# Patient Record
Sex: FEMALE | Race: WHITE | NOT HISPANIC OR LATINO
[De-identification: names, ages, dates, MRNs, and addresses within clinical notes are randomized per-mention and may not be internally consistent; named-entity substitution may affect disease eponyms.]

---

## 2017-01-10 PROBLEM — Z00.00 ENCOUNTER FOR PREVENTIVE HEALTH EXAMINATION: Status: ACTIVE | Noted: 2017-01-10

## 2017-04-17 ENCOUNTER — APPOINTMENT (OUTPATIENT)
Dept: OPHTHALMOLOGY | Facility: CLINIC | Age: 77
End: 2017-04-17

## 2017-06-15 ENCOUNTER — OUTPATIENT (OUTPATIENT)
Dept: OUTPATIENT SERVICES | Facility: HOSPITAL | Age: 77
LOS: 1 days | Discharge: HOME | End: 2017-06-15

## 2017-06-15 DIAGNOSIS — E87.2 ACIDOSIS: ICD-10-CM

## 2017-06-15 DIAGNOSIS — M06.9 RHEUMATOID ARTHRITIS, UNSPECIFIED: ICD-10-CM

## 2017-06-15 DIAGNOSIS — I82.409 ACUTE EMBOLISM AND THROMBOSIS OF UNSPECIFIED DEEP VEINS OF UNSPECIFIED LOWER EXTREMITY: ICD-10-CM

## 2017-06-15 DIAGNOSIS — E11.9 TYPE 2 DIABETES MELLITUS WITHOUT COMPLICATIONS: ICD-10-CM

## 2017-06-28 DIAGNOSIS — D50.8 OTHER IRON DEFICIENCY ANEMIAS: ICD-10-CM

## 2017-06-28 DIAGNOSIS — E78.2 MIXED HYPERLIPIDEMIA: ICD-10-CM

## 2017-06-28 DIAGNOSIS — M06.4 INFLAMMATORY POLYARTHROPATHY: ICD-10-CM

## 2017-09-13 ENCOUNTER — OUTPATIENT (OUTPATIENT)
Dept: OUTPATIENT SERVICES | Facility: HOSPITAL | Age: 77
LOS: 1 days | Discharge: HOME | End: 2017-09-13

## 2017-09-13 DIAGNOSIS — E87.2 ACIDOSIS: ICD-10-CM

## 2017-09-13 DIAGNOSIS — I82.409 ACUTE EMBOLISM AND THROMBOSIS OF UNSPECIFIED DEEP VEINS OF UNSPECIFIED LOWER EXTREMITY: ICD-10-CM

## 2017-09-13 DIAGNOSIS — J02.9 ACUTE PHARYNGITIS, UNSPECIFIED: ICD-10-CM

## 2017-09-13 DIAGNOSIS — M06.9 RHEUMATOID ARTHRITIS, UNSPECIFIED: ICD-10-CM

## 2017-09-13 DIAGNOSIS — E11.9 TYPE 2 DIABETES MELLITUS WITHOUT COMPLICATIONS: ICD-10-CM

## 2017-09-18 ENCOUNTER — OUTPATIENT (OUTPATIENT)
Dept: OUTPATIENT SERVICES | Facility: HOSPITAL | Age: 77
LOS: 1 days | Discharge: HOME | End: 2017-09-18

## 2017-09-18 DIAGNOSIS — I82.409 ACUTE EMBOLISM AND THROMBOSIS OF UNSPECIFIED DEEP VEINS OF UNSPECIFIED LOWER EXTREMITY: ICD-10-CM

## 2017-09-18 DIAGNOSIS — D50.8 OTHER IRON DEFICIENCY ANEMIAS: ICD-10-CM

## 2017-09-18 DIAGNOSIS — E11.9 TYPE 2 DIABETES MELLITUS WITHOUT COMPLICATIONS: ICD-10-CM

## 2017-09-18 DIAGNOSIS — E87.2 ACIDOSIS: ICD-10-CM

## 2017-09-18 DIAGNOSIS — E78.2 MIXED HYPERLIPIDEMIA: ICD-10-CM

## 2017-09-18 DIAGNOSIS — M06.9 RHEUMATOID ARTHRITIS, UNSPECIFIED: ICD-10-CM

## 2017-09-18 DIAGNOSIS — M06.4 INFLAMMATORY POLYARTHROPATHY: ICD-10-CM

## 2017-10-16 ENCOUNTER — APPOINTMENT (OUTPATIENT)
Dept: OPHTHALMOLOGY | Facility: CLINIC | Age: 77
End: 2017-10-16
Payer: MEDICARE

## 2017-10-16 PROCEDURE — 92014 COMPRE OPH EXAM EST PT 1/>: CPT

## 2017-12-18 ENCOUNTER — OUTPATIENT (OUTPATIENT)
Dept: OUTPATIENT SERVICES | Facility: HOSPITAL | Age: 77
LOS: 1 days | Discharge: HOME | End: 2017-12-18

## 2017-12-18 DIAGNOSIS — E87.2 ACIDOSIS: ICD-10-CM

## 2017-12-18 DIAGNOSIS — M06.4 INFLAMMATORY POLYARTHROPATHY: ICD-10-CM

## 2017-12-18 DIAGNOSIS — D50.8 OTHER IRON DEFICIENCY ANEMIAS: ICD-10-CM

## 2017-12-18 DIAGNOSIS — E78.2 MIXED HYPERLIPIDEMIA: ICD-10-CM

## 2017-12-18 DIAGNOSIS — M06.9 RHEUMATOID ARTHRITIS, UNSPECIFIED: ICD-10-CM

## 2017-12-18 DIAGNOSIS — I82.409 ACUTE EMBOLISM AND THROMBOSIS OF UNSPECIFIED DEEP VEINS OF UNSPECIFIED LOWER EXTREMITY: ICD-10-CM

## 2017-12-18 DIAGNOSIS — E11.9 TYPE 2 DIABETES MELLITUS WITHOUT COMPLICATIONS: ICD-10-CM

## 2018-02-28 ENCOUNTER — OUTPATIENT (OUTPATIENT)
Dept: OUTPATIENT SERVICES | Facility: HOSPITAL | Age: 78
LOS: 1 days | Discharge: HOME | End: 2018-02-28

## 2018-02-28 DIAGNOSIS — S52.531A COLLES' FRACTURE OF RIGHT RADIUS, INITIAL ENCOUNTER FOR CLOSED FRACTURE: ICD-10-CM

## 2018-03-14 ENCOUNTER — OUTPATIENT (OUTPATIENT)
Dept: OUTPATIENT SERVICES | Facility: HOSPITAL | Age: 78
LOS: 1 days | Discharge: HOME | End: 2018-03-14

## 2018-03-14 DIAGNOSIS — M54.5 LOW BACK PAIN: ICD-10-CM

## 2018-03-19 ENCOUNTER — OUTPATIENT (OUTPATIENT)
Dept: OUTPATIENT SERVICES | Facility: HOSPITAL | Age: 78
LOS: 1 days | Discharge: HOME | End: 2018-03-19

## 2018-03-19 DIAGNOSIS — E78.2 MIXED HYPERLIPIDEMIA: ICD-10-CM

## 2018-03-19 DIAGNOSIS — D50.8 OTHER IRON DEFICIENCY ANEMIAS: ICD-10-CM

## 2018-03-19 DIAGNOSIS — E11.9 TYPE 2 DIABETES MELLITUS WITHOUT COMPLICATIONS: ICD-10-CM

## 2018-03-19 DIAGNOSIS — M06.4 INFLAMMATORY POLYARTHROPATHY: ICD-10-CM

## 2018-04-03 ENCOUNTER — OUTPATIENT (OUTPATIENT)
Dept: OUTPATIENT SERVICES | Facility: HOSPITAL | Age: 78
LOS: 1 days | Discharge: HOME | End: 2018-04-03

## 2018-04-04 DIAGNOSIS — M81.0 AGE-RELATED OSTEOPOROSIS WITHOUT CURRENT PATHOLOGICAL FRACTURE: ICD-10-CM

## 2018-04-04 DIAGNOSIS — Z78.0 ASYMPTOMATIC MENOPAUSAL STATE: ICD-10-CM

## 2018-04-04 DIAGNOSIS — Z13.820 ENCOUNTER FOR SCREENING FOR OSTEOPOROSIS: ICD-10-CM

## 2018-04-04 DIAGNOSIS — M06.9 RHEUMATOID ARTHRITIS, UNSPECIFIED: ICD-10-CM

## 2018-04-04 DIAGNOSIS — Z87.310 PERSONAL HISTORY OF (HEALED) OSTEOPOROSIS FRACTURE: ICD-10-CM

## 2018-04-16 ENCOUNTER — APPOINTMENT (OUTPATIENT)
Dept: OPHTHALMOLOGY | Facility: CLINIC | Age: 78
End: 2018-04-16
Payer: MEDICARE

## 2018-04-16 PROCEDURE — 92014 COMPRE OPH EXAM EST PT 1/>: CPT

## 2018-05-04 ENCOUNTER — OUTPATIENT (OUTPATIENT)
Dept: OUTPATIENT SERVICES | Facility: HOSPITAL | Age: 78
LOS: 1 days | Discharge: HOME | End: 2018-05-04

## 2018-05-04 DIAGNOSIS — M81.0 AGE-RELATED OSTEOPOROSIS WITHOUT CURRENT PATHOLOGICAL FRACTURE: ICD-10-CM

## 2018-05-04 DIAGNOSIS — D50.8 OTHER IRON DEFICIENCY ANEMIAS: ICD-10-CM

## 2018-05-04 DIAGNOSIS — M06.4 INFLAMMATORY POLYARTHROPATHY: ICD-10-CM

## 2018-05-04 DIAGNOSIS — E78.2 MIXED HYPERLIPIDEMIA: ICD-10-CM

## 2018-06-08 ENCOUNTER — INPATIENT (INPATIENT)
Facility: HOSPITAL | Age: 78
LOS: 6 days | Discharge: NURSING, REHAB OR LONG-TERM CARE | End: 2018-06-15
Attending: INTERNAL MEDICINE | Admitting: INTERNAL MEDICINE
Payer: MEDICARE

## 2018-06-08 VITALS
SYSTOLIC BLOOD PRESSURE: 134 MMHG | OXYGEN SATURATION: 99 % | RESPIRATION RATE: 18 BRPM | TEMPERATURE: 97 F | DIASTOLIC BLOOD PRESSURE: 60 MMHG | HEART RATE: 90 BPM

## 2018-06-08 DIAGNOSIS — M17.11 UNILATERAL PRIMARY OSTEOARTHRITIS, RIGHT KNEE: Chronic | ICD-10-CM

## 2018-06-08 LAB
ABO RH CONFIRMATION: SIGNIFICANT CHANGE UP
ALBUMIN SERPL ELPH-MCNC: 3.3 G/DL — LOW (ref 3.5–5.2)
ALBUMIN SERPL ELPH-MCNC: 3.5 G/DL — SIGNIFICANT CHANGE UP (ref 3.5–5.2)
ALP SERPL-CCNC: 44 U/L — SIGNIFICANT CHANGE UP (ref 30–115)
ALP SERPL-CCNC: 46 U/L — SIGNIFICANT CHANGE UP (ref 30–115)
ALT FLD-CCNC: 14 U/L — SIGNIFICANT CHANGE UP (ref 0–41)
ALT FLD-CCNC: 14 U/L — SIGNIFICANT CHANGE UP (ref 0–41)
ANION GAP SERPL CALC-SCNC: 16 MMOL/L — HIGH (ref 7–14)
APTT BLD: 20.2 SEC — CRITICAL LOW (ref 27–39.2)
AST SERPL-CCNC: 13 U/L — SIGNIFICANT CHANGE UP (ref 0–41)
AST SERPL-CCNC: 13 U/L — SIGNIFICANT CHANGE UP (ref 0–41)
BILIRUB DIRECT SERPL-MCNC: <0.2 MG/DL — SIGNIFICANT CHANGE UP (ref 0–0.2)
BILIRUB INDIRECT FLD-MCNC: >0.1 MG/DL — LOW (ref 0.2–1.2)
BILIRUB SERPL-MCNC: 0.3 MG/DL — SIGNIFICANT CHANGE UP (ref 0.2–1.2)
BILIRUB SERPL-MCNC: 0.4 MG/DL — SIGNIFICANT CHANGE UP (ref 0.2–1.2)
BUN SERPL-MCNC: 46 MG/DL — HIGH (ref 10–20)
CALCIUM SERPL-MCNC: 8.1 MG/DL — LOW (ref 8.5–10.1)
CHLORIDE SERPL-SCNC: 100 MMOL/L — SIGNIFICANT CHANGE UP (ref 98–110)
CK SERPL-CCNC: 51 U/L — SIGNIFICANT CHANGE UP (ref 0–225)
CK SERPL-CCNC: 53 U/L — SIGNIFICANT CHANGE UP (ref 0–225)
CO2 SERPL-SCNC: 19 MMOL/L — SIGNIFICANT CHANGE UP (ref 17–32)
CREAT SERPL-MCNC: 1.5 MG/DL — SIGNIFICANT CHANGE UP (ref 0.7–1.5)
GAS PNL BLDV: SIGNIFICANT CHANGE UP
GLUCOSE SERPL-MCNC: 181 MG/DL — HIGH (ref 70–99)
HCT VFR BLD CALC: 16 % — LOW (ref 37–47)
HGB BLD-MCNC: 5 G/DL — CRITICAL LOW (ref 12–16)
INR BLD: 1.05 RATIO — SIGNIFICANT CHANGE UP (ref 0.65–1.3)
IRON SATN MFR SERPL: 10 % — LOW (ref 15–50)
IRON SATN MFR SERPL: 26 UG/DL — LOW (ref 35–150)
LDH SERPL L TO P-CCNC: 246 — HIGH (ref 50–242)
LIDOCAIN IGE QN: 44 U/L — SIGNIFICANT CHANGE UP (ref 7–60)
MAGNESIUM SERPL-MCNC: 2.2 MG/DL — SIGNIFICANT CHANGE UP (ref 1.8–2.4)
MCHC RBC-ENTMCNC: 31.1 PG — HIGH (ref 27–31)
MCHC RBC-ENTMCNC: 31.3 G/DL — LOW (ref 32–37)
MCV RBC AUTO: 99.4 FL — HIGH (ref 81–99)
NRBC # BLD: 0 /100 WBCS — SIGNIFICANT CHANGE UP (ref 0–0)
PLATELET # BLD AUTO: 55 K/UL — LOW (ref 130–400)
POTASSIUM SERPL-MCNC: 4.6 MMOL/L — SIGNIFICANT CHANGE UP (ref 3.5–5)
POTASSIUM SERPL-SCNC: 4.6 MMOL/L — SIGNIFICANT CHANGE UP (ref 3.5–5)
PROT SERPL-MCNC: 5.5 G/DL — LOW (ref 6–8)
PROT SERPL-MCNC: 5.8 G/DL — LOW (ref 6–8)
PROTHROM AB SERPL-ACNC: 11.3 SEC — SIGNIFICANT CHANGE UP (ref 9.95–12.87)
RBC # BLD: 1.61 M/UL — LOW (ref 4.2–5.4)
RBC # FLD: 18.5 % — HIGH (ref 11.5–14.5)
SODIUM SERPL-SCNC: 135 MMOL/L — SIGNIFICANT CHANGE UP (ref 135–146)
TIBC SERPL-MCNC: 250 UG/DL — SIGNIFICANT CHANGE UP (ref 220–430)
TROPONIN T SERPL-MCNC: 0.02 NG/ML — HIGH
TROPONIN T SERPL-MCNC: 0.03 NG/ML — CRITICAL HIGH
TYPE + AB SCN PNL BLD: SIGNIFICANT CHANGE UP
UIBC SERPL-MCNC: 224 UG/DL — SIGNIFICANT CHANGE UP (ref 110–370)
URATE SERPL-MCNC: 8.4 MG/DL — HIGH (ref 2.5–7)
WBC # BLD: 0.96 K/UL — CRITICAL LOW (ref 4.8–10.8)
WBC # FLD AUTO: 0.96 K/UL — CRITICAL LOW (ref 4.8–10.8)

## 2018-06-08 RX ORDER — CEFEPIME 1 G/1
2000 INJECTION, POWDER, FOR SOLUTION INTRAMUSCULAR; INTRAVENOUS ONCE
Qty: 0 | Refills: 0 | Status: COMPLETED | OUTPATIENT
Start: 2018-06-08 | End: 2018-06-08

## 2018-06-08 RX ORDER — VANCOMYCIN HCL 1 G
VIAL (EA) INTRAVENOUS
Qty: 0 | Refills: 0 | Status: DISCONTINUED | OUTPATIENT
Start: 2018-06-08 | End: 2018-06-08

## 2018-06-08 RX ORDER — CEFEPIME 1 G/1
INJECTION, POWDER, FOR SOLUTION INTRAMUSCULAR; INTRAVENOUS
Qty: 0 | Refills: 0 | Status: DISCONTINUED | OUTPATIENT
Start: 2018-06-08 | End: 2018-06-13

## 2018-06-08 RX ORDER — VANCOMYCIN HCL 1 G
VIAL (EA) INTRAVENOUS
Qty: 0 | Refills: 0 | Status: DISCONTINUED | OUTPATIENT
Start: 2018-06-08 | End: 2018-06-13

## 2018-06-08 RX ORDER — PANTOPRAZOLE SODIUM 20 MG/1
40 TABLET, DELAYED RELEASE ORAL ONCE
Qty: 0 | Refills: 0 | Status: COMPLETED | OUTPATIENT
Start: 2018-06-08 | End: 2018-06-08

## 2018-06-08 RX ORDER — SODIUM CHLORIDE 9 MG/ML
1000 INJECTION INTRAMUSCULAR; INTRAVENOUS; SUBCUTANEOUS
Qty: 0 | Refills: 0 | Status: DISCONTINUED | OUTPATIENT
Start: 2018-06-08 | End: 2018-06-08

## 2018-06-08 RX ORDER — CEFEPIME 1 G/1
2000 INJECTION, POWDER, FOR SOLUTION INTRAMUSCULAR; INTRAVENOUS EVERY 24 HOURS
Qty: 0 | Refills: 0 | Status: DISCONTINUED | OUTPATIENT
Start: 2018-06-09 | End: 2018-06-13

## 2018-06-08 RX ORDER — VANCOMYCIN HCL 1 G
1200 VIAL (EA) INTRAVENOUS EVERY 24 HOURS
Qty: 0 | Refills: 0 | Status: DISCONTINUED | OUTPATIENT
Start: 2018-06-09 | End: 2018-06-13

## 2018-06-08 RX ORDER — ALLOPURINOL 300 MG
300 TABLET ORAL DAILY
Qty: 0 | Refills: 0 | Status: DISCONTINUED | OUTPATIENT
Start: 2018-06-08 | End: 2018-06-15

## 2018-06-08 RX ORDER — VANCOMYCIN HCL 1 G
1200 VIAL (EA) INTRAVENOUS ONCE
Qty: 0 | Refills: 0 | Status: DISCONTINUED | OUTPATIENT
Start: 2018-06-08 | End: 2018-06-08

## 2018-06-08 RX ORDER — CEFEPIME 1 G/1
INJECTION, POWDER, FOR SOLUTION INTRAMUSCULAR; INTRAVENOUS
Qty: 0 | Refills: 0 | Status: COMPLETED | OUTPATIENT
Start: 2018-06-08 | End: 2018-06-09

## 2018-06-08 RX ORDER — VANCOMYCIN HCL 1 G
1200 VIAL (EA) INTRAVENOUS ONCE
Qty: 0 | Refills: 0 | Status: COMPLETED | OUTPATIENT
Start: 2018-06-08 | End: 2018-06-08

## 2018-06-08 RX ORDER — SODIUM CHLORIDE 9 MG/ML
2000 INJECTION, SOLUTION INTRAVENOUS ONCE
Qty: 0 | Refills: 0 | Status: COMPLETED | OUTPATIENT
Start: 2018-06-08 | End: 2018-06-08

## 2018-06-08 RX ADMIN — Medication 300 MILLIGRAM(S): at 22:08

## 2018-06-08 RX ADMIN — Medication 250 MILLIGRAM(S): at 20:33

## 2018-06-08 RX ADMIN — PANTOPRAZOLE SODIUM 40 MILLIGRAM(S): 20 TABLET, DELAYED RELEASE ORAL at 15:41

## 2018-06-08 RX ADMIN — CEFEPIME 100 MILLIGRAM(S): 1 INJECTION, POWDER, FOR SOLUTION INTRAMUSCULAR; INTRAVENOUS at 20:33

## 2018-06-08 RX ADMIN — SODIUM CHLORIDE 2000 MILLILITER(S): 9 INJECTION, SOLUTION INTRAVENOUS at 12:50

## 2018-06-08 NOTE — ED ADULT NURSE REASSESSMENT NOTE - NS ED NURSE REASSESS COMMENT FT1
RN called blood bank, blood is not ready yet, as per blood bank can be about 45minutes till it is ready

## 2018-06-08 NOTE — ED PROVIDER NOTE - NS ED ROS FT
REVIEW OF SYSTEMS:    CONSTITUTIONAL: No weakness, fevers or chills  EYES/ENT: No visual changes;  No vertigo or throat pain   NECK: No pain or stiffness. No cervical midline tenderness.  RESPIRATORY: No difficulty breathing, cough, wheezing.  CARDIOVASCULAR: No chest pain, SOB, or palpitations  GENITOURINARY: No dysuria, frequency or hematuria.  NEUROLOGICAL: No headache. No numbness or weakness.  SKIN: No itching, rashes.

## 2018-06-08 NOTE — ED PROVIDER NOTE - OBJECTIVE STATEMENT
79yo F hx HTN DM RA on 60mg daily prednisone and TMX, RLE nonhealing wound just finished course of Clindamycin pw diarrhea x3d- c/o mld diffuse abdominal pain associated with loose stools but otherwise no complaints- no f/c/n/v, chest pain, shortness of breath, dysuria/hematuria, back pain- Denies all bleeding. Denies hemoptysis, hematemesis, hematuria, BRBPR, melena, vaginal bleeding

## 2018-06-08 NOTE — ED PROVIDER NOTE - PHYSICAL EXAMINATION
Well appearing NAD non toxic. NCAT EOMI conjunctiva pale No nasal discharge. dry MM. Neck supple, non tender, full ROM. RRR no MRG +S1S2. CTA b/l. Abd s NT ND +BS. Ext WWP x4, moving all extremities, no edema. 2+ equal pulses throughout. Cooperative, appropriate.

## 2018-06-08 NOTE — H&P ADULT - NSHPLABSRESULTS_GEN_ALL_CORE
5.0    0.96  )-----------( 55       ( 08 Jun 2018 12:49 )             16.0       06-08    135  |  100  |  46<H>  ----------------------------<  181<H>  4.6   |  19  |  1.5    Ca    8.1<L>      08 Jun 2018 12:49    TPro  5.8<L>  /  Alb  3.5  /  TBili  0.4  /  DBili  x   /  AST  13  /  ALT  14  /  AlkPhos  46  06-08                      Lactate Trend      CARDIAC MARKERS ( 08 Jun 2018 14:25 )  x     / 0.03 ng/mL / 51 U/L / x     / x            CAPILLARY BLOOD GLUCOSE 5.0    0.96  )-----------( 55       ( 08 Jun 2018 12:49 )             16.0       06-08    135  |  100  |  46<H>  ----------------------------<  181<H>  4.6   |  19  |  1.5    Ca    8.1<L>      08 Jun 2018 12:49    TPro  5.8<L>  /  Alb  3.5  /  TBili  0.4  /  DBili  x   /  AST  13  /  ALT  14  /  AlkPhos  46  06-08          < from: 12 Lead ECG (06.08.18 @ 12:13) >     Line Normal sinus rhythm  Poor R wave progression  Borderline EKG    < end of copied text >                Lactate Trend      CARDIAC MARKERS ( 08 Jun 2018 14:25 )  x     / 0.03 ng/mL / 51 U/L / x     / x            CAPILLARY BLOOD GLUCOSE

## 2018-06-08 NOTE — ED PROVIDER NOTE - CARE PLAN
Principal Discharge DX:	Pancytopenia  Secondary Diagnosis:	NSTEMI (non-ST elevated myocardial infarction)

## 2018-06-08 NOTE — ED PROVIDER NOTE - MEDICAL DECISION MAKING DETAILS
Sign out received from Dr. Jane. I personally evaluated the patient. I reviewed the Resident’s or Physician Assistant’s note (as assigned above), and agree with the findings and plan except as documented in my note. Patient doing well, patient admitted to tele

## 2018-06-08 NOTE — ED PROVIDER NOTE - PROGRESS NOTE DETAILS
Pt s/o to Dr. Whitlock to follow up CT A/P, reassess and admit. rectal exam showing guaic + brown stool

## 2018-06-08 NOTE — ED ADULT NURSE NOTE - OBJECTIVE STATEMENT
patient states has diarrhea x2days denies abdomen pan denies n/v. Pt states she is a diabetic. Pt states eating and drinking ok.

## 2018-06-08 NOTE — H&P ADULT - ATTENDING COMMENTS
patient seen and examined independently, agree with pgy 3 assesment and plan except as indicated above,

## 2018-06-08 NOTE — H&P ADULT - HISTORY OF PRESENT ILLNESS
78 Y O f with pmh of HTN , and RA on prednisone and methotrexate presented to ER with c/o diarrhea and generalized weakness for 3 days . History goes back to 2 months ; when pt developed a blister on her  right toe that was infected , went to her PCP was told to take  clindamycin for 5 days. Pt took the medication and reports the that wound was healing . Pt  then went to her Vascular Dr 2 wks ago , had doppler done and was told to take clindamycin for another 2 wks. Pt started taking clindamycin , pt completed 12 days and then developed watery diarrhea .  Pt reports having > 5 episodes of diarrhea , no nausea or vomiting .Pt reports that for past 2 days she had around 7 BM /day , watery with cramps , she did not notice any blood or black stools.  Pt reports no fevers but reports chills at night. Pt also developed generalized weakness , was unable to ambulate so presented to ER.    In Er blood work showed pt had pancytopenia with hb of 5 ,  wbc 0.96 , platelet count of 50 .Labs done in March showed hb of 10.2 with repeat in May was 7.6 but normal platelets and white count.  Pt also had guaiaic + stools. 78 Y O f with pmh of HTN , chronic back pain and RA on prednisone and methotrexate presented to ER with c/o diarrhea and generalized weakness for 3 days . History goes back to 2 months ; when pt developed a blister on her  right toe that was infected , went to her PCP was told to take  clindamycin for 5 days. Pt took the medication and reports the that wound was healing . Pt  then went to her Vascular Dr 2 wks ago , had doppler done and was told to take clindamycin for another 2 wks. Pt started taking clindamycin , pt completed 12 days and then developed watery diarrhea .  Pt reports having > 5 episodes of diarrhea , no nausea or vomiting .Pt reports that for past 2 days she had around 7 BM /day , watery with cramps , she did not notice any blood or black stools.    Pt reports no fevers but reports chills at night. Pt also developed generalized weakness , was unable to ambulate so presented to ER. Pt reports sob , for past 2 months.     In Er blood work showed pt had pancytopenia with hb of 5 ,  wbc 0.96 , platelet count of 50 .Labs done in March showed hb of 10.2 with repeat in May was 7.6 but normal platelets and white count.  Pt also had guaiaic + stools.

## 2018-06-08 NOTE — H&P ADULT - NSHPPHYSICALEXAM_GEN_ALL_CORE
T(F): 98.4  HR: 66  BP: 108/58  RR: 18  SpO2: 97%      PHYSICAL EXAM:  GENERAL: NAD, well-developed lying on bed PALE   HEAD:  Atraumatic, Normocephalic  NECK: Supple, No JVD  CHEST/LUNG: Clear to auscultation bilaterally; No wheeze  HEART: Regular rate and rhythm; No murmurs, rubs, or gallops  ABDOMEN: Soft, Nontender, Nondistended; Bowel sounds present  EXTREMITIES:  2+ Peripheral Pulses, right 3rd toe small ulcer dry with no surrounding erythema no discharge  PSYCH: AAOx3  NEUROLOGY: non-focal  SKIN: LEFT WRIST ECCHYMOTIC T(F): 98.4  HR: 66  BP: 108/58  RR: 18  SpO2: 97%      PHYSICAL EXAM:  GENERAL: NAD, well-developed lying on bed PALE   HEAD:  Atraumatic, Normocephalic  NECK: Supple, No JVD  PULM: Clear to auscultation bilaterally; No wheeze  CV: Regular rate and rhythm; No murmurs, rubs, or gallops  ABDOMEN: Soft, Nontender, Nondistended; Bowel sounds present  EXTREMITIES:  2+ Peripheral Pulses, right 3rd toe small ulcer dry with no surrounding erythema no discharge  PSYCH: AAOx3  NEUROLOGY: non-focal  SKIN: LEFT WRIST ECCHYMOTIC

## 2018-06-08 NOTE — ED PROVIDER NOTE - ATTENDING CONTRIBUTION TO CARE
77 yo female with PMH HTN, DM, rheumatoid arthritis on prednisone and MTX, presented c/o diarrhea x 3 days associated with diffuse abdominal pain. Pt with recent use of clindamycin. Denied any nausea or vomiting, urinary complaints, fevers or chills. VS noted, agree with exam as above.  A/P: Diarrhea; Abdominal pain -  labs, IVF, EKG, CT A/P, reassess

## 2018-06-08 NOTE — CONSULT NOTE ADULT - SUBJECTIVE AND OBJECTIVE BOX
Patient is a 78y old  Female who presents with a chief complaint of Diarrhea and generalized weakness for 3 days (08 Jun 2018 16:56)      HPI:  78 Y O f with pmh of HTN , and RA on prednisone and methotrexate presented to ER with c/o diarrhea and generalized weakness for 3 days . History goes back to 2 months ; when pt developed a blister on her  right toe that was infected , went to her PCP was told to take  clindamycin for 5 days. Pt took the medication and reports the that wound was healing . Pt  then went to her Vascular Dr 2 wks ago , had doppler done and was told to take clindamycin for another 2 wks. Pt started taking clindamycin , pt completed 12 days and then developed watery diarrhea .  Pt reports having > 5 episodes of diarrhea , no nausea or vomiting .Pt reports that for past 2 days she had around 7 BM /day , watery with cramps , she did not notice any blood or black stools.  Pt reports no fevers but reports chills at night. Pt also developed generalized weakness , was unable to ambulate so presented to ER.    In Er blood work showed pt had pancytopenia with hb of 5 ,  wbc 0.96 , platelet count of 50 .Labs done in March showed hb of 10.2 with repeat in May was 7.6 but normal platelets and white count.  Pt also had guaiaic + stools. (08 Jun 2018 16:56)       PAST MEDICAL & SURGICAL HISTORY:  Rheumatoid arteritis  Other specified diabetes mellitus with other specified complication, unspecified whether long term insulin use  Primary osteoarthritis of right knee: s/p knee repalcement      SOCIAL HISTORY:  Negative x 3     FAMILY HISTORY:  No pertinent family history in first degree relatives      MEDICATIONS  (STANDING):  cefepime   IVPB      enalapril 10 milliGRAM(s) Oral daily  predniSONE   Tablet 60 milliGRAM(s) Oral daily  sodium chloride 0.9%. 1000 milliLiter(s) (75 mL/Hr) IV Continuous <Continuous>  vancomycin  IVPB        MEDICATIONS  (PRN):      Weight (kg): 80 (06-08-18 @ 18:03)  Allergies    No Known Drug Allergies  strawberry (Unknown)    Intolerances        Vital Signs Last 24 Hrs  T(C): 36.9 (08 Jun 2018 16:10), Max: 36.9 (08 Jun 2018 16:10)  T(F): 98.4 (08 Jun 2018 16:10), Max: 98.4 (08 Jun 2018 16:10)  HR: 66 (08 Jun 2018 16:10) (66 - 90)  BP: 108/58 (08 Jun 2018 16:10) (108/58 - 134/60)  BP(mean): --  RR: 18 (08 Jun 2018 16:10) (18 - 18)  SpO2: 97% (08 Jun 2018 16:10) (97% - 99%)    PHYSICAL EXAM  General: adult in NAD  HEENT: clear oropharynx, anicteric sclera, pink conjunctiva  Neck: supple  CV: normal S1/S2 with no murmur rubs or gallops  Lungs: positive air movement b/l ant lungs,clear to auscultation, no wheezes, no rales  Abdomen: soft non-tender non-distended, no hepatosplenomegaly  Ext: no clubbing cyanosis or edema  Skin: no rashes and no petechiae  Neuro: alert and oriented X 4, no focal deficits      LABS:                          5.0    0.96  )-----------( 55       ( 08 Jun 2018 12:49 )             16.0         Mean Cell Volume : 99.4 fL  Mean Cell Hemoglobin : 31.1 pg  Mean Cell Hemoglobin Concentration : 31.3 g/dL  Auto Neutrophil # : x  Auto Lymphocyte # : x  Auto Monocyte # : x  Auto Eosinophil # : x  Auto Basophil # : x  Auto Neutrophil % : 80.0 %  Auto Lymphocyte % : 10.0 %  Auto Monocyte % : x  Auto Eosinophil % : 10.0 %  Auto Basophil % : x      Serial CBC's  06-08 @ 12:49  Hct-16.0 / Hgb-5.0 / Plat-55 / RBC-1.61 / WBC-0.96      06-08    135  |  100  |  46<H>  ----------------------------<  181<H>  4.6   |  19  |  1.5    Ca    8.1<L>      08 Jun 2018 12:49    TPro  5.8<L>  /  Alb  3.5  /  TBili  0.4  /  DBili  x   /  AST  13  /  ALT  14  /  AlkPhos  46  06-08      Radiology:      < from: CT Abdomen and Pelvis w/ IV Cont (06.08.18 @ 15:00) >    EXAM:  CT ABDOMEN AND PELVIS IC            PROCEDURE DATE:  06/08/2018            INTERPRETATION:  CLINICAL HISTORY / REASON FOR EXAM: Abdominal pain,   diarrhea.    TECHNIQUE: Contiguous axial CT images were obtained from the lower chest   to the pubic symphysis following administration of 100 mL Optiray 320   intravenous contrast.  Oral contrast was not administered.  Reformatted   images in the coronal and sagittal planes were acquired.    COMPARISON CT: None        OTHER STUDIES USED FOR CORRELATION: Lumbar radiograph from February 28, 2018       FINDINGS:    LOWER CHEST: Apparent asymmetric right breast soft tissue. Bibasilar   atelectasis with left lower lobe groundglass opacities.    HEPATOBILIARY: Indeterminate 1.1 cm liver segment 6 hypodense lesion   (series 3, image 22)    SPLEEN: Unremarkable.    PANCREAS: Two pancreatic tail hypodense possible cystic lesions, larger   1.9 cm (series 5, image 107).    ADRENAL GLANDS: Unremarkable.    KIDNEYS: Symmetric enhancement bilaterally. No evidence of hydronephrosis.    ABDOMINOPELVIC NODES: Unremarkable.    PELVIC ORGANS: Distended urinary bladder.    PERITONEUM/MESENTERY/BOWEL: No bowel obstruction, ascites or   intraperitoneal free air. Normal appendix.    BONES/SOFT TISSUES:Chronic compression fractures of T12, L1 and L2.   Chronic fracture of the left pubic rami. Chronic right eighth rib   fracture. Degenerative changes to the thoracolumbar spine.      IMPRESSION:  1. No evidence of acute abdominal pathology.    2. Apparent asymmetric right breast soft tissue; diagnostic mammogram or   correlation with outside mammography recommended.    3. Indeterminate 1.1 cm liver segment 6 hypodense lesion. Further   evaluation recommended with MRI abdomen with and without IV contrast.    4. Two pancreatic tail hypodense possible cystic lesions, larger 1.9 cm;   this can be further evaluated with recommended MRI with additional MRCP   sequences.    < end of copied text > Patient is a 78y old  Female who presents with a chief complaint of Diarrhea and generalized weakness for 3 days (08 Jun 2018 16:56)      HPI:  78 Y O f with pmh of HTN , and RA on prednisone and methotrexate presented to ER with c/o diarrhea and generalized weakness for 3 days . History goes back to 2 months ; when pt developed a blister on her  right toe that was infected , went to her PCP was told to take  clindamycin for 5 days. Pt took the medication and reports the that wound was healing . Pt  then went to her Vascular Dr 2 wks ago , had doppler done and was told to take clindamycin for another 2 wks. Pt started taking clindamycin , pt completed 12 days and then developed watery diarrhea .  Pt reports having > 5 episodes of diarrhea , no nausea or vomiting .Pt reports that for past 2 days she had around 7 BM /day , watery with cramps , she did not notice any blood or black stools.  Pt reports no fevers but reports chills at night. Pt also developed generalized weakness , was unable to ambulate so presented to ER. The patient also endorses lightheadedness and dizziness, some nausea but no vomiting, she denies any abdominal pain. The patient also states that she has decreased appetite for the last few months and generalized fatigue over the last few days. Patient denies headaches, dysphagia, mouth sores/blisters, chest pain, shortness of breath, abdominal pain, hematuria, hematochezia, melena, epistaxis or gum bleeding.     In Er blood work showed pt had pancytopenia with hb of 5 ,  wbc 0.96 , platelet count of 50 .Labs done in March showed hb of 10.2 with repeat in May was 7.6 but normal platelets and white count.  Pt also had guaiaic + stools. (08 Jun 2018 16:56)       PAST MEDICAL & SURGICAL HISTORY:  Rheumatoid arteritis  Other specified diabetes mellitus with other specified complication, unspecified whether long term insulin use  Primary osteoarthritis of right knee: s/p knee repalcement      SOCIAL HISTORY:  Negative x 3     FAMILY HISTORY:  No pertinent family history in first degree relatives      MEDICATIONS  (STANDING):  cefepime   IVPB      enalapril 10 milliGRAM(s) Oral daily  predniSONE   Tablet 60 milliGRAM(s) Oral daily  sodium chloride 0.9%. 1000 milliLiter(s) (75 mL/Hr) IV Continuous <Continuous>  vancomycin  IVPB        MEDICATIONS  (PRN):      Weight (kg): 80 (06-08-18 @ 18:03)  Allergies    No Known Drug Allergies  strawberry (Unknown)    Intolerances        Vital Signs Last 24 Hrs  T(C): 36.9 (08 Jun 2018 16:10), Max: 36.9 (08 Jun 2018 16:10)  T(F): 98.4 (08 Jun 2018 16:10), Max: 98.4 (08 Jun 2018 16:10)  HR: 66 (08 Jun 2018 16:10) (66 - 90)  BP: 108/58 (08 Jun 2018 16:10) (108/58 - 134/60)  BP(mean): --  RR: 18 (08 Jun 2018 16:10) (18 - 18)  SpO2: 97% (08 Jun 2018 16:10) (97% - 99%)    PHYSICAL EXAM  General: adult in NAD, obese   HEENT: moist mucous membranes, no ulcers, blisters   Neck: no adenopathy  Heart: normal S1/S2   Lungs: decreased breath sounds bilateral bases   abdomen: soft non-tender non-distended, no hepatosplenomegaly  Ext: no edema, healing blister on the right 3rd and 4th toe  Neuro: alert and oriented X 4, no focal deficits      LABS:                          5.0    0.96  )-----------( 55       ( 08 Jun 2018 12:49 )             16.0         Mean Cell Volume : 99.4 fL  Mean Cell Hemoglobin : 31.1 pg  Mean Cell Hemoglobin Concentration : 31.3 g/dL  Auto Neutrophil # : x  Auto Lymphocyte # : x  Auto Monocyte # : x  Auto Eosinophil # : x  Auto Basophil # : x  Auto Neutrophil % : 80.0 %  Auto Lymphocyte % : 10.0 %  Auto Monocyte % : x  Auto Eosinophil % : 10.0 %  Auto Basophil % : x      Serial CBC's  06-08 @ 12:49  Hct-16.0 / Hgb-5.0 / Plat-55 / RBC-1.61 / WBC-0.96      06-08    135  |  100  |  46<H>  ----------------------------<  181<H>  4.6   |  19  |  1.5    Ca    8.1<L>      08 Jun 2018 12:49    TPro  5.8<L>  /  Alb  3.5  /  TBili  0.4  /  DBili  x   /  AST  13  /  ALT  14  /  AlkPhos  46  06-08      Radiology:      < from: CT Abdomen and Pelvis w/ IV Cont (06.08.18 @ 15:00) >    EXAM:  CT ABDOMEN AND PELVIS IC            PROCEDURE DATE:  06/08/2018            INTERPRETATION:  CLINICAL HISTORY / REASON FOR EXAM: Abdominal pain,   diarrhea.    TECHNIQUE: Contiguous axial CT images were obtained from the lower chest   to the pubic symphysis following administration of 100 mL Optiray 320   intravenous contrast.  Oral contrast was not administered.  Reformatted   images in the coronal and sagittal planes were acquired.    COMPARISON CT: None        OTHER STUDIES USED FOR CORRELATION: Lumbar radiograph from February 28, 2018       FINDINGS:    LOWER CHEST: Apparent asymmetric right breast soft tissue. Bibasilar   atelectasis with left lower lobe groundglass opacities.    HEPATOBILIARY: Indeterminate 1.1 cm liver segment 6 hypodense lesion   (series 3, image 22)    SPLEEN: Unremarkable.    PANCREAS: Two pancreatic tail hypodense possible cystic lesions, larger   1.9 cm (series 5, image 107).    ADRENAL GLANDS: Unremarkable.    KIDNEYS: Symmetric enhancement bilaterally. No evidence of hydronephrosis.    ABDOMINOPELVIC NODES: Unremarkable.    PELVIC ORGANS: Distended urinary bladder.    PERITONEUM/MESENTERY/BOWEL: No bowel obstruction, ascites or   intraperitoneal free air. Normal appendix.    BONES/SOFT TISSUES:Chronic compression fractures of T12, L1 and L2.   Chronic fracture of the left pubic rami. Chronic right eighth rib   fracture. Degenerative changes to the thoracolumbar spine.      IMPRESSION:  1. No evidence of acute abdominal pathology.    2. Apparent asymmetric right breast soft tissue; diagnostic mammogram or   correlation with outside mammography recommended.    3. Indeterminate 1.1 cm liver segment 6 hypodense lesion. Further   evaluation recommended with MRI abdomen with and without IV contrast.    4. Two pancreatic tail hypodense possible cystic lesions, larger 1.9 cm;   this can be further evaluated with recommended MRI with additional MRCP   sequences.    < end of copied text > Patient is a 78y old  Female who presents with a chief complaint of Diarrhea and generalized weakness for 3 days (08 Jun 2018 16:56)      HPI:  78 Y O f with pmh of HTN , and RA on prednisone and methotrexate presented to ER with c/o diarrhea and generalized weakness for 3 days . History goes back to 2 months ; when pt developed a blister on her  right toe that was infected , went to her PCP was told to take  clindamycin for 5 days. Pt took the medication and reports the that wound was healing . Pt  then went to her Vascular Dr 2 wks ago , had doppler done and was told to take clindamycin for another 2 wks. Pt started taking clindamycin , pt completed 12 days and then developed watery diarrhea .  Pt reports having > 5 episodes of diarrhea , no nausea or vomiting .Pt reports that for past 2 days she had around 7 BM /day , watery with cramps , she did not notice any blood or black stools.  Pt reports no fevers but reports chills at night. Pt also developed generalized weakness , was unable to ambulate so presented to ER. The patient also endorses lightheadedness and dizziness, some nausea but no vomiting, she denies any abdominal pain. The patient also states that she has decreased appetite for the last few months and generalized fatigue over the last few days. Patient denies headaches, dysphagia, mouth sores/blisters, chest pain, shortness of breath, abdominal pain, hematuria, hematochezia, melena, epistaxis or gum bleeding.     In Er blood work showed pt had pancytopenia with hb of 5 ,  wbc 0.96 , platelet count of 50 .Labs done in March showed hb of 10.2 with repeat in May was 7.6 but normal platelets and white count.  Pt also had guaiaic + stools. (08 Jun 2018 16:56)       PAST MEDICAL & SURGICAL HISTORY:  Rheumatoid arteritis  Other specified diabetes mellitus with other specified complication, unspecified whether long term insulin use  Primary osteoarthritis of right knee: s/p knee repalcement      SOCIAL HISTORY:  Negative x 3     FAMILY HISTORY:  No pertinent family history in first degree relatives      MEDICATIONS  (STANDING):  cefepime   IVPB      enalapril 10 milliGRAM(s) Oral daily  predniSONE   Tablet 60 milliGRAM(s) Oral daily  sodium chloride 0.9%. 1000 milliLiter(s) (75 mL/Hr) IV Continuous <Continuous>  vancomycin  IVPB        MEDICATIONS  (PRN):      Weight (kg): 80 (06-08-18 @ 18:03)  Allergies    No Known Drug Allergies  strawberry (Unknown)    Intolerances        Vital Signs Last 24 Hrs  T(C): 36.9 (08 Jun 2018 16:10), Max: 36.9 (08 Jun 2018 16:10)  T(F): 98.4 (08 Jun 2018 16:10), Max: 98.4 (08 Jun 2018 16:10)  HR: 66 (08 Jun 2018 16:10) (66 - 90)  BP: 108/58 (08 Jun 2018 16:10) (108/58 - 134/60)  BP(mean): --  RR: 18 (08 Jun 2018 16:10) (18 - 18)  SpO2: 97% (08 Jun 2018 16:10) (97% - 99%)    PHYSICAL EXAM  General: adult in NAD, obese   HEENT: moist mucous membranes, no ulcers, blisters   Neck: no adenopathy  Heart: normal S1/S2   Lungs: decreased breath sounds bilateral bases   abdomen: soft non-tender non-distended, no hepatosplenomegaly  Ext: no edema, healing blister on the right 3rd and 4th toe  Neuro: alert and oriented X 4, no focal deficits      LABS:                        7.8    0.66  )-----------( 38       ( 09 Jun 2018 09:46 )             23.1                           5.0    0.96  )-----------( 55       ( 08 Jun 2018 12:49 )             16.0         Mean Cell Volume : 99.4 fL  Mean Cell Hemoglobin : 31.1 pg  Mean Cell Hemoglobin Concentration : 31.3 g/dL  Auto Neutrophil # : x  Auto Lymphocyte # : x  Auto Monocyte # : x  Auto Eosinophil # : x  Auto Basophil # : x  Auto Neutrophil % : 80.0 %  Auto Lymphocyte % : 10.0 %  Auto Monocyte % : x  Auto Eosinophil % : 10.0 %  Auto Basophil % : x      Serial CBC's  06-08 @ 12:49  Hct-16.0 / Hgb-5.0 / Plat-55 / RBC-1.61 / WBC-0.96      06-08    135  |  100  |  46<H>  ----------------------------<  181<H>  4.6   |  19  |  1.5    Ca    8.1<L>      08 Jun 2018 12:49    TPro  5.8<L>  /  Alb  3.5  /  TBili  0.4  /  DBili  x   /  AST  13  /  ALT  14  /  AlkPhos  46  06-08      Radiology:      < from: CT Abdomen and Pelvis w/ IV Cont (06.08.18 @ 15:00) >    EXAM:  CT ABDOMEN AND PELVIS IC            PROCEDURE DATE:  06/08/2018            INTERPRETATION:  CLINICAL HISTORY / REASON FOR EXAM: Abdominal pain,   diarrhea.    TECHNIQUE: Contiguous axial CT images were obtained from the lower chest   to the pubic symphysis following administration of 100 mL Optiray 320   intravenous contrast.  Oral contrast was not administered.  Reformatted   images in the coronal and sagittal planes were acquired.    COMPARISON CT: None        OTHER STUDIES USED FOR CORRELATION: Lumbar radiograph from February 28, 2018       FINDINGS:    LOWER CHEST: Apparent asymmetric right breast soft tissue. Bibasilar   atelectasis with left lower lobe groundglass opacities.    HEPATOBILIARY: Indeterminate 1.1 cm liver segment 6 hypodense lesion   (series 3, image 22)    SPLEEN: Unremarkable.    PANCREAS: Two pancreatic tail hypodense possible cystic lesions, larger   1.9 cm (series 5, image 107).    ADRENAL GLANDS: Unremarkable.    KIDNEYS: Symmetric enhancement bilaterally. No evidence of hydronephrosis.    ABDOMINOPELVIC NODES: Unremarkable.    PELVIC ORGANS: Distended urinary bladder.    PERITONEUM/MESENTERY/BOWEL: No bowel obstruction, ascites or   intraperitoneal free air. Normal appendix.    BONES/SOFT TISSUES:Chronic compression fractures of T12, L1 and L2.   Chronic fracture of the left pubic rami. Chronic right eighth rib   fracture. Degenerative changes to the thoracolumbar spine.      IMPRESSION:  1. No evidence of acute abdominal pathology.    2. Apparent asymmetric right breast soft tissue; diagnostic mammogram or   correlation with outside mammography recommended.    3. Indeterminate 1.1 cm liver segment 6 hypodense lesion. Further   evaluation recommended with MRI abdomen with and without IV contrast.    4. Two pancreatic tail hypodense possible cystic lesions, larger 1.9 cm;   this can be further evaluated with recommended MRI with additional MRCP   sequences.    < end of copied text >

## 2018-06-08 NOTE — CONSULT NOTE ADULT - ASSESSMENT
78 Y O f with pmh of HTN , and RA on prednisone and methotrexate presented to ER with c/o diarrhea and generalized weakness for 3 days.       # ANEMIA / PANCYTOPENIA / BLAST ON PERIPHERAL SMEAR    -Possible ACUTE LEUKEMIA     LESS LIKELY CLINDAMYCIN ASSOCIATED BONE MARROW TOXICITY VS METHOTREXATE MDS VS  VIRAL ILLNESS ( LESS LIKELY)    - Transfuse 2 units of PRBC, Keep Hgb >7, Platelets >10   - IVFs, Broad Spectrum Antibiotics   - Stat PT/PTT, iron studies including ferritin- please draw prior to transfusion  - Flow cytometry, B12, folate, SPEP, K/L ratio, SIFE, HIV, Hepatitis Panel, LDH, Magnesium, Phosphorus, uric acid, methotrexate level  -2D echo   - UA/UCx/BCx  - Send off C diff- for diarrhea   - Consider CT chest     # SHONNA?  - IVFs, trend serum creatinine       # DIARRHEA :    - pt reports improvement in diarrhea today , will send stool sample to r/o C.DIFF.  - iv fluids.    # Laceration on the toe;    -Dry , no discharge or erythema .  - vascular evaluation .    # RA:    - Stable will c/w prednisone , will hold off on methotrexate for now.    # GI:    -ppx with protonix.    # DVT:    -ppx with scd. 78 Y O f with pmh of HTN , and RA on prednisone and methotrexate presented to ER with c/o diarrhea and generalized weakness for 3 days.       # ANEMIA / PANCYTOPENIA / BLAST ON PERIPHERAL SMEAR    -Possible ACUTE LEUKEMIA     LESS LIKELY CLINDAMYCIN ASSOCIATED BONE MARROW TOXICITY VS METHOTREXATE MDS VS  VIRAL ILLNESS ( LESS LIKELY)    - Transfuse 2 units of PRBC, Keep Hgb >7, Platelets >10   - IVFs, Broad Spectrum Antibiotics   - Stat PT/PTT, iron studies including ferritin- please draw prior to transfusion  - Flow cytometry, B12, folate, SPEP, K/L ratio, SIFE, HIV, Hepatitis Panel, LDH, Magnesium, Phosphorus, uric acid, methotrexate level  -2D echo   - UA/UCx/BCx  - Send off C diff- for diarrhea   - Consider CT chest   - Blood smear reviewed: Thrombocytopenia, neutropenia but present neutrophils demonstrate toxic granulations, basophilia present, small atypical lymphocytes with high N/C ratio- 5-10% of cells     # SHONNA?  - IVFs, trend serum creatinine       # DIARRHEA :    - pt reports improvement in diarrhea today , will send stool sample to r/o C.DIFF.  - iv fluids.    # Laceration on the toe;    -Dry , no discharge or erythema .  - vascular evaluation .    # RA:    - Stable will c/w prednisone , will hold off on methotrexate for now.    # GI:    -ppx with protonix.    # DVT:    -ppx with scd. 78 Y O f with pmh of HTN , and RA on prednisone and methotrexate presented to ER with c/o diarrhea and generalized weakness for 3 days.       # ANEMIA / PANCYTOPENIA / BLAST ON PERIPHERAL SMEAR    -Possible ACUTE LEUKEMIA     LESS LIKELY CLINDAMYCIN ASSOCIATED BONE MARROW TOXICITY VS METHOTREXATE MDS VS  VIRAL ILLNESS ( LESS LIKELY)    - Transfuse 2 units of PRBC, Keep Hgb >7, Platelets >10   - IVFs, Broad Spectrum Antibiotics   - Stat PT/PTT, iron studies including ferritin- please draw prior to transfusion  - Flow cytometry, B12, folate, SPEP, K/L ratio, SIFE, HIV, Hepatitis Panel, LDH, Magnesium, Phosphorus, uric acid, methotrexate level, retic, fibrinogen, D dimer   -2D echo   - UA/UCx/BCx  - Send off C diff- for diarrhea   - Consider CT chest   - Blood smear reviewed: Thrombocytopenia, neutropenia but present neutrophils demonstrate toxic granulations, basophilia present, small atypical lymphocytes with high N/C ratio- 5-10% of cells     # SHONNA?  - IVFs, trend serum creatinine       # DIARRHEA :    - pt reports improvement in diarrhea today , will send stool sample to r/o C.DIFF.  - iv fluids.    # Laceration on the toe;    -Dry , no discharge or erythema .  - vascular evaluation .    # RA:    - Stable will c/w prednisone , will hold off on methotrexate for now.    # GI:    -ppx with protonix.    # DVT:    -ppx with scd. 78 Y O f with pmh of HTN , and RA on prednisone and methotrexate presented to ER with c/o diarrhea and generalized weakness for 3 days.       # ANEMIA / PANCYTOPENIA / BLAST ON PERIPHERAL SMEAR    -Possible ACUTE LEUKEMIA     LESS LIKELY CLINDAMYCIN ASSOCIATED BONE MARROW TOXICITY VS METHOTREXATE TOXICITY MDS VS  VIRAL ILLNESS ( LESS LIKELY)    - Transfuse 2 units of PRBC, Keep Hgb >7, Platelets >10   - IVFs, Broad Spectrum Antibiotics   - Stat PT/PTT, iron studies including ferritin- please draw prior to transfusion  - Flow cytometry, B12, folate, SPEP, K/L ratio, SIFE, HIV, Hepatitis Panel, LDH, Magnesium, Phosphorus, uric acid, methotrexate level, retic, fibrinogen, D dimer, haptoglobin  -2D echo   - UA/UCx/BCx  - Send off C diff- for diarrhea   - Consider CT chest   - Blood smear reviewed: Thrombocytopenia, neutropenia but present neutrophils demonstrate toxic granulations, small atypical lymphocytes with high N/C ratio<5% of cells     # SHONNA?  - IVFs, trend serum creatinine       # DIARRHEA :    - pt reports improvement in diarrhea today , will send stool sample to r/o C.DIFF.  - iv fluids.    # Laceration on the toe;    -Dry , no discharge or erythema .  - vascular evaluation .    # RA:    - Stable will c/w prednisone , will hold off on methotrexate for now.    # GI:    -ppx with protonix.    # DVT:    -ppx with scd. 78 Y O f with pmh of HTN , and RA on prednisone and methotrexate presented to ER with c/o diarrhea and generalized weakness for 3 days.       # ANEMIA / PANCYTOPENIA    -DDX include drug-induced such as MTX, flaring inflammatory disorder, B12/folate deficiency, or primary bone marrow disorder such as leukemia and aplastic anemia.   - Transfuse 2 units of PRBC, Keep Hgb >7, Platelets >10   - IVFs, Broad Spectrum Antibiotics   - Stat PT/PTT, iron studies including ferritin- please draw prior to transfusion  - Flow cytometry, B12, folate, SPEP, K/L ratio, SIFE, HIV, Hepatitis Panel, LDH, Magnesium, Phosphorus, uric acid, methotrexate level, retic, fibrinogen, D dimer, haptoglobin  -2D echo   - UA/UCx/BCx  - Send off C diff- for diarrhea   - Consider CT chest   - Blood smear reviewed: Thrombocytopenia, neutropenia but present neutrophils demonstrate toxic granulations, small atypical lymphocytes with high N/C ratio<5% of cells     # SHONNA?  - IVFs, trend serum creatinine       # DIARRHEA :    - pt reports improvement in diarrhea today , will send stool sample to r/o C.DIFF.  - iv fluids.    # Laceration on the toe;    -Dry , no discharge or erythema .  - vascular evaluation .    # RA:    - Stable will c/w prednisone , will hold off on methotrexate for now.    # GI:    -ppx with protonix.    # DVT:    -ppx with scd.

## 2018-06-08 NOTE — H&P ADULT - ASSESSMENT
78 Y O f with pmh of HTN , and RA on prednisone and methotrexate presented to ER with c/o diarrhea and generalized weakness for 3 days .       # ANEMIA / PANCYTOPENIA / BLAST ON PERIPHERAL SMEAR    -likely ACUTE LEUKEMIA     LESS LIKELY CLINDAMYCIN ASSOCIATED BONE MARROW TOXICITY VRS  MDS VRS  VIRAL ILLNESS ( LESS LIKELY)    -admit to medicine .  - transfuse 2 units of PRBC.  - Neutropenic but not septic.  - pt will need BM biopsy .   - hematology evaluation; will call fellow about the findings.    # DIARRHEA :    - pt reports improvement in diarrhea today , will send stool sample to r/o C.DIFF.  - iv fluids.    # TOE WOUND ;    -Dry , no discharge or erythema .  - vascular evaluation .    # RA:    - Stable will c/w prednisone , will hold off on methotrexate for now.    # GI:    -ppx with protonix.    # DVT:    -ppx with scd. 78 Y O f with pmh of HTN , and RA on prednisone and methotrexate presented to ER with c/o diarrhea and generalized weakness for 3 days .       # ANEMIA / PANCYTOPENIA / BLAST ON PERIPHERAL SMEAR    -likely ACUTE LEUKEMIA     LESS LIKELY CLINDAMYCIN ASSOCIATED BONE MARROW TOXICITY VRS  MDS VRS METHOTREXATE TOXICITY VRS   VIRAL ILLNESS ( LESS LIKELY)    -admit to medicine .  - transfuse 2 units of PRBC.  - blood culture ; broad spectrum IV abx.  - will get uric acid , phosphorous, mag , pt , ptt , iron studies , b12 , folate , spep, upep , HIV , hepatitis panel stat.  - Neutropenic but not septic.  - pt will need BM biopsy .   - hematology evaluation; spoke with Dr Wilcox ; recommended to c/w same management.    #CKD:    -monitor BMP.    # DIARRHEA :    - pt reports improvement in diarrhea today , will send stool sample to r/o C.DIFF.  - iv fluids.    # TOE WOUND ;    -Dry , no discharge or erythema .  - vascular evaluation .    # RA:    - Stable will c/w prednisone , will hold off on methotrexate for now.    # GI:    -ppx with protonix.    # DVT:    -ppx with scd.    #dispo:    -full code from home. 78 Y O f with pmh of HTN , and RA on prednisone and methotrexate presented to ER with c/o diarrhea and generalized weakness for 3 days .       # ANEMIA / PANCYTOPENIA / BLAST ON PERIPHERAL SMEAR    -likely ACUTE LEUKEMIA     LESS LIKELY CLINDAMYCIN ASSOCIATED BONE MARROW TOXICITY VRS  MDS VRS METHOTREXATE TOXICITY VRS   VIRAL ILLNESS ( LESS LIKELY)    -admit to medicine .  - transfuse 2 units of PRBC.  - blood culture ; broad spectrum IV abx.  - will get uric acid , phosphorous, mag , pt , ptt , iron studies , b12 , folate , spep, upep , HIV , hepatitis panel stat.  - Neutropenic but not septic.  - pt will need BM biopsy .   - hematology evaluation; spoke with Dr Wilcox ; recommended to c/w same management.    #CKD: CKD 3     -monitor BMP.    # DIARRHEA : secondary to likely clostridium dificile      - pt reports improvement in diarrhea today , will send stool sample to r/o C.DIFF.  - iv fluids.    # TOE WOUND ;    -Dry , no discharge or erythema .  - vascular evaluation .    # RA:    - Stable will c/w prednisone , will hold off on methotrexate for now.    # GI:    -ppx with protonix.    # DVT:    -ppx with scd.    #dispo:    -full code from home.

## 2018-06-08 NOTE — H&P ADULT - PMH
Other specified diabetes mellitus with other specified complication, unspecified whether long term insulin use    Rheumatoid arteritis

## 2018-06-08 NOTE — ED ADULT NURSE NOTE - PMH
Other specified diabetes mellitus with other specified complication, unspecified whether long term insulin use

## 2018-06-09 LAB
ALBUMIN SERPL ELPH-MCNC: 3.3 G/DL — LOW (ref 3.5–5.2)
ALBUMIN SERPL ELPH-MCNC: 3.4 G/DL — LOW (ref 3.5–5.2)
ALP SERPL-CCNC: 44 U/L — SIGNIFICANT CHANGE UP (ref 30–115)
ALP SERPL-CCNC: 45 U/L — SIGNIFICANT CHANGE UP (ref 30–115)
ALT FLD-CCNC: 14 U/L — SIGNIFICANT CHANGE UP (ref 0–41)
ALT FLD-CCNC: 15 U/L — SIGNIFICANT CHANGE UP (ref 0–41)
ANION GAP SERPL CALC-SCNC: 16 MMOL/L — HIGH (ref 7–14)
ANION GAP SERPL CALC-SCNC: 17 MMOL/L — HIGH (ref 7–14)
AST SERPL-CCNC: 15 U/L — SIGNIFICANT CHANGE UP (ref 0–41)
AST SERPL-CCNC: 18 U/L — SIGNIFICANT CHANGE UP (ref 0–41)
BASOPHILS # BLD AUTO: 0 K/UL — SIGNIFICANT CHANGE UP (ref 0–0.2)
BASOPHILS NFR BLD AUTO: 0 % — SIGNIFICANT CHANGE UP (ref 0–1)
BILIRUB SERPL-MCNC: 0.4 MG/DL — SIGNIFICANT CHANGE UP (ref 0.2–1.2)
BILIRUB SERPL-MCNC: 0.5 MG/DL — SIGNIFICANT CHANGE UP (ref 0.2–1.2)
BUN SERPL-MCNC: 33 MG/DL — HIGH (ref 10–20)
BUN SERPL-MCNC: 38 MG/DL — HIGH (ref 10–20)
CALCIUM SERPL-MCNC: 8.2 MG/DL — LOW (ref 8.5–10.1)
CALCIUM SERPL-MCNC: 8.3 MG/DL — LOW (ref 8.5–10.1)
CHLORIDE SERPL-SCNC: 100 MMOL/L — SIGNIFICANT CHANGE UP (ref 98–110)
CHLORIDE SERPL-SCNC: 101 MMOL/L — SIGNIFICANT CHANGE UP (ref 98–110)
CHOLEST SERPL-MCNC: 157 MG/DL — SIGNIFICANT CHANGE UP (ref 100–200)
CO2 SERPL-SCNC: 20 MMOL/L — SIGNIFICANT CHANGE UP (ref 17–32)
CO2 SERPL-SCNC: 21 MMOL/L — SIGNIFICANT CHANGE UP (ref 17–32)
CREAT SERPL-MCNC: 1.3 MG/DL — SIGNIFICANT CHANGE UP (ref 0.7–1.5)
CREAT SERPL-MCNC: 1.5 MG/DL — SIGNIFICANT CHANGE UP (ref 0.7–1.5)
EOSINOPHIL # BLD AUTO: 0.05 K/UL — SIGNIFICANT CHANGE UP (ref 0–0.7)
EOSINOPHIL NFR BLD AUTO: 7.6 % — SIGNIFICANT CHANGE UP (ref 0–8)
FIBRINOGEN PPP-MCNC: 520 MG/DL — SIGNIFICANT CHANGE UP (ref 204.4–570.6)
GLUCOSE SERPL-MCNC: 223 MG/DL — HIGH (ref 70–99)
GLUCOSE SERPL-MCNC: 94 MG/DL — SIGNIFICANT CHANGE UP (ref 70–99)
HCT VFR BLD CALC: 23.1 % — LOW (ref 37–47)
HDLC SERPL-MCNC: 46 MG/DL — SIGNIFICANT CHANGE UP (ref 40–125)
HGB BLD-MCNC: 7.8 G/DL — LOW (ref 12–16)
HIV 1 & 2 AB SERPL IA.RAPID: SIGNIFICANT CHANGE UP
IMM GRANULOCYTES NFR BLD AUTO: 7.6 % — HIGH (ref 0.1–0.3)
LIPID PNL WITH DIRECT LDL SERPL: 77 MG/DL — SIGNIFICANT CHANGE UP (ref 4–129)
LYMPHOCYTES # BLD AUTO: 0.27 K/UL — LOW (ref 1.2–3.4)
LYMPHOCYTES # BLD AUTO: 40.9 % — SIGNIFICANT CHANGE UP (ref 20.5–51.1)
MAGNESIUM SERPL-MCNC: 2.1 MG/DL — SIGNIFICANT CHANGE UP (ref 1.8–2.4)
MCHC RBC-ENTMCNC: 30.7 PG — SIGNIFICANT CHANGE UP (ref 27–31)
MCHC RBC-ENTMCNC: 33.8 G/DL — SIGNIFICANT CHANGE UP (ref 32–37)
MCV RBC AUTO: 90.9 FL — SIGNIFICANT CHANGE UP (ref 81–99)
MONOCYTES # BLD AUTO: 0.05 K/UL — LOW (ref 0.1–0.6)
MONOCYTES NFR BLD AUTO: 7.6 % — SIGNIFICANT CHANGE UP (ref 1.7–9.3)
NEUTROPHILS # BLD AUTO: 0.24 K/UL — LOW (ref 1.4–6.5)
NEUTROPHILS NFR BLD AUTO: 36.3 % — LOW (ref 42.2–75.2)
PHOSPHATE SERPL-MCNC: 2.3 MG/DL — SIGNIFICANT CHANGE UP (ref 2.1–4.9)
PLATELET # BLD AUTO: 38 K/UL — LOW (ref 130–400)
POTASSIUM SERPL-MCNC: 4.6 MMOL/L — SIGNIFICANT CHANGE UP (ref 3.5–5)
POTASSIUM SERPL-MCNC: 4.8 MMOL/L — SIGNIFICANT CHANGE UP (ref 3.5–5)
POTASSIUM SERPL-SCNC: 4.6 MMOL/L — SIGNIFICANT CHANGE UP (ref 3.5–5)
POTASSIUM SERPL-SCNC: 4.8 MMOL/L — SIGNIFICANT CHANGE UP (ref 3.5–5)
PROT SERPL-MCNC: 5.7 G/DL — LOW (ref 6–8)
PROT SERPL-MCNC: 5.7 G/DL — LOW (ref 6–8)
RBC # BLD: 2.54 M/UL — LOW (ref 4.2–5.4)
RBC # FLD: 18.9 % — HIGH (ref 11.5–14.5)
SODIUM SERPL-SCNC: 136 MMOL/L — SIGNIFICANT CHANGE UP (ref 135–146)
SODIUM SERPL-SCNC: 139 MMOL/L — SIGNIFICANT CHANGE UP (ref 135–146)
TOTAL CHOLESTEROL/HDL RATIO MEASUREMENT: 3.4 RATIO — LOW (ref 4–5.5)
TRIGL SERPL-MCNC: 258 MG/DL — HIGH (ref 10–149)
WBC # BLD: 0.66 K/UL — CRITICAL LOW (ref 4.8–10.8)
WBC # FLD AUTO: 0.66 K/UL — CRITICAL LOW (ref 4.8–10.8)

## 2018-06-09 RX ORDER — DEXTROSE 50 % IN WATER 50 %
25 SYRINGE (ML) INTRAVENOUS ONCE
Qty: 0 | Refills: 0 | Status: DISCONTINUED | OUTPATIENT
Start: 2018-06-09 | End: 2018-06-15

## 2018-06-09 RX ORDER — INSULIN GLARGINE 100 [IU]/ML
15 INJECTION, SOLUTION SUBCUTANEOUS AT BEDTIME
Qty: 0 | Refills: 0 | Status: DISCONTINUED | OUTPATIENT
Start: 2018-06-09 | End: 2018-06-15

## 2018-06-09 RX ORDER — INSULIN LISPRO 100/ML
VIAL (ML) SUBCUTANEOUS
Qty: 0 | Refills: 0 | Status: DISCONTINUED | OUTPATIENT
Start: 2018-06-09 | End: 2018-06-15

## 2018-06-09 RX ORDER — DEXTROSE 50 % IN WATER 50 %
12.5 SYRINGE (ML) INTRAVENOUS ONCE
Qty: 0 | Refills: 0 | Status: DISCONTINUED | OUTPATIENT
Start: 2018-06-09 | End: 2018-06-15

## 2018-06-09 RX ORDER — INSULIN LISPRO 100/ML
5 VIAL (ML) SUBCUTANEOUS
Qty: 0 | Refills: 0 | Status: DISCONTINUED | OUTPATIENT
Start: 2018-06-09 | End: 2018-06-15

## 2018-06-09 RX ORDER — ACETAMINOPHEN 500 MG
650 TABLET ORAL EVERY 6 HOURS
Qty: 0 | Refills: 0 | Status: DISCONTINUED | OUTPATIENT
Start: 2018-06-09 | End: 2018-06-15

## 2018-06-09 RX ORDER — DEXTROSE 50 % IN WATER 50 %
15 SYRINGE (ML) INTRAVENOUS ONCE
Qty: 0 | Refills: 0 | Status: DISCONTINUED | OUTPATIENT
Start: 2018-06-09 | End: 2018-06-15

## 2018-06-09 RX ORDER — SODIUM CHLORIDE 9 MG/ML
1000 INJECTION, SOLUTION INTRAVENOUS
Qty: 0 | Refills: 0 | Status: DISCONTINUED | OUTPATIENT
Start: 2018-06-09 | End: 2018-06-11

## 2018-06-09 RX ORDER — GLUCAGON INJECTION, SOLUTION 0.5 MG/.1ML
1 INJECTION, SOLUTION SUBCUTANEOUS ONCE
Qty: 0 | Refills: 0 | Status: DISCONTINUED | OUTPATIENT
Start: 2018-06-09 | End: 2018-06-15

## 2018-06-09 RX ADMIN — Medication 650 MILLIGRAM(S): at 03:50

## 2018-06-09 RX ADMIN — Medication 60 MILLIGRAM(S): at 05:39

## 2018-06-09 RX ADMIN — Medication 5 UNIT(S): at 16:28

## 2018-06-09 RX ADMIN — Medication 650 MILLIGRAM(S): at 11:59

## 2018-06-09 RX ADMIN — Medication 650 MILLIGRAM(S): at 22:59

## 2018-06-09 RX ADMIN — CEFEPIME 100 MILLIGRAM(S): 1 INJECTION, POWDER, FOR SOLUTION INTRAMUSCULAR; INTRAVENOUS at 18:53

## 2018-06-09 RX ADMIN — Medication 5: at 16:28

## 2018-06-09 RX ADMIN — Medication 650 MILLIGRAM(S): at 04:20

## 2018-06-09 RX ADMIN — Medication 250 MILLIGRAM(S): at 18:59

## 2018-06-09 RX ADMIN — Medication 10 MILLIGRAM(S): at 05:39

## 2018-06-09 NOTE — PROVIDER CONTACT NOTE (OTHER) - SITUATION
Pt FS= 87 mg/dl, juice provided. Repeat FS= 77mg/dl, sandwich and juice given. Repeat FS= 76 mg/dl. Pt in no obvious distress. Awaiting orders.

## 2018-06-10 LAB
ALBUMIN SERPL ELPH-MCNC: 3.5 G/DL — SIGNIFICANT CHANGE UP (ref 3.5–5.2)
ALP SERPL-CCNC: 41 U/L — SIGNIFICANT CHANGE UP (ref 30–115)
ALT FLD-CCNC: 15 U/L — SIGNIFICANT CHANGE UP (ref 0–41)
ANION GAP SERPL CALC-SCNC: 17 MMOL/L — HIGH (ref 7–14)
AST SERPL-CCNC: 21 U/L — SIGNIFICANT CHANGE UP (ref 0–41)
BASOPHILS # BLD AUTO: 0.01 K/UL — SIGNIFICANT CHANGE UP (ref 0–0.2)
BASOPHILS NFR BLD AUTO: 1.1 % — HIGH (ref 0–1)
BILIRUB SERPL-MCNC: 0.4 MG/DL — SIGNIFICANT CHANGE UP (ref 0.2–1.2)
BUN SERPL-MCNC: 38 MG/DL — HIGH (ref 10–20)
CALCIUM SERPL-MCNC: 8.8 MG/DL — SIGNIFICANT CHANGE UP (ref 8.5–10.1)
CHLORIDE SERPL-SCNC: 101 MMOL/L — SIGNIFICANT CHANGE UP (ref 98–110)
CO2 SERPL-SCNC: 20 MMOL/L — SIGNIFICANT CHANGE UP (ref 17–32)
CREAT SERPL-MCNC: 1.5 MG/DL — SIGNIFICANT CHANGE UP (ref 0.7–1.5)
EOSINOPHIL # BLD AUTO: 0.12 K/UL — SIGNIFICANT CHANGE UP (ref 0–0.7)
EOSINOPHIL NFR BLD AUTO: 12.6 % — HIGH (ref 0–8)
ESTIMATED AVERAGE GLUCOSE: 154 MG/DL — HIGH (ref 68–114)
GLUCOSE SERPL-MCNC: 124 MG/DL — HIGH (ref 70–99)
HAPTOGLOB SERPL-MCNC: 371 MG/DL — HIGH (ref 34–200)
HBA1C BLD-MCNC: 7 % — HIGH (ref 4–5.6)
HCT VFR BLD CALC: 22.7 % — LOW (ref 37–47)
HGB BLD-MCNC: 7.5 G/DL — LOW (ref 12–16)
IMM GRANULOCYTES NFR BLD AUTO: 1.1 % — HIGH (ref 0.1–0.3)
LYMPHOCYTES # BLD AUTO: 0.44 K/UL — LOW (ref 1.2–3.4)
LYMPHOCYTES # BLD AUTO: 46.3 % — SIGNIFICANT CHANGE UP (ref 20.5–51.1)
MCHC RBC-ENTMCNC: 30.6 PG — SIGNIFICANT CHANGE UP (ref 27–31)
MCHC RBC-ENTMCNC: 33 G/DL — SIGNIFICANT CHANGE UP (ref 32–37)
MCV RBC AUTO: 92.7 FL — SIGNIFICANT CHANGE UP (ref 81–99)
MONOCYTES # BLD AUTO: 0.1 K/UL — SIGNIFICANT CHANGE UP (ref 0.1–0.6)
MONOCYTES NFR BLD AUTO: 10.5 % — HIGH (ref 1.7–9.3)
NEUTROPHILS # BLD AUTO: 0.27 K/UL — LOW (ref 1.4–6.5)
NEUTROPHILS NFR BLD AUTO: 28.4 % — LOW (ref 42.2–75.2)
NRBC # BLD: 5 /100 WBCS — HIGH (ref 0–0)
PLATELET # BLD AUTO: 42 K/UL — LOW (ref 130–400)
POTASSIUM SERPL-MCNC: 4.4 MMOL/L — SIGNIFICANT CHANGE UP (ref 3.5–5)
POTASSIUM SERPL-SCNC: 4.4 MMOL/L — SIGNIFICANT CHANGE UP (ref 3.5–5)
PROT SERPL-MCNC: 5.7 G/DL — LOW (ref 6–8)
PROT SERPL-MCNC: 5.8 G/DL — LOW (ref 6–8.3)
PROT SERPL-MCNC: 5.8 G/DL — LOW (ref 6–8.3)
RBC # BLD: 2.45 M/UL — LOW (ref 4.2–5.4)
RBC # FLD: 18.7 % — HIGH (ref 11.5–14.5)
SODIUM SERPL-SCNC: 138 MMOL/L — SIGNIFICANT CHANGE UP (ref 135–146)
WBC # BLD: 0.95 K/UL — CRITICAL LOW (ref 4.8–10.8)
WBC # FLD AUTO: 0.95 K/UL — CRITICAL LOW (ref 4.8–10.8)

## 2018-06-10 RX ORDER — FOLIC ACID 0.8 MG
1 TABLET ORAL DAILY
Qty: 0 | Refills: 0 | Status: DISCONTINUED | OUTPATIENT
Start: 2018-06-10 | End: 2018-06-15

## 2018-06-10 RX ADMIN — Medication 650 MILLIGRAM(S): at 11:42

## 2018-06-10 RX ADMIN — Medication 5 UNIT(S): at 16:38

## 2018-06-10 RX ADMIN — Medication 2: at 16:38

## 2018-06-10 RX ADMIN — Medication 650 MILLIGRAM(S): at 22:14

## 2018-06-10 RX ADMIN — Medication 10 MILLIGRAM(S): at 06:03

## 2018-06-10 RX ADMIN — Medication 650 MILLIGRAM(S): at 11:15

## 2018-06-10 RX ADMIN — INSULIN GLARGINE 15 UNIT(S): 100 INJECTION, SOLUTION SUBCUTANEOUS at 22:12

## 2018-06-10 RX ADMIN — Medication 250 MILLIGRAM(S): at 17:33

## 2018-06-10 RX ADMIN — Medication 60 MILLIGRAM(S): at 06:03

## 2018-06-10 RX ADMIN — Medication 1 MILLIGRAM(S): at 13:15

## 2018-06-10 RX ADMIN — Medication 5 UNIT(S): at 11:41

## 2018-06-10 RX ADMIN — CEFEPIME 100 MILLIGRAM(S): 1 INJECTION, POWDER, FOR SOLUTION INTRAMUSCULAR; INTRAVENOUS at 17:33

## 2018-06-10 RX ADMIN — Medication 5: at 11:40

## 2018-06-10 NOTE — PROGRESS NOTE ADULT - ASSESSMENT
78 Y O f with pmh of HTN , and RA on prednisone and methotrexate presented to ER with c/o diarrhea and generalized weakness for 3 days .       # ANEMIA / PANCYTOPENIA / BLAST ON PERIPHERAL SMEAR    -likely ACUTE LEUKEMIA     LESS LIKELY CLINDAMYCIN ASSOCIATED BONE MARROW TOXICITY VRS  MDS VRS METHOTREXATE TOXICITY VRS   VIRAL ILLNESS ( LESS LIKELY)    appreciate hemo onc eval, monitor cbc, will arrange with ir for possible bm biopsy , optherwise will monitor     #CKD 3     -monitor BMP.    # DIARRHEA : secondary to likely clostridium dificile    resolved  # TOE WOUND ;    -Dry , no discharge or erythema .  - vascular evaluation .    # RA:    - Stable will c/w prednisone , will hold off on methotrexate for now.    # GI:    -ppx with protonix.    # DVT:    -ppx with scd.    #dispo:    -full code from home.

## 2018-06-10 NOTE — PROGRESS NOTE ADULT - ASSESSMENT
78 Y O f with pmh of HTN , and RA on prednisone and methotrexate presented to ER with c/o diarrhea and generalized weakness for 3 days.       # ANEMIA / PANCYTOPENIA    -DDX include drug-induced such as MTX, flaring inflammatory disorder, B12/folate deficiency, or primary bone marrow disorder such as leukemia and aplastic anemia.   - Transfused 2 units of PRBC, Keep Hgb >7, Platelets >10   - IVFs, Broad Spectrum Antibiotics   - PT/PTT- normal, fibrinogen 522, haptogloin 377, , Uric acid 8.4, phos 2.3  T bili normal, HIV negative  -Iron 26, TIBC 250, % saturation 10  - Flow cytometry, B12, folate, SPEP, K/L ratio, SIFE, Hepatitis Panel, methotrexate level, retic, D dimer- Pending  -2D echo   - UA negative, Blood culture negative  - Send off C diff- for diarrhea   - Consider CT chest   - Blood smear reviewed: Thrombocytopenia, neutropenia but present neutrophils demonstrate toxic granulations, No obvious blasts on the smear     # SHONNA?  - IVFs, trend serum creatinine       # DIARRHEA :    - pt reports improvement in diarrhea today , will send stool sample to r/o C.DIFF.  - iv fluids.    # Laceration on the toe;    -Dry , no discharge or erythema .  - vascular evaluation .    # RA:    - Stable will c/w prednisone , will hold off on methotrexate for now.    # GI:    -ppx with protonix.    # DVT:    -ppx with scd.

## 2018-06-10 NOTE — PROGRESS NOTE ADULT - SUBJECTIVE AND OBJECTIVE BOX
MEDICATIONS  (STANDING):  allopurinol 300 milliGRAM(s) Oral daily  cefepime   IVPB      cefepime   IVPB 2000 milliGRAM(s) IV Intermittent every 24 hours  dextrose 5%. 1000 milliLiter(s) (50 mL/Hr) IV Continuous <Continuous>  dextrose 50% Injectable 12.5 Gram(s) IV Push once  dextrose 50% Injectable 25 Gram(s) IV Push once  dextrose 50% Injectable 25 Gram(s) IV Push once  enalapril 10 milliGRAM(s) Oral daily  insulin glargine Injectable (LANTUS) 15 Unit(s) SubCutaneous at bedtime  insulin lispro (HumaLOG) corrective regimen sliding scale   SubCutaneous three times a day before meals  insulin lispro Injectable (HumaLOG) 5 Unit(s) SubCutaneous three times a day before meals  predniSONE   Tablet 60 milliGRAM(s) Oral daily  vancomycin  IVPB 1200 milliGRAM(s) IV Intermittent every 24 hours  vancomycin  IVPB        MEDICATIONS  (PRN):  acetaminophen   Tablet. 650 milliGRAM(s) Oral every 6 hours PRN Mild Pain (1 - 3)  dextrose 40% Gel 15 Gram(s) Oral once PRN Blood Glucose LESS THAN 70 milliGRAM(s)/deciLiter  glucagon  Injectable 1 milliGRAM(s) IntraMuscular once PRN Glucose <70 milliGRAM(s)/deciLiter      Allergies    No Known Drug Allergies  strawberry (Unknown)    Intolerances        Vital Signs Last 24 Hrs  T(C): 36.7 (09 Jun 2018 23:00), Max: 36.7 (09 Jun 2018 23:00)  T(F): 98.1 (09 Jun 2018 23:00), Max: 98.1 (09 Jun 2018 23:00)  HR: 82 (09 Jun 2018 23:00) (73 - 82)  BP: 141/62 (09 Jun 2018 23:00) (136/66 - 141/62)  BP(mean): --  RR: 18 (09 Jun 2018 23:00) (18 - 18)  SpO2: --    PHYSICAL EXAM  General: adult in NAD  HEENT: clear oropharynx, anicteric sclera, pink conjunctiva  Neck: supple  CV: normal S1/S2 with no murmur rubs or gallops  Lungs: positive air movement b/l ant lungs,clear to auscultation, no wheezes, no rales  Abdomen: soft non-tender non-distended, no hepatosplenomegaly  Ext: no clubbing cyanosis or edema  Skin: no rashes and no petechiae  Neuro: alert and oriented X 4, no focal deficits  LABS:                          7.8    0.66  )-----------( 38       ( 09 Jun 2018 09:46 )             23.1         Mean Cell Volume : 90.9 fL  Mean Cell Hemoglobin : 30.7 pg  Mean Cell Hemoglobin Concentration : 33.8 g/dL  Auto Neutrophil # : 0.24 K/uL  Auto Lymphocyte # : 0.27 K/uL  Auto Monocyte # : 0.05 K/uL  Auto Eosinophil # : 0.05 K/uL  Auto Basophil # : 0.00 K/uL  Auto Neutrophil % : 36.3 %  Auto Lymphocyte % : 40.9 %  Auto Monocyte % : 7.6 %  Auto Eosinophil % : 7.6 %  Auto Basophil % : 0.0 %    Serial CBC's  06-09 @ 09:46  Hct-23.1 / Hgb-7.8 / Plat-38 / RBC-2.54 / WBC-0.66          Serial CBC's  06-08 @ 12:49  Hct-16.0 / Hgb-5.0 / Plat-55 / RBC-1.61 / WBC-0.96            06-09    136  |  100  |  38<H>  ----------------------------<  223<H>  4.8   |  20  |  1.5    Ca    8.2<L>      09 Jun 2018 09:46  Phos  2.3     06-09  Mg     2.1     06-09    TPro  5.7<L>  /  Alb  3.4<L>  /  TBili  0.5  /  DBili  x   /  AST  18  /  ALT  15  /  AlkPhos  44  06-09      PT/INR - ( 08 Jun 2018 20:21 )   PT: 11.30 sec;   INR: 1.05 ratio         PTT - ( 08 Jun 2018 20:21 )  PTT:20.2 sec    Hemoglobin: 7.8 g/dL (06-09-18 @ 09:46)  WBC Count: 0.66 K/uL (06-09-18 @ 09:46)  Platelet Count - Automated: 38 K/uL (06-09-18 @ 09:46)  Hematocrit: 23.1 % (06-09-18 @ 09:46)  Iron - Total Binding Capacity.: 250 ug/dL (06-08-18 @ 20:31)  Hematocrit: 16.0 % (06-08-18 @ 12:49)  Platelet Count - Automated: 55 K/uL (06-08-18 @ 12:49)  Hemoglobin: 5.0 g/dL (06-08-18 @ 12:49)  WBC Count: 0.96 K/uL (06-08-18 @ 12:49)    Culture - Blood (06.08.18 @ 20:56)    Specimen Source: .Blood None    Culture Results:   No growth to date.        RADIOLOGY & ADDITIONAL STUDIES:    < from: CT Abdomen and Pelvis w/ IV Cont (06.08.18 @ 15:00) >    EXAM:  CT ABDOMEN AND PELVIS IC            PROCEDURE DATE:  06/08/2018            INTERPRETATION:  CLINICAL HISTORY / REASON FOR EXAM: Abdominal pain,   diarrhea.    TECHNIQUE: Contiguous axial CT images were obtained from the lower chest   to the pubic symphysis following administration of 100 mL Optiray 320   intravenous contrast.  Oral contrast was not administered.  Reformatted   images in the coronal and sagittal planes were acquired.    COMPARISON CT: None        OTHER STUDIES USED FOR CORRELATION: Lumbar radiograph from February 28, 2018       FINDINGS:    LOWER CHEST: Apparent asymmetric right breast soft tissue. Bibasilar   atelectasis with left lower lobe groundglass opacities.    HEPATOBILIARY: Indeterminate 1.1 cm liver segment 6 hypodense lesion   (series 3, image 22)    SPLEEN: Unremarkable.    PANCREAS: Two pancreatic tail hypodense possible cystic lesions, larger   1.9 cm (series 5, image 107).    ADRENAL GLANDS: Unremarkable.    KIDNEYS: Symmetric enhancement bilaterally. No evidence of hydronephrosis.    ABDOMINOPELVIC NODES: Unremarkable.    PELVIC ORGANS: Distended urinary bladder.    PERITONEUM/MESENTERY/BOWEL: No bowel obstruction, ascites or   intraperitoneal free air. Normal appendix.    BONES/SOFT TISSUES:Chronic compression fractures of T12, L1 and L2.   Chronic fracture of the left pubic rami. Chronic right eighth rib   fracture. Degenerative changes to the thoracolumbar spine.      IMPRESSION:  1. No evidence of acute abdominal pathology.    2. Apparent asymmetric right breast soft tissue; diagnostic mammogram or   correlation with outside mammography recommended.    3. Indeterminate 1.1 cm liver segment 6 hypodense lesion. Further   evaluation recommended with MRI abdomen with and without IV contrast.    4. Two pancreatic tail hypodense possible cystic lesions, larger 1.9 cm;   this can be further evaluated with recommended MRI with additional MRCP   sequences.    < end of copied text > Patient and examined, patient feels well today and has no complaints at this time, she says that she was unable to sleep overnight.     MEDICATIONS  (STANDING):  allopurinol 300 milliGRAM(s) Oral daily  cefepime   IVPB      cefepime   IVPB 2000 milliGRAM(s) IV Intermittent every 24 hours  dextrose 5%. 1000 milliLiter(s) (50 mL/Hr) IV Continuous <Continuous>  dextrose 50% Injectable 12.5 Gram(s) IV Push once  dextrose 50% Injectable 25 Gram(s) IV Push once  dextrose 50% Injectable 25 Gram(s) IV Push once  enalapril 10 milliGRAM(s) Oral daily  insulin glargine Injectable (LANTUS) 15 Unit(s) SubCutaneous at bedtime  insulin lispro (HumaLOG) corrective regimen sliding scale   SubCutaneous three times a day before meals  insulin lispro Injectable (HumaLOG) 5 Unit(s) SubCutaneous three times a day before meals  predniSONE   Tablet 60 milliGRAM(s) Oral daily  vancomycin  IVPB 1200 milliGRAM(s) IV Intermittent every 24 hours  vancomycin  IVPB        MEDICATIONS  (PRN):  acetaminophen   Tablet. 650 milliGRAM(s) Oral every 6 hours PRN Mild Pain (1 - 3)  dextrose 40% Gel 15 Gram(s) Oral once PRN Blood Glucose LESS THAN 70 milliGRAM(s)/deciLiter  glucagon  Injectable 1 milliGRAM(s) IntraMuscular once PRN Glucose <70 milliGRAM(s)/deciLiter      Allergies    No Known Drug Allergies  strawberry (Unknown)    Intolerances        Vital Signs Last 24 Hrs  T(C): 36.7 (09 Jun 2018 23:00), Max: 36.7 (09 Jun 2018 23:00)  T(F): 98.1 (09 Jun 2018 23:00), Max: 98.1 (09 Jun 2018 23:00)  HR: 82 (09 Jun 2018 23:00) (73 - 82)  BP: 141/62 (09 Jun 2018 23:00) (136/66 - 141/62)  BP(mean): --  RR: 18 (09 Jun 2018 23:00) (18 - 18)  SpO2: --    PHYSICAL EXAM  General: adult in NAD  CV: normal S1/S2   Lungs: positive air movement b/l ant lungs  Abdomen: soft non-tender   Ext: noedema  Skin: Patient has laceration on her toes left   Neuro: alert and oriented     LABS:                          7.8    0.66  )-----------( 38       ( 09 Jun 2018 09:46 )             23.1         Mean Cell Volume : 90.9 fL  Mean Cell Hemoglobin : 30.7 pg  Mean Cell Hemoglobin Concentration : 33.8 g/dL  Auto Neutrophil # : 0.24 K/uL  Auto Lymphocyte # : 0.27 K/uL  Auto Monocyte # : 0.05 K/uL  Auto Eosinophil # : 0.05 K/uL  Auto Basophil # : 0.00 K/uL  Auto Neutrophil % : 36.3 %  Auto Lymphocyte % : 40.9 %  Auto Monocyte % : 7.6 %  Auto Eosinophil % : 7.6 %  Auto Basophil % : 0.0 %    Serial CBC's  06-09 @ 09:46  Hct-23.1 / Hgb-7.8 / Plat-38 / RBC-2.54 / WBC-0.66          Serial CBC's  06-08 @ 12:49  Hct-16.0 / Hgb-5.0 / Plat-55 / RBC-1.61 / WBC-0.96            06-09    136  |  100  |  38<H>  ----------------------------<  223<H>  4.8   |  20  |  1.5    Ca    8.2<L>      09 Jun 2018 09:46  Phos  2.3     06-09  Mg     2.1     06-09    TPro  5.7<L>  /  Alb  3.4<L>  /  TBili  0.5  /  DBili  x   /  AST  18  /  ALT  15  /  AlkPhos  44  06-09      PT/INR - ( 08 Jun 2018 20:21 )   PT: 11.30 sec;   INR: 1.05 ratio         PTT - ( 08 Jun 2018 20:21 )  PTT:20.2 sec    Hemoglobin: 7.8 g/dL (06-09-18 @ 09:46)  WBC Count: 0.66 K/uL (06-09-18 @ 09:46)  Platelet Count - Automated: 38 K/uL (06-09-18 @ 09:46)  Hematocrit: 23.1 % (06-09-18 @ 09:46)  Iron - Total Binding Capacity.: 250 ug/dL (06-08-18 @ 20:31)  Hematocrit: 16.0 % (06-08-18 @ 12:49)  Platelet Count - Automated: 55 K/uL (06-08-18 @ 12:49)  Hemoglobin: 5.0 g/dL (06-08-18 @ 12:49)  WBC Count: 0.96 K/uL (06-08-18 @ 12:49)    Culture - Blood (06.08.18 @ 20:56)    Specimen Source: .Blood None    Culture Results:   No growth to date.        RADIOLOGY & ADDITIONAL STUDIES:    < from: CT Abdomen and Pelvis w/ IV Cont (06.08.18 @ 15:00) >    EXAM:  CT ABDOMEN AND PELVIS IC            PROCEDURE DATE:  06/08/2018            INTERPRETATION:  CLINICAL HISTORY / REASON FOR EXAM: Abdominal pain,   diarrhea.    TECHNIQUE: Contiguous axial CT images were obtained from the lower chest   to the pubic symphysis following administration of 100 mL Optiray 320   intravenous contrast.  Oral contrast was not administered.  Reformatted   images in the coronal and sagittal planes were acquired.    COMPARISON CT: None        OTHER STUDIES USED FOR CORRELATION: Lumbar radiograph from February 28, 2018       FINDINGS:    LOWER CHEST: Apparent asymmetric right breast soft tissue. Bibasilar   atelectasis with left lower lobe groundglass opacities.    HEPATOBILIARY: Indeterminate 1.1 cm liver segment 6 hypodense lesion   (series 3, image 22)    SPLEEN: Unremarkable.    PANCREAS: Two pancreatic tail hypodense possible cystic lesions, larger   1.9 cm (series 5, image 107).    ADRENAL GLANDS: Unremarkable.    KIDNEYS: Symmetric enhancement bilaterally. No evidence of hydronephrosis.    ABDOMINOPELVIC NODES: Unremarkable.    PELVIC ORGANS: Distended urinary bladder.    PERITONEUM/MESENTERY/BOWEL: No bowel obstruction, ascites or   intraperitoneal free air. Normal appendix.    BONES/SOFT TISSUES:Chronic compression fractures of T12, L1 and L2.   Chronic fracture of the left pubic rami. Chronic right eighth rib   fracture. Degenerative changes to the thoracolumbar spine.      IMPRESSION:  1. No evidence of acute abdominal pathology.    2. Apparent asymmetric right breast soft tissue; diagnostic mammogram or   correlation with outside mammography recommended.    3. Indeterminate 1.1 cm liver segment 6 hypodense lesion. Further   evaluation recommended with MRI abdomen with and without IV contrast.    4. Two pancreatic tail hypodense possible cystic lesions, larger 1.9 cm;   this can be further evaluated with recommended MRI with additional MRCP   sequences.    < end of copied text >

## 2018-06-11 DIAGNOSIS — I00 RHEUMATIC FEVER WITHOUT HEART INVOLVEMENT: ICD-10-CM

## 2018-06-11 DIAGNOSIS — D61.818 OTHER PANCYTOPENIA: ICD-10-CM

## 2018-06-11 LAB
% ALBUMIN: 52 % — SIGNIFICANT CHANGE UP
% ALPHA 1: 8.1 % — SIGNIFICANT CHANGE UP
% ALPHA 2: 18.7 % — SIGNIFICANT CHANGE UP
% BETA: 13.8 % — SIGNIFICANT CHANGE UP
% GAMMA: 7.4 % — SIGNIFICANT CHANGE UP
ALBUMIN SERPL ELPH-MCNC: 3 G/DL — LOW (ref 3.6–5.5)
ALBUMIN SERPL ELPH-MCNC: 3.3 G/DL — LOW (ref 3.5–5.2)
ALBUMIN/GLOB SERPL ELPH: 1.1 RATIO — SIGNIFICANT CHANGE UP
ALP SERPL-CCNC: 40 U/L — SIGNIFICANT CHANGE UP (ref 30–115)
ALPHA1 GLOB SERPL ELPH-MCNC: 0.5 G/DL — HIGH (ref 0.1–0.4)
ALPHA2 GLOB SERPL ELPH-MCNC: 1.1 G/DL — HIGH (ref 0.5–1)
ALT FLD-CCNC: 14 U/L — SIGNIFICANT CHANGE UP (ref 0–41)
ANION GAP SERPL CALC-SCNC: 13 MMOL/L — SIGNIFICANT CHANGE UP (ref 7–14)
AST SERPL-CCNC: 16 U/L — SIGNIFICANT CHANGE UP (ref 0–41)
B-GLOBULIN SERPL ELPH-MCNC: 0.8 G/DL — SIGNIFICANT CHANGE UP (ref 0.5–1)
BASOPHILS # BLD AUTO: 0 K/UL — SIGNIFICANT CHANGE UP (ref 0–0.2)
BASOPHILS NFR BLD AUTO: 0 % — SIGNIFICANT CHANGE UP (ref 0–1)
BILIRUB SERPL-MCNC: 0.3 MG/DL — SIGNIFICANT CHANGE UP (ref 0.2–1.2)
BUN SERPL-MCNC: 44 MG/DL — HIGH (ref 10–20)
CALCIUM SERPL-MCNC: 8.7 MG/DL — SIGNIFICANT CHANGE UP (ref 8.5–10.1)
CHLORIDE SERPL-SCNC: 101 MMOL/L — SIGNIFICANT CHANGE UP (ref 98–110)
CO2 SERPL-SCNC: 20 MMOL/L — SIGNIFICANT CHANGE UP (ref 17–32)
CREAT SERPL-MCNC: 1.7 MG/DL — HIGH (ref 0.7–1.5)
EOSINOPHIL # BLD AUTO: 0.08 K/UL — SIGNIFICANT CHANGE UP (ref 0–0.7)
EOSINOPHIL NFR BLD AUTO: 4.7 % — SIGNIFICANT CHANGE UP (ref 0–8)
FOLATE SERPL-MCNC: 7.1 NG/ML — SIGNIFICANT CHANGE UP
GAMMA GLOBULIN: 0.4 G/DL — LOW (ref 0.6–1.6)
GLUCOSE SERPL-MCNC: 218 MG/DL — HIGH (ref 70–99)
HCT VFR BLD CALC: 23.4 % — LOW (ref 37–47)
HGB BLD-MCNC: 7.5 G/DL — LOW (ref 12–16)
IMM GRANULOCYTES NFR BLD AUTO: 1.8 % — HIGH (ref 0.1–0.3)
INTERPRETATION SERPL IFE-IMP: SIGNIFICANT CHANGE UP
LYMPHOCYTES # BLD AUTO: 0.9 K/UL — LOW (ref 1.2–3.4)
LYMPHOCYTES # BLD AUTO: 52.9 % — HIGH (ref 20.5–51.1)
MCHC RBC-ENTMCNC: 30.1 PG — SIGNIFICANT CHANGE UP (ref 27–31)
MCHC RBC-ENTMCNC: 32.1 G/DL — SIGNIFICANT CHANGE UP (ref 32–37)
MCV RBC AUTO: 94 FL — SIGNIFICANT CHANGE UP (ref 81–99)
MONOCYTES # BLD AUTO: 0.21 K/UL — SIGNIFICANT CHANGE UP (ref 0.1–0.6)
MONOCYTES NFR BLD AUTO: 12.4 % — HIGH (ref 1.7–9.3)
NEUTROPHILS # BLD AUTO: 0.48 K/UL — LOW (ref 1.4–6.5)
NEUTROPHILS NFR BLD AUTO: 28.2 % — LOW (ref 42.2–75.2)
NRBC # BLD: 3 /100 WBCS — HIGH (ref 0–0)
PLATELET # BLD AUTO: 80 K/UL — LOW (ref 130–400)
POTASSIUM SERPL-MCNC: 4.9 MMOL/L — SIGNIFICANT CHANGE UP (ref 3.5–5)
POTASSIUM SERPL-SCNC: 4.9 MMOL/L — SIGNIFICANT CHANGE UP (ref 3.5–5)
PROT PATTERN SERPL ELPH-IMP: SIGNIFICANT CHANGE UP
PROT SERPL-MCNC: 5.7 G/DL — LOW (ref 6–8)
RBC # BLD: 2.49 M/UL — LOW (ref 4.2–5.4)
RBC # FLD: 18.6 % — HIGH (ref 11.5–14.5)
SODIUM SERPL-SCNC: 134 MMOL/L — LOW (ref 135–146)
VIT B12 SERPL-MCNC: 896 PG/ML — SIGNIFICANT CHANGE UP (ref 232–1245)
WBC # BLD: 1.7 K/UL — LOW (ref 4.8–10.8)
WBC # FLD AUTO: 1.7 K/UL — LOW (ref 4.8–10.8)

## 2018-06-11 RX ADMIN — Medication 1 MILLIGRAM(S): at 12:02

## 2018-06-11 RX ADMIN — Medication 10 MILLIGRAM(S): at 06:08

## 2018-06-11 RX ADMIN — Medication 5 UNIT(S): at 08:13

## 2018-06-11 RX ADMIN — Medication 250 MILLIGRAM(S): at 22:10

## 2018-06-11 RX ADMIN — CEFEPIME 100 MILLIGRAM(S): 1 INJECTION, POWDER, FOR SOLUTION INTRAMUSCULAR; INTRAVENOUS at 18:44

## 2018-06-11 RX ADMIN — Medication 3: at 11:58

## 2018-06-11 RX ADMIN — Medication 5: at 17:13

## 2018-06-11 RX ADMIN — Medication 300 MILLIGRAM(S): at 12:02

## 2018-06-11 RX ADMIN — Medication 650 MILLIGRAM(S): at 15:05

## 2018-06-11 RX ADMIN — Medication 650 MILLIGRAM(S): at 08:54

## 2018-06-11 RX ADMIN — Medication 5 UNIT(S): at 11:59

## 2018-06-11 RX ADMIN — INSULIN GLARGINE 15 UNIT(S): 100 INJECTION, SOLUTION SUBCUTANEOUS at 22:04

## 2018-06-11 RX ADMIN — Medication 5 UNIT(S): at 17:13

## 2018-06-11 RX ADMIN — Medication 60 MILLIGRAM(S): at 06:08

## 2018-06-11 NOTE — CONSULT NOTE ADULT - SUBJECTIVE AND OBJECTIVE BOX
77 yo female known to me with RA stable on Prednisone ~15mg/day and methotrexate 20mg/week  patient has declined to try biologics. 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   Patient recently developed lesion on toe and was treated with clindamycin initially for 5 days then was started on another two week course and developed diarrhea. In ER found to have pancytopenia. Patient has had  chronic anemia but never had reduced WBC or platelets despite taking methotrexate for >10yrs and having CKD. She notes her diarrhea has resolved.    Physical exam  ICU Vital Signs Last 24 Hrs  T(C): 36.5 (11 Jun 2018 16:07), Max: 37.3 (10 Jin 2018 23:50)  T(F): 97.7 (11 Jun 2018 16:07), Max: 99.1 (10 Jin 2018 23:50)  HR: 77 (11 Jun 2018 16:07) (69 - 78)  BP: 132/71 (11 Jun 2018 16:07) (125/- - 132/71)  BP(mean): --  ABP: --  ABP(mean): --  RR: 20 (11 Jun 2018 16:07) (18 - 20)  SpO2: 97% (10 Jin 2018 23:50) (97% - 97%)  WD female in NAD  w/o rash  Chest clear  Cor Nml S1S2   Abd soft BS+  Ext - w/o synovitis, mild pain on ROM right shoulder  Neuro nml

## 2018-06-11 NOTE — CONSULT NOTE ADULT - ASSESSMENT
77 yo female with RA , DM , Osteoporosis who was stable until developing  foot lesion which was treated with Clindamycin. Patient developed diarrhea   and pancytopenia. I don't believe hematologic problem secondary to   methotrexate-she has been stable on this dosage for years and there has been  no recent decline in her renal function. There has to be an alternate cause to the  thrombocytopenia -either clindamycin , C.diff or MDS. I agree with holding methotrexate  until her WBC and platelets recover but would restart methotrexate once this has occurred.   Suggest w/u to r/o other possibilities. Continue current dosage of prednisone for now.

## 2018-06-11 NOTE — PROGRESS NOTE ADULT - SUBJECTIVE AND OBJECTIVE BOX
Patient and examined, patient feels well today and has no complaints at this time    MEDICATIONS  (STANDING):  allopurinol 300 milliGRAM(s) Oral daily  cefepime   IVPB      cefepime   IVPB 2000 milliGRAM(s) IV Intermittent every 24 hours  dextrose 5%. 1000 milliLiter(s) (50 mL/Hr) IV Continuous <Continuous>  dextrose 50% Injectable 12.5 Gram(s) IV Push once  dextrose 50% Injectable 25 Gram(s) IV Push once  dextrose 50% Injectable 25 Gram(s) IV Push once  enalapril 10 milliGRAM(s) Oral daily  insulin glargine Injectable (LANTUS) 15 Unit(s) SubCutaneous at bedtime  insulin lispro (HumaLOG) corrective regimen sliding scale   SubCutaneous three times a day before meals  insulin lispro Injectable (HumaLOG) 5 Unit(s) SubCutaneous three times a day before meals  predniSONE   Tablet 60 milliGRAM(s) Oral daily  vancomycin  IVPB 1200 milliGRAM(s) IV Intermittent every 24 hours  vancomycin  IVPB        MEDICATIONS  (PRN):  acetaminophen   Tablet. 650 milliGRAM(s) Oral every 6 hours PRN Mild Pain (1 - 3)  dextrose 40% Gel 15 Gram(s) Oral once PRN Blood Glucose LESS THAN 70 milliGRAM(s)/deciLiter  glucagon  Injectable 1 milliGRAM(s) IntraMuscular once PRN Glucose <70 milliGRAM(s)/deciLiter      Allergies    No Known Drug Allergies  strawberry (Unknown)    Intolerances        Vital Signs Last 24 Hrs  T(C): 36.9 (11 Jun 2018 07:58), Max: 37.3 (10 Jin 2018 23:50)  T(F): 98.4 (11 Jun 2018 07:58), Max: 99.1 (10 Jin 2018 23:50)  HR: 78 (11 Jun 2018 07:58) (69 - 78)  BP: 125/- (11 Jun 2018 07:58) (119/63 - 129/61)  BP(mean): --  RR: 18 (11 Jun 2018 07:58) (18 - 20)  SpO2: 97% (10 Jin 2018 23:50) (97% - 99%)      PHYSICAL EXAM  General: adult in NAD  CV: normal S1/S2   Lungs: positive air movement b/l ant lungs  Abdomen: soft non-tender   Ext: noedema  Skin: Patient has laceration on her toes left   Neuro: alert and oriented     LABS:                          7.5    1.70  )-----------( 80       ( 11 Jun 2018 10:11 )             23.4          06-10    138  |  101  |  38<H>  ----------------------------<  124<H>  4.4   |  20  |  1.5    Ca    8.8      10 Jin 2018 08:09    TPro  5.7<L>  /  Alb  3.5  /  TBili  0.4  /  DBili  x   /  AST  21  /  ALT  15  /  AlkPhos  41  06-10                     7.8    0.66  )-----------( 38       ( 09 Jun 2018 09:46 )             23.1         Mean Cell Volume : 90.9 fL  Mean Cell Hemoglobin : 30.7 pg  Mean Cell Hemoglobin Concentration : 33.8 g/dL  Auto Neutrophil # : 0.24 K/uL  Auto Lymphocyte # : 0.27 K/uL  Auto Monocyte # : 0.05 K/uL  Auto Eosinophil # : 0.05 K/uL  Auto Basophil # : 0.00 K/uL  Auto Neutrophil % : 36.3 %  Auto Lymphocyte % : 40.9 %  Auto Monocyte % : 7.6 %  Auto Eosinophil % : 7.6 %  Auto Basophil % : 0.0 %    Serial CBC's  06-09 @ 09:46  Hct-23.1 / Hgb-7.8 / Plat-38 / RBC-2.54 / WBC-0.66          Serial CBC's  06-08 @ 12:49  Hct-16.0 / Hgb-5.0 / Plat-55 / RBC-1.61 / WBC-0.96            06-09    136  |  100  |  38<H>  ----------------------------<  223<H>  4.8   |  20  |  1.5    Ca    8.2<L>      09 Jun 2018 09:46  Phos  2.3     06-09  Mg     2.1     06-09    TPro  5.7<L>  /  Alb  3.4<L>  /  TBili  0.5  /  DBili  x   /  AST  18  /  ALT  15  /  AlkPhos  44  06-09      PT/INR - ( 08 Jun 2018 20:21 )   PT: 11.30 sec;   INR: 1.05 ratio         PTT - ( 08 Jun 2018 20:21 )  PTT:20.2 sec    Hemoglobin: 7.8 g/dL (06-09-18 @ 09:46)  WBC Count: 0.66 K/uL (06-09-18 @ 09:46)  Platelet Count - Automated: 38 K/uL (06-09-18 @ 09:46)  Hematocrit: 23.1 % (06-09-18 @ 09:46)  Iron - Total Binding Capacity.: 250 ug/dL (06-08-18 @ 20:31)  Hematocrit: 16.0 % (06-08-18 @ 12:49)  Platelet Count - Automated: 55 K/uL (06-08-18 @ 12:49)  Hemoglobin: 5.0 g/dL (06-08-18 @ 12:49)  WBC Count: 0.96 K/uL (06-08-18 @ 12:49)    Culture - Blood (06.08.18 @ 20:56)    Specimen Source: .Blood None    Culture Results:   No growth to date.        RADIOLOGY & ADDITIONAL STUDIES:    < from: CT Abdomen and Pelvis w/ IV Cont (06.08.18 @ 15:00) >    EXAM:  CT ABDOMEN AND PELVIS IC            PROCEDURE DATE:  06/08/2018            INTERPRETATION:  CLINICAL HISTORY / REASON FOR EXAM: Abdominal pain,   diarrhea.    TECHNIQUE: Contiguous axial CT images were obtained from the lower chest   to the pubic symphysis following administration of 100 mL Optiray 320   intravenous contrast.  Oral contrast was not administered.  Reformatted   images in the coronal and sagittal planes were acquired.    COMPARISON CT: None        OTHER STUDIES USED FOR CORRELATION: Lumbar radiograph from February 28, 2018       FINDINGS:    LOWER CHEST: Apparent asymmetric right breast soft tissue. Bibasilar   atelectasis with left lower lobe groundglass opacities.    HEPATOBILIARY: Indeterminate 1.1 cm liver segment 6 hypodense lesion   (series 3, image 22)    SPLEEN: Unremarkable.    PANCREAS: Two pancreatic tail hypodense possible cystic lesions, larger   1.9 cm (series 5, image 107).    ADRENAL GLANDS: Unremarkable.    KIDNEYS: Symmetric enhancement bilaterally. No evidence of hydronephrosis.    ABDOMINOPELVIC NODES: Unremarkable.    PELVIC ORGANS: Distended urinary bladder.    PERITONEUM/MESENTERY/BOWEL: No bowel obstruction, ascites or   intraperitoneal free air. Normal appendix.    BONES/SOFT TISSUES:Chronic compression fractures of T12, L1 and L2.   Chronic fracture of the left pubic rami. Chronic right eighth rib   fracture. Degenerative changes to the thoracolumbar spine.      IMPRESSION:  1. No evidence of acute abdominal pathology.    2. Apparent asymmetric right breast soft tissue; diagnostic mammogram or   correlation with outside mammography recommended.    3. Indeterminate 1.1 cm liver segment 6 hypodense lesion. Further   evaluation recommended with MRI abdomen with and without IV contrast.    4. Two pancreatic tail hypodense possible cystic lesions, larger 1.9 cm;   this can be further evaluated with recommended MRI with additional MRCP   sequences.    < end of copied text > Patient and examined, patient feels well today and has no complaints at this time. Diarrhea resolved.    MEDICATIONS  (STANDING):  allopurinol 300 milliGRAM(s) Oral daily  cefepime   IVPB      cefepime   IVPB 2000 milliGRAM(s) IV Intermittent every 24 hours  dextrose 5%. 1000 milliLiter(s) (50 mL/Hr) IV Continuous <Continuous>  dextrose 50% Injectable 12.5 Gram(s) IV Push once  dextrose 50% Injectable 25 Gram(s) IV Push once  dextrose 50% Injectable 25 Gram(s) IV Push once  enalapril 10 milliGRAM(s) Oral daily  insulin glargine Injectable (LANTUS) 15 Unit(s) SubCutaneous at bedtime  insulin lispro (HumaLOG) corrective regimen sliding scale   SubCutaneous three times a day before meals  insulin lispro Injectable (HumaLOG) 5 Unit(s) SubCutaneous three times a day before meals  predniSONE   Tablet 60 milliGRAM(s) Oral daily  vancomycin  IVPB 1200 milliGRAM(s) IV Intermittent every 24 hours  vancomycin  IVPB        MEDICATIONS  (PRN):  acetaminophen   Tablet. 650 milliGRAM(s) Oral every 6 hours PRN Mild Pain (1 - 3)  dextrose 40% Gel 15 Gram(s) Oral once PRN Blood Glucose LESS THAN 70 milliGRAM(s)/deciLiter  glucagon  Injectable 1 milliGRAM(s) IntraMuscular once PRN Glucose <70 milliGRAM(s)/deciLiter      Allergies    No Known Drug Allergies  strawberry (Unknown)    Intolerances        Vital Signs Last 24 Hrs  T(C): 36.9 (11 Jun 2018 07:58), Max: 37.3 (10 Jin 2018 23:50)  T(F): 98.4 (11 Jun 2018 07:58), Max: 99.1 (10 Jin 2018 23:50)  HR: 78 (11 Jun 2018 07:58) (69 - 78)  BP: 125/- (11 Jun 2018 07:58) (119/63 - 129/61)  BP(mean): --  RR: 18 (11 Jun 2018 07:58) (18 - 20)  SpO2: 97% (10 Jin 2018 23:50) (97% - 99%)      PHYSICAL EXAM  General: adult in NAD  CV: normal S1/S2   Lungs: positive air movement b/l ant lungs  Abdomen: soft non-tender   Ext: noedema  Skin: Patient has laceration on her toes left   Neuro: alert and oriented     LABS:                          7.5    1.70  )-----------( 80       ( 11 Jun 2018 10:11 )             23.4   CBC Full  -  ( 11 Jun 2018 10:11 )  WBC Count : 1.70 K/uL  Hemoglobin : 7.5 g/dL  Hematocrit : 23.4 %  Platelet Count - Automated : 80 K/uL  Mean Cell Volume : 94.0 fL  Mean Cell Hemoglobin : 30.1 pg  Mean Cell Hemoglobin Concentration : 32.1 g/dL  Auto Neutrophil # : 0.48 K/uL  Auto Lymphocyte # : 0.90 K/uL  Auto Monocyte # : 0.21 K/uL  Auto Eosinophil # : 0.08 K/uL  Auto Basophil # : 0.00 K/uL  Auto Neutrophil % : 28.2 %  Auto Lymphocyte % : 52.9 %  Auto Monocyte % : 12.4 %  Auto Eosinophil % : 4.7 %  Auto Basophil % : 0.0 %         06-10    138  |  101  |  38<H>  ----------------------------<  124<H>  4.4   |  20  |  1.5    Ca    8.8      10 Jin 2018 08:09    TPro  5.7<L>  /  Alb  3.5  /  TBili  0.4  /  DBili  x   /  AST  21  /  ALT  15  /  AlkPhos  41  06-10                     7.8    0.66  )-----------( 38       ( 09 Jun 2018 09:46 )             23.1         Mean Cell Volume : 90.9 fL  Mean Cell Hemoglobin : 30.7 pg  Mean Cell Hemoglobin Concentration : 33.8 g/dL  Auto Neutrophil # : 0.24 K/uL  Auto Lymphocyte # : 0.27 K/uL  Auto Monocyte # : 0.05 K/uL  Auto Eosinophil # : 0.05 K/uL  Auto Basophil # : 0.00 K/uL  Auto Neutrophil % : 36.3 %  Auto Lymphocyte % : 40.9 %  Auto Monocyte % : 7.6 %  Auto Eosinophil % : 7.6 %  Auto Basophil % : 0.0 %    Serial CBC's  06-09 @ 09:46  Hct-23.1 / Hgb-7.8 / Plat-38 / RBC-2.54 / WBC-0.66          Serial CBC's  06-08 @ 12:49  Hct-16.0 / Hgb-5.0 / Plat-55 / RBC-1.61 / WBC-0.96            06-09    136  |  100  |  38<H>  ----------------------------<  223<H>  4.8   |  20  |  1.5    Ca    8.2<L>      09 Jun 2018 09:46  Phos  2.3     06-09  Mg     2.1     06-09    TPro  5.7<L>  /  Alb  3.4<L>  /  TBili  0.5  /  DBili  x   /  AST  18  /  ALT  15  /  AlkPhos  44  06-09      PT/INR - ( 08 Jun 2018 20:21 )   PT: 11.30 sec;   INR: 1.05 ratio         PTT - ( 08 Jun 2018 20:21 )  PTT:20.2 sec    Hemoglobin: 7.8 g/dL (06-09-18 @ 09:46)  WBC Count: 0.66 K/uL (06-09-18 @ 09:46)  Platelet Count - Automated: 38 K/uL (06-09-18 @ 09:46)  Hematocrit: 23.1 % (06-09-18 @ 09:46)  Iron - Total Binding Capacity.: 250 ug/dL (06-08-18 @ 20:31)  Hematocrit: 16.0 % (06-08-18 @ 12:49)  Platelet Count - Automated: 55 K/uL (06-08-18 @ 12:49)  Hemoglobin: 5.0 g/dL (06-08-18 @ 12:49)  WBC Count: 0.96 K/uL (06-08-18 @ 12:49)    Culture - Blood (06.08.18 @ 20:56)    Specimen Source: .Blood None    Culture Results:   No growth to date.        RADIOLOGY & ADDITIONAL STUDIES:    < from: CT Abdomen and Pelvis w/ IV Cont (06.08.18 @ 15:00) >    EXAM:  CT ABDOMEN AND PELVIS IC            PROCEDURE DATE:  06/08/2018            INTERPRETATION:  CLINICAL HISTORY / REASON FOR EXAM: Abdominal pain,   diarrhea.    TECHNIQUE: Contiguous axial CT images were obtained from the lower chest   to the pubic symphysis following administration of 100 mL Optiray 320   intravenous contrast.  Oral contrast was not administered.  Reformatted   images in the coronal and sagittal planes were acquired.    COMPARISON CT: None        OTHER STUDIES USED FOR CORRELATION: Lumbar radiograph from February 28, 2018       FINDINGS:    LOWER CHEST: Apparent asymmetric right breast soft tissue. Bibasilar   atelectasis with left lower lobe groundglass opacities.    HEPATOBILIARY: Indeterminate 1.1 cm liver segment 6 hypodense lesion   (series 3, image 22)    SPLEEN: Unremarkable.    PANCREAS: Two pancreatic tail hypodense possible cystic lesions, larger   1.9 cm (series 5, image 107).    ADRENAL GLANDS: Unremarkable.    KIDNEYS: Symmetric enhancement bilaterally. No evidence of hydronephrosis.    ABDOMINOPELVIC NODES: Unremarkable.    PELVIC ORGANS: Distended urinary bladder.    PERITONEUM/MESENTERY/BOWEL: No bowel obstruction, ascites or   intraperitoneal free air. Normal appendix.    BONES/SOFT TISSUES:Chronic compression fractures of T12, L1 and L2.   Chronic fracture of the left pubic rami. Chronic right eighth rib   fracture. Degenerative changes to the thoracolumbar spine.      IMPRESSION:  1. No evidence of acute abdominal pathology.    2. Apparent asymmetric right breast soft tissue; diagnostic mammogram or   correlation with outside mammography recommended.    3. Indeterminate 1.1 cm liver segment 6 hypodense lesion. Further   evaluation recommended with MRI abdomen with and without IV contrast.    4. Two pancreatic tail hypodense possible cystic lesions, larger 1.9 cm;   this can be further evaluated with recommended MRI with additional MRCP   sequences.    < end of copied text >

## 2018-06-11 NOTE — CONSULT NOTE ADULT - SUBJECTIVE AND OBJECTIVE BOX
INTERVENTIONAL RADIOLOGY CONSULT:     Procedure Requested: bone marrow biopsy    HPI:  78 Y O f with pmh of HTN , chronic back pain and RA on prednisone and methotrexate presented to ER with c/o diarrhea and generalized weakness for 3 days . History goes back to 2 months ; when pt developed a blister on her  right toe that was infected , went to her PCP was told to take  clindamycin for 5 days. Pt took the medication and reports the that wound was healing . Pt  then went to her Vascular Dr 2 wks ago , had doppler done and was told to take clindamycin for another 2 wks. Pt started taking clindamycin , pt completed 12 days and then developed watery diarrhea .  Pt reports having > 5 episodes of diarrhea , no nausea or vomiting .Pt reports that for past 2 days she had around 7 BM /day , watery with cramps , she did not notice any blood or black stools.    Pt reports no fevers but reports chills at night. Pt also developed generalized weakness , was unable to ambulate so presented to ER. Pt reports sob , for past 2 months.     In Er blood work showed pt had pancytopenia with hb of 5 ,  wbc 0.96 , platelet count of 50 .Labs done in March showed hb of 10.2 with repeat in May was 7.6 but normal platelets and white count.  Pt also had guaiaic + stools. 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, unspecified whether long term insulin use  Primary osteoarthritis of right knee: s/p knee repalcement      MEDICATIONS  (STANDING):  allopurinol 300 milliGRAM(s) Oral daily  cefepime   IVPB      cefepime   IVPB 2000 milliGRAM(s) IV Intermittent every 24 hours  dextrose 50% Injectable 12.5 Gram(s) IV Push once  dextrose 50% Injectable 25 Gram(s) IV Push once  dextrose 50% Injectable 25 Gram(s) IV Push once  enalapril 10 milliGRAM(s) Oral daily  folic acid 1 milliGRAM(s) Oral daily  insulin glargine Injectable (LANTUS) 15 Unit(s) SubCutaneous at bedtime  insulin lispro (HumaLOG) corrective regimen sliding scale   SubCutaneous three times a day before meals  insulin lispro Injectable (HumaLOG) 5 Unit(s) SubCutaneous three times a day before meals  predniSONE   Tablet 60 milliGRAM(s) Oral daily  vancomycin  IVPB 1200 milliGRAM(s) IV Intermittent every 24 hours  vancomycin  IVPB        MEDICATIONS  (PRN):  acetaminophen   Tablet. 650 milliGRAM(s) Oral every 6 hours PRN Mild Pain (1 - 3)  dextrose 40% Gel 15 Gram(s) Oral once PRN Blood Glucose LESS THAN 70 milliGRAM(s)/deciLiter  glucagon  Injectable 1 milliGRAM(s) IntraMuscular once PRN Glucose <70 milliGRAM(s)/deciLiter      Allergies    No Known Drug Allergies  strawberry (Unknown)    Intolerances        Social History:   Smoking: Yes [ ]  No [ ]   ______pk yrs  ETOH  Yes [ ]  No [ ]  Social [ ]  DRUGS:  Yes [ ]  No [ ]  if so what______________    FAMILY HISTORY:  No pertinent family history in first degree relatives      Physical Exam:   Vital Signs Last 24 Hrs  T(C): 36.9 (11 Jun 2018 07:58), Max: 37.3 (10 Jin 2018 23:50)  T(F): 98.4 (11 Jun 2018 07:58), Max: 99.1 (10 Jin 2018 23:50)  HR: 78 (11 Jun 2018 07:58) (69 - 78)  BP: 125/- (11 Jun 2018 07:58) (119/63 - 129/61)  BP(mean): --  RR: 18 (11 Jun 2018 07:58) (18 - 20)  SpO2: 97% (10 Jin 2018 23:50) (97% - 99%)        Labs:                         7.5    1.70  )-----------( 80       ( 11 Jun 2018 10:11 )             23.4     06-11    134<L>  |  101  |  44<H>  ----------------------------<  218<H>  4.9   |  20  |  1.7<H>    Ca    8.7      11 Jun 2018 10:11    TPro  5.7<L>  /  Alb  3.3<L>  /  TBili  0.3  /  DBili  x   /  AST  16  /  ALT  14  /  AlkPhos  40  06-11        Pertinent labs:                      7.5    1.70  )-----------( 80       ( 11 Jun 2018 10:11 )             23.4       06-11    134<L>  |  101  |  44<H>  ----------------------------<  218<H>  4.9   |  20  |  1.7<H>    Ca    8.7      11 Jun 2018 10:11    TPro  5.7<L>  /  Alb  3.3<L>  /  TBili  0.3  /  DBili  x   /  AST  16  /  ALT  14  /  AlkPhos  40  06-11      ASSESSMENT/ PLAN:   - consulted by primary team for bone marrow biopsy to rule out acute leukemia - patient has new onset pancytopenia on admission  - patient scheduled for CT guided bone marrow biopsy with conscious sedation tomorrow 6/12  - please make patient NPO after midnight   - target Hg >8, PLT >50 - due to very minimal risk of bleeding will still proceed if slightly below range      Thank you for the courtesy of this consult, please call [phone] with any further questions. INTERVENTIONAL RADIOLOGY CONSULT:     Procedure Requested: bone marrow biopsy    HPI:  78 Y O f with pmh of HTN , chronic back pain and RA on prednisone and methotrexate presented to ER with c/o diarrhea and generalized weakness for 3 days . History goes back to 2 months ; when pt developed a blister on her  right toe that was infected , went to her PCP was told to take  clindamycin for 5 days. Pt took the medication and reports the that wound was healing . Pt  then went to her Vascular Dr 2 wks ago , had doppler done and was told to take clindamycin for another 2 wks. Pt started taking clindamycin , pt completed 12 days and then developed watery diarrhea .  Pt reports having > 5 episodes of diarrhea , no nausea or vomiting .Pt reports that for past 2 days she had around 7 BM /day , watery with cramps , she did not notice any blood or black stools.    Pt reports no fevers but reports chills at night. Pt also developed generalized weakness , was unable to ambulate so presented to ER. Pt reports sob , for past 2 months.     In Er blood work showed pt had pancytopenia with hb of 5 ,  wbc 0.96 , platelet count of 50 .Labs done in March showed hb of 10.2 with repeat in May was 7.6 but normal platelets and white count.  Pt also had guaiaic + stools. 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, unspecified whether long term insulin use  Primary osteoarthritis of right knee: s/p knee repalcement      MEDICATIONS  (STANDING):  allopurinol 300 milliGRAM(s) Oral daily  cefepime   IVPB      cefepime   IVPB 2000 milliGRAM(s) IV Intermittent every 24 hours  dextrose 50% Injectable 12.5 Gram(s) IV Push once  dextrose 50% Injectable 25 Gram(s) IV Push once  dextrose 50% Injectable 25 Gram(s) IV Push once  enalapril 10 milliGRAM(s) Oral daily  folic acid 1 milliGRAM(s) Oral daily  insulin glargine Injectable (LANTUS) 15 Unit(s) SubCutaneous at bedtime  insulin lispro (HumaLOG) corrective regimen sliding scale   SubCutaneous three times a day before meals  insulin lispro Injectable (HumaLOG) 5 Unit(s) SubCutaneous three times a day before meals  predniSONE   Tablet 60 milliGRAM(s) Oral daily  vancomycin  IVPB 1200 milliGRAM(s) IV Intermittent every 24 hours  vancomycin  IVPB        MEDICATIONS  (PRN):  acetaminophen   Tablet. 650 milliGRAM(s) Oral every 6 hours PRN Mild Pain (1 - 3)  dextrose 40% Gel 15 Gram(s) Oral once PRN Blood Glucose LESS THAN 70 milliGRAM(s)/deciLiter  glucagon  Injectable 1 milliGRAM(s) IntraMuscular once PRN Glucose <70 milliGRAM(s)/deciLiter      Allergies    No Known Drug Allergies  strawberry (Unknown)    Intolerances        Social History:   Smoking: Yes [ ]  No [ ]   ______pk yrs  ETOH  Yes [ ]  No [ ]  Social [ ]  DRUGS:  Yes [ ]  No [ ]  if so what______________    FAMILY HISTORY:  No pertinent family history in first degree relatives      Physical Exam:   Vital Signs Last 24 Hrs  T(C): 36.9 (11 Jun 2018 07:58), Max: 37.3 (10 Jin 2018 23:50)  T(F): 98.4 (11 Jun 2018 07:58), Max: 99.1 (10 Jin 2018 23:50)  HR: 78 (11 Jun 2018 07:58) (69 - 78)  BP: 125/- (11 Jun 2018 07:58) (119/63 - 129/61)  BP(mean): --  RR: 18 (11 Jun 2018 07:58) (18 - 20)  SpO2: 97% (10 Jin 2018 23:50) (97% - 99%)        Labs:                         7.5    1.70  )-----------( 80       ( 11 Jun 2018 10:11 )             23.4     06-11    134<L>  |  101  |  44<H>  ----------------------------<  218<H>  4.9   |  20  |  1.7<H>    Ca    8.7      11 Jun 2018 10:11    TPro  5.7<L>  /  Alb  3.3<L>  /  TBili  0.3  /  DBili  x   /  AST  16  /  ALT  14  /  AlkPhos  40  06-11        Pertinent labs:                      7.5    1.70  )-----------( 80       ( 11 Jun 2018 10:11 )             23.4       06-11    134<L>  |  101  |  44<H>  ----------------------------<  218<H>  4.9   |  20  |  1.7<H>    Ca    8.7      11 Jun 2018 10:11    TPro  5.7<L>  /  Alb  3.3<L>  /  TBili  0.3  /  DBili  x   /  AST  16  /  ALT  14  /  AlkPhos  40  06-11      ASSESSMENT/ PLAN:   - consulted by primary team for bone marrow biopsy to rule out acute leukemia - patient has new onset pancytopenia on admission  - patient scheduled for CT guided bone marrow biopsy with conscious sedation tomorrow 6/12  - please make patient NPO after midnight   - target Hg >8, PLT >50 - due to very minimal risk of bleeding will still proceed if slightly below range      Addendum:  - spoke with Dr. Marx 6/11 2:30pm - heme/onc no longer wants bone marrow bx - labs are trending upwards - most likely drug related response   - please reconsult as needed     Thank you for the courtesy of this consult, please call [phone] with any further questions. INTERVENTIONAL RADIOLOGY CONSULT:     Procedure Requested: bone marrow biopsy    HPI:  78 Y O f with pmh of HTN , chronic back pain and RA on prednisone and methotrexate presented to ER with c/o diarrhea and generalized weakness for 3 days . History goes back to 2 months ; when pt developed a blister on her  right toe that was infected , went to her PCP was told to take  clindamycin for 5 days. Pt took the medication and reports the that wound was healing . Pt  then went to her Vascular Dr 2 wks ago , had doppler done and was told to take clindamycin for another 2 wks. Pt started taking clindamycin , pt completed 12 days and then developed watery diarrhea .  Pt reports having > 5 episodes of diarrhea , no nausea or vomiting .Pt reports that for past 2 days she had around 7 BM /day , watery with cramps , she did not notice any blood or black stools.    Pt reports no fevers but reports chills at night. Pt also developed generalized weakness , was unable to ambulate so presented to ER. Pt reports sob , for past 2 months.     In Er blood work showed pt had pancytopenia with hb of 5 ,  wbc 0.96 , platelet count of 50 .Labs done in March showed hb of 10.2 with repeat in May was 7.6 but normal platelets and white count.  Pt also had guaiaic + stools. 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, unspecified whether long term insulin use  Primary osteoarthritis of right knee: s/p knee repalcement      MEDICATIONS  (STANDING):  allopurinol 300 milliGRAM(s) Oral daily  cefepime   IVPB      cefepime   IVPB 2000 milliGRAM(s) IV Intermittent every 24 hours  dextrose 50% Injectable 12.5 Gram(s) IV Push once  dextrose 50% Injectable 25 Gram(s) IV Push once  dextrose 50% Injectable 25 Gram(s) IV Push once  enalapril 10 milliGRAM(s) Oral daily  folic acid 1 milliGRAM(s) Oral daily  insulin glargine Injectable (LANTUS) 15 Unit(s) SubCutaneous at bedtime  insulin lispro (HumaLOG) corrective regimen sliding scale   SubCutaneous three times a day before meals  insulin lispro Injectable (HumaLOG) 5 Unit(s) SubCutaneous three times a day before meals  predniSONE   Tablet 60 milliGRAM(s) Oral daily  vancomycin  IVPB 1200 milliGRAM(s) IV Intermittent every 24 hours  vancomycin  IVPB        MEDICATIONS  (PRN):  acetaminophen   Tablet. 650 milliGRAM(s) Oral every 6 hours PRN Mild Pain (1 - 3)  dextrose 40% Gel 15 Gram(s) Oral once PRN Blood Glucose LESS THAN 70 milliGRAM(s)/deciLiter  glucagon  Injectable 1 milliGRAM(s) IntraMuscular once PRN Glucose <70 milliGRAM(s)/deciLiter      Allergies    No Known Drug Allergies  strawberry (Unknown)    Intolerances        Social History:   Smoking: Yes [ ]  No [ ]   ______pk yrs  ETOH  Yes [ ]  No [ ]  Social [ ]  DRUGS:  Yes [ ]  No [ ]  if so what______________    FAMILY HISTORY:  No pertinent family history in first degree relatives      Physical Exam:   Vital Signs Last 24 Hrs  T(C): 36.9 (11 Jun 2018 07:58), Max: 37.3 (10 Jin 2018 23:50)  T(F): 98.4 (11 Jun 2018 07:58), Max: 99.1 (10 Jin 2018 23:50)  HR: 78 (11 Jun 2018 07:58) (69 - 78)  BP: 125/- (11 Jun 2018 07:58) (119/63 - 129/61)  BP(mean): --  RR: 18 (11 Jun 2018 07:58) (18 - 20)  SpO2: 97% (10 Jin 2018 23:50) (97% - 99%)        Labs:                         7.5    1.70  )-----------( 80       ( 11 Jun 2018 10:11 )             23.4     06-11    134<L>  |  101  |  44<H>  ----------------------------<  218<H>  4.9   |  20  |  1.7<H>    Ca    8.7      11 Jun 2018 10:11    TPro  5.7<L>  /  Alb  3.3<L>  /  TBili  0.3  /  DBili  x   /  AST  16  /  ALT  14  /  AlkPhos  40  06-11        Pertinent labs:                      7.5    1.70  )-----------( 80       ( 11 Jun 2018 10:11 )             23.4       06-11    134<L>  |  101  |  44<H>  ----------------------------<  218<H>  4.9   |  20  |  1.7<H>    Ca    8.7      11 Jun 2018 10:11    TPro  5.7<L>  /  Alb  3.3<L>  /  TBili  0.3  /  DBili  x   /  AST  16  /  ALT  14  /  AlkPhos  40  06-11      ASSESSMENT/ PLAN:   - consulted by primary team for bone marrow biopsy to rule out acute leukemia - patient has new onset pancytopenia on admission  - patient scheduled for CT guided bone marrow biopsy with conscious sedation tomorrow 6/12  - please make patient NPO after midnight   - target Hg >8, PLT >50 - due to very minimal risk of bleeding will still proceed if slightly below range      Addendum:  - spoke with Dr. Smith 6/11 2:30pm - heme/onc no longer wants bone marrow bx - labs are trending upwards - most likely drug related response   - please reconsult as needed     Thank you for the courtesy of this consult, please call [phone] with any further questions.

## 2018-06-11 NOTE — PROGRESS NOTE ADULT - SUBJECTIVE AND OBJECTIVE BOX
Patient is a 78y old  Female who presents with a chief complaint of Diarrhea, weakness (08 Jun 2018 22:49)      Ovenight events:    PAST MEDICAL & SURGICAL HISTORY:  Rheumatoid arteritis  Other specified diabetes mellitus with other specified complication, unspecified whether long term insulin use  Primary osteoarthritis of right knee: s/p knee repalcement        FAMILY HISTORY:  No pertinent family history in first degree relatives        Allergies    No Known Drug Allergies  strawberry (Unknown)    Intolerances        acetaminophen   Tablet. 650 milliGRAM(s) Oral every 6 hours PRN  allopurinol 300 milliGRAM(s) Oral daily  cefepime   IVPB      cefepime   IVPB 2000 milliGRAM(s) IV Intermittent every 24 hours  dextrose 40% Gel 15 Gram(s) Oral once PRN  dextrose 50% Injectable 12.5 Gram(s) IV Push once  dextrose 50% Injectable 25 Gram(s) IV Push once  dextrose 50% Injectable 25 Gram(s) IV Push once  enalapril 10 milliGRAM(s) Oral daily  folic acid 1 milliGRAM(s) Oral daily  glucagon  Injectable 1 milliGRAM(s) IntraMuscular once PRN  insulin glargine Injectable (LANTUS) 15 Unit(s) SubCutaneous at bedtime  insulin lispro (HumaLOG) corrective regimen sliding scale   SubCutaneous three times a day before meals  insulin lispro Injectable (HumaLOG) 5 Unit(s) SubCutaneous three times a day before meals  predniSONE   Tablet 60 milliGRAM(s) Oral daily  vancomycin  IVPB 1200 milliGRAM(s) IV Intermittent every 24 hours  vancomycin  IVPB      : Home Meds:      PHYSICAL EXAM    ICU Vital Signs Last 24 Hrs  T(C): 36.9 (11 Jun 2018 07:58), Max: 37.3 (10 Jin 2018 23:50)  T(F): 98.4 (11 Jun 2018 07:58), Max: 99.1 (10 Jin 2018 23:50)  HR: 78 (11 Jun 2018 07:58) (69 - 78)  BP: 125/- (11 Jun 2018 07:58) (119/63 - 129/61)  BP(mean): --  ABP: --  ABP(mean): --  RR: 18 (11 Jun 2018 07:58) (18 - 20)  SpO2: 97% (10 Jin 2018 23:50) (97% - 99%)      General: NAD  HEENT:   Lymph nodes:  Lungs:  CTA b/l  Cardiovascular: rs1s2 no murmur  Abdomen: soft non tender not distended  Extremities: no LLE   Skin: laceration on left toes  Neurological: AAO3      06-10-18 @ 07:01  -  06-11-18 @ 07:00  --------------------------------------------------------  IN:  Total IN: 0 mL    OUT:    Voided: 2 mL  Total OUT: 2 mL    Total NET: -2 mL      06-11-18 @ 07:01  -  06-11-18 @ 15:05  --------------------------------------------------------  IN:    Oral Fluid: 560 mL  Total IN: 560 mL    OUT:    Voided: 700 mL  Total OUT: 700 mL    Total NET: -140 mL          LABS:                          7.5    1.70  )-----------( 80       ( 11 Jun 2018 10:11 )             23.4                                               06-11    134<L>  |  101  |  44<H>  ----------------------------<  218<H>  4.9   |  20  |  1.7<H>    Ca    8.7      11 Jun 2018 10:11    TPro  5.7<L>  /  Alb  3.3<L>  /  TBili  0.3  /  DBili  x   /  AST  16  /  ALT  14  /  AlkPhos  40  06-11                                                                                           LIVER FUNCTIONS - ( 11 Jun 2018 10:11 )  Alb: 3.3 g/dL / Pro: 5.7 g/dL / ALK PHOS: 40 U/L / ALT: 14 U/L / AST: 16 U/L / GGT: x                                                  Culture - Blood (collected 08 Jun 2018 20:56)  Source: .Blood None  Preliminary Report (10 Jin 2018 02:12):    No growth to date.                                                                                           X-Rays:                                                                                         ECHO:    MEDICATIONS  (STANDING):  allopurinol 300 milliGRAM(s) Oral daily  cefepime   IVPB      cefepime   IVPB 2000 milliGRAM(s) IV Intermittent every 24 hours  dextrose 50% Injectable 12.5 Gram(s) IV Push once  dextrose 50% Injectable 25 Gram(s) IV Push once  dextrose 50% Injectable 25 Gram(s) IV Push once  enalapril 10 milliGRAM(s) Oral daily  folic acid 1 milliGRAM(s) Oral daily  insulin glargine Injectable (LANTUS) 15 Unit(s) SubCutaneous at bedtime  insulin lispro (HumaLOG) corrective regimen sliding scale   SubCutaneous three times a day before meals  insulin lispro Injectable (HumaLOG) 5 Unit(s) SubCutaneous three times a day before meals  predniSONE   Tablet 60 milliGRAM(s) Oral daily  vancomycin  IVPB 1200 milliGRAM(s) IV Intermittent every 24 hours  vancomycin  IVPB        MEDICATIONS  (PRN):  acetaminophen   Tablet. 650 milliGRAM(s) Oral every 6 hours PRN Mild Pain (1 - 3)  dextrose 40% Gel 15 Gram(s) Oral once PRN Blood Glucose LESS THAN 70 milliGRAM(s)/deciLiter  glucagon  Injectable 1 milliGRAM(s) IntraMuscular once PRN Glucose <70 milliGRAM(s)/deciLiter Patient is a 78y old  Female who presents with a chief complaint of Diarrhea, weakness (08 Jun 2018 22:49)      Ovenight events:    PAST MEDICAL & SURGICAL HISTORY:  Rheumatoid arteritis  Other specified diabetes mellitus with other specified complication, unspecified whether long term insulin use  Primary osteoarthritis of right knee: s/p knee repalcement        FAMILY HISTORY:  No pertinent family history in first degree relatives        Allergies    No Known Drug Allergies  strawberry (Unknown)    Intolerances        acetaminophen   Tablet. 650 milliGRAM(s) Oral every 6 hours PRN  allopurinol 300 milliGRAM(s) Oral daily  cefepime   IVPB      cefepime   IVPB 2000 milliGRAM(s) IV Intermittent every 24 hours  dextrose 40% Gel 15 Gram(s) Oral once PRN  dextrose 50% Injectable 12.5 Gram(s) IV Push once  dextrose 50% Injectable 25 Gram(s) IV Push once  dextrose 50% Injectable 25 Gram(s) IV Push once  enalapril 10 milliGRAM(s) Oral daily  folic acid 1 milliGRAM(s) Oral daily  glucagon  Injectable 1 milliGRAM(s) IntraMuscular once PRN  insulin glargine Injectable (LANTUS) 15 Unit(s) SubCutaneous at bedtime  insulin lispro (HumaLOG) corrective regimen sliding scale   SubCutaneous three times a day before meals  insulin lispro Injectable (HumaLOG) 5 Unit(s) SubCutaneous three times a day before meals  predniSONE   Tablet 60 milliGRAM(s) Oral daily  vancomycin  IVPB 1200 milliGRAM(s) IV Intermittent every 24 hours  vancomycin  IVPB      : Home Meds:      PHYSICAL EXAM    ICU Vital Signs Last 24 Hrs  T(C): 36.9 (11 Jun 2018 07:58), Max: 37.3 (10 Jin 2018 23:50)  T(F): 98.4 (11 Jun 2018 07:58), Max: 99.1 (10 Jin 2018 23:50)  HR: 78 (11 Jun 2018 07:58) (69 - 78)  BP: 125/- (11 Jun 2018 07:58) (119/63 - 129/61)  BP(mean): --  ABP: --  ABP(mean): --  RR: 18 (11 Jun 2018 07:58) (18 - 20)  SpO2: 97% (10 Jin 2018 23:50) (97% - 99%)      General: NAD  HEENT:   Lymph nodes:  Lungs:  CTA b/l  Cardiovascular: rs1s2 no murmur  Abdomen: soft non tender not distended  Extremities: no LLE   Skin: laceration on left toes  Neurological: AAO3      06-10-18 @ 07:01  -  06-11-18 @ 07:00  --------------------------------------------------------  IN:  Total IN: 0 mL    OUT:    Voided: 2 mL  Total OUT: 2 mL    Total NET: -2 mL      06-11-18 @ 07:01  -  06-11-18 @ 15:05  --------------------------------------------------------  IN:    Oral Fluid: 560 mL  Total IN: 560 mL    OUT:    Voided: 700 mL  Total OUT: 700 mL    Total NET: -140 mL          LABS:                          7.5    1.70  )-----------( 80       ( 11 Jun 2018 10:11 )             23.4                                               06-11    134<L>  |  101  |  44<H>  ----------------------------<  218<H>  4.9   |  20  |  1.7<H>    Ca    8.7      11 Jun 2018 10:11    TPro  5.7<L>  /  Alb  3.3<L>  /  TBili  0.3  /  DBili  x   /  AST  16  /  ALT  14  /  AlkPhos  40  06-11                                                                                           LIVER FUNCTIONS - ( 11 Jun 2018 10:11 )  Alb: 3.3 g/dL / Pro: 5.7 g/dL / ALK PHOS: 40 U/L / ALT: 14 U/L / AST: 16 U/L / GGT: x                                                  Culture - Blood (collected 08 Jun 2018 20:56)  Source: .Blood None  Preliminary Report (10 Jin 2018 02:12):    No growth to date.         MEDICATIONS  (STANDING):  allopurinol 300 milliGRAM(s) Oral daily  cefepime   IVPB      cefepime   IVPB 2000 milliGRAM(s) IV Intermittent every 24 hours  dextrose 50% Injectable 12.5 Gram(s) IV Push once  dextrose 50% Injectable 25 Gram(s) IV Push once  dextrose 50% Injectable 25 Gram(s) IV Push once  enalapril 10 milliGRAM(s) Oral daily  folic acid 1 milliGRAM(s) Oral daily  insulin glargine Injectable (LANTUS) 15 Unit(s) SubCutaneous at bedtime  insulin lispro (HumaLOG) corrective regimen sliding scale   SubCutaneous three times a day before meals  insulin lispro Injectable (HumaLOG) 5 Unit(s) SubCutaneous three times a day before meals  predniSONE   Tablet 60 milliGRAM(s) Oral daily  vancomycin  IVPB 1200 milliGRAM(s) IV Intermittent every 24 hours  vancomycin  IVPB        MEDICATIONS  (PRN):  acetaminophen   Tablet. 650 milliGRAM(s) Oral every 6 hours PRN Mild Pain (1 - 3)  dextrose 40% Gel 15 Gram(s) Oral once PRN Blood Glucose LESS THAN 70 milliGRAM(s)/deciLiter  glucagon  Injectable 1 milliGRAM(s) IntraMuscular once PRN Glucose <70 milliGRAM(s)/deciLiter

## 2018-06-11 NOTE — PROGRESS NOTE ADULT - ASSESSMENT
78 Y O f with pmh of HTN , and RA on prednisone and methotrexate presented to ER with c/o diarrhea and generalized weakness for 3 days.       # ANEMIA / PANCYTOPENIA    -DDX include drug-induced such as MTX, flaring inflammatory disorder, B12/folate deficiency, or primary bone marrow disorder such as leukemia and aplastic anemia.  --Her pancytopenia is improving.    - Transfused 2 units of PRBC, Keep Hgb >7, Platelets >10   - IVFs, Broad Spectrum Antibiotics   - PT/PTT- normal, fibrinogen 522, haptogloin 377, , Uric acid 8.4, phos 2.3  T bili normal, HIV negative  -Iron 26, TIBC 250, % saturation 10  - Flow cytometry, B12, folate, SPEP, K/L ratio, SIFE, Hepatitis Panel, methotrexate level, retic, D dimer- Pending  -2D echo   - UA negative, Blood culture negative  - Send off C diff- for diarrhea   -Blood smear reviewed: Thrombocytopenia, neutropenia but present neutrophils demonstrate toxic granulations, No obvious blasts on the smear     # SHONNA?  - IVFs, trend serum creatinine       # DIARRHEA :    - pt reports improvement in diarrhea today ,C. diff was not sent.   # Laceration on the toe;      # RA:    - Stable will c/w prednisone , will hold off on methotrexate for now.    # GI:    -ppx with protonix.    # DVT:    -ppx with scd. 78 Y O f with pmh of HTN , and RA on prednisone and methotrexate presented to ER with c/o diarrhea and generalized weakness for 3 days.       # ANEMIA / PANCYTOPENIA    -DDX include drug-induced such as MTX, flaring inflammatory disorder, B12/folate deficiency, or primary bone marrow disorder such as leukemia and aplastic anemia.  --Her pancytopenia is improving. I doubt if its bone marrow disorder if counts are improving   - Transfused 2 units of PRBC, Keep Hgb >7, Platelets >10   - IVFs, Broad Spectrum Antibiotics   - PT/PTT- normal, fibrinogen 522, haptogloin 377, , Uric acid 8.4, phos 2.3  T bili normal, HIV negative  -Iron 26, TIBC 250, % saturation 10  - B12, folate are normal. immunoelectrophoresis no monoclonal bands.  --Hepatitis Panel, methotrexate level, retic, D dimer- Pending  -2D echo   - UA negative, Blood culture negative  - Send off C diff- for diarrhea   -Blood smear reviewed: Thrombocytopenia, neutropenia but present neutrophils demonstrate toxic granulations, No obvious blasts on the smear     # SHONNA?  - IVFs, trend serum creatinine       # DIARRHEA :    - pt reports improvement in diarrhea today ,C. diff was not sent.   # Laceration on the toe;      # RA:    - Stable will c/w prednisone , will hold off on methotrexate for now.    # GI:    -ppx with protonix.    # DVT:    -ppx with scd.

## 2018-06-12 LAB
ANION GAP SERPL CALC-SCNC: 16 MMOL/L — HIGH (ref 7–14)
BASOPHILS # BLD AUTO: 0.01 K/UL — SIGNIFICANT CHANGE UP (ref 0–0.2)
BASOPHILS NFR BLD AUTO: 0.4 % — SIGNIFICANT CHANGE UP (ref 0–1)
BUN SERPL-MCNC: 45 MG/DL — HIGH (ref 10–20)
CALCIUM SERPL-MCNC: 8.9 MG/DL — SIGNIFICANT CHANGE UP (ref 8.5–10.1)
CHLORIDE SERPL-SCNC: 100 MMOL/L — SIGNIFICANT CHANGE UP (ref 98–110)
CO2 SERPL-SCNC: 20 MMOL/L — SIGNIFICANT CHANGE UP (ref 17–32)
CREAT SERPL-MCNC: 1.5 MG/DL — SIGNIFICANT CHANGE UP (ref 0.7–1.5)
EOSINOPHIL # BLD AUTO: 0.11 K/UL — SIGNIFICANT CHANGE UP (ref 0–0.7)
EOSINOPHIL NFR BLD AUTO: 4.4 % — SIGNIFICANT CHANGE UP (ref 0–8)
GLUCOSE SERPL-MCNC: 227 MG/DL — HIGH (ref 70–99)
HCT VFR BLD CALC: 24.7 % — LOW (ref 37–47)
HGB BLD-MCNC: 7.8 G/DL — LOW (ref 12–16)
IMM GRANULOCYTES NFR BLD AUTO: 7.7 % — HIGH (ref 0.1–0.3)
LYMPHOCYTES # BLD AUTO: 1.24 K/UL — SIGNIFICANT CHANGE UP (ref 1.2–3.4)
LYMPHOCYTES # BLD AUTO: 50 % — SIGNIFICANT CHANGE UP (ref 20.5–51.1)
MAGNESIUM SERPL-MCNC: 2.1 MG/DL — SIGNIFICANT CHANGE UP (ref 1.8–2.4)
MCHC RBC-ENTMCNC: 29.9 PG — SIGNIFICANT CHANGE UP (ref 27–31)
MCHC RBC-ENTMCNC: 31.6 G/DL — LOW (ref 32–37)
MCV RBC AUTO: 94.6 FL — SIGNIFICANT CHANGE UP (ref 81–99)
MONOCYTES # BLD AUTO: 0.43 K/UL — SIGNIFICANT CHANGE UP (ref 0.1–0.6)
MONOCYTES NFR BLD AUTO: 17.3 % — HIGH (ref 1.7–9.3)
NEUTROPHILS # BLD AUTO: 0.5 K/UL — LOW (ref 1.4–6.5)
NEUTROPHILS NFR BLD AUTO: 20.2 % — LOW (ref 42.2–75.2)
PLATELET # BLD AUTO: 150 K/UL — SIGNIFICANT CHANGE UP (ref 130–400)
POTASSIUM SERPL-MCNC: 4.7 MMOL/L — SIGNIFICANT CHANGE UP (ref 3.5–5)
POTASSIUM SERPL-SCNC: 4.7 MMOL/L — SIGNIFICANT CHANGE UP (ref 3.5–5)
RBC # BLD: 2.61 M/UL — LOW (ref 4.2–5.4)
RBC # FLD: 19 % — HIGH (ref 11.5–14.5)
SODIUM SERPL-SCNC: 136 MMOL/L — SIGNIFICANT CHANGE UP (ref 135–146)
WBC # BLD: 2.48 K/UL — LOW (ref 4.8–10.8)
WBC # FLD AUTO: 2.48 K/UL — LOW (ref 4.8–10.8)

## 2018-06-12 RX ADMIN — Medication 650 MILLIGRAM(S): at 11:02

## 2018-06-12 RX ADMIN — Medication 3: at 11:50

## 2018-06-12 RX ADMIN — Medication 250 MILLIGRAM(S): at 18:09

## 2018-06-12 RX ADMIN — Medication 650 MILLIGRAM(S): at 11:01

## 2018-06-12 RX ADMIN — CEFEPIME 100 MILLIGRAM(S): 1 INJECTION, POWDER, FOR SOLUTION INTRAMUSCULAR; INTRAVENOUS at 18:09

## 2018-06-12 RX ADMIN — Medication 10 MILLIGRAM(S): at 06:26

## 2018-06-12 RX ADMIN — Medication 5 UNIT(S): at 11:51

## 2018-06-12 RX ADMIN — Medication 650 MILLIGRAM(S): at 16:49

## 2018-06-12 RX ADMIN — Medication 4: at 16:41

## 2018-06-12 RX ADMIN — Medication 5 UNIT(S): at 16:42

## 2018-06-12 RX ADMIN — INSULIN GLARGINE 15 UNIT(S): 100 INJECTION, SOLUTION SUBCUTANEOUS at 21:13

## 2018-06-12 RX ADMIN — Medication 1 MILLIGRAM(S): at 11:02

## 2018-06-12 RX ADMIN — Medication 300 MILLIGRAM(S): at 11:01

## 2018-06-12 RX ADMIN — Medication 60 MILLIGRAM(S): at 06:26

## 2018-06-12 NOTE — PROGRESS NOTE ADULT - ASSESSMENT
# Pancytopenia in setting of antibiotic usage:    -DDX include drug-induced such as MTX, flaring inflammatory disorder, B12/folate deficiency, or primary bone marrow disorder such as leukemia and aplastic anemia.   - Transfused 2 units of PRBC, Keep Hgb >7, Platelets >10   - IVFs, Broad Spectrum Antibiotics   - PT/PTT- normal, fibrinogen 522, haptogloin 377, , Uric acid 8.4, phos 2.3  T bili normal, HIV negative  -Iron 26, TIBC 250, % saturation 10  - Flow cytometry, B12, folate, SPEP, K/L ratio, SIFE, Hepatitis Panel, methotrexate level, retic, D dimer- Pending  -2D echo   - UA negative, Blood culture negative  - Blood smear reviewed: Thrombocytopenia, neutropenia but present neutrophils demonstrate toxic granulations, No obvious blasts on the smear   - Deferred CT-guided biopsy because patient has improving cell counts, so possibility of drug-induced is higher than acute malignancy.  - Today CBC improved so no need for biopsy, will continue with medical management for now.    #CKD: CKD 3     -monitor BMP.    # DIARRHEA :   - resolved  - if diarrhea again then send c diff sample    - pt reports improvement in diarrhea today , will send stool sample to r/o C.DIFF.  - iv fluids.    # TOE WOUND ;    -Dry , no discharge or erythema .  - vascular/ podiatry evaluation .    # RA:    - Stable will c/w prednisone , will hold off on methotrexate for now as per rheumaology    # GI:    -ppx with protonix.    # DVT:    -ppx with scd.    #dispo:    -full code from home. # Pancytopenia in setting of antibiotic usage: resolving  -DDX include drug-induced such as MTX, flaring inflammatory disorder, B12/folate deficiency, or primary bone marrow disorder such as leukemia and aplastic anemia.   - Transfused 2 units of PRBC, Keep Hgb >7, Platelets >10   -  Broad Spectrum Antibiotics   - PT/PTT- normal, fibrinogen 522, haptogloin 377, , Uric acid 8.4, phos 2.3  T bili normal, HIV negative  -Iron 26, TIBC 250, % saturation 10  - Flow cytometry, B12, folate, SPEP, K/L ratio, SIFE, Hepatitis Panel, methotrexate level, retic, D dimer- Pending  -2D echo   - UA negative, Blood culture negative  - Blood smear reviewed: Thrombocytopenia, neutropenia but present neutrophils demonstrate toxic granulations, No obvious blasts on the smear   - Deferred bone marrow  biopsy because patient has improving cell counts, so possibility of drug-induced is higher than acute malignancy.  - Today CBC improved so no need for biopsy, will continue with medical management for now.    #CKD: CKD 3   -monitor BMP.    # DIARRHEA :   - resolved    # TOE WOUND ;  -Dry , no discharge or erythema .  -  podiatry evaluation .    #DM type 2  - continue lantus, lispro    # RA:  - Stable will c/w prednisone , will hold off on methotrexate for now as per rheumaology    # GI:  -ppx with protonix.    # DVT:  -ppx with scd.    #dispo:    -full code from home.

## 2018-06-12 NOTE — PROGRESS NOTE ADULT - SUBJECTIVE AND OBJECTIVE BOX
Patient is a 78y old  Female who presents with a chief complaint of Diarrhea, weakness (08 Jun 2018 22:49)      Ovenight events:    PAST MEDICAL & SURGICAL HISTORY:  Rheumatoid arteritis  Other specified diabetes mellitus with other specified complication, unspecified whether long term insulin use  Primary osteoarthritis of right knee: s/p knee repalcement        FAMILY HISTORY:  No pertinent family history in first degree relatives        Allergies    No Known Drug Allergies  strawberry (Unknown)    Intolerances        acetaminophen   Tablet. 650 milliGRAM(s) Oral every 6 hours PRN  allopurinol 300 milliGRAM(s) Oral daily  cefepime   IVPB      cefepime   IVPB 2000 milliGRAM(s) IV Intermittent every 24 hours  dextrose 40% Gel 15 Gram(s) Oral once PRN  dextrose 50% Injectable 12.5 Gram(s) IV Push once  dextrose 50% Injectable 25 Gram(s) IV Push once  dextrose 50% Injectable 25 Gram(s) IV Push once  enalapril 10 milliGRAM(s) Oral daily  folic acid 1 milliGRAM(s) Oral daily  glucagon  Injectable 1 milliGRAM(s) IntraMuscular once PRN  insulin glargine Injectable (LANTUS) 15 Unit(s) SubCutaneous at bedtime  insulin lispro (HumaLOG) corrective regimen sliding scale   SubCutaneous three times a day before meals  insulin lispro Injectable (HumaLOG) 5 Unit(s) SubCutaneous three times a day before meals  predniSONE   Tablet 60 milliGRAM(s) Oral daily  vancomycin  IVPB 1200 milliGRAM(s) IV Intermittent every 24 hours  vancomycin  IVPB      : Home Meds:      PHYSICAL EXAM    ICU Vital Signs Last 24 Hrs  T(C): 36.9 (12 Jun 2018 07:30), Max: 37.1 (11 Jun 2018 23:31)  T(F): 98.4 (12 Jun 2018 07:30), Max: 98.7 (11 Jun 2018 23:31)  HR: 85 (12 Jun 2018 07:30) (72 - 85)  BP: 140/70 (12 Jun 2018 07:30) (132/59 - 140/70)  BP(mean): --  ABP: --  ABP(mean): --  RR: 18 (12 Jun 2018 07:30) (18 - 20)  SpO2: 95% (12 Jun 2018 06:26) (95% - 95%)      General: NAD  HEENT: wnl  Lymph nodes:  Lungs:  CTA b/l  Cardiovascular: rs1s2 no murmur  Abdomen: soft non tender not distended  Extremities: no LLE   Skin: laceration on left toes  Neurological: AAO3      06-11-18 @ 07:01  -  06-12-18 @ 07:00  --------------------------------------------------------  IN:    Oral Fluid: 560 mL  Total IN: 560 mL    OUT:    Voided: 700 mL  Total OUT: 700 mL    Total NET: -140 mL          LABS:    Folate, Serum (06.10.18 @ 16:01)    Folate, Serum: 7.1: Note: New reference ranges effective 04/16/2018. ng/mL    Vitamin B12, Serum: 896: Note: Reference Range Change on 12/18/2017. pg/mL (06.10.18 @ 16:01)    Iron with Total Binding Capacity (06.08.18 @ 20:31)    Iron - Total Binding Capacity.: 250 ug/dL    % Saturation, Iron: 10 %    Iron Total, Serum: 26 ug/dL    Unsaturated Iron Binding Capacity: 224 ug/dL                            7.8    2.48  )-----------( 150      ( 12 Jun 2018 09:49 )             24.7                                               06-12    136  |  100  |  45<H>  ----------------------------<  227<H>  4.7   |  20  |  1.5    Ca    8.9      12 Jun 2018 09:49  Mg     2.1     06-12    TPro  5.7<L>  /  Alb  3.3<L>  /  TBili  0.3  /  DBili  x   /  AST  16  /  ALT  14  /  AlkPhos  40  06-11                                                                                           LIVER FUNCTIONS - ( 11 Jun 2018 10:11 )  Alb: 3.3 g/dL / Pro: 5.7 g/dL / ALK PHOS: 40 U/L / ALT: 14 U/L / AST: 16 U/L / GGT: x                                                                                                                                       X-Rays:                                                                                         ECHO:    MEDICATIONS  (STANDING):  allopurinol 300 milliGRAM(s) Oral daily  cefepime   IVPB      cefepime   IVPB 2000 milliGRAM(s) IV Intermittent every 24 hours  dextrose 50% Injectable 12.5 Gram(s) IV Push once  dextrose 50% Injectable 25 Gram(s) IV Push once  dextrose 50% Injectable 25 Gram(s) IV Push once  enalapril 10 milliGRAM(s) Oral daily  folic acid 1 milliGRAM(s) Oral daily  insulin glargine Injectable (LANTUS) 15 Unit(s) SubCutaneous at bedtime  insulin lispro (HumaLOG) corrective regimen sliding scale   SubCutaneous three times a day before meals  insulin lispro Injectable (HumaLOG) 5 Unit(s) SubCutaneous three times a day before meals  predniSONE   Tablet 60 milliGRAM(s) Oral daily  vancomycin  IVPB 1200 milliGRAM(s) IV Intermittent every 24 hours  vancomycin  IVPB        MEDICATIONS  (PRN):  acetaminophen   Tablet. 650 milliGRAM(s) Oral every 6 hours PRN Mild Pain (1 - 3)  dextrose 40% Gel 15 Gram(s) Oral once PRN Blood Glucose LESS THAN 70 milliGRAM(s)/deciLiter  glucagon  Injectable 1 milliGRAM(s) IntraMuscular once PRN Glucose <70 milliGRAM(s)/deciLiter Patient is a 78y old  Female who presents with a chief complaint of Diarrhea, weakness (08 Jun 2018 22:49)      Ovenight events: none    PAST MEDICAL & SURGICAL HISTORY:  Rheumatoid arteritis  Other specified diabetes mellitus with other specified complication, unspecified whether long term insulin use  Primary osteoarthritis of right knee: s/p knee repalcement        FAMILY HISTORY:  No pertinent family history in first degree relatives        Allergies    No Known Drug Allergies  strawberry (Unknown)    Intolerances        acetaminophen   Tablet. 650 milliGRAM(s) Oral every 6 hours PRN  allopurinol 300 milliGRAM(s) Oral daily  cefepime   IVPB      cefepime   IVPB 2000 milliGRAM(s) IV Intermittent every 24 hours  dextrose 40% Gel 15 Gram(s) Oral once PRN  dextrose 50% Injectable 12.5 Gram(s) IV Push once  dextrose 50% Injectable 25 Gram(s) IV Push once  dextrose 50% Injectable 25 Gram(s) IV Push once  enalapril 10 milliGRAM(s) Oral daily  folic acid 1 milliGRAM(s) Oral daily  glucagon  Injectable 1 milliGRAM(s) IntraMuscular once PRN  insulin glargine Injectable (LANTUS) 15 Unit(s) SubCutaneous at bedtime  insulin lispro (HumaLOG) corrective regimen sliding scale   SubCutaneous three times a day before meals  insulin lispro Injectable (HumaLOG) 5 Unit(s) SubCutaneous three times a day before meals  predniSONE   Tablet 60 milliGRAM(s) Oral daily  vancomycin  IVPB 1200 milliGRAM(s) IV Intermittent every 24 hours  vancomycin  IVPB      : Home Meds:      PHYSICAL EXAM    ICU Vital Signs Last 24 Hrs  T(C): 36.9 (12 Jun 2018 07:30), Max: 37.1 (11 Jun 2018 23:31)  T(F): 98.4 (12 Jun 2018 07:30), Max: 98.7 (11 Jun 2018 23:31)  HR: 85 (12 Jun 2018 07:30) (72 - 85)  BP: 140/70 (12 Jun 2018 07:30) (132/59 - 140/70)  BP(mean): --  ABP: --  ABP(mean): --  RR: 18 (12 Jun 2018 07:30) (18 - 20)  SpO2: 95% (12 Jun 2018 06:26) (95% - 95%)      General: NAD  HEENT: atraumatic, EOMI  Lungs:  CTA b/l  Cardiovascular: rs1s2 no murmur  Abdomen: soft non tender not distended  Extremities: no LLE   Skin: laceration on left toes  Neurological: AAO3      06-11-18 @ 07:01  -  06-12-18 @ 07:00  --------------------------------------------------------  IN:    Oral Fluid: 560 mL  Total IN: 560 mL    OUT:    Voided: 700 mL  Total OUT: 700 mL    Total NET: -140 mL          LABS:    Folate, Serum (06.10.18 @ 16:01)    Folate, Serum: 7.1: Note: New reference ranges effective 04/16/2018. ng/mL    Vitamin B12, Serum: 896: Note: Reference Range Change on 12/18/2017. pg/mL (06.10.18 @ 16:01)    Iron with Total Binding Capacity (06.08.18 @ 20:31)    Iron - Total Binding Capacity.: 250 ug/dL    % Saturation, Iron: 10 %    Iron Total, Serum: 26 ug/dL    Unsaturated Iron Binding Capacity: 224 ug/dL                            7.8    2.48  )-----------( 150      ( 12 Jun 2018 09:49 )             24.7                                               06-12    136  |  100  |  45<H>  ----------------------------<  227<H>  4.7   |  20  |  1.5    Ca    8.9      12 Jun 2018 09:49  Mg     2.1     06-12    TPro  5.7<L>  /  Alb  3.3<L>  /  TBili  0.3  /  DBili  x   /  AST  16  /  ALT  14  /  AlkPhos  40  06-11                                                                                           LIVER FUNCTIONS - ( 11 Jun 2018 10:11 )  Alb: 3.3 g/dL / Pro: 5.7 g/dL / ALK PHOS: 40 U/L / ALT: 14 U/L / AST: 16 U/L / GGT: x                                                                                                                                       X-Rays:                                                                                         ECHO:    MEDICATIONS  (STANDING):  allopurinol 300 milliGRAM(s) Oral daily  cefepime   IVPB      cefepime   IVPB 2000 milliGRAM(s) IV Intermittent every 24 hours  dextrose 50% Injectable 12.5 Gram(s) IV Push once  dextrose 50% Injectable 25 Gram(s) IV Push once  dextrose 50% Injectable 25 Gram(s) IV Push once  enalapril 10 milliGRAM(s) Oral daily  folic acid 1 milliGRAM(s) Oral daily  insulin glargine Injectable (LANTUS) 15 Unit(s) SubCutaneous at bedtime  insulin lispro (HumaLOG) corrective regimen sliding scale   SubCutaneous three times a day before meals  insulin lispro Injectable (HumaLOG) 5 Unit(s) SubCutaneous three times a day before meals  predniSONE   Tablet 60 milliGRAM(s) Oral daily  vancomycin  IVPB 1200 milliGRAM(s) IV Intermittent every 24 hours  vancomycin  IVPB        MEDICATIONS  (PRN):  acetaminophen   Tablet. 650 milliGRAM(s) Oral every 6 hours PRN Mild Pain (1 - 3)  dextrose 40% Gel 15 Gram(s) Oral once PRN Blood Glucose LESS THAN 70 milliGRAM(s)/deciLiter  glucagon  Injectable 1 milliGRAM(s) IntraMuscular once PRN Glucose <70 milliGRAM(s)/deciLiter

## 2018-06-13 ENCOUNTER — TRANSCRIPTION ENCOUNTER (OUTPATIENT)
Age: 78
End: 2018-06-13

## 2018-06-13 LAB
ANION GAP SERPL CALC-SCNC: 16 MMOL/L — HIGH (ref 7–14)
BASOPHILS # BLD AUTO: 0.03 K/UL — SIGNIFICANT CHANGE UP (ref 0–0.2)
BASOPHILS NFR BLD AUTO: 0.8 % — SIGNIFICANT CHANGE UP (ref 0–1)
BUN SERPL-MCNC: 49 MG/DL — HIGH (ref 10–20)
CALCIUM SERPL-MCNC: 8.8 MG/DL — SIGNIFICANT CHANGE UP (ref 8.5–10.1)
CHLORIDE SERPL-SCNC: 99 MMOL/L — SIGNIFICANT CHANGE UP (ref 98–110)
CO2 SERPL-SCNC: 22 MMOL/L — SIGNIFICANT CHANGE UP (ref 17–32)
CREAT SERPL-MCNC: 1.6 MG/DL — HIGH (ref 0.7–1.5)
EOSINOPHIL # BLD AUTO: 0.11 K/UL — SIGNIFICANT CHANGE UP (ref 0–0.7)
EOSINOPHIL NFR BLD AUTO: 2.9 % — SIGNIFICANT CHANGE UP (ref 0–8)
GLUCOSE SERPL-MCNC: 302 MG/DL — HIGH (ref 70–99)
HCT VFR BLD CALC: 25.5 % — LOW (ref 37–47)
HGB BLD-MCNC: 8 G/DL — LOW (ref 12–16)
IMM GRANULOCYTES NFR BLD AUTO: 14.9 % — HIGH (ref 0.1–0.3)
LYMPHOCYTES # BLD AUTO: 1.87 K/UL — SIGNIFICANT CHANGE UP (ref 1.2–3.4)
LYMPHOCYTES # BLD AUTO: 49.6 % — SIGNIFICANT CHANGE UP (ref 20.5–51.1)
MCHC RBC-ENTMCNC: 30 PG — SIGNIFICANT CHANGE UP (ref 27–31)
MCHC RBC-ENTMCNC: 31.4 G/DL — LOW (ref 32–37)
MCV RBC AUTO: 95.5 FL — SIGNIFICANT CHANGE UP (ref 81–99)
MONOCYTES # BLD AUTO: 0.62 K/UL — HIGH (ref 0.1–0.6)
MONOCYTES NFR BLD AUTO: 16.4 % — HIGH (ref 1.7–9.3)
NEUTROPHILS # BLD AUTO: 0.58 K/UL — LOW (ref 1.4–6.5)
NEUTROPHILS NFR BLD AUTO: 15.4 % — LOW (ref 42.2–75.2)
PLATELET # BLD AUTO: 208 K/UL — SIGNIFICANT CHANGE UP (ref 130–400)
POTASSIUM SERPL-MCNC: 4.9 MMOL/L — SIGNIFICANT CHANGE UP (ref 3.5–5)
POTASSIUM SERPL-SCNC: 4.9 MMOL/L — SIGNIFICANT CHANGE UP (ref 3.5–5)
RBC # BLD: 2.67 M/UL — LOW (ref 4.2–5.4)
RBC # FLD: 18.5 % — HIGH (ref 11.5–14.5)
SODIUM SERPL-SCNC: 137 MMOL/L — SIGNIFICANT CHANGE UP (ref 135–146)
WBC # BLD: 3.77 K/UL — LOW (ref 4.8–10.8)
WBC # FLD AUTO: 3.77 K/UL — LOW (ref 4.8–10.8)

## 2018-06-13 PROCEDURE — 93925 LOWER EXTREMITY STUDY: CPT | Mod: 26

## 2018-06-13 RX ADMIN — Medication 650 MILLIGRAM(S): at 07:19

## 2018-06-13 RX ADMIN — Medication 5 UNIT(S): at 11:34

## 2018-06-13 RX ADMIN — INSULIN GLARGINE 15 UNIT(S): 100 INJECTION, SOLUTION SUBCUTANEOUS at 21:08

## 2018-06-13 RX ADMIN — Medication 650 MILLIGRAM(S): at 06:49

## 2018-06-13 RX ADMIN — Medication 5 UNIT(S): at 17:40

## 2018-06-13 RX ADMIN — Medication 3: at 17:40

## 2018-06-13 RX ADMIN — Medication 1 MILLIGRAM(S): at 11:35

## 2018-06-13 RX ADMIN — Medication 300 MILLIGRAM(S): at 11:35

## 2018-06-13 RX ADMIN — Medication 650 MILLIGRAM(S): at 21:54

## 2018-06-13 RX ADMIN — Medication 4: at 11:34

## 2018-06-13 RX ADMIN — Medication 650 MILLIGRAM(S): at 14:15

## 2018-06-13 RX ADMIN — Medication 10 MILLIGRAM(S): at 05:44

## 2018-06-13 RX ADMIN — Medication 60 MILLIGRAM(S): at 05:44

## 2018-06-13 RX ADMIN — Medication 650 MILLIGRAM(S): at 22:23

## 2018-06-13 NOTE — DISCHARGE NOTE ADULT - CARE PROVIDERS DIRECT ADDRESSES
[email or web address]

## 2018-06-13 NOTE — DISCHARGE NOTE ADULT - MEDICATION SUMMARY - MEDICATIONS TO TAKE
I will START or STAY ON the medications listed below when I get home from the hospital:    predniSONE 20 mg oral tablet  -- 3 tab(s) by mouth once a day  -- Indication: For arthritis    acetaminophen 325 mg oral tablet  -- 2 tab(s) by mouth every 6 hours, As needed, Mild Pain (1 - 3)  -- Indication: For spinal fracture    enalapril 10 mg oral tablet  -- 1 tab(s) by mouth once a day  -- Indication: For HTN    Actos 30 mg oral tablet  -- 1 tab(s) by mouth once a day  -- Indication: For DM    glimepiride 2 mg oral tablet  -- 1  by mouth 3 times a day  -- Indication: For DM    allopurinol 300 mg oral tablet  -- 1 tab(s) by mouth once a day  -- Indication: For arthritis    docusate sodium 100 mg oral capsule  -- 1 cap(s) by mouth 3 times a day, As Needed for constipation  -- Indication: For constipation    lactulose 10 g/15 mL oral syrup  -- 30 milliliter(s) by mouth 2 times a day, As Needed for constipation  -- Indication: For constipation    senna oral tablet  -- 2 tab(s) by mouth once a day (at bedtime), As Needed for constipation  -- Indication: For constipation    folic acid 1 mg oral tablet  -- 1 tab(s) by mouth once a day  -- Indication: For arthritis

## 2018-06-13 NOTE — CONSULT NOTE ADULT - ATTENDING COMMENTS
78 year old type 2 DM female presents with superficial ulceration on 3rd digit right foot,no ascending cellulitis noted no drainage. Positive history of pancytopenia. pedal pulses diminished bilateral. agree with above findings xrays reviewed no evidence of fracture or OM noted,doppler studies ordered. no surgical intervention at this time. recommend depth shoes for daily use to accommodate hammer toes. patient to follow up with her podiatrist on D/C.
The patient was seen and examined. Agree with above.  79 yo female with h/o RA on MTX and prednisone presented with new onset profound pancytopenia. Etiology is unclear. DDx include drug-induced such as MTX, flaring inflammatory disorder, B12/folate deficiency, or primary bone marrow disorder such as leukemia and aplastic anemia. Peripheral blood smear was reviewed. No blasts were seen but there were few cells to be reviewed due to leukopenia.    Recommendation:  1. Will followup the results of blood work as listed above.  2. Bone marrow biopsy on Monday.  3. Monitor CBC daily.  4. Discontinue MTX. Give Folic Acid 1 mg daily.

## 2018-06-13 NOTE — PROGRESS NOTE ADULT - SUBJECTIVE AND OBJECTIVE BOX
Patient is a 78y old  Female who presents with a chief complaint of Diarrhea, weakness (08 Jun 2018 22:49)      Ovenight events:    PAST MEDICAL & SURGICAL HISTORY:  Rheumatoid arteritis  Other specified diabetes mellitus with other specified complication, unspecified whether long term insulin use  Primary osteoarthritis of right knee: s/p knee repalcement        FAMILY HISTORY:  No pertinent family history in first degree relatives        Allergies    No Known Drug Allergies  strawberry (Unknown)    Intolerances        acetaminophen   Tablet. 650 milliGRAM(s) Oral every 6 hours PRN  allopurinol 300 milliGRAM(s) Oral daily  cefepime   IVPB      cefepime   IVPB 2000 milliGRAM(s) IV Intermittent every 24 hours  dextrose 40% Gel 15 Gram(s) Oral once PRN  dextrose 50% Injectable 12.5 Gram(s) IV Push once  dextrose 50% Injectable 25 Gram(s) IV Push once  dextrose 50% Injectable 25 Gram(s) IV Push once  enalapril 10 milliGRAM(s) Oral daily  folic acid 1 milliGRAM(s) Oral daily  glucagon  Injectable 1 milliGRAM(s) IntraMuscular once PRN  insulin glargine Injectable (LANTUS) 15 Unit(s) SubCutaneous at bedtime  insulin lispro (HumaLOG) corrective regimen sliding scale   SubCutaneous three times a day before meals  insulin lispro Injectable (HumaLOG) 5 Unit(s) SubCutaneous three times a day before meals  predniSONE   Tablet 60 milliGRAM(s) Oral daily  vancomycin  IVPB 1200 milliGRAM(s) IV Intermittent every 24 hours  vancomycin  IVPB      : Home Meds:      PHYSICAL EXAM    ICU Vital Signs Last 24 Hrs  T(C): 36.8 (13 Jun 2018 08:17), Max: 36.8 (13 Jun 2018 08:17)  T(F): 98.2 (13 Jun 2018 08:17), Max: 98.2 (13 Jun 2018 08:17)  HR: 72 (13 Jun 2018 08:17) (70 - 77)  BP: 115/61 (13 Jun 2018 08:17) (115/61 - 151/67)  BP(mean): 72 (13 Jun 2018 08:17) (72 - 97)  ABP: --  ABP(mean): --  RR: 18 (13 Jun 2018 08:17) (18 - 18)  SpO2: --    General: NAD  HEENT: atraumatic, EOMI  Lungs:  CTA b/l  Cardiovascular: rs1s2 no murmur  Abdomen: soft non tender not distended  Extremities: no LLE   Skin: laceration on right third toe  Neurological: AAO3  	    06-13-18 @ 07:01  -  06-13-18 @ 15:48  --------------------------------------------------------  IN:  Total IN: 0 mL    OUT:    Voided: 2 mL  Total OUT: 2 mL    Total NET: -2 mL          LABS:                          8.0    3.77  )-----------( 208      ( 13 Jun 2018 09:38 )             25.5                                               06-13    137  |  99  |  49<H>  ----------------------------<  302<H>  4.9   |  22  |  1.6<H>    Ca    8.8      13 Jun 2018 09:38  Mg     2.1     06-12                                                                                                                                                                                                                           X-Rays:                                                                                         ECHO:    MEDICATIONS  (STANDING):  allopurinol 300 milliGRAM(s) Oral daily  cefepime   IVPB      cefepime   IVPB 2000 milliGRAM(s) IV Intermittent every 24 hours  dextrose 50% Injectable 12.5 Gram(s) IV Push once  dextrose 50% Injectable 25 Gram(s) IV Push once  dextrose 50% Injectable 25 Gram(s) IV Push once  enalapril 10 milliGRAM(s) Oral daily  folic acid 1 milliGRAM(s) Oral daily  insulin glargine Injectable (LANTUS) 15 Unit(s) SubCutaneous at bedtime  insulin lispro (HumaLOG) corrective regimen sliding scale   SubCutaneous three times a day before meals  insulin lispro Injectable (HumaLOG) 5 Unit(s) SubCutaneous three times a day before meals  predniSONE   Tablet 60 milliGRAM(s) Oral daily  vancomycin  IVPB 1200 milliGRAM(s) IV Intermittent every 24 hours  vancomycin  IVPB        MEDICATIONS  (PRN):  acetaminophen   Tablet. 650 milliGRAM(s) Oral every 6 hours PRN Mild Pain (1 - 3)  dextrose 40% Gel 15 Gram(s) Oral once PRN Blood Glucose LESS THAN 70 milliGRAM(s)/deciLiter  glucagon  Injectable 1 milliGRAM(s) IntraMuscular once PRN Glucose <70 milliGRAM(s)/deciLiter Patient is a 78y old  Female who presents with a chief complaint of Diarrhea, weakness (08 Jun 2018 22:49)      Ovenight events: none    PAST MEDICAL & SURGICAL HISTORY:  Rheumatoid arteritis  Other specified diabetes mellitus with other specified complication, unspecified whether long term insulin use  Primary osteoarthritis of right knee: s/p knee repalcement        FAMILY HISTORY:  No pertinent family history in first degree relatives        Allergies    No Known Drug Allergies  strawberry (Unknown)    Intolerances        acetaminophen   Tablet. 650 milliGRAM(s) Oral every 6 hours PRN  allopurinol 300 milliGRAM(s) Oral daily  cefepime   IVPB      cefepime   IVPB 2000 milliGRAM(s) IV Intermittent every 24 hours  dextrose 40% Gel 15 Gram(s) Oral once PRN  dextrose 50% Injectable 12.5 Gram(s) IV Push once  dextrose 50% Injectable 25 Gram(s) IV Push once  dextrose 50% Injectable 25 Gram(s) IV Push once  enalapril 10 milliGRAM(s) Oral daily  folic acid 1 milliGRAM(s) Oral daily  glucagon  Injectable 1 milliGRAM(s) IntraMuscular once PRN  insulin glargine Injectable (LANTUS) 15 Unit(s) SubCutaneous at bedtime  insulin lispro (HumaLOG) corrective regimen sliding scale   SubCutaneous three times a day before meals  insulin lispro Injectable (HumaLOG) 5 Unit(s) SubCutaneous three times a day before meals  predniSONE   Tablet 60 milliGRAM(s) Oral daily  vancomycin  IVPB 1200 milliGRAM(s) IV Intermittent every 24 hours  vancomycin  IVPB      : Home Meds:      PHYSICAL EXAM    ICU Vital Signs Last 24 Hrs  T(C): 36.8 (13 Jun 2018 08:17), Max: 36.8 (13 Jun 2018 08:17)  T(F): 98.2 (13 Jun 2018 08:17), Max: 98.2 (13 Jun 2018 08:17)  HR: 72 (13 Jun 2018 08:17) (70 - 77)  BP: 115/61 (13 Jun 2018 08:17) (115/61 - 151/67)  BP(mean): 72 (13 Jun 2018 08:17) (72 - 97)  ABP: --  ABP(mean): --  RR: 18 (13 Jun 2018 08:17) (18 - 18)  SpO2: --    General: NAD  HEENT: atraumatic, EOMI  CHEST:  CTA b/l  Cardiovascular: rs1s2 no murmur  Abdomen: soft non tender not distended  Extremities: no LLE   Skin: laceration on right third toe  Neurological: AAO3  	    06-13-18 @ 07:01  -  06-13-18 @ 15:48  --------------------------------------------------------  IN:  Total IN: 0 mL    OUT:    Voided: 2 mL  Total OUT: 2 mL    Total NET: -2 mL          LABS:                          8.0    3.77  )-----------( 208      ( 13 Jun 2018 09:38 )             25.5                                               06-13    137  |  99  |  49<H>  ----------------------------<  302<H>  4.9   |  22  |  1.6<H>    Ca    8.8      13 Jun 2018 09:38  Mg     2.1     06-12                                                                                                                                                                                                                           X-Rays:                                                                                         ECHO:    MEDICATIONS  (STANDING):  allopurinol 300 milliGRAM(s) Oral daily  cefepime   IVPB      cefepime   IVPB 2000 milliGRAM(s) IV Intermittent every 24 hours  dextrose 50% Injectable 12.5 Gram(s) IV Push once  dextrose 50% Injectable 25 Gram(s) IV Push once  dextrose 50% Injectable 25 Gram(s) IV Push once  enalapril 10 milliGRAM(s) Oral daily  folic acid 1 milliGRAM(s) Oral daily  insulin glargine Injectable (LANTUS) 15 Unit(s) SubCutaneous at bedtime  insulin lispro (HumaLOG) corrective regimen sliding scale   SubCutaneous three times a day before meals  insulin lispro Injectable (HumaLOG) 5 Unit(s) SubCutaneous three times a day before meals  predniSONE   Tablet 60 milliGRAM(s) Oral daily  vancomycin  IVPB 1200 milliGRAM(s) IV Intermittent every 24 hours  vancomycin  IVPB        MEDICATIONS  (PRN):  acetaminophen   Tablet. 650 milliGRAM(s) Oral every 6 hours PRN Mild Pain (1 - 3)  dextrose 40% Gel 15 Gram(s) Oral once PRN Blood Glucose LESS THAN 70 milliGRAM(s)/deciLiter  glucagon  Injectable 1 milliGRAM(s) IntraMuscular once PRN Glucose <70 milliGRAM(s)/deciLiter

## 2018-06-13 NOTE — CONSULT NOTE ADULT - SUBJECTIVE AND OBJECTIVE BOX
77 y/o female with PMH below admitted for pancytopenia. Podiatry was consulted for toe ulcer. Pt seen at bedside NAD. Pt said that she sees Dr. Oden as o/p and she accidently hit her toes with the cane and toes are painful especially the 3rd toe right foot.   HPI:  78 Y O f with pmh of HTN , chronic back pain and RA on prednisone and methotrexate presented to ER with c/o diarrhea and generalized weakness for 3 days . History goes back to 2 months ; when pt developed a blister on her  right toe that was infected , went to her PCP was told to take  clindamycin for 5 days. Pt took the medication and reports the that wound was healing . Pt  then went to her Vascular Dr 2 wks ago , had doppler done and was told to take clindamycin for another 2 wks. Pt started taking clindamycin , pt completed 12 days and then developed watery diarrhea .  Pt reports having > 5 episodes of diarrhea , no nausea or vomiting .Pt reports that for past 2 days she had around 7 BM /day , watery with cramps , she did not notice any blood or black stools.    Pt reports no fevers but reports chills at night. Pt also developed generalized weakness , was unable to ambulate so presented to ER. Pt reports sob , for past 2 months.     In Er blood work showed pt had pancytopenia with hb of 5 ,  wbc 0.96 , platelet count of 50 .Labs done in March showed hb of 10.2 with repeat in May was 7.6 but normal platelets and white count.  Pt also had guaiaic + stools. (08 Jun 2018 16:56)    PMH: PANCYTOPENIA  Rheumatoid arteritis  Other specified diabetes mellitus with other specified complication, unspecified whether long term insulin use    PSH: Primary osteoarthritis of right knee    Medication cefepime   IVPB      cefepime   IVPB 2000 milliGRAM(s) IV Intermittent every 24 hours  vancomycin  IVPB 1200 milliGRAM(s) IV Intermittent every 24 hours  vancomycin  IVPB        Allergy: No Known Drug Allergies  strawberry (Unknown)        Labs:                        8.0    3.77  )-----------( 208      ( 13 Jun 2018 09:38 )             25.5       06-13    137  |  99  |  49<H>  ----------------------------<  302<H>  4.9   |  22  |  1.6<H>    Ca    8.8      13 Jun 2018 09:38  Mg     2.1     06-12              O:   Derm: right 3rd toe ulcer covered with necrotic patch. 3rd toe appears ecchymotic vs ischemic?, no active sign of infection, - edema, - purlence, - fluctuance, - tracking/tunneling, - probe to bone. - streaking erythema. - xerosis, + pain upon palpation, - hemosiderin deposits. small superficial ulcer 2nd toe noted covered with dry serosanginous fluid noted.   Vascular: Dorsalis Pedis and Posterior Tibial pulses 0/4.   MSK: No amputation B/L LE. hammertoe deformities B/L digits.       Assessment and Plan:   Pt with Right 3rd toe ulcer  Chart reviewed and Patient evaluated  Discussed diagnosis and treatment with patient  betadine Pain daily  xray right foot to r/o fracture  aduplex- r/o PAD  Darco shoe for ambulation  Podiatry will f/u 79 y/o female with PMH below admitted for pancytopenia. Podiatry was consulted for toe ulcer. Pt seen at bedside NAD. Pt said that she sees Dr. Oden as o/p and she accidently hit her toes with the cane and toes are painful especially the 3rd toe right foot.   HPI:  78 Y O f with pmh of HTN , chronic back pain and RA on prednisone and methotrexate presented to ER with c/o diarrhea and generalized weakness for 3 days . History goes back to 2 months ; when pt developed a blister on her  right toe that was infected , went to her PCP was told to take  clindamycin for 5 days. Pt took the medication and reports the that wound was healing . Pt  then went to her Vascular Dr 2 wks ago , had doppler done and was told to take clindamycin for another 2 wks. Pt started taking clindamycin , pt completed 12 days and then developed watery diarrhea .  Pt reports having > 5 episodes of diarrhea , no nausea or vomiting .Pt reports that for past 2 days she had around 7 BM /day , watery with cramps , she did not notice any blood or black stools.    Pt reports no fevers but reports chills at night. Pt also developed generalized weakness , was unable to ambulate so presented to ER. Pt reports sob , for past 2 months.     In Er blood work showed pt had pancytopenia with hb of 5 ,  wbc 0.96 , platelet count of 50 .Labs done in March showed hb of 10.2 with repeat in May was 7.6 but normal platelets and white count.  Pt also had guaiaic + stools. (08 Jun 2018 16:56)    PMH: PANCYTOPENIA  Rheumatoid arteritis  Other specified diabetes mellitus with other specified complication, unspecified whether long term insulin use    PSH: Primary osteoarthritis of right knee    Medication cefepime   IVPB      cefepime   IVPB 2000 milliGRAM(s) IV Intermittent every 24 hours  vancomycin  IVPB 1200 milliGRAM(s) IV Intermittent every 24 hours  vancomycin  IVPB        Allergy: No Known Drug Allergies  strawberry (Unknown)        Labs:                        8.0    3.77  )-----------( 208      ( 13 Jun 2018 09:38 )             25.5       06-13    137  |  99  |  49<H>  ----------------------------<  302<H>  4.9   |  22  |  1.6<H>    Ca    8.8      13 Jun 2018 09:38  Mg     2.1     06-12              O:   Derm: right 3rd toe ulcer covered with necrotic patch. 3rd toe appears ecchymotic vs ischemic?, no active sign of infection, - edema, - purlence, - fluctuance, - tracking/tunneling, - probe to bone. - streaking erythema. - xerosis, + pain upon palpation, - hemosiderin deposits. small superficial ulcer 2nd toe noted covered with dry serosanginous fluid noted.   Vascular: Dorsalis Pedis and Posterior Tibial pulses 0/4. except DP 1/4 on right foot   MSK: No amputation B/L LE. hammertoe deformities B/L digits.       Assessment and Plan:   Pt with Right 3rd toe ulcer  Chart reviewed and Patient evaluated  Discussed diagnosis and treatment with patient  betadine Pain daily  xray right foot to r/o fracture  aduplex- r/o PAD  Darco shoe for ambulation  Podiatry will f/u

## 2018-06-13 NOTE — DISCHARGE NOTE ADULT - PATIENT PORTAL LINK FT
You can access the [facility] Patient Portal, offered by [facility], by registering with the following website: [email or web address]

## 2018-06-13 NOTE — DISCHARGE NOTE ADULT - HOSPITAL COURSE
79yo F with PMHx HTN DM2 RA on 60mg daily prednisone and TMX, right 3rd toe nonhealing wound, hx rt TKR just finished course of Clindamycin pw diarrhea x3d and diffuse abdominal pain associated with loose stools. Diarrhea resolved in hospital before samples were sent for analysis. She had pancytopenia on admission and required 2 units of PRBC transfusion. Workup was initiated and 77yo F with PMHx HTN DM2 RA on 60mg daily prednisone and TMX, right 3rd toe nonhealing wound, hx rt TKR just finished course of Clindamycin pw diarrhea x3d and diffuse abdominal pain associated with loose stools. Diarrhea resolved in hospital before samples were sent for analysis. She had pancytopenia on admission and required 2 units of PRBC transfusion. Workup showed EUGENIO, no hemolysis, recovered cell lines spontaneously so high probability of being drug-induced. Patient also felt weak and deconditioned so PT/rehab consult requested and MARK sent for SNF short term rehab. CT ab/pelv showed pancreatic hypodense lesions possibly cystic, and 1.1 cm liver segment 6 hypodense lesion, to be followed with MRI w/ w/o IV contrast with PMD. She also has asymmetric breasts which she endorses being from childhood but has not done mammogram in 6 years so recommended to get mammogram as outpatient. 79yo F with PMHx HTN DM2 RA on 60mg daily prednisone and TMX, right 3rd toe nonhealing wound, hx rt TKR just finished course of Clindamycin pw diarrhea x3d and diffuse abdominal pain associated with loose stools. Diarrhea resolved in hospital before samples were sent for analysis. She had pancytopenia on admission and required 2 units of PRBC transfusion. Workup showed EUGENIO, no hemolysis, recovered cell lines spontaneously so high probability of being drug-induced. Patient also felt weak and deconditioned so PT/rehab consult requested and MARK sent for SNF short term rehab. CT ab/pelv showed pancreatic hypodense lesions possibly cystic, and 1.1 cm liver segment 6 hypodense lesion, to be followed with MRI w/ w/o IV contrast with PMD. She also has asymmetric breasts which she endorses being from childhood but has not done mammogram in 6 years so recommended to get mammogram as outpatient. Also needs to follow up CBC with hematology and f/u with rheum to restart MTX. 79yo F with PMHx HTN DM2 RA on 60mg daily prednisone and TMX, right 3rd toe nonhealing wound, hx rt TKR just finished course of Clindamycin pw diarrhea x3d and diffuse abdominal pain associated with loose stools. Diarrhea resolved in hospital before samples could be sent for analysis. She had pancytopenia on admission and required 2 units of PRBC transfusion. Workup -no hemolysis, recovered cell lines spontaneously so high probability of being drug-induced. Patient also felt weak and deconditioned so PT/rehab consult requested and MARK sent for SNF short term rehab. CT ab/pelv showed pancreatic hypodense lesions possibly cystic, and 1.1 cm liver segment 6 hypodense lesion, to be followed with MRI w/ w/o IV contrast with PMD. She also has asymmetric breasts which she endorses being from childhood but has not done mammogram in 6 years so recommended to get mammogram as outpatient. Also needs to follow up CBC with hematology and f/u with rheum to restart MTX.    # Pancytopenia likely drug induced resolved  - drug-induced due to MTX/ clindamycin, unlikely to be MTX toxicity as she had her level checked week before presentation as it was normal.  -Negative hep panel. B12, folate normal. Negative myeloma panel.  - PT/PTT- normal, fibrinogen 522, haptogloin 377, , Uric acid 8.4, phos 2.3  T bili normal, HIV negative  -Iron 26, TIBC 250, % saturation 10  - F/u flow cytometry  - Transfused 2 units of PRBC   -  plan is to follow up cbc with hem/onc as outpt to assess ANC to resume MTX accordingly    #CKD: CKD 3   -monitor BMP.    # DIARRHEA :   - resolved    # TOE WOUND ;  -Dry , no discharge or erythema .  -  podiatry evaluation - Darco shoe for ambulation.  - vascular eval-normal arterial flow on duplex. 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 She needs to get mammogram and MRI pancreatic protocol. THis can be done as outpatient.

## 2018-06-13 NOTE — PROGRESS NOTE ADULT - ASSESSMENT
# Pancytopenia in setting of antibiotic usage: resolving  -DDX include drug-induced such as MTX, flaring inflammatory disorder, B12/folate deficiency, or primary bone marrow disorder such as leukemia and aplastic anemia.   - Transfused 2 units of PRBC, Keep Hgb >7, Platelets >10   -  Broad Spectrum Antibiotics   - PT/PTT- normal, fibrinogen 522, haptogloin 377, , Uric acid 8.4, phos 2.3  T bili normal, HIV negative  -Iron 26, TIBC 250, % saturation 10  - Flow cytometry, B12, folate, SPEP, K/L ratio, SIFE, Hepatitis Panel, methotrexate level, retic, D dimer- Pending  -2D echo   - resolving/ plan is to follow up cbc with hem/onc as outpt to assess ANC to resume MTX accordingly      #CKD: CKD 3   -monitor BMP.    # DIARRHEA :   - resolved    # TOE WOUND ;  -Dry , no discharge or erythema .  -  podiatry evaluation .    #DM type 2  - continue lantus, lispro    # RA:  - Stable will c/w prednisone , will hold off on methotrexate for now as per rheumaology    # GI:  -ppx with protonix.    # DVT:  -ppx with scd.    #dispo:    -full code from home. # Pancytopenia likely drug induced resolving  - drug-induced due to MTX/ clindamycin, unlikely to be MTX toxicity as she had her level checked week before presentation as it was normal.  -Negative hep panel. B12, folate normal. Negative myeloma panel.  - PT/PTT- normal, fibrinogen 522, haptogloin 377, , Uric acid 8.4, phos 2.3  T bili normal, HIV negative  -Iron 26, TIBC 250, % saturation 10  - F/u flow cytometry  - Transfused 2 units of PRBC, Keep Hgb >7, Platelets >10   -  plan is to follow up cbc with hem/onc as outpt to assess ANC to resume MTX accordingly    #CKD: CKD 3   -monitor BMP.    # DIARRHEA :   - resolved    # TOE WOUND ;  -Dry , no discharge or erythema .  -  podiatry evaluation -xray right foot to r/o fracture, aduplex- r/o PAD, Darco shoe for ambulation.  - vascular eval.    #DM type 2  - continue lantus, lispro    # RA:  - Stable will c/w prednisone , will hold off on methotrexate for now as per rheumaology    # GI:  -ppx with protonix.    # DVT:  -ppx with scd.    #dispo:    -full code from home.

## 2018-06-13 NOTE — DISCHARGE NOTE ADULT - SECONDARY DIAGNOSIS.
Rheumatoid arteritis Other specified diabetes mellitus with other specified complication, unspecified whether long term insulin use NSTEMI (non-ST elevated myocardial infarction)

## 2018-06-13 NOTE — PROGRESS NOTE ADULT - SUBJECTIVE AND OBJECTIVE BOX
Patient and examined, patient feels well today and has no complaints at this time. She was noted to have toe fracture. being evaluated by podiatry.     MEDICATIONS  (STANDING):  allopurinol 300 milliGRAM(s) Oral daily  cefepime   IVPB      cefepime   IVPB 2000 milliGRAM(s) IV Intermittent every 24 hours  dextrose 50% Injectable 12.5 Gram(s) IV Push once  dextrose 50% Injectable 25 Gram(s) IV Push once  dextrose 50% Injectable 25 Gram(s) IV Push once  enalapril 10 milliGRAM(s) Oral daily  folic acid 1 milliGRAM(s) Oral daily  insulin glargine Injectable (LANTUS) 15 Unit(s) SubCutaneous at bedtime  insulin lispro (HumaLOG) corrective regimen sliding scale   SubCutaneous three times a day before meals  insulin lispro Injectable (HumaLOG) 5 Unit(s) SubCutaneous three times a day before meals  predniSONE   Tablet 60 milliGRAM(s) Oral daily  vancomycin  IVPB 1200 milliGRAM(s) IV Intermittent every 24 hours  vancomycin  IVPB        MEDICATIONS  (PRN):  acetaminophen   Tablet. 650 milliGRAM(s) Oral every 6 hours PRN Mild Pain (1 - 3)  dextrose 40% Gel 15 Gram(s) Oral once PRN Blood Glucose LESS THAN 70 milliGRAM(s)/deciLiter  glucagon  Injectable 1 milliGRAM(s) IntraMuscular once PRN Glucose <70 milliGRAM(s)/deciLiter      Allergies    No Known Drug Allergies  strawberry (Unknown)    Intolerances        Vital Signs Last 24 Hrs  T(C): 36.8 (13 Jun 2018 08:17), Max: 36.8 (13 Jun 2018 08:17)  T(F): 98.2 (13 Jun 2018 08:17), Max: 98.2 (13 Jun 2018 08:17)  HR: 72 (13 Jun 2018 08:17) (70 - 77)  BP: 115/61 (13 Jun 2018 08:17) (115/61 - 151/67)  BP(mean): 72 (13 Jun 2018 08:17) (72 - 97)  RR: 18 (13 Jun 2018 08:17) (18 - 18)  SpO2: --    PHYSICAL EXAM  General: adult in NAD  CV: normal S1/S2   Lungs: positive air movement b/l ant lungs  Abdomen: soft non-tender   Ext: no edema  Skin: Patient has laceration on her toes left   Neuro: alert and oriented     LABS:                               8.0    3.77  )-----------( 208      ( 13 Jun 2018 09:38 )             25.5   06-13    137  |  99  |  49<H>  ----------------------------<  302<H>  4.9   |  22  |  1.6<H>    Ca    8.8      13 Jun 2018 09:38  Mg     2.1     06-12                             7.5    1.70  )-----------( 80       ( 11 Jun 2018 10:11 )             23.4   CBC Full  -  ( 11 Jun 2018 10:11 )  WBC Count : 1.70 K/uL  Hemoglobin : 7.5 g/dL  Hematocrit : 23.4 %  Platelet Count - Automated : 80 K/uL  Mean Cell Volume : 94.0 fL  Mean Cell Hemoglobin : 30.1 pg  Mean Cell Hemoglobin Concentration : 32.1 g/dL  Auto Neutrophil # : 0.48 K/uL  Auto Lymphocyte # : 0.90 K/uL  Auto Monocyte # : 0.21 K/uL  Auto Eosinophil # : 0.08 K/uL  Auto Basophil # : 0.00 K/uL  Auto Neutrophil % : 28.2 %  Auto Lymphocyte % : 52.9 %  Auto Monocyte % : 12.4 %  Auto Eosinophil % : 4.7 %  Auto Basophil % : 0.0 %         06-10    138  |  101  |  38<H>  ----------------------------<  124<H>  4.4   |  20  |  1.5    Ca    8.8      10 Jin 2018 08:09    TPro  5.7<L>  /  Alb  3.5  /  TBili  0.4  /  DBili  x   /  AST  21  /  ALT  15  /  AlkPhos  41  06-10                     7.8    0.66  )-----------( 38       ( 09 Jun 2018 09:46 )             23.1         Mean Cell Volume : 90.9 fL  Mean Cell Hemoglobin : 30.7 pg  Mean Cell Hemoglobin Concentration : 33.8 g/dL  Auto Neutrophil # : 0.24 K/uL  Auto Lymphocyte # : 0.27 K/uL  Auto Monocyte # : 0.05 K/uL  Auto Eosinophil # : 0.05 K/uL  Auto Basophil # : 0.00 K/uL  Auto Neutrophil % : 36.3 %  Auto Lymphocyte % : 40.9 %  Auto Monocyte % : 7.6 %  Auto Eosinophil % : 7.6 %  Auto Basophil % : 0.0 %    Serial CBC's  06-09 @ 09:46  Hct-23.1 / Hgb-7.8 / Plat-38 / RBC-2.54 / WBC-0.66          Serial CBC's  06-08 @ 12:49  Hct-16.0 / Hgb-5.0 / Plat-55 / RBC-1.61 / WBC-0.96            06-09    136  |  100  |  38<H>  ----------------------------<  223<H>  4.8   |  20  |  1.5    Ca    8.2<L>      09 Jun 2018 09:46  Phos  2.3     06-09  Mg     2.1     06-09    TPro  5.7<L>  /  Alb  3.4<L>  /  TBili  0.5  /  DBili  x   /  AST  18  /  ALT  15  /  AlkPhos  44  06-09      PT/INR - ( 08 Jun 2018 20:21 )   PT: 11.30 sec;   INR: 1.05 ratio         PTT - ( 08 Jun 2018 20:21 )  PTT:20.2 sec    Hemoglobin: 7.8 g/dL (06-09-18 @ 09:46)  WBC Count: 0.66 K/uL (06-09-18 @ 09:46)  Platelet Count - Automated: 38 K/uL (06-09-18 @ 09:46)  Hematocrit: 23.1 % (06-09-18 @ 09:46)  Iron - Total Binding Capacity.: 250 ug/dL (06-08-18 @ 20:31)  Hematocrit: 16.0 % (06-08-18 @ 12:49)  Platelet Count - Automated: 55 K/uL (06-08-18 @ 12:49)  Hemoglobin: 5.0 g/dL (06-08-18 @ 12:49)  WBC Count: 0.96 K/uL (06-08-18 @ 12:49)    Culture - Blood (06.08.18 @ 20:56)    Specimen Source: .Blood None    Culture Results:   No growth to date.        RADIOLOGY & ADDITIONAL STUDIES:    < from: CT Abdomen and Pelvis w/ IV Cont (06.08.18 @ 15:00) >    EXAM:  CT ABDOMEN AND PELVIS IC            PROCEDURE DATE:  06/08/2018            INTERPRETATION:  CLINICAL HISTORY / REASON FOR EXAM: Abdominal pain,   diarrhea.    TECHNIQUE: Contiguous axial CT images were obtained from the lower chest   to the pubic symphysis following administration of 100 mL Optiray 320   intravenous contrast.  Oral contrast was not administered.  Reformatted   images in the coronal and sagittal planes were acquired.    COMPARISON CT: None        OTHER STUDIES USED FOR CORRELATION: Lumbar radiograph from February 28, 2018       FINDINGS:    LOWER CHEST: Apparent asymmetric right breast soft tissue. Bibasilar   atelectasis with left lower lobe groundglass opacities.    HEPATOBILIARY: Indeterminate 1.1 cm liver segment 6 hypodense lesion   (series 3, image 22)    SPLEEN: Unremarkable.    PANCREAS: Two pancreatic tail hypodense possible cystic lesions, larger   1.9 cm (series 5, image 107).    ADRENAL GLANDS: Unremarkable.    KIDNEYS: Symmetric enhancement bilaterally. No evidence of hydronephrosis.    ABDOMINOPELVIC NODES: Unremarkable.    PELVIC ORGANS: Distended urinary bladder.    PERITONEUM/MESENTERY/BOWEL: No bowel obstruction, ascites or   intraperitoneal free air. Normal appendix.    BONES/SOFT TISSUES:Chronic compression fractures of T12, L1 and L2.   Chronic fracture of the left pubic rami. Chronic right eighth rib   fracture. Degenerative changes to the thoracolumbar spine.      IMPRESSION:  1. No evidence of acute abdominal pathology.    2. Apparent asymmetric right breast soft tissue; diagnostic mammogram or   correlation with outside mammography recommended.    3. Indeterminate 1.1 cm liver segment 6 hypodense lesion. Further   evaluation recommended with MRI abdomen with and without IV contrast.    4. Two pancreatic tail hypodense possible cystic lesions, larger 1.9 cm;   this can be further evaluated with recommended MRI with additional MRCP   sequences.    < end of copied text >

## 2018-06-13 NOTE — PROGRESS NOTE ADULT - ASSESSMENT
78 Y O f with pmh of HTN , and RA on prednisone and methotrexate presented to ER with c/o diarrhea and generalized weakness for 3 days.       # ANEMIA / PANCYTOPENIA  Improving. Platelets are normal now. ANC is still at 500 but is improving which is reassuring.   At this point, likely from Drug induced. MTX vs clindamycin both can cause agranulocytosis.   Rheum Follow up appreciated unlikely to be MTX toxicity as she had her level checked week before presentation as it was normal. Though Rheum wants MTX to be initiated. We recommend to wait until ANC improves >1000.   -Blood work reviewed. Negat hep panel. B12, folate normal. Negative myeloma panel. Flow is not back yet though.  --Her pancytopenia is improving. I doubt if its bone marrow disorder if counts are improving. We will continue to observe her. If ANC not trending up then will consider bone marrow biopsy. If she is planned to be discahrged. she will need to follow up with Dr. Briseno at [facility] in 1 week.     r     # SHONNA?  - IVFs, trend serum creatinine       # DIARRHEA :    - pt reports improvement in diarrhea today ,C. diff was not sent.     # Laceration on the toe; Podiatry follow up      # RA:    - Stable will c/w prednisone , will hold off on methotrexate for now.    # she was noted to inderteminate cyst in pancreas and asymmetric right breast tissue. She needs to get mammogram and MRI pancreatic protocol. THis can be done as outpatient.

## 2018-06-13 NOTE — CONSULT NOTE ADULT - ASSESSMENT
Recommend outpatient follow up with patients own vascular surgeon at [facility]. Dr Salcedo to see. Right 3rd toe ulcer, normal arterial flow on duplex. No vascular intervention required at this time    Recommend outpatient follow up with patients own vascular surgeon at [facility]. Dr Salcedo to see.

## 2018-06-13 NOTE — DISCHARGE NOTE ADULT - CARE PLAN
Principal Discharge DX:	Pancytopenia  Secondary Diagnosis:	Rheumatoid arteritis  Secondary Diagnosis:	Other specified diabetes mellitus with other specified complication, unspecified whether long term insulin use  Secondary Diagnosis:	NSTEMI (non-ST elevated myocardial infarction) Principal Discharge DX:	Pancytopenia  Goal:	medical therapy  Assessment and plan of treatment:	resolved, repeat CBC in 1 week and f//u with hemo onc and rheum to resume MTX as per their recommendation  Secondary Diagnosis:	Rheumatoid arteritis  Goal:	medical therapy  Assessment and plan of treatment:	c/w allopurinol  Secondary Diagnosis:	Other specified diabetes mellitus with other specified complication, unspecified whether long term insulin use  Goal:	medical therapy  Assessment and plan of treatment:	c/w oral meds and f/u with PMD

## 2018-06-13 NOTE — CONSULT NOTE ADULT - SUBJECTIVE AND OBJECTIVE BOX
HPI:  78 Y O f with pmh of HTN , chronic back pain and RA on prednisone and methotrexate presented to ER with c/o diarrhea and generalized weakness for 3 days . History goes back to 2 months ; when pt developed a blister on her  right toe that was infected , went to her PCP was told to take  clindamycin for 5 days. Pt took the medication and reports the that wound was healing . Pt  then went to her Vascular Dr 2 wks ago , had doppler done and was told to take clindamycin for another 2 wks. Pt started taking clindamycin , pt completed 12 days and then developed watery diarrhea .  Pt reports having > 5 episodes of diarrhea , no nausea or vomiting .Pt reports that for past 2 days she had around 7 BM /day , watery with cramps , she did not notice any blood or black stools.  Pt reports no fevers but reports chills at night. Pt also developed generalized weakness , was unable to ambulate so presented to ER. Pt reports sob , for past 2 months.   In Er blood work showed pt had pancytopenia with hb of 5 ,  wbc 0.96 , platelet count of 50 .Labs done in March showed hb of 10.2 with repeat in May was 7.6 but normal platelets and white count.  Pt also had guaiaic + stools. (08 Jun 2018 16:56)  Vascular surgery consult called for 3rd toe ulcer right side which patient has had a longer time. Pt follows up with [facility] vascular surgery and has had vascular procedures done in the past.     Objective:   PAST MEDICAL & SURGICAL HISTORY:  Rheumatoid arteritis  Other specified diabetes mellitus with other specified complication, unspecified whether long term insulin use  Primary osteoarthritis of right knee: s/p knee repalcement    Vital Signs Last 24 Hrs  T(C): 36.8 (13 Jun 2018 08:17), Max: 36.8 (13 Jun 2018 08:17)  T(F): 98.2 (13 Jun 2018 08:17), Max: 98.2 (13 Jun 2018 08:17)  HR: 72 (13 Jun 2018 08:17) (70 - 77)  BP: 115/61 (13 Jun 2018 08:17) (115/61 - 151/67)  BP(mean): 72 (13 Jun 2018 08:17) (72 - 97)  RR: 18 (13 Jun 2018 08:17) (18 - 18)  SpO2: --        I&O's Detail    13 Jun 2018 07:01  -  13 Jun 2018 16:02  --------------------------------------------------------  IN:  Total IN: 0 mL    OUT:    Voided: 2 mL  Total OUT: 2 mL    Total NET: -2 mL                 8.0  3.77  )-----------( 208      ( 06-13 @ 09:38 )             25.5                7.8    2.48  )-----------( 150      ( 06-12 @ 09:49 )             24.7                    137   |  99    |  49                 Ca: 8.8    BMP:   ----------------------------< 302    Mg: x     (06-13-18 @ 09:38)             4.9    |  22    | 1.6                Ph: x        LFT:     TPro: 5.7 / Alb: 3.3 / TBili: 0.3 / DBili: x / AST: 16 / ALT: 14 / AlkPhos: 40   (06-11-18 @ 10:11)      MEDICATIONS  (STANDING):  allopurinol 300 milliGRAM(s) Oral daily  cefepime   IVPB      cefepime   IVPB 2000 milliGRAM(s) IV Intermittent every 24 hours  dextrose 50% Injectable 12.5 Gram(s) IV Push once  dextrose 50% Injectable 25 Gram(s) IV Push once  dextrose 50% Injectable 25 Gram(s) IV Push once  enalapril 10 milliGRAM(s) Oral daily  folic acid 1 milliGRAM(s) Oral daily  insulin glargine Injectable (LANTUS) 15 Unit(s) SubCutaneous at bedtime  insulin lispro (HumaLOG) corrective regimen sliding scale   SubCutaneous three times a day before meals  insulin lispro Injectable (HumaLOG) 5 Unit(s) SubCutaneous three times a day before meals  predniSONE   Tablet 60 milliGRAM(s) Oral daily  vancomycin  IVPB 1200 milliGRAM(s) IV Intermittent every 24 hours  vancomycin  IVPB        MEDICATIONS  (PRN):  acetaminophen   Tablet. 650 milliGRAM(s) Oral every 6 hours PRN Mild Pain (1 - 3)  dextrose 40% Gel 15 Gram(s) Oral once PRN Blood Glucose LESS THAN 70 milliGRAM(s)/deciLiter  glucagon  Injectable 1 milliGRAM(s) IntraMuscular once PRN Glucose <70 milliGRAM(s)/deciLiter    Diet, DASH/TLC:   Sodium & Cholesterol Restricted (06-11-18 @ 10:08)    PHYSICAL EXAM:  General Appearance: Appears well, NAD  Neck: Supple  Chest: Equal expansion bilaterally, equal breath sounds  CV: S1, S2, RRR  Abdomen: Soft, NT, ND  Extremities: Right third toe ulcer, DP palpable, PT nonpalpable, warm foot  Neuro: A&Ox3

## 2018-06-13 NOTE — DISCHARGE NOTE ADULT - PLAN OF CARE
medical therapy resolved, repeat CBC in 1 week and f//u with hemo onc and rheum to resume MTX as per their recommendation c/w allopurinol c/w oral meds and f/u with PMD

## 2018-06-14 LAB
CULTURE RESULTS: SIGNIFICANT CHANGE UP
HCV RNA SERPL NAA DL=5-ACNC: SIGNIFICANT CHANGE UP IU/ML
HCV RNA SPEC NAA+PROBE-LOG IU: SIGNIFICANT CHANGE UP LOGIU/ML
SPECIMEN SOURCE: SIGNIFICANT CHANGE UP

## 2018-06-14 RX ORDER — SENNA PLUS 8.6 MG/1
2 TABLET ORAL AT BEDTIME
Qty: 0 | Refills: 0 | Status: DISCONTINUED | OUTPATIENT
Start: 2018-06-14 | End: 2018-06-15

## 2018-06-14 RX ORDER — FOLIC ACID 0.8 MG
1 TABLET ORAL
Qty: 0 | Refills: 0 | COMMUNITY
Start: 2018-06-14

## 2018-06-14 RX ORDER — DOCUSATE SODIUM 100 MG
100 CAPSULE ORAL THREE TIMES A DAY
Qty: 0 | Refills: 0 | Status: DISCONTINUED | OUTPATIENT
Start: 2018-06-14 | End: 2018-06-15

## 2018-06-14 RX ADMIN — INSULIN GLARGINE 15 UNIT(S): 100 INJECTION, SOLUTION SUBCUTANEOUS at 22:03

## 2018-06-14 RX ADMIN — Medication 3: at 11:35

## 2018-06-14 RX ADMIN — Medication 5 UNIT(S): at 17:48

## 2018-06-14 RX ADMIN — Medication 60 MILLIGRAM(S): at 06:09

## 2018-06-14 RX ADMIN — Medication 5 UNIT(S): at 11:36

## 2018-06-14 RX ADMIN — Medication 10 MILLIGRAM(S): at 15:48

## 2018-06-14 RX ADMIN — Medication 650 MILLIGRAM(S): at 10:25

## 2018-06-14 RX ADMIN — Medication 1 MILLIGRAM(S): at 11:36

## 2018-06-14 RX ADMIN — Medication 100 MILLIGRAM(S): at 22:04

## 2018-06-14 RX ADMIN — SENNA PLUS 2 TABLET(S): 8.6 TABLET ORAL at 15:47

## 2018-06-14 RX ADMIN — Medication 5 UNIT(S): at 08:00

## 2018-06-14 RX ADMIN — SENNA PLUS 2 TABLET(S): 8.6 TABLET ORAL at 22:05

## 2018-06-14 RX ADMIN — Medication 650 MILLIGRAM(S): at 22:07

## 2018-06-14 RX ADMIN — Medication 300 MILLIGRAM(S): at 11:36

## 2018-06-14 RX ADMIN — Medication 3: at 17:47

## 2018-06-14 RX ADMIN — Medication 10 MILLIGRAM(S): at 06:09

## 2018-06-14 NOTE — PROGRESS NOTE ADULT - ATTENDING COMMENTS
The patient was seen and examined. Agree with above.   The patient is feeling better. Blood counts are improving. 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   Neutrophil count is still low. Should followup at [facility] upon discharge to insure full recovery.  Followup with Dr. Henry for management of RA. Would hold MTX until ANC > 1000.  The patient may come to [facility] on Friday to check CBC.
Patient seen and examined independently.
The patient was seen and examined. Agree with above.   Afebrile and no sign of bleeding.  Repeat CBC today still reveals pancytopenia but stable.  Will continue current care, followup the results of flow and B12.  Will call IR for BM biopsy.  Rheumatology followup.
Patient seen and examined independently.
Patient seen and examined independently.
The patient was seen and examined. Agree with above.  Repeat CBC shows improvement, WBC 1.7/ANC 0.48, Hb 7.5 and PLT 80, suggestive of possible drug-induced cytopenia.  Will hold BM bx, continue supportive care, monitor CBC daily and call rheumatology followup.
Patient is seen and examined independently, I agree with resident note above and plan of care -edited and corrected where applicable.

## 2018-06-14 NOTE — CONSULT NOTE ADULT - SUBJECTIVE AND OBJECTIVE BOX
HPI:  78 Y O f with pmh of HTN , chronic back pain and RA on prednisone and methotrexate presented to ER with c/o diarrhea and generalized weakness for 3 days . History goes back to 2 months ; when pt developed a blister on her  right toe that was infected , went to her PCP was told to take  clindamycin for 5 days. Pt took the medication and reports the that wound was healing . Pt  then went to her Vascular Dr 2 wks ago , had doppler done and was told to take clindamycin for another 2 wks. Pt started taking clindamycin , pt completed 12 days and then developed watery diarrhea .  Pt reports having > 5 episodes of diarrhea , no nausea or vomiting .Pt reports that for past 2 days she had around 7 BM /day , watery with cramps , she did not notice any blood or black stools.    Pt reports no fevers but reports chills at night. Pt also developed generalized weakness , was unable to ambulate so presented to ER. Pt reports sob , for past 2 months.     In Er blood work showed pt had pancytopenia with hb of 5 ,  wbc 0.96 , platelet count of 50 .Labs done in March showed hb of 10.2 with repeat in May was 7.6 but normal platelets and white count.  Pt also had guaiaic + stools. 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, unspecified whether long term insulin use  Primary osteoarthritis of right knee: s/p knee repalcement      Hospital Course:    TODAY'S SUBJECTIVE & REVIEW OF SYMPTOMS:     Constitutional WNL   Cardio WNL   Resp WNL   GI WNL  Heme WNL  Endo WNL  Skin WNL  MSK Weakness  Neuro WNL  Cognitive WNL  Psych WNL      MEDICATIONS  (STANDING):  allopurinol 300 milliGRAM(s) Oral daily  dextrose 50% Injectable 12.5 Gram(s) IV Push once  dextrose 50% Injectable 25 Gram(s) IV Push once  dextrose 50% Injectable 25 Gram(s) IV Push once  docusate sodium 100 milliGRAM(s) Oral three times a day  enalapril 10 milliGRAM(s) Oral daily  folic acid 1 milliGRAM(s) Oral daily  insulin glargine Injectable (LANTUS) 15 Unit(s) SubCutaneous at bedtime  insulin lispro (HumaLOG) corrective regimen sliding scale   SubCutaneous three times a day before meals  insulin lispro Injectable (HumaLOG) 5 Unit(s) SubCutaneous three times a day before meals  predniSONE   Tablet 60 milliGRAM(s) Oral daily  senna 2 Tablet(s) Oral at bedtime    MEDICATIONS  (PRN):  acetaminophen   Tablet. 650 milliGRAM(s) Oral every 6 hours PRN Mild Pain (1 - 3)  dextrose 40% Gel 15 Gram(s) Oral once PRN Blood Glucose LESS THAN 70 milliGRAM(s)/deciLiter  glucagon  Injectable 1 milliGRAM(s) IntraMuscular once PRN Glucose <70 milliGRAM(s)/deciLiter      FAMILY HISTORY:  No pertinent family history in first degree relatives      Allergies    No Known Drug Allergies  strawberry (Unknown)    Intolerances        SOCIAL HISTORY:    [  ] Etoh  [  ] Smoking  [  ] Substance abuse     Home Environment:  [x  ] Home Alone  [  ] Lives with Family  [  ] Home Health Aid    Dwelling:  [  ] Apartment  [ x ] Private House  [  ] Adult Home  [  ] Skilled Nursing Facility      [  ] Short Term  [  ] Long Term  [x  ] Stairs       Elevator [  ]    FUNCTIONAL STATUS PTA: (Check all that apply)  Ambulation: [x   ]Independent    [  ] Dependent     [  ] Non-Ambulatory  Assistive Device: [  ] SA Cane  [  ]  Q Cane  [x  ] Walker  [  ]  Wheelchair  ADL : [ x ] Independent  [  ]  Dependent       Vital Signs Last 24 Hrs  T(C): 36.8 (14 Jun 2018 15:25), Max: 37.2 (13 Jun 2018 23:50)  T(F): 98.2 (14 Jun 2018 15:25), Max: 98.9 (13 Jun 2018 23:50)  HR: 88 (14 Jun 2018 15:25) (72 - 88)  BP: 140/82 (14 Jun 2018 15:25) (119/63 - 141/67)  BP(mean): --  RR: 20 (14 Jun 2018 15:25) (20 - 20)  SpO2: 96% (13 Jun 2018 16:01) (96% - 96%)      PHYSICAL EXAM: Alert & Oriented X3  GENERAL: NAD, well-groomed, well-developed  HEAD:  Atraumatic, Normocephalic  CHEST/LUNG: Clear   HEART: S1S2+  ABDOMEN: Soft, Nontender  EXTREMITIES:  no calf tenderness    NERVOUS SYSTEM:  Cranial Nerves 2-12 intact [  ] Abnormal  [  ]  ROM: WFL all extremities [  ]  Abnormal [x  ]limited right knee  Motor Strength: WFL all extremities  [  ]  Abnormal [x  ]limited rle  Sensation: intact to light touch [x  ] Abnormal [  ]  Reflexes: Symmetric [  ]  Abnormal [  ]    FUNCTIONAL STATUS:  Bed Mobility: Independent [  ]  Supervision [  ]  Needs Assistance [x  ]  N/A [  ]  Transfers: Independent [  ]  Supervision [  ]  Needs Assistance [x  ]  N/A [  ]   Ambulation: Independent [  ]  Supervision [  ]  Needs Assistance [  ]  N/A [  ]  ADL: Independent [  ] Requires Assistance [  ] N/A [  ]      LABS:                        8.0    3.77  )-----------( 208      ( 13 Jun 2018 09:38 )             25.5     06-13    137  |  99  |  49<H>  ----------------------------<  302<H>  4.9   |  22  |  1.6<H>    Ca    8.8      13 Jun 2018 09:38            RADIOLOGY & ADDITIONAL STUDIES:    Assesment:

## 2018-06-14 NOTE — PROGRESS NOTE ADULT - SUBJECTIVE AND OBJECTIVE BOX
Patient is a 78y old  Female who presents with a chief complaint of Diarrhea, weakness (13 Jun 2018 16:08)      Ovenight events: none overnight    PAST MEDICAL & SURGICAL HISTORY:  Rheumatoid arteritis  Other specified diabetes mellitus with other specified complication, unspecified whether long term insulin use  Primary osteoarthritis of right knee: s/p knee repalcement        FAMILY HISTORY:  No pertinent family history in first degree relatives        Allergies    No Known Drug Allergies  strawberry (Unknown)    Intolerances        acetaminophen   Tablet. 650 milliGRAM(s) Oral every 6 hours PRN  allopurinol 300 milliGRAM(s) Oral daily  bisacodyl Suppository 10 milliGRAM(s) Rectal once  dextrose 40% Gel 15 Gram(s) Oral once PRN  dextrose 50% Injectable 12.5 Gram(s) IV Push once  dextrose 50% Injectable 25 Gram(s) IV Push once  dextrose 50% Injectable 25 Gram(s) IV Push once  docusate sodium 100 milliGRAM(s) Oral three times a day  enalapril 10 milliGRAM(s) Oral daily  folic acid 1 milliGRAM(s) Oral daily  glucagon  Injectable 1 milliGRAM(s) IntraMuscular once PRN  insulin glargine Injectable (LANTUS) 15 Unit(s) SubCutaneous at bedtime  insulin lispro (HumaLOG) corrective regimen sliding scale   SubCutaneous three times a day before meals  insulin lispro Injectable (HumaLOG) 5 Unit(s) SubCutaneous three times a day before meals  predniSONE   Tablet 60 milliGRAM(s) Oral daily  senna 2 Tablet(s) Oral at bedtime  : Home Meds:      PHYSICAL EXAM    ICU Vital Signs Last 24 Hrs  T(C): 36.9 (14 Jun 2018 07:49), Max: 37.2 (13 Jun 2018 23:50)  T(F): 98.4 (14 Jun 2018 07:49), Max: 98.9 (13 Jun 2018 23:50)  HR: 72 (14 Jun 2018 07:49) (72 - 76)  BP: 119/63 (14 Jun 2018 07:49) (119/63 - 141/67)  BP(mean): --  ABP: --  ABP(mean): --  RR: 20 (14 Jun 2018 07:49) (20 - 20)  SpO2: 96% (13 Jun 2018 16:01) (96% - 96%)      General: NAD  HEENT: atraumatic, EOMI  CHEST:  CTA b/l  Cardiovascular: rs1s2 no murmur  Abdomen: soft non tender not distended  Extremities: no LLE   Skin: laceration on right third toe  Neurological: AAO3  	      06-13-18 @ 07:01  -  06-14-18 @ 07:00  --------------------------------------------------------  IN:  Total IN: 0 mL    OUT:    Voided: 2 mL  Total OUT: 2 mL    Total NET: -2 mL          LABS:                          8.0    3.77  )-----------( 208      ( 13 Jun 2018 09:38 )             25.5                                               06-13    137  |  99  |  49<H>  ----------------------------<  302<H>  4.9   |  22  |  1.6<H>    Ca    8.8      13 Jun 2018 09:38                                                                                                                                                                                                                           X-Rays:                                                                                         ECHO:    MEDICATIONS  (STANDING):  allopurinol 300 milliGRAM(s) Oral daily  bisacodyl Suppository 10 milliGRAM(s) Rectal once  dextrose 50% Injectable 12.5 Gram(s) IV Push once  dextrose 50% Injectable 25 Gram(s) IV Push once  dextrose 50% Injectable 25 Gram(s) IV Push once  docusate sodium 100 milliGRAM(s) Oral three times a day  enalapril 10 milliGRAM(s) Oral daily  folic acid 1 milliGRAM(s) Oral daily  insulin glargine Injectable (LANTUS) 15 Unit(s) SubCutaneous at bedtime  insulin lispro (HumaLOG) corrective regimen sliding scale   SubCutaneous three times a day before meals  insulin lispro Injectable (HumaLOG) 5 Unit(s) SubCutaneous three times a day before meals  predniSONE   Tablet 60 milliGRAM(s) Oral daily  senna 2 Tablet(s) Oral at bedtime    MEDICATIONS  (PRN):  acetaminophen   Tablet. 650 milliGRAM(s) Oral every 6 hours PRN Mild Pain (1 - 3)  dextrose 40% Gel 15 Gram(s) Oral once PRN Blood Glucose LESS THAN 70 milliGRAM(s)/deciLiter  glucagon  Injectable 1 milliGRAM(s) IntraMuscular once PRN Glucose <70 milliGRAM(s)/deciLiter

## 2018-06-14 NOTE — PROGRESS NOTE ADULT - ASSESSMENT
# Pancytopenia likely drug induced resolving  - resolved  - drug-induced due to MTX/ clindamycin, unlikely to be MTX toxicity as she had her level checked week before presentation as it was normal.  -Negative hep panel. B12, folate normal. Negative myeloma panel.  - PT/PTT- normal, fibrinogen 522, haptogloin 377, , Uric acid 8.4, phos 2.3  T bili normal, HIV negative  -Iron 26, TIBC 250, % saturation 10  - F/u flow cytometry  - Transfused 2 units of PRBC, Keep Hgb >7, Platelets >10   -  plan is to follow up cbc with hem/onc as outpt to assess ANC to resume MTX accordingly    #CKD: CKD 3   -monitor BMP.    # DIARRHEA :   - resolved    # TOE WOUND ;  -Dry , no discharge or erythema .  -  podiatry evaluation -xray right foot to r/o fracture, aduplex- r/o PAD, Darco shoe for ambulation.  - vascular eval.    #DM type 2  - continue lantus, lispro    # RA:  - Stable will c/w prednisone , will hold off on methotrexate for now as per rheumaology    # GI:  -ppx with protonix.    # DVT:  -ppx with scd.    #dispo:  - prepare for d/c tomorrow to SNF # Pancytopenia likely drug induced resolved  - drug-induced due to MTX/ clindamycin, unlikely to be MTX toxicity as she had her level checked week before presentation as it was normal.  -Negative hep panel. B12, folate normal. Negative myeloma panel.  - PT/PTT- normal, fibrinogen 522, haptogloin 377, , Uric acid 8.4, phos 2.3  T bili normal, HIV negative  -Iron 26, TIBC 250, % saturation 10  - F/u flow cytometry  - Transfused 2 units of PRBC, Keep Hgb >7, Platelets >10   -  plan is to follow up cbc with hem/onc as outpt to assess ANC to resume MTX accordingly    #CKD: CKD 3   -monitor BMP.    # DIARRHEA :   - resolved    # TOE WOUND ;  -Dry , no discharge or erythema .  -  podiatry evaluation - Darco shoe for ambulation.  - vascular eval-normal arterial flow on duplex. No vascular intervention required at this time    #DM type 2  - continue lantus, lispro    # RA:  - Stable will c/w prednisone , will hold off on methotrexate for now as per rheumaology    # GI:  -ppx with protonix.    # DVT:  -ppx with scd.    #dispo:  - prepare for d/c tomorrow to SNF

## 2018-06-14 NOTE — CONSULT NOTE ADULT - ASSESSMENT

## 2018-06-14 NOTE — CONSULT NOTE ADULT - CONSULT REASON
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
Pancytopenia
Rt third toe ulcer
bone marrow biopsy
gait dysfunction
right foot ulcer

## 2018-06-15 VITALS
OXYGEN SATURATION: 97 % | RESPIRATION RATE: 20 BRPM | HEART RATE: 89 BPM | DIASTOLIC BLOOD PRESSURE: 69 MMHG | TEMPERATURE: 98 F | SYSTOLIC BLOOD PRESSURE: 145 MMHG

## 2018-06-15 LAB
ANION GAP SERPL CALC-SCNC: 19 MMOL/L — HIGH (ref 7–14)
BUN SERPL-MCNC: 48 MG/DL — HIGH (ref 10–20)
CALCIUM SERPL-MCNC: 8.9 MG/DL — SIGNIFICANT CHANGE UP (ref 8.5–10.1)
CHLORIDE SERPL-SCNC: 101 MMOL/L — SIGNIFICANT CHANGE UP (ref 98–110)
CO2 SERPL-SCNC: 18 MMOL/L — SIGNIFICANT CHANGE UP (ref 17–32)
CREAT SERPL-MCNC: 1.6 MG/DL — HIGH (ref 0.7–1.5)
GLUCOSE SERPL-MCNC: 174 MG/DL — HIGH (ref 70–99)
HCT VFR BLD CALC: 28.8 % — LOW (ref 37–47)
HGB BLD-MCNC: 9.1 G/DL — LOW (ref 12–16)
MCHC RBC-ENTMCNC: 30.5 PG — SIGNIFICANT CHANGE UP (ref 27–31)
MCHC RBC-ENTMCNC: 31.6 G/DL — LOW (ref 32–37)
MCV RBC AUTO: 96.6 FL — SIGNIFICANT CHANGE UP (ref 81–99)
NRBC # BLD: 2 /100 WBCS — HIGH (ref 0–0)
PLATELET # BLD AUTO: 416 K/UL — HIGH (ref 130–400)
POTASSIUM SERPL-MCNC: 4.5 MMOL/L — SIGNIFICANT CHANGE UP (ref 3.5–5)
POTASSIUM SERPL-SCNC: 4.5 MMOL/L — SIGNIFICANT CHANGE UP (ref 3.5–5)
RBC # BLD: 2.98 M/UL — LOW (ref 4.2–5.4)
RBC # FLD: 19 % — HIGH (ref 11.5–14.5)
SODIUM SERPL-SCNC: 138 MMOL/L — SIGNIFICANT CHANGE UP (ref 135–146)
WBC # BLD: 11.52 K/UL — HIGH (ref 4.8–10.8)
WBC # FLD AUTO: 11.52 K/UL — HIGH (ref 4.8–10.8)

## 2018-06-15 RX ORDER — ALLOPURINOL 300 MG
1 TABLET ORAL
Qty: 0 | Refills: 0 | COMMUNITY
Start: 2018-06-15

## 2018-06-15 RX ORDER — LACTULOSE 10 G/15ML
20 SOLUTION ORAL EVERY 4 HOURS
Qty: 0 | Refills: 0 | Status: DISCONTINUED | OUTPATIENT
Start: 2018-06-15 | End: 2018-06-15

## 2018-06-15 RX ORDER — LACTULOSE 10 G/15ML
30 SOLUTION ORAL
Qty: 0 | Refills: 0 | COMMUNITY
Start: 2018-06-15

## 2018-06-15 RX ORDER — SENNA PLUS 8.6 MG/1
2 TABLET ORAL
Qty: 0 | Refills: 0 | COMMUNITY
Start: 2018-06-15

## 2018-06-15 RX ORDER — DOCUSATE SODIUM 100 MG
1 CAPSULE ORAL
Qty: 0 | Refills: 0 | COMMUNITY
Start: 2018-06-15

## 2018-06-15 RX ORDER — ACETAMINOPHEN 500 MG
2 TABLET ORAL
Qty: 0 | Refills: 0 | COMMUNITY
Start: 2018-06-15

## 2018-06-15 RX ADMIN — Medication 1: at 13:47

## 2018-06-15 RX ADMIN — Medication 5 UNIT(S): at 16:19

## 2018-06-15 RX ADMIN — Medication 5 UNIT(S): at 08:40

## 2018-06-15 RX ADMIN — Medication 10 MILLIGRAM(S): at 07:37

## 2018-06-15 RX ADMIN — Medication 4: at 16:19

## 2018-06-15 RX ADMIN — LACTULOSE 20 GRAM(S): 10 SOLUTION ORAL at 13:48

## 2018-06-15 RX ADMIN — Medication 300 MILLIGRAM(S): at 13:50

## 2018-06-15 RX ADMIN — Medication 5 UNIT(S): at 13:48

## 2018-06-15 RX ADMIN — Medication 1 MILLIGRAM(S): at 13:49

## 2018-06-15 RX ADMIN — Medication 100 MILLIGRAM(S): at 07:37

## 2018-06-15 RX ADMIN — Medication 100 MILLIGRAM(S): at 13:48

## 2018-06-15 RX ADMIN — Medication 60 MILLIGRAM(S): at 07:37

## 2018-06-15 RX ADMIN — Medication 650 MILLIGRAM(S): at 16:13

## 2018-06-15 NOTE — DIETITIAN INITIAL EVALUATION ADULT. - OTHER INFO
Pt admitted w/ diarrhea + weakness- now resolved. Pancytopenia likely drug induced- resolved. CKD III stable. D/C pending. RD assessment for LOS. Pt reports intake is now very good, consumed 100% of meal. Denies any recent wt changes. Reports follows heart healthy/carb consistent diet at home. Allergy to strawberries

## 2018-06-15 NOTE — PHYSICAL THERAPY INITIAL EVALUATION ADULT - GAIT DEVIATIONS NOTED, PT EVAL
decreased velocity of limb motion/decreased francisco javier/decreased step length/decreased stride length

## 2018-06-15 NOTE — PHYSICAL THERAPY INITIAL EVALUATION ADULT - GENERAL OBSERVATIONS, REHAB EVAL
Time in: 1000Time out: 1025 am Chart reviewed. Pt. seen seated at edge of bed, +R darco shoe, LSO brace, agreed to PT

## 2018-06-15 NOTE — DIETITIAN INITIAL EVALUATION ADULT. - ENERGY NEEDS
Calories: 4659-9290 kcals/day (22-25 kcals/kg ABW) for CKD pt  Protein: 64-80 g/day (0.8-1 g/kg ABW) for ckd stage III  Fluids: 1ml/kcal or per LIP

## 2018-06-15 NOTE — PROGRESS NOTE ADULT - SUBJECTIVE AND OBJECTIVE BOX
Pt with right 3rd toe ulcer.  No surgical intervention from podiatry standpoint.  WBAT with Darco shoe right foot  F/u o/p with Dr. Oden  Please recall as o/p

## 2018-06-18 ENCOUNTER — OUTPATIENT (OUTPATIENT)
Dept: OUTPATIENT SERVICES | Facility: HOSPITAL | Age: 78
LOS: 1 days | Discharge: HOME | End: 2018-06-18

## 2018-06-18 DIAGNOSIS — M17.11 UNILATERAL PRIMARY OSTEOARTHRITIS, RIGHT KNEE: Chronic | ICD-10-CM

## 2018-06-18 DIAGNOSIS — E10.9 TYPE 1 DIABETES MELLITUS WITHOUT COMPLICATIONS: ICD-10-CM

## 2018-06-18 PROBLEM — E13.69 OTHER SPECIFIED DIABETES MELLITUS WITH OTHER SPECIFIED COMPLICATION: Chronic | Status: ACTIVE | Noted: 2018-06-08

## 2018-06-21 DIAGNOSIS — A04.72 ENTEROCOLITIS DUE TO CLOSTRIDIUM DIFFICILE, NOT SPECIFIED AS RECURRENT: ICD-10-CM

## 2018-06-21 DIAGNOSIS — M06.9 RHEUMATOID ARTHRITIS, UNSPECIFIED: ICD-10-CM

## 2018-06-21 DIAGNOSIS — Z96.651 PRESENCE OF RIGHT ARTIFICIAL KNEE JOINT: ICD-10-CM

## 2018-06-21 DIAGNOSIS — T38.0X5A ADVERSE EFFECT OF GLUCOCORTICOIDS AND SYNTHETIC ANALOGUES, INITIAL ENCOUNTER: ICD-10-CM

## 2018-06-21 DIAGNOSIS — L97.511 NON-PRESSURE CHRONIC ULCER OF OTHER PART OF RIGHT FOOT LIMITED TO BREAKDOWN OF SKIN: ICD-10-CM

## 2018-06-21 DIAGNOSIS — E11.621 TYPE 2 DIABETES MELLITUS WITH FOOT ULCER: ICD-10-CM

## 2018-06-21 DIAGNOSIS — Z91.018 ALLERGY TO OTHER FOODS: ICD-10-CM

## 2018-06-21 DIAGNOSIS — T36.95XA ADVERSE EFFECT OF UNSPECIFIED SYSTEMIC ANTIBIOTIC, INITIAL ENCOUNTER: ICD-10-CM

## 2018-06-21 DIAGNOSIS — R19.7 DIARRHEA, UNSPECIFIED: ICD-10-CM

## 2018-06-21 DIAGNOSIS — E11.22 TYPE 2 DIABETES MELLITUS WITH DIABETIC CHRONIC KIDNEY DISEASE: ICD-10-CM

## 2018-06-21 DIAGNOSIS — M54.9 DORSALGIA, UNSPECIFIED: ICD-10-CM

## 2018-06-21 DIAGNOSIS — G89.29 OTHER CHRONIC PAIN: ICD-10-CM

## 2018-06-21 DIAGNOSIS — D61.811 OTHER DRUG-INDUCED PANCYTOPENIA: ICD-10-CM

## 2018-06-21 DIAGNOSIS — M81.8 OTHER OSTEOPOROSIS WITHOUT CURRENT PATHOLOGICAL FRACTURE: ICD-10-CM

## 2018-06-21 DIAGNOSIS — I12.9 HYPERTENSIVE CHRONIC KIDNEY DISEASE WITH STAGE 1 THROUGH STAGE 4 CHRONIC KIDNEY DISEASE, OR UNSPECIFIED CHRONIC KIDNEY DISEASE: ICD-10-CM

## 2018-06-21 DIAGNOSIS — N18.3 CHRONIC KIDNEY DISEASE, STAGE 3 (MODERATE): ICD-10-CM

## 2018-06-28 ENCOUNTER — OUTPATIENT (OUTPATIENT)
Dept: OUTPATIENT SERVICES | Facility: HOSPITAL | Age: 78
LOS: 1 days | Discharge: HOME | End: 2018-06-28

## 2018-06-28 DIAGNOSIS — M17.11 UNILATERAL PRIMARY OSTEOARTHRITIS, RIGHT KNEE: Chronic | ICD-10-CM

## 2018-06-28 DIAGNOSIS — R79.9 ABNORMAL FINDING OF BLOOD CHEMISTRY, UNSPECIFIED: ICD-10-CM

## 2018-06-29 ENCOUNTER — APPOINTMENT (OUTPATIENT)
Dept: HEMATOLOGY ONCOLOGY | Facility: CLINIC | Age: 78
End: 2018-06-29

## 2018-06-29 ENCOUNTER — OUTPATIENT (OUTPATIENT)
Dept: OUTPATIENT SERVICES | Facility: HOSPITAL | Age: 78
LOS: 1 days | Discharge: HOME | End: 2018-06-29

## 2018-06-29 VITALS
TEMPERATURE: 97.7 F | RESPIRATION RATE: 14 BRPM | HEART RATE: 95 BPM | SYSTOLIC BLOOD PRESSURE: 120 MMHG | BODY MASS INDEX: 30.31 KG/M2 | HEIGHT: 68 IN | WEIGHT: 200 LBS | DIASTOLIC BLOOD PRESSURE: 70 MMHG

## 2018-06-29 DIAGNOSIS — Z87.448 PERSONAL HISTORY OF OTHER DISEASES OF URINARY SYSTEM: ICD-10-CM

## 2018-06-29 DIAGNOSIS — M17.11 UNILATERAL PRIMARY OSTEOARTHRITIS, RIGHT KNEE: Chronic | ICD-10-CM

## 2018-06-30 PROBLEM — Z87.448 HISTORY OF CHRONIC KIDNEY DISEASE: Status: RESOLVED | Noted: 2018-06-30 | Resolved: 2018-06-30

## 2018-07-02 DIAGNOSIS — D61.818 OTHER PANCYTOPENIA: ICD-10-CM

## 2018-07-03 ENCOUNTER — OUTPATIENT (OUTPATIENT)
Dept: OUTPATIENT SERVICES | Facility: HOSPITAL | Age: 78
LOS: 1 days | Discharge: HOME | End: 2018-07-03

## 2018-07-03 DIAGNOSIS — M17.11 UNILATERAL PRIMARY OSTEOARTHRITIS, RIGHT KNEE: Chronic | ICD-10-CM

## 2018-07-03 DIAGNOSIS — R79.9 ABNORMAL FINDING OF BLOOD CHEMISTRY, UNSPECIFIED: ICD-10-CM

## 2018-07-30 ENCOUNTER — APPOINTMENT (OUTPATIENT)
Dept: HEMATOLOGY ONCOLOGY | Facility: CLINIC | Age: 78
End: 2018-07-30

## 2018-08-16 ENCOUNTER — OUTPATIENT (OUTPATIENT)
Dept: OUTPATIENT SERVICES | Facility: HOSPITAL | Age: 78
LOS: 1 days | Discharge: HOME | End: 2018-08-16

## 2018-08-16 DIAGNOSIS — M17.11 UNILATERAL PRIMARY OSTEOARTHRITIS, RIGHT KNEE: Chronic | ICD-10-CM

## 2018-08-16 DIAGNOSIS — L97.512 NON-PRESSURE CHRONIC ULCER OF OTHER PART OF RIGHT FOOT WITH FAT LAYER EXPOSED: ICD-10-CM

## 2018-08-17 PROBLEM — I00 RHEUMATIC FEVER WITHOUT HEART INVOLVEMENT: Chronic | Status: ACTIVE | Noted: 2018-06-08

## 2018-08-22 ENCOUNTER — OUTPATIENT (OUTPATIENT)
Dept: OUTPATIENT SERVICES | Facility: HOSPITAL | Age: 78
LOS: 1 days | Discharge: HOME | End: 2018-08-22

## 2018-08-22 DIAGNOSIS — M17.11 UNILATERAL PRIMARY OSTEOARTHRITIS, RIGHT KNEE: Chronic | ICD-10-CM

## 2018-08-23 DIAGNOSIS — I83.022 VARICOSE VEINS OF LEFT LOWER EXTREMITY WITH ULCER OF CALF: ICD-10-CM

## 2018-08-24 ENCOUNTER — INPATIENT (INPATIENT)
Facility: HOSPITAL | Age: 78
LOS: 6 days | Discharge: NURSING, REHAB OR LONG-TERM CARE | End: 2018-08-31
Attending: INTERNAL MEDICINE | Admitting: INTERNAL MEDICINE
Payer: MEDICARE

## 2018-08-24 VITALS
RESPIRATION RATE: 22 BRPM | SYSTOLIC BLOOD PRESSURE: 125 MMHG | DIASTOLIC BLOOD PRESSURE: 65 MMHG | OXYGEN SATURATION: 100 % | TEMPERATURE: 99 F | HEART RATE: 97 BPM

## 2018-08-24 DIAGNOSIS — M17.11 UNILATERAL PRIMARY OSTEOARTHRITIS, RIGHT KNEE: Chronic | ICD-10-CM

## 2018-08-24 LAB
ALBUMIN SERPL ELPH-MCNC: 4 G/DL — SIGNIFICANT CHANGE UP (ref 3.5–5.2)
ALP SERPL-CCNC: 62 U/L — SIGNIFICANT CHANGE UP (ref 30–115)
ALT FLD-CCNC: 12 U/L — SIGNIFICANT CHANGE UP (ref 0–41)
ANION GAP SERPL CALC-SCNC: 16 MMOL/L — HIGH (ref 7–14)
APTT BLD: 25 SEC — LOW (ref 27–39.2)
AST SERPL-CCNC: 34 U/L — SIGNIFICANT CHANGE UP (ref 0–41)
BASOPHILS # BLD AUTO: 0 K/UL — SIGNIFICANT CHANGE UP (ref 0–0.2)
BASOPHILS NFR BLD AUTO: 0 % — SIGNIFICANT CHANGE UP (ref 0–1)
BILIRUB SERPL-MCNC: 0.3 MG/DL — SIGNIFICANT CHANGE UP (ref 0.2–1.2)
BUN SERPL-MCNC: 29 MG/DL — HIGH (ref 10–20)
CALCIUM SERPL-MCNC: 8.7 MG/DL — SIGNIFICANT CHANGE UP (ref 8.5–10.1)
CHLORIDE SERPL-SCNC: 102 MMOL/L — SIGNIFICANT CHANGE UP (ref 98–110)
CO2 SERPL-SCNC: 17 MMOL/L — SIGNIFICANT CHANGE UP (ref 17–32)
CREAT SERPL-MCNC: 1.3 MG/DL — SIGNIFICANT CHANGE UP (ref 0.7–1.5)
EOSINOPHIL # BLD AUTO: 0 K/UL — SIGNIFICANT CHANGE UP (ref 0–0.7)
EOSINOPHIL NFR BLD AUTO: 0 % — SIGNIFICANT CHANGE UP (ref 0–8)
GLUCOSE BLDC GLUCOMTR-MCNC: 173 MG/DL — HIGH (ref 70–99)
GLUCOSE SERPL-MCNC: 233 MG/DL — HIGH (ref 70–99)
HCT VFR BLD CALC: 29.1 % — LOW (ref 37–47)
HGB BLD-MCNC: 9.2 G/DL — LOW (ref 12–16)
IMM GRANULOCYTES NFR BLD AUTO: 0.7 % — HIGH (ref 0.1–0.3)
INR BLD: 0.96 RATIO — SIGNIFICANT CHANGE UP (ref 0.65–1.3)
LACTATE SERPL-SCNC: 2 MMOL/L — SIGNIFICANT CHANGE UP (ref 0.5–2.2)
LYMPHOCYTES # BLD AUTO: 0.65 K/UL — LOW (ref 1.2–3.4)
LYMPHOCYTES # BLD AUTO: 11.1 % — LOW (ref 20.5–51.1)
MCHC RBC-ENTMCNC: 31.6 G/DL — LOW (ref 32–37)
MCHC RBC-ENTMCNC: 32.7 PG — HIGH (ref 27–31)
MCV RBC AUTO: 103.6 FL — HIGH (ref 81–99)
MONOCYTES # BLD AUTO: 0.12 K/UL — SIGNIFICANT CHANGE UP (ref 0.1–0.6)
MONOCYTES NFR BLD AUTO: 2.1 % — SIGNIFICANT CHANGE UP (ref 1.7–9.3)
NEUTROPHILS # BLD AUTO: 5.02 K/UL — SIGNIFICANT CHANGE UP (ref 1.4–6.5)
NEUTROPHILS NFR BLD AUTO: 86.1 % — HIGH (ref 42.2–75.2)
PLATELET # BLD AUTO: 236 K/UL — SIGNIFICANT CHANGE UP (ref 130–400)
POTASSIUM SERPL-MCNC: 6.1 MMOL/L — CRITICAL HIGH (ref 3.5–5)
POTASSIUM SERPL-SCNC: 6.1 MMOL/L — CRITICAL HIGH (ref 3.5–5)
PROT SERPL-MCNC: 6.5 G/DL — SIGNIFICANT CHANGE UP (ref 6–8)
PROTHROM AB SERPL-ACNC: 10.4 SEC — SIGNIFICANT CHANGE UP (ref 9.95–12.87)
RBC # BLD: 2.81 M/UL — LOW (ref 4.2–5.4)
RBC # FLD: 17.7 % — HIGH (ref 11.5–14.5)
SODIUM SERPL-SCNC: 135 MMOL/L — SIGNIFICANT CHANGE UP (ref 135–146)
TYPE + AB SCN PNL BLD: SIGNIFICANT CHANGE UP
WBC # BLD: 5.83 K/UL — SIGNIFICANT CHANGE UP (ref 4.8–10.8)
WBC # FLD AUTO: 5.83 K/UL — SIGNIFICANT CHANGE UP (ref 4.8–10.8)

## 2018-08-24 PROCEDURE — 93971 EXTREMITY STUDY: CPT | Mod: 26

## 2018-08-24 RX ORDER — VANCOMYCIN HCL 1 G
1000 VIAL (EA) INTRAVENOUS EVERY 12 HOURS
Qty: 0 | Refills: 0 | Status: DISCONTINUED | OUTPATIENT
Start: 2018-08-24 | End: 2018-08-28

## 2018-08-24 RX ORDER — SODIUM CHLORIDE 9 MG/ML
1000 INJECTION, SOLUTION INTRAVENOUS ONCE
Qty: 0 | Refills: 0 | Status: COMPLETED | OUTPATIENT
Start: 2018-08-24 | End: 2018-08-24

## 2018-08-24 RX ORDER — ENOXAPARIN SODIUM 100 MG/ML
90 INJECTION SUBCUTANEOUS
Qty: 0 | Refills: 0 | Status: DISCONTINUED | OUTPATIENT
Start: 2018-08-24 | End: 2018-08-29

## 2018-08-24 RX ORDER — SODIUM CHLORIDE 9 MG/ML
500 INJECTION, SOLUTION INTRAVENOUS
Qty: 0 | Refills: 0 | Status: DISCONTINUED | OUTPATIENT
Start: 2018-08-24 | End: 2018-08-24

## 2018-08-24 RX ORDER — ALLOPURINOL 300 MG
300 TABLET ORAL DAILY
Qty: 0 | Refills: 0 | Status: DISCONTINUED | OUTPATIENT
Start: 2018-08-24 | End: 2018-08-29

## 2018-08-24 RX ORDER — ENOXAPARIN SODIUM 100 MG/ML
88 INJECTION SUBCUTANEOUS ONCE
Qty: 0 | Refills: 0 | Status: COMPLETED | OUTPATIENT
Start: 2018-08-24 | End: 2018-08-24

## 2018-08-24 RX ORDER — OXYCODONE AND ACETAMINOPHEN 5; 325 MG/1; MG/1
1 TABLET ORAL EVERY 4 HOURS
Qty: 0 | Refills: 0 | Status: DISCONTINUED | OUTPATIENT
Start: 2018-08-24 | End: 2018-08-29

## 2018-08-24 RX ORDER — VANCOMYCIN HCL 1 G
1000 VIAL (EA) INTRAVENOUS ONCE
Qty: 0 | Refills: 0 | Status: COMPLETED | OUTPATIENT
Start: 2018-08-24 | End: 2018-08-24

## 2018-08-24 RX ORDER — METRONIDAZOLE 500 MG
500 TABLET ORAL EVERY 8 HOURS
Qty: 0 | Refills: 0 | Status: DISCONTINUED | OUTPATIENT
Start: 2018-08-24 | End: 2018-08-28

## 2018-08-24 RX ADMIN — Medication 250 MILLIGRAM(S): at 14:39

## 2018-08-24 RX ADMIN — Medication 1000 MILLIGRAM(S): at 15:33

## 2018-08-24 RX ADMIN — ENOXAPARIN SODIUM 88 MILLIGRAM(S): 100 INJECTION SUBCUTANEOUS at 17:57

## 2018-08-24 RX ADMIN — SODIUM CHLORIDE 1000 MILLILITER(S): 9 INJECTION, SOLUTION INTRAVENOUS at 14:39

## 2018-08-24 RX ADMIN — SODIUM CHLORIDE 500 MILLILITER(S): 9 INJECTION, SOLUTION INTRAVENOUS at 20:04

## 2018-08-24 RX ADMIN — SODIUM CHLORIDE 1000 MILLILITER(S): 9 INJECTION, SOLUTION INTRAVENOUS at 15:33

## 2018-08-24 NOTE — ED PROVIDER NOTE - PROGRESS NOTE DETAILS
[facility] pod[facility]. Aware of patient. Will see [facility]ry[facility]. Aware of patient. Will see patient Podiatry made aware again, Dr. Johnston. Will see patient Evaluated patient after incident in CT. Patient has no complaint. Tenderness in low lumbar region. No tenderness in lower t spine

## 2018-08-24 NOTE — H&P ADULT - HISTORY OF PRESENT ILLNESS
77 yo M with PMH of HTN, DM, RA on Prednisone, DVT off AC presented to ED with foot ulcer after being referred by PCP. Patient reports he injured is R 3rd toe ~2 months ago. Has had poor wound healing since. Reports chills of 3 days duration PTP as well as extreme pain. Denies fevers. Also has poorly healing wounds of L leg. States they appeared ~1 month ago out of no where. Follows with Podiatry as outpatient. Patient had DVT in past (2-3 years ago) and was on Coumadin but was taken off it because he said he was told clot had resolved. In ED had LLE swelling, duplex was positive for DVT. 79 yo F with PMH of HTN, DM, RA on Prednisone, DVT off AC presented to ED with foot ulcer after being referred by PCP. Patient reports she injured is R 3rd toe ~2 months ago. Has had poor wound healing since. Reports chills of 3 days duration PTP as well as extreme pain. Denies fevers. Also has poorly healing wounds of L leg. States they appeared ~1 month ago out of no where. Follows with Podiatry as outpatient. Patient had DVT in past (2-3 years ago) and was on Coumadin but was taken off it because he said she was told clot had resolved. In ED had LLE swelling, duplex was positive for DVT.

## 2018-08-24 NOTE — ED ADULT NURSE NOTE - NSIMPLEMENTINTERV_GEN_ALL_ED
Implemented All Universal Safety Interventions:  [place] to call system. Call bell, personal items and telephone within reach. Instruct patient to call for assistance. Room bathroom lighting operational. Non-slip footwear when patient is off stretcher. Physically safe environment: no spills, clutter or unnecessary equipment. Stretcher in lowest position, wheels locked, appropriate side rails in place.

## 2018-08-24 NOTE — ED PROVIDER NOTE - PHYSICAL EXAMINATION
AOx4, Non toxic appearing, NAD, speaking in full sentences. Skin  warm and dry, no acute rash. Head normocephalic, atraumatic. PERRLA/EOMI, conjunctiva and sclera clear. MM moist, no nasal discharge.  Pharynx and TM's unremarkable.  No mastoid or temporal ttp. Neck supple nt, no meningeal signs. Heart RRR s1s2 nl, no rub/murmur. Lungs- No retractions, BS equal, CTAB. Abdomen soft ntnd no r/g. Extremities- 3rd right toe erythematous, warm, with small necrotic center, ttp. Left lower leg wounds with purlent base, surrounding erythema ttp anteriorly.  sensation wnl, normal ROM. LLE slightly larger than right.

## 2018-08-24 NOTE — H&P ADULT - NSHPPHYSICALEXAM_GEN_ALL_CORE
GEN: NAD, comfortable  CARDIO: RRR, no m/r/g  RESP: CTAB, no w/r/r  ABD: soft, NT/ND, +BS  EXT: LLE wrapped, R foot ulcer noted 3rd digit  NEURO: AAOx3, non-focal

## 2018-08-24 NOTE — ED PROVIDER NOTE - MEDICAL DECISION MAKING DETAILS
Patient is a 79 y/o F with pmh of HTN, DM, PVD. diabetic foot ulcers presents with left leg swelling. Found to have DVT. Lovenox administered. PE ruled out.

## 2018-08-24 NOTE — H&P ADULT - ASSESSMENT
77 yo M with PMH of HTN, DM, RA on Prednisone, DVT off AC presented to ED with foot ulcer after being referred by PCP.    #) Acute DVT LLE  - Duplex done - official read pending. ED made aware of positive results. Started on Lovenox BID.  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 Patient denies ever having been tested.    #) Diabetic foot ulcer  - Podiatry following  - will check ESR, CRP, foot XR's  - c/w Unasyn + Vanc  - ID eval    #) HTN - c/w Enalapril    #) DM - holding oral agents, monitor fs, insulin PRN    #) RA  - c/w Prednisone. Is on it chronically. Rheum Dr Henry aware  - patient previously on Methotrexate but was discontinued over concerns for pancytopenia  - In discussion with Dr Henry about starting biologic however he has a history of TB    DVT ppx: Lovenox  Diet: DASH, carb consistent  Activity: OOBTC  Code status: FULL  Dispo: pending 79 yo M with PMH of HTN, DM, RA on Prednisone, DVT off AC presented to ED with foot ulcer after being referred by PCP.    #) Acute DVT LLE  - Duplex done - official read pending. ED made aware of positive results. Started on Lovenox BID.  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 Consider hypercoagulable workup    #) Diabetic foot ulcer  - Podiatry following  - will check ESR, CRP, foot XR's  - c/w Unasyn + Vanc  - ID eval    #) HTN - c/w Enalapril    #) DM - holding oral agents, monitor fs, insulin PRN    #) RA  - c/w Prednisone. Is on it chronically. Rheum Dr Henry aware  - patient previously on Methotrexate but was discontinued over concerns for pancytopenia  - In discussion with Dr Henry about starting biologic however he has a history of TB    DVT ppx: Lovenox  Diet: DASH, carb consistent  Activity: OOBTC  Code status: FULL  Dispo: pending 77 yo M with PMH of HTN, DM, RA on Prednisone, DVT off AC presented to ED with foot ulcer after being referred by PCP.    #) Acute DVT LLE  - Duplex done - official read pending. ED made aware of positive results. Started on Lovenox BID.  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 Consider hypercoagulable workup    #) Diabetic foot ulcer  - Podiatry following  - will check ESR, CRP, foot XR's  - reports number of antibiotic allergies, though not all severe. Will cover with Flagyl + Levaquin + Vanc for now.  - ID eval    #) HTN - c/w Enalapril    #) DM - holding oral agents, monitor fs, insulin PRN    #) RA  - c/w Prednisone. Is on it chronically. Rheum Dr Henry aware  - patient previously on Methotrexate but was discontinued over concerns for pancytopenia  - In discussion with Dr Henry about starting biologic however he has a history of TB    DVT ppx: Lovenox  Diet: DASH, carb consistent  Activity: OOBTC  Code status: FULL  Dispo: pending 77 yo M with PMH of HTN, DM, RA on Prednisone, DVT off AC presented to ED with foot ulcer after being referred by PCP.    #) Acute DVT LLE  - Duplex done - official read pending. ED made aware of positive results. Started on Lovenox BID.  - CTA negative for PE  - c/w Lovenox BID for now  - this is 2nd DVT now. Consider hypercoagulable workup    #) Diabetic foot ulcer  - Podiatry following  - will check ESR, CRP, foot XR's  - reports number of antibiotic allergies, though not all severe. Will cover with Flagyl + Levaquin + Vanc for now.  - ID eval    #) HTN - c/w Enalapril    #) DM - holding oral agents, monitor fs, insulin PRN    #) RA  - c/w Prednisone. Is on it chronically. Rheum Dr Henry aware  - patient previously on Methotrexate but was discontinued over concerns for pancytopenia  - In discussion with Dr Henry about starting biologic however he has a history of TB    DVT ppx: Lovenox  Diet: DASH, carb consistent  Activity: OOBTC  Code status: FULL  Dispo: pending 77 yo F with PMH of HTN, DM, RA on Prednisone, DVT off AC presented to ED with foot ulcer after being referred by PCP.    #) Acute DVT LLE  - Duplex done - official read pending. ED made aware of positive results. Started on Lovenox BID.  - CTA negative for PE  - c/w Lovenox BID for now  - this is 2nd DVT now. Consider hypercoagulable workup    #) Diabetic foot ulcer  - Podiatry following  - will check ESR, CRP, foot XR's  - reports number of antibiotic allergies, though not all severe. Will cover with Flagyl + Levaquin + Vanc for now.  - ID eval    #) HTN - c/w Enalapril    #) DM - holding oral agents, monitor fs, insulin PRN    #) RA  - c/w Prednisone. Is on it chronically. Rheum Dr Henry aware  - patient previously on Methotrexate but was discontinued over concerns for pancytopenia  - In discussion with Dr Henry about starting biologic however he has a history of TB    DVT ppx: Lovenox  Diet: DASH, carb consistent  Activity: OOBTC  Code status: FULL  Dispo: pending

## 2018-08-24 NOTE — ED PROVIDER NOTE - NS ED ROS FT
Constitutional: See HPI.  Eyes: No visual changes, eye pain or discharge.  ENMT: No neck pain or stiffness.  Cardiac: No chest pain, SOB or edema. No chest pain with exertion.  Respiratory: No cough or respiratory distress.   GI: No nausea, vomiting, diarrhea or abdominal pain.  : No dysuria, frequency or burning.  MS: No myalgia, muscle weakness, joint pain or back pain.  Neuro: No headache or weakness. No LOC.  Skin: +DFu  Heme: no bleeding

## 2018-08-24 NOTE — H&P ADULT - ATTENDING COMMENTS
79 yo F with PMH of HTN, DM, RA on Prednisone, and prior DVT off AC admitted for non-healing RLE ulcer with hospital course complicated by acute LLE DVT.    Acute LLE DVT: lower extremity duplex was positive for acute thrombus.  Awaiting official results to assess for extensiveness of clot burden.  Consider Vascular Sx evaluation if thrombus involves multiple vessels.  Continue therapeutic Lovenox as inpatient, and consider transitioning to Eliquis upon discharge.  Please refer to hematology for hypercoaguable workup as outpatient.    Right Lower Extremity DFU (3rd digit): radiographs were negative for OM.  Podiatry recmmendations were reviewed; continue local wound care.  Infectious Disease evaluation requested for optimization of antibiotic therapy.  Her pain is currently well controlled on Percocet    Rheumatoid Arthritis: continue Prednisone 20mg PO q24.  Follow-up with Rheumatology-Dr. Henry as outpatient for treatment with biologics (off medications due to distant history of tuberculosis).    Hypertension: continue Enalapril    DMII: continue Lantus/Lispro and monitor FS with meals.    GI/DVT prophylaxis

## 2018-08-24 NOTE — ED ADULT NURSE NOTE - OBJECTIVE STATEMENT
pt presents to the ED with c/o right 3rd toe pain and left LE increased pain with feeling warm yesterday. PT's pm sent her in to r/o dvt to the left LE. pt denies sob,cp, n/v/d.

## 2018-08-24 NOTE — ED PROVIDER NOTE - PMH
HTN (hypertension)    Other specified diabetes mellitus with other specified complication, unspecified whether long term insulin use    Rheumatoid arteritis

## 2018-08-24 NOTE — ED PROVIDER NOTE - ATTENDING CONTRIBUTION TO CARE
I personally evaluated the patient. I reviewed the Resident’s or Physician Assistant’s note (as assigned above), and agree with the findings and plan except as documented in my note.    79 yo F with a hx of DM, HTN, DVT presents with diabetic foot ulcer at the 3rd toe. Reports left leg swelling. No CP, SOB. No fevers.    Plan: DVT r/o, labs, podiatry consult, admit

## 2018-08-24 NOTE — ED PROVIDER NOTE - OBJECTIVE STATEMENT
79 yo F with a hx of DM, HTN, DVT not on AC, DFUs, presents with DFU on right 3rd toe and chronic wounds on left lower extremity. Going for "months", sent in by PMD Dr. KELLER. Both sites very painful, draining purulent fluid. Associated with chills and left leg swelling. Denies fevers, cp, SOB, cough, hemoptysis.

## 2018-08-24 NOTE — ED ADULT NURSE NOTE - PMH
Other specified diabetes mellitus with other specified complication, unspecified whether long term insulin use    Rheumatoid arteritis HTN (hypertension)    Other specified diabetes mellitus with other specified complication, unspecified whether long term insulin use    Rheumatoid arteritis

## 2018-08-25 LAB
ANION GAP SERPL CALC-SCNC: 14 MMOL/L — SIGNIFICANT CHANGE UP (ref 7–14)
ANION GAP SERPL CALC-SCNC: 18 MMOL/L — HIGH (ref 7–14)
BASOPHILS # BLD AUTO: 0.02 K/UL — SIGNIFICANT CHANGE UP (ref 0–0.2)
BASOPHILS NFR BLD AUTO: 0.3 % — SIGNIFICANT CHANGE UP (ref 0–1)
BUN SERPL-MCNC: 27 MG/DL — HIGH (ref 10–20)
BUN SERPL-MCNC: 28 MG/DL — HIGH (ref 10–20)
CALCIUM SERPL-MCNC: 8.6 MG/DL — SIGNIFICANT CHANGE UP (ref 8.5–10.1)
CALCIUM SERPL-MCNC: 8.6 MG/DL — SIGNIFICANT CHANGE UP (ref 8.5–10.1)
CHLORIDE SERPL-SCNC: 106 MMOL/L — SIGNIFICANT CHANGE UP (ref 98–110)
CHLORIDE SERPL-SCNC: 107 MMOL/L — SIGNIFICANT CHANGE UP (ref 98–110)
CO2 SERPL-SCNC: 18 MMOL/L — SIGNIFICANT CHANGE UP (ref 17–32)
CO2 SERPL-SCNC: 19 MMOL/L — SIGNIFICANT CHANGE UP (ref 17–32)
CREAT SERPL-MCNC: 1.3 MG/DL — SIGNIFICANT CHANGE UP (ref 0.7–1.5)
CREAT SERPL-MCNC: 1.3 MG/DL — SIGNIFICANT CHANGE UP (ref 0.7–1.5)
CRP SERPL-MCNC: 0.78 MG/DL — HIGH (ref 0–0.4)
EOSINOPHIL # BLD AUTO: 0.03 K/UL — SIGNIFICANT CHANGE UP (ref 0–0.7)
EOSINOPHIL NFR BLD AUTO: 0.5 % — SIGNIFICANT CHANGE UP (ref 0–8)
ERYTHROCYTE [SEDIMENTATION RATE] IN BLOOD: 43 MM/HR — HIGH (ref 0–20)
GLUCOSE BLDC GLUCOMTR-MCNC: 116 MG/DL — HIGH (ref 70–99)
GLUCOSE BLDC GLUCOMTR-MCNC: 153 MG/DL — HIGH (ref 70–99)
GLUCOSE BLDC GLUCOMTR-MCNC: 218 MG/DL — HIGH (ref 70–99)
GLUCOSE BLDC GLUCOMTR-MCNC: 58 MG/DL — LOW (ref 70–99)
GLUCOSE BLDC GLUCOMTR-MCNC: 96 MG/DL — SIGNIFICANT CHANGE UP (ref 70–99)
GLUCOSE SERPL-MCNC: 46 MG/DL — LOW (ref 70–99)
GLUCOSE SERPL-MCNC: 70 MG/DL — SIGNIFICANT CHANGE UP (ref 70–99)
HCT VFR BLD CALC: 28.6 % — LOW (ref 37–47)
HGB BLD-MCNC: 8.6 G/DL — LOW (ref 12–16)
IMM GRANULOCYTES NFR BLD AUTO: 0.3 % — SIGNIFICANT CHANGE UP (ref 0.1–0.3)
LYMPHOCYTES # BLD AUTO: 1.97 K/UL — SIGNIFICANT CHANGE UP (ref 1.2–3.4)
LYMPHOCYTES # BLD AUTO: 30.7 % — SIGNIFICANT CHANGE UP (ref 20.5–51.1)
MAGNESIUM SERPL-MCNC: 2.3 MG/DL — SIGNIFICANT CHANGE UP (ref 1.8–2.4)
MAGNESIUM SERPL-MCNC: 2.3 MG/DL — SIGNIFICANT CHANGE UP (ref 1.8–2.4)
MCHC RBC-ENTMCNC: 30.1 G/DL — LOW (ref 32–37)
MCHC RBC-ENTMCNC: 32.1 PG — HIGH (ref 27–31)
MCV RBC AUTO: 106.7 FL — HIGH (ref 81–99)
MONOCYTES # BLD AUTO: 0.62 K/UL — HIGH (ref 0.1–0.6)
MONOCYTES NFR BLD AUTO: 9.7 % — HIGH (ref 1.7–9.3)
NEUTROPHILS # BLD AUTO: 3.75 K/UL — SIGNIFICANT CHANGE UP (ref 1.4–6.5)
NEUTROPHILS NFR BLD AUTO: 58.5 % — SIGNIFICANT CHANGE UP (ref 42.2–75.2)
NRBC # BLD: 0 /100 WBCS — SIGNIFICANT CHANGE UP (ref 0–0)
PLATELET # BLD AUTO: 217 K/UL — SIGNIFICANT CHANGE UP (ref 130–400)
POTASSIUM SERPL-MCNC: 4.5 MMOL/L — SIGNIFICANT CHANGE UP (ref 3.5–5)
POTASSIUM SERPL-MCNC: 5 MMOL/L — SIGNIFICANT CHANGE UP (ref 3.5–5)
POTASSIUM SERPL-SCNC: 4.5 MMOL/L — SIGNIFICANT CHANGE UP (ref 3.5–5)
POTASSIUM SERPL-SCNC: 5 MMOL/L — SIGNIFICANT CHANGE UP (ref 3.5–5)
RBC # BLD: 2.68 M/UL — LOW (ref 4.2–5.4)
RBC # FLD: 17.9 % — HIGH (ref 11.5–14.5)
SODIUM SERPL-SCNC: 139 MMOL/L — SIGNIFICANT CHANGE UP (ref 135–146)
SODIUM SERPL-SCNC: 143 MMOL/L — SIGNIFICANT CHANGE UP (ref 135–146)
WBC # BLD: 6.41 K/UL — SIGNIFICANT CHANGE UP (ref 4.8–10.8)
WBC # FLD AUTO: 6.41 K/UL — SIGNIFICANT CHANGE UP (ref 4.8–10.8)

## 2018-08-25 RX ORDER — TRAMADOL HYDROCHLORIDE 50 MG/1
25 TABLET ORAL ONCE
Qty: 0 | Refills: 0 | Status: DISCONTINUED | OUTPATIENT
Start: 2018-08-25 | End: 2018-08-26

## 2018-08-25 RX ADMIN — OXYCODONE AND ACETAMINOPHEN 1 TABLET(S): 5; 325 TABLET ORAL at 00:04

## 2018-08-25 RX ADMIN — Medication 300 MILLIGRAM(S): at 11:08

## 2018-08-25 RX ADMIN — Medication 100 MILLIGRAM(S): at 15:36

## 2018-08-25 RX ADMIN — Medication 20 MILLIGRAM(S): at 06:02

## 2018-08-25 RX ADMIN — OXYCODONE AND ACETAMINOPHEN 1 TABLET(S): 5; 325 TABLET ORAL at 11:03

## 2018-08-25 RX ADMIN — Medication 250 MILLIGRAM(S): at 17:44

## 2018-08-25 RX ADMIN — OXYCODONE AND ACETAMINOPHEN 1 TABLET(S): 5; 325 TABLET ORAL at 09:56

## 2018-08-25 RX ADMIN — OXYCODONE AND ACETAMINOPHEN 1 TABLET(S): 5; 325 TABLET ORAL at 21:53

## 2018-08-25 RX ADMIN — Medication 100 MILLIGRAM(S): at 21:08

## 2018-08-25 RX ADMIN — Medication 10 MILLIGRAM(S): at 06:02

## 2018-08-25 RX ADMIN — Medication 250 MILLIGRAM(S): at 06:02

## 2018-08-25 RX ADMIN — ENOXAPARIN SODIUM 90 MILLIGRAM(S): 100 INJECTION SUBCUTANEOUS at 17:45

## 2018-08-25 RX ADMIN — Medication 100 MILLIGRAM(S): at 06:02

## 2018-08-25 RX ADMIN — ENOXAPARIN SODIUM 90 MILLIGRAM(S): 100 INJECTION SUBCUTANEOUS at 06:02

## 2018-08-25 NOTE — CONSULT NOTE ADULT - SUBJECTIVE AND OBJECTIVE BOX
PODIATRY CONSULT   LINCOLN HEREDIA is a pleasant well-nourished, well developed 78y Female in no acute distress, alert awake, and oriented to person, place and time.   Patient is a 78y old  Female who presents with a chief complaint of diabetic foot ulcer + DVT (24 Aug 2018 23:10)    HPI:  77 yo M with PMH of HTN, DM, RA on Prednisone, DVT off AC presented to ED with foot ulcer after being referred by PCP. Patient reports the injured is R 3rd toe ~2 months ago. She has been seeing Dr. Oden for local wound care R 3rd digit and Left LE.  Doctor has been applying some type of cream which does not recall name of it.  states that she has finished po abx 2 weeks ago and gotten better but now it is getting worse again.  She describes her pain as sharp/needle pain which rates 8/10.  Has had poor wound healing since. Reports chills of 3 days duration PTP as well as extreme pain. she also has poorly healing wounds of L leg. States they appeared ~1 month ago out of no where. Patient had DVT in past (2-3 years ago) and was on Coumadin but was taken off it because he said he was told clot had resolved. In ED had LLE swelling, duplex was positive for DVT.       PAST MEDICAL & SURGICAL HISTORY:  HTN (hypertension)  Rheumatoid arteritis  Other specified diabetes mellitus with other specified complication, unspecified whether long term insulin use  Primary osteoarthritis of right knee: s/p knee repalcement    MEDICATIONS  (STANDING):  allopurinol 300 milliGRAM(s) Oral daily  enalapril 10 milliGRAM(s) Oral daily  enoxaparin Injectable 90 milliGRAM(s) SubCutaneous two times a day  levoFLOXacin IVPB 750 milliGRAM(s) IV Intermittent every 24 hours  metroNIDAZOLE  IVPB 500 milliGRAM(s) IV Intermittent every 8 hours  predniSONE   Tablet 20 milliGRAM(s) Oral daily  vancomycin  IVPB 1000 milliGRAM(s) IV Intermittent every 12 hours    MEDICATIONS  (PRN):  oxyCODONE    5 mG/acetaminophen 325 mG 1 Tablet(s) Oral every 4 hours PRN Severe Pain (7 - 10)    FAMILY HISTORY:  No pertinent family history in first degree relatives    Vital Signs Last 24 Hrs  T(C): 35.8 (25 Aug 2018 06:01), Max: 37.3 (24 Aug 2018 13:07)  T(F): 96.5 (25 Aug 2018 06:01), Max: 99.2 (24 Aug 2018 13:07)  HR: 85 (25 Aug 2018 06:01) (85 - 107)  BP: 143/65 (25 Aug 2018 06:01) (125/65 - 154/69)  RR: 16 (25 Aug 2018 06:01) (16 - 22)  SpO2: 100% (24 Aug 2018 16:40) (100% - 100%)                          9.2    5.83  )-----------( 236      ( 24 Aug 2018 14:19 )             29.1     08-25    139  |  107  |  28<H>  ----------------------------<  70  5.0   |  18  |  1.3    Ca    8.6      25 Aug 2018 00:55  Mg     2.3     08-25    TPro  6.5  /  Alb  4.0  /  TBili  0.3  /  DBili  x   /  AST  34  /  ALT  12  /  AlkPhos  62  08-24    PT/INR - ( 24 Aug 2018 14:19 )   PT: 10.40 sec;   INR: 0.96 ratio         PTT - ( 24 Aug 2018 14:19 )  PTT:25.0 sec    CAPILLARY BLOOD GLUCOSE      POCT Blood Glucose.: 173 mg/dL (24 Aug 2018 21:23)        PHYSICAL EXAM  LE Focused examination conducted:    DERM:  necrotic/fibriotic superficial wound ulcer dorsal right 3rd digit with erythema and edema noted.  multiple superficial ulcers noted left medial aspect of the LE.   VASC:   Dorsalis Pedis 0/4   Posterior Tibial 0/4    Capillary refill time <3s x10   Temperature gradient: cold to touch b/l  Edema: mild edema right  NEURO: Protective sensation intact to the level of the digits bilateral.  ORTHO: Muscle strength 5/5 all major muscle groups bilateral. contracted toes 2/3/4 b/l    A:  DFU right 3rd digit  multiple superficial ulcerations LLE.  P:  pt evaluated and treated  applied betadine/dsd/cling left 3rd digit  applied xeroform/dsd/kerlix right LE  x ray pending report right  A duplex ordered b/l  V duplex pending report   continue local wound care  consult ID  consult pain management  podiatry will f/u with attending for further evaluation and management  08-25-18 @ 09:12 PODIATRY CONSULT   LINCOLN HEREDIA is a pleasant well-nourished, well developed 78y Female in no acute distress, alert awake, and oriented to person, place and time.   Patient is a 78y old  Female who presents with a chief complaint of diabetic foot ulcer + DVT (24 Aug 2018 23:10)    HPI:  79 yo M with PMH of HTN, DM, RA on Prednisone, DVT off AC presented to ED with foot ulcer after being referred by PCP. Patient reports the injured is R 3rd toe ~2 months ago. She has been seeing Dr. Oden for local wound care R 3rd digit and Left LE.  Doctor has been applying some type of cream which does not recall name of it.  states that she has finished po abx 2 weeks ago and gotten better but now it is getting worse again.  She describes her pain as sharp/needle pain which rates 8/10.  Has had poor wound healing since. Reports chills of 3 days duration PTP as well as extreme pain. she also has poorly healing wounds of L leg. States they appeared ~1 month ago out of no where. Patient had DVT in past (2-3 years ago) and was on Coumadin but was taken off it because he said he was told clot had resolved. In ED had LLE swelling, duplex was positive for DVT.       PAST MEDICAL & SURGICAL HISTORY:  HTN (hypertension)  Rheumatoid arteritis  Other specified diabetes mellitus with other specified complication, unspecified whether long term insulin use  Primary osteoarthritis of right knee: s/p knee repalcement    MEDICATIONS  (STANDING):  allopurinol 300 milliGRAM(s) Oral daily  enalapril 10 milliGRAM(s) Oral daily  enoxaparin Injectable 90 milliGRAM(s) SubCutaneous two times a day  levoFLOXacin IVPB 750 milliGRAM(s) IV Intermittent every 24 hours  metroNIDAZOLE  IVPB 500 milliGRAM(s) IV Intermittent every 8 hours  predniSONE   Tablet 20 milliGRAM(s) Oral daily  vancomycin  IVPB 1000 milliGRAM(s) IV Intermittent every 12 hours    MEDICATIONS  (PRN):  oxyCODONE    5 mG/acetaminophen 325 mG 1 Tablet(s) Oral every 4 hours PRN Severe Pain (7 - 10)    FAMILY HISTORY:  No pertinent family history in first degree relatives    Vital Signs Last 24 Hrs  T(C): 35.8 (25 Aug 2018 06:01), Max: 37.3 (24 Aug 2018 13:07)  T(F): 96.5 (25 Aug 2018 06:01), Max: 99.2 (24 Aug 2018 13:07)  HR: 85 (25 Aug 2018 06:01) (85 - 107)  BP: 143/65 (25 Aug 2018 06:01) (125/65 - 154/69)  RR: 16 (25 Aug 2018 06:01) (16 - 22)  SpO2: 100% (24 Aug 2018 16:40) (100% - 100%)                          9.2    5.83  )-----------( 236      ( 24 Aug 2018 14:19 )             29.1     08-25    139  |  107  |  28<H>  ----------------------------<  70  5.0   |  18  |  1.3    Ca    8.6      25 Aug 2018 00:55  Mg     2.3     08-25    TPro  6.5  /  Alb  4.0  /  TBili  0.3  /  DBili  x   /  AST  34  /  ALT  12  /  AlkPhos  62  08-24    PT/INR - ( 24 Aug 2018 14:19 )   PT: 10.40 sec;   INR: 0.96 ratio         PTT - ( 24 Aug 2018 14:19 )  PTT:25.0 sec    CAPILLARY BLOOD GLUCOSE      POCT Blood Glucose.: 173 mg/dL (24 Aug 2018 21:23)        PHYSICAL EXAM  LE Focused examination conducted:    DERM:  necrotic/fibriotic superficial wound ulcer dorsal right 3rd digit with erythema and edema noted.  multiple superficial ulcers noted left medial aspect of the LE.   VASC:   Dorsalis Pedis 0/4   Posterior Tibial 0/4    Capillary refill time <3s x10   Temperature gradient: cold to touch b/l  Edema: mild edema right  NEURO: Protective sensation intact to the level of the digits bilateral.  ORTHO: Muscle strength 5/5 all major muscle groups bilateral. contracted toes 2/3/4 b/l    A:  DFU right 3rd digit  multiple superficial ulcerations LLE.  P:  pt evaluated and treated  applied betadine/dsd/cling left 3rd digit  applied xeroform/dsd/kerlix right LE  x ray pending report right  A duplex ordered b/l  V duplex pending report   continue local wound care  consult ID  consult Vascular  consult pain management  podiatry will f/u with attending for further evaluation and management  08-25-18 @ 09:12

## 2018-08-25 NOTE — PROGRESS NOTE ADULT - ASSESSMENT
77 yo M with PMH of HTN, DM, RA on Prednisone, DVT off AC presented to ED with foot ulcer after being referred by PCP.    #) Acute DVT LLE  - Duplex done - official read pending. ED made aware of positive results. Started on Lovenox BID.  - CTA negative for PE  - c/w Lovenox BID for now  - this is 2nd DVT now. Consider hypercoagulable workup    #) Diabetic foot ulcer  - Podiatry following  - will check ESR, CRP, foot XR's  - reports number of antibiotic allergies, though not all severe. Will cover with Flagyl + Levaquin + Vanc for now.  - ID eval    #) HTN - c/w Enalapril    #) DM - holding oral agents, monitor fs, insulin PRN    #) RA  - c/w Prednisone. Is on it chronically. Rheum Dr Henry aware  - patient previously on Methotrexate but was discontinued over concerns for pancytopenia  - In discussion with Dr Henry about starting biologic however he has a history of TB    DVT ppx: Lovenox  Diet: DASH, carb consistent  Activity: OOBTC  Code status: FULL  Dispo: pending 77 yo M with PMH of HTN, DM, RA on Prednisone, DVT off AC presented to ED with foot ulcer after being referred by PCP.    #) Acute DVT LLE  - Duplex done - official read pending. Started on Lovenox BID.  - CTA negative for PE  - c/w Lovenox BID for now  - this is 2nd DVT now. Consider hypercoagulable workup    #) Diabetic foot ulcer  - Podiatry: pt evaluated and treated, applied betadine/dsd/cling left 3rd digit, applied xeroform/dsd/kerlix right LE, x ray pending report right  - A duplex ordered b/l, V duplex pending report , continue local wound care, consult ID, consult pain management   - Check ESR, CRP, foot XR's  - reports number of antibiotic allergies, though not all severe. c/w  Flagyl + Levaquin + Vanc for now.  - ID eval    #) HTN - c/w Enalapril    #) DM - holding oral agents, monitor fs, insulin PRN    #) RA  - c/w Prednisone. Rheum Dr Henry aware  - patient previously on Methotrexate but was discontinued over concerns for pancytopenia  - Biologics: contraindicated, history of TB    DVT ppx: Lovenox  Diet: DASH, carb consistent  Activity: OOBTC  Code status: FULL  Dispo: pending

## 2018-08-25 NOTE — CONSULT NOTE ADULT - ATTENDING COMMENTS
Patient evaluated bedside with resident. Right third toe with ulcer dry eschar unstageable  no cellulitis noted. Multiple hammertoes noted right foot. Left lower extremity with excoriations and small ulcer medial calf. Vascular work up and continue local wound care.

## 2018-08-25 NOTE — PROGRESS NOTE ADULT - SUBJECTIVE AND OBJECTIVE BOX
CHIEF COMPLAINT:    Patient is a 78y old Female who presents with a chief complaint of diabetic foot ulcer + DVT (24 Aug 2018 23:10)    Currently admitted to medicine with the primary diagnosis of DVT (deep venous thrombosis)     Today is hospital day 1d. This morning she is resting comfortably in bed and reports no new issues or overnight events.     PAST MEDICAL & SURGICAL HISTORY  HTN (hypertension)  Rheumatoid arteritis  Other specified diabetes mellitus with other specified complication, unspecified whether long term insulin use  Primary osteoarthritis of right knee: s/p knee repalcement    SOCIAL HISTORY:  Negative for smoking/alcohol/drug use.     ALLERGIES:  clindamycin (Unknown)  erythromycin (Unknown)  penicillin (Unknown)  strawberry (Unknown)    MEDICATIONS:  STANDING MEDICATIONS  allopurinol 300 milliGRAM(s) Oral daily  enalapril 10 milliGRAM(s) Oral daily  enoxaparin Injectable 90 milliGRAM(s) SubCutaneous two times a day  levoFLOXacin IVPB 750 milliGRAM(s) IV Intermittent every 24 hours  metroNIDAZOLE  IVPB 500 milliGRAM(s) IV Intermittent every 8 hours  predniSONE   Tablet 20 milliGRAM(s) Oral daily  vancomycin  IVPB 1000 milliGRAM(s) IV Intermittent every 12 hours    PRN MEDICATIONS  oxyCODONE    5 mG/acetaminophen 325 mG 1 Tablet(s) Oral every 4 hours PRN    VITALS:   T(F): 96.5  HR: 85  BP: 143/65  RR: 16  SpO2: 100%    LABS:                        8.6    6.41  )-----------( 217      ( 25 Aug 2018 07:56 )             28.6     08-25    143  |  106  |  27<H>  ----------------------------<  46<L>  4.5   |  19  |  1.3    Ca    8.6      25 Aug 2018 07:56  Mg     2.3     08-25    TPro  6.5  /  Alb  4.0  /  TBili  0.3  /  DBili  x   /  AST  34  /  ALT  12  /  AlkPhos  62  08-24    PT/INR - ( 24 Aug 2018 14:19 )   PT: 10.40 sec;   INR: 0.96 ratio         PTT - ( 24 Aug 2018 14:19 )  PTT:25.0 sec      Sedimentation Rate, Erythrocyte: 43 mm/hr <H> (08-25-18 @ 07:56)  Lactate, Blood: 2.0 mmol/L (08-24-18 @ 14:19)            RADIOLOGY:    PHYSICAL EXAM:  GEN: No acute distress  PULM: Clear to auscultation bilaterally   CARD: S1/S2 present. RRR.   GI: Soft, non-tender, non-distended. Bowel sounds present  MSK: NC/NC/NE/2+PP/BOYLE  NEURO: AAOX3 CHIEF COMPLAINT:    Patient is a 78y old Female who presents with a chief complaint of diabetic foot ulcer + DVT (24 Aug 2018 23:10)    Currently admitted to medicine with the primary diagnosis of DVT (deep venous thrombosis)     Today is hospital day 1d. This morning she is resting comfortably in bed and reports no new issues or overnight events. Pt denies any CP, SOB, N/V/D, palpitataions.    PAST MEDICAL & SURGICAL HISTORY  HTN (hypertension)  Rheumatoid arteritis  Other specified diabetes mellitus with other specified complication, unspecified whether long term insulin use  Primary osteoarthritis of right knee: s/p knee repalcement    SOCIAL HISTORY:  Negative for smoking/alcohol/drug use.     ALLERGIES:  clindamycin (Unknown)  erythromycin (Unknown)  penicillin (Unknown)  strawberry (Unknown)    MEDICATIONS:  STANDING MEDICATIONS  allopurinol 300 milliGRAM(s) Oral daily  enalapril 10 milliGRAM(s) Oral daily  enoxaparin Injectable 90 milliGRAM(s) SubCutaneous two times a day  levoFLOXacin IVPB 750 milliGRAM(s) IV Intermittent every 24 hours  metroNIDAZOLE  IVPB 500 milliGRAM(s) IV Intermittent every 8 hours  predniSONE   Tablet 20 milliGRAM(s) Oral daily  vancomycin  IVPB 1000 milliGRAM(s) IV Intermittent every 12 hours    PRN MEDICATIONS  oxyCODONE    5 mG/acetaminophen 325 mG 1 Tablet(s) Oral every 4 hours PRN    VITALS:   T(F): 96.5  HR: 85  BP: 143/65  RR: 16  SpO2: 100%    LABS:                        8.6    6.41  )-----------( 217      ( 25 Aug 2018 07:56 )             28.6     08-25    143  |  106  |  27<H>  ----------------------------<  46<L>  4.5   |  19  |  1.3    Ca    8.6      25 Aug 2018 07:56  Mg     2.3     08-25    TPro  6.5  /  Alb  4.0  /  TBili  0.3  /  DBili  x   /  AST  34  /  ALT  12  /  AlkPhos  62  08-24    PT/INR - ( 24 Aug 2018 14:19 )   PT: 10.40 sec;   INR: 0.96 ratio         PTT - ( 24 Aug 2018 14:19 )  PTT:25.0 sec      Sedimentation Rate, Erythrocyte: 43 mm/hr <H> (08-25-18 @ 07:56)  Lactate, Blood: 2.0 mmol/L (08-24-18 @ 14:19)            RADIOLOGY:    PHYSICAL EXAM:  GEN: No acute distress  PULM: Clear to auscultation bilaterally   CARD: S1/S2 present. RRR.   GI: Soft, non-tender, non-distended. Bowel sounds present  MSK: No edema  NEURO: AAOX3

## 2018-08-26 LAB
ANION GAP SERPL CALC-SCNC: 14 MMOL/L — SIGNIFICANT CHANGE UP (ref 7–14)
BASOPHILS # BLD AUTO: 0.01 K/UL — SIGNIFICANT CHANGE UP (ref 0–0.2)
BASOPHILS NFR BLD AUTO: 0.2 % — SIGNIFICANT CHANGE UP (ref 0–1)
BUN SERPL-MCNC: 28 MG/DL — HIGH (ref 10–20)
CALCIUM SERPL-MCNC: 8.9 MG/DL — SIGNIFICANT CHANGE UP (ref 8.5–10.1)
CHLORIDE SERPL-SCNC: 103 MMOL/L — SIGNIFICANT CHANGE UP (ref 98–110)
CO2 SERPL-SCNC: 21 MMOL/L — SIGNIFICANT CHANGE UP (ref 17–32)
CREAT SERPL-MCNC: 1.4 MG/DL — SIGNIFICANT CHANGE UP (ref 0.7–1.5)
EOSINOPHIL # BLD AUTO: 0.07 K/UL — SIGNIFICANT CHANGE UP (ref 0–0.7)
EOSINOPHIL NFR BLD AUTO: 1.4 % — SIGNIFICANT CHANGE UP (ref 0–8)
GLUCOSE BLDC GLUCOMTR-MCNC: 181 MG/DL — HIGH (ref 70–99)
GLUCOSE BLDC GLUCOMTR-MCNC: 193 MG/DL — HIGH (ref 70–99)
GLUCOSE BLDC GLUCOMTR-MCNC: 268 MG/DL — HIGH (ref 70–99)
GLUCOSE BLDC GLUCOMTR-MCNC: 49 MG/DL — LOW (ref 70–99)
GLUCOSE BLDC GLUCOMTR-MCNC: 69 MG/DL — LOW (ref 70–99)
GLUCOSE BLDC GLUCOMTR-MCNC: 77 MG/DL — SIGNIFICANT CHANGE UP (ref 70–99)
GLUCOSE BLDC GLUCOMTR-MCNC: 90 MG/DL — SIGNIFICANT CHANGE UP (ref 70–99)
GLUCOSE SERPL-MCNC: 58 MG/DL — LOW (ref 70–99)
HCT VFR BLD CALC: 26.4 % — LOW (ref 37–47)
HGB BLD-MCNC: 8.3 G/DL — LOW (ref 12–16)
IMM GRANULOCYTES NFR BLD AUTO: 0.4 % — HIGH (ref 0.1–0.3)
LYMPHOCYTES # BLD AUTO: 1.27 K/UL — SIGNIFICANT CHANGE UP (ref 1.2–3.4)
LYMPHOCYTES # BLD AUTO: 25.2 % — SIGNIFICANT CHANGE UP (ref 20.5–51.1)
MCHC RBC-ENTMCNC: 31.4 G/DL — LOW (ref 32–37)
MCHC RBC-ENTMCNC: 32.9 PG — HIGH (ref 27–31)
MCV RBC AUTO: 104.8 FL — HIGH (ref 81–99)
MONOCYTES # BLD AUTO: 0.59 K/UL — SIGNIFICANT CHANGE UP (ref 0.1–0.6)
MONOCYTES NFR BLD AUTO: 11.7 % — HIGH (ref 1.7–9.3)
NEUTROPHILS # BLD AUTO: 3.07 K/UL — SIGNIFICANT CHANGE UP (ref 1.4–6.5)
NEUTROPHILS NFR BLD AUTO: 61.1 % — SIGNIFICANT CHANGE UP (ref 42.2–75.2)
NRBC # BLD: 0 /100 WBCS — SIGNIFICANT CHANGE UP (ref 0–0)
PLATELET # BLD AUTO: 215 K/UL — SIGNIFICANT CHANGE UP (ref 130–400)
POTASSIUM SERPL-MCNC: 5.3 MMOL/L — HIGH (ref 3.5–5)
POTASSIUM SERPL-SCNC: 5.3 MMOL/L — HIGH (ref 3.5–5)
RBC # BLD: 2.52 M/UL — LOW (ref 4.2–5.4)
RBC # FLD: 17.5 % — HIGH (ref 11.5–14.5)
SODIUM SERPL-SCNC: 138 MMOL/L — SIGNIFICANT CHANGE UP (ref 135–146)
WBC # BLD: 5.03 K/UL — SIGNIFICANT CHANGE UP (ref 4.8–10.8)
WBC # FLD AUTO: 5.03 K/UL — SIGNIFICANT CHANGE UP (ref 4.8–10.8)

## 2018-08-26 PROCEDURE — 99222 1ST HOSP IP/OBS MODERATE 55: CPT

## 2018-08-26 RX ADMIN — OXYCODONE AND ACETAMINOPHEN 1 TABLET(S): 5; 325 TABLET ORAL at 00:10

## 2018-08-26 RX ADMIN — OXYCODONE AND ACETAMINOPHEN 1 TABLET(S): 5; 325 TABLET ORAL at 08:08

## 2018-08-26 RX ADMIN — OXYCODONE AND ACETAMINOPHEN 1 TABLET(S): 5; 325 TABLET ORAL at 15:13

## 2018-08-26 RX ADMIN — Medication 20 MILLIGRAM(S): at 06:05

## 2018-08-26 RX ADMIN — ENOXAPARIN SODIUM 90 MILLIGRAM(S): 100 INJECTION SUBCUTANEOUS at 06:04

## 2018-08-26 RX ADMIN — Medication 100 MILLIGRAM(S): at 21:44

## 2018-08-26 RX ADMIN — Medication 10 MILLIGRAM(S): at 06:05

## 2018-08-26 RX ADMIN — ENOXAPARIN SODIUM 90 MILLIGRAM(S): 100 INJECTION SUBCUTANEOUS at 17:49

## 2018-08-26 RX ADMIN — Medication 250 MILLIGRAM(S): at 17:51

## 2018-08-26 RX ADMIN — Medication 250 MILLIGRAM(S): at 06:05

## 2018-08-26 RX ADMIN — OXYCODONE AND ACETAMINOPHEN 1 TABLET(S): 5; 325 TABLET ORAL at 06:05

## 2018-08-26 RX ADMIN — OXYCODONE AND ACETAMINOPHEN 1 TABLET(S): 5; 325 TABLET ORAL at 15:52

## 2018-08-26 RX ADMIN — Medication 100 MILLIGRAM(S): at 06:05

## 2018-08-26 RX ADMIN — Medication 300 MILLIGRAM(S): at 11:24

## 2018-08-26 RX ADMIN — Medication 100 MILLIGRAM(S): at 13:24

## 2018-08-26 RX ADMIN — OXYCODONE AND ACETAMINOPHEN 1 TABLET(S): 5; 325 TABLET ORAL at 00:11

## 2018-08-26 NOTE — CONSULT NOTE ADULT - SUBJECTIVE AND OBJECTIVE BOX
Patient is a 78y old  Female who presents with a chief complaint of diabetic foot ulcer + DVT (24 Aug 2018 23:10)      INTERVAL HPI/OVERNIGHT EVENTS:  T(C): 36.7 (08-26-18 @ 14:56), Max: 36.7 (08-26-18 @ 14:56)  HR: 100 (08-26-18 @ 14:56) (90 - 101)  BP: 125/77 (08-26-18 @ 14:56) (124/59 - 139/63)  RR: 18 (08-26-18 @ 14:56) (18 - 18)  SpO2: --  Wt(kg): --  I&O's Summary    25 Aug 2018 07:01  -  26 Aug 2018 07:00  --------------------------------------------------------  IN: 1250 mL / OUT: 1650 mL / NET: -400 mL    26 Aug 2018 07:01  -  26 Aug 2018 18:48  --------------------------------------------------------  IN: 860 mL / OUT: 500 mL / NET: 360 mL        PAST MEDICAL & SURGICAL HISTORY:  HTN (hypertension)  Rheumatoid arteritis  Other specified diabetes mellitus with other specified complication, unspecified whether long term insulin use  Primary osteoarthritis of right knee: s/p knee repalcement      SOCIAL HISTORY  Alcohol:  Tobacco:  Illicit substance use:      FAMILY HISTORY:      LABS:                        8.3    5.03  )-----------( 215      ( 26 Aug 2018 07:45 )             26.4     08-26    138  |  103  |  28<H>  ----------------------------<  58<L>  5.3<H>   |  21  |  1.4    Ca    8.9      26 Aug 2018 07:45  Mg     2.3     08-25          CAPILLARY BLOOD GLUCOSE  181 (26 Aug 2018 11:40)  77 (26 Aug 2018 08:01)  49 (26 Aug 2018 01:56)      POCT Blood Glucose.: 268 mg/dL (26 Aug 2018 16:46)  POCT Blood Glucose.: 181 mg/dL (26 Aug 2018 11:27)  POCT Blood Glucose.: 77 mg/dL (26 Aug 2018 07:28)  POCT Blood Glucose.: 90 mg/dL (26 Aug 2018 06:00)  POCT Blood Glucose.: 69 mg/dL (26 Aug 2018 02:37)  POCT Blood Glucose.: 49 mg/dL (26 Aug 2018 01:52)  POCT Blood Glucose.: 116 mg/dL (25 Aug 2018 20:58)            MEDICATIONS  (STANDING):  allopurinol 300 milliGRAM(s) Oral daily  enalapril 10 milliGRAM(s) Oral daily  enoxaparin Injectable 90 milliGRAM(s) SubCutaneous two times a day  levoFLOXacin IVPB 750 milliGRAM(s) IV Intermittent every 24 hours  metroNIDAZOLE  IVPB 500 milliGRAM(s) IV Intermittent every 8 hours  predniSONE   Tablet 20 milliGRAM(s) Oral daily  vancomycin  IVPB 1000 milliGRAM(s) IV Intermittent every 12 hours    MEDICATIONS  (PRN):  oxyCODONE    5 mG/acetaminophen 325 mG 1 Tablet(s) Oral every 4 hours PRN Severe Pain (7 - 10)      REVIEW OF SYSTEMS:  CONSTITUTIONAL: No fever, weight loss, or fatigue  EYES: No eye pain, visual disturbances, or discharge  ENMT:  No difficulty hearing, tinnitus, vertigo; No sinus or throat pain  NECK: No pain or stiffness  RESPIRATORY: No cough, wheezing, chills or hemoptysis; No shortness of breath  CARDIOVASCULAR: No chest pain, palpitations, dizziness, or leg swelling  GASTROINTESTINAL: No abdominal or epigastric pain. No nausea, vomiting, or hematemesis; No diarrhea or constipation. No melena or hematochezia.  GENITOURINARY: No dysuria, frequency, hematuria, or incontinence  NEUROLOGICAL: No headaches, memory loss, loss of strength, numbness, or tremors  SKIN: No itching, burning, rashes, or lesions   LYMPH NODES: No enlarged glands  ENDOCRINE: No heat or cold intolerance; No hair loss  MUSCULOSKELETAL: No joint pain or swelling; No muscle, back, or extremity pain  PSYCHIATRIC: No depression, anxiety, mood swings, or difficulty sleeping  HEME/LYMPH: No easy bruising, or bleeding gums  ALLERY AND IMMUNOLOGIC: No hives or eczema    RADIOLOGY & ADDITIONAL TESTS:    Imaging Personally Reviewed:  [ ] YES  [ ] NO    Consultant(s) Notes Reviewed:  [ ] YES  [ ] NO    PHYSICAL EXAM:  GENERAL: NAD, well-groomed, well-developed  HEAD:  Atraumatic, Normocephalic  EYES: EOMI, PERRLA, conjunctiva and sclera clear  ENMT: No tonsillar erythema, exudates, or enlargement; Moist mucous membranes, Good dentition, No lesions  NECK: Supple, No JVD, Normal thyroid  NERVOUS SYSTEM:  Alert & Oriented X3, Good concentration; Motor Strength 5/5 B/L upper and lower extremities; DTRs 2+ intact and symmetric  CHEST/LUNG: Clear to percussion bilaterally; No rales, rhonchi, wheezing, or rubs  HEART: Regular rate and rhythm; No murmurs, rubs, or gallops  ABDOMEN: Soft, Nontender, Nondistended; Bowel sounds present  EXTREMITIES:  2+ Peripheral Pulses, No clubbing, cyanosis, or edema  LYMPH: No lymphadenopathy noted  SKIN: No rashes or lesions    Care Discussed with Consultants/Other Providers [ ] YES  [ ] NO Patient is a 78y old  Female with multiple co-morbidities including  DM2, DVT off AC, who presented to the ER for evaluation of Right 3rd toe ulcer and severe pain.         REVIEW OF SYSTEMS: Total of twelve systems have been reviewed with patient and found to be negative unless mentioned in HPI          PAST MEDICAL & SURGICAL HISTORY:  DM,   RA on Prednisone  HTN (hypertension)  Rheumatoid arteritis  Other specified diabetes mellitus with other specified complication, unspecified whether long term insulin use  Primary osteoarthritis of right knee: s/p knee replacement   DVT (24 Aug 2018 23:10)        SOCIAL HISTORY  Alcohol: Does not drink  Tobacco: Does not smoke  Illicit substance use: None        FAMILY HISTORY: Non contributory to the present illness      ALLERGIES: NKFDA      T(C): 36.7 (08-26-18 @ 14:56), Max: 36.7 (08-26-18 @ 14:56)  HR: 100 (08-26-18 @ 14:56) (90 - 101)  BP: 125/77 (08-26-18 @ 14:56) (124/59 - 139/63)  RR: 18 (08-26-18 @ 14:56) (18 - 18)  SpO2: --  Wt(kg): --  I&O's Summary      PHYSICAL EXAM:  GENERAL: Not in distress  CVS; s1 and s2 present  RESP: Air entry B/L  GI: Abdomen nontender and nondistended  EXT: Right third toe necrotic and very tender  CNS: AAOX3        LABS:                        8.3    5.03  )-----------( 215      ( 26 Aug 2018 07:45 )             26.4         08-26    138  |  103  |  28<H>  ----------------------------<  58<L>  5.3<H>   |  21  |  1.4    Ca    8.9      26 Aug 2018 07:45  Mg     2.3     08-25          CAPILLARY BLOOD GLUCOSE  181 (26 Aug 2018 11:40)  77 (26 Aug 2018 08:01)  49 (26 Aug 2018 01:56)      POCT Blood Glucose.: 268 mg/dL (26 Aug 2018 16:46)  POCT Blood Glucose.: 181 mg/dL (26 Aug 2018 11:27)  POCT Blood Glucose.: 77 mg/dL (26 Aug 2018 07:28)  POCT Blood Glucose.: 90 mg/dL (26 Aug 2018 06:00)  POCT Blood Glucose.: 69 mg/dL (26 Aug 2018 02:37)  POCT Blood Glucose.: 49 mg/dL (26 Aug 2018 01:52)  POCT Blood Glucose.: 116 mg/dL (25 Aug 2018 20:58)          MEDICATIONS  (STANDING):  allopurinol 300 milliGRAM(s) Oral daily  enalapril 10 milliGRAM(s) Oral daily  enoxaparin Injectable 90 milliGRAM(s) SubCutaneous two times a day  levoFLOXacin IVPB 750 milliGRAM(s) IV Intermittent every 24 hours  metroNIDAZOLE  IVPB 500 milliGRAM(s) IV Intermittent every 8 hours  predniSONE   Tablet 20 milliGRAM(s) Oral daily  vancomycin  IVPB 1000 milliGRAM(s) IV Intermittent every 12 hours    MEDICATIONS  (PRN):  oxyCODONE    5 mG/acetaminophen 325 mG 1 Tablet(s) Oral every 4 hours PRN Severe Pain (7 - 10)        RADIOLOGY & ADDITIONAL TESTS:    < from: Xray Foot AP + Lateral, Right (08.25.18 @ 08:08) >  No evidence of acute fracture or dislocation. Neuropathic toe claw   deformities and midfoot degenerative changes are present. Tarsometatarsal  preserved. No radiographic evidence to suggest osteomyelitis. No   subcutaneous emphysema      < end of copied text >

## 2018-08-26 NOTE — PROGRESS NOTE ADULT - SUBJECTIVE AND OBJECTIVE BOX
YAN LINCOLN MRN-[id number]    Hospitalist Note  77 yo F with PMH of HTN, DM, RA on Prednisone, and prior DVT off AC admitted for non-healing RLE ulcer with hospital course complicated by acute LLE DVT.    Overnight events/Updates: The patient was hypoglycemic last night--no insulin was administered. No new complaints this AM    Vital Signs Last 24 Hrs  T(C): 36.2 (26 Aug 2018 04:55), Max: 36.4 (25 Aug 2018 20:36)  T(F): 97.2 (26 Aug 2018 04:55), Max: 97.5 (25 Aug 2018 20:36)  HR: 90 (26 Aug 2018 04:55) (90 - 116)  BP: 139/63 (26 Aug 2018 04:55) (122/61 - 139/63)  BP(mean): --  RR: 18 (26 Aug 2018 04:55) (18 - 18)  SpO2: --    Physical Examination:  General: AAO x 3  HEENT: PERRLA, EOMI  CV= S1 & S2 appreciated, tachycardia  Lungs=CTA BL  Abdominal Examination= + BS, Soft, NT/ND  Extremity Examination= Ulcer to the dorsal aspect of the right third toe    ROS: No chest pain, no shortness of breath.  All other systems reviewed and are within normal limits except for the complaints in the HPI.    MEDICATIONS  (STANDING):  allopurinol 300 milliGRAM(s) Oral daily  enalapril 10 milliGRAM(s) Oral daily  enoxaparin Injectable 90 milliGRAM(s) SubCutaneous two times a day  levoFLOXacin IVPB 750 milliGRAM(s) IV Intermittent every 24 hours  metroNIDAZOLE  IVPB 500 milliGRAM(s) IV Intermittent every 8 hours  predniSONE   Tablet 20 milliGRAM(s) Oral daily  vancomycin  IVPB 1000 milliGRAM(s) IV Intermittent every 12 hours    MEDICATIONS  (PRN):  oxyCODONE    5 mG/acetaminophen 325 mG 1 Tablet(s) Oral every 4 hours PRN Severe Pain (7 - 10)                            8.3    5.03  )-----------( 215      ( 26 Aug 2018 07:45 )             26.4     08-26    138  |  103  |  28<H>  ----------------------------<  58<L>  5.3<H>   |  21  |  1.4    Ca    8.9      26 Aug 2018 07:45  Mg     2.3     08-25    TPro  6.5  /  Alb  4.0  /  TBili  0.3  /  DBili  x   /  AST  34  /  ALT  12  /  AlkPhos  62  08-24      Case discussed with housestaff & family  SEBAS Segal [id number]

## 2018-08-26 NOTE — PROGRESS NOTE ADULT - ASSESSMENT
79 yo F with PMH of HTN, DM, RA on Prednisone, and prior DVT off AC admitted for non-healing RLE ulcer with hospital course complicated by acute LLE DVT.    Acute LLE DVT: lower extremity duplex was positive for acute thrombus.  Pending results from official duplex.  Vascular Surgery consult requested for acute DVT and non-healing DFU.  Continue Lovenox 90mg SQ q12.  Please refer to hematology/oncology upon discharge.  Right Lower Extremity DFU (3rd digit): radiographs were negative for OM.  Continue local wound care as per podiatry.  Continue treatment with IV Flagyl, Levaquin, and Vancomycin.  ID evaluation was requested.  Her pain is currently well controlled on Percocet.  Repeat CBC and BMP in AM  Rheumatoid Arthritis: continue Prednisone 20mg PO q24.  Follow-up with Rheumatology-Dr. Henry as outpatient for treatment with biologics (off medications due to distant history of tuberculosis).  Hypertension: continue Enalapril  DMII: continue Lantus/Lispro and monitor FS with meals.  GI prophylaxis

## 2018-08-26 NOTE — PROGRESS NOTE ADULT - SUBJECTIVE AND OBJECTIVE BOX
PROGRESS NOTE   Pt seen at bedside during AM rounds for Ischemic ulcer R 3rd digit and  multiple superficial ulcerations LLE. Pt reports pain and oozing LLE ulcers. PT reports pain 3rd R digit. Denies n/f/v/c/d/sob.       Vital Signs Last 24 Hrs  T(C): 36.2 (26 Aug 2018 04:55), Max: 36.4 (25 Aug 2018 20:36)  T(F): 97.2 (26 Aug 2018 04:55), Max: 97.5 (25 Aug 2018 20:36)  HR: 90 (26 Aug 2018 04:55) (90 - 116)  BP: 139/63 (26 Aug 2018 04:55) (122/61 - 139/63)  BP(mean): --  RR: 18 (26 Aug 2018 04:55) (18 - 18)  SpO2: --                          8.3    5.03  )-----------( 215      ( 26 Aug 2018 07:45 )             26.4               08-26    138  |  103  |  28<H>  ----------------------------<  58<L>  5.3<H>   |  21  |  1.4    Ca    8.9      26 Aug 2018 07:45  Mg     2.3     08-25    TPro  6.5  /  Alb  4.0  /  TBili  0.3  /  DBili  x   /  AST  34  /  ALT  12  /  AlkPhos  62  08-24      PHYSICAL EXAM  LE Focused examination conducted:    DERM:  necrotic/fibriotic superficial wound ulcer dorsal right 3rd digit with erythema and edema noted.  multiple superficial ulcers noted left medial aspect of the LE.   VASC:   Dorsalis Pedis 0/4   Posterior Tibial 0/4    Capillary refill time <3s x10   Temperature gradient: cold to touch b/l  Edema: mild edema right  NEURO: Protective sensation intact to the level of the digits bilateral.  ORTHO: Muscle strength 5/5 all major muscle groups bilateral. hammertoes toes 2/3/4 b/l    A:  Right ischemic 3rd digit ulcer  multiple superficial ulcerations LLE.  P:  pt evaluated and treated  applied betadine no dressing R 3rd toe  applied xeroform/dsd/kerlix right LE  x ray negative   continue local wound care  consult ID recommend  consult Vascular recommended  podiatry will f/u with attending for further evaluation and management

## 2018-08-27 ENCOUNTER — TRANSCRIPTION ENCOUNTER (OUTPATIENT)
Age: 78
End: 2018-08-27

## 2018-08-27 DIAGNOSIS — Z02.9 ENCOUNTER FOR ADMINISTRATIVE EXAMINATIONS, UNSPECIFIED: ICD-10-CM

## 2018-08-27 LAB
ANION GAP SERPL CALC-SCNC: 13 MMOL/L — SIGNIFICANT CHANGE UP (ref 7–14)
ANION GAP SERPL CALC-SCNC: 18 MMOL/L — HIGH (ref 7–14)
BASOPHILS # BLD AUTO: 0.01 K/UL — SIGNIFICANT CHANGE UP (ref 0–0.2)
BASOPHILS NFR BLD AUTO: 0.2 % — SIGNIFICANT CHANGE UP (ref 0–1)
BUN SERPL-MCNC: 31 MG/DL — HIGH (ref 10–20)
BUN SERPL-MCNC: 34 MG/DL — HIGH (ref 10–20)
CALCIUM SERPL-MCNC: 9.1 MG/DL — SIGNIFICANT CHANGE UP (ref 8.5–10.1)
CALCIUM SERPL-MCNC: 9.2 MG/DL — SIGNIFICANT CHANGE UP (ref 8.5–10.1)
CHLORIDE SERPL-SCNC: 101 MMOL/L — SIGNIFICANT CHANGE UP (ref 98–110)
CHLORIDE SERPL-SCNC: 105 MMOL/L — SIGNIFICANT CHANGE UP (ref 98–110)
CO2 SERPL-SCNC: 19 MMOL/L — SIGNIFICANT CHANGE UP (ref 17–32)
CO2 SERPL-SCNC: 20 MMOL/L — SIGNIFICANT CHANGE UP (ref 17–32)
CREAT SERPL-MCNC: 1.5 MG/DL — SIGNIFICANT CHANGE UP (ref 0.7–1.5)
CREAT SERPL-MCNC: 1.6 MG/DL — HIGH (ref 0.7–1.5)
EOSINOPHIL # BLD AUTO: 0.1 K/UL — SIGNIFICANT CHANGE UP (ref 0–0.7)
EOSINOPHIL NFR BLD AUTO: 2.1 % — SIGNIFICANT CHANGE UP (ref 0–8)
GLUCOSE BLDC GLUCOMTR-MCNC: 130 MG/DL — HIGH (ref 70–99)
GLUCOSE BLDC GLUCOMTR-MCNC: 131 MG/DL — HIGH (ref 70–99)
GLUCOSE BLDC GLUCOMTR-MCNC: 150 MG/DL — HIGH (ref 70–99)
GLUCOSE BLDC GLUCOMTR-MCNC: 206 MG/DL — HIGH (ref 70–99)
GLUCOSE BLDC GLUCOMTR-MCNC: 233 MG/DL — HIGH (ref 70–99)
GLUCOSE BLDC GLUCOMTR-MCNC: 87 MG/DL — SIGNIFICANT CHANGE UP (ref 70–99)
GLUCOSE BLDC GLUCOMTR-MCNC: 96 MG/DL — SIGNIFICANT CHANGE UP (ref 70–99)
GLUCOSE SERPL-MCNC: 192 MG/DL — HIGH (ref 70–99)
GLUCOSE SERPL-MCNC: 83 MG/DL — SIGNIFICANT CHANGE UP (ref 70–99)
HCT VFR BLD CALC: 29.6 % — LOW (ref 37–47)
HGB BLD-MCNC: 9.1 G/DL — LOW (ref 12–16)
IMM GRANULOCYTES NFR BLD AUTO: 0.4 % — HIGH (ref 0.1–0.3)
LYMPHOCYTES # BLD AUTO: 1.31 K/UL — SIGNIFICANT CHANGE UP (ref 1.2–3.4)
LYMPHOCYTES # BLD AUTO: 27.1 % — SIGNIFICANT CHANGE UP (ref 20.5–51.1)
MCHC RBC-ENTMCNC: 30.7 G/DL — LOW (ref 32–37)
MCHC RBC-ENTMCNC: 32.7 PG — HIGH (ref 27–31)
MCV RBC AUTO: 106.5 FL — HIGH (ref 81–99)
MONOCYTES # BLD AUTO: 0.61 K/UL — HIGH (ref 0.1–0.6)
MONOCYTES NFR BLD AUTO: 12.6 % — HIGH (ref 1.7–9.3)
NEUTROPHILS # BLD AUTO: 2.79 K/UL — SIGNIFICANT CHANGE UP (ref 1.4–6.5)
NEUTROPHILS NFR BLD AUTO: 57.6 % — SIGNIFICANT CHANGE UP (ref 42.2–75.2)
NRBC # BLD: 0 /100 WBCS — SIGNIFICANT CHANGE UP (ref 0–0)
PLATELET # BLD AUTO: 203 K/UL — SIGNIFICANT CHANGE UP (ref 130–400)
POTASSIUM SERPL-MCNC: 5.4 MMOL/L — HIGH (ref 3.5–5)
POTASSIUM SERPL-MCNC: 5.6 MMOL/L — HIGH (ref 3.5–5)
POTASSIUM SERPL-SCNC: 5.4 MMOL/L — HIGH (ref 3.5–5)
POTASSIUM SERPL-SCNC: 5.6 MMOL/L — HIGH (ref 3.5–5)
RBC # BLD: 2.78 M/UL — LOW (ref 4.2–5.4)
RBC # FLD: 17.6 % — HIGH (ref 11.5–14.5)
SODIUM SERPL-SCNC: 138 MMOL/L — SIGNIFICANT CHANGE UP (ref 135–146)
SODIUM SERPL-SCNC: 138 MMOL/L — SIGNIFICANT CHANGE UP (ref 135–146)
WBC # BLD: 4.84 K/UL — SIGNIFICANT CHANGE UP (ref 4.8–10.8)
WBC # FLD AUTO: 4.84 K/UL — SIGNIFICANT CHANGE UP (ref 4.8–10.8)

## 2018-08-27 RX ORDER — INSULIN HUMAN 100 [IU]/ML
10 INJECTION, SOLUTION SUBCUTANEOUS ONCE
Qty: 0 | Refills: 0 | Status: DISCONTINUED | OUTPATIENT
Start: 2018-08-27 | End: 2018-08-27

## 2018-08-27 RX ORDER — FLUOCINONIDE/EMOLLIENT BASE 0.05 %
1 CREAM (GRAM) TOPICAL EVERY 12 HOURS
Qty: 0 | Refills: 0 | Status: DISCONTINUED | OUTPATIENT
Start: 2018-08-27 | End: 2018-08-29

## 2018-08-27 RX ORDER — PIOGLITAZONE HYDROCHLORIDE 15 MG/1
30 TABLET ORAL DAILY
Qty: 0 | Refills: 0 | Status: DISCONTINUED | OUTPATIENT
Start: 2018-08-27 | End: 2018-08-29

## 2018-08-27 RX ORDER — DEXTROSE 50 % IN WATER 50 %
50 SYRINGE (ML) INTRAVENOUS ONCE
Qty: 0 | Refills: 0 | Status: DISCONTINUED | OUTPATIENT
Start: 2018-08-27 | End: 2018-08-29

## 2018-08-27 RX ORDER — INSULIN HUMAN 100 [IU]/ML
10 INJECTION, SOLUTION SUBCUTANEOUS ONCE
Qty: 0 | Refills: 0 | Status: COMPLETED | OUTPATIENT
Start: 2018-08-27 | End: 2018-08-27

## 2018-08-27 RX ORDER — DEXTROSE 50 % IN WATER 50 %
50 SYRINGE (ML) INTRAVENOUS ONCE
Qty: 0 | Refills: 0 | Status: COMPLETED | OUTPATIENT
Start: 2018-08-27 | End: 2018-08-27

## 2018-08-27 RX ORDER — INSULIN LISPRO 100/ML
10 VIAL (ML) SUBCUTANEOUS ONCE
Qty: 0 | Refills: 0 | Status: DISCONTINUED | OUTPATIENT
Start: 2018-08-27 | End: 2018-08-27

## 2018-08-27 RX ADMIN — Medication 250 MILLIGRAM(S): at 06:41

## 2018-08-27 RX ADMIN — Medication 10 MILLIGRAM(S): at 06:39

## 2018-08-27 RX ADMIN — Medication 100 MILLIGRAM(S): at 21:40

## 2018-08-27 RX ADMIN — OXYCODONE AND ACETAMINOPHEN 1 TABLET(S): 5; 325 TABLET ORAL at 00:42

## 2018-08-27 RX ADMIN — Medication 50 MILLILITER(S): at 12:01

## 2018-08-27 RX ADMIN — Medication 1 APPLICATION(S): at 18:22

## 2018-08-27 RX ADMIN — OXYCODONE AND ACETAMINOPHEN 1 TABLET(S): 5; 325 TABLET ORAL at 00:07

## 2018-08-27 RX ADMIN — Medication 250 MILLIGRAM(S): at 18:21

## 2018-08-27 RX ADMIN — Medication 300 MILLIGRAM(S): at 11:52

## 2018-08-27 RX ADMIN — PIOGLITAZONE HYDROCHLORIDE 30 MILLIGRAM(S): 15 TABLET ORAL at 11:52

## 2018-08-27 RX ADMIN — INSULIN HUMAN 10 UNIT(S): 100 INJECTION, SOLUTION SUBCUTANEOUS at 12:21

## 2018-08-27 RX ADMIN — Medication 20 MILLIGRAM(S): at 06:39

## 2018-08-27 RX ADMIN — OXYCODONE AND ACETAMINOPHEN 1 TABLET(S): 5; 325 TABLET ORAL at 23:39

## 2018-08-27 RX ADMIN — Medication 100 MILLIGRAM(S): at 06:39

## 2018-08-27 RX ADMIN — ENOXAPARIN SODIUM 90 MILLIGRAM(S): 100 INJECTION SUBCUTANEOUS at 06:38

## 2018-08-27 RX ADMIN — ENOXAPARIN SODIUM 90 MILLIGRAM(S): 100 INJECTION SUBCUTANEOUS at 18:21

## 2018-08-27 RX ADMIN — OXYCODONE AND ACETAMINOPHEN 1 TABLET(S): 5; 325 TABLET ORAL at 23:02

## 2018-08-27 RX ADMIN — Medication 100 MILLIGRAM(S): at 14:48

## 2018-08-27 NOTE — DISCHARGE NOTE ADULT - MEDICATION SUMMARY - MEDICATIONS TO TAKE
I will START or STAY ON the medications listed below when I get home from the hospital:    predniSONE 20 mg oral tablet  -- 1 tab(s) by mouth once a day  -- Indication: For Rheumatoid arthiritis    oxyCODONE-acetaminophen 5 mg-325 mg oral tablet  -- 1 tab(s) by mouth every 4 hours, As needed, Severe Pain (7 - 10)  -- Indication: For Pain    Eliquis 5 mg oral tablet  -- 2 tab(s) by mouth 2 times a day for 5 days and then 1 tab by mouth 2 times a day.   -- Check with your doctor before becoming pregnant.  It is very important that you take or use this exactly as directed.  Do not skip doses or discontinue unless directed by your doctor.  Obtain medical advice before taking any non-prescription drugs as some may affect the action of this medication.    -- Indication: For Anticoagulation    Actos 30 mg oral tablet  -- 1 tab(s) by mouth once a day  -- Indication: For Diabetes    glimepiride 2 mg oral tablet  -- 1  by mouth 3 times a day  -- Indication: For Diabetes    leucovorin 5 mg oral tablet  -- 1 tab(s) by mouth once a day  -- Indication: For Rheumatoid arthirits    allopurinol 300 mg oral tablet  -- 1 tab(s) by mouth once a day  -- Indication: For Anti gout    fluocinonide 0.05% topical cream  -- 1 application on skin every 12 hours  -- Indication: For Topical cream    docusate sodium 100 mg oral capsule  -- 1 cap(s) by mouth 2 times a day  -- Indication: For Laxative    folic acid 1 mg oral tablet  -- 1 tab(s) by mouth once a day  -- Indication: For Supplements

## 2018-08-27 NOTE — DISCHARGE NOTE ADULT - MEDICATION SUMMARY - MEDICATIONS TO STOP TAKING
I will STOP taking the medications listed below when I get home from the hospital:    enalapril 10 mg oral tablet  -- 1 tab(s) by mouth once a day    acetaminophen 325 mg oral tablet  -- 2 tab(s) by mouth every 6 hours, As needed, Mild Pain (1 - 3)

## 2018-08-27 NOTE — CONSULT NOTE ADULT - ASSESSMENT
IMPRESSION: Rehab of gait dysfunction      PRECAUTIONS: [  ] Cardiac  [  ] Respiratory  [  ] Seizures [  ] Contact Isolation  [  ] Droplet Isolation  [  ] Other    Weight Bearing Status:     RECOMMENDATION:    Out of Bed to Chair     DVT/Decubiti Prophylaxis    REHAB PLAN:     [ x  ] Bedside P/T 3-5 times a week   [   ]   Bedside O/T  2-3 times a week             [   ] No Rehab Therapy Indicated                   [   ]  Speech Therapy   Conditioning/ROM                                    ADL  Bed Mobility                                               Conditioning/ROM  Transfers                                                     Bed Mobility  Sitting /Standing Balance                         Transfers                                        Gait Training                                               Sitting/Standing Balance  Stair Training [   ]Applicable                    Home equipment Eval                                                                        Splinting  [   ] Only      GOALS:   ADL   [   ]   Independent                    Transfers  [ x  ] Independent                          Ambulation  [ x  ] Independent     [ x   ] With device                            [   ]  CG                                                         [   ]  CG                                                                  [   ] CG                            [    ] Min A                                                   [   ] Min A                                                              [   ] Min  A          DISCHARGE PLAN:   [   ]  Good candidate for Intensive Rehabilitation/Hospital based-4A [facility]                                             Will tolerate 3hrs Intensive Rehab Daily                                       [ x   ]  Short Term Rehab in Skilled Nursing Facility                              vs         [  x  ]  Home with Outpatient or VN services                                         [    ]  Possible Candidate for Intensive Hospital based Rehab
ASSESSMENT:  78y Female presenting with ulcer on right 3rd toe, complains of worsening pain and skin changes over the past 6 weeks.    PLAN:   -f/u arterial duplex    --------------------------------------------------------------------------------------    08-27-18 @ 13:24
# Right third toe DFU with necrosis    Would recommend:    1. Vascular evaluation for possible revascularization  2. Doubt antibiotic is beneficial at this time  3. Monitor for fever, and Leukocytosis     will follow the patient with you and make further recommendation based on the clinical course and Lab results  Thank you for the opportunity to participate in Mr. HEREDIA's care

## 2018-08-27 NOTE — CONSULT NOTE ADULT - SUBJECTIVE AND OBJECTIVE BOX
HPI:  79 yo F with PMH of HTN, DM, RA on Prednisone, DVT off AC presented to ED with foot ulcer after being referred by PCP. Patient reports she injured is R 3rd toe ~2 months ago. Has had poor wound healing since. Reports chills of 3 days duration PTP as well as extreme pain. Denies fevers. Also has poorly healing wounds of L leg. States they appeared ~1 month ago out of no where. Follows with Podiatry as outpatient. Patient had DVT in past (2-3 years ago) and was on Coumadin but was taken off it because he said she was told clot had resolved. In ED had LLE swelling, duplex was positive for DVT. (24 Aug 2018 23:10)      PAST MEDICAL & SURGICAL HISTORY:  HTN (hypertension)  Rheumatoid arteritis  Other specified diabetes mellitus with other specified complication, unspecified whether long term insulin use  Primary osteoarthritis of right knee: s/p knee repalcement      Hospital Course:    TODAY'S SUBJECTIVE & REVIEW OF SYMPTOMS:     Constitutional WNL   Cardio WNL   Resp WNL   GI WNL  Heme WNL  Endo WNL  Skin WNL  MSK foot pain  Neuro WNL  Cognitive WNL  Psych WNL      MEDICATIONS  (STANDING):  allopurinol 300 milliGRAM(s) Oral daily  enalapril 10 milliGRAM(s) Oral daily  enoxaparin Injectable 90 milliGRAM(s) SubCutaneous two times a day  levoFLOXacin IVPB 750 milliGRAM(s) IV Intermittent every 48 hours  metroNIDAZOLE  IVPB 500 milliGRAM(s) IV Intermittent every 8 hours  pioglitazone 30 milliGRAM(s) Oral daily  predniSONE   Tablet 20 milliGRAM(s) Oral daily  vancomycin  IVPB 1000 milliGRAM(s) IV Intermittent every 12 hours    MEDICATIONS  (PRN):  oxyCODONE    5 mG/acetaminophen 325 mG 1 Tablet(s) Oral every 4 hours PRN Severe Pain (7 - 10)      FAMILY HISTORY:  No pertinent family history in first degree relatives      Allergies    clindamycin (Unknown)  erythromycin (Unknown)  penicillin (Unknown)  strawberry (Unknown)    Intolerances        SOCIAL HISTORY:    [  ] Etoh  [  ] Smoking  [  ] Substance abuse     Home Environment:  [x  ] Home Alone  [  ] Lives with Family  [  ] Home Health Aid    Dwelling:  [x  ] Apartment  [  ] Private House  [  ] Adult Home  [  ] Skilled Nursing Facility      [  ] Short Term  [  ] Long Term  [  ] Stairs       Elevator [  ]    FUNCTIONAL STATUS PTA: (Check all that apply)  Ambulation: [x   ]Independent    [  ] Dependent     [  ] Non-Ambulatory  Assistive Device: [  ] SA Cane  [  ]  Q Cane  [x  ] Walker  [  ]  Wheelchair  ADL : [ x ] Independent  [  ]  Dependent       Vital Signs Last 24 Hrs  T(C): 36.1 (27 Aug 2018 06:33), Max: 36.7 (26 Aug 2018 14:56)  T(F): 96.9 (27 Aug 2018 06:33), Max: 98 (26 Aug 2018 14:56)  HR: 94 (27 Aug 2018 06:33) (94 - 100)  BP: 123/65 (27 Aug 2018 06:33) (121/68 - 125/77)  BP(mean): --  RR: 18 (26 Aug 2018 14:56) (18 - 18)  SpO2: --      PHYSICAL EXAM: Alert & Oriented X3  GENERAL: NAD, well-groomed, well-developed  HEAD:  Atraumatic, Normocephalic  CHEST/LUNG: Clear   HEART: S1S2+  ABDOMEN: Soft, Nontender  EXTREMITIES:  no calf tenderness    NERVOUS SYSTEM:  Cranial Nerves 2-12 intact [  ] Abnormal  [  ]  ROM: WFL all extremities [  ]  Abnormal [x  ]limited right shoulder  Motor Strength: WFL all extremities  [  ]  Abnormal [ x ]limited right shoulder  Sensation: intact to light touch [  ] Abnormal [  ]  Reflexes: Symmetric [  ]  Abnormal [  ]    FUNCTIONAL STATUS:  Bed Mobility: Independent [  ]  Supervision [  ]  Needs Assistance [ x ]  N/A [  ]  Transfers: Independent [  ]  Supervision [  ]  Needs Assistance [x  ]  N/A [  ]   Ambulation: Independent [  ]  Supervision [  ]  Needs Assistance [x  ]  N/A [  ]  ADL: Independent [  ] Requires Assistance [  ] N/A [  ]      LABS:                        9.1    4.84  )-----------( 203      ( 27 Aug 2018 07:39 )             29.6     08-27    138  |  105  |  31<H>  ----------------------------<  83  5.4<H>   |  20  |  1.5    Ca    9.2      27 Aug 2018 07:39            RADIOLOGY & ADDITIONAL STUDIES:    Assesment:

## 2018-08-27 NOTE — DISCHARGE NOTE ADULT - PATIENT PORTAL LINK FT
You can access the [facility] Patient Portal, offered by [facility], by registering with the following website: [email or web address]

## 2018-08-27 NOTE — DISCHARGE NOTE ADULT - HOSPITAL COURSE
79 yo F with PMH of HTN, DM, RA on Prednisone, DVT off AC presented to ED with foot ulcer after being referred by PCP. Patient reports she injured is R 3rd toe ~2 months ago. Has had poor wound healing since. Patient had DVT in past (2-3 years ago) and was on Coumadin but was taken off it because she said she was told clot had resolved. In ED had LLE swelling, duplex was positive for DVT in left lower extremity. Arterial Duplex was negative.Vascular Surgery consult was requested for acute DVT and non-healing DFU. Angiogram was done which showed atherosclerotic lesions. No acute procedure recommended at this time. Patient can follow up outpatient.   - Continue Lovenox 90mg SQ q12.  -Continue Eliquis 10 mg twice a day for 7 days then 5 mg twice a day Outpatient.   - Right Lower Extremity DFU (3rd digit): radiographs were negative for OM.  Continue local wound care as per podiatry. Patient started on doxycycline 100 BID.  Her pain is currently well controlled on Percocet.    2.Rheumatoid Arthritis  continue Prednisone 20mg PO q24.  Follow-up with Rheumatology-Dr. Henry as outpatient for treatment with biologics (off medications due to distant history of tuberculosis).    3.Hypertension  Holding Enalipril. Follow up with BMP for hyperkalemia    4.DMII  Resumed Pioglitazone. monitor BS    5. Hyperkalemia  No t wave changes on EKG. Patient given dextrose 50 bolus and Insulin once. Current K is 5.1. Follow up with BMP. patient on ace need to monitor if potassium gets worse then need to hold ace     6.CKD 3 stable     7.SOB  Follow up with chest X-ray.  Possibly due to post op atelectasis. 79 yo F with PMH of HTN, DM, RA on Prednisone, DVT off AC presented to ED with foot ulcer after being referred by PCP. Patient reports she injured is R 3rd toe ~2 months ago. Has had poor wound healing since. Patient had DVT in past (2-3 years ago) and was on Coumadin but was taken off it because she said she was told clot had resolved. In ED had LLE swelling, duplex was positive for DVT in left lower extremity. Arterial Duplex was negative.Vascular Surgery consult was requested for acute DVT and non-healing DFU. Angiogram was done which showed atherosclerotic lesions. No acute procedure recommended at this time. Patient can follow up outpatient. Patient has to continue Eliquis 10 mg twice a day for 5 days then 5 mg twice a day Outpatient.Her pain is currently well controlled on Percocet.  For Rheumatoid Arthritis continue Prednisone 20mg PO q24.  Follow-up with Rheumatology-Dr. Henry as outpatient for treatment with biologics (off medications due to distant history of tuberculosis).  For Hypertension Holding Enalipril because of hyperkalemia. Discuss with PCP to resume it. 77 yo F with PMH of HTN, DM, RA on Prednisone, DVT off AC presented to ED with foot ulcer after being referred by PCP. Patient reports she injured is R 3rd toe ~2 months ago. Has had poor wound healing since. Patient had DVT in past (2-3 years ago) and was on Coumadin but was taken off it because she said she was told clot had resolved. In ED had LLE swelling, duplex was positive for DVT in left lower extremity. Arterial Duplex was negative.Vascular Surgery consult was requested for acute DVT and non-healing DFU. Angiogram was done which showed atherosclerotic lesions. No acute procedure recommended at this time. Patient can follow up outpatient. Patient has to continue Eliquis 10 mg twice a day for 5 days then 5 mg twice a day Outpatient.Her pain is currently well controlled on Percocet.  For Rheumatoid Arthritis continue Prednisone 20mg PO q24.  Follow-up with Rheumatology-Dr. Henry as outpatient for treatment with biologics (off medications due to distant history of tuberculosis).  For Hypertension Holding Enalipril because of hyperkalemia. Discuss with PCP to resume it.  Podiatry has recommended wound care with betadine, xeroform and kerlex dressing.

## 2018-08-27 NOTE — DISCHARGE NOTE ADULT - CARE PLAN
Principal Discharge DX:	Diabetic foot ulcer  Goal:	To Optimize the patient  Assessment and plan of treatment:	The patient was evaluated for osteomyletis, DVT and infection. Sepsis, osteomyelitis was ruled out. The patient was found to have left lower extremity DVT. Started on anticoagulation. Vascular was consulted, angiogram was done, no surgery indicated at this time. Patient received wound care.  Secondary Diagnosis:	Deep vein thrombosis (DVT) of other vein of left lower extremity Principal Discharge DX:	Diabetic foot ulcer  Goal:	To Optimize the patient  Assessment and plan of treatment:	The patient was evaluated for osteomyletis, DVT and infection. Sepsis, osteomyelitis was ruled out. The patient was found to have left lower extremity DVT. Started on anticoagulation. Vascular was consulted, angiogram was done, no surgery indicated at this time. Patient received wound care. Discuss with PCP to resume enalipril which was discontinued because of hyperkalemia.  Secondary Diagnosis:	Deep vein thrombosis (DVT) of other vein of left lower extremity Principal Discharge DX:	Diabetic foot ulcer  Goal:	To Optimize the patient  Assessment and plan of treatment:	The patient was evaluated for osteomyletis, DVT and infection. Sepsis, osteomyelitis was ruled out. The patient was found to have left lower extremity DVT. Started on anticoagulation. Vascular was consulted, angiogram was done, no surgery indicated at this time. Patient received wound care. Discuss with PCP to resume enalipril which was discontinued because of hyperkalemia. Podiatry recommended to continue with wound care, betadine dressing, xeroform and kerlix dressing.  Secondary Diagnosis:	Deep vein thrombosis (DVT) of other vein of left lower extremity

## 2018-08-27 NOTE — DISCHARGE NOTE ADULT - CARE PROVIDER_API CALL
Dr. Stephen,   Phone: (   )    -  Fax: (   )    -    Dr. Henry,   Phone: (   )    -  Fax: (   )    -

## 2018-08-27 NOTE — CONSULT NOTE ADULT - SUBJECTIVE AND OBJECTIVE BOX
Vascular Surgery Consultation Note  =====================================================  HPI: 78y Female  HPI:  77 yo F with PMH of HTN, DM, RA on Prednisone, DVT off AC presented to ED with foot ulcer after being referred by PCP. Patient reports she injured is R 3rd toe ~2 months ago. Has had poor wound healing since. Reports chills of 3 days duration PTP as well as extreme pain. Denies fevers. Also has poorly healing wounds of L leg. States they appeared ~1 month ago out of no where. Follows with Podiatry as outpatient who had been treating the ulcer with collagenase/dressing changes. Patient had DVT in past (2-3 years ago) and was on Coumadin but was taken off it because he said she was told clot had resolved. In ED had LLE swelling, duplex was positive for DVT.  On presentation today, patient is complaining of worsening pain in her right 3rd toe.  Denies drainage or bleeding from lesion.  Palpable DP bilaterally, could not palpate posterior tibial arteries bilaterally.         PAST MEDICAL & SURGICAL HISTORY:  HTN (hypertension)  Rheumatoid arteritis  Other specified diabetes mellitus with other specified complication, unspecified whether long term insulin use  Primary osteoarthritis of right knee: s/p knee repalcement    Home Meds: Home Medications:  acetaminophen 325 mg oral tablet: 2 tab(s) orally every 6 hours, As needed, Mild Pain (1 - 3) (15 Jin 2018 13:43)  Actos 30 mg oral tablet: 1 tab(s) orally once a day (08 Jun 2018 17:15)  allopurinol 300 mg oral tablet: 1 tab(s) orally once a day (15 Jin 2018 13:43)  enalapril 10 mg oral tablet: 1 tab(s) orally once a day (08 Jun 2018 17:15)  folic acid 1 mg oral tablet: 1 tab(s) orally once a day (14 Jun 2018 09:59)  glimepiride 2 mg oral tablet: 1  orally 3 times a day (08 Jun 2018 17:15)  leucovorin 5 mg oral tablet: 1 tab(s) orally once a day (24 Aug 2018 23:35)  predniSONE 20 mg oral tablet: 1 tab(s) orally once a day (24 Aug 2018 23:17)    Allergies: Allergies    clindamycin (Unknown)  erythromycin (Unknown)  penicillin (Unknown)  strawberry (Unknown)    Intolerances      Soc:   Advanced Directives: Presumed Full Code     ROS:    REVIEW OF SYSTEMS    [x ] A ten-point review of systems was otherwise negative except as noted.  [ ] Due to altered mental status/intubation, subjective information were not able to be obtained from the patient. History was obtained, to the extent possible, from review of the chart and collateral sources of information.      CURRENT MEDICATIONS:   --------------------------------------------------------------------------------------  Neurologic Medications  oxyCODONE    5 mG/acetaminophen 325 mG 1 Tablet(s) Oral every 4 hours PRN Severe Pain (7 - 10)    Respiratory Medications    Cardiovascular Medications    Gastrointestinal Medications    Genitourinary Medications    Hematologic/Oncologic Medications  enoxaparin Injectable 90 milliGRAM(s) SubCutaneous two times a day    Antimicrobial/Immunologic Medications  levoFLOXacin IVPB 750 milliGRAM(s) IV Intermittent every 48 hours  metroNIDAZOLE  IVPB 500 milliGRAM(s) IV Intermittent every 8 hours  vancomycin  IVPB 1000 milliGRAM(s) IV Intermittent every 12 hours    Endocrine/Metabolic Medications  allopurinol 300 milliGRAM(s) Oral daily  pioglitazone 30 milliGRAM(s) Oral daily  predniSONE   Tablet 20 milliGRAM(s) Oral daily    Topical/Other Medications    --------------------------------------------------------------------------------------    VITAL SIGNS, INS/OUTS (last 24 hours):  --------------------------------------------------------------------------------------  ICU Vital Signs Last 24 Hrs  T(C): 36.1 (27 Aug 2018 06:33), Max: 36.7 (26 Aug 2018 14:56)  T(F): 96.9 (27 Aug 2018 06:33), Max: 98 (26 Aug 2018 14:56)  HR: 94 (27 Aug 2018 06:33) (94 - 100)  BP: 123/65 (27 Aug 2018 06:33) (121/68 - 125/77)  BP(mean): --  ABP: --  ABP(mean): --  RR: 18 (26 Aug 2018 14:56) (18 - 18)  SpO2: --    I&O's Summary    26 Aug 2018 07:01  -  27 Aug 2018 07:00  --------------------------------------------------------  IN: 860 mL / OUT: 500 mL / NET: 360 mL    27 Aug 2018 07:01  -  27 Aug 2018 13:24  --------------------------------------------------------  IN: 420 mL / OUT: 0 mL / NET: 420 mL      --------------------------------------------------------------------------------------  PHYSICAL EXAM    General: NAD, AAOx3, calm and cooperative  HEENT: NCAT, ANTONIO, EOMI  Cardiac: RRR S1, S2, no Murmurs, rubs or gallops  Respiratory: CTAB  Abdomen: Soft, non-distended, non-tender, +bowel sounds  Vascular: palpable DP bilateral lower extremities, bilateral pitting edema  Skin: ulcer overlying anterior surface of right 3rd toe, erythema if bilateral feet      LABS  --------------------------------------------------------------------------------------  Labs:  CAPILLARY BLOOD GLUCOSE      POCT Blood Glucose.: 233 mg/dL (27 Aug 2018 11:45)  POCT Blood Glucose.: 96 mg/dL (27 Aug 2018 07:22)  POCT Blood Glucose.: 131 mg/dL (27 Aug 2018 06:43)  POCT Blood Glucose.: 87 mg/dL (27 Aug 2018 03:19)  POCT Blood Glucose.: 130 mg/dL (27 Aug 2018 00:02)  POCT Blood Glucose.: 193 mg/dL (26 Aug 2018 21:12)  POCT Blood Glucose.: 268 mg/dL (26 Aug 2018 16:46)                          9.1    4.84  )-----------( 203      ( 27 Aug 2018 07:39 )             29.6       Auto Neutrophil %: 57.6 % (08-27-18 @ 07:39)  Auto Immature Granulocyte %: 0.4 % (08-27-18 @ 07:39)    08-27    138  |  105  |  31<H>  ----------------------------<  83  5.4<H>   |  20  |  1.5      Calcium, Total Serum: 9.2 mg/dL (08-27-18 @ 07:39)      LFTs:     Lactate, Blood: 2.0 mmol/L (08-24-18 @ 14:19)      Coags:            Culture - Blood (collected 24 Aug 2018 14:19)  Source: .Blood Blood-Peripheral  Preliminary Report (26 Aug 2018 01:02):    No growth to date.    Culture - Blood (collected 24 Aug 2018 14:19)  Source: .Blood Blood-Peripheral  Preliminary Report (26 Aug 2018 01:02):    No growth to date.        --------------------------------------------------------------------------------------    OTHER LABS    IMAGING RESULTS    ---------------------------------------------------------------------------------------

## 2018-08-27 NOTE — PROGRESS NOTE ADULT - SUBJECTIVE AND OBJECTIVE BOX
LINCOLN HEREDIA 78y Female  MRN#: [id number]       SUBJECTIVE    Patient is a 78y old Female who presents with a chief complaint of diabetic foot ulcer + DVT (27 Aug 2018 10:31)  Currently admitted to medicine with the primary diagnosis of DVT (deep venous thrombosis)  Patient is lying comfortably in bed. She denies any fever, chest pain, SOB, abdominal pain. No complains in urine and bowel movement. She denies any calf pain. The patient does not recall how she developed wound on her right leg third toe. She denies any history of trauma, discharge and bleeding. When she presented in ER, US duplex lower extremities showed blood clot in femoral, Popliteal and posterior tibial vein on left side. Arterial duplex was normal bilaterally.        OBJECTIVE  PAST MEDICAL & SURGICAL HISTORY  HTN (hypertension)  Rheumatoid arteritis  Other specified diabetes mellitus with other specified complication, unspecified whether long term insulin use  Primary osteoarthritis of right knee: s/p knee repalcement    ALLERGIES:  clindamycin (Unknown)  erythromycin (Unknown)  penicillin (Unknown)  strawberry (Unknown)    MEDICATIONS:  STANDING MEDICATIONS  allopurinol 300 milliGRAM(s) Oral daily  enalapril 10 milliGRAM(s) Oral daily  enoxaparin Injectable 90 milliGRAM(s) SubCutaneous two times a day  levoFLOXacin IVPB 750 milliGRAM(s) IV Intermittent every 48 hours  metroNIDAZOLE  IVPB 500 milliGRAM(s) IV Intermittent every 8 hours  pioglitazone 30 milliGRAM(s) Oral daily  predniSONE   Tablet 20 milliGRAM(s) Oral daily  vancomycin  IVPB 1000 milliGRAM(s) IV Intermittent every 12 hours    PRN MEDICATIONS  oxyCODONE    5 mG/acetaminophen 325 mG 1 Tablet(s) Oral every 4 hours PRN      VITAL SIGNS: Last 24 Hours  T(C): 36.1 (27 Aug 2018 06:33), Max: 36.7 (26 Aug 2018 14:56)  T(F): 96.9 (27 Aug 2018 06:33), Max: 98 (26 Aug 2018 14:56)  HR: 94 (27 Aug 2018 06:33) (94 - 100)  BP: 123/65 (27 Aug 2018 06:33) (121/68 - 125/77)  BP(mean): --  RR: 18 (26 Aug 2018 14:56) (18 - 18)  SpO2: --    LABS:                        9.1    4.84  )-----------( 203      ( 27 Aug 2018 07:39 )             29.6     08-27    138  |  105  |  31<H>  ----------------------------<  83  5.4<H>   |  20  |  1.5    Ca    9.2      27 Aug 2018 07:39                Culture - Blood (collected 24 Aug 2018 14:19)  Source: .Blood Blood-Peripheral  Preliminary Report (26 Aug 2018 01:02):    No growth to date.    Culture - Blood (collected 24 Aug 2018 14:19)  Source: .Blood Blood-Peripheral  Preliminary Report (26 Aug 2018 01:02):    No growth to date.          RADIOLOGY:      PHYSICAL EXAM:    GENERAL: NAD, well-developed, AAOx3  HEENT:  Atraumatic, Normocephalic  PULMONARY: Clear to auscultation bilaterally; No wheeze  CARDIOVASCULAR: Regular rate and rhythm; No murmurs, rubs, or gallops  GASTROINTESTINAL: Soft, Nontender Bowel sounds present  MUSCULOSKELETAL:   Right third Ulcer, well defined margin. Tender, No discharge, no oozing or bleeding.                                    Pulses +1 bilaterally, skin pale bilaterally, Cool to touch, Bilateral lower extremities.    ADMISSION SUMMARY  Patient is a 78y old Female who presents with a chief complaint of diabetic foot ulcer + DVT (27 Aug 2018 10:31)  Currently admitted to medicine with the primary diagnosis of DVT (deep venous thrombosis)      ASSESSMENT & PLAN    1. Acute LLE DVT  - lower extremity duplex was positive for acute thrombus.  - Vascular Surgery consult requested for acute DVT and non-healing DFU.    - Continue Lovenox 90mg SQ q12. Please refer to hematology/oncology upon discharge.  - Right Lower Extremity DFU (3rd digit): radiographs were negative for OM.  Continue local wound care as per podiatry. Continue treatment with IV Flagyl, Levaquin, and Vancomycin. As per ID recommendation, antibiotics will not help be beneficial.  Her pain is currently well controlled on Percocet.    2.Rheumatoid Arthritis  continue Prednisone 20mg PO q24.  Follow-up with Rheumatology-Dr. Henry as outpatient for treatment with biologics (off medications due to distant history of tuberculosis).    3.Hypertension  continue Enalapril    4.DMII  continue Lantus/Lispro and monitor FS with meals.    GI prophylaxis  Full code  Diet Dash Diet  Dispo- Short term rehab vs Home with [facility] LINCOLN HEREDIA 78y Female  MRN#: [id number]       SUBJECTIVE    Patient is a 78y old Female who presents with a chief complaint of diabetic foot ulcer + DVT (27 Aug 2018 10:31)  Currently admitted to medicine with the primary diagnosis of DVT (deep venous thrombosis)  Patient is lying comfortably in bed. She denies any fever, chest pain, SOB, abdominal pain. No complains in urine and bowel movement. She denies any calf pain. The patient does not recall how she developed wound on her right leg third toe. She denies any history of trauma, discharge and bleeding. When she presented in ER, US duplex lower extremities showed blood clot in femoral, Popliteal and posterior tibial vein on left side. Arterial duplex was normal bilaterally.        OBJECTIVE  PAST MEDICAL & SURGICAL HISTORY  HTN (hypertension)  Rheumatoid arteritis  Other specified diabetes mellitus with other specified complication, unspecified whether long term insulin use  Primary osteoarthritis of right knee: s/p knee repalcement    ALLERGIES:  clindamycin (Unknown)  erythromycin (Unknown)  penicillin (Unknown)  strawberry (Unknown)    MEDICATIONS:  STANDING MEDICATIONS  allopurinol 300 milliGRAM(s) Oral daily  enalapril 10 milliGRAM(s) Oral daily  enoxaparin Injectable 90 milliGRAM(s) SubCutaneous two times a day  levoFLOXacin IVPB 750 milliGRAM(s) IV Intermittent every 48 hours  metroNIDAZOLE  IVPB 500 milliGRAM(s) IV Intermittent every 8 hours  pioglitazone 30 milliGRAM(s) Oral daily  predniSONE   Tablet 20 milliGRAM(s) Oral daily  vancomycin  IVPB 1000 milliGRAM(s) IV Intermittent every 12 hours    PRN MEDICATIONS  oxyCODONE    5 mG/acetaminophen 325 mG 1 Tablet(s) Oral every 4 hours PRN      VITAL SIGNS: Last 24 Hours  T(C): 36.1 (27 Aug 2018 06:33), Max: 36.7 (26 Aug 2018 14:56)  T(F): 96.9 (27 Aug 2018 06:33), Max: 98 (26 Aug 2018 14:56)  HR: 94 (27 Aug 2018 06:33) (94 - 100)  BP: 123/65 (27 Aug 2018 06:33) (121/68 - 125/77)  BP(mean): --  RR: 18 (26 Aug 2018 14:56) (18 - 18)  SpO2: --    LABS:                        9.1    4.84  )-----------( 203      ( 27 Aug 2018 07:39 )             29.6     08-27    138  |  105  |  31<H>  ----------------------------<  83  5.4<H>   |  20  |  1.5    Ca    9.2      27 Aug 2018 07:39                Culture - Blood (collected 24 Aug 2018 14:19)  Source: .Blood Blood-Peripheral  Preliminary Report (26 Aug 2018 01:02):    No growth to date.    Culture - Blood (collected 24 Aug 2018 14:19)  Source: .Blood Blood-Peripheral  Preliminary Report (26 Aug 2018 01:02):    No growth to date.          RADIOLOGY:      PHYSICAL EXAM:    GENERAL: NAD, well-developed, AAOx3  HEENT:  Atraumatic, Normocephalic  PULMONARY: Clear to auscultation bilaterally; No wheeze  CARDIOVASCULAR: Regular rate and rhythm; No murmurs, rubs, or gallops  GASTROINTESTINAL: Soft, Nontender Bowel sounds present  MUSCULOSKELETAL:   Right third Ulcer, well defined margin. Tender, No discharge, no oozing or bleeding.                                    Pulses +1 bilaterally, skin pale bilaterally, Cool to touch, Bilateral lower extremities.    ADMISSION SUMMARY  Patient is a 78y old Female who presents with a chief complaint of diabetic foot ulcer + DVT (27 Aug 2018 10:31)  Currently admitted to medicine with the primary diagnosis of DVT (deep venous thrombosis)      ASSESSMENT & PLAN    1. Acute LLE DVT  - lower extremity duplex was positive for acute thrombus.  - Vascular Surgery consult requested for acute DVT and non-healing DFU.    - Continue Lovenox 90mg SQ q12. Please refer to hematology/oncology upon discharge.  - Right Lower Extremity DFU (3rd digit): radiographs were negative for OM.  Continue local wound care as per podiatry. Continue treatment with IV Flagyl, Levaquin, and Vancomycin. As per ID recommendation, antibiotics will not help be beneficial.  Her pain is currently well controlled on Percocet.    2.Rheumatoid Arthritis  continue Prednisone 20mg PO q24.  Follow-up with Rheumatology-Dr. Henry as outpatient for treatment with biologics (off medications due to distant history of tuberculosis).    3.Hypertension  continue Enalapril    4.DMII  Resumed Pioglitazone. monitor BS    GI prophylaxis  Full code  Diet Dash Diet  Dispo- Short term rehab vs Home with [facility] LINCOLN HEREDIA 78y Female  MRN#: [id number]       SUBJECTIVE    Patient is a 78y old Female who presents with a chief complaint of diabetic foot ulcer + DVT (27 Aug 2018 10:31)  Currently admitted to medicine with the primary diagnosis of DVT (deep venous thrombosis)  Patient is lying comfortably in bed. She denies any fever, chest pain, SOB, abdominal pain. No complains in urine and bowel movement. She denies any calf pain. The patient does not recall how she developed wound on her right leg third toe. She denies any history of trauma, discharge and bleeding. When she presented in ER, US duplex lower extremities showed blood clot in femoral, Popliteal and posterior tibial vein on left side. Arterial duplex was normal bilaterally.        OBJECTIVE  PAST MEDICAL & SURGICAL HISTORY  HTN (hypertension)  Rheumatoid arteritis  Other specified diabetes mellitus with other specified complication, unspecified whether long term insulin use  Primary osteoarthritis of right knee: s/p knee repalcement    ALLERGIES:  clindamycin (Unknown)  erythromycin (Unknown)  penicillin (Unknown)  strawberry (Unknown)    MEDICATIONS:  STANDING MEDICATIONS  allopurinol 300 milliGRAM(s) Oral daily  enalapril 10 milliGRAM(s) Oral daily  enoxaparin Injectable 90 milliGRAM(s) SubCutaneous two times a day  levoFLOXacin IVPB 750 milliGRAM(s) IV Intermittent every 48 hours  metroNIDAZOLE  IVPB 500 milliGRAM(s) IV Intermittent every 8 hours  pioglitazone 30 milliGRAM(s) Oral daily  predniSONE   Tablet 20 milliGRAM(s) Oral daily  vancomycin  IVPB 1000 milliGRAM(s) IV Intermittent every 12 hours    PRN MEDICATIONS  oxyCODONE    5 mG/acetaminophen 325 mG 1 Tablet(s) Oral every 4 hours PRN      VITAL SIGNS: Last 24 Hours  T(C): 36.1 (27 Aug 2018 06:33), Max: 36.7 (26 Aug 2018 14:56)  T(F): 96.9 (27 Aug 2018 06:33), Max: 98 (26 Aug 2018 14:56)  HR: 94 (27 Aug 2018 06:33) (94 - 100)  BP: 123/65 (27 Aug 2018 06:33) (121/68 - 125/77)  BP(mean): --  RR: 18 (26 Aug 2018 14:56) (18 - 18)  SpO2: --    LABS:                        9.1    4.84  )-----------( 203      ( 27 Aug 2018 07:39 )             29.6     08-27    138  |  105  |  31<H>  ----------------------------<  83  5.4<H>   |  20  |  1.5    Ca    9.2      27 Aug 2018 07:39                Culture - Blood (collected 24 Aug 2018 14:19)  Source: .Blood Blood-Peripheral  Preliminary Report (26 Aug 2018 01:02):    No growth to date.    Culture - Blood (collected 24 Aug 2018 14:19)  Source: .Blood Blood-Peripheral  Preliminary Report (26 Aug 2018 01:02):    No growth to date.          RADIOLOGY:      PHYSICAL EXAM:    GENERAL: NAD, well-developed, AAOx3  HEENT:  Atraumatic, Normocephalic  PULMONARY: Clear to auscultation bilaterally; No wheeze  CARDIOVASCULAR: Regular rate and rhythm; No murmurs, rubs, or gallops  GASTROINTESTINAL: Soft, Nontender Bowel sounds present  MUSCULOSKELETAL:   Right third Ulcer, well defined margin. Tender, No discharge, no oozing or bleeding.                                    Pulses +1 bilaterally, skin pale bilaterally, Cool to touch, Bilateral lower extremities.    ADMISSION SUMMARY  Patient is a 78y old Female who presents with a chief complaint of diabetic foot ulcer + DVT (27 Aug 2018 10:31)  Currently admitted to medicine with the primary diagnosis of DVT (deep venous thrombosis)      ASSESSMENT & PLAN    1. Acute LLE DVT  - lower extremity duplex was positive for acute thrombus.  - Vascular Surgery consult requested for acute DVT and non-healing DFU.    - Continue Lovenox 90mg SQ q12. Please refer to hematology/oncology upon discharge.  - Right Lower Extremity DFU (3rd digit): radiographs were negative for OM.  Continue local wound care as per podiatry. Continue treatment with IV Flagyl, Levaquin, and Vancomycin. As per ID recommendation, antibiotics will not help be beneficial.  Her pain is currently well controlled on Percocet.    2.Rheumatoid Arthritis  continue Prednisone 20mg PO q24.  Follow-up with Rheumatology-Dr. Henry as outpatient for treatment with biologics (off medications due to distant history of tuberculosis).    3.Hypertension  continue Enalapril    4.DMII  Resumed Pioglitazone. monitor BS    5. Hyperkalemia  No t wave changes on EKG. Patient given dextrose 50 bolus and Insulin once. Repeat BMP 4 Pm    GI prophylaxis  Full code  Diet Dash Diet  Dispo- Short term rehab vs Home with [facility] LINCOLN HEREDIA 78y Female  MRN#: [id number]       SUBJECTIVE    Patient is a 78y old Female who presents with a chief complaint of diabetic foot ulcer + DVT (27 Aug 2018 10:31)  Currently admitted to medicine with the primary diagnosis of DVT (deep venous thrombosis)  Patient is lying comfortably in bed. She denies any fever, chest pain, SOB, abdominal pain. No complains in urine and bowel movement. She denies any calf pain. The patient does not recall how she developed wound on her right leg third toe. She denies any history of trauma, discharge and bleeding. When she presented in ER, US duplex lower extremities showed blood clot in femoral, Popliteal and posterior tibial vein on left side. Arterial duplex was normal bilaterally.        OBJECTIVE  PAST MEDICAL & SURGICAL HISTORY  HTN (hypertension)  Rheumatoid arteritis  Other specified diabetes mellitus with other specified complication, unspecified whether long term insulin use  Primary osteoarthritis of right knee: s/p knee repalcement    ALLERGIES:  clindamycin (Unknown)  erythromycin (Unknown)  penicillin (Unknown)  strawberry (Unknown)    MEDICATIONS:  STANDING MEDICATIONS  allopurinol 300 milliGRAM(s) Oral daily  enalapril 10 milliGRAM(s) Oral daily  enoxaparin Injectable 90 milliGRAM(s) SubCutaneous two times a day  levoFLOXacin IVPB 750 milliGRAM(s) IV Intermittent every 48 hours  metroNIDAZOLE  IVPB 500 milliGRAM(s) IV Intermittent every 8 hours  pioglitazone 30 milliGRAM(s) Oral daily  predniSONE   Tablet 20 milliGRAM(s) Oral daily  vancomycin  IVPB 1000 milliGRAM(s) IV Intermittent every 12 hours    PRN MEDICATIONS  oxyCODONE    5 mG/acetaminophen 325 mG 1 Tablet(s) Oral every 4 hours PRN      VITAL SIGNS: Last 24 Hours  T(C): 36.1 (27 Aug 2018 06:33), Max: 36.7 (26 Aug 2018 14:56)  T(F): 96.9 (27 Aug 2018 06:33), Max: 98 (26 Aug 2018 14:56)  HR: 94 (27 Aug 2018 06:33) (94 - 100)  BP: 123/65 (27 Aug 2018 06:33) (121/68 - 125/77)  BP(mean): --  RR: 18 (26 Aug 2018 14:56) (18 - 18)  SpO2: --    LABS:                        9.1    4.84  )-----------( 203      ( 27 Aug 2018 07:39 )             29.6     08-27    138  |  105  |  31<H>  ----------------------------<  83  5.4<H>   |  20  |  1.5    Ca    9.2      27 Aug 2018 07:39                Culture - Blood (collected 24 Aug 2018 14:19)  Source: .Blood Blood-Peripheral  Preliminary Report (26 Aug 2018 01:02):    No growth to date.    Culture - Blood (collected 24 Aug 2018 14:19)  Source: .Blood Blood-Peripheral  Preliminary Report (26 Aug 2018 01:02):    No growth to date.          RADIOLOGY:      PHYSICAL EXAM:    GENERAL: NAD, well-developed, AAOx3  HEENT:  Atraumatic, Normocephalic  PULMONARY: Clear to auscultation bilaterally; No wheeze  CARDIOVASCULAR: Regular rate and rhythm; No murmurs, rubs, or gallops  GASTROINTESTINAL: Soft, Nontender Bowel sounds present  MUSCULOSKELETAL:   Right third Ulcer, well defined margin. Tender, No discharge, no oozing or bleeding.                                    Pulses +1 bilaterally, skin pale bilaterally, Cool to touch, Bilateral lower extremities.    ADMISSION SUMMARY  Patient is a 78y old Female who presents with a chief complaint of diabetic foot ulcer + DVT (27 Aug 2018 10:31)  Currently admitted to medicine with the primary diagnosis of DVT (deep venous thrombosis)      ASSESSMENT & PLAN    1. Acute LLE DVT  - lower extremity duplex was positive for acute thrombus. Arterial Duplex was negative.  - Vascular Surgery consult requested for acute DVT and non-healing DFU.    - Continue Lovenox 90mg SQ q12. Please refer to hematology/oncology upon discharge.  - Right Lower Extremity DFU (3rd digit): radiographs were negative for OM.  Continue local wound care as per podiatry. Continue treatment with IV Flagyl, Levaquin, and Vancomycin. As per ID recommendation, antibiotics will not help be beneficial.  Her pain is currently well controlled on Percocet.    2.Rheumatoid Arthritis  continue Prednisone 20mg PO q24.  Follow-up with Rheumatology-Dr. Henry as outpatient for treatment with biologics (off medications due to distant history of tuberculosis).    3.Hypertension  continue Enalapril    4.DMII  Resumed Pioglitazone. monitor BS    5. Hyperkalemia  No t wave changes on EKG. Patient given dextrose 50 bolus and Insulin once. Repeat BMP 4 Pm    GI prophylaxis  Full code  Diet Dash Diet  Dispo- Short term rehab vs Home with [facility]

## 2018-08-27 NOTE — PROGRESS NOTE ADULT - SUBJECTIVE AND OBJECTIVE BOX
no chest pain and no sob     Vital Signs Last 24 Hrs  T(C): 36.1 (27 Aug 2018 06:33), Max: 36.7 (26 Aug 2018 14:56)  T(F): 96.9 (27 Aug 2018 06:33), Max: 98 (26 Aug 2018 14:56)  HR: 94 (27 Aug 2018 06:33) (94 - 100)  BP: 123/65 (27 Aug 2018 06:33) (121/68 - 125/77)  BP(mean): --  RR: 18 (26 Aug 2018 14:56) (18 - 18)  SpO2: --    PHYSICAL EXAM:  GENERAL: NAD, well-developed  HEAD:  Atraumatic, Normocephalic  EYES: EOMI, PERRLA, conjunctiva and sclera clear  NECK: Supple, No JVD  Pulm: Clear to auscultation bilaterally; No wheeze  CV: Regular rate and rhythm; No murmurs, rubs, or gallops  GI: Soft, Nontender, Nondistended; Bowel sounds present  EXTREMITIES:  Ulcer to the dorsal aspect of the right third toe  PSYCH: AAOx3  NEUROLOGY: non-focal  SKIN: No rashes or lesions                          9.1    4.84  )-----------( 203      ( 27 Aug 2018 07:39 )             29.6     08-27    138  |  105  |  31<H>  ----------------------------<  83  5.4<H>   |  20  |  1.5    Ca    9.2      27 Aug 2018 07:39                Culture - Blood (collected 24 Aug 2018 14:19)  Source: .Blood Blood-Peripheral  Preliminary Report (26 Aug 2018 01:02):    No growth to date.    Culture - Blood (collected 24 Aug 2018 14:19)  Source: .Blood Blood-Peripheral  Preliminary Report (26 Aug 2018 01:02):    No growth to date.

## 2018-08-27 NOTE — CONSULT NOTE ADULT - ATTENDING COMMENTS
Pt seen and examined.  DM female with non healing gangrenous right third toe wound.  Also has left leg venous stasis ulcer.  On examination she does not have palpable pedal pulses.  Will schedule angiogram of right leg for 8/29/18

## 2018-08-27 NOTE — DISCHARGE NOTE ADULT - PROVIDER TOKENS
FREE:[LAST:[Dr. Stephen],PHONE:[(   )    -],FAX:[(   )    -]],FREE:[LAST:[Dr. Henry],PHONE:[(   )    -],FAX:[(   )    -]]

## 2018-08-27 NOTE — PROGRESS NOTE ADULT - ASSESSMENT
77 yo F with PMH of HTN, DM, RA on Prednisone, and prior DVT off AC admitted for non-healing RLE ulcer with hospital course complicated by acute LLE DVT.    Acute LLE DVT:  Vascular Surgery consult requested for acute DVT and non-healing DFU.  Continue Lovenox 90mg SQ q12 for now .  Please refer to hematology/oncology upon discharge.    Right Lower Extremity DFU (3rd digit): radiographs were negative for OM.  Continue local wound care as per podiatry.  Continue treatment with IV Flagyl, Levaquin, and Vancomycin.needs ID follow up to comment on abx . Her pain is currently well controlled on Percocet.    Rheumatoid Arthritis: continue Prednisone 20mg PO q24.  Follow-up with Rheumatology-Dr. Henry as outpatient for treatment with biologics (off medications due to distant history of tuberculosis).    Hypertension: hold vasotec for now potassium 5.4 recheck tomorrow and resume if potassium better. otherwise will give CCB     DMII: resumed actos per patient request. had episodes of hypoglycemia on insulin     GI prophylaxis

## 2018-08-27 NOTE — PROGRESS NOTE ADULT - SUBJECTIVE AND OBJECTIVE BOX
LINCOLN HEREDIA  78y, Female      OVERNIGHT EVENTS:  no acute events overnight    ROS negative except as per above    VITALS:  T(F): 96.3, Max: 97.4 (08-26-18 @ 20:39)  HR: 111  BP: 14/68  RR: 20Vital Signs Last 24 Hrs  T(C): 35.7 (27 Aug 2018 13:58), Max: 36.3 (26 Aug 2018 20:39)  T(F): 96.3 (27 Aug 2018 13:58), Max: 97.4 (26 Aug 2018 20:39)  HR: 111 (27 Aug 2018 13:58) (94 - 111)  BP: 14/68 (27 Aug 2018 13:58) (14/68 - 123/65)  BP(mean): --  RR: 20 (27 Aug 2018 13:58) (20 - 20)  SpO2: --    PHYSICAL EXAM  Gen: Awake and alert, non-toxic appearing, NAD  HEENT: NCAT. EOMI. MMM.   Neck: Supple, no cervical LAD  CV: RRR, no murmurs  Lungs: CTAB, no w/r/r  Abd: Soft. NTND  Extr: wwp, RLE venous stasis wound, no surrounding erythema. L foot digit ischemic ulcer  Skin: no rash  Neuro: No focal deficits  Lines: clean      TESTS & MEASUREMENTS:                        9.1    4.84  )-----------( 203      ( 27 Aug 2018 07:39 )             29.6     08-27    138  |  105  |  31<H>  ----------------------------<  83  5.4<H>   |  20  |  1.5    Ca    9.2      27 Aug 2018 07:39          Culture - Blood (collected 08-24-18 @ 14:19)  Source: .Blood Blood-Peripheral  Preliminary Report (08-26-18 @ 01:02):    No growth to date.    Culture - Blood (collected 08-24-18 @ 14:19)  Source: .Blood Blood-Peripheral  Preliminary Report (08-26-18 @ 01:02):    No growth to date.          RADIOLOGY & ADDITIONAL TESTS:    ANTIBIOTICS:  levoFLOXacin IVPB   100 mL/Hr IV Intermittent (08-27-18 @ 06:42)   100 mL/Hr IV Intermittent (08-26-18 @ 06:05)   100 mL/Hr IV Intermittent (08-25-18 @ 06:01)    metroNIDAZOLE  IVPB   100 mL/Hr IV Intermittent (08-27-18 @ 14:48)   100 mL/Hr IV Intermittent (08-27-18 @ 06:39)   100 mL/Hr IV Intermittent (08-26-18 @ 21:44)   100 mL/Hr IV Intermittent (08-26-18 @ 13:24)   100 mL/Hr IV Intermittent (08-26-18 @ 06:05)   100 mL/Hr IV Intermittent (08-25-18 @ 21:08)   100 mL/Hr IV Intermittent (08-25-18 @ 15:36)   100 mL/Hr IV Intermittent (08-25-18 @ 06:02)    vancomycin  IVPB   250 mL/Hr IV Intermittent (08-27-18 @ 06:41)   250 mL/Hr IV Intermittent (08-26-18 @ 17:51)   250 mL/Hr IV Intermittent (08-26-18 @ 06:05)   250 mL/Hr IV Intermittent (08-25-18 @ 17:44)   250 mL/Hr IV Intermittent (08-25-18 @ 06:02)    vancomycin  IVPB   250 mL/Hr IV Intermittent (08-24-18 @ 14:39)        levoFLOXacin IVPB 750 milliGRAM(s) IV Intermittent every 48 hours  metroNIDAZOLE  IVPB 500 milliGRAM(s) IV Intermittent every 8 hours  vancomycin  IVPB 1000 milliGRAM(s) IV Intermittent every 12 hours

## 2018-08-27 NOTE — DISCHARGE NOTE ADULT - PLAN OF CARE
To Optimize the patient The patient was evaluated for osteomyletis, DVT and infection. Sepsis, osteomyelitis was ruled out. The patient was found to have left lower extremity DVT. Started on anticoagulation. Vascular was consulted, angiogram was done, no surgery indicated at this time. Patient received wound care. The patient was evaluated for osteomyletis, DVT and infection. Sepsis, osteomyelitis was ruled out. The patient was found to have left lower extremity DVT. Started on anticoagulation. Vascular was consulted, angiogram was done, no surgery indicated at this time. Patient received wound care. Discuss with PCP to resume enalipril which was discontinued because of hyperkalemia. The patient was evaluated for osteomyletis, DVT and infection. Sepsis, osteomyelitis was ruled out. The patient was found to have left lower extremity DVT. Started on anticoagulation. Vascular was consulted, angiogram was done, no surgery indicated at this time. Patient received wound care. Discuss with PCP to resume enalipril which was discontinued because of hyperkalemia. Podiatry recommended to continue with wound care, betadine dressing, xeroform and kerlix dressing.

## 2018-08-27 NOTE — PROGRESS NOTE ADULT - ASSESSMENT
78F    RA on prednisone  DM    # Right third toe DFU with necrosis, xray without OM  # LLE ulcer & DVT  # Macrocytosis    - Appreciate Vascular consult  - No fever, no leukocytosis, recommend monitor off abx-- as seen once abx started, if primary team prefers can complete short course doxy 100mg BID PO 5-7 days for LLE ulcer    Spectra [id number]

## 2018-08-28 PROBLEM — I10 ESSENTIAL (PRIMARY) HYPERTENSION: Chronic | Status: ACTIVE | Noted: 2018-08-24

## 2018-08-28 LAB
ALBUMIN SERPL ELPH-MCNC: 3.6 G/DL — SIGNIFICANT CHANGE UP (ref 3.5–5.2)
ALP SERPL-CCNC: 55 U/L — SIGNIFICANT CHANGE UP (ref 30–115)
ALT FLD-CCNC: 16 U/L — SIGNIFICANT CHANGE UP (ref 0–41)
ANION GAP SERPL CALC-SCNC: 12 MMOL/L — SIGNIFICANT CHANGE UP (ref 7–14)
ANION GAP SERPL CALC-SCNC: 17 MMOL/L — HIGH (ref 7–14)
APTT BLD: 36.9 SEC — SIGNIFICANT CHANGE UP (ref 27–39.2)
AST SERPL-CCNC: 26 U/L — SIGNIFICANT CHANGE UP (ref 0–41)
BASOPHILS # BLD AUTO: 0.01 K/UL — SIGNIFICANT CHANGE UP (ref 0–0.2)
BASOPHILS NFR BLD AUTO: 0.2 % — SIGNIFICANT CHANGE UP (ref 0–1)
BILIRUB SERPL-MCNC: 0.2 MG/DL — SIGNIFICANT CHANGE UP (ref 0.2–1.2)
BUN SERPL-MCNC: 34 MG/DL — HIGH (ref 10–20)
BUN SERPL-MCNC: 35 MG/DL — HIGH (ref 10–20)
CALCIUM SERPL-MCNC: 8.9 MG/DL — SIGNIFICANT CHANGE UP (ref 8.5–10.1)
CALCIUM SERPL-MCNC: 9 MG/DL — SIGNIFICANT CHANGE UP (ref 8.5–10.1)
CHLORIDE SERPL-SCNC: 106 MMOL/L — SIGNIFICANT CHANGE UP (ref 98–110)
CHLORIDE SERPL-SCNC: 106 MMOL/L — SIGNIFICANT CHANGE UP (ref 98–110)
CO2 SERPL-SCNC: 18 MMOL/L — SIGNIFICANT CHANGE UP (ref 17–32)
CO2 SERPL-SCNC: 19 MMOL/L — SIGNIFICANT CHANGE UP (ref 17–32)
CREAT SERPL-MCNC: 1.5 MG/DL — SIGNIFICANT CHANGE UP (ref 0.7–1.5)
CREAT SERPL-MCNC: 1.5 MG/DL — SIGNIFICANT CHANGE UP (ref 0.7–1.5)
EOSINOPHIL # BLD AUTO: 0.08 K/UL — SIGNIFICANT CHANGE UP (ref 0–0.7)
EOSINOPHIL NFR BLD AUTO: 1.6 % — SIGNIFICANT CHANGE UP (ref 0–8)
GLUCOSE BLDC GLUCOMTR-MCNC: 135 MG/DL — HIGH (ref 70–99)
GLUCOSE BLDC GLUCOMTR-MCNC: 139 MG/DL — HIGH (ref 70–99)
GLUCOSE BLDC GLUCOMTR-MCNC: 153 MG/DL — HIGH (ref 70–99)
GLUCOSE BLDC GLUCOMTR-MCNC: 84 MG/DL — SIGNIFICANT CHANGE UP (ref 70–99)
GLUCOSE BLDC GLUCOMTR-MCNC: 96 MG/DL — SIGNIFICANT CHANGE UP (ref 70–99)
GLUCOSE BLDC GLUCOMTR-MCNC: 99 MG/DL — SIGNIFICANT CHANGE UP (ref 70–99)
GLUCOSE SERPL-MCNC: 125 MG/DL — HIGH (ref 70–99)
GLUCOSE SERPL-MCNC: 82 MG/DL — SIGNIFICANT CHANGE UP (ref 70–99)
HCT VFR BLD CALC: 29.4 % — LOW (ref 37–47)
HGB BLD-MCNC: 8.9 G/DL — LOW (ref 12–16)
IMM GRANULOCYTES NFR BLD AUTO: 0.4 % — HIGH (ref 0.1–0.3)
INR BLD: 1.19 RATIO — SIGNIFICANT CHANGE UP (ref 0.65–1.3)
LYMPHOCYTES # BLD AUTO: 1.27 K/UL — SIGNIFICANT CHANGE UP (ref 1.2–3.4)
LYMPHOCYTES # BLD AUTO: 25 % — SIGNIFICANT CHANGE UP (ref 20.5–51.1)
MAGNESIUM SERPL-MCNC: 2.1 MG/DL — SIGNIFICANT CHANGE UP (ref 1.8–2.4)
MCHC RBC-ENTMCNC: 30.3 G/DL — LOW (ref 32–37)
MCHC RBC-ENTMCNC: 32.2 PG — HIGH (ref 27–31)
MCV RBC AUTO: 106.5 FL — HIGH (ref 81–99)
MONOCYTES # BLD AUTO: 0.57 K/UL — SIGNIFICANT CHANGE UP (ref 0.1–0.6)
MONOCYTES NFR BLD AUTO: 11.2 % — HIGH (ref 1.7–9.3)
NEUTROPHILS # BLD AUTO: 3.13 K/UL — SIGNIFICANT CHANGE UP (ref 1.4–6.5)
NEUTROPHILS NFR BLD AUTO: 61.6 % — SIGNIFICANT CHANGE UP (ref 42.2–75.2)
NRBC # BLD: 0 /100 WBCS — SIGNIFICANT CHANGE UP (ref 0–0)
PHOSPHATE SERPL-MCNC: 3.7 MG/DL — SIGNIFICANT CHANGE UP (ref 2.1–4.9)
PLATELET # BLD AUTO: 201 K/UL — SIGNIFICANT CHANGE UP (ref 130–400)
POTASSIUM SERPL-MCNC: 5.1 MMOL/L — HIGH (ref 3.5–5)
POTASSIUM SERPL-MCNC: 5.2 MMOL/L — HIGH (ref 3.5–5)
POTASSIUM SERPL-SCNC: 5.1 MMOL/L — HIGH (ref 3.5–5)
POTASSIUM SERPL-SCNC: 5.2 MMOL/L — HIGH (ref 3.5–5)
PROT SERPL-MCNC: 5.8 G/DL — LOW (ref 6–8)
PROTHROM AB SERPL-ACNC: 12.8 SEC — SIGNIFICANT CHANGE UP (ref 9.95–12.87)
RBC # BLD: 2.76 M/UL — LOW (ref 4.2–5.4)
RBC # FLD: 17.7 % — HIGH (ref 11.5–14.5)
SODIUM SERPL-SCNC: 137 MMOL/L — SIGNIFICANT CHANGE UP (ref 135–146)
SODIUM SERPL-SCNC: 141 MMOL/L — SIGNIFICANT CHANGE UP (ref 135–146)
WBC # BLD: 5.08 K/UL — SIGNIFICANT CHANGE UP (ref 4.8–10.8)
WBC # FLD AUTO: 5.08 K/UL — SIGNIFICANT CHANGE UP (ref 4.8–10.8)

## 2018-08-28 PROCEDURE — 93925 LOWER EXTREMITY STUDY: CPT | Mod: 26

## 2018-08-28 RX ORDER — SODIUM CHLORIDE 9 MG/ML
1000 INJECTION INTRAMUSCULAR; INTRAVENOUS; SUBCUTANEOUS
Qty: 0 | Refills: 0 | Status: DISCONTINUED | OUTPATIENT
Start: 2018-08-28 | End: 2018-08-29

## 2018-08-28 RX ADMIN — Medication 250 MILLIGRAM(S): at 06:36

## 2018-08-28 RX ADMIN — ENOXAPARIN SODIUM 90 MILLIGRAM(S): 100 INJECTION SUBCUTANEOUS at 06:33

## 2018-08-28 RX ADMIN — OXYCODONE AND ACETAMINOPHEN 1 TABLET(S): 5; 325 TABLET ORAL at 21:12

## 2018-08-28 RX ADMIN — OXYCODONE AND ACETAMINOPHEN 1 TABLET(S): 5; 325 TABLET ORAL at 11:43

## 2018-08-28 RX ADMIN — OXYCODONE AND ACETAMINOPHEN 1 TABLET(S): 5; 325 TABLET ORAL at 13:22

## 2018-08-28 RX ADMIN — ENOXAPARIN SODIUM 90 MILLIGRAM(S): 100 INJECTION SUBCUTANEOUS at 17:57

## 2018-08-28 RX ADMIN — OXYCODONE AND ACETAMINOPHEN 1 TABLET(S): 5; 325 TABLET ORAL at 21:53

## 2018-08-28 RX ADMIN — Medication 100 MILLIGRAM(S): at 17:57

## 2018-08-28 RX ADMIN — SODIUM CHLORIDE 75 MILLILITER(S): 9 INJECTION INTRAMUSCULAR; INTRAVENOUS; SUBCUTANEOUS at 18:44

## 2018-08-28 RX ADMIN — PIOGLITAZONE HYDROCHLORIDE 30 MILLIGRAM(S): 15 TABLET ORAL at 11:45

## 2018-08-28 RX ADMIN — Medication 100 MILLIGRAM(S): at 06:34

## 2018-08-28 RX ADMIN — Medication 20 MILLIGRAM(S): at 06:34

## 2018-08-28 RX ADMIN — Medication 1 APPLICATION(S): at 17:57

## 2018-08-28 RX ADMIN — Medication 300 MILLIGRAM(S): at 11:45

## 2018-08-28 NOTE — PRE-ANESTHESIA EVALUATION ADULT - NSANTHPMHFT_GEN_ALL_CORE
ADMISSION SUMMARY  Patient is a 78y old Female who presents with a chief complaint of diabetic foot ulcer + DVT (27 Aug 2018 10:31)  Currently admitted to medicine with the primary diagnosis of DVT (deep venous thrombosis)      ASSESSMENT & PLAN    1. Acute LLE DVT  - lower extremity duplex was positive for acute thrombus. Arterial Duplex was negative.  - Vascular Surgery consult requested for acute DVT and non-healing DFU. Arterial Duplex repeat order. Vascular surgery following up.   - Continue Lovenox 90mg SQ q12. Please refer to hematology/oncology upon discharge.  - Right Lower Extremity DFU (3rd digit): radiographs were negative for OM.  Continue local wound care as per podiatry. Patient started on doxycycline 100 BID.  Her pain is currently well controlled on Percocet.    2.Rheumatoid Arthritis  continue Prednisone 20mg PO q24.  Follow-up with Rheumatology-Dr. Henry as outpatient for treatment with biologics (off medications due to distant history of tuberculosis).    3.Hypertension  continue Enalapril    4.DMII  Resumed Pioglitazone. monitor BS    5. Hyperkalemia  No t wave changes on EKG. Patient given dextrose 50 bolus and Insulin once. Current K is 5.1. Follow up with BMP    GI prophylaxis  Full code  Diet Dash Diet  Dispo- Short term rehab vs Home with [facility]

## 2018-08-28 NOTE — PROGRESS NOTE ADULT - SUBJECTIVE AND OBJECTIVE BOX
LINCOLN HEREDIA 78y Female  MRN#: [id number]       SUBJECTIVE    Patient is a 78y old Female who presents with a chief complaint of diabetic foot ulcer + DVT (27 Aug 2018 10:31).Currently admitted to medicine with the primary diagnosis of DVT (deep venous thrombosis).Patient is lying comfortably in bed. She denies any fever, chest pain, SOB, abdominal pain. No complains in urine and bowel movement. She denies any calf pain. The patient does not recall how she developed wound on her right leg third toe. She denies any history of trauma, discharge and bleeding. When she presented in ER, US duplex lower extremities showed blood clot in femoral, Popliteal and posterior tibial vein on left side. Arterial duplex was normal bilaterally.  No overnight complains. The patient denies any worsening of symptoms.      OBJECTIVE  PAST MEDICAL & SURGICAL HISTORY  HTN (hypertension)  Rheumatoid arteritis  Other specified diabetes mellitus with other specified complication, unspecified whether long term insulin use  Primary osteoarthritis of right knee: s/p knee repalcement    ALLERGIES:  clindamycin (Unknown)  erythromycin (Unknown)  penicillin (Unknown)  strawberry (Unknown)    MEDICATIONS:  STANDING MEDICATIONS  allopurinol 300 milliGRAM(s) Oral daily  dextrose 50% Injectable 50 milliLiter(s) IV Push once  doxycycline hyclate Capsule 100 milliGRAM(s) Oral every 12 hours  enoxaparin Injectable 90 milliGRAM(s) SubCutaneous two times a day  fluocinonide 0.05% Cream 1 Application(s) Topical every 12 hours  insulin regular  human recombinant. 10 Unit(s) SubCutaneous once  pioglitazone 30 milliGRAM(s) Oral daily  predniSONE   Tablet 20 milliGRAM(s) Oral daily    PRN MEDICATIONS  oxyCODONE    5 mG/acetaminophen 325 mG 1 Tablet(s) Oral every 4 hours PRN      VITAL SIGNS: Last 24 Hours  T(C): 36.4 (28 Aug 2018 06:30), Max: 36.5 (27 Aug 2018 20:30)  T(F): 97.6 (28 Aug 2018 06:30), Max: 97.7 (27 Aug 2018 20:30)  HR: 95 (28 Aug 2018 06:30) (95 - 116)  BP: 108/55 (28 Aug 2018 06:30) (14/68 - 152/70)  BP(mean): --  RR: 19 (27 Aug 2018 20:30) (19 - 20)  SpO2: --    LABS:                        8.9    5.08  )-----------( 201      ( 28 Aug 2018 07:43 )             29.4     08-28    141  |  106  |  34<H>  ----------------------------<  82  5.1<H>   |  18  |  1.5    Ca    8.9      28 Aug 2018 07:43  Phos  3.7     08-28  Mg     2.1     08-28    TPro  5.8<L>  /  Alb  3.6  /  TBili  0.2  /  DBili  x   /  AST  26  /  ALT  16  /  AlkPhos  55  08-28    PT/INR - ( 28 Aug 2018 07:43 )   PT: 12.80 sec;   INR: 1.19 ratio         PTT - ( 28 Aug 2018 07:43 )  PTT:36.9 sec              RADIOLOGY:      PHYSICAL EXAM:    GENERAL: NAD, well-developed, AAOx3  HEENT:  Atraumatic, Normocephalic  PULMONARY: Clear to auscultation bilaterally; No wheeze  CARDIOVASCULAR: Regular rate and rhythm; No murmurs, rubs, or gallops  GASTROINTESTINAL: Soft, Nontender Bowel sounds present  MUSCULOSKELETAL:   Right third Ulcer, well defined margin. Tender, No discharge, no oozing or bleeding.                                    Pulses +1 bilaterally, skin pale bilaterally, Cool to touch, Bilateral lower extremities.    ADMISSION SUMMARY  Patient is a 78y old Female who presents with a chief complaint of diabetic foot ulcer + DVT (27 Aug 2018 10:31)  Currently admitted to medicine with the primary diagnosis of DVT (deep venous thrombosis)      ASSESSMENT & PLAN    1. Acute LLE DVT  - lower extremity duplex was positive for acute thrombus. Arterial Duplex was negative.  - Vascular Surgery consult requested for acute DVT and non-healing DFU. Arterial Duplex repeat order. Vascular surgery following up.   - Continue Lovenox 90mg SQ q12. Please refer to hematology/oncology upon discharge.  - Right Lower Extremity DFU (3rd digit): radiographs were negative for OM.  Continue local wound care as per podiatry. Patient started on doxycycline 100 BID.  Her pain is currently well controlled on Percocet.    2.Rheumatoid Arthritis  continue Prednisone 20mg PO q24.  Follow-up with Rheumatology-Dr. Henry as outpatient for treatment with biologics (off medications due to distant history of tuberculosis).    3.Hypertension  continue Enalapril    4.DMII  Resumed Pioglitazone. monitor BS    5. Hyperkalemia  No t wave changes on EKG. Patient given dextrose 50 bolus and Insulin once. Current K is 5.1. Follow up with BMP    GI prophylaxis  Full code  Diet Dash Diet  Dispo- Short term rehab vs Home with [facility] LINCOLN HEREDIA 78y Female  MRN#: [id number]       SUBJECTIVE    Patient is a 78y old Female who presents with a chief complaint of diabetic foot ulcer + DVT (27 Aug 2018 10:31).Currently admitted to medicine with the primary diagnosis of DVT (deep venous thrombosis).Patient is lying comfortably in bed. She denies any fever, chest pain, SOB, abdominal pain. No complains in urine and bowel movement. She denies any calf pain. The patient does not recall how she developed wound on her right leg third toe. She denies any history of trauma, discharge and bleeding. When she presented in ER, US duplex lower extremities showed blood clot in femoral, Popliteal and posterior tibial vein on left side. Arterial duplex was normal bilaterally.  No overnight complains. The patient denies any worsening of symptoms.      OBJECTIVE  PAST MEDICAL & SURGICAL HISTORY  HTN (hypertension)  Rheumatoid arteritis  Other specified diabetes mellitus with other specified complication, unspecified whether long term insulin use  Primary osteoarthritis of right knee: s/p knee repalcement    ALLERGIES:  clindamycin (Unknown)  erythromycin (Unknown)  penicillin (Unknown)  strawberry (Unknown)    MEDICATIONS:  STANDING MEDICATIONS  allopurinol 300 milliGRAM(s) Oral daily  dextrose 50% Injectable 50 milliLiter(s) IV Push once  doxycycline hyclate Capsule 100 milliGRAM(s) Oral every 12 hours  enoxaparin Injectable 90 milliGRAM(s) SubCutaneous two times a day  fluocinonide 0.05% Cream 1 Application(s) Topical every 12 hours  insulin regular  human recombinant. 10 Unit(s) SubCutaneous once  pioglitazone 30 milliGRAM(s) Oral daily  predniSONE   Tablet 20 milliGRAM(s) Oral daily    PRN MEDICATIONS  oxyCODONE    5 mG/acetaminophen 325 mG 1 Tablet(s) Oral every 4 hours PRN      VITAL SIGNS: Last 24 Hours  T(C): 36.4 (28 Aug 2018 06:30), Max: 36.5 (27 Aug 2018 20:30)  T(F): 97.6 (28 Aug 2018 06:30), Max: 97.7 (27 Aug 2018 20:30)  HR: 95 (28 Aug 2018 06:30) (95 - 116)  BP: 108/55 (28 Aug 2018 06:30) (14/68 - 152/70)  BP(mean): --  RR: 19 (27 Aug 2018 20:30) (19 - 20)  SpO2: --    LABS:                        8.9    5.08  )-----------( 201      ( 28 Aug 2018 07:43 )             29.4     08-28    141  |  106  |  34<H>  ----------------------------<  82  5.1<H>   |  18  |  1.5    Ca    8.9      28 Aug 2018 07:43  Phos  3.7     08-28  Mg     2.1     08-28    TPro  5.8<L>  /  Alb  3.6  /  TBili  0.2  /  DBili  x   /  AST  26  /  ALT  16  /  AlkPhos  55  08-28    PT/INR - ( 28 Aug 2018 07:43 )   PT: 12.80 sec;   INR: 1.19 ratio         PTT - ( 28 Aug 2018 07:43 )  PTT:36.9 sec              RADIOLOGY:      PHYSICAL EXAM:    GENERAL: NAD, well-developed, AAOx3  HEENT:  Atraumatic, Normocephalic  PULMONARY: Clear to auscultation bilaterally; No wheeze  CARDIOVASCULAR: Regular rate and rhythm; No murmurs, rubs, or gallops  GASTROINTESTINAL: Soft, Nontender Bowel sounds present  MUSCULOSKELETAL:   Right third Ulcer, well defined margin. Tender, No discharge, no oozing or bleeding.                                    Pulses +1 bilaterally, skin pale bilaterally, Cool to touch, Bilateral lower extremities.    ADMISSION SUMMARY  Patient is a 78y old Female who presents with a chief complaint of diabetic foot ulcer + DVT (27 Aug 2018 10:31)  Currently admitted to medicine with the primary diagnosis of DVT (deep venous thrombosis)      ASSESSMENT & PLAN    1. Acute LLE DVT  - lower extremity duplex was positive for acute thrombus. Arterial Duplex was negative.  - Vascular Surgery consult requested for acute DVT and non-healing DFU. Arterial Duplex repeat order. Vascular surgery following up.   - Continue Lovenox 90mg SQ q12. Please refer to hematology/oncology upon discharge.  - Right Lower Extremity DFU (3rd digit): radiographs were negative for OM.  Continue local wound care as per podiatry. Patient started on doxycycline 100 BID.  Her pain is currently well controlled on Percocet.  -will send home on eliquis patient prefers this over coumadin     2.Rheumatoid Arthritis  continue Prednisone 20mg PO q24.  Follow-up with Rheumatology-Dr. Henry as outpatient for treatment with biologics (off medications due to distant history of tuberculosis).    3.Hypertension  continue Enalapril    4.DMII  Resumed Pioglitazone. monitor BS    5. Hyperkalemia  No t wave changes on EKG. Patient given dextrose 50 bolus and Insulin once. Current K is 5.1. Follow up with BMP    6.CKD 3 stable         GI prophylaxis  Full code  Diet Dash Diet  Dispo- Short term rehab vs Home with [facility]

## 2018-08-28 NOTE — CHART NOTE - NSCHARTNOTEFT_GEN_A_CORE
Patient: LINCOLN HEREDIA  MRN: [id number]  78y Female  Location: [facility] T5-3C 009 B  18 @ 16:35    Vitals:  T(F): 96.8 (18 @ 14:06), Max: 97.7 (18 @ 20:30)  HR: 107 (18 @ 14:06) (95 - 116)  BP: 133/71 (18 @ 14:06) (108/55 - 152/70)  RR: 20 (18 @ 14:06) (19 - 20)  SpO2: --    Procedure:   Consent in Chart: [ x ] Yes [  ] No  Diet: NPO after midnight  Fluids:   EK Lead ECG:   Ventricular Rate 100 BPM    Atrial Rate 100 BPM    P-R Interval 144 ms    QRS Duration 78 ms    Q-T Interval 316 ms    QTC Calculation(Bezet) 407 ms    P Axis 26 degrees    R Axis -34 degrees    T Axis 21 degrees    Diagnosis Line Normal sinus rhythm  Left axis deviation  Left anterior fascicular block  Low voltage QRS  Cannot rule out Anterior infarct , age undetermined  Abnormal ECG    Confirmed by LAURA MCDANIEL MD ([id number]) on 2018 9:33:38 AM (18 @ 07:53)    CXR:  Xray Chest 1 View-PORTABLE IMMEDIATE:   EXAM:  XR CHEST PORTABLE IMMED 1V            PROCEDURE DATE:  2018            INTERPRETATION:  Clinical History / Reason for exam: Sepsis    Comparison : Chest radiograph December 3, 2015.    Technique/Positionin frontal.    Findings:    Support devices: None.    Cardiac/mediastinum/hilum: Unremarkable.    Lung parenchyma/Pleura: Left basilar focal atelectasis.    Skeleton/soft tissues: Stable.    Impression:      Left basilar focal atelectasis                  CARA GANN M.D., ATTENDING RADIOLOGIST  This document has been electronically signed. Aug 24 2018  2:40PM             (18 @ 14:37)    Pre-OP Labs:  CAPILLARY BLOOD GLUCOSE      POCT Blood Glucose.: 153 mg/dL (28 Aug 2018 16:16)  POCT Blood Glucose.: 99 mg/dL (28 Aug 2018 11:15)  POCT Blood Glucose.: 96 mg/dL (28 Aug 2018 07:36)  POCT Blood Glucose.: 84 mg/dL (28 Aug 2018 05:57)  POCT Blood Glucose.: 139 mg/dL (27 Aug 2018 23:59)  POCT Blood Glucose.: 150 mg/dL (27 Aug 2018 21:23)  POCT Blood Glucose.: 206 mg/dL (27 Aug 2018 16:56)                          8.9    5.08  )-----------( 201      ( 28 Aug 2018 07:43 )             29.4         141  |  106  |  34<H>  ----------------------------<  82  5.1<H>   |  18  |  1.5    Ca    8.9      28 Aug 2018 07:43  Phos  3.7       Mg     2.1         TPro  5.8<L>  /  Alb  3.6  /  TBili  0.2  /  DBili  x   /  AST  26  /  ALT  16  /  AlkPhos  55  -    PT/INR - ( 28 Aug 2018 07:43 )   PT: 12.80 sec;   INR: 1.19 ratio         PTT - ( 28 Aug 2018 07:43 )  PTT:36.9 sec    Type & Screen:    Anticoagulation/Antiplatelet:    Plan:  -OR tomorrow for right lower extremity angiogram  -NPO after midnight  -IVF when NPO  -active type and screen Patient: LINCOLN HEREDIA  MRN: [id number]  78y Female  Location: [facility] T5-3C 009 B  18 @ 16:35    Vitals:  T(F): 96.8 (18 @ 14:06), Max: 97.7 (18 @ 20:30)  HR: 107 (18 @ 14:06) (95 - 116)  BP: 133/71 (18 @ 14:06) (108/55 - 152/70)  RR: 20 (18 @ 14:06) (19 - 20)  SpO2: --    Procedure:   Consent in Chart: [ x ] Yes [  ] No  Diet: NPO after midnight  Fluids:   EK Lead ECG:   Ventricular Rate 100 BPM    Atrial Rate 100 BPM    P-R Interval 144 ms    QRS Duration 78 ms    Q-T Interval 316 ms    QTC Calculation(Bezet) 407 ms    P Axis 26 degrees    R Axis -34 degrees    T Axis 21 degrees    Diagnosis Line Normal sinus rhythm  Left axis deviation  Left anterior fascicular block  Low voltage QRS  Cannot rule out Anterior infarct , age undetermined  Abnormal ECG    Confirmed by LAURA MCDANIEL MD ([id number]) on 2018 9:33:38 AM (18 @ 07:53)    CXR:  Xray Chest 1 View-PORTABLE IMMEDIATE:   EXAM:  XR CHEST PORTABLE IMMED 1V            PROCEDURE DATE:  2018            INTERPRETATION:  Clinical History / Reason for exam: Sepsis    Comparison : Chest radiograph December 3, 2015.    Technique/Positionin frontal.    Findings:    Support devices: None.    Cardiac/mediastinum/hilum: Unremarkable.    Lung parenchyma/Pleura: Left basilar focal atelectasis.    Skeleton/soft tissues: Stable.    Impression:      Left basilar focal atelectasis                  CARA GANN M.D., ATTENDING RADIOLOGIST  This document has been electronically signed. Aug 24 2018  2:40PM             (18 @ 14:37)    Pre-OP Labs:  CAPILLARY BLOOD GLUCOSE      POCT Blood Glucose.: 153 mg/dL (28 Aug 2018 16:16)  POCT Blood Glucose.: 99 mg/dL (28 Aug 2018 11:15)  POCT Blood Glucose.: 96 mg/dL (28 Aug 2018 07:36)  POCT Blood Glucose.: 84 mg/dL (28 Aug 2018 05:57)  POCT Blood Glucose.: 139 mg/dL (27 Aug 2018 23:59)  POCT Blood Glucose.: 150 mg/dL (27 Aug 2018 21:23)  POCT Blood Glucose.: 206 mg/dL (27 Aug 2018 16:56)                          8.9    5.08  )-----------( 201      ( 28 Aug 2018 07:43 )             29.4         141  |  106  |  34<H>  ----------------------------<  82  5.1<H>   |  18  |  1.5    Ca    8.9      28 Aug 2018 07:43  Phos  3.7       Mg     2.1         TPro  5.8<L>  /  Alb  3.6  /  TBili  0.2  /  DBili  x   /  AST  26  /  ALT  16  /  AlkPhos  55  -    PT/INR - ( 28 Aug 2018 07:43 )   PT: 12.80 sec;   INR: 1.19 ratio         PTT - ( 28 Aug 2018 07:43 )  PTT:36.9 sec    Type & Screen:    Anticoagulation/Antiplatelet:    Plan:  -OR tomorrow for right lower extremity angiogram  -Hold morning dose of lovenox  -NPO after midnight  -IVF when NPO  -active type and screen

## 2018-08-28 NOTE — PRE-ANESTHESIA EVALUATION ADULT - NSRADCARDRESULTSFT_GEN_ALL_CORE
Summary:   1. Normal left ventricular size and wall thicknesses, with normal   systolic function.   2. The mean global longitudinal strain by speckle tracking is -18.4%   which is normal.   3. Mild tricuspid regurgitation.   4. Pulmonic valve regurgitation.   5. LA volume Index is 40.3 ml/m² ml/m2.

## 2018-08-28 NOTE — PROGRESS NOTE ADULT - SUBJECTIVE AND OBJECTIVE BOX
PROGRESS NOTE   Pt seen at bedside during AM rounds for Ischemic ulcer R 3rd digit and  multiple superficial ulcerations LLE. No changes or acute events. Denies n/f/v/c/d/sob.         Vital Signs Last 24 Hrs  T(C): 36.4 (28 Aug 2018 06:30), Max: 36.5 (27 Aug 2018 20:30)  T(F): 97.6 (28 Aug 2018 06:30), Max: 97.7 (27 Aug 2018 20:30)  HR: 95 (28 Aug 2018 06:30) (95 - 116)  BP: 108/55 (28 Aug 2018 06:30) (14/68 - 152/70)  BP(mean): --  RR: 19 (27 Aug 2018 20:30) (19 - 20)  SpO2: --                          8.9    5.08  )-----------( 201      ( 28 Aug 2018 07:43 )             29.4               08-28    141  |  106  |  34<H>  ----------------------------<  82  5.1<H>   |  18  |  1.5    Ca    8.9      28 Aug 2018 07:43  Phos  3.7     08-28  Mg     2.1     08-28    TPro  5.8<L>  /  Alb  3.6  /  TBili  0.2  /  DBili  x   /  AST  26  /  ALT  16  /  AlkPhos  55  08-28      PHYSICAL EXAM  E Focused examination conducted:  DERM:  necrotic/fibriotic superficial wound ulcer dorsal right 3rd digit with erythema and edema noted.  multiple superficial ulcers noted left medial aspect of the LE.   VASC:   Dorsalis Pedis 0/4   Posterior Tibial 0/4    Capillary refill time <3s x10   Temperature gradient: cold to touch b/l  Edema: mild edema right  NEURO: Protective sensation intact to the level of the digits bilateral.  ORTHO: Muscle strength 5/5 all major muscle groups bilateral. hammertoes toes 2/3/4 b/l    A:  Right ischemic 3rd digit ulcer  multiple superficial ulcerations LLE.  P:  pt evaluated and treated  applied betadine no dressing R 3rd toe  applied xeroform/dsd/kerlix right LE  Will continue local wound care  No surgical planning at this point per Podiatry  podiatry will f/u with attending for further evaluation and management

## 2018-08-29 ENCOUNTER — APPOINTMENT (OUTPATIENT)
Dept: VASCULAR SURGERY | Facility: HOSPITAL | Age: 78
End: 2018-08-29
Payer: MEDICARE

## 2018-08-29 LAB
ALLERGY+IMMUNOLOGY DIAG STUDY NOTE: SIGNIFICANT CHANGE UP
ANION GAP SERPL CALC-SCNC: 16 MMOL/L — HIGH (ref 7–14)
BASOPHILS # BLD AUTO: 0.01 K/UL — SIGNIFICANT CHANGE UP (ref 0–0.2)
BASOPHILS NFR BLD AUTO: 0.2 % — SIGNIFICANT CHANGE UP (ref 0–1)
BLD GP AB SCN SERPL QL: ABNORMAL
BUN SERPL-MCNC: 34 MG/DL — HIGH (ref 10–20)
CALCIUM SERPL-MCNC: 8.6 MG/DL — SIGNIFICANT CHANGE UP (ref 8.5–10.1)
CHLORIDE SERPL-SCNC: 101 MMOL/L — SIGNIFICANT CHANGE UP (ref 98–110)
CO2 SERPL-SCNC: 19 MMOL/L — SIGNIFICANT CHANGE UP (ref 17–32)
CREAT SERPL-MCNC: 1.3 MG/DL — SIGNIFICANT CHANGE UP (ref 0.7–1.5)
DIR ANTIGLOB POLYSPECIFIC INTERPRETATION: SIGNIFICANT CHANGE UP
EOSINOPHIL # BLD AUTO: 0.11 K/UL — SIGNIFICANT CHANGE UP (ref 0–0.7)
EOSINOPHIL NFR BLD AUTO: 2.1 % — SIGNIFICANT CHANGE UP (ref 0–8)
GLUCOSE BLDC GLUCOMTR-MCNC: 145 MG/DL — HIGH (ref 70–99)
GLUCOSE BLDC GLUCOMTR-MCNC: 158 MG/DL — HIGH (ref 70–99)
GLUCOSE BLDC GLUCOMTR-MCNC: 189 MG/DL — HIGH (ref 70–99)
GLUCOSE BLDC GLUCOMTR-MCNC: 236 MG/DL — HIGH (ref 70–99)
GLUCOSE BLDC GLUCOMTR-MCNC: 247 MG/DL — HIGH (ref 70–99)
GLUCOSE BLDC GLUCOMTR-MCNC: 95 MG/DL — SIGNIFICANT CHANGE UP (ref 70–99)
GLUCOSE SERPL-MCNC: 89 MG/DL — SIGNIFICANT CHANGE UP (ref 70–99)
HCT VFR BLD CALC: 27.2 % — LOW (ref 37–47)
HGB BLD-MCNC: 8.4 G/DL — LOW (ref 12–16)
IMM GRANULOCYTES NFR BLD AUTO: 0.8 % — HIGH (ref 0.1–0.3)
LYMPHOCYTES # BLD AUTO: 1 K/UL — LOW (ref 1.2–3.4)
LYMPHOCYTES # BLD AUTO: 18.9 % — LOW (ref 20.5–51.1)
MCHC RBC-ENTMCNC: 30.9 G/DL — LOW (ref 32–37)
MCHC RBC-ENTMCNC: 32.8 PG — HIGH (ref 27–31)
MCV RBC AUTO: 106.3 FL — HIGH (ref 81–99)
MONOCYTES # BLD AUTO: 0.56 K/UL — SIGNIFICANT CHANGE UP (ref 0.1–0.6)
MONOCYTES NFR BLD AUTO: 10.6 % — HIGH (ref 1.7–9.3)
NEUTROPHILS # BLD AUTO: 3.57 K/UL — SIGNIFICANT CHANGE UP (ref 1.4–6.5)
NEUTROPHILS NFR BLD AUTO: 67.4 % — SIGNIFICANT CHANGE UP (ref 42.2–75.2)
NRBC # BLD: 0 /100 WBCS — SIGNIFICANT CHANGE UP (ref 0–0)
PLATELET # BLD AUTO: 194 K/UL — SIGNIFICANT CHANGE UP (ref 130–400)
POTASSIUM SERPL-MCNC: 5.2 MMOL/L — HIGH (ref 3.5–5)
POTASSIUM SERPL-SCNC: 5.2 MMOL/L — HIGH (ref 3.5–5)
RBC # BLD: 2.56 M/UL — LOW (ref 4.2–5.4)
RBC # FLD: 17.6 % — HIGH (ref 11.5–14.5)
SODIUM SERPL-SCNC: 136 MMOL/L — SIGNIFICANT CHANGE UP (ref 135–146)
TYPE + AB SCN PNL BLD: SIGNIFICANT CHANGE UP
WBC # BLD: 5.29 K/UL — SIGNIFICANT CHANGE UP (ref 4.8–10.8)
WBC # FLD AUTO: 5.29 K/UL — SIGNIFICANT CHANGE UP (ref 4.8–10.8)

## 2018-08-29 PROCEDURE — 36246 INS CATH ABD/L-EXT ART 2ND: CPT | Mod: RT

## 2018-08-29 PROCEDURE — 76937 US GUIDE VASCULAR ACCESS: CPT | Mod: 26

## 2018-08-29 PROCEDURE — 36200 PLACE CATHETER IN AORTA: CPT | Mod: 59,RT

## 2018-08-29 PROCEDURE — 75630 X-RAY AORTA LEG ARTERIES: CPT | Mod: 26,59

## 2018-08-29 PROCEDURE — 75710 ARTERY X-RAYS ARM/LEG: CPT | Mod: 26

## 2018-08-29 RX ORDER — ALLOPURINOL 300 MG
300 TABLET ORAL DAILY
Qty: 0 | Refills: 0 | Status: DISCONTINUED | OUTPATIENT
Start: 2018-08-29 | End: 2018-08-31

## 2018-08-29 RX ORDER — ASPIRIN/CALCIUM CARB/MAGNESIUM 324 MG
81 TABLET ORAL DAILY
Qty: 0 | Refills: 0 | Status: DISCONTINUED | OUTPATIENT
Start: 2018-08-29 | End: 2018-08-30

## 2018-08-29 RX ORDER — METOCLOPRAMIDE HCL 10 MG
10 TABLET ORAL ONCE
Qty: 0 | Refills: 0 | Status: DISCONTINUED | OUTPATIENT
Start: 2018-08-29 | End: 2018-08-29

## 2018-08-29 RX ORDER — ENOXAPARIN SODIUM 100 MG/ML
90 INJECTION SUBCUTANEOUS
Qty: 0 | Refills: 0 | Status: DISCONTINUED | OUTPATIENT
Start: 2018-08-29 | End: 2018-08-30

## 2018-08-29 RX ORDER — PIOGLITAZONE HYDROCHLORIDE 15 MG/1
30 TABLET ORAL DAILY
Qty: 0 | Refills: 0 | Status: DISCONTINUED | OUTPATIENT
Start: 2018-08-29 | End: 2018-08-31

## 2018-08-29 RX ORDER — OXYCODONE AND ACETAMINOPHEN 5; 325 MG/1; MG/1
1 TABLET ORAL ONCE
Qty: 0 | Refills: 0 | Status: DISCONTINUED | OUTPATIENT
Start: 2018-08-29 | End: 2018-08-29

## 2018-08-29 RX ORDER — MORPHINE SULFATE 50 MG/1
2 CAPSULE, EXTENDED RELEASE ORAL
Qty: 0 | Refills: 0 | Status: DISCONTINUED | OUTPATIENT
Start: 2018-08-29 | End: 2018-08-29

## 2018-08-29 RX ORDER — SODIUM CHLORIDE 9 MG/ML
1000 INJECTION INTRAMUSCULAR; INTRAVENOUS; SUBCUTANEOUS
Qty: 0 | Refills: 0 | Status: DISCONTINUED | OUTPATIENT
Start: 2018-08-29 | End: 2018-08-30

## 2018-08-29 RX ORDER — ONDANSETRON 8 MG/1
4 TABLET, FILM COATED ORAL ONCE
Qty: 0 | Refills: 0 | Status: DISCONTINUED | OUTPATIENT
Start: 2018-08-29 | End: 2018-08-29

## 2018-08-29 RX ORDER — FLUOCINONIDE/EMOLLIENT BASE 0.05 %
1 CREAM (GRAM) TOPICAL EVERY 12 HOURS
Qty: 0 | Refills: 0 | Status: DISCONTINUED | OUTPATIENT
Start: 2018-08-29 | End: 2018-08-31

## 2018-08-29 RX ORDER — OXYCODONE AND ACETAMINOPHEN 5; 325 MG/1; MG/1
1 TABLET ORAL EVERY 4 HOURS
Qty: 0 | Refills: 0 | Status: DISCONTINUED | OUTPATIENT
Start: 2018-08-29 | End: 2018-08-31

## 2018-08-29 RX ORDER — APIXABAN 2.5 MG/1
2 TABLET, FILM COATED ORAL
Qty: 44 | Refills: 0 | OUTPATIENT
Start: 2018-08-29 | End: 2018-09-27

## 2018-08-29 RX ORDER — SODIUM CHLORIDE 9 MG/ML
1000 INJECTION INTRAMUSCULAR; INTRAVENOUS; SUBCUTANEOUS
Qty: 0 | Refills: 0 | Status: DISCONTINUED | OUTPATIENT
Start: 2018-08-29 | End: 2018-08-29

## 2018-08-29 RX ORDER — HYDROMORPHONE HYDROCHLORIDE 2 MG/ML
0.5 INJECTION INTRAMUSCULAR; INTRAVENOUS; SUBCUTANEOUS
Qty: 0 | Refills: 0 | Status: DISCONTINUED | OUTPATIENT
Start: 2018-08-29 | End: 2018-08-29

## 2018-08-29 RX ADMIN — OXYCODONE AND ACETAMINOPHEN 1 TABLET(S): 5; 325 TABLET ORAL at 12:38

## 2018-08-29 RX ADMIN — MORPHINE SULFATE 2 MILLIGRAM(S): 50 CAPSULE, EXTENDED RELEASE ORAL at 12:03

## 2018-08-29 RX ADMIN — OXYCODONE AND ACETAMINOPHEN 1 TABLET(S): 5; 325 TABLET ORAL at 07:47

## 2018-08-29 RX ADMIN — Medication 300 MILLIGRAM(S): at 11:32

## 2018-08-29 RX ADMIN — ENOXAPARIN SODIUM 90 MILLIGRAM(S): 100 INJECTION SUBCUTANEOUS at 17:14

## 2018-08-29 RX ADMIN — Medication 1 APPLICATION(S): at 05:49

## 2018-08-29 RX ADMIN — Medication 1 APPLICATION(S): at 17:15

## 2018-08-29 RX ADMIN — Medication 81 MILLIGRAM(S): at 11:32

## 2018-08-29 RX ADMIN — OXYCODONE AND ACETAMINOPHEN 1 TABLET(S): 5; 325 TABLET ORAL at 12:16

## 2018-08-29 RX ADMIN — Medication 100 MILLIGRAM(S): at 05:49

## 2018-08-29 RX ADMIN — PIOGLITAZONE HYDROCHLORIDE 30 MILLIGRAM(S): 15 TABLET ORAL at 14:28

## 2018-08-29 RX ADMIN — Medication 20 MILLIGRAM(S): at 05:49

## 2018-08-29 RX ADMIN — OXYCODONE AND ACETAMINOPHEN 1 TABLET(S): 5; 325 TABLET ORAL at 23:03

## 2018-08-29 RX ADMIN — SODIUM CHLORIDE 100 MILLILITER(S): 9 INJECTION INTRAMUSCULAR; INTRAVENOUS; SUBCUTANEOUS at 15:43

## 2018-08-29 RX ADMIN — MORPHINE SULFATE 2 MILLIGRAM(S): 50 CAPSULE, EXTENDED RELEASE ORAL at 11:45

## 2018-08-29 RX ADMIN — Medication 100 MILLIGRAM(S): at 17:14

## 2018-08-29 RX ADMIN — OXYCODONE AND ACETAMINOPHEN 1 TABLET(S): 5; 325 TABLET ORAL at 08:30

## 2018-08-29 RX ADMIN — OXYCODONE AND ACETAMINOPHEN 1 TABLET(S): 5; 325 TABLET ORAL at 05:48

## 2018-08-29 RX ADMIN — OXYCODONE AND ACETAMINOPHEN 1 TABLET(S): 5; 325 TABLET ORAL at 01:08

## 2018-08-29 RX ADMIN — SODIUM CHLORIDE 50 MILLILITER(S): 9 INJECTION INTRAMUSCULAR; INTRAVENOUS; SUBCUTANEOUS at 10:52

## 2018-08-29 RX ADMIN — SODIUM CHLORIDE 75 MILLILITER(S): 9 INJECTION INTRAMUSCULAR; INTRAVENOUS; SUBCUTANEOUS at 07:57

## 2018-08-29 RX ADMIN — OXYCODONE AND ACETAMINOPHEN 1 TABLET(S): 5; 325 TABLET ORAL at 23:33

## 2018-08-29 NOTE — BRIEF OPERATIVE NOTE - OPERATION/FINDINGS
patent aortoiliac segment, femoropopliteal segment and 2 vessel intact runoff via PHIL and peroneal vessel

## 2018-08-29 NOTE — CHART NOTE - NSCHARTNOTEFT_GEN_A_CORE
GENERAL SURGERY PROGRESS NOTE     LINCOLN HEREDIA  78y  Female  Hospital day :5d  POD: 0  Procedure: Angiogram, extremity, right    Patient is doing well post operatively. Denies pain at catheter site. No swelling present to indicate hematoma. Denies f/c, C/P, SOB.     T(F): 97.8 (08-29-18 @ 12:50), Max: 98.2 (08-29-18 @ 10:51)  HR: 89 (08-29-18 @ 12:50) (83 - 96)  BP: 113/65 (08-29-18 @ 12:50) (93/68 - 142/67)  ABP: --  ABP(mean): --  RR: 12 (08-29-18 @ 12:50) (12 - 23)  SpO2: 100% (08-29-18 @ 12:50) (96% - 100%)    DIET/FLUIDS: sodium chloride 0.9%. 1000 milliLiter(s) IV Continuous <Continuous>    GI proph:    AC/ proph: aspirin enteric coated 81 milliGRAM(s) Oral daily    ABx: doxycycline hyclate Capsule 100 milliGRAM(s) Oral every 12 hours      PHYSICAL EXAM:  GENERAL: NAD, well-appearing  CHEST/LUNG: No obvious increased WOB  ABDOMEN: Soft, Nontender, Nondistended; No swelling at catheterization site   VASC: B/l DP/PT able to be dopplered       LABS  Labs:  CAPILLARY BLOOD GLUCOSE  145 (29 Aug 2018 03:02)      POCT Blood Glucose.: 189 mg/dL (29 Aug 2018 14:06)  POCT Blood Glucose.: 95 mg/dL (29 Aug 2018 07:33)  POCT Blood Glucose.: 145 mg/dL (29 Aug 2018 03:14)  POCT Blood Glucose.: 135 mg/dL (28 Aug 2018 21:15)  POCT Blood Glucose.: 158 mg/dL (28 Aug 2018 17:08)  POCT Blood Glucose.: 153 mg/dL (28 Aug 2018 16:16)                          8.4    5.29  )-----------( 194      ( 29 Aug 2018 08:22 )             27.2       Auto Neutrophil %: 67.4 % (08-29-18 @ 08:22)  Auto Immature Granulocyte %: 0.8 % (08-29-18 @ 08:22)    08-29    136  |  101  |  34<H>  ----------------------------<  89  5.2<H>   |  19  |  1.3      Calcium, Total Serum: 8.6 mg/dL (08-29-18 @ 08:22)      LFTs:             5.8  | 0.2  | 26       ------------------[55      ( 28 Aug 2018 07:43 )  3.6  | x    | 16          Lipase:x      Amylase:x             Coags:     12.80  ----< 1.19    ( 28 Aug 2018 07:43 )     36.9

## 2018-08-29 NOTE — PROGRESS NOTE ADULT - SUBJECTIVE AND OBJECTIVE BOX
LINCOLN HEREDIA 78y Female  MRN#: [id number]       SUBJECTIVE  Patient is a 78y old Female who presents with a chief complaint of diabetic foot ulcer + DVT (27 Aug 2018 10:31).Currently admitted to medicine with the primary diagnosis of DVT (deep venous thrombosis).  No overnight complains. The patient denies any worsening of symptoms. Patient scheduled for angiogram today.         OBJECTIVE  PAST MEDICAL & SURGICAL HISTORY  HTN (hypertension)  Rheumatoid arteritis  Other specified diabetes mellitus with other specified complication, unspecified whether long term insulin use  Primary osteoarthritis of right knee: s/p knee repalcement    ALLERGIES:  clindamycin (Unknown)  erythromycin (Unknown)  penicillin (Unknown)  strawberry (Unknown)    MEDICATIONS:  STANDING MEDICATIONS    PRN MEDICATIONS      VITAL SIGNS: Last 24 Hours  T(C): 35.9 (29 Aug 2018 08:00), Max: 36 (28 Aug 2018 14:06)  T(F): 96.6 (29 Aug 2018 08:00), Max: 96.8 (28 Aug 2018 14:06)  HR: 88 (29 Aug 2018 08:00) (88 - 107)  BP: 142/67 (29 Aug 2018 08:00) (112/57 - 142/67)  BP(mean): --  RR: 18 (29 Aug 2018 08:00) (18 - 20)  SpO2: 96% (29 Aug 2018 08:00) (96% - 96%)    LABS:                        8.9    5.08  )-----------( 201      ( 28 Aug 2018 07:43 )             29.4     08-28    141  |  106  |  34<H>  ----------------------------<  82  5.1<H>   |  18  |  1.5    Ca    8.9      28 Aug 2018 07:43  Phos  3.7     08-28  Mg     2.1     08-28    TPro  5.8<L>  /  Alb  3.6  /  TBili  0.2  /  DBili  x   /  AST  26  /  ALT  16  /  AlkPhos  55  08-28    PT/INR - ( 28 Aug 2018 07:43 )   PT: 12.80 sec;   INR: 1.19 ratio         PTT - ( 28 Aug 2018 07:43 )  PTT:36.9 sec            PHYSICAL EXAM:    GENERAL: NAD, well-developed, AAOx3  HEENT:  Atraumatic, Normocephalic  PULMONARY: Clear to auscultation bilaterally  CARDIOVASCULAR: Regular rate and rhythm; No murmurs, rubs, or gallops  MUSCULOSKELETAL:   Right third Ulcer, well defined margin. Tender, No discharge, no oozing or bleeding.                                    Pulses +1 bilaterally, skin pale bilaterally, Cool to touch, Bilateral lower extremities.    ADMISSION SUMMARY  Patient is a 78y old Female who presents with a chief complaint of diabetic foot ulcer + DVT (27 Aug 2018 10:31)  Currently admitted to medicine with the primary diagnosis of DVT (deep venous thrombosis)    PHYSICAL EXAM:      1. Acute LLE DVT  - lower extremity duplex was positive for acute thrombus. Arterial Duplex was negative.  - Vascular Surgery consult requested for acute DVT and non-healing DFU. Arterial Duplex repeat order. Vascular surgery following up. Patient going for angiogram today.   - Continue Lovenox 90mg SQ q12. Please refer to hematology/oncology upon discharge.  -Continue Eliquis 10 mg twice a day for 7 days then 5 mg twice  day   - Right Lower Extremity DFU (3rd digit): radiographs were negative for OM.  Continue local wound care as per podiatry. Patient started on doxycycline 100 BID.  Her pain is currently well controlled on Percocet.  -will send home on eliquis patient prefers this over coumadin     2.Rheumatoid Arthritis  continue Prednisone 20mg PO q24.  Follow-up with Rheumatology-Dr. Henry as outpatient for treatment with biologics (off medications due to distant history of tuberculosis).    3.Hypertension  continue Enalapril    4.DMII  Resumed Pioglitazone. monitor BS    5. Hyperkalemia  No t wave changes on EKG. Patient given dextrose 50 bolus and Insulin once. Current K is 5.1. Follow up with BMP    6.CKD 3 stable         GI prophylaxis  Full code  Diet Dash Diet  Dispo- Short term rehab vs Home with [facility] LINCOLN HEREDIA 78y Female  MRN#: [id number]       SUBJECTIVE  Patient is a 78y old Female who presents with a chief complaint of diabetic foot ulcer + DVT (27 Aug 2018 10:31).Currently admitted to medicine with the primary diagnosis of DVT (deep venous thrombosis).  No overnight complains. The patient denies any worsening of symptoms. Patient scheduled for angiogram today.         OBJECTIVE  PAST MEDICAL & SURGICAL HISTORY  HTN (hypertension)  Rheumatoid arteritis  Other specified diabetes mellitus with other specified complication, unspecified whether long term insulin use  Primary osteoarthritis of right knee: s/p knee repalcement    ALLERGIES:  clindamycin (Unknown)  erythromycin (Unknown)  penicillin (Unknown)  strawberry (Unknown)    MEDICATIONS:  STANDING MEDICATIONS    PRN MEDICATIONS      VITAL SIGNS: Last 24 Hours  T(C): 35.9 (29 Aug 2018 08:00), Max: 36 (28 Aug 2018 14:06)  T(F): 96.6 (29 Aug 2018 08:00), Max: 96.8 (28 Aug 2018 14:06)  HR: 88 (29 Aug 2018 08:00) (88 - 107)  BP: 142/67 (29 Aug 2018 08:00) (112/57 - 142/67)  BP(mean): --  RR: 18 (29 Aug 2018 08:00) (18 - 20)  SpO2: 96% (29 Aug 2018 08:00) (96% - 96%)    LABS:                        8.9    5.08  )-----------( 201      ( 28 Aug 2018 07:43 )             29.4     08-28    141  |  106  |  34<H>  ----------------------------<  82  5.1<H>   |  18  |  1.5    Ca    8.9      28 Aug 2018 07:43  Phos  3.7     08-28  Mg     2.1     08-28    TPro  5.8<L>  /  Alb  3.6  /  TBili  0.2  /  DBili  x   /  AST  26  /  ALT  16  /  AlkPhos  55  08-28    PT/INR - ( 28 Aug 2018 07:43 )   PT: 12.80 sec;   INR: 1.19 ratio         PTT - ( 28 Aug 2018 07:43 )  PTT:36.9 sec            PHYSICAL EXAM:    GENERAL: NAD, well-developed, AAOx3  HEENT:  Atraumatic, Normocephalic  PULMONARY: Clear to auscultation bilaterally  CARDIOVASCULAR: Regular rate and rhythm; No murmurs, rubs, or gallops  MUSCULOSKELETAL:   Right third Ulcer, well defined margin. Tender, No discharge, no oozing or bleeding.                                    Pulses +1 bilaterally, skin pale bilaterally, Cool to touch, Bilateral lower extremities.    ADMISSION SUMMARY  Patient is a 78y old Female who presents with a chief complaint of diabetic foot ulcer + DVT (27 Aug 2018 10:31)  Currently admitted to medicine with the primary diagnosis of DVT (deep venous thrombosis)    PHYSICAL EXAM:      1. Acute LLE DVT  - lower extremity duplex was positive for acute thrombus. Arterial Duplex was negative.  - Vascular Surgery consult requested for acute DVT and non-healing DFU. Arterial Duplex repeat order. Vascular surgery following up. Patient going for angiogram today.   - Continue Lovenox 90mg SQ q12. Please refer to hematology/oncology upon discharge.  -Continue Eliquis 10 mg twice a day for 7 days then 5 mg twice a day Outpatient.   - Right Lower Extremity DFU (3rd digit): radiographs were negative for OM.  Continue local wound care as per podiatry. Patient started on doxycycline 100 BID.  Her pain is currently well controlled on Percocet.    2.Rheumatoid Arthritis  continue Prednisone 20mg PO q24.  Follow-up with Rheumatology-Dr. Henry as outpatient for treatment with biologics (off medications due to distant history of tuberculosis).    3.Hypertension  continue Enalapril    4.DMII  Resumed Pioglitazone. monitor BS    5. Hyperkalemia  No t wave changes on EKG. Patient given dextrose 50 bolus and Insulin once. Current K is 5.1. Follow up with BMP    6.CKD 3 stable         GI prophylaxis  Full code  Diet Dash Diet  Dispo- Short term rehab vs Home with [facility] LINCOLN HEREDIA 78y Female  MRN#: [id number]       SUBJECTIVE  Patient is a 78y old Female who presents with a chief complaint of diabetic foot ulcer + DVT (27 Aug 2018 10:31).Currently admitted to medicine with the primary diagnosis of DVT (deep venous thrombosis).  No overnight complains. The patient denies any worsening of symptoms. Patient scheduled for angiogram today.         OBJECTIVE  PAST MEDICAL & SURGICAL HISTORY  HTN (hypertension)  Rheumatoid arteritis  Other specified diabetes mellitus with other specified complication, unspecified whether long term insulin use  Primary osteoarthritis of right knee: s/p knee repalcement    ALLERGIES:  clindamycin (Unknown)  erythromycin (Unknown)  penicillin (Unknown)  strawberry (Unknown)    MEDICATIONS:  STANDING MEDICATIONS    PRN MEDICATIONS      VITAL SIGNS: Last 24 Hours  T(C): 35.9 (29 Aug 2018 08:00), Max: 36 (28 Aug 2018 14:06)  T(F): 96.6 (29 Aug 2018 08:00), Max: 96.8 (28 Aug 2018 14:06)  HR: 88 (29 Aug 2018 08:00) (88 - 107)  BP: 142/67 (29 Aug 2018 08:00) (112/57 - 142/67)  BP(mean): --  RR: 18 (29 Aug 2018 08:00) (18 - 20)  SpO2: 96% (29 Aug 2018 08:00) (96% - 96%)    LABS:                        8.9    5.08  )-----------( 201      ( 28 Aug 2018 07:43 )             29.4     08-28    141  |  106  |  34<H>  ----------------------------<  82  5.1<H>   |  18  |  1.5    Ca    8.9      28 Aug 2018 07:43  Phos  3.7     08-28  Mg     2.1     08-28    TPro  5.8<L>  /  Alb  3.6  /  TBili  0.2  /  DBili  x   /  AST  26  /  ALT  16  /  AlkPhos  55  08-28    PT/INR - ( 28 Aug 2018 07:43 )   PT: 12.80 sec;   INR: 1.19 ratio         PTT - ( 28 Aug 2018 07:43 )  PTT:36.9 sec            PHYSICAL EXAM:    GENERAL: NAD, well-developed, AAOx3  HEENT:  Atraumatic, Normocephalic  PULMONARY: Clear to auscultation bilaterally  CARDIOVASCULAR: Regular rate and rhythm; No murmurs, rubs, or gallops  MUSCULOSKELETAL:   Right third Ulcer, well defined margin. Tender, No discharge, no oozing or bleeding.                                    Pulses +1 bilaterally, skin pale bilaterally, Cool to touch, Bilateral lower extremities.    ADMISSION SUMMARY  Patient is a 78y old Female who presents with a chief complaint of diabetic foot ulcer + DVT (27 Aug 2018 10:31)  Currently admitted to medicine with the primary diagnosis of DVT (deep venous thrombosis)    PHYSICAL EXAM:      1. Acute LLE DVT  - lower extremity duplex was positive for acute thrombus. Arterial Duplex was negative.  - Vascular Surgery consult requested for acute DVT and non-healing DFU. Arterial Duplex repeat order. Vascular surgery following up. Patient going for angiogram today.   - Continue Lovenox 90mg SQ q12. Please refer to hematology/oncology upon discharge.  -Continue Eliquis 10 mg twice a day for 7 days then 5 mg twice a day Outpatient.   - Right Lower Extremity DFU (3rd digit): radiographs were negative for OM.  Continue local wound care as per podiatry. Patient started on doxycycline 100 BID.  Her pain is currently well controlled on Percocet.    2.Rheumatoid Arthritis  continue Prednisone 20mg PO q24.  Follow-up with Rheumatology-Dr. Henry as outpatient for treatment with biologics (off medications due to distant history of tuberculosis).    3.Hypertension  continue Enalapril    4.DMII  Resumed Pioglitazone. monitor BS    5. Hyperkalemia  No t wave changes on EKG. Patient given dextrose 50 bolus and Insulin once. Current K is 5.1. Follow up with BMP. patient on ace need to monitor if potassium gets worse then need to hold ace     6.CKD 3 stable         GI prophylaxis  Full code  Diet Dash Diet  Dispo- Short term rehab vs Home with [facility]

## 2018-08-29 NOTE — CHART NOTE - NSCHARTNOTEFT_GEN_A_CORE
S/p aortogram, angiogram under sedation.  Patient is awake, patent airway with intact reflexes, pain controlled, adequate hydration, no n/v.  Discharge to floor when criteria are met. BP 93/68 P 87 R 16 T 98.4 SpO2 98%.

## 2018-08-29 NOTE — PROGRESS NOTE ADULT - ATTENDING COMMENTS
patient seen and examined independently    vascular planning on angiogram tomorrow. will need to give IVF tonight and 12 hours post angiogram since for CKD3
patient seen and examined independently

## 2018-08-29 NOTE — BRIEF OPERATIVE NOTE - PROCEDURE
<<-----Click on this checkbox to enter Procedure Angiogram, extremity, right  08/29/2018    Active  SEA

## 2018-08-29 NOTE — PHYSICAL THERAPY INITIAL EVALUATION ADULT - SPECIFY REASON(S)
Pt not in room. As per covering RN Mandy, pt went for Angiogram. PT to f/u when pt is available if appropriate and schedule permitting.

## 2018-08-30 LAB
ALBUMIN SERPL ELPH-MCNC: 3.8 G/DL — SIGNIFICANT CHANGE UP (ref 3.5–5.2)
ALP SERPL-CCNC: 58 U/L — SIGNIFICANT CHANGE UP (ref 30–115)
ALT FLD-CCNC: 17 U/L — SIGNIFICANT CHANGE UP (ref 0–41)
ANION GAP SERPL CALC-SCNC: 16 MMOL/L — HIGH (ref 7–14)
APTT BLD: 35.2 SEC — SIGNIFICANT CHANGE UP (ref 27–39.2)
AST SERPL-CCNC: 19 U/L — SIGNIFICANT CHANGE UP (ref 0–41)
BILIRUB SERPL-MCNC: 0.2 MG/DL — SIGNIFICANT CHANGE UP (ref 0.2–1.2)
BUN SERPL-MCNC: 41 MG/DL — HIGH (ref 10–20)
CALCIUM SERPL-MCNC: 8.4 MG/DL — LOW (ref 8.5–10.1)
CHLORIDE SERPL-SCNC: 102 MMOL/L — SIGNIFICANT CHANGE UP (ref 98–110)
CO2 SERPL-SCNC: 20 MMOL/L — SIGNIFICANT CHANGE UP (ref 17–32)
CREAT SERPL-MCNC: 1.5 MG/DL — SIGNIFICANT CHANGE UP (ref 0.7–1.5)
CULTURE RESULTS: SIGNIFICANT CHANGE UP
CULTURE RESULTS: SIGNIFICANT CHANGE UP
GLUCOSE BLDC GLUCOMTR-MCNC: 154 MG/DL — HIGH (ref 70–99)
GLUCOSE BLDC GLUCOMTR-MCNC: 162 MG/DL — HIGH (ref 70–99)
GLUCOSE BLDC GLUCOMTR-MCNC: 168 MG/DL — HIGH (ref 70–99)
GLUCOSE BLDC GLUCOMTR-MCNC: 245 MG/DL — HIGH (ref 70–99)
GLUCOSE BLDC GLUCOMTR-MCNC: 287 MG/DL — HIGH (ref 70–99)
GLUCOSE SERPL-MCNC: 133 MG/DL — HIGH (ref 70–99)
HCT VFR BLD CALC: 27.3 % — LOW (ref 37–47)
HGB BLD-MCNC: 8.5 G/DL — LOW (ref 12–16)
INR BLD: 1.05 RATIO — SIGNIFICANT CHANGE UP (ref 0.65–1.3)
MCHC RBC-ENTMCNC: 31.1 G/DL — LOW (ref 32–37)
MCHC RBC-ENTMCNC: 32.9 PG — HIGH (ref 27–31)
MCV RBC AUTO: 105.8 FL — HIGH (ref 81–99)
NRBC # BLD: 0 /100 WBCS — SIGNIFICANT CHANGE UP (ref 0–0)
PLATELET # BLD AUTO: 202 K/UL — SIGNIFICANT CHANGE UP (ref 130–400)
POTASSIUM SERPL-MCNC: 5.1 MMOL/L — HIGH (ref 3.5–5)
POTASSIUM SERPL-SCNC: 5.1 MMOL/L — HIGH (ref 3.5–5)
PROT SERPL-MCNC: 5.9 G/DL — LOW (ref 6–8)
PROTHROM AB SERPL-ACNC: 11.3 SEC — SIGNIFICANT CHANGE UP (ref 9.95–12.87)
RBC # BLD: 2.58 M/UL — LOW (ref 4.2–5.4)
RBC # FLD: 17.3 % — HIGH (ref 11.5–14.5)
SODIUM SERPL-SCNC: 138 MMOL/L — SIGNIFICANT CHANGE UP (ref 135–146)
SPECIMEN SOURCE: SIGNIFICANT CHANGE UP
SPECIMEN SOURCE: SIGNIFICANT CHANGE UP
WBC # BLD: 5.46 K/UL — SIGNIFICANT CHANGE UP (ref 4.8–10.8)
WBC # FLD AUTO: 5.46 K/UL — SIGNIFICANT CHANGE UP (ref 4.8–10.8)

## 2018-08-30 RX ORDER — APIXABAN 2.5 MG/1
10 TABLET, FILM COATED ORAL EVERY 12 HOURS
Qty: 0 | Refills: 0 | Status: DISCONTINUED | OUTPATIENT
Start: 2018-08-30 | End: 2018-08-31

## 2018-08-30 RX ADMIN — Medication 100 MILLIGRAM(S): at 05:55

## 2018-08-30 RX ADMIN — Medication 1 APPLICATION(S): at 17:17

## 2018-08-30 RX ADMIN — OXYCODONE AND ACETAMINOPHEN 1 TABLET(S): 5; 325 TABLET ORAL at 21:34

## 2018-08-30 RX ADMIN — Medication 20 MILLIGRAM(S): at 05:55

## 2018-08-30 RX ADMIN — Medication 1 APPLICATION(S): at 05:56

## 2018-08-30 RX ADMIN — ENOXAPARIN SODIUM 90 MILLIGRAM(S): 100 INJECTION SUBCUTANEOUS at 05:55

## 2018-08-30 RX ADMIN — PIOGLITAZONE HYDROCHLORIDE 30 MILLIGRAM(S): 15 TABLET ORAL at 12:22

## 2018-08-30 RX ADMIN — Medication 300 MILLIGRAM(S): at 12:22

## 2018-08-30 NOTE — PROGRESS NOTE ADULT - SUBJECTIVE AND OBJECTIVE BOX
Progress Note: General Surgery  Patient: LINCOLN HEREDIA , 78y (1940)Female   MRN: [id number]  Location: [address]   Visit: 08-24-18 Inpatient  Date: 08-30-18 @ 09:22  Hospital Day:   Post-op Day:     Procedure/Diagnosis: right 3rd toe ulcer, s/p RLE angio  Events over 24h: Patient complaining of pain in right 3rd toe and left lower extremity above ankle, controlled with pain medication.  Otherwise no acute overnight events.  RLE angiogram performed yesterday, patient tolerated procedure well.    Vitals: T(F): 97.4 (08-30-18 @ 05:00), Max: 98.2 (08-29-18 @ 10:51)  HR: 89 (08-30-18 @ 05:00)  BP: 123/56 (08-30-18 @ 05:00) (93/68 - 136/68)  RR: 18 (08-30-18 @ 05:00)  SpO2: 96% (08-30-18 @ 08:19)    In:   08-29-18 @ 07:01  -  08-30-18 @ 07:00  --------------------------------------------------------  IN: 1260 mL      Out:   08-29-18 @ 07:01  -  08-30-18 @ 07:00  --------------------------------------------------------  OUT:    Voided: 300 mL  Total OUT: 300 mL        Net:   08-29-18 @ 07:01  -  08-30-18 @ 07:00  --------------------------------------------------------  NET: 960 mL      Diet: Diet, DASH/TLC:   Sodium & Cholesterol Restricted  Consistent Carbohydrate No Snacks (08-29-18 @ 11:10)    IV Fluids: yes no , Type: sodium chloride 0.9%. 1000 milliLiter(s) (100 mL/Hr) IV Continuous <Continuous>    Physical Examination:  General Appearance: NAD, alert and cooperative  HEENT: NCAT, WNL  Heart: S1 and S2. No murmurs. Rhythm is regular   Lungs: Clear to auscultation BL   Abdomen:  Positive bowel sounds. Soft, nondistended, nontender. Obese, left groin dressing clean and dry, soft, nontender  MSK/Extremities: ROM intact BL upper/lower extremities, palpable TPs and DPs bilaterally  Skin: Warm/dry, Normal color, No jaundice.       Medications: [Standing]  allopurinol 300 milliGRAM(s) Oral daily  aspirin enteric coated 81 milliGRAM(s) Oral daily  doxycycline hyclate Capsule 100 milliGRAM(s) Oral every 12 hours  enoxaparin Injectable 90 milliGRAM(s) SubCutaneous two times a day  fluocinonide 0.05% Cream 1 Application(s) Topical every 12 hours  pioglitazone 30 milliGRAM(s) Oral daily  predniSONE   Tablet 20 milliGRAM(s) Oral daily  sodium chloride 0.9%. 1000 milliLiter(s) (100 mL/Hr) IV Continuous <Continuous>    DVT Prophylaxis: enoxaparin Injectable 90 milliGRAM(s) SubCutaneous two times a day    GI Prophylaxis:   Antibiotics: doxycycline hyclate Capsule 100 milliGRAM(s) Oral every 12 hours    Anticoagulation:   Medications:[PRN]  oxyCODONE    5 mG/acetaminophen 325 mG 1 Tablet(s) Oral every 4 hours PRN    Labs:                        8.5    5.46  )-----------( 202      ( 30 Aug 2018 07:47 )             27.3     08-30    138  |  102  |  41<H>  ----------------------------<  133<H>  5.1<H>   |  20  |  1.5    Ca    8.4<L>      30 Aug 2018 07:47    TPro  5.9<L>  /  Alb  3.8  /  TBili  0.2  /  DBili  x   /  AST  19  /  ALT  17  /  AlkPhos  58  08-30    LIVER FUNCTIONS - ( 30 Aug 2018 07:47 )  Alb: 3.8 g/dL / Pro: 5.9 g/dL / ALK PHOS: 58 U/L / ALT: 17 U/L / AST: 19 U/L / GGT: x           PT/INR - ( 30 Aug 2018 07:47 )   PT: 11.30 sec;   INR: 1.05 ratio         PTT - ( 30 Aug 2018 07:47 )  PTT:35.2 sec        Urine/Micro:      Imaging:  None/24h          Date/Time: 08-30-18 @ 09:22

## 2018-08-30 NOTE — PROGRESS NOTE ADULT - SUBJECTIVE AND OBJECTIVE BOX
no chest pain and no sob     Vital Signs Last 24 Hrs  T(C): 36.3 (30 Aug 2018 05:00), Max: 36.6 (29 Aug 2018 11:51)  T(F): 97.4 (30 Aug 2018 05:00), Max: 97.8 (29 Aug 2018 11:51)  HR: 89 (30 Aug 2018 05:00) (83 - 101)  BP: 123/56 (30 Aug 2018 05:00) (113/65 - 136/68)  BP(mean): --  RR: 18 (30 Aug 2018 05:00) (12 - 22)  SpO2: 100% (30 Aug 2018 10:49) (87% - 100%)    PHYSICAL EXAM:  GENERAL: NAD, well-developed  HEAD:  Atraumatic, Normocephalic  EYES: EOMI, PERRLA, conjunctiva and sclera clear  NECK: Supple, No JVD  Pulm Clear to auscultation bilaterally; No wheeze  CV: Regular rate and rhythm; No murmurs, rubs, or gallops  GI Soft, Nontender, Nondistended; Bowel sounds present  EXTREMITIES: right 3rd  ulcer toe  PSYCH: AAOx3  NEUROLOGY: non-focal                          8.5    5.46  )-----------( 202      ( 30 Aug 2018 07:47 )             27.3     08-30    138  |  102  |  41<H>  ----------------------------<  133<H>  5.1<H>   |  20  |  1.5    Ca    8.4<L>      30 Aug 2018 07:47    TPro  5.9<L>  /  Alb  3.8  /  TBili  0.2  /  DBili  x   /  AST  19  /  ALT  17  /  AlkPhos  58  08-30    LIVER FUNCTIONS - ( 30 Aug 2018 07:47 )  Alb: 3.8 g/dL / Pro: 5.9 g/dL / ALK PHOS: 58 U/L / ALT: 17 U/L / AST: 19 U/L / GGT: x           PT/INR - ( 30 Aug 2018 07:47 )   PT: 11.30 sec;   INR: 1.05 ratio         PTT - ( 30 Aug 2018 07:47 )  PTT:35.2 sec

## 2018-08-30 NOTE — PROGRESS NOTE ADULT - SUBJECTIVE AND OBJECTIVE BOX
PROGRESS NOTE   Pt seen at bedside during AM rounds for Ischemic ulcer R 3rd digit and  multiple superficial ulcerations LLE. s/p day 1 Angio RLE. No changes or acute events. Denies n/f/v/c/d/sob.       Vital Signs Last 24 Hrs  T(C): 36.3 (30 Aug 2018 05:00), Max: 36.8 (29 Aug 2018 10:51)  T(F): 97.4 (30 Aug 2018 05:00), Max: 98.2 (29 Aug 2018 10:51)  HR: 89 (30 Aug 2018 05:00) (83 - 101)  BP: 123/56 (30 Aug 2018 05:00) (93/68 - 136/68)  BP(mean): --  RR: 18 (30 Aug 2018 05:00) (12 - 23)  SpO2: 96% (30 Aug 2018 08:19) (87% - 100%)                          8.5    5.46  )-----------( 202      ( 30 Aug 2018 07:47 )             27.3               08-30    138  |  102  |  41<H>  ----------------------------<  133<H>  5.1<H>   |  20  |  1.5    Ca    8.4<L>      30 Aug 2018 07:47    TPro  5.9<L>  /  Alb  3.8  /  TBili  0.2  /  DBili  x   /  AST  19  /  ALT  17  /  AlkPhos  58  08-30      PHYSICAL EXAM  E Focused examination conducted:  DERM:  necrotic/fibriotic superficial wound ulcer dorsal right 3rd digit with erythema and edema noted.  multiple superficial ulcers noted left medial aspect of the LE.       A:  Right ischemic 3rd digit ulcer  multiple superficial ulcerations LLE.  P:  pt evaluated and treated  applied betadine no dressing R 3rd toe  applied xeroform/dsd/kerlix right LE  Will continue local wound care  No surgical planning at this point per Podiatry  Pt is stable for D/C from podiatry stand point  Pt f/u with Dr. Oden DPM as o/p for further evaluation and management of her foot ulcers

## 2018-08-30 NOTE — PROGRESS NOTE ADULT - SUBJECTIVE AND OBJECTIVE BOX
LINCOLN HEREDIA 78y Female  MRN#: [id number]       SUBJECTIVE    Patient is a 78y old Female who presents with a chief complaint of diabetic foot ulcer + DVT (27 Aug 2018 10:31).Currently admitted to medicine with the primary diagnosis of DVT (deep venous thrombosis). The patient denies any worsening of symptoms. Patient had angiogram yesterday. Awaiting recommendations from vascular surgery. The patient started feeling slight shortness of breath since morning. She denies any chest pain, Racing of heart. She was started on NC 3 L and saturating well. She feels tired.       OBJECTIVE  PAST MEDICAL & SURGICAL HISTORY  HTN (hypertension)  Rheumatoid arteritis  Other specified diabetes mellitus with other specified complication, unspecified whether long term insulin use  Primary osteoarthritis of right knee: s/p knee repalcement    ALLERGIES:  clindamycin (Unknown)  erythromycin (Unknown)  penicillin (Unknown)  strawberry (Unknown)    MEDICATIONS:  STANDING MEDICATIONS  allopurinol 300 milliGRAM(s) Oral daily  apixaban 10 milliGRAM(s) Oral every 12 hours  fluocinonide 0.05% Cream 1 Application(s) Topical every 12 hours  pioglitazone 30 milliGRAM(s) Oral daily  predniSONE   Tablet 20 milliGRAM(s) Oral daily    PRN MEDICATIONS  oxyCODONE    5 mG/acetaminophen 325 mG 1 Tablet(s) Oral every 4 hours PRN      VITAL SIGNS: Last 24 Hours  T(C): 36.1 (30 Aug 2018 12:46), Max: 36.3 (30 Aug 2018 05:00)  T(F): 97 (30 Aug 2018 12:46), Max: 97.4 (30 Aug 2018 05:00)  HR: 101 (30 Aug 2018 12:46) (89 - 101)  BP: 123/59 (30 Aug 2018 12:46) (123/56 - 129/58)  BP(mean): --  RR: 20 (30 Aug 2018 12:46) (18 - 20)  SpO2: 100% (30 Aug 2018 10:49) (87% - 100%)    LABS:                        8.5    5.46  )-----------( 202      ( 30 Aug 2018 07:47 )             27.3     08-30    138  |  102  |  41<H>  ----------------------------<  133<H>  5.1<H>   |  20  |  1.5    Ca    8.4<L>      30 Aug 2018 07:47    TPro  5.9<L>  /  Alb  3.8  /  TBili  0.2  /  DBili  x   /  AST  19  /  ALT  17  /  AlkPhos  58  08-30    PT/INR - ( 30 Aug 2018 07:47 )   PT: 11.30 sec;   INR: 1.05 ratio         PTT - ( 30 Aug 2018 07:47 )  PTT:35.2 sec              RADIOLOGY:      PHYSICAL EXAM:    GENERAL: NAD, well-developed, AAOx3  HEENT:  Atraumatic, Normocephalic  PULMONARY: Clear to auscultation bilaterally, No rhonchi or wheeze  CARDIOVASCULAR: Regular rate and rhythm; No murmurs, rubs, or gallops  MUSCULOSKELETAL:   Right third Ulcer, well defined margin. Tender, No discharge, no oozing or bleeding.                                    Pulses +1 bilaterally, skin pale bilaterally, Cool to touch, Bilateral lower extremities.    ADMISSION SUMMARY  Patient is a 78y old Female who presents with a chief complaint of diabetic foot ulcer + DVT (27 Aug 2018 10:31)  Currently admitted to medicine with the primary diagnosis of DVT (deep venous thrombosis)    ASSESSMENT    1. Acute LLE DVT  - lower extremity duplex was positive for acute thrombus. Arterial Duplex was negative.  - Vascular Surgery consult requested for acute DVT and non-healing DFU. Arterial Duplex repeat order. Vascular surgery following up. Angiogram done-showed    atherosclerotic lesions. Awaiting vascular surgery clearance.   - Continue Lovenox 90mg SQ q12. Please refer to hematology/oncology upon discharge.  -Continue Eliquis 10 mg twice a day for 7 days then 5 mg twice a day Outpatient.   - Right Lower Extremity DFU (3rd digit): radiographs were negative for OM.  Continue local wound care as per podiatry. Patient started on doxycycline 100 BID.  Her pain is currently well controlled on Percocet.    2.Rheumatoid Arthritis  continue Prednisone 20mg PO q24.  Follow-up with Rheumatology-Dr. Henry as outpatient for treatment with biologics (off medications due to distant history of tuberculosis).    3.Hypertension  Holding Enalipril. Follow up with BMP for hyperkalemia    4.DMII  Resumed Pioglitazone. monitor BS    5. Hyperkalemia  No t wave changes on EKG. Patient given dextrose 50 bolus and Insulin once. Current K is 5.1. Follow up with BMP. patient on ace need to monitor if potassium gets worse then need to hold ace     6.CKD 3 stable       Anticipate discharge Soon  GI prophylaxis  Full code  Diet Dash Diet  Dispo- Short term rehab vs Home with VNS LINCOLN HEREDIA 78y Female  MRN#: [id number]       SUBJECTIVE    Patient is a 78y old Female who presents with a chief complaint of diabetic foot ulcer + DVT (27 Aug 2018 10:31).Currently admitted to medicine with the primary diagnosis of DVT (deep venous thrombosis). The patient denies any worsening of symptoms. Patient had angiogram yesterday. Awaiting recommendations from vascular surgery. The patient started feeling slight shortness of breath since morning. She denies any chest pain, Racing of heart. She was started on NC 3 L and saturating well. She feels tired.       OBJECTIVE  PAST MEDICAL & SURGICAL HISTORY  HTN (hypertension)  Rheumatoid arteritis  Other specified diabetes mellitus with other specified complication, unspecified whether long term insulin use  Primary osteoarthritis of right knee: s/p knee repalcement    ALLERGIES:  clindamycin (Unknown)  erythromycin (Unknown)  penicillin (Unknown)  strawberry (Unknown)    MEDICATIONS:  STANDING MEDICATIONS  allopurinol 300 milliGRAM(s) Oral daily  apixaban 10 milliGRAM(s) Oral every 12 hours  fluocinonide 0.05% Cream 1 Application(s) Topical every 12 hours  pioglitazone 30 milliGRAM(s) Oral daily  predniSONE   Tablet 20 milliGRAM(s) Oral daily    PRN MEDICATIONS  oxyCODONE    5 mG/acetaminophen 325 mG 1 Tablet(s) Oral every 4 hours PRN      VITAL SIGNS: Last 24 Hours  T(C): 36.1 (30 Aug 2018 12:46), Max: 36.3 (30 Aug 2018 05:00)  T(F): 97 (30 Aug 2018 12:46), Max: 97.4 (30 Aug 2018 05:00)  HR: 101 (30 Aug 2018 12:46) (89 - 101)  BP: 123/59 (30 Aug 2018 12:46) (123/56 - 129/58)  BP(mean): --  RR: 20 (30 Aug 2018 12:46) (18 - 20)  SpO2: 100% (30 Aug 2018 10:49) (87% - 100%)    LABS:                        8.5    5.46  )-----------( 202      ( 30 Aug 2018 07:47 )             27.3     08-30    138  |  102  |  41<H>  ----------------------------<  133<H>  5.1<H>   |  20  |  1.5    Ca    8.4<L>      30 Aug 2018 07:47    TPro  5.9<L>  /  Alb  3.8  /  TBili  0.2  /  DBili  x   /  AST  19  /  ALT  17  /  AlkPhos  58  08-30    PT/INR - ( 30 Aug 2018 07:47 )   PT: 11.30 sec;   INR: 1.05 ratio         PTT - ( 30 Aug 2018 07:47 )  PTT:35.2 sec              RADIOLOGY:      PHYSICAL EXAM:    GENERAL: NAD, well-developed, AAOx3  HEENT:  Atraumatic, Normocephalic  PULMONARY: Clear to auscultation bilaterally, No rhonchi or wheeze  CARDIOVASCULAR: Regular rate and rhythm; No murmurs, rubs, or gallops  MUSCULOSKELETAL:   Right third Ulcer, well defined margin. Tender, No discharge, no oozing or bleeding.                                    Pulses +1 bilaterally, skin pale bilaterally, Cool to touch, Bilateral lower extremities.    ADMISSION SUMMARY  Patient is a 78y old Female who presents with a chief complaint of diabetic foot ulcer + DVT (27 Aug 2018 10:31)  Currently admitted to medicine with the primary diagnosis of DVT (deep venous thrombosis)    ASSESSMENT    1. Acute LLE DVT  - lower extremity duplex was positive for acute thrombus. Arterial Duplex was negative.  - Vascular Surgery consult requested for acute DVT and non-healing DFU. Arterial Duplex repeat order. Vascular surgery following up. Angiogram done-showed    atherosclerotic lesions. Awaiting vascular surgery clearance.   - Continue Lovenox 90mg SQ q12. Please refer to hematology/oncology upon discharge.  -Continue Eliquis 10 mg twice a day for 7 days then 5 mg twice a day Outpatient.   - Right Lower Extremity DFU (3rd digit): radiographs were negative for OM.  Continue local wound care as per podiatry. Patient started on doxycycline 100 BID.  Her pain is currently well controlled on Percocet.    2.Rheumatoid Arthritis  continue Prednisone 20mg PO q24.  Follow-up with Rheumatology-Dr. Henry as outpatient for treatment with biologics (off medications due to distant history of tuberculosis).    3.Hypertension  Holding Enalipril. Follow up with BMP for hyperkalemia    4.DMII  Resumed Pioglitazone. monitor BS    5. Hyperkalemia  No t wave changes on EKG. Patient given dextrose 50 bolus and Insulin once. Current K is 5.1. Follow up with BMP. patient on ace need to monitor if potassium gets worse then need to hold ace     6.CKD 3 stable     7.SOB  Follow up with chest X-ray.  Possibly due to post op atelectasis.      Anticipate discharge Soon  GI prophylaxis  Full code  Diet Dash Diet  Dispo- Short term rehab vs Home with VNS

## 2018-08-30 NOTE — PROGRESS NOTE ADULT - ASSESSMENT
Assessment:  78y Female patient admitted with right 3rd toe ulcer, s/p RLE angio, with the above physical exam, labs, and imaging findings.    Plan:  -pain control  -continue regular diet  -angio showed open vessels with no significant stenosis  -no acute surgical intervention required at this time

## 2018-08-30 NOTE — PHYSICAL THERAPY INITIAL EVALUATION ADULT - GENERAL OBSERVATIONS, REHAB EVAL
10:10-10:58 48 min   pt rec OOB in chair in NAD, pt agreeable to PT, +O2 intact, spoke with RN Kathy who stated can try amb on room air, see vitals

## 2018-08-30 NOTE — PROGRESS NOTE ADULT - ASSESSMENT
1. Acute LLE DVT  patient prefers to be on eliauis not coumadin   OPT follow up with heme/onc this is second episode of DVT  OPT follow up with vascular     2.Rheumatoid Arthritis  prednisone as per rheum     3.Hypertension  controlled off meds      4.DMII  Resumed Pioglitazone. monitor BS    5. Hyperkalemia 5.1 today off ace     6.CKD 3 stable     7.right 3rd toe ulcer OPT follow up with podiatry s/p angiogram by vascular and no stenosis     8. discharge to snf vs home will discuss with

## 2018-08-31 VITALS
HEART RATE: 94 BPM | DIASTOLIC BLOOD PRESSURE: 68 MMHG | RESPIRATION RATE: 18 BRPM | TEMPERATURE: 96 F | SYSTOLIC BLOOD PRESSURE: 137 MMHG

## 2018-08-31 LAB
ALBUMIN SERPL ELPH-MCNC: 3.8 G/DL — SIGNIFICANT CHANGE UP (ref 3.5–5.2)
ALP SERPL-CCNC: 54 U/L — SIGNIFICANT CHANGE UP (ref 30–115)
ALT FLD-CCNC: 14 U/L — SIGNIFICANT CHANGE UP (ref 0–41)
ANION GAP SERPL CALC-SCNC: 15 MMOL/L — HIGH (ref 7–14)
AST SERPL-CCNC: 18 U/L — SIGNIFICANT CHANGE UP (ref 0–41)
BILIRUB SERPL-MCNC: 0.2 MG/DL — SIGNIFICANT CHANGE UP (ref 0.2–1.2)
BUN SERPL-MCNC: 42 MG/DL — HIGH (ref 10–20)
CALCIUM SERPL-MCNC: 8.6 MG/DL — SIGNIFICANT CHANGE UP (ref 8.5–10.1)
CHLORIDE SERPL-SCNC: 105 MMOL/L — SIGNIFICANT CHANGE UP (ref 98–110)
CO2 SERPL-SCNC: 19 MMOL/L — SIGNIFICANT CHANGE UP (ref 17–32)
CREAT SERPL-MCNC: 1.4 MG/DL — SIGNIFICANT CHANGE UP (ref 0.7–1.5)
GLUCOSE BLDC GLUCOMTR-MCNC: 131 MG/DL — HIGH (ref 70–99)
GLUCOSE BLDC GLUCOMTR-MCNC: 274 MG/DL — HIGH (ref 70–99)
GLUCOSE SERPL-MCNC: 94 MG/DL — SIGNIFICANT CHANGE UP (ref 70–99)
HCT VFR BLD CALC: 27.2 % — LOW (ref 37–47)
HGB BLD-MCNC: 8.5 G/DL — LOW (ref 12–16)
MCHC RBC-ENTMCNC: 31.3 G/DL — LOW (ref 32–37)
MCHC RBC-ENTMCNC: 32.6 PG — HIGH (ref 27–31)
MCV RBC AUTO: 104.2 FL — HIGH (ref 81–99)
NRBC # BLD: 0 /100 WBCS — SIGNIFICANT CHANGE UP (ref 0–0)
PLATELET # BLD AUTO: 207 K/UL — SIGNIFICANT CHANGE UP (ref 130–400)
POTASSIUM SERPL-MCNC: 5.2 MMOL/L — HIGH (ref 3.5–5)
POTASSIUM SERPL-SCNC: 5.2 MMOL/L — HIGH (ref 3.5–5)
PROT SERPL-MCNC: 5.7 G/DL — LOW (ref 6–8)
RBC # BLD: 2.61 M/UL — LOW (ref 4.2–5.4)
RBC # FLD: 17.2 % — HIGH (ref 11.5–14.5)
SODIUM SERPL-SCNC: 139 MMOL/L — SIGNIFICANT CHANGE UP (ref 135–146)
WBC # BLD: 5.75 K/UL — SIGNIFICANT CHANGE UP (ref 4.8–10.8)
WBC # FLD AUTO: 5.75 K/UL — SIGNIFICANT CHANGE UP (ref 4.8–10.8)

## 2018-08-31 RX ORDER — FLUOCINONIDE/EMOLLIENT BASE 0.05 %
1 CREAM (GRAM) TOPICAL
Qty: 0 | Refills: 0 | COMMUNITY
Start: 2018-08-31

## 2018-08-31 RX ORDER — APIXABAN 2.5 MG/1
2 TABLET, FILM COATED ORAL
Qty: 44 | Refills: 0 | COMMUNITY
Start: 2018-08-31 | End: 2018-09-29

## 2018-08-31 RX ORDER — SENNA PLUS 8.6 MG/1
2 TABLET ORAL AT BEDTIME
Qty: 0 | Refills: 0 | Status: DISCONTINUED | OUTPATIENT
Start: 2018-08-31 | End: 2018-08-31

## 2018-08-31 RX ORDER — APIXABAN 2.5 MG/1
2 TABLET, FILM COATED ORAL
Qty: 44 | Refills: 0 | OUTPATIENT
Start: 2018-08-31 | End: 2018-09-29

## 2018-08-31 RX ORDER — DOCUSATE SODIUM 100 MG
100 CAPSULE ORAL
Qty: 0 | Refills: 0 | Status: DISCONTINUED | OUTPATIENT
Start: 2018-08-31 | End: 2018-08-31

## 2018-08-31 RX ORDER — DOCUSATE SODIUM 100 MG
1 CAPSULE ORAL
Qty: 0 | Refills: 0 | COMMUNITY
Start: 2018-08-31

## 2018-08-31 RX ADMIN — PIOGLITAZONE HYDROCHLORIDE 30 MILLIGRAM(S): 15 TABLET ORAL at 11:16

## 2018-08-31 RX ADMIN — Medication 20 MILLIGRAM(S): at 06:35

## 2018-08-31 RX ADMIN — Medication 300 MILLIGRAM(S): at 11:16

## 2018-08-31 RX ADMIN — Medication 1 APPLICATION(S): at 06:35

## 2018-08-31 RX ADMIN — APIXABAN 10 MILLIGRAM(S): 2.5 TABLET, FILM COATED ORAL at 06:35

## 2018-08-31 NOTE — PROGRESS NOTE ADULT - SUBJECTIVE AND OBJECTIVE BOX
PROGRESS NOTE   Pt seen at bedside during AM rounds for Ischemic ulcer R 3rd digit and  multiple superficial ulcerations LLE. s/p day 2 Angio RLE. No changes or acute events. Denies n/f/v/c/d/sob.       Vital Signs Last 24 Hrs  T(C): 35.6 (31 Aug 2018 05:57), Max: 36.2 (30 Aug 2018 20:30)  T(F): 96.1 (31 Aug 2018 05:57), Max: 97.1 (30 Aug 2018 20:30)  HR: 94 (31 Aug 2018 05:57) (81 - 101)  BP: 137/68 (31 Aug 2018 05:57) (123/59 - 144/60)  BP(mean): --  RR: 18 (31 Aug 2018 05:57) (18 - 20)  SpO2: --                          8.5    5.75  )-----------( 207      ( 31 Aug 2018 05:09 )             27.2               08-31    139  |  105  |  42<H>  ----------------------------<  94  5.2<H>   |  19  |  1.4    Ca    8.6      31 Aug 2018 05:09    TPro  5.7<L>  /  Alb  3.8  /  TBili  0.2  /  DBili  x   /  AST  18  /  ALT  14  /  AlkPhos  54  08-31      PHYSICAL EXAM   LE Focused examination conducted:  DERM:  necrotic/fibriotic superficial wound ulcer dorsal right 3rd digit with erythema and edema noted.  multiple superficial ulcers noted left medial aspect of the LE.       A:  Right ischemic 3rd digit ulcer  multiple superficial ulcerations LLE.  P:  pt evaluated and treated  Wound Care orders:  applied betadine/DSD R 3rd toe  applied xeroform/dsd/kerlix right LE  Will continue local wound care  No surgical planning at this point per Podiatry  Pt is stable for D/C from podiatry stand point  Pt f/u with Dr. Oden DPM as o/p for further evaluation and management of her foot ulcers

## 2018-08-31 NOTE — PROGRESS NOTE ADULT - SUBJECTIVE AND OBJECTIVE BOX
LINCOLN HEREDIA 78y Female  MRN#: [id number]       SUBJECTIVE    Patient is a 78y old Female who presents with a chief complaint of diabetic foot ulcer + DVT (27 Aug 2018 10:31).Currently admitted to medicine with the primary diagnosis of DVT (deep venous thrombosis). The patient denies any worsening of symptoms. Patient had angiogram done, vascular surgery not going for any procedure currently. The patient shortness is has resolved. She denies any chest pain, Racing of heart.      OBJECTIVE  PAST MEDICAL & SURGICAL HISTORY  HTN (hypertension)  Rheumatoid arteritis  Other specified diabetes mellitus with other specified complication, unspecified whether long term insulin use  Primary osteoarthritis of right knee: s/p knee repalcement    ALLERGIES:  clindamycin (Unknown)  erythromycin (Unknown)  penicillin (Unknown)  strawberry (Unknown)    MEDICATIONS:  STANDING MEDICATIONS  allopurinol 300 milliGRAM(s) Oral daily  apixaban 10 milliGRAM(s) Oral every 12 hours  fluocinonide 0.05% Cream 1 Application(s) Topical every 12 hours  pioglitazone 30 milliGRAM(s) Oral daily  predniSONE   Tablet 20 milliGRAM(s) Oral daily    PRN MEDICATIONS  oxyCODONE    5 mG/acetaminophen 325 mG 1 Tablet(s) Oral every 4 hours PRN      VITAL SIGNS: Last 24 Hours  T(C): 35.6 (31 Aug 2018 05:57), Max: 36.2 (30 Aug 2018 20:30)  T(F): 96.1 (31 Aug 2018 05:57), Max: 97.1 (30 Aug 2018 20:30)  HR: 94 (31 Aug 2018 05:57) (81 - 101)  BP: 137/68 (31 Aug 2018 05:57) (123/59 - 144/60)  BP(mean): --  RR: 18 (31 Aug 2018 05:57) (18 - 20)  SpO2: 100% (30 Aug 2018 10:49) (90% - 100%)    LABS:                        8.5    5.75  )-----------( 207      ( 31 Aug 2018 05:09 )             27.2     08-30    138  |  102  |  41<H>  ----------------------------<  133<H>  5.1<H>   |  20  |  1.5    Ca    8.4<L>      30 Aug 2018 07:47    TPro  5.9<L>  /  Alb  3.8  /  TBili  0.2  /  DBili  x   /  AST  19  /  ALT  17  /  AlkPhos  58  08-30    PT/INR - ( 30 Aug 2018 07:47 )   PT: 11.30 sec;   INR: 1.05 ratio         PTT - ( 30 Aug 2018 07:47 )  PTT:35.2 sec              RADIOLOGY:      PHYSICAL EXAM:    GENERAL: NAD, well-developed, AAOx3  HEENT:  Atraumatic, Normocephalic  PULMONARY: Clear to auscultation bilaterally, No rhonchi or wheeze  CARDIOVASCULAR: Regular rate and rhythm; No murmurs, rubs, or gallops  MUSCULOSKELETAL: Right third Ulcer, well defined margin. Tender, No discharge, no oozing or bleeding. Pulses +1 bilaterally, skin pale bilaterally, Cool to touch, Bilateral lower extremities. Mild tenderness noted on the left lower extremity. Michael sign negative.    ADMISSION SUMMARY  Patient is a 78y old Female who presents with a chief complaint of diabetic foot ulcer + DVT (27 Aug 2018 10:31)  Currently admitted to medicine with the primary diagnosis of DVT (deep venous thrombosis)    ASSESSMENT    1. Acute LLE DVT  - lower extremity duplex was positive for acute thrombus. Arterial Duplex was negative.  - Vascular Surgery consult requested for acute DVT and non-healing DFU. Arterial Duplex repeat order. Vascular surgery following up. Angiogram done-showed    atherosclerotic lesions. No acute procedure recommended at this time. Patient can follow up outpatient.   - Continue Lovenox 90mg SQ q12.  -Continue Eliquis 10 mg twice a day for 7 days then 5 mg twice a day Outpatient.   - Right Lower Extremity DFU (3rd digit): radiographs were negative for OM.  Continue local wound care as per podiatry. Patient started on doxycycline 100 BID.  Her pain is currently well controlled on Percocet.    2.Rheumatoid Arthritis  continue Prednisone 20mg PO q24.  Follow-up with Rheumatology-Dr. Henry as outpatient for treatment with biologics (off medications due to distant history of tuberculosis).    3.Hypertension  Holding Enalipril. Follow up with BMP for hyperkalemia    4.DMII  Resumed Pioglitazone. monitor BS    5. Hyperkalemia  No t wave changes on EKG. Patient given dextrose 50 bolus and Insulin once. Current K is 5.1. Follow up with BMP. patient on ace need to monitor if potassium gets worse then need to hold ace     6.CKD 3 stable     7.SOB  Follow up with chest X-ray.  Possibly due to post op atelectasis.      Anticipate for discharge

## 2018-08-31 NOTE — PROGRESS NOTE ADULT - ASSESSMENT
1. Acute LLE DVT  patient prefers to be on eliquis not coumadin   OPT follow up with heme/onc this is second episode of DVT  OPT follow up with vascular     2.Rheumatoid Arthritis  prednisone as per rheum     3.Hypertension  controlled off meds      4.DMII  Resumed Pioglitazone. monitor BS    5. Hyperkalemia 5.2 today off ace     6.CKD 3 stable     7.right 3rd toe ulcer OPT follow up with podiatry s/p angiogram by vascular and no stenosis     8. discharge to snf once bed available     spent 35 min on discharge

## 2018-08-31 NOTE — PROGRESS NOTE ADULT - SUBJECTIVE AND OBJECTIVE BOX
no chest pain and no sob     Vital Signs Last 24 Hrs  T(C): 35.6 (31 Aug 2018 05:57), Max: 36.2 (30 Aug 2018 20:30)  T(F): 96.1 (31 Aug 2018 05:57), Max: 97.1 (30 Aug 2018 20:30)  HR: 94 (31 Aug 2018 05:57) (81 - 101)  BP: 137/68 (31 Aug 2018 05:57) (123/59 - 144/60)  BP(mean): --  RR: 18 (31 Aug 2018 05:57) (18 - 20)  SpO2: --    PHYSICAL EXAM:  GENERAL: NAD, well-developed  HEAD:  Atraumatic, Normocephalic  EYES: EOMI, PERRLA, conjunctiva and sclera clear  NECK: Supple, No JVD  Pulm: Clear to auscultation bilaterally; No wheeze  CV: Regular rate and rhythm; No murmurs, rubs, or gallops  GI: Soft, Nontender, Nondistended; Bowel sounds present  EXTREMITIES:right 3rd toe ulcer   PSYCH: AAOx3  NEUROLOGY: non-focal                            8.5    5.75  )-----------( 207      ( 31 Aug 2018 05:09 )             27.2     08-31    139  |  105  |  42<H>  ----------------------------<  94  5.2<H>   |  19  |  1.4    Ca    8.6      31 Aug 2018 05:09    TPro  5.7<L>  /  Alb  3.8  /  TBili  0.2  /  DBili  x   /  AST  18  /  ALT  14  /  AlkPhos  54  08-31    LIVER FUNCTIONS - ( 31 Aug 2018 05:09 )  Alb: 3.8 g/dL / Pro: 5.7 g/dL / ALK PHOS: 54 U/L / ALT: 14 U/L / AST: 18 U/L / GGT: x           PT/INR - ( 30 Aug 2018 07:47 )   PT: 11.30 sec;   INR: 1.05 ratio         PTT - ( 30 Aug 2018 07:47 )  PTT:35.2 sec

## 2018-08-31 NOTE — PROGRESS NOTE ADULT - PROVIDER SPECIALTY LIST ADULT
Hospitalist
Infectious Disease
Internal Medicine
Podiatry
Vascular Surgery
Internal Medicine
Hospitalist

## 2018-09-01 ENCOUNTER — OUTPATIENT (OUTPATIENT)
Dept: OUTPATIENT SERVICES | Facility: HOSPITAL | Age: 78
LOS: 1 days | Discharge: HOME | End: 2018-09-01

## 2018-09-01 DIAGNOSIS — M17.11 UNILATERAL PRIMARY OSTEOARTHRITIS, RIGHT KNEE: Chronic | ICD-10-CM

## 2018-09-01 DIAGNOSIS — R79.9 ABNORMAL FINDING OF BLOOD CHEMISTRY, UNSPECIFIED: ICD-10-CM

## 2018-09-08 ENCOUNTER — OUTPATIENT (OUTPATIENT)
Dept: OUTPATIENT SERVICES | Facility: HOSPITAL | Age: 78
LOS: 1 days | Discharge: HOME | End: 2018-09-08

## 2018-09-08 DIAGNOSIS — M17.11 UNILATERAL PRIMARY OSTEOARTHRITIS, RIGHT KNEE: Chronic | ICD-10-CM

## 2018-09-08 DIAGNOSIS — D64.9 ANEMIA, UNSPECIFIED: ICD-10-CM

## 2018-09-10 DIAGNOSIS — E11.621 TYPE 2 DIABETES MELLITUS WITH FOOT ULCER: ICD-10-CM

## 2018-09-10 DIAGNOSIS — E11.22 TYPE 2 DIABETES MELLITUS WITH DIABETIC CHRONIC KIDNEY DISEASE: ICD-10-CM

## 2018-09-10 DIAGNOSIS — I82.402 ACUTE EMBOLISM AND THROMBOSIS OF UNSPECIFIED DEEP VEINS OF LEFT LOWER EXTREMITY: ICD-10-CM

## 2018-09-10 DIAGNOSIS — M06.9 RHEUMATOID ARTHRITIS, UNSPECIFIED: ICD-10-CM

## 2018-09-10 DIAGNOSIS — Z88.0 ALLERGY STATUS TO PENICILLIN: ICD-10-CM

## 2018-09-10 DIAGNOSIS — N18.3 CHRONIC KIDNEY DISEASE, STAGE 3 (MODERATE): ICD-10-CM

## 2018-09-10 DIAGNOSIS — Z88.1 ALLERGY STATUS TO OTHER ANTIBIOTIC AGENTS STATUS: ICD-10-CM

## 2018-09-10 DIAGNOSIS — I70.261 ATHEROSCLEROSIS OF NATIVE ARTERIES OF EXTREMITIES WITH GANGRENE, RIGHT LEG: ICD-10-CM

## 2018-09-10 DIAGNOSIS — Z87.891 PERSONAL HISTORY OF NICOTINE DEPENDENCE: ICD-10-CM

## 2018-09-10 DIAGNOSIS — D75.89 OTHER SPECIFIED DISEASES OF BLOOD AND BLOOD-FORMING ORGANS: ICD-10-CM

## 2018-09-10 DIAGNOSIS — E87.5 HYPERKALEMIA: ICD-10-CM

## 2018-09-10 DIAGNOSIS — L97.519 NON-PRESSURE CHRONIC ULCER OF OTHER PART OF RIGHT FOOT WITH UNSPECIFIED SEVERITY: ICD-10-CM

## 2018-09-10 DIAGNOSIS — Z91.018 ALLERGY TO OTHER FOODS: ICD-10-CM

## 2018-09-10 DIAGNOSIS — I12.9 HYPERTENSIVE CHRONIC KIDNEY DISEASE WITH STAGE 1 THROUGH STAGE 4 CHRONIC KIDNEY DISEASE, OR UNSPECIFIED CHRONIC KIDNEY DISEASE: ICD-10-CM

## 2018-09-10 DIAGNOSIS — E11.52 TYPE 2 DIABETES MELLITUS WITH DIABETIC PERIPHERAL ANGIOPATHY WITH GANGRENE: ICD-10-CM

## 2018-09-27 ENCOUNTER — OUTPATIENT (OUTPATIENT)
Dept: OUTPATIENT SERVICES | Facility: HOSPITAL | Age: 78
LOS: 1 days | Discharge: HOME | End: 2018-09-27

## 2018-09-27 DIAGNOSIS — M17.11 UNILATERAL PRIMARY OSTEOARTHRITIS, RIGHT KNEE: Chronic | ICD-10-CM

## 2018-09-28 DIAGNOSIS — M81.0 AGE-RELATED OSTEOPOROSIS WITHOUT CURRENT PATHOLOGICAL FRACTURE: ICD-10-CM

## 2018-09-28 DIAGNOSIS — E78.2 MIXED HYPERLIPIDEMIA: ICD-10-CM

## 2018-09-28 DIAGNOSIS — D50.8 OTHER IRON DEFICIENCY ANEMIAS: ICD-10-CM

## 2018-09-28 DIAGNOSIS — R53.81 OTHER MALAISE: ICD-10-CM

## 2018-09-28 DIAGNOSIS — N19 UNSPECIFIED KIDNEY FAILURE: ICD-10-CM

## 2018-09-28 DIAGNOSIS — K76.9 LIVER DISEASE, UNSPECIFIED: ICD-10-CM

## 2018-10-01 ENCOUNTER — APPOINTMENT (OUTPATIENT)
Dept: OPHTHALMOLOGY | Facility: CLINIC | Age: 78
End: 2018-10-01
Payer: MEDICARE

## 2018-10-01 DIAGNOSIS — E11.9 TYPE 2 DIABETES MELLITUS W/OUT COMPLICATIONS: ICD-10-CM

## 2018-10-01 DIAGNOSIS — Z96.1 PRESENCE OF INTRAOCULAR LENS: ICD-10-CM

## 2018-10-01 DIAGNOSIS — M06.9 RHEUMATOID ARTHRITIS, UNSPECIFIED: ICD-10-CM

## 2018-10-01 DIAGNOSIS — H35.3290 EXUDATIVE AGE-RELATED MACULAR DEGENERATION, UNSPECIFIED EYE, STAGE UNSPECIFIED: ICD-10-CM

## 2018-10-01 DIAGNOSIS — H20.043 SECONDARY NONINFECTIOUS IRIDOCYCLITIS, BILATERAL: ICD-10-CM

## 2018-10-01 PROCEDURE — 92014 COMPRE OPH EXAM EST PT 1/>: CPT

## 2018-10-02 PROBLEM — H35.3290 MACULAR DEGENERATION, WET: Status: ACTIVE | Noted: 2017-04-18

## 2018-10-02 PROBLEM — H20.043 IRITIS, SECONDARY, BILATERAL: Status: ACTIVE | Noted: 2017-04-18

## 2018-10-02 PROBLEM — M06.9 RHEUMATOID ARTHRITIS: Status: ACTIVE | Noted: 2017-04-18

## 2018-10-02 PROBLEM — E11.9 DIABETES MELLITUS TYPE II, CONTROLLED: Status: ACTIVE | Noted: 2017-04-18

## 2018-10-02 PROBLEM — Z96.1 PSEUDOPHAKIA, BOTH EYES: Status: ACTIVE | Noted: 2017-04-18

## 2018-10-23 ENCOUNTER — INPATIENT (INPATIENT)
Facility: HOSPITAL | Age: 78
LOS: 5 days | Discharge: NURSING, REHAB OR LONG-TERM CARE | End: 2018-10-29
Attending: INTERNAL MEDICINE | Admitting: INTERNAL MEDICINE
Payer: MEDICARE

## 2018-10-23 VITALS
SYSTOLIC BLOOD PRESSURE: 133 MMHG | TEMPERATURE: 100 F | OXYGEN SATURATION: 97 % | DIASTOLIC BLOOD PRESSURE: 58 MMHG | RESPIRATION RATE: 18 BRPM | HEART RATE: 95 BPM

## 2018-10-23 DIAGNOSIS — M17.11 UNILATERAL PRIMARY OSTEOARTHRITIS, RIGHT KNEE: Chronic | ICD-10-CM

## 2018-10-23 LAB
ALBUMIN SERPL ELPH-MCNC: 3.8 G/DL — SIGNIFICANT CHANGE UP (ref 3.5–5.2)
ALP SERPL-CCNC: 55 U/L — SIGNIFICANT CHANGE UP (ref 30–115)
ALT FLD-CCNC: 8 U/L — SIGNIFICANT CHANGE UP (ref 0–41)
ANION GAP SERPL CALC-SCNC: 19 MMOL/L — HIGH (ref 7–14)
APPEARANCE UR: ABNORMAL
APTT BLD: 28 SEC — SIGNIFICANT CHANGE UP (ref 27–39.2)
AST SERPL-CCNC: 24 U/L — SIGNIFICANT CHANGE UP (ref 0–41)
BACTERIA # UR AUTO: ABNORMAL /HPF
BASE EXCESS BLDV CALC-SCNC: -3.3 MMOL/L — LOW (ref -2–2)
BASOPHILS # BLD AUTO: 0.04 K/UL — SIGNIFICANT CHANGE UP (ref 0–0.2)
BASOPHILS NFR BLD AUTO: 0.4 % — SIGNIFICANT CHANGE UP (ref 0–1)
BILIRUB SERPL-MCNC: 0.3 MG/DL — SIGNIFICANT CHANGE UP (ref 0.2–1.2)
BILIRUB UR-MCNC: NEGATIVE — SIGNIFICANT CHANGE UP
BUN SERPL-MCNC: 24 MG/DL — HIGH (ref 10–20)
CA-I SERPL-SCNC: 1.13 MMOL/L — SIGNIFICANT CHANGE UP (ref 1.12–1.3)
CALCIUM SERPL-MCNC: 8.3 MG/DL — LOW (ref 8.5–10.1)
CHLORIDE SERPL-SCNC: 97 MMOL/L — LOW (ref 98–110)
CO2 SERPL-SCNC: 21 MMOL/L — SIGNIFICANT CHANGE UP (ref 17–32)
COLOR SPEC: YELLOW — SIGNIFICANT CHANGE UP
CREAT SERPL-MCNC: 1.1 MG/DL — SIGNIFICANT CHANGE UP (ref 0.7–1.5)
DIFF PNL FLD: ABNORMAL
EOSINOPHIL # BLD AUTO: 0.1 K/UL — SIGNIFICANT CHANGE UP (ref 0–0.7)
EOSINOPHIL NFR BLD AUTO: 1 % — SIGNIFICANT CHANGE UP (ref 0–8)
EPI CELLS # UR: ABNORMAL /HPF
GAS PNL BLDV: 135 MMOL/L — LOW (ref 136–145)
GAS PNL BLDV: SIGNIFICANT CHANGE UP
GLUCOSE SERPL-MCNC: 145 MG/DL — HIGH (ref 70–99)
GLUCOSE UR QL: NEGATIVE MG/DL — SIGNIFICANT CHANGE UP
HCO3 BLDV-SCNC: 22 MMOL/L — SIGNIFICANT CHANGE UP (ref 22–29)
HCT VFR BLD CALC: 29.7 % — LOW (ref 37–47)
HCT VFR BLDA CALC: 30.8 % — LOW (ref 34–44)
HGB BLD CALC-MCNC: 10.1 G/DL — LOW (ref 14–18)
HGB BLD-MCNC: 9.4 G/DL — LOW (ref 12–16)
IMM GRANULOCYTES NFR BLD AUTO: 0.4 % — HIGH (ref 0.1–0.3)
INR BLD: 1.2 RATIO — SIGNIFICANT CHANGE UP (ref 0.65–1.3)
KETONES UR-MCNC: NEGATIVE — SIGNIFICANT CHANGE UP
LACTATE BLDV-MCNC: 1.5 MMOL/L — SIGNIFICANT CHANGE UP (ref 0.5–1.6)
LEUKOCYTE ESTERASE UR-ACNC: ABNORMAL
LYMPHOCYTES # BLD AUTO: 1.41 K/UL — SIGNIFICANT CHANGE UP (ref 1.2–3.4)
LYMPHOCYTES # BLD AUTO: 13.5 % — LOW (ref 20.5–51.1)
MCHC RBC-ENTMCNC: 31 PG — SIGNIFICANT CHANGE UP (ref 27–31)
MCHC RBC-ENTMCNC: 31.6 G/DL — LOW (ref 32–37)
MCV RBC AUTO: 98 FL — SIGNIFICANT CHANGE UP (ref 81–99)
MONOCYTES # BLD AUTO: 1.41 K/UL — HIGH (ref 0.1–0.6)
MONOCYTES NFR BLD AUTO: 13.5 % — HIGH (ref 1.7–9.3)
NEUTROPHILS # BLD AUTO: 7.43 K/UL — HIGH (ref 1.4–6.5)
NEUTROPHILS NFR BLD AUTO: 71.2 % — SIGNIFICANT CHANGE UP (ref 42.2–75.2)
NITRITE UR-MCNC: POSITIVE
NRBC # BLD: 0 /100 WBCS — SIGNIFICANT CHANGE UP (ref 0–0)
PCO2 BLDV: 40 MMHG — LOW (ref 41–51)
PH BLDV: 7.35 — SIGNIFICANT CHANGE UP (ref 7.26–7.43)
PH UR: 5.5 — SIGNIFICANT CHANGE UP (ref 5–8)
PLATELET # BLD AUTO: 254 K/UL — SIGNIFICANT CHANGE UP (ref 130–400)
PO2 BLDV: 29 MMHG — SIGNIFICANT CHANGE UP (ref 20–40)
POTASSIUM BLDV-SCNC: 4.3 MMOL/L — SIGNIFICANT CHANGE UP (ref 3.3–5.6)
POTASSIUM SERPL-MCNC: 4.4 MMOL/L — SIGNIFICANT CHANGE UP (ref 3.5–5)
POTASSIUM SERPL-SCNC: 4.4 MMOL/L — SIGNIFICANT CHANGE UP (ref 3.5–5)
PROT SERPL-MCNC: 6 G/DL — SIGNIFICANT CHANGE UP (ref 6–8)
PROT UR-MCNC: 30 MG/DL
PROTHROM AB SERPL-ACNC: 13.8 SEC — HIGH (ref 9.95–12.87)
RBC # BLD: 3.03 M/UL — LOW (ref 4.2–5.4)
RBC # FLD: 15.2 % — HIGH (ref 11.5–14.5)
RBC CASTS # UR COMP ASSIST: ABNORMAL /HPF
SAO2 % BLDV: 53 % — SIGNIFICANT CHANGE UP
SODIUM SERPL-SCNC: 137 MMOL/L — SIGNIFICANT CHANGE UP (ref 135–146)
SP GR SPEC: 1.02 — SIGNIFICANT CHANGE UP (ref 1.01–1.03)
TROPONIN T SERPL-MCNC: 0.06 NG/ML — CRITICAL HIGH
UROBILINOGEN FLD QL: 0.2 MG/DL — SIGNIFICANT CHANGE UP (ref 0.2–0.2)
WBC # BLD: 10.43 K/UL — SIGNIFICANT CHANGE UP (ref 4.8–10.8)
WBC # FLD AUTO: 10.43 K/UL — SIGNIFICANT CHANGE UP (ref 4.8–10.8)
WBC UR QL: >50 /HPF

## 2018-10-23 RX ORDER — MORPHINE SULFATE 50 MG/1
4 CAPSULE, EXTENDED RELEASE ORAL ONCE
Qty: 0 | Refills: 0 | Status: DISCONTINUED | OUTPATIENT
Start: 2018-10-23 | End: 2018-10-23

## 2018-10-23 RX ORDER — CEFTRIAXONE 500 MG/1
1 INJECTION, POWDER, FOR SOLUTION INTRAMUSCULAR; INTRAVENOUS ONCE
Qty: 0 | Refills: 0 | Status: COMPLETED | OUTPATIENT
Start: 2018-10-23 | End: 2018-10-23

## 2018-10-23 RX ADMIN — CEFTRIAXONE 100 GRAM(S): 500 INJECTION, POWDER, FOR SOLUTION INTRAMUSCULAR; INTRAVENOUS at 21:26

## 2018-10-23 RX ADMIN — MORPHINE SULFATE 4 MILLIGRAM(S): 50 CAPSULE, EXTENDED RELEASE ORAL at 21:26

## 2018-10-23 NOTE — ED PROVIDER NOTE - PROGRESS NOTE DETAILS
Discussed case with podiatry. Will evaluate pt in the morning. Discussed case with RICKY Pal and Dr. Wall. Aware of pts admission

## 2018-10-23 NOTE — ED PROVIDER NOTE - CARE PLAN
Principal Discharge DX:	UTI (urinary tract infection)  Secondary Diagnosis:	Elevated troponin  Secondary Diagnosis:	Foot ulcer

## 2018-10-23 NOTE — ED PROVIDER NOTE - OBJECTIVE STATEMENT
78 year old female with pmhx of HTN, RA, DM presenting with multiple medical complaints. Pt has hx of diabetic ulcer on her left third toe x many months. Was seen in ED 2 months ago for ulcer and was found to have DVT, now on Eliquis. Pt followed up with her podiatrist Dr. Oden in the office yesterday. Pt is in Ed today due to fever of 103 F and generalized weakness x 1 day. Sts she has had difficulty walking with her walker due to fatigue/weakness.  + worsening b/l LE pain and swelling. No URI symptoms, sob, chest pain, abdominal pain, n/v/d, dysuria, trauma/falls.

## 2018-10-23 NOTE — ED PROVIDER NOTE - MEDICAL DECISION MAKING DETAILS
78 year old female with pmhx of HTN, RA, DM presenting with multiple medical complaints. Pt noted with UTI on labs. CT scan performed as she developed abdominal pain while in ED. CT shows no acute abdominal pathology, but does show chronic and acute compression fractures. Pt treated for UTI. Pt also noted with mildly elevated troponin. Pt admitted to tele for further evaluation

## 2018-10-23 NOTE — ED PROVIDER NOTE - NS ED ROS FT
Constitutional: + fever, generalized weakness  Cardiac: No chest pain, SOB or edema.  Respiratory: No cough or respiratory distress  GI: No nausea, vomiting, diarrhea or abdominal pain.  : No dysuria, frequency, urgency or hematuria  MS: no pain to back or extremities, no loss of ROM, no weakness  Extrem: + b/l lower extremity pain/swelling  Neuro: No headache or weakness. No LOC.  Skin:  + ulcer on right third toe  Except as documented in the HPI, all other systems are negative.

## 2018-10-23 NOTE — ED PROVIDER NOTE - PHYSICAL EXAMINATION
CONSTITUTIONAL: Well-appearing; well-nourished; in no apparent distress.   NECK: Supple; non-tender; no cervical lymphadenopathy. No JVD.   CARDIOVASCULAR: Normal S1, S2; no murmurs, rubs, or gallops.   RESPIRATORY: Normal chest excursion with respiration; breath sounds clear and equal bilaterally; no wheezes, rhonchi, or rales.  GI/: Normal bowel sounds; non-distended; non-tender; no palpable organomegaly.   MS: + b/l peripheral pitting edema.   SKIN: + ulcer noted over distal dorsal surface of right third toe with some surrounding erythema. + granulation tissue noted. No discharge. + multiple small superficial ulcerations over b/l lower extremities  NEURO/PSYCH: A & O x 4; grossly unremarkable. mood and manner are appropriate.

## 2018-10-23 NOTE — ED ADULT NURSE NOTE - OBJECTIVE STATEMENT
patient saw podiatrist yesterday for right 3rd toe debri and now is in a lot of pain, denies numbness tingle, Pt has blle edema +3. left leg more warm then right. Pt states feels weak all over

## 2018-10-23 NOTE — ED PROVIDER NOTE - ATTENDING CONTRIBUTION TO CARE
79 yo F with h/o DVT, currently on eliquis, chronic R third finger ulcer, RA, on prednisone, HTN, p/w fever. Pt states that today she developed a fever, [id number]. Pt took tylenol two hours pta in ED. Pt currently c/o malaise, b/l LE swelling/redness and right third toe pain. Pt states that she did get her toe debrided yesterday by her podiatrist, , and initially felt better but now having pain. Pt denies any ha, neck pain, chest pain, sob, cough, abdominal pain, n/v/d or dysuria. No recent travel or sickc ontacts. Pt did not get the flu shot this year. a/p: borderline febrile, pt appears in nad, nontoxic appearing, ncat, perrla, norm cardiac exam, +murmur, no rubs/gallops, lungs cta b/l, no w/r/r, abd is soft and nt, +b/l pedal edema extending to b/l knees, +chronic nonhealing ulcer to dorsal surface of distal right third toe with crusting and necrotic tissue, +mild erythema extending from toe to dorsum of foot, faint b/l pedal pulses, +healing ulcers x2 to left inner calf, will check labs, ekg, cxr, ua, and reassess

## 2018-10-24 LAB
ANION GAP SERPL CALC-SCNC: 16 MMOL/L — HIGH (ref 7–14)
BASOPHILS # BLD AUTO: 0.03 K/UL — SIGNIFICANT CHANGE UP (ref 0–0.2)
BASOPHILS NFR BLD AUTO: 0.3 % — SIGNIFICANT CHANGE UP (ref 0–1)
BUN SERPL-MCNC: 25 MG/DL — HIGH (ref 10–20)
CALCIUM SERPL-MCNC: 8 MG/DL — LOW (ref 8.5–10.1)
CHLORIDE SERPL-SCNC: 98 MMOL/L — SIGNIFICANT CHANGE UP (ref 98–110)
CK MB CFR SERPL CALC: 2.3 NG/ML — SIGNIFICANT CHANGE UP (ref 0.6–6.3)
CK SERPL-CCNC: 759 U/L — HIGH (ref 0–225)
CK SERPL-CCNC: 770 U/L — HIGH (ref 0–225)
CO2 SERPL-SCNC: 22 MMOL/L — SIGNIFICANT CHANGE UP (ref 17–32)
CREAT SERPL-MCNC: 1.2 MG/DL — SIGNIFICANT CHANGE UP (ref 0.7–1.5)
EOSINOPHIL # BLD AUTO: 0.26 K/UL — SIGNIFICANT CHANGE UP (ref 0–0.7)
EOSINOPHIL NFR BLD AUTO: 3 % — SIGNIFICANT CHANGE UP (ref 0–8)
GLUCOSE BLDC GLUCOMTR-MCNC: 136 MG/DL — HIGH (ref 70–99)
GLUCOSE BLDC GLUCOMTR-MCNC: 147 MG/DL — HIGH (ref 70–99)
GLUCOSE SERPL-MCNC: 133 MG/DL — HIGH (ref 70–99)
HCT VFR BLD CALC: 27.6 % — LOW (ref 37–47)
HGB BLD-MCNC: 8.6 G/DL — LOW (ref 12–16)
IMM GRANULOCYTES NFR BLD AUTO: 0.5 % — HIGH (ref 0.1–0.3)
LYMPHOCYTES # BLD AUTO: 1.79 K/UL — SIGNIFICANT CHANGE UP (ref 1.2–3.4)
LYMPHOCYTES # BLD AUTO: 20.5 % — SIGNIFICANT CHANGE UP (ref 20.5–51.1)
MAGNESIUM SERPL-MCNC: 2.2 MG/DL — SIGNIFICANT CHANGE UP (ref 1.8–2.4)
MCHC RBC-ENTMCNC: 31.2 G/DL — LOW (ref 32–37)
MCHC RBC-ENTMCNC: 31.2 PG — HIGH (ref 27–31)
MCV RBC AUTO: 100 FL — HIGH (ref 81–99)
MONOCYTES # BLD AUTO: 1.04 K/UL — HIGH (ref 0.1–0.6)
MONOCYTES NFR BLD AUTO: 11.9 % — HIGH (ref 1.7–9.3)
NEUTROPHILS # BLD AUTO: 5.59 K/UL — SIGNIFICANT CHANGE UP (ref 1.4–6.5)
NEUTROPHILS NFR BLD AUTO: 63.8 % — SIGNIFICANT CHANGE UP (ref 42.2–75.2)
PLATELET # BLD AUTO: 239 K/UL — SIGNIFICANT CHANGE UP (ref 130–400)
POTASSIUM SERPL-MCNC: 4.5 MMOL/L — SIGNIFICANT CHANGE UP (ref 3.5–5)
POTASSIUM SERPL-SCNC: 4.5 MMOL/L — SIGNIFICANT CHANGE UP (ref 3.5–5)
RBC # BLD: 2.76 M/UL — LOW (ref 4.2–5.4)
RBC # FLD: 15.3 % — HIGH (ref 11.5–14.5)
SODIUM SERPL-SCNC: 136 MMOL/L — SIGNIFICANT CHANGE UP (ref 135–146)
TROPONIN T SERPL-MCNC: 0.06 NG/ML — CRITICAL HIGH
WBC # BLD: 8.75 K/UL — SIGNIFICANT CHANGE UP (ref 4.8–10.8)
WBC # FLD AUTO: 8.75 K/UL — SIGNIFICANT CHANGE UP (ref 4.8–10.8)

## 2018-10-24 PROCEDURE — 99282 EMERGENCY DEPT VISIT SF MDM: CPT

## 2018-10-24 RX ORDER — ALLOPURINOL 300 MG
300 TABLET ORAL DAILY
Qty: 0 | Refills: 0 | Status: DISCONTINUED | OUTPATIENT
Start: 2018-10-24 | End: 2018-10-29

## 2018-10-24 RX ORDER — CEFTRIAXONE 500 MG/1
1 INJECTION, POWDER, FOR SOLUTION INTRAMUSCULAR; INTRAVENOUS EVERY 24 HOURS
Qty: 0 | Refills: 0 | Status: DISCONTINUED | OUTPATIENT
Start: 2018-10-24 | End: 2018-10-29

## 2018-10-24 RX ORDER — MORPHINE SULFATE 50 MG/1
4 CAPSULE, EXTENDED RELEASE ORAL ONCE
Qty: 0 | Refills: 0 | Status: DISCONTINUED | OUTPATIENT
Start: 2018-10-24 | End: 2018-10-24

## 2018-10-24 RX ORDER — LEUCOVORIN CALCIUM 5 MG
5 TABLET ORAL DAILY
Qty: 0 | Refills: 0 | Status: DISCONTINUED | OUTPATIENT
Start: 2018-10-24 | End: 2018-10-29

## 2018-10-24 RX ORDER — DOCUSATE SODIUM 100 MG
100 CAPSULE ORAL
Qty: 0 | Refills: 0 | Status: DISCONTINUED | OUTPATIENT
Start: 2018-10-24 | End: 2018-10-29

## 2018-10-24 RX ORDER — APIXABAN 2.5 MG/1
5 TABLET, FILM COATED ORAL EVERY 12 HOURS
Qty: 0 | Refills: 0 | Status: DISCONTINUED | OUTPATIENT
Start: 2018-10-24 | End: 2018-10-29

## 2018-10-24 RX ORDER — MORPHINE SULFATE 50 MG/1
4 CAPSULE, EXTENDED RELEASE ORAL EVERY 4 HOURS
Qty: 0 | Refills: 0 | Status: DISCONTINUED | OUTPATIENT
Start: 2018-10-24 | End: 2018-10-25

## 2018-10-24 RX ORDER — FOLIC ACID 0.8 MG
1 TABLET ORAL DAILY
Qty: 0 | Refills: 0 | Status: DISCONTINUED | OUTPATIENT
Start: 2018-10-24 | End: 2018-10-29

## 2018-10-24 RX ADMIN — CEFTRIAXONE 100 GRAM(S): 500 INJECTION, POWDER, FOR SOLUTION INTRAMUSCULAR; INTRAVENOUS at 06:46

## 2018-10-24 RX ADMIN — MORPHINE SULFATE 4 MILLIGRAM(S): 50 CAPSULE, EXTENDED RELEASE ORAL at 09:15

## 2018-10-24 RX ADMIN — MORPHINE SULFATE 4 MILLIGRAM(S): 50 CAPSULE, EXTENDED RELEASE ORAL at 08:45

## 2018-10-24 RX ADMIN — Medication 100 MILLIGRAM(S): at 06:45

## 2018-10-24 RX ADMIN — Medication 5 MILLIGRAM(S): at 12:05

## 2018-10-24 RX ADMIN — MORPHINE SULFATE 4 MILLIGRAM(S): 50 CAPSULE, EXTENDED RELEASE ORAL at 03:09

## 2018-10-24 RX ADMIN — APIXABAN 5 MILLIGRAM(S): 2.5 TABLET, FILM COATED ORAL at 17:33

## 2018-10-24 RX ADMIN — Medication 1 MILLIGRAM(S): at 12:05

## 2018-10-24 RX ADMIN — MORPHINE SULFATE 4 MILLIGRAM(S): 50 CAPSULE, EXTENDED RELEASE ORAL at 17:33

## 2018-10-24 RX ADMIN — Medication 15 MILLIGRAM(S): at 06:45

## 2018-10-24 RX ADMIN — Medication 100 MILLIGRAM(S): at 17:33

## 2018-10-24 RX ADMIN — MORPHINE SULFATE 4 MILLIGRAM(S): 50 CAPSULE, EXTENDED RELEASE ORAL at 01:58

## 2018-10-24 RX ADMIN — Medication 300 MILLIGRAM(S): at 12:05

## 2018-10-24 RX ADMIN — APIXABAN 5 MILLIGRAM(S): 2.5 TABLET, FILM COATED ORAL at 06:46

## 2018-10-24 RX ADMIN — MORPHINE SULFATE 4 MILLIGRAM(S): 50 CAPSULE, EXTENDED RELEASE ORAL at 01:49

## 2018-10-24 NOTE — H&P ADULT - ASSESSMENT
77 yo F with PMH of DM2, RA on Prednisone, DVT on Eliquis, foot ulcer, presented to ED for fever after being advised by visiting nurse to come in.    #) Acute cystitis  - UA grossly +ve  - check urine Cx  - f/u blood Cx  - c/w Rocephin 1g q24 for now    #) Pain 2/2 diabetic foot ulcer  - Podiatry eval  - will control pain with IV morphine for now    #) DVT - c/w Eliquis    #) DM - holding oral agents, monitor fs, insulin PRN    #) RA  - c/w Prednisone. Is on it chronically. Rheum Dr Henry aware  - patient previously on Methotrexate but was discontinued over concerns for pancytopenia  - In discussion with Dr Henry about starting biologic however he has a history of TB    DVT ppx: Lovenox  Diet: DASH, carb consistent  Activity: OOBTC  Code status: FULL  Dispo: pending 79 yo F with PMH of DM2, RA, DVT on Eliquis, gout, latent TB on Rifampin and foot ulcer presented to ED for fever after being advised by visiting nurse to come in.    #) Acute cystitis  - UA grossly +ve  - check urine Cx  - f/u blood Cx  - c/w Rocephin 1g q24 for now    #) Pain 2/2 diabetic foot ulcer  - Podiatry eval  - will control pain with IV morphine for now    #) DVT - c/w Eliquis    #) DM - holding oral agents, monitor fs, insulin PRN    #) RA  - c/w Prednisone. Home med - patient is being tapered down  - recently started on Leucovorin and Folic acid    #) Latent TB - c/w Rifampin    DVT ppx: Lovenox  Diet: DASH, carb consistent  Activity: OOBTC  Code status: FULL  Dispo: pending 77 yo F with PMH of DM2, RA, DVT on Eliquis, gout, latent TB on Rifampin and foot ulcer presented to ED for fever after being advised by visiting nurse to come in.    #) Acute cystitis  - UA grossly +ve  - check urine Cx  - f/u blood Cx  - c/w Rocephin 1g q24 for now    #) Pain 2/2 diabetic foot ulcer  - Podiatry eval  - will control pain with IV morphine for now    #) RA  - c/w Prednisone. Home med - patient is being tapered down  - recently started on Leucovorin and Folic acid    #) Latent TB - c/w Rifampin    #) DVT - c/w Eliquis    #) DM - holding oral agents, monitor fs, insulin PRN    #) Gout - c/w Allopurinol  DVT ppx: Lovenox  Diet: DASH, carb consistent  Activity: OOBTC  Code status: FULL  Dispo: pending 77 yo F with PMH of DM2, RA, DVT on Eliquis, gout, latent TB on Rifampin and foot ulcer presented to ED for fever after being advised by visiting nurse to come in.    #) Acute cystitis  - UA grossly +ve  - check urine Cx  - f/u blood Cx  - c/w Rocephin 1g q24 for now    #) Pain 2/2 diabetic foot ulcer  - Podiatry eval  - will control pain with IV morphine for now    #) RA  - c/w Prednisone. Home med - patient is being tapered down  - recently started on Leucovorin and Folic acid    #) Latent TB - c/w Rifampin    #) DVT - c/w Eliquis    #) DM - holding oral agents, monitor fs, insulin PRN    #) Gout - c/w Allopurinol    DVT ppx: Lovenox  Diet: DASH, carb consistent  Activity: OOBTC  Code status: FULL  Dispo: pending 77 yo F with PMH of DM2, RA, DVT on Eliquis, gout, latent TB on Rifampin and foot ulcer presented to ED for fever after being advised by visiting nurse to come in.    #) Acute cystitis  - UA grossly +ve  - check urine Cx  - f/u blood Cx  - c/w Rocephin 1g q24 for now    #) Pain 2/2 diabetic foot ulcer  - Podiatry eval  - will control pain with IV morphine for now    #) RA  - c/w Prednisone. Home med - patient is being tapered down  - recently started on Leucovorin and Folic acid    #) Latent TB - c/w Rifampin    #) DVT - c/w Eliquis    #) DM - holding oral agents, monitor fs, insulin PRN    #) Gout - c/w Allopurinol    DVT ppx: on Eliquis  Diet: DASH, carb consistent  Activity: OOBTC  Code status: FULL  Dispo: pending 79 yo F with PMH of DM2, RA, DVT on Eliquis, gout, latent TB on Rifampin and foot ulcer presented to ED for fever after being advised by visiting nurse to come in.    #) Acute cystitis  - UA grossly +ve  - check urine Cx  - f/u blood Cx  - c/w Rocephin 1g q24 for now    #) Pain 2/2 diabetic foot ulcer  - Podiatry eval  - will control pain with IV morphine for now    #) Troponemia  - no chest pain, no EKG changes  - likely demand ischemia. Will trend CE for now    #) RA  - c/w Prednisone. Home med - patient is being tapered down  - recently started on Leucovorin and Folic acid    #) Latent TB - c/w Rifampin    #) DVT - c/w Eliquis    #) DM - holding oral agents, monitor fs, insulin PRN    #) Gout - c/w Allopurinol    DVT ppx: on Eliquis  Diet: DASH, carb consistent  Activity: OOBTC  Code status: FULL  Dispo: pending

## 2018-10-24 NOTE — CONSULT NOTE ADULT - ASSESSMENT
Assessment:   - DFU dorsal aspect right 3rd digit. + edema, + erythema, + malodor, - purulence  - wound medial aspect LLE    Plan:   - pt seen/evaluated in ED  - Consult  (Vascular Sx)  - Consult Rheumatology   - Consult ID  - c/w IV abx  - dressed w/ betadine/DSD  - plan will be discussed w/ attending   - podiatry will f/u Assessment:   - DFU dorsal aspect right 3rd digit. + edema, + erythema, + malodor, - purulence  - wound medial aspect LLE    Plan:   - pt seen/evaluated in ED  - Consult  (Vascular Sx)  - Consult Rheumatology   - Consult ID  - c/w IV abx  - dressed w/ betadine/DSD   - podiatry will f/u

## 2018-10-24 NOTE — H&P ADULT - HISTORY OF PRESENT ILLNESS
79 yo F with PMH of HTN, DM, RA on Prednisone, DVT on Eliquis, foot ulcer, presented to ED for fever after being advised by visiting nurse to come in. Patient reports she had a fever of 103 when visiting nurse came to see her day PTP. Was told she should come into ED. Also reports she had her foot ulcer debrided by Podiatry on Monday. Afterwards Monday evening, patient began experiencing severe pain and was unable to ambulate. Visiting nurse came next day and noted fever. Patient reports some lower abdominal discomfort. Denies any burning while urinating, foul odor from urine, constipation, diarrhea, chest pain, SOB, cough or other complaint or symptoms.     In ED UA was grossly positive. Received Rocephin 1g. 77 yo F with PMH of DM2, RA, DVT on Eliquis, gout, latent TB on Rifampin and foot ulcer presented to ED for fever after being advised by visiting nurse to come in. Patient reports she had a fever of 103 when visiting nurse came to see her day PTP. Was told she should come into ED. Also reports she had her foot ulcer debrided by Podiatry on Monday. Afterwards Monday evening, patient began experiencing severe, unbearable pain. Visiting nurse came next day and noted fever. Patient reports some lower abdominal discomfort. Denies any burning while urinating, foul odor from urine, constipation, diarrhea, chest pain, SOB, cough or other complaint or symptoms.     In ED UA was grossly positive. Received Rocephin 1g.

## 2018-10-24 NOTE — CONSULT NOTE ADULT - ASSESSMENT
Assessment:   - DFU dorsal aspect right 3rd digit. + edema, + erythema, + malodor, - purulence  - wound medial aspect LLE    Plan:   - pt seen/evaluated in ED  - Consult  (Vascular Sx)  - Consult Rheumatology   - Consult ID  - c/w IV abx  - dressed w/ betadine/DSD  - plan discussed w/ attending   - podiatry will f/u

## 2018-10-24 NOTE — ED ADULT NURSE REASSESSMENT NOTE - NS ED NURSE REASSESS COMMENT FT1
pt resting comfortably, denies discomfort at this time. fluids provided, sandwich at bed side. Iv intact. no distress noted at this time, safety maintained.

## 2018-10-24 NOTE — CONSULT NOTE ADULT - ASSESSMENT
78y old Female c/o dysphagia after eating chicken. No evidence of foreign body on flexible laryngoscopy.    PLAN:  1.	Consider GI consult  2.	No acute ENT intervention at this time 78y old Female c/o dysphagia after eating chicken. No evidence of foreign body on flexible laryngoscopy.    PLAN:  1.	Consider GI evaluation & speech and swallow eval  2.	No acute ENT intervention at this time

## 2018-10-24 NOTE — H&P ADULT - PMH
DVT (deep venous thrombosis)    Gout    HTN (hypertension)    Other specified diabetes mellitus with other specified complication, unspecified whether long term insulin use    Rheumatoid arteritis

## 2018-10-24 NOTE — H&P ADULT - NSHPPHYSICALEXAM_GEN_ALL_CORE
GEN: NAD, comfortable  CARDIO: RRR, no m/r/g  RESP: CTAB, no w/r/r  ABD: soft, NT/ND, +BS  EXT: +1 pitting edema b/l, R 3rd toe ulcer noted, granulation tissue, no pus  NEURO: AAOx3, grossly normal

## 2018-10-24 NOTE — H&P ADULT - ATTENDING COMMENTS
patient seen and examined independently on morning rounds in ED, chart reviewed and discussed with medicine resident and agree with the above overnight H/P and assessment and plan with the following addendum:     in brief, 77 yo woman with h/o DM2, RA and OA (s/p TKR), DVT (on eliquis), gout and latent tb currently on rifampin treatment regimen who p/w fever at home (as recorded by home vns that was 103).  She denies any chest pain, cough, sob, headache, dysuria or other urinary symptoms.  She was seen by podiatry <1 week ago for Left foot debridement.  in the ED she was found to have +UA and was started on iv abx (ceftriaxone) and admitted to medicine service for UTI.    pcp-Dr. Zafar WINTER- as per above otherwise review of systems unremarkable  soc hx- lives alone- has home vns- no h/o tobacco, etoh or drug use    PE:  GEN-NAD, AAOx3  HEENT- NCAT, PERRLA, EOMI  NECK- supple no jvd  PULM- Clear to auscultation bilaterally, fair air entry  CVS- +s1/s2 RRR   GI- soft obese NT ND +bs  EXT- 1+edema, Right 3rd metatarsal distal tip ulcer with central necrosis/granualtion tissue- left anterior distal shin medial skin break- chronic venous stasis changes  NEURO- nonfocal    labs/radiology reviewed    a/p:   #Fever 2/2 UTI  -cont with iv abx (ceftriaxone)  -f/u urine and blood cx  -monitor wbc and fever curve    #DM2 with DFU  -podiatry f/u--->unclear if full debridement done within last week  -local wound care (xeroform)  -monitor fs---check hba1c  -cont insulin     #latent Tb---cont rifampin---need to confirm duration of treatment remaining    #h/o DVT--cont eliquis    #DVT/GI ppx    full code  PT eval for dispo planning

## 2018-10-24 NOTE — CONSULT NOTE ADULT - ATTENDING COMMENTS
Patient was sent to the ED by patients podiatrist Dr. Oden. Podiatry team has spoken to Dr. Oden. I agree with the above plan

## 2018-10-25 LAB
ALBUMIN SERPL ELPH-MCNC: 2.9 G/DL — LOW (ref 3.5–5.2)
ALP SERPL-CCNC: 46 U/L — SIGNIFICANT CHANGE UP (ref 30–115)
ALT FLD-CCNC: 10 U/L — SIGNIFICANT CHANGE UP (ref 0–41)
ANION GAP SERPL CALC-SCNC: 15 MMOL/L — HIGH (ref 7–14)
AST SERPL-CCNC: 24 U/L — SIGNIFICANT CHANGE UP (ref 0–41)
BASOPHILS # BLD AUTO: 0.02 K/UL — SIGNIFICANT CHANGE UP (ref 0–0.2)
BASOPHILS NFR BLD AUTO: 0.3 % — SIGNIFICANT CHANGE UP (ref 0–1)
BILIRUB SERPL-MCNC: 0.4 MG/DL — SIGNIFICANT CHANGE UP (ref 0.2–1.2)
BUN SERPL-MCNC: 25 MG/DL — HIGH (ref 10–20)
CALCIUM SERPL-MCNC: 7.7 MG/DL — LOW (ref 8.5–10.1)
CHLORIDE SERPL-SCNC: 100 MMOL/L — SIGNIFICANT CHANGE UP (ref 98–110)
CO2 SERPL-SCNC: 21 MMOL/L — SIGNIFICANT CHANGE UP (ref 17–32)
CREAT SERPL-MCNC: 1.2 MG/DL — SIGNIFICANT CHANGE UP (ref 0.7–1.5)
CULTURE RESULTS: SIGNIFICANT CHANGE UP
EOSINOPHIL # BLD AUTO: 0.27 K/UL — SIGNIFICANT CHANGE UP (ref 0–0.7)
EOSINOPHIL NFR BLD AUTO: 3.5 % — SIGNIFICANT CHANGE UP (ref 0–8)
ERYTHROCYTE [SEDIMENTATION RATE] IN BLOOD: 73 MM/HR — HIGH (ref 0–20)
ESTIMATED AVERAGE GLUCOSE: 146 MG/DL — HIGH (ref 68–114)
GLUCOSE BLDC GLUCOMTR-MCNC: 127 MG/DL — HIGH (ref 70–99)
GLUCOSE BLDC GLUCOMTR-MCNC: 129 MG/DL — HIGH (ref 70–99)
GLUCOSE BLDC GLUCOMTR-MCNC: 133 MG/DL — HIGH (ref 70–99)
GLUCOSE BLDC GLUCOMTR-MCNC: 155 MG/DL — HIGH (ref 70–99)
GLUCOSE BLDC GLUCOMTR-MCNC: 165 MG/DL — HIGH (ref 70–99)
GLUCOSE BLDC GLUCOMTR-MCNC: 171 MG/DL — HIGH (ref 70–99)
GLUCOSE BLDC GLUCOMTR-MCNC: 173 MG/DL — HIGH (ref 70–99)
GLUCOSE BLDC GLUCOMTR-MCNC: 239 MG/DL — HIGH (ref 70–99)
GLUCOSE SERPL-MCNC: 148 MG/DL — HIGH (ref 70–99)
GRAM STN FLD: SIGNIFICANT CHANGE UP
HBA1C BLD-MCNC: 6.7 % — HIGH (ref 4–5.6)
HCT VFR BLD CALC: 25.8 % — LOW (ref 37–47)
HGB BLD-MCNC: 8.2 G/DL — LOW (ref 12–16)
IMM GRANULOCYTES NFR BLD AUTO: 0.3 % — SIGNIFICANT CHANGE UP (ref 0.1–0.3)
LYMPHOCYTES # BLD AUTO: 0.82 K/UL — LOW (ref 1.2–3.4)
LYMPHOCYTES # BLD AUTO: 10.7 % — LOW (ref 20.5–51.1)
MCHC RBC-ENTMCNC: 30.9 PG — SIGNIFICANT CHANGE UP (ref 27–31)
MCHC RBC-ENTMCNC: 31.8 G/DL — LOW (ref 32–37)
MCV RBC AUTO: 97.4 FL — SIGNIFICANT CHANGE UP (ref 81–99)
MONOCYTES # BLD AUTO: 1.08 K/UL — HIGH (ref 0.1–0.6)
MONOCYTES NFR BLD AUTO: 14.1 % — HIGH (ref 1.7–9.3)
NEUTROPHILS # BLD AUTO: 5.45 K/UL — SIGNIFICANT CHANGE UP (ref 1.4–6.5)
NEUTROPHILS NFR BLD AUTO: 71.1 % — SIGNIFICANT CHANGE UP (ref 42.2–75.2)
NRBC # BLD: 0 /100 WBCS — SIGNIFICANT CHANGE UP (ref 0–0)
PLATELET # BLD AUTO: 238 K/UL — SIGNIFICANT CHANGE UP (ref 130–400)
POTASSIUM SERPL-MCNC: 4.2 MMOL/L — SIGNIFICANT CHANGE UP (ref 3.5–5)
POTASSIUM SERPL-SCNC: 4.2 MMOL/L — SIGNIFICANT CHANGE UP (ref 3.5–5)
PROT SERPL-MCNC: 5.2 G/DL — LOW (ref 6–8)
RBC # BLD: 2.65 M/UL — LOW (ref 4.2–5.4)
RBC # FLD: 14.9 % — HIGH (ref 11.5–14.5)
SODIUM SERPL-SCNC: 136 MMOL/L — SIGNIFICANT CHANGE UP (ref 135–146)
SPECIMEN SOURCE: SIGNIFICANT CHANGE UP
WBC # BLD: 7.66 K/UL — SIGNIFICANT CHANGE UP (ref 4.8–10.8)
WBC # FLD AUTO: 7.66 K/UL — SIGNIFICANT CHANGE UP (ref 4.8–10.8)

## 2018-10-25 PROCEDURE — 99222 1ST HOSP IP/OBS MODERATE 55: CPT | Mod: 25

## 2018-10-25 PROCEDURE — 31575 DIAGNOSTIC LARYNGOSCOPY: CPT

## 2018-10-25 RX ORDER — DEXTROSE 50 % IN WATER 50 %
25 SYRINGE (ML) INTRAVENOUS ONCE
Qty: 0 | Refills: 0 | Status: DISCONTINUED | OUTPATIENT
Start: 2018-10-25 | End: 2018-10-29

## 2018-10-25 RX ORDER — OXYCODONE AND ACETAMINOPHEN 5; 325 MG/1; MG/1
1 TABLET ORAL ONCE
Qty: 0 | Refills: 0 | Status: DISCONTINUED | OUTPATIENT
Start: 2018-10-25 | End: 2018-10-25

## 2018-10-25 RX ORDER — INSULIN LISPRO 100/ML
VIAL (ML) SUBCUTANEOUS
Qty: 0 | Refills: 0 | Status: DISCONTINUED | OUTPATIENT
Start: 2018-10-25 | End: 2018-10-29

## 2018-10-25 RX ORDER — DEXTROSE 50 % IN WATER 50 %
12.5 SYRINGE (ML) INTRAVENOUS ONCE
Qty: 0 | Refills: 0 | Status: DISCONTINUED | OUTPATIENT
Start: 2018-10-25 | End: 2018-10-29

## 2018-10-25 RX ORDER — CHLORHEXIDINE GLUCONATE 213 G/1000ML
1 SOLUTION TOPICAL
Qty: 0 | Refills: 0 | Status: DISCONTINUED | OUTPATIENT
Start: 2018-10-25 | End: 2018-10-29

## 2018-10-25 RX ORDER — OXYCODONE AND ACETAMINOPHEN 5; 325 MG/1; MG/1
2 TABLET ORAL EVERY 6 HOURS
Qty: 0 | Refills: 0 | Status: DISCONTINUED | OUTPATIENT
Start: 2018-10-25 | End: 2018-10-26

## 2018-10-25 RX ORDER — SODIUM CHLORIDE 9 MG/ML
1000 INJECTION, SOLUTION INTRAVENOUS
Qty: 0 | Refills: 0 | Status: DISCONTINUED | OUTPATIENT
Start: 2018-10-25 | End: 2018-10-29

## 2018-10-25 RX ORDER — INSULIN GLARGINE 100 [IU]/ML
12 INJECTION, SOLUTION SUBCUTANEOUS AT BEDTIME
Qty: 0 | Refills: 0 | Status: DISCONTINUED | OUTPATIENT
Start: 2018-10-25 | End: 2018-10-29

## 2018-10-25 RX ORDER — INSULIN LISPRO 100/ML
4 VIAL (ML) SUBCUTANEOUS
Qty: 0 | Refills: 0 | Status: DISCONTINUED | OUTPATIENT
Start: 2018-10-25 | End: 2018-10-29

## 2018-10-25 RX ORDER — GLUCAGON INJECTION, SOLUTION 0.5 MG/.1ML
1 INJECTION, SOLUTION SUBCUTANEOUS ONCE
Qty: 0 | Refills: 0 | Status: DISCONTINUED | OUTPATIENT
Start: 2018-10-25 | End: 2018-10-29

## 2018-10-25 RX ORDER — DEXTROSE 50 % IN WATER 50 %
15 SYRINGE (ML) INTRAVENOUS ONCE
Qty: 0 | Refills: 0 | Status: DISCONTINUED | OUTPATIENT
Start: 2018-10-25 | End: 2018-10-29

## 2018-10-25 RX ADMIN — Medication 100 MILLIGRAM(S): at 17:18

## 2018-10-25 RX ADMIN — MORPHINE SULFATE 4 MILLIGRAM(S): 50 CAPSULE, EXTENDED RELEASE ORAL at 00:45

## 2018-10-25 RX ADMIN — MORPHINE SULFATE 4 MILLIGRAM(S): 50 CAPSULE, EXTENDED RELEASE ORAL at 13:06

## 2018-10-25 RX ADMIN — MORPHINE SULFATE 4 MILLIGRAM(S): 50 CAPSULE, EXTENDED RELEASE ORAL at 09:06

## 2018-10-25 RX ADMIN — APIXABAN 5 MILLIGRAM(S): 2.5 TABLET, FILM COATED ORAL at 05:16

## 2018-10-25 RX ADMIN — Medication 15 MILLIGRAM(S): at 05:16

## 2018-10-25 RX ADMIN — Medication 1 MILLIGRAM(S): at 12:38

## 2018-10-25 RX ADMIN — MORPHINE SULFATE 4 MILLIGRAM(S): 50 CAPSULE, EXTENDED RELEASE ORAL at 15:00

## 2018-10-25 RX ADMIN — CHLORHEXIDINE GLUCONATE 1 APPLICATION(S): 213 SOLUTION TOPICAL at 12:37

## 2018-10-25 RX ADMIN — CEFTRIAXONE 100 GRAM(S): 500 INJECTION, POWDER, FOR SOLUTION INTRAMUSCULAR; INTRAVENOUS at 05:16

## 2018-10-25 RX ADMIN — Medication 100 MILLIGRAM(S): at 05:16

## 2018-10-25 RX ADMIN — INSULIN GLARGINE 12 UNIT(S): 100 INJECTION, SOLUTION SUBCUTANEOUS at 21:04

## 2018-10-25 RX ADMIN — OXYCODONE AND ACETAMINOPHEN 1 TABLET(S): 5; 325 TABLET ORAL at 22:18

## 2018-10-25 RX ADMIN — MORPHINE SULFATE 4 MILLIGRAM(S): 50 CAPSULE, EXTENDED RELEASE ORAL at 08:32

## 2018-10-25 RX ADMIN — APIXABAN 5 MILLIGRAM(S): 2.5 TABLET, FILM COATED ORAL at 17:18

## 2018-10-25 RX ADMIN — Medication 300 MILLIGRAM(S): at 12:38

## 2018-10-25 RX ADMIN — Medication 4 UNIT(S): at 17:17

## 2018-10-25 RX ADMIN — Medication 5 MILLIGRAM(S): at 12:38

## 2018-10-25 RX ADMIN — MORPHINE SULFATE 4 MILLIGRAM(S): 50 CAPSULE, EXTENDED RELEASE ORAL at 00:11

## 2018-10-25 NOTE — PROGRESS NOTE ADULT - SUBJECTIVE AND OBJECTIVE BOX
SUBJECTIVE:    Patient is a 78y old Female who presents with a chief complaint of UTI (24 Oct 2018 20:15)    Currently admitted to medicine with the primary diagnosis of UTI (urinary tract infection)     Today is hospital day 1d. Choked on a piece of chicken last night. ENT was called - did a flex laryngoscopy - didnt see anything; no acute intervention. Patient able to tolerate PO now. c/o of some left jaw pain.     PAST MEDICAL & SURGICAL HISTORY  Gout  DVT (deep venous thrombosis)  HTN (hypertension)  Rheumatoid arteritis  Other specified diabetes mellitus with other specified complication, unspecified whether long term insulin use  Primary osteoarthritis of right knee: s/p knee repalcement    SOCIAL HISTORY:  Negative for smoking/alcohol/drug use.     ALLERGIES:  clindamycin (Unknown)  erythromycin (Unknown)  penicillin (Unknown)  strawberry (Unknown)    MEDICATIONS:  STANDING MEDICATIONS  allopurinol 300 milliGRAM(s) Oral daily  apixaban 5 milliGRAM(s) Oral every 12 hours  cefTRIAXone   IVPB 1 Gram(s) IV Intermittent every 24 hours  chlorhexidine 4% Liquid 1 Application(s) Topical <User Schedule>  docusate sodium 100 milliGRAM(s) Oral two times a day  folic acid 1 milliGRAM(s) Oral daily  leucovorin 5 milliGRAM(s) Oral daily  predniSONE   Tablet 15 milliGRAM(s) Oral daily  rifampin 300 milliGRAM(s) Oral two times a day    PRN MEDICATIONS  morphine  - Injectable 4 milliGRAM(s) IV Push every 4 hours PRN    VITALS:   T(F): 99.3  HR: 102  BP: 119/76  RR: 20  SpO2: 98%    LABS:                        8.2    7.66  )-----------( 238      ( 25 Oct 2018 07:17 )             25.8     10-25    136  |  100  |  25<H>  ----------------------------<  148<H>  4.2   |  21  |  1.2    Ca    7.7<L>      25 Oct 2018 07:17  Mg     2.2     10-24    TPro  5.2<L>  /  Alb  2.9<L>  /  TBili  0.4  /  DBili  x   /  AST  24  /  ALT  10  /  AlkPhos  46  10-25    PT/INR - ( 23 Oct 2018 17:27 )   PT: 13.80 sec;   INR: 1.20 ratio         PTT - ( 23 Oct 2018 17:27 )  PTT:28.0 sec  Urinalysis Basic - ( 23 Oct 2018 20:50 )    Color: Yellow / Appearance: Cloudy / S.020 / pH: x  Gluc: x / Ketone: Negative  / Bili: Negative / Urobili: 0.2 mg/dL   Blood: x / Protein: 30 mg/dL / Nitrite: Positive   Leuk Esterase: Moderate / RBC: 3-5 /HPF / WBC >50 /HPF   Sq Epi: x / Non Sq Epi: Moderate /HPF / Bacteria: TNTC /HPF            Culture - Blood (collected 23 Oct 2018 18:41)  Source: .Blood Blood  Preliminary Report (25 Oct 2018 03:01):    No growth to date.    Culture - Blood (collected 23 Oct 2018 18:41)  Source: .Blood Blood  Preliminary Report (25 Oct 2018 03:01):    No growth to date.      CARDIAC MARKERS ( 24 Oct 2018 07:26 )  x     / 0.06 ng/mL / 759 U/L / x     / 2.3 ng/mL  CARDIAC MARKERS ( 23 Oct 2018 17:27 )  x     / 0.06 ng/mL / x     / x     / x          RADIOLOGY:    EXAM:  CT ABDOMEN AND PELVIS IC            PROCEDURE DATE:  10/23/2018            INTERPRETATION:    CLINICAL HISTORY / REASON FOR EXAM: Weakness and fever    TECHNIQUE: Contiguous axial CT images were obtained from the lower chest   to the pubic symphysis following administration of 100 mL Optiray 320  intravenous contrast.  Oral contrast was not administered.  Reformatted   images in the coronal and sagittal planes were acquired.    COMPARISON CT: 2018    FINDINGS:    LOWER CHEST: Apparent asymmetric right breast soft tissue. Bibasilar  atelectasis with left lower lobe groundglass opacities. Nodular density   in the lingula measuring up to 1.1 cm.    HEPATOBILIARY: Indeterminate 1.1 cm liver segment 6 hypodense lesion   (series 3, image 24)    SPLEEN: Unremarkable.    PANCREAS: Two pancreatic tail hypodense possible cystic lesions, larger   1.9  cm (series 3, image 28).    ADRENAL GLANDS: Unremarkable.    KIDNEYS: Symmetric enhancement bilaterally. No evidence of hydronephrosis.    ABDOMINOPELVIC NODES: Unremarkable.    PELVIC ORGANS: Partially distended urinary bladder.    PERITONEUM/MESENTERY/BOWEL: No bowel obstruction, ascites or   intraperitoneal free air. Normal appendix.    BONES/SOFT TISSUES: Chronic compression fractures of T12, L1 and L2. T9   and T10 compression fractures are unchanged since 2018. T8   compression fracture is new since 2018. Chronic fracture of the   left pubic rami. Chronic right eighth rib fracture. Degenerative changes   to the thoracolumbar spine.      IMPRESSION:      No evidence of acute abdominal or pelvic pathology.    T8 compression fracture is new since 2018.    Apparent asymmetric right breast soft tissue; diagnostic    mammogram/sonogram or correlation with outside mammography recommended.    Indeterminate 1.1 cm liver segment 6 hypodense lesion, unchanged. Further   evaluation with nonemergent MRI abdomen with and without IV contrast is   recommended.    Two pancreatic tail hypodense possible cystic lesions, larger 1.9 cm,   unchanged; this can be further evaluated with recommended MRI with   additional MRCP sequences.    Lingular 1.1 cm nodularity. This may reflect focal atelectasis however,   correlation with PET CT may be of benefit.    MORALES GONZALEZ M.D., ATTENDING RADIOLOGIST  This document has been electronically signed. Oct 23 2018 11:58PM            EXAM:  DUPLEX SCAN EXT VEINS LOWER BI        PROCEDURE DATE:  10/23/2018    INTERPRETATION:  Clinical History / Reason for exam: The patient is a   78-year-old female with leg swelling. A venous duplex examination was   performed to evaluate the patient for deep venous thrombosis of the lower   extremities.    The common femoral, great saphenous and small saphenous veins were   visualized bilaterally with no evidence of deep venous thrombosis    All veins were fully compressible.  There was presence of spontaneous   flow, augmentation with distal compression and phasicity.    A chronic bilateral femoral and popliteal DVT was visualized    The anterior tibial veins were not visualized    The posterior tibial veins were  not visualized    The peroneal veins were not visualized.    Impression:    A chronic bilateral femoral and popliteal DVT was visualized    ICD-10: M79.89    BE LACKEY M.D., ATTENDING VASCULAR  This document has been electronically signed. Oct 24 2018 11:05AM                PHYSICAL EXAM:  GEN: No acute distress  LUNGS: Clear to auscultation bilaterally   HEART: S1/S2 present. RRR.   ABD: Soft, non-tender, non-distended. Bowel sounds present  EXT:  1+edema, Right 3rd metatarsal distal tip ulcer with central necrosis/granulation tissue- left anterior distal shin medial skin break- chronic venous stasis changes  NEURO: AAOX3

## 2018-10-25 NOTE — PROGRESS NOTE ADULT - ASSESSMENT
79 yo F with PMH of DM2, RA, DVT on Eliquis, gout, latent TB on Rifampin and foot ulcer presented to ED for fever after being advised by visiting nurse to come in.    #)cystitis  - UA grossly +ve  - f/u urine Cx  - blood Cx negx2  - c/w Rocephin 1g q24 for now    #)DFU  - Podiatry f/u  - ID f/u   - vascular f/u  - c/w IV morphine for now  - f/u ESR  - f/u right foot xray  - CXR f/u - AP/lateral  - will find history of wound cx   - A1C f/u      #) Troponemia  - no chest pain, no EKG changes  - likely demand ischemia. Will trend CE      #) RA  - c/w Prednisone. Home med - patient is being tapered down  - recently started on Leucovorin and Folic acid    #) Latent TB - c/w Rifampin (total 4 month treatment)    #) LLE DVT - c/w Eliquis 5mg BID    #) DM - holding oral agents, monitor fs, insulin PRN    #) Gout - c/w Allopurinol    DVT ppx: on Eliquis  Diet: DASH, carb consistent  Activity: OOBTC  Code status: FULL  Dispo: pending

## 2018-10-25 NOTE — CONSULT NOTE ADULT - ASSESSMENT
IMPRESSION    Pt with RA, DM,     Left lung atelectasis on 10/23 Cxray    U/A Nitrite: Positive /Leuk Esterase: Moderate / RBC: 3-5 /HPF / WBC >50 /HPF/ Bacteria: TNTC     On cefTRIAXone   IVPB 1 Gram(s) IV Intermittent every 24 hours  rifampin 300 milliGRAM(s) Oral two times a day    Patient has history of:  clindamycin (Unknown)  erythromycin (Unknown)  penicillin (Unknown)    Right 3rd foot ulcer      SUGGESTIONs  Need urine culture  Continue Rocephin   Vascular to see pt IMPRESSION    Pt with RA, DM,     Left lung atelectasis on 10/23 Cxray    U/A Nitrite: Positive /Leuk Esterase: Moderate / RBC: 3-5 /HPF / WBC >50 /HPF/ Bacteria: TNTC     On cefTRIAXone   IVPB 1 Gram(s) IV Intermittent every 24 hours  rifampin 300 milliGRAM(s) Oral two times a day    Patient has history of:  clindamycin (Unknown)  erythromycin (Unknown)  penicillin (Unknown)    Right 3rd foot ulcer X almost 1 year    On Rifampin for four more months for latent TB      SUGGESTIONs  Need urine culture  Continue Rocephin   Vascular to see pt  Await today's foot Xray  Need result of prior foot CT (As per pt: No MRI was done because of type of knee replacement)

## 2018-10-25 NOTE — PROGRESS NOTE ADULT - ASSESSMENT
9 yo F with PMH of DM2, RA, LLE DVT on Eliquis, CKD 3, gout, latent TB on Rifampin and foot ulcer admitted for fever, likely 2/2 UTI. I week PTP patient underwent an uneventful debridement by podiatry, 1 day post-op was complaining of RLE pain, was advised by podiatry office to take pain relievers until her follow up appt. She was then referred to ED by visiting nurse after noting a fever    1. UTI  - UA+, f/u urine cx  - blood Cx negx2  - c/w Rocephin 1g q24 for now  -monitor VS and WBC    2. Choking episode  no further occurrences  speech and swallow evaluation    3. Diabetic Foot Ulcer s/p debridement 1 week ago  no acute intervention by vascular inpatient  ESR elevated, f/u foot xray  c/w betadine/DSD  follow up podiatry    4. RA - follows Dr Henry  patient is on Prednisone 15 daily, leucovorin/Folic Acid  as per patient, she was supposed to start Rituxan infusions this week   Rheumatology evaluation     5. LE pitting edema  2d echo from 6/2018 shows normal systolic fxn, mild [place], mild TR  today's cxray shows no effusions/no pulm vascular congestion  possibly due to hypoalbuminemia/sedentary lifestyle    6. Latent TB - c/w Rifampin (total 4 month treatment)    7. LLE DVT - c/w Eliquis 5mg BID    8. DM - holding oral agents, monitor fs, insulin PRN    9. Gout - c/w Allopurinol    DVT ppx: on Eliquis  Diet: DASH, carb consistent  Activity: OOB  Code status: FULL  Dispo: pending    Discussed with patient and housestaff

## 2018-10-25 NOTE — PROGRESS NOTE ADULT - SUBJECTIVE AND OBJECTIVE BOX
LINCOLN HEREDIA  78y Female    CHIEF COMPLAINT:    Patient is a 78y old  Female who presents with a chief complaint of UTI (25 Oct 2018 12:03)      INTERVAL HPI/OVERNIGHT EVENTS:    Patient seen and examined. Complains of right foot pain and sore throat. Patient choked on chicken yesterday while in the ED. She was seen by ENT, and a laryngoscopy at that time was unrevealing. Patient denies any prior history of choking, coughing,  dysphagia, odynophagia to solids or liquids.     ROS: All other systems are negative.    Vital Signs:    T(F): 97.9 (10-25-18 @ 12:46), Max: 99.8 (10-24-18 @ 23:30)  HR: 97 (10-25-18 @ 12:46) (97 - 102)  BP: 154/72 (10-25-18 @ 12:46) (119/76 - 154/72)  RR: 18 (10-25-18 @ 12:46) (18 - 20)  SpO2: 97% (10-25-18 @ 08:18) (97% - 98%)    POCT Blood Glucose.: 173 mg/dL (25 Oct 2018 16:52)  POCT Blood Glucose.: 239 mg/dL (25 Oct 2018 11:42)  POCT Blood Glucose.: 171 mg/dL (25 Oct 2018 08:02)  POCT Blood Glucose.: 129 mg/dL (25 Oct 2018 05:24)  POCT Blood Glucose.: 136 mg/dL (24 Oct 2018 23:36)  POCT Blood Glucose.: 155 mg/dL (24 Oct 2018 18:02)      PHYSICAL EXAM:    GENERAL:  NAD, cushingoid habitus  SKIN: No rashes or lesions  HEENT: Atraumatic. Normocephalic.   NECK: Supple, No JVD. No lymphadenopathy.  PULMONARY: CTA B/L. No wheezing. No rales  CVS: Normal S1, S2. Rate and Rhythm are regular. No murmurs. B/l LE pitting edema  ABDOMEN/GI: Soft, mild lower abdominal tenderness, Nondistended; BS present  MSK:  Right 3rd digit edematous, with areas of dry blood visible, no signs of gas/necrosis  NEUROLOGIC:  No motor or sensory deficit.  PSYCH: Alert & oriented x 3    Consultant(s) Notes Reviewed:  [x ] YES  [ ] NO  Care Discussed with Consultants/Other Providers [ x] YES  [ ] NO    LABS:                        8.2    7.66  )-----------( 238      ( 25 Oct 2018 07:17 )             25.8     10-25    136  |  100  |  25<H>  ----------------------------<  148<H>  4.2   |  21  |  1.2    Ca    7.7<L>      25 Oct 2018 07:17  Mg     2.2     10-24    TPro  5.2<L>  /  Alb  2.9<L>  /  TBili  0.4  /  DBili  x   /  AST  24  /  ALT  10  /  AlkPhos  46  10-25    PT/INR - ( 23 Oct 2018 17:27 )   PT: 13.80 sec;   INR: 1.20 ratio         PTT - ( 23 Oct 2018 17:27 )  PTT:28.0 sec    Trop 0.06, CKMB 2.3, , 10-24-18 @ 07:26  Trop 0.06, CKMB --, CK --, 10-23-18 @ 17:27      Culture - Blood (collected 23 Oct 2018 18:41)  Source: .Blood Blood  Preliminary Report (25 Oct 2018 03:01):    No growth to date.    Culture - Blood (collected 23 Oct 2018 18:41)  Source: .Blood Blood  Preliminary Report (25 Oct 2018 03:01):    No growth to date.        RADIOLOGY & ADDITIONAL TESTS:  < from: Transthoracic Echocardiogram (06.09.18 @ 15:29) >  Summary:   1. Normal left ventricular size and wall thicknesses, with normal   systolic function.   2. The mean global longitudinal strain by speckle tracking is -18.4%   which is normal.   3. Mild tricuspid regurgitation.   4. Pulmonic valve regurgitation.   5. LA volume Index is 40.3 ml/m² ml/m2.    < end of copied text >      Imaging or report Personally Reviewed:  [x] YES  [ ] NO    Medications:  Standing  allopurinol 300 milliGRAM(s) Oral daily  apixaban 5 milliGRAM(s) Oral every 12 hours  cefTRIAXone   IVPB 1 Gram(s) IV Intermittent every 24 hours  chlorhexidine 4% Liquid 1 Application(s) Topical <User Schedule>  dextrose 5%. 1000 milliLiter(s) IV Continuous <Continuous>  dextrose 50% Injectable 12.5 Gram(s) IV Push once  dextrose 50% Injectable 25 Gram(s) IV Push once  dextrose 50% Injectable 25 Gram(s) IV Push once  docusate sodium 100 milliGRAM(s) Oral two times a day  folic acid 1 milliGRAM(s) Oral daily  insulin glargine Injectable (LANTUS) 12 Unit(s) SubCutaneous at bedtime  insulin lispro (HumaLOG) corrective regimen sliding scale   SubCutaneous three times a day before meals  insulin lispro Injectable (HumaLOG) 4 Unit(s) SubCutaneous three times a day before meals  leucovorin 5 milliGRAM(s) Oral daily  predniSONE   Tablet 15 milliGRAM(s) Oral daily  rifampin 300 milliGRAM(s) Oral two times a day    PRN Meds  dextrose 40% Gel 15 Gram(s) Oral once PRN  glucagon  Injectable 1 milliGRAM(s) IntraMuscular once PRN  morphine  - Injectable 4 milliGRAM(s) IV Push every 4 hours PRN

## 2018-10-26 LAB
-  COAGULASE NEGATIVE STAPHYLOCOCCUS: SIGNIFICANT CHANGE UP
ANION GAP SERPL CALC-SCNC: 12 MMOL/L — SIGNIFICANT CHANGE UP (ref 7–14)
BUN SERPL-MCNC: 27 MG/DL — HIGH (ref 10–20)
CALCIUM SERPL-MCNC: 7.9 MG/DL — LOW (ref 8.5–10.1)
CHLORIDE SERPL-SCNC: 97 MMOL/L — LOW (ref 98–110)
CK MB CFR SERPL CALC: 2.1 NG/ML — SIGNIFICANT CHANGE UP (ref 0.6–6.3)
CK SERPL-CCNC: 444 U/L — HIGH (ref 0–225)
CO2 SERPL-SCNC: 23 MMOL/L — SIGNIFICANT CHANGE UP (ref 17–32)
CREAT SERPL-MCNC: 1.3 MG/DL — SIGNIFICANT CHANGE UP (ref 0.7–1.5)
CRP SERPL-MCNC: 9.24 MG/DL — HIGH (ref 0–0.4)
CULTURE RESULTS: SIGNIFICANT CHANGE UP
ERYTHROCYTE [SEDIMENTATION RATE] IN BLOOD: 74 MM/HR — HIGH (ref 0–20)
ESTIMATED AVERAGE GLUCOSE: 137 MG/DL — HIGH (ref 68–114)
GLUCOSE BLDC GLUCOMTR-MCNC: 108 MG/DL — HIGH (ref 70–99)
GLUCOSE BLDC GLUCOMTR-MCNC: 114 MG/DL — HIGH (ref 70–99)
GLUCOSE BLDC GLUCOMTR-MCNC: 117 MG/DL — HIGH (ref 70–99)
GLUCOSE BLDC GLUCOMTR-MCNC: 118 MG/DL — HIGH (ref 70–99)
GLUCOSE BLDC GLUCOMTR-MCNC: 136 MG/DL — HIGH (ref 70–99)
GLUCOSE BLDC GLUCOMTR-MCNC: 211 MG/DL — HIGH (ref 70–99)
GLUCOSE SERPL-MCNC: 122 MG/DL — HIGH (ref 70–99)
GRAM STN FLD: SIGNIFICANT CHANGE UP
HBA1C BLD-MCNC: 6.4 % — HIGH (ref 4–5.6)
HCT VFR BLD CALC: 26.5 % — LOW (ref 37–47)
HGB BLD-MCNC: 8.3 G/DL — LOW (ref 12–16)
MCHC RBC-ENTMCNC: 30.7 PG — SIGNIFICANT CHANGE UP (ref 27–31)
MCHC RBC-ENTMCNC: 31.3 G/DL — LOW (ref 32–37)
MCV RBC AUTO: 98.1 FL — SIGNIFICANT CHANGE UP (ref 81–99)
METHOD TYPE: SIGNIFICANT CHANGE UP
NRBC # BLD: 0 /100 WBCS — SIGNIFICANT CHANGE UP (ref 0–0)
ORGANISM # SPEC MICROSCOPIC CNT: SIGNIFICANT CHANGE UP
ORGANISM # SPEC MICROSCOPIC CNT: SIGNIFICANT CHANGE UP
PLATELET # BLD AUTO: 252 K/UL — SIGNIFICANT CHANGE UP (ref 130–400)
POTASSIUM SERPL-MCNC: 4.4 MMOL/L — SIGNIFICANT CHANGE UP (ref 3.5–5)
POTASSIUM SERPL-SCNC: 4.4 MMOL/L — SIGNIFICANT CHANGE UP (ref 3.5–5)
RBC # BLD: 2.7 M/UL — LOW (ref 4.2–5.4)
RBC # FLD: 15.1 % — HIGH (ref 11.5–14.5)
SODIUM SERPL-SCNC: 132 MMOL/L — LOW (ref 135–146)
SPECIMEN SOURCE: SIGNIFICANT CHANGE UP
TROPONIN T SERPL-MCNC: 0.06 NG/ML — CRITICAL HIGH
WBC # BLD: 5.17 K/UL — SIGNIFICANT CHANGE UP (ref 4.8–10.8)
WBC # FLD AUTO: 5.17 K/UL — SIGNIFICANT CHANGE UP (ref 4.8–10.8)

## 2018-10-26 PROCEDURE — 99231 SBSQ HOSP IP/OBS SF/LOW 25: CPT

## 2018-10-26 RX ORDER — OXYCODONE AND ACETAMINOPHEN 5; 325 MG/1; MG/1
2 TABLET ORAL EVERY 4 HOURS
Qty: 0 | Refills: 0 | Status: DISCONTINUED | OUTPATIENT
Start: 2018-10-26 | End: 2018-10-29

## 2018-10-26 RX ORDER — OXYCODONE AND ACETAMINOPHEN 5; 325 MG/1; MG/1
1 TABLET ORAL EVERY 4 HOURS
Qty: 0 | Refills: 0 | Status: DISCONTINUED | OUTPATIENT
Start: 2018-10-26 | End: 2018-10-26

## 2018-10-26 RX ADMIN — Medication 15 MILLIGRAM(S): at 05:44

## 2018-10-26 RX ADMIN — Medication 100 MILLIGRAM(S): at 17:23

## 2018-10-26 RX ADMIN — OXYCODONE AND ACETAMINOPHEN 2 TABLET(S): 5; 325 TABLET ORAL at 15:15

## 2018-10-26 RX ADMIN — APIXABAN 5 MILLIGRAM(S): 2.5 TABLET, FILM COATED ORAL at 17:23

## 2018-10-26 RX ADMIN — CHLORHEXIDINE GLUCONATE 1 APPLICATION(S): 213 SOLUTION TOPICAL at 05:44

## 2018-10-26 RX ADMIN — Medication 4: at 12:06

## 2018-10-26 RX ADMIN — OXYCODONE AND ACETAMINOPHEN 2 TABLET(S): 5; 325 TABLET ORAL at 18:19

## 2018-10-26 RX ADMIN — OXYCODONE AND ACETAMINOPHEN 2 TABLET(S): 5; 325 TABLET ORAL at 23:45

## 2018-10-26 RX ADMIN — Medication 4 UNIT(S): at 16:49

## 2018-10-26 RX ADMIN — OXYCODONE AND ACETAMINOPHEN 2 TABLET(S): 5; 325 TABLET ORAL at 08:33

## 2018-10-26 RX ADMIN — CEFTRIAXONE 100 GRAM(S): 500 INJECTION, POWDER, FOR SOLUTION INTRAMUSCULAR; INTRAVENOUS at 05:45

## 2018-10-26 RX ADMIN — Medication 4 UNIT(S): at 12:06

## 2018-10-26 RX ADMIN — Medication 1 MILLIGRAM(S): at 12:04

## 2018-10-26 RX ADMIN — OXYCODONE AND ACETAMINOPHEN 1 TABLET(S): 5; 325 TABLET ORAL at 02:11

## 2018-10-26 RX ADMIN — APIXABAN 5 MILLIGRAM(S): 2.5 TABLET, FILM COATED ORAL at 05:44

## 2018-10-26 RX ADMIN — OXYCODONE AND ACETAMINOPHEN 2 TABLET(S): 5; 325 TABLET ORAL at 02:55

## 2018-10-26 RX ADMIN — Medication 300 MILLIGRAM(S): at 11:23

## 2018-10-26 RX ADMIN — Medication 4 UNIT(S): at 08:35

## 2018-10-26 RX ADMIN — Medication 5 MILLIGRAM(S): at 11:23

## 2018-10-26 RX ADMIN — OXYCODONE AND ACETAMINOPHEN 2 TABLET(S): 5; 325 TABLET ORAL at 03:40

## 2018-10-26 RX ADMIN — OXYCODONE AND ACETAMINOPHEN 2 TABLET(S): 5; 325 TABLET ORAL at 14:15

## 2018-10-26 RX ADMIN — OXYCODONE AND ACETAMINOPHEN 2 TABLET(S): 5; 325 TABLET ORAL at 09:30

## 2018-10-26 RX ADMIN — Medication 100 MILLIGRAM(S): at 05:44

## 2018-10-26 NOTE — PROGRESS NOTE ADULT - ASSESSMENT
Pt with RA, DM,   ESR 74    Left lung atelectasis on 10/23 Cxray    U/A Nitrite: Positive /Leuk Esterase: Moderate / RBC: 3-5 /HPF / WBC >50 /HPF/ Bacteria: TNTC     On cefTRIAXone   IVPB 1 Gram(s) IV Intermittent every 24 hours  rifampin 300 milliGRAM(s) Oral two times a day    Patient has history of:  clindamycin (Unknown)  erythromycin (Unknown)  penicillin (Unknown)    Right 3rd foot ulcer X almost 1 year    On Rifampin for four more months for latent TB    1/2 B/C CNS; other B/C with GPC in clusters  Concerned re: contaminated    Foot xray Neuropathic hind mid and forefoot changes with hammertoe deformities   of second and third.    SUGGESTIONs  B/C x 2  Continue Rocephin   Need result of prior foot CT (As per pt: No MRI was done because of type of knee replacement)

## 2018-10-26 NOTE — PROGRESS NOTE ADULT - SUBJECTIVE AND OBJECTIVE BOX
PROGRESS NOTE   Pt seen at bedside during PM wounds with attending present. No acute events or changes to the foot. Pt complaining about pain on the R 3rd toe. Denies n/v/c/d/f/sob      Vital Signs Last 24 Hrs  T(C): 36.9 (26 Oct 2018 06:10), Max: 36.9 (26 Oct 2018 06:10)  T(F): 98.4 (26 Oct 2018 06:10), Max: 98.4 (26 Oct 2018 06:10)  HR: 84 (26 Oct 2018 06:10) (84 - 87)  BP: 127/58 (26 Oct 2018 06:10) (121/71 - 127/58)  BP(mean): --  RR: 16 (26 Oct 2018 06:10) (16 - 18)  SpO2: 97% (26 Oct 2018 08:00) (97% - 97%)                          8.3    5.17  )-----------( 252      ( 26 Oct 2018 06:30 )             26.5               10-26    132<L>  |  97<L>  |  27<H>  ----------------------------<  122<H>  4.4   |  23  |  1.3    Ca    7.9<L>      26 Oct 2018 06:30    TPro  5.2<L>  /  Alb  2.9<L>  /  TBili  0.4  /  DBili  x   /  AST  24  /  ALT  10  /  AlkPhos  46  10-25      PHYSICAL EXAM  Vasc:    - DP/PT pulses non palpable but dopplerable B/L  - Skin temp warm to warm, proximal to distal B/L  - CFT < 3 sec B/L    Neuro:   - Gross sensation in tact B/L     Derm:   - No interdigital macerations B/L   - ulcer dorsal aspect right 3rd digit. + mild edema, + mild erythema, - purulence, Dry superficial skin. No bone exposed.     MSK:   - Muscle strength 3/5 in all quadrants  - Hammertoes left digits 3/4 and right digits 2,3,4    < from: Xray Foot AP + Lateral + Oblique, Right (10.25.18 @ 10:06) >    EXAM:  XR FOOT COMP MIN 3 VIEWS RT            PROCEDURE DATE:  10/25/2018            INTERPRETATION:  Clinical history: third toe ulcer, rule out   osteomyelitis or deep infection.    Technique: 3 views of the right foot.    Comparison: X-ray foot 8/25/2018.    Findings:    No evidence of acute fracture, with neuropathic changes of the hind mid   and forefoot including exaggerated hammertoes worst at second and third   PIP joints.. No evidence of soft tissue ulceration. No subcutaneous   emphysema    Calcaneal heel spur.    Diffuse atherosclerotic vascular calcifications.      Impression:     1.  No radiographic evidence of soft tissue ulcer with third PIP   hammertoe. Consider MR evaluation of the ulcerated region to exclude   underlying osteomyelitis  2.   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      < end of copied text >      A:  - DFU dorsal aspect right 3rd digit. + edema, + erythema, + malodor, - purulence  P:   - pt seen/evaluated at bedside  - Xray unremarkable for Bony or soft tissue infection   - Wound care orders: Paint w/ betadine.   - Pt stable for D/C per Podiatry.  - No surgical intervention at this point.   - Please re-consult Podiatry as needed

## 2018-10-26 NOTE — SWALLOW BEDSIDE ASSESSMENT ADULT - SWALLOW EVAL: DIAGNOSIS
no overt symptoms of penetration/aspiration or oropharyngeal dysphagia. pt with susoected esophageal dysfunction 2/2 c/o globus sensation with laurita cracker

## 2018-10-26 NOTE — PROGRESS NOTE ADULT - SUBJECTIVE AND OBJECTIVE BOX
infectious diseases progress note:  LINCOLN HEREDIA is a 78yFemale patient    UTI (URINARY TRACT INFECTION);ELEVATED TROPONIN        ROS:  CONSTITUTIONAL:  Negative fever or chills, feels well, good appetite  EYES:  Negative  blurry vision or double vision  CARDIOVASCULAR:  Negative for chest pain or palpitations  RESPIRATORY:  Negative for cough, wheezing, or SOB   GASTROINTESTINAL:  Negative for nausea, vomiting, diarrhea, constipation, or abdominal pain  GENITOURINARY:  Negative frequency, urgency or dysuria  NEUROLOGIC:  No headache, confusion, dizziness, lightheadedness    Allergies    clindamycin (Unknown)  erythromycin (Unknown)  penicillin (Unknown)  strawberry (Unknown)    Intolerances        ANTIBIOTICS/RELEVANT:  antimicrobials  cefTRIAXone   IVPB 1 Gram(s) IV Intermittent every 24 hours  rifampin 300 milliGRAM(s) Oral two times a day    immunologic:    OTHER:  allopurinol 300 milliGRAM(s) Oral daily  apixaban 5 milliGRAM(s) Oral every 12 hours  chlorhexidine 4% Liquid 1 Application(s) Topical <User Schedule>  dextrose 40% Gel 15 Gram(s) Oral once PRN  dextrose 5%. 1000 milliLiter(s) IV Continuous <Continuous>  dextrose 50% Injectable 12.5 Gram(s) IV Push once  dextrose 50% Injectable 25 Gram(s) IV Push once  dextrose 50% Injectable 25 Gram(s) IV Push once  docusate sodium 100 milliGRAM(s) Oral two times a day  folic acid 1 milliGRAM(s) Oral daily  glucagon  Injectable 1 milliGRAM(s) IntraMuscular once PRN  insulin glargine Injectable (LANTUS) 12 Unit(s) SubCutaneous at bedtime  insulin lispro (HumaLOG) corrective regimen sliding scale   SubCutaneous three times a day before meals  insulin lispro Injectable (HumaLOG) 4 Unit(s) SubCutaneous three times a day before meals  leucovorin 5 milliGRAM(s) Oral daily  oxyCODONE    5 mG/acetaminophen 325 mG 2 Tablet(s) Oral every 6 hours PRN  predniSONE   Tablet 15 milliGRAM(s) Oral daily      Objective:  T(F): 98.4 (10-26-18 @ 06:10), Max: 98.4 (10-26-18 @ 06:10)  HR: 84 (10-26-18 @ 06:10) (84 - 87)  BP: 127/58 (10-26-18 @ 06:10) (121/71 - 127/58)  RR: 16 (10-26-18 @ 06:10) (16 - 18)  SpO2: 97% (10-26-18 @ 08:00) (97% - 97%)    PHYSICAL EXAM  Constitutional:Well-developed, well nourished  Eyes:ANTONIO, EOMI  Ear/Nose/Throat: no oral lesion, no sinus tenderness on percussion	  Neck:no JVD, no lymphadenopathy, supple  Respiratory: CTA lico  Cardiovascular: S1S2 RRR, no murmurs  Gastrointestinal:soft, (+) BS, no HSM  Extremities:no e/e/c    10-26    132<L>  |  97<L>  |  27<H>  ----------------------------<  122<H>  4.4   |  23  |  1.3      TPro  5.2<L>  /  Alb  2.9<L>  /  TBili  0.4  /  DBili  x   /  AST  24  /  ALT  10  /  AlkPhos  46  10-25                            8.3    5.17  )-----------( 252      ( 26 Oct 2018 06:30 )             26.5           Culture - Urine (collected 24 Oct 2018 02:22)  Source: .Urine Clean Catch (Midstream)  Final Report (25 Oct 2018 20:43):    <10,000 CFU/ml Normal Urogenital reva present    Culture - Blood (collected 23 Oct 2018 18:41)  Source: .Blood Blood  Gram Stain (25 Oct 2018 22:48):    Growth in aerobic bottle: Gram Positive Cocci in Clusters  Preliminary Report (25 Oct 2018 22:48):    Growth in aerobic bottle: Gram Positive Cocci in Clusters    "Due to technical problems, Proteus sp. will Not be reported as part of    the BCID panel until further notice"    ***Blood Panel PCR results on this specimen are available    approximately 3 hours after the Gram stain result.***    Gram stain, PCR, and/or culture results may not always    correspond due to difference in methodologies.    ************************************************************    This PCR assay was performed using [employer].    The following targets are tested for: Enterococcus,    vancomycin resistant enterococci, Listeria monocytogenes,    coagulase negative staphylococci, S. aureus,    methicillin resistant S. aureus, Streptococcus agalactiae    (Group B), S. pneumoniae, S.pyogenes (Group A),    Acinetobacter baumannii, Enterobacter cloacae, E. coli,    Klebsiella oxytoca, K. pneumoniae, Proteus sp.,    Serratia marcescens, Haemophilus influenzae,    Neisseria meningitidis, Pseudomonas aeruginosa, Candida    albicans, C. glabrata, C krusei, C parapsilosis,    C. tropicalis and the KPC resistance gene.  Organism: Blood Culture PCR (26 Oct 2018 02:02)  Organism: Blood Culture PCR (26 Oct 2018 02:02)      -  Coagulase negative Staphylococcus: Detec      Method Type: PCR    Culture - Blood (collected 23 Oct 2018 18:41)  Source: .Blood Blood  Gram Stain (26 Oct 2018 08:50):    Growth in anaerobic bottle: Gram Positive Cocci in Clusters  Preliminary Report (26 Oct 2018 08:51):    Growth in anaerobic bottle: Gram Positive Cocci in Clusters

## 2018-10-26 NOTE — PROGRESS NOTE ADULT - SUBJECTIVE AND OBJECTIVE BOX
SUBJECTIVE:    Patient is a 78y old Female who presents with a chief complaint of UTI (25 Oct 2018 16:55)    Currently admitted to medicine with the primary diagnosis of UTI (urinary tract infection)     Today is hospital day 2d. This morning she is resting comfortably in bed and reports no new issues or overnight events.     PAST MEDICAL & SURGICAL HISTORY  Gout  DVT (deep venous thrombosis)  HTN (hypertension)  Rheumatoid arteritis  Other specified diabetes mellitus with other specified complication, unspecified whether long term insulin use  Primary osteoarthritis of right knee: s/p knee repalcement    SOCIAL HISTORY:  Negative for smoking/alcohol/drug use.     ALLERGIES:  clindamycin (Unknown)  erythromycin (Unknown)  penicillin (Unknown)  strawberry (Unknown)    MEDICATIONS:  STANDING MEDICATIONS  allopurinol 300 milliGRAM(s) Oral daily  apixaban 5 milliGRAM(s) Oral every 12 hours  cefTRIAXone   IVPB 1 Gram(s) IV Intermittent every 24 hours  chlorhexidine 4% Liquid 1 Application(s) Topical <User Schedule>  dextrose 5%. 1000 milliLiter(s) IV Continuous <Continuous>  dextrose 50% Injectable 12.5 Gram(s) IV Push once  dextrose 50% Injectable 25 Gram(s) IV Push once  dextrose 50% Injectable 25 Gram(s) IV Push once  docusate sodium 100 milliGRAM(s) Oral two times a day  folic acid 1 milliGRAM(s) Oral daily  insulin glargine Injectable (LANTUS) 12 Unit(s) SubCutaneous at bedtime  insulin lispro (HumaLOG) corrective regimen sliding scale   SubCutaneous three times a day before meals  insulin lispro Injectable (HumaLOG) 4 Unit(s) SubCutaneous three times a day before meals  leucovorin 5 milliGRAM(s) Oral daily  predniSONE   Tablet 15 milliGRAM(s) Oral daily  rifampin 300 milliGRAM(s) Oral two times a day    PRN MEDICATIONS  dextrose 40% Gel 15 Gram(s) Oral once PRN  glucagon  Injectable 1 milliGRAM(s) IntraMuscular once PRN  oxyCODONE    5 mG/acetaminophen 325 mG 2 Tablet(s) Oral every 6 hours PRN    VITALS:   T(F): 98.4  HR: 84  BP: 127/58  RR: 16  SpO2: 97%    LABS:                        8.3    5.17  )-----------( 252      ( 26 Oct 2018 06:30 )             26.5     10-26    132<L>  |  97<L>  |  27<H>  ----------------------------<  122<H>  4.4   |  23  |  1.3    Ca    7.9<L>      26 Oct 2018 06:30    TPro  5.2<L>  /  Alb  2.9<L>  /  TBili  0.4  /  DBili  x   /  AST  24  /  ALT  10  /  AlkPhos  46  10-25          Sedimentation Rate, Erythrocyte: 74 mm/hr <H> (10-26-18 @ 06:30)  Creatine Kinase, Serum: 444 U/L <H> (10-26-18 @ 06:30)  Troponin T, Serum: 0.06 ng/mL <HH> (10-26-18 @ 06:30)  Sedimentation Rate, Erythrocyte: 73 mm/hr <H> (10-25-18 @ 11:00)      Culture - Urine (collected 24 Oct 2018 02:22)  Source: .Urine Clean Catch (Midstream)  Final Report (25 Oct 2018 20:43):    <10,000 CFU/ml Normal Urogenital reva present    Culture - Blood (collected 23 Oct 2018 18:41)  Source: .Blood Blood  Gram Stain (25 Oct 2018 22:48):    Growth in aerobic bottle: Gram Positive Cocci in Clusters  Preliminary Report (25 Oct 2018 22:48):    Growth in aerobic bottle: Gram Positive Cocci in Clusters    "Due to technical problems, Proteus sp. will Not be reported as part of    the BCID panel until further notice"    ***Blood Panel PCR results on this specimen are available    approximately 3 hours after the Gram stain result.***    Gram stain, PCR, and/or culture results may not always    correspond due to difference in methodologies.    ************************************************************    This PCR assay was performed using [employer].    The following targets are tested for: Enterococcus,    vancomycin resistant enterococci, Listeria monocytogenes,    coagulase negative staphylococci, S. aureus,    methicillin resistant S. aureus, Streptococcus agalactiae    (Group B), S. pneumoniae, S.pyogenes (Group A),    Acinetobacter baumannii, Enterobacter cloacae, E. coli,    Klebsiella oxytoca, K. pneumoniae, Proteus sp.,    Serratia marcescens, Haemophilus influenzae,    Neisseria meningitidis, Pseudomonas aeruginosa, Candida    albicans, C. glabrata, C krusei, C parapsilosis,    C. tropicalis and the KPC resistance gene.  Organism: Blood Culture PCR (26 Oct 2018 02:02)  Organism: Blood Culture PCR (26 Oct 2018 02:02)    Culture - Blood (collected 23 Oct 2018 18:41)  Source: .Blood Blood  Gram Stain (26 Oct 2018 08:50):    Growth in anaerobic bottle: Gram Positive Cocci in Clusters  Preliminary Report (26 Oct 2018 08:51):    Growth in anaerobic bottle: Gram Positive Cocci in Clusters      CARDIAC MARKERS ( 26 Oct 2018 06:30 )  x     / 0.06 ng/mL / 444 U/L / x     / 2.1 ng/mL      RADIOLOGY:      EXAM:  XR CHEST PA LAT 2V            PROCEDURE DATE:  10/25/2018            INTERPRETATION:  Clinical History / Reason for exam: Pneumonia.    Comparison : Chest radiograph 10/23/2018.    Technique/Positioning: Frontal and lateral views of the chest.    Findings:    Support devices: None.    Cardiac/mediastinum/hilum: Stable.    Lung parenchyma/Pleura: No focal consolidation or pleural effusion.   Bibasilar atelectasis. No pneumothorax.    Skeleton/soft tissues: Stable.    Impression:      Bibasilar atelectasis. No focal consolidation.        PHYSICAL EXAM:  GEN: No acute distress  LUNGS: Clear to auscultation bilaterally   HEART: S1/S2 present. RRR.   ABD: Soft, non-tender, non-distended. Bowel sounds present  EXT:  1+edema, Right 3rd metatarsal distal tip ulcer with central necrosis/granulation tissue- left anterior distal shin medial skin break- chronic venous stasis changes  NEURO: AAOX3

## 2018-10-26 NOTE — PROGRESS NOTE ADULT - ASSESSMENT
79 yo F with PMH of DM2, RA, DVT on Eliquis, gout, latent TB on Rifampin and foot ulcer presented to ED for fever after being advised by visiting nurse to come in.    #)cystitis  - UA grossly +ve  - Urine culture negative  - blood Cx positive for coag neg staph; will repeat blood cx  - c/w Rocephin 1g q24 for now     #)DFU  - Podiatry f/u  - ID f/u   - vascular - no acute intervention. f/u Dr. Dorman in 2 weeks.  - c/w IV morphine for now  - ESR elevated at 74  - f/u right foot xray  - CXR - Bibasilar atelectasis. No focal consolidation.  - will find history of wound cx   - A1C at 6.7      #) Troponemia  - no chest pain, no EKG changes  - likely demand ischemia. Will trend CE      #) RA  - c/w Prednisone. Home med - patient is being tapered down  - recently started on Leucovorin and Folic acid  - due for Rutixan therapy - will consult rheum    #) Latent TB - c/w Rifampin (total 4 month treatment)    #) LLE DVT - c/w Eliquis 5mg BID    #) DM - holding oral agents, monitor fs, insulin PRN    #) Gout - c/w Allopurinol    DVT ppx: on Eliquis  Diet: DASH, carb consistent  Activity: OOBTC  Code status: FULL  Dispo: pending 79 yo F with PMH of DM2, RA, DVT on Eliquis, gout, latent TB on Rifampin and foot ulcer presented to ED for fever after being advised by visiting nurse to come in.    #)cystitis  - UA grossly +ve  - Urine culture negative  - c/w Rocephin 1g q24 for now     #bacteremia  -blood culture positive for gram+ cocci in clusters  -need ID follow up regarding escalation of abx  -will repeat blood cx  -will check Echo to r/o endocarditis  -podiatry f/u    #)DFU  - Podiatry f/u  - ID f/u   - vascular - no acute intervention. f/u Dr. Dorman in 2 weeks.  - c/w IV morphine for now  - ESR elevated at 74  - f/u right foot xray read  - CXR - Bibasilar atelectasis. No focal consolidation.  - will find history of wound cx   - A1C at 6.7      #) Troponemia  - no chest pain, no EKG changes  - likely demand ischemia. no need to trend CE anymore    #) RA  - c/w Prednisone. Home med - patient is being tapered down  - recently started on Leucovorin and Folic acid  - due for Rutixan therapy - will consult rheum    #) Latent TB - c/w Rifampin (total 4 month treatment)    #) LLE DVT - c/w Eliquis 5mg BID    #) DM - c/w basal bolus insulin, iss    #) Gout - c/w Allopurinol    DVT ppx: on Eliquis  Diet: DASH, carb consistent  Activity: OOBTC  Code status: FULL  Dispo: pending 79 yo F with PMH of DM2, RA, DVT on Eliquis, gout, latent TB on Rifampin and foot ulcer presented to ED for fever after being advised by visiting nurse to come in.    #)cystitis  - UA grossly +ve  - Urine culture negative  - c/w Rocephin 1g q24 for now     #bacteremia  -blood culture positive for gram+ cocci in clusters and coag neg staph  -ID: repeat blood cx, continue rocephin for now.  -will repeat blood cx  -will check Echo to r/o endocarditis      #)DFU  - Podiatry: local wound care, outpatient follow up  - vascular - no acute intervention. f/u Dr. Dorman in 2 weeks.  - c/w IV morphine for now  - ESR elevated at 74  - Foot xray: unremarkable for Bony or soft tissue infection   - CXR - Bibasilar atelectasis. No focal consolidation.  - will find history of wound cx (previous CT scan)  - A1C at 6.7      #) Troponemia  - no chest pain, no EKG changes  - likely demand ischemia. no need to trend CE anymore    #) RA  - c/w Prednisone. Home med - patient is being tapered down  - recently started on Leucovorin and Folic acid  - due for Rutixan therapy - will consult rheum    #) Latent TB - c/w Rifampin (total 4 month treatment)    #) LLE DVT - c/w Eliquis 5mg BID    #) DM - c/w basal bolus insulin, iss    #) Gout - c/w Allopurinol    DVT ppx: on Eliquis  Diet: DASH, carb consistent  Activity: OOBTC  Code status: FULL  Dispo: pending

## 2018-10-26 NOTE — PROGRESS NOTE ADULT - SUBJECTIVE AND OBJECTIVE BOX
LINCOLN HEREDIA  78y Female    CHIEF COMPLAINT:    Patient is a 78y old  Female who presents with a chief complaint of UTI (25 Oct 2018 16:55)      INTERVAL HPI/OVERNIGHT EVENTS:    Patient seen and examined at bedside. No acute events overnight. Patient states that she feels run down and fatigued.    ROS: All other systems are negative.    Vital Signs:    T(F): 98.4 (10-26-18 @ 06:10), Max: 98.4 (10-26-18 @ 06:10)  HR: 84 (10-26-18 @ 06:10) (84 - 97)  BP: 127/58 (10-26-18 @ 06:10) (121/71 - 154/72)  RR: 16 (10-26-18 @ 06:10) (16 - 18)  SpO2: 97% (10-26-18 @ 08:00) (97% - 97%)  I&O's Summary    POCT Blood Glucose.: 136 mg/dL (26 Oct 2018 08:05)  POCT Blood Glucose.: 108 mg/dL (26 Oct 2018 02:12)  POCT Blood Glucose.: 133 mg/dL (25 Oct 2018 20:44)  POCT Blood Glucose.: 173 mg/dL (25 Oct 2018 16:52)  POCT Blood Glucose.: 239 mg/dL (25 Oct 2018 11:42)      PHYSICAL EXAM:    GENERAL:  NAD, tired  SKIN: dry flaky skin, LE  HENT: Atraumatic. Normocephalic.   NECK: Supple, No JVD. No lymphadenopathy.  PULMONARY: Poor inspiratory effort No wheezing. No rales  CVS: Normal S1, S2. Rate and Rhythm are regular. No murmurs.  ABDOMEN/GI: Soft, Nontender, Nondistended; BS present  MSK:  2+ pitting edema b/l no clubbing or cyanosis. Right 3rd phalanx swollen, dry blood around prior debriedment site  NEUROLOGIC:  No motor or sensory deficit.  PSYCH: Alert & oriented x 3    Consultant(s) Notes Reviewed:  [x ] YES  [ ] NO  Care Discussed with Consultants/Other Providers [ x] YES  [ ] NO    LABS:                        8.3    5.17  )-----------( 252      ( 26 Oct 2018 06:30 )             26.5     10-26    132<L>  |  97<L>  |  27<H>  ----------------------------<  122<H>  4.4   |  23  |  1.3    Ca    7.9<L>      26 Oct 2018 06:30    TPro  5.2<L>  /  Alb  2.9<L>  /  TBili  0.4  /  DBili  x   /  AST  24  /  ALT  10  /  AlkPhos  46  10-25    Trop 0.06, CKMB 2.1, , 10-26-18 @ 06:30  Trop 0.06, CKMB 2.3, , 10-24-18 @ 07:26  Trop 0.06, CKMB --, CK --, 10-23-18 @ 17:27    Culture - Urine (collected 24 Oct 2018 02:22)  Source: .Urine Clean Catch (Midstream)  Final Report (25 Oct 2018 20:43):    <10,000 CFU/ml Normal Urogenital reva present    Culture - Blood (collected 23 Oct 2018 18:41)  Source: .Blood Blood  Gram Stain (25 Oct 2018 22:48):    Growth in aerobic bottle: Gram Positive Cocci in Clusters  Preliminary Report (25 Oct 2018 22:48):    Growth in aerobic bottle: Gram Positive Cocci in Clusters    "Due to technical problems, Proteus sp. will Not be reported as part of    the BCID panel until further notice"    ***Blood Panel PCR results on this specimen are available    approximately 3 hours after the Gram stain result.***    Gram stain, PCR, and/or culture results may not always    correspond due to difference in methodologies.    ************************************************************    This PCR assay was performed using [employer].    The following targets are tested for: Enterococcus,    vancomycin resistant enterococci, Listeria monocytogenes,    coagulase negative staphylococci, S. aureus,    methicillin resistant S. aureus, Streptococcus agalactiae    (Group B), S. pneumoniae, S.pyogenes (Group A),    Acinetobacter baumannii, Enterobacter cloacae, E. coli,    Klebsiella oxytoca, K. pneumoniae, Proteus sp.,    Serratia marcescens, Haemophilus influenzae,    Neisseria meningitidis, Pseudomonas aeruginosa, Candida    albicans, C. glabrata, C krusei, C parapsilosis,    C. tropicalis and the KPC resistance gene.  Organism: Blood Culture PCR (26 Oct 2018 02:02)  Organism: Blood Culture PCR (26 Oct 2018 02:02)    Culture - Blood (collected 23 Oct 2018 18:41)  Source: .Blood Blood  Gram Stain (26 Oct 2018 08:50):    Growth in anaerobic bottle: Gram Positive Cocci in Clusters  Preliminary Report (26 Oct 2018 08:51):    Growth in anaerobic bottle: Gram Positive Cocci in Clusters        RADIOLOGY & ADDITIONAL TESTS:  Imaging or report Personally Reviewed:  [x] YES  [ ] NO    Medications:  Standing  allopurinol 300 milliGRAM(s) Oral daily  apixaban 5 milliGRAM(s) Oral every 12 hours  cefTRIAXone   IVPB 1 Gram(s) IV Intermittent every 24 hours  chlorhexidine 4% Liquid 1 Application(s) Topical <User Schedule>  dextrose 5%. 1000 milliLiter(s) IV Continuous <Continuous>  dextrose 50% Injectable 12.5 Gram(s) IV Push once  dextrose 50% Injectable 25 Gram(s) IV Push once  dextrose 50% Injectable 25 Gram(s) IV Push once  docusate sodium 100 milliGRAM(s) Oral two times a day  folic acid 1 milliGRAM(s) Oral daily  insulin glargine Injectable (LANTUS) 12 Unit(s) SubCutaneous at bedtime  insulin lispro (HumaLOG) corrective regimen sliding scale   SubCutaneous three times a day before meals  insulin lispro Injectable (HumaLOG) 4 Unit(s) SubCutaneous three times a day before meals  leucovorin 5 milliGRAM(s) Oral daily  predniSONE   Tablet 15 milliGRAM(s) Oral daily  rifampin 300 milliGRAM(s) Oral two times a day    PRN Meds  dextrose 40% Gel 15 Gram(s) Oral once PRN  glucagon  Injectable 1 milliGRAM(s) IntraMuscular once PRN  oxyCODONE    5 mG/acetaminophen 325 mG 2 Tablet(s) Oral every 6 hours PRN

## 2018-10-26 NOTE — SWALLOW BEDSIDE ASSESSMENT ADULT - SLP PERTINENT HISTORY OF CURRENT PROBLEM
pt admitted for foot ulcer. while in ER pt choked on chicken. pt reports occasional regurgitation/globus sensation with meat or dry foods for past few months. pt reports she feels food stuck and needs to drink to push it down

## 2018-10-26 NOTE — PROGRESS NOTE ADULT - ASSESSMENT
79 yo F with PMH of DM2, RA, LLE DVT on Eliquis, CKD 3, gout, latent TB on Rifampin and foot ulcer admitted for fever, likely 2/2 UTI. I week PTP patient underwent an uneventful debridement by podiatry, 1 day post-op was complaining of RLE pain, was advised by podiatry office to take pain relievers until her follow up appt. She was then referred to ED by visiting nurse after noting a fever    1. Sepsis 2/2 coagulase negative Staph bacteremia  - UA+, ucx normal urogenital reva  - blood Cx + x2 --->1st is Coag negative staph, 2nd gm + cocci in clusters  -given recent debridement of 3rd digit DFU, as possible source versus endocarditis??  -d/c Rocephin start vancomycin 1250 mg Q24H, first dose stat  -check another blood culture and f/u ID  -check 2d echo  -f/u podiatry  -monitor VS and CBC    2. Choking episode  no further occurrences  awaiting speech and swallow evaluation    3. CKD 3  stable    4. RA - follows Dr Henry  patient is on Prednisone 15 daily, leucovorin/Folic Acid  as per patient, she was supposed to start Rituxan infusions this week   Rheumatology evaluation     5. LE pitting edema  improved since yesterday  2d echo from 6/2018 shows normal systolic fxn, mild AR, mild TR  today's cxray shows no effusions/no pulm vascular congestion  possibly due to hypoalbuminemia/sedentary lifestyle    6. Latent TB - c/w Rifampin (total 4 month treatment)    7. LLE DVT - c/w Eliquis 5mg BID    8. DM - holding oral agents, monitor fs, insulin PRN    9. Gout - c/w Allopurinol    DVT ppx: on Eliquis  Diet: DASH, carb consistent  Activity: OOB  Code status: FULL  Dispo: pending    Case discussed with patient and housestaff 77 yo F with PMH of DM2, RA, LLE DVT on Eliquis, CKD 3, gout, latent TB on Rifampin and foot ulcer admitted for fever, likely 2/2 UTI. I week PTP patient underwent an uneventful debridement by podiatry, 1 day post-op was complaining of RLE pain, was advised by podiatry office to take pain relievers until her follow up appt. She was then referred to ED by visiting nurse after noting a fever    1. Sepsis 2/2 coagulase negative Staph bacteremia  - UA+, ucx normal urogenital reva  - blood Cx + x2 --->1st is Coag negative staph, 2nd gm + cocci in clusters  -given recent debridement of 3rd digit DFU, as possible source versus endocarditis??  -d/c Rocephin start vancomycin 1250 mg Q24H, first dose stat  -check another blood culture and f/u ID  -check 2d echo  -f/u podiatry  -monitor VS and CBC    2. Choking episode  no further occurrences  awaiting speech and swallow evaluation    3. Troponemia  no active chest pain  stable at 0.06, no need to check anymore    4. RA - follows Dr Henry  patient is on Prednisone 15 daily, leucovorin/Folic Acid  as per patient, she was supposed to start Rituxan infusions this week   Rheumatology evaluation     5. LE pitting edema  improved since yesterday  2d echo from 6/2018 shows normal systolic fxn, mild DC, mild TR  today's cxray shows no effusions/no pulm vascular congestion  possibly due to hypoalbuminemia/sedentary lifestyle    6. Latent TB - c/w Rifampin (total 4 month treatment)    7. LLE DVT - c/w Eliquis 5mg BID    8. DM - holding oral agents, monitor fs, insulin PRN    9. CKD 3 - stable    DVT ppx: on Eliquis  Diet: DASH, carb consistent  Activity: OOB  Code status: FULL  Dispo: pending    Case discussed with patient and housestaff 77 yo F with PMH of DM2, RA, LLE DVT on Eliquis, CKD 3, gout, latent TB on Rifampin and foot ulcer admitted for fever, likely 2/2 UTI. I week PTP patient underwent an uneventful debridement by podiatry, 1 day post-op was complaining of RLE pain, was advised by podiatry office to take pain relievers until her follow up appt. She was then referred to ED by visiting nurse after noting a fever    1. Sepsis 2/2 coagulase negative Staph bacteremia  - UA+, ucx normal urogenital reva  - blood Cx + x2 --->1st is Coag negative staph, 2nd gm + cocci in clusters  -given recent debridement of 3rd digit DFU, as possible source versus endocarditis??  -d/c Rocephin start vancomycin 1250 mg Q24H, first dose stat  -check another blood culture and f/u ID  -check 2d echo  -f/u LE xray  -f/u podiatry  -monitor VS and CBC    2. Choking episode  no further occurrences  awaiting speech and swallow evaluation    3. Troponemia  no active chest pain  stable at 0.06, no need to check anymore    4. RA - follows Dr Henry  patient is on Prednisone 15 daily, leucovorin/Folic Acid  as per patient, she was supposed to start Rituxan infusions this week   Rheumatology evaluation     5. LE pitting edema  improved since yesterday  2d echo from 6/2018 shows normal systolic fxn, mild [place], mild TR  today's cxray shows no effusions/no pulm vascular congestion  possibly due to hypoalbuminemia/sedentary lifestyle    6. Latent TB - c/w Rifampin (total 4 month treatment)    7. LLE DVT - c/w Eliquis 5mg BID    8. DM - holding oral agents, monitor fs, insulin PRN    9. CKD 3 - stable    DVT ppx: on Eliquis  Diet: DASH, carb consistent  Activity: OOB  Code status: FULL  Dispo: pending    Case discussed with patient and housestaff 79 yo F with PMH of DM2, RA, LLE DVT on Eliquis, CKD 3, gout, latent TB on Rifampin and foot ulcer admitted for fever, likely 2/2 UTI. I week PTP patient underwent an uneventful debridement by podiatry, 1 day post-op was complaining of RLE pain, was advised by podiatry office to take pain relievers until her follow up appt. She was then referred to ED by visiting nurse after noting a fever    1. Sepsis 2/2 coagulase negative Staph bacteremia  - UA+, ucx normal urogenital reva  - blood Cx + x2 --->1st is Coag negative staph, 2nd gm + cocci in clusters  -given recent debridement of 3rd digit DFU, as possible source versus endocarditis?? versus contamination  -check another blood culture and f/u with  ID  -check 2d echo  -f/u LE xray  -f/u podiatry  -monitor VS and CBC    2. Choking episode  no further occurrences  awaiting speech and swallow evaluation    3. Troponemia  no active chest pain  stable at 0.06, no need to check anymore    4. RA - follows Dr Henry  patient is on Prednisone 15 daily, leucovorin/Folic Acid  as per patient, she was supposed to start Rituxan infusions this week   Rheumatology evaluation     5. LE pitting edema  improved since yesterday  2d echo from 6/2018 shows normal systolic fxn, mild UT, mild TR  today's cxray shows no effusions/no pulm vascular congestion  possibly due to hypoalbuminemia/sedentary lifestyle    6. Latent TB - c/w Rifampin (total 4 month treatment)    7. LLE DVT - c/w Eliquis 5mg BID    8. DM - holding oral agents, monitor fs, insulin PRN    9. CKD 3 - stable    DVT ppx: on Eliquis  Diet: DASH, carb consistent  Activity: OOB  Code status: FULL  Dispo: pending    Case discussed with patient and housestaff

## 2018-10-27 LAB
ANION GAP SERPL CALC-SCNC: 16 MMOL/L — HIGH (ref 7–14)
BUN SERPL-MCNC: 22 MG/DL — HIGH (ref 10–20)
CALCIUM SERPL-MCNC: 7.8 MG/DL — LOW (ref 8.5–10.1)
CHLORIDE SERPL-SCNC: 99 MMOL/L — SIGNIFICANT CHANGE UP (ref 98–110)
CO2 SERPL-SCNC: 22 MMOL/L — SIGNIFICANT CHANGE UP (ref 17–32)
CREAT SERPL-MCNC: 1.2 MG/DL — SIGNIFICANT CHANGE UP (ref 0.7–1.5)
GLUCOSE BLDC GLUCOMTR-MCNC: 100 MG/DL — HIGH (ref 70–99)
GLUCOSE BLDC GLUCOMTR-MCNC: 116 MG/DL — HIGH (ref 70–99)
GLUCOSE BLDC GLUCOMTR-MCNC: 127 MG/DL — HIGH (ref 70–99)
GLUCOSE BLDC GLUCOMTR-MCNC: 166 MG/DL — HIGH (ref 70–99)
GLUCOSE BLDC GLUCOMTR-MCNC: 184 MG/DL — HIGH (ref 70–99)
GLUCOSE SERPL-MCNC: 182 MG/DL — HIGH (ref 70–99)
HCT VFR BLD CALC: 26.1 % — LOW (ref 37–47)
HGB BLD-MCNC: 8.3 G/DL — LOW (ref 12–16)
MCHC RBC-ENTMCNC: 30.9 PG — SIGNIFICANT CHANGE UP (ref 27–31)
MCHC RBC-ENTMCNC: 31.8 G/DL — LOW (ref 32–37)
MCV RBC AUTO: 97 FL — SIGNIFICANT CHANGE UP (ref 81–99)
NRBC # BLD: 0 /100 WBCS — SIGNIFICANT CHANGE UP (ref 0–0)
PLATELET # BLD AUTO: 266 K/UL — SIGNIFICANT CHANGE UP (ref 130–400)
POTASSIUM SERPL-MCNC: 4.6 MMOL/L — SIGNIFICANT CHANGE UP (ref 3.5–5)
POTASSIUM SERPL-SCNC: 4.6 MMOL/L — SIGNIFICANT CHANGE UP (ref 3.5–5)
RBC # BLD: 2.69 M/UL — LOW (ref 4.2–5.4)
RBC # FLD: 14.9 % — HIGH (ref 11.5–14.5)
SODIUM SERPL-SCNC: 137 MMOL/L — SIGNIFICANT CHANGE UP (ref 135–146)
WBC # BLD: 5.06 K/UL — SIGNIFICANT CHANGE UP (ref 4.8–10.8)
WBC # FLD AUTO: 5.06 K/UL — SIGNIFICANT CHANGE UP (ref 4.8–10.8)

## 2018-10-27 RX ADMIN — Medication 5 MILLIGRAM(S): at 12:42

## 2018-10-27 RX ADMIN — Medication 2: at 08:33

## 2018-10-27 RX ADMIN — APIXABAN 5 MILLIGRAM(S): 2.5 TABLET, FILM COATED ORAL at 06:02

## 2018-10-27 RX ADMIN — Medication 100 MILLIGRAM(S): at 06:02

## 2018-10-27 RX ADMIN — Medication 100 MILLIGRAM(S): at 17:44

## 2018-10-27 RX ADMIN — OXYCODONE AND ACETAMINOPHEN 2 TABLET(S): 5; 325 TABLET ORAL at 21:42

## 2018-10-27 RX ADMIN — OXYCODONE AND ACETAMINOPHEN 2 TABLET(S): 5; 325 TABLET ORAL at 22:55

## 2018-10-27 RX ADMIN — Medication 2: at 12:40

## 2018-10-27 RX ADMIN — CHLORHEXIDINE GLUCONATE 1 APPLICATION(S): 213 SOLUTION TOPICAL at 06:01

## 2018-10-27 RX ADMIN — Medication 1 MILLIGRAM(S): at 12:42

## 2018-10-27 RX ADMIN — Medication 15 MILLIGRAM(S): at 06:02

## 2018-10-27 RX ADMIN — CEFTRIAXONE 100 GRAM(S): 500 INJECTION, POWDER, FOR SOLUTION INTRAMUSCULAR; INTRAVENOUS at 06:01

## 2018-10-27 RX ADMIN — OXYCODONE AND ACETAMINOPHEN 2 TABLET(S): 5; 325 TABLET ORAL at 17:42

## 2018-10-27 RX ADMIN — OXYCODONE AND ACETAMINOPHEN 2 TABLET(S): 5; 325 TABLET ORAL at 13:11

## 2018-10-27 RX ADMIN — OXYCODONE AND ACETAMINOPHEN 2 TABLET(S): 5; 325 TABLET ORAL at 13:33

## 2018-10-27 RX ADMIN — Medication 4 UNIT(S): at 12:41

## 2018-10-27 RX ADMIN — Medication 300 MILLIGRAM(S): at 12:41

## 2018-10-27 RX ADMIN — OXYCODONE AND ACETAMINOPHEN 2 TABLET(S): 5; 325 TABLET ORAL at 08:34

## 2018-10-27 RX ADMIN — OXYCODONE AND ACETAMINOPHEN 2 TABLET(S): 5; 325 TABLET ORAL at 09:02

## 2018-10-27 RX ADMIN — OXYCODONE AND ACETAMINOPHEN 2 TABLET(S): 5; 325 TABLET ORAL at 04:16

## 2018-10-27 RX ADMIN — OXYCODONE AND ACETAMINOPHEN 2 TABLET(S): 5; 325 TABLET ORAL at 04:46

## 2018-10-27 RX ADMIN — Medication 4 UNIT(S): at 08:33

## 2018-10-27 RX ADMIN — OXYCODONE AND ACETAMINOPHEN 2 TABLET(S): 5; 325 TABLET ORAL at 00:15

## 2018-10-27 RX ADMIN — APIXABAN 5 MILLIGRAM(S): 2.5 TABLET, FILM COATED ORAL at 17:45

## 2018-10-27 NOTE — PROGRESS NOTE ADULT - SUBJECTIVE AND OBJECTIVE BOX
LINCOLN HEREDIA  78y Female    CHIEF COMPLAINT:    Patient is a 78y old  Female who presents with a chief complaint of UTI (27 Oct 2018 08:33)      INTERVAL HPI/OVERNIGHT EVENTS:    Patient seen and examined. Generally feels well but complains of toe pain. She had a beside surgical debriedment done yesterday by podiatry.     ROS: All other systems are negative.    Vital Signs:    T(F): 97.8 (10-27-18 @ 06:51), Max: 97.8 (10-27-18 @ 06:51)  HR: 89 (10-27-18 @ 06:51) (87 - 89)  BP: 126/61 (10-27-18 @ 06:51) (125/60 - 129/63)  RR: 18 (10-27-18 @ 06:51) (18 - 18)  SpO2: 96% (10-26-18 @ 20:03) (96% - 96%)  I&O's Summary    POCT Blood Glucose.: 166 mg/dL (27 Oct 2018 12:00)  POCT Blood Glucose.: 184 mg/dL (27 Oct 2018 07:57)  POCT Blood Glucose.: 100 mg/dL (27 Oct 2018 02:02)  POCT Blood Glucose.: 114 mg/dL (26 Oct 2018 22:13)  POCT Blood Glucose.: 117 mg/dL (26 Oct 2018 18:29)  POCT Blood Glucose.: 118 mg/dL (26 Oct 2018 16:31)      PHYSICAL EXAM:    GENERAL:  NAD, cushiongoid  SKIN: No rashes or lesions  HEENT: Atraumatic. Normocephalic.   NECK: Supple, No JVD. No lymphadenopathy.  PULMONARY: CTA B/L. No wheezing. No rales  CVS: Normal S1, S2. Rate and Rhythm are regular. No murmurs.  ABDOMEN/GI: Soft, Nontender, Nondistended; BS present  MSK:  No edema B/L LE. Right third digit swollen with areas of dry blood, no discharge or foul smell  NEUROLOGIC:  No motor or sensory deficit.  PSYCH: Alert & oriented x 3    Consultant(s) Notes Reviewed:  [x ] YES  [ ] NO  Care Discussed with Consultants/Other Providers [ x] YES  [ ] NO    LABS:                        8.3    5.06  )-----------( 266      ( 27 Oct 2018 08:08 )             26.1     10-27    137  |  99  |  22<H>  ----------------------------<  182<H>  4.6   |  22  |  1.2    Ca    7.8<L>      27 Oct 2018 08:08    Trop 0.06, CKMB 2.1, , 10-26-18 @ 06:30    RADIOLOGY & ADDITIONAL TESTS:  < from: Xray Foot AP + Lateral + Oblique, Right (10.25.18 @ 10:06) >  mpression:     1.  No radiographic evidence of soft tissue ulcer with third PIP   hammertoe. Consider MR evaluation of the ulcerated region to exclude   underlying osteomyelitis  2.   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    < end of copied text >      Imaging or report Personally Reviewed:  [x] YES  [ ] NO    Medications:  Standing  allopurinol 300 milliGRAM(s) Oral daily  apixaban 5 milliGRAM(s) Oral every 12 hours  cefTRIAXone   IVPB 1 Gram(s) IV Intermittent every 24 hours  chlorhexidine 4% Liquid 1 Application(s) Topical <User Schedule>  dextrose 5%. 1000 milliLiter(s) IV Continuous <Continuous>  dextrose 50% Injectable 12.5 Gram(s) IV Push once  dextrose 50% Injectable 25 Gram(s) IV Push once  dextrose 50% Injectable 25 Gram(s) IV Push once  docusate sodium 100 milliGRAM(s) Oral two times a day  folic acid 1 milliGRAM(s) Oral daily  insulin glargine Injectable (LANTUS) 12 Unit(s) SubCutaneous at bedtime  insulin lispro (HumaLOG) corrective regimen sliding scale   SubCutaneous three times a day before meals  insulin lispro Injectable (HumaLOG) 4 Unit(s) SubCutaneous three times a day before meals  leucovorin 5 milliGRAM(s) Oral daily  predniSONE   Tablet 15 milliGRAM(s) Oral daily  rifampin 300 milliGRAM(s) Oral two times a day    PRN Meds  dextrose 40% Gel 15 Gram(s) Oral once PRN  glucagon  Injectable 1 milliGRAM(s) IntraMuscular once PRN  oxyCODONE    5 mG/acetaminophen 325 mG 2 Tablet(s) Oral every 4 hours PRN

## 2018-10-27 NOTE — PROGRESS NOTE ADULT - SUBJECTIVE AND OBJECTIVE BOX
SUBJECTIVE:    Patient is a 78y old Female who presents with a chief complaint of UTI (26 Oct 2018 13:15)    Currently admitted to medicine with the primary diagnosis of UTI (urinary tract infection)     Today is hospital day 3d. This morning she is resting comfortably in bed and reports no new issues or overnight events.     PAST MEDICAL & SURGICAL HISTORY  Gout  DVT (deep venous thrombosis)  HTN (hypertension)  Rheumatoid arteritis  Other specified diabetes mellitus with other specified complication, unspecified whether long term insulin use  Primary osteoarthritis of right knee: s/p knee repalcement    SOCIAL HISTORY:  Negative for smoking/alcohol/drug use.     ALLERGIES:  clindamycin (Unknown)  erythromycin (Unknown)  penicillin (Unknown)  strawberry (Unknown)    MEDICATIONS:  STANDING MEDICATIONS  allopurinol 300 milliGRAM(s) Oral daily  apixaban 5 milliGRAM(s) Oral every 12 hours  cefTRIAXone   IVPB 1 Gram(s) IV Intermittent every 24 hours  chlorhexidine 4% Liquid 1 Application(s) Topical <User Schedule>  dextrose 5%. 1000 milliLiter(s) IV Continuous <Continuous>  dextrose 50% Injectable 12.5 Gram(s) IV Push once  dextrose 50% Injectable 25 Gram(s) IV Push once  dextrose 50% Injectable 25 Gram(s) IV Push once  docusate sodium 100 milliGRAM(s) Oral two times a day  folic acid 1 milliGRAM(s) Oral daily  insulin glargine Injectable (LANTUS) 12 Unit(s) SubCutaneous at bedtime  insulin lispro (HumaLOG) corrective regimen sliding scale   SubCutaneous three times a day before meals  insulin lispro Injectable (HumaLOG) 4 Unit(s) SubCutaneous three times a day before meals  leucovorin 5 milliGRAM(s) Oral daily  predniSONE   Tablet 15 milliGRAM(s) Oral daily  rifampin 300 milliGRAM(s) Oral two times a day    PRN MEDICATIONS  dextrose 40% Gel 15 Gram(s) Oral once PRN  glucagon  Injectable 1 milliGRAM(s) IntraMuscular once PRN  oxyCODONE    5 mG/acetaminophen 325 mG 2 Tablet(s) Oral every 4 hours PRN    VITALS:   T(F): 97.8  HR: 89  BP: 126/61  RR: 18  SpO2: 96%    LABS:                        8.3    5.17  )-----------( 252      ( 26 Oct 2018 06:30 )             26.5     10-26    132<L>  |  97<L>  |  27<H>  ----------------------------<  122<H>  4.4   |  23  |  1.3    Ca    7.9<L>      26 Oct 2018 06:30                CARDIAC MARKERS ( 26 Oct 2018 06:30 )  x     / 0.06 ng/mL / 444 U/L / x     / 2.1 ng/mL      RADIOLOGY:    EXAM:  XR FOOT COMP MIN 3 VIEWS RT            PROCEDURE DATE:  10/25/2018            INTERPRETATION:  Clinical history: third toe ulcer, rule out   osteomyelitis or deep infection.    Technique: 3 views of the right foot.    Comparison: X-ray foot 8/25/2018.    Findings:    No evidence of acute fracture, with neuropathic changes of the hind mid   and forefoot including exaggerated hammertoes worst at second and third   PIP joints.. No evidence of soft tissue ulceration. No subcutaneous   emphysema    Calcaneal heel spur.    Diffuse atherosclerotic vascular calcifications.      Impression:     1.  No radiographic evidence of soft tissue ulcer with third PIP   hammertoe. Consider MR evaluation of the ulcerated region to exclude   underlying osteomyelitis  2.   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  WILFREDO HAYDEN M.D., RESIDENT RADIOLOGIST  This document has been electronically signed.  KORY PLASCENCIA M.D., ATTENDING RADIOLOGIST  This document has been electronically signed. Oct 26 2018 11:27AM            EXAM:  XR CHEST PA LAT 2V        PROCEDURE DATE:  10/25/2018    INTERPRETATION:  Clinical History / Reason for exam: Pneumonia.    Comparison : Chest radiograph 10/23/2018.    Technique/Positioning: Frontal and lateral views of the chest.    Findings:    Support devices: None.    Cardiac/mediastinum/hilum: Stable.    Lung parenchyma/Pleura: No focal consolidation or pleural effusion.   Bibasilar atelectasis. No pneumothorax.    Skeleton/soft tissues: Stable.    Impression:      Bibasilar atelectasis. No focal consolidation.    WILFREDO COON M.D., RESIDENT RADIOLOGIST  This document has been electronically signed.  LYNNE MARTINEZ M.D., ATTENDING RADIOLOGIST  This document has been electronically signed. Oct 25 2018  4:53PM        PHYSICAL EXAM:  GEN: No acute distress  LUNGS: Clear to auscultation bilaterally   HEART: S1/S2 present. RRR.   ABD: Soft, non-tender, non-distended. Bowel sounds present  EXT:  1+edema, Right 3rd metatarsal distal tip ulcer with central necrosis/granulation tissue- left anterior distal shin medial skin break- chronic venous stasis changes  NEURO: AAOX3 SUBJECTIVE:    Patient is a 78y old Female who presents with a chief complaint of UTI (26 Oct 2018 13:15)    Currently admitted to medicine with the primary diagnosis of UTI (urinary tract infection)     Today is hospital day 3d. Complaining of pain. Difficulty in walking - will consult PT/R    PAST MEDICAL & SURGICAL HISTORY  Gout  DVT (deep venous thrombosis)  HTN (hypertension)  Rheumatoid arteritis  Other specified diabetes mellitus with other specified complication, unspecified whether long term insulin use  Primary osteoarthritis of right knee: s/p knee replacement    SOCIAL HISTORY:  Negative for smoking/alcohol/drug use.     ALLERGIES:  clindamycin (Unknown)  erythromycin (Unknown)  penicillin (Unknown)  strawberry (Unknown)    MEDICATIONS:  STANDING MEDICATIONS  allopurinol 300 milliGRAM(s) Oral daily  apixaban 5 milliGRAM(s) Oral every 12 hours  cefTRIAXone   IVPB 1 Gram(s) IV Intermittent every 24 hours  chlorhexidine 4% Liquid 1 Application(s) Topical <User Schedule>  dextrose 5%. 1000 milliLiter(s) IV Continuous <Continuous>  dextrose 50% Injectable 12.5 Gram(s) IV Push once  dextrose 50% Injectable 25 Gram(s) IV Push once  dextrose 50% Injectable 25 Gram(s) IV Push once  docusate sodium 100 milliGRAM(s) Oral two times a day  folic acid 1 milliGRAM(s) Oral daily  insulin glargine Injectable (LANTUS) 12 Unit(s) SubCutaneous at bedtime  insulin lispro (HumaLOG) corrective regimen sliding scale   SubCutaneous three times a day before meals  insulin lispro Injectable (HumaLOG) 4 Unit(s) SubCutaneous three times a day before meals  leucovorin 5 milliGRAM(s) Oral daily  predniSONE   Tablet 15 milliGRAM(s) Oral daily  rifampin 300 milliGRAM(s) Oral two times a day    PRN MEDICATIONS  dextrose 40% Gel 15 Gram(s) Oral once PRN  glucagon  Injectable 1 milliGRAM(s) IntraMuscular once PRN  oxyCODONE    5 mG/acetaminophen 325 mG 2 Tablet(s) Oral every 4 hours PRN    VITALS:   T(F): 97.8  HR: 89  BP: 126/61  RR: 18  SpO2: 96%    LABS:                        8.3    5.17  )-----------( 252      ( 26 Oct 2018 06:30 )             26.5     10-26    132<L>  |  97<L>  |  27<H>  ----------------------------<  122<H>  4.4   |  23  |  1.3    Ca    7.9<L>      26 Oct 2018 06:30                CARDIAC MARKERS ( 26 Oct 2018 06:30 )  x     / 0.06 ng/mL / 444 U/L / x     / 2.1 ng/mL      RADIOLOGY:    EXAM:  XR FOOT COMP MIN 3 VIEWS RT            PROCEDURE DATE:  10/25/2018            INTERPRETATION:  Clinical history: third toe ulcer, rule out   osteomyelitis or deep infection.    Technique: 3 views of the right foot.    Comparison: X-ray foot 8/25/2018.    Findings:    No evidence of acute fracture, with neuropathic changes of the hind mid   and forefoot including exaggerated hammertoes worst at second and third   PIP joints.. No evidence of soft tissue ulceration. No subcutaneous   emphysema    Calcaneal heel spur.    Diffuse atherosclerotic vascular calcifications.      Impression:     1.  No radiographic evidence of soft tissue ulcer with third PIP   hammertoe. Consider MR evaluation of the ulcerated region to exclude   underlying osteomyelitis  2.   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  WILFREDO HAYDEN M.D., RESIDENT RADIOLOGIST  This document has been electronically signed.  KORY PLASCENCIA M.D., ATTENDING RADIOLOGIST  This document has been electronically signed. Oct 26 2018 11:27AM            EXAM:  XR CHEST PA LAT 2V        PROCEDURE DATE:  10/25/2018    INTERPRETATION:  Clinical History / Reason for exam: Pneumonia.    Comparison : Chest radiograph 10/23/2018.    Technique/Positioning: Frontal and lateral views of the chest.    Findings:    Support devices: None.    Cardiac/mediastinum/hilum: Stable.    Lung parenchyma/Pleura: No focal consolidation or pleural effusion.   Bibasilar atelectasis. No pneumothorax.    Skeleton/soft tissues: Stable.    Impression:      Bibasilar atelectasis. No focal consolidation.    WILFREDO COON M.D., RESIDENT RADIOLOGIST  This document has been electronically signed.  LYNNE MARTINEZ M.D., ATTENDING RADIOLOGIST  This document has been electronically signed. Oct 25 2018  4:53PM        PHYSICAL EXAM:  GEN: No acute distress  LUNGS: Clear to auscultation bilaterally   HEART: S1/S2 present. RRR.   ABD: Soft, non-tender, non-distended. Bowel sounds present  EXT:  1+edema, Right 3rd metatarsal distal tip ulcer with central necrosis/granulation tissue- left anterior distal shin medial skin break- chronic venous stasis changes  NEURO: AAOX3

## 2018-10-27 NOTE — PROGRESS NOTE ADULT - ASSESSMENT
77 yo F with PMH of DM2, RA, DVT on Eliquis, gout, latent TB on Rifampin and foot ulcer presented to ED for fever after being advised by visiting nurse to come in.    #)cystitis  - UA grossly +ve  - Urine culture negative  - c/w Rocephin 1g q24 for now     #bacteremia  -blood culture positive for gram+ cocci in clusters and coag neg staph  -ID: repeat blood cx, continue rocephin for now.  -repeat cultures pending  -will check Echo to r/o endocarditis - pending      #)DFU  - Podiatry: local wound care, outpatient follow up  - vascular - no acute intervention. f/u Dr. Dorman in 2 weeks.  - c/w IV morphine for now  - ESR elevated at 74  - Foot xray: unremarkable for Bony or soft tissue infection   - CXR - Bibasilar atelectasis. No focal consolidation.  - will find history of wound cx (previous CT scan)  - A1C at 6.7      #) Troponemia  - no chest pain, no EKG changes  - likely demand ischemia. no need to trend CE anymore    #) RA  - c/w Prednisone. Home med - patient is being tapered down  - recently started on Leucovorin and Folic acid  - due for Rutixan therapy - will consult rheum    #) Latent TB - c/w Rifampin (total 4 month treatment)    #) LLE DVT - c/w Eliquis 5mg BID    #) DM - c/w basal bolus insulin, iss    #) Gout - c/w Allopurinol    DVT ppx: on Eliquis  Diet: DASH, carb consistent  Activity: OOBTC  Code status: FULL  Dispo: pending

## 2018-10-28 LAB
ANION GAP SERPL CALC-SCNC: 15 MMOL/L — HIGH (ref 7–14)
BUN SERPL-MCNC: 19 MG/DL — SIGNIFICANT CHANGE UP (ref 10–20)
CALCIUM SERPL-MCNC: 7.9 MG/DL — LOW (ref 8.5–10.1)
CHLORIDE SERPL-SCNC: 100 MMOL/L — SIGNIFICANT CHANGE UP (ref 98–110)
CO2 SERPL-SCNC: 23 MMOL/L — SIGNIFICANT CHANGE UP (ref 17–32)
CREAT SERPL-MCNC: 1.2 MG/DL — SIGNIFICANT CHANGE UP (ref 0.7–1.5)
CULTURE RESULTS: SIGNIFICANT CHANGE UP
GLUCOSE BLDC GLUCOMTR-MCNC: 107 MG/DL — HIGH (ref 70–99)
GLUCOSE BLDC GLUCOMTR-MCNC: 152 MG/DL — HIGH (ref 70–99)
GLUCOSE BLDC GLUCOMTR-MCNC: 162 MG/DL — HIGH (ref 70–99)
GLUCOSE BLDC GLUCOMTR-MCNC: 169 MG/DL — HIGH (ref 70–99)
GLUCOSE BLDC GLUCOMTR-MCNC: 191 MG/DL — HIGH (ref 70–99)
GLUCOSE BLDC GLUCOMTR-MCNC: 99 MG/DL — SIGNIFICANT CHANGE UP (ref 70–99)
GLUCOSE SERPL-MCNC: 178 MG/DL — HIGH (ref 70–99)
HCT VFR BLD CALC: 28.4 % — LOW (ref 37–47)
HGB BLD-MCNC: 8.7 G/DL — LOW (ref 12–16)
MCHC RBC-ENTMCNC: 30.3 PG — SIGNIFICANT CHANGE UP (ref 27–31)
MCHC RBC-ENTMCNC: 30.6 G/DL — LOW (ref 32–37)
MCV RBC AUTO: 99 FL — SIGNIFICANT CHANGE UP (ref 81–99)
NRBC # BLD: 0 /100 WBCS — SIGNIFICANT CHANGE UP (ref 0–0)
PLATELET # BLD AUTO: 274 K/UL — SIGNIFICANT CHANGE UP (ref 130–400)
POTASSIUM SERPL-MCNC: 4.8 MMOL/L — SIGNIFICANT CHANGE UP (ref 3.5–5)
POTASSIUM SERPL-SCNC: 4.8 MMOL/L — SIGNIFICANT CHANGE UP (ref 3.5–5)
RBC # BLD: 2.87 M/UL — LOW (ref 4.2–5.4)
RBC # FLD: 15 % — HIGH (ref 11.5–14.5)
SODIUM SERPL-SCNC: 138 MMOL/L — SIGNIFICANT CHANGE UP (ref 135–146)
SPECIMEN SOURCE: SIGNIFICANT CHANGE UP
WBC # BLD: 5.14 K/UL — SIGNIFICANT CHANGE UP (ref 4.8–10.8)
WBC # FLD AUTO: 5.14 K/UL — SIGNIFICANT CHANGE UP (ref 4.8–10.8)

## 2018-10-28 RX ORDER — SENNA PLUS 8.6 MG/1
2 TABLET ORAL AT BEDTIME
Qty: 0 | Refills: 0 | Status: DISCONTINUED | OUTPATIENT
Start: 2018-10-28 | End: 2018-10-29

## 2018-10-28 RX ORDER — POLYETHYLENE GLYCOL 3350 17 G/17G
17 POWDER, FOR SOLUTION ORAL ONCE
Qty: 0 | Refills: 0 | Status: COMPLETED | OUTPATIENT
Start: 2018-10-28 | End: 2018-10-28

## 2018-10-28 RX ADMIN — CHLORHEXIDINE GLUCONATE 1 APPLICATION(S): 213 SOLUTION TOPICAL at 06:27

## 2018-10-28 RX ADMIN — OXYCODONE AND ACETAMINOPHEN 2 TABLET(S): 5; 325 TABLET ORAL at 20:35

## 2018-10-28 RX ADMIN — Medication 4 UNIT(S): at 12:24

## 2018-10-28 RX ADMIN — OXYCODONE AND ACETAMINOPHEN 2 TABLET(S): 5; 325 TABLET ORAL at 09:53

## 2018-10-28 RX ADMIN — APIXABAN 5 MILLIGRAM(S): 2.5 TABLET, FILM COATED ORAL at 05:17

## 2018-10-28 RX ADMIN — Medication 2: at 12:24

## 2018-10-28 RX ADMIN — SENNA PLUS 2 TABLET(S): 8.6 TABLET ORAL at 22:57

## 2018-10-28 RX ADMIN — OXYCODONE AND ACETAMINOPHEN 2 TABLET(S): 5; 325 TABLET ORAL at 05:50

## 2018-10-28 RX ADMIN — CEFTRIAXONE 100 GRAM(S): 500 INJECTION, POWDER, FOR SOLUTION INTRAMUSCULAR; INTRAVENOUS at 05:50

## 2018-10-28 RX ADMIN — OXYCODONE AND ACETAMINOPHEN 2 TABLET(S): 5; 325 TABLET ORAL at 10:22

## 2018-10-28 RX ADMIN — POLYETHYLENE GLYCOL 3350 17 GRAM(S): 17 POWDER, FOR SOLUTION ORAL at 08:28

## 2018-10-28 RX ADMIN — Medication 4 UNIT(S): at 17:18

## 2018-10-28 RX ADMIN — INSULIN GLARGINE 12 UNIT(S): 100 INJECTION, SOLUTION SUBCUTANEOUS at 22:57

## 2018-10-28 RX ADMIN — OXYCODONE AND ACETAMINOPHEN 2 TABLET(S): 5; 325 TABLET ORAL at 15:09

## 2018-10-28 RX ADMIN — Medication 100 MILLIGRAM(S): at 05:17

## 2018-10-28 RX ADMIN — Medication 4 UNIT(S): at 08:27

## 2018-10-28 RX ADMIN — Medication 1 MILLIGRAM(S): at 12:25

## 2018-10-28 RX ADMIN — APIXABAN 5 MILLIGRAM(S): 2.5 TABLET, FILM COATED ORAL at 17:18

## 2018-10-28 RX ADMIN — Medication 2: at 08:27

## 2018-10-28 RX ADMIN — Medication 5 MILLIGRAM(S): at 12:25

## 2018-10-28 RX ADMIN — Medication 300 MILLIGRAM(S): at 12:25

## 2018-10-28 RX ADMIN — OXYCODONE AND ACETAMINOPHEN 2 TABLET(S): 5; 325 TABLET ORAL at 01:59

## 2018-10-28 RX ADMIN — Medication 15 MILLIGRAM(S): at 05:17

## 2018-10-28 RX ADMIN — OXYCODONE AND ACETAMINOPHEN 2 TABLET(S): 5; 325 TABLET ORAL at 06:56

## 2018-10-28 RX ADMIN — OXYCODONE AND ACETAMINOPHEN 2 TABLET(S): 5; 325 TABLET ORAL at 19:43

## 2018-10-28 RX ADMIN — Medication 100 MILLIGRAM(S): at 17:18

## 2018-10-28 NOTE — PROGRESS NOTE ADULT - ATTENDING COMMENTS
Patient seen and examined independently. I personally had a face-to-face encounter with the patient, examined the patient myself and reviewed the plan of care with the housestaff. Agree with Dr Pope's note above    c/w current Abx. Will f/u
Patients pain symptoms began post debridement at her private podiatrists office. Clinically the 3rd digit is erythematous; no bone exposed. IV antibiotics per ID recommendations. No podiatric surgical intervention at this time. Patient to follow as outpatient with her private podiatrist.
Patient seen and examined independently. I personally had a face-to-face encounter with the patient, examined the patient myself and reviewed the plan of care with the housestaff. Agree with Dr Peres's note above.     S : No CP/SOB  Other ROS neg.    Corroborate with above exam.    Labs/Rad : Reviewed.     c/w IV CTX for bacteremia. F/u repeat Cultures.

## 2018-10-28 NOTE — PROGRESS NOTE ADULT - ASSESSMENT
77 yo F with PMH of DM2, RA, LLE DVT on Eliquis, CKD 3, gout, latent TB on Rifampin and foot ulcer admitted for fever, likely 2/2 UTI. I week PTP patient underwent an uneventful debridement by podiatry, 1 day post-op was complaining of RLE pain, was advised by podiatry office to take pain relievers until her follow up appt. She was then referred to ED by visiting nurse after noting a fever    1. UTI - nl WBC, afebrile, asymptomatic  - UA+, ucx normal urogenital reva  - blood Cx + x2 --->1st is Coag negative staph, 2nd gm + cocci in clusters  subsequent blood cultures negative - likely contamination  -c/w Current abx. Today is day 5    2. Coagulase negative staph in blood - based on initial culture  subsequent cultures x3 negative -->>> likely a contamination    3. Choking episode  -no further occurrences  -speech and swallow eval appreciated  -if patient has a choking episode again, will ask for GI eval     4. DFU  s/p bedside debridement by podiatry  c/w wound care  f/u podiatry if any further plans for debriedments    6. RA - follows Dr Henry  patient is on Prednisone 15 daily, leucovorin/Folic Acid  as per patient, she was supposed to start Rituxan infusions this week   Rheumatology evaluation pending    7. LE pitting edema  improved since patent has been sitting up in the chair  2d echo from 6/2018 shows normal systolic fxn, mild FL, mild TR  cxray shows no effusions/no pulm vascular congestion  possibly due to hypoalbuminemia/sedentary lifestyle    8 Latent TB - c/w Rifampin (total 4 month treatment)    9. LLE DVT - c/w Eliquis 5mg BID    10. DM - holding oral agents, monitor fs, insulin PRN    11. CKD 3 - stable    DVT ppx: on Eliquis  Diet: DASH, carb consistent  Activity: OOB  Code status: FULL  Dispo: patient requesting to go to [facility] for rehab. Awaiting PT eval.     Case discussed with patient and housestaff

## 2018-10-29 ENCOUNTER — TRANSCRIPTION ENCOUNTER (OUTPATIENT)
Age: 78
End: 2018-10-29

## 2018-10-29 VITALS
DIASTOLIC BLOOD PRESSURE: 63 MMHG | SYSTOLIC BLOOD PRESSURE: 140 MMHG | HEART RATE: 99 BPM | RESPIRATION RATE: 18 BRPM | TEMPERATURE: 97 F

## 2018-10-29 LAB
ANION GAP SERPL CALC-SCNC: 12 MMOL/L — SIGNIFICANT CHANGE UP (ref 7–14)
BUN SERPL-MCNC: 19 MG/DL — SIGNIFICANT CHANGE UP (ref 10–20)
CALCIUM SERPL-MCNC: 8.4 MG/DL — LOW (ref 8.5–10.1)
CHLORIDE SERPL-SCNC: 97 MMOL/L — LOW (ref 98–110)
CO2 SERPL-SCNC: 22 MMOL/L — SIGNIFICANT CHANGE UP (ref 17–32)
CREAT SERPL-MCNC: 1.1 MG/DL — SIGNIFICANT CHANGE UP (ref 0.7–1.5)
CULTURE RESULTS: SIGNIFICANT CHANGE UP
CULTURE RESULTS: SIGNIFICANT CHANGE UP
GLUCOSE BLDC GLUCOMTR-MCNC: 134 MG/DL — HIGH (ref 70–99)
GLUCOSE BLDC GLUCOMTR-MCNC: 139 MG/DL — HIGH (ref 70–99)
GLUCOSE BLDC GLUCOMTR-MCNC: 146 MG/DL — HIGH (ref 70–99)
GLUCOSE BLDC GLUCOMTR-MCNC: 165 MG/DL — HIGH (ref 70–99)
GLUCOSE SERPL-MCNC: 154 MG/DL — HIGH (ref 70–99)
HCT VFR BLD CALC: 28.7 % — LOW (ref 37–47)
HGB BLD-MCNC: 8.8 G/DL — LOW (ref 12–16)
MCHC RBC-ENTMCNC: 30.1 PG — SIGNIFICANT CHANGE UP (ref 27–31)
MCHC RBC-ENTMCNC: 30.7 G/DL — LOW (ref 32–37)
MCV RBC AUTO: 98.3 FL — SIGNIFICANT CHANGE UP (ref 81–99)
NRBC # BLD: 0 /100 WBCS — SIGNIFICANT CHANGE UP (ref 0–0)
PLATELET # BLD AUTO: 306 K/UL — SIGNIFICANT CHANGE UP (ref 130–400)
POTASSIUM SERPL-MCNC: 4.5 MMOL/L — SIGNIFICANT CHANGE UP (ref 3.5–5)
POTASSIUM SERPL-SCNC: 4.5 MMOL/L — SIGNIFICANT CHANGE UP (ref 3.5–5)
RBC # BLD: 2.92 M/UL — LOW (ref 4.2–5.4)
RBC # FLD: 15.1 % — HIGH (ref 11.5–14.5)
SODIUM SERPL-SCNC: 131 MMOL/L — LOW (ref 135–146)
SPECIMEN SOURCE: SIGNIFICANT CHANGE UP
WBC # BLD: 7.82 K/UL — SIGNIFICANT CHANGE UP (ref 4.8–10.8)
WBC # FLD AUTO: 7.82 K/UL — SIGNIFICANT CHANGE UP (ref 4.8–10.8)

## 2018-10-29 RX ADMIN — Medication 4 UNIT(S): at 12:35

## 2018-10-29 RX ADMIN — OXYCODONE AND ACETAMINOPHEN 2 TABLET(S): 5; 325 TABLET ORAL at 17:02

## 2018-10-29 RX ADMIN — Medication 15 MILLIGRAM(S): at 06:05

## 2018-10-29 RX ADMIN — Medication 1 MILLIGRAM(S): at 11:31

## 2018-10-29 RX ADMIN — OXYCODONE AND ACETAMINOPHEN 2 TABLET(S): 5; 325 TABLET ORAL at 00:47

## 2018-10-29 RX ADMIN — CEFTRIAXONE 100 GRAM(S): 500 INJECTION, POWDER, FOR SOLUTION INTRAMUSCULAR; INTRAVENOUS at 06:04

## 2018-10-29 RX ADMIN — OXYCODONE AND ACETAMINOPHEN 2 TABLET(S): 5; 325 TABLET ORAL at 11:30

## 2018-10-29 RX ADMIN — Medication 100 MILLIGRAM(S): at 06:05

## 2018-10-29 RX ADMIN — APIXABAN 5 MILLIGRAM(S): 2.5 TABLET, FILM COATED ORAL at 06:05

## 2018-10-29 RX ADMIN — Medication 100 MILLIGRAM(S): at 17:12

## 2018-10-29 RX ADMIN — Medication 5 MILLIGRAM(S): at 11:31

## 2018-10-29 RX ADMIN — OXYCODONE AND ACETAMINOPHEN 2 TABLET(S): 5; 325 TABLET ORAL at 17:32

## 2018-10-29 RX ADMIN — CHLORHEXIDINE GLUCONATE 1 APPLICATION(S): 213 SOLUTION TOPICAL at 06:05

## 2018-10-29 RX ADMIN — Medication 4 UNIT(S): at 17:04

## 2018-10-29 RX ADMIN — OXYCODONE AND ACETAMINOPHEN 2 TABLET(S): 5; 325 TABLET ORAL at 00:10

## 2018-10-29 RX ADMIN — APIXABAN 5 MILLIGRAM(S): 2.5 TABLET, FILM COATED ORAL at 17:04

## 2018-10-29 RX ADMIN — Medication 4 UNIT(S): at 09:00

## 2018-10-29 RX ADMIN — Medication 2: at 12:35

## 2018-10-29 RX ADMIN — OXYCODONE AND ACETAMINOPHEN 2 TABLET(S): 5; 325 TABLET ORAL at 12:43

## 2018-10-29 RX ADMIN — Medication 300 MILLIGRAM(S): at 11:31

## 2018-10-29 RX ADMIN — Medication 100 MILLIGRAM(S): at 17:04

## 2018-10-29 RX ADMIN — OXYCODONE AND ACETAMINOPHEN 2 TABLET(S): 5; 325 TABLET ORAL at 06:04

## 2018-10-29 NOTE — DISCHARGE NOTE ADULT - HOSPITAL COURSE
79 yo F with PMH of DM2, RA, DVT on Eliquis, gout, latent TB on Rifampin and foot ulcer presented to ED for fever after being advised by visiting nurse to come in. Patient reports she had a fever of 103 when visiting nurse came to see her day PTP. Was told she should come into ED. Also reports she had her foot ulcer debrided by Podiatry on Monday. Afterwards Monday evening, patient began experiencing severe, unbearable pain. Visiting nurse came next day and noted fever. Patient reports some lower abdominal discomfort. Denies any burning while urinating, foul odor from urine, constipation, diarrhea, chest pain, SOB, cough or other complaint or symptoms.     In ED UA was grossly positive. Received Rocephin 1g. Urine culture was negative however. Podiatry evaluated the DFU and recommended local wound care and outpatient follow up. Vascular did not recommend any inpatient acute intervention. Patient will f/u with rheum and podiatry as outpatient.     Pt will also follow up with rheumatology Dr Henry for further management of her Rheumatoid Arthritis .

## 2018-10-29 NOTE — PHYSICAL THERAPY INITIAL EVALUATION ADULT - GAIT DISTANCE, PT EVAL
60' x2 with 3 rest periods 2* to fatigue, then another 20'x 2 to and from bathroom after seated rest period. 60' x2 with 3 rest periods 2* to fatigue, then another 20'x 2 to and from bathroom after seated rest period. Pt requested pain meds. Janice ROSEN administered at b/s.

## 2018-10-29 NOTE — PHYSICAL THERAPY INITIAL EVALUATION ADULT - CRITERIA FOR SKILLED THERAPEUTIC INTERVENTIONS
rehab potential/impairments found/functional limitations in following categories/predicted duration of therapy intervention/therapy frequency/risk reduction/prevention

## 2018-10-29 NOTE — PROGRESS NOTE ADULT - ASSESSMENT
IMPRESSION:  Dry ganfreous changes right foot 3rd digit with no infection.  RLE distally with superficial ischemic ulcer with no abscess/ cellulitis.  No PNA  Bcx contaminated with CNS with repeat Bcx being negative.    RECOMMENDATIONS:  Doxycycline 100 mg po q12h for 7 days.  D/C other Abx.

## 2018-10-29 NOTE — DISCHARGE NOTE ADULT - PATIENT PORTAL LINK FT
You can access the [facility] Patient Portal, offered by [facility], by registering with the following website: [email or web address]

## 2018-10-29 NOTE — PHYSICAL THERAPY INITIAL EVALUATION ADULT - RANGE OF MOTION EXAMINATION, REHAB EVAL
B UE's grossly WFL. R LE grossly WFL except for R DF limited 2* to pain in toes and lower tibial region with + swelling noted.  LLE grossly WFL

## 2018-10-29 NOTE — PROGRESS NOTE ADULT - ASSESSMENT
79 yo F with PMH of DM2, RA, DVT on Eliquis, gout, latent TB on Rifampin and foot ulcer presented to ED for fever after being advised by visiting nurse to come in.    #)cystitis  - UA grossly +ve  - Urine culture negative  - c/w Rocephin 1g q24 for now     #bacteremia  -blood culture positive for gram+ cocci in clusters and coag neg staph  -subsequent blood cx have been negative; most likely a contaminant   -will check Echo to r/o endocarditis       #)DFU  - Podiatry: local wound care, outpatient follow up  - vascular - no acute intervention. f/u Dr. Dorman in 2 weeks.  - c/w IV morphine for now  - ESR elevated at 74  - Foot xray: unremarkable for Bony or soft tissue infection   - CXR - Bibasilar atelectasis. No focal consolidation.  - will find history of wound cx (previous CT scan)  - A1C at 6.7      #) Troponemia  - no chest pain, no EKG changes  - likely demand ischemia. no need to trend CE anymore    #) RA  - c/w Prednisone 15 qd  - recently started on Leucovorin and Folic acid  - due for Rutixan therapy   - can follow up outpatient Dr. Henry     #) Latent TB - c/w Rifampin (total 4 month treatment)    #) LLE DVT - c/w Eliquis 5mg BID    #) DM - c/w basal bolus insulin, iss    #) Gout - c/w Allopurinol    DVT ppx: on Eliquis  Diet: DASH, carb consistent  Activity: OOBTC  Code status: FULL  Dispo: Pt/R pending; pt wishes to go to [facility] 77 yo F with PMH of DM2, RA, DVT on Eliquis, gout, latent TB on Rifampin and foot ulcer presented to ED for fever after being advised by visiting nurse to come in.    #)cystitis  - UA grossly +ve  - Urine culture negative  - d/c Rocephin    #bacteremia  -blood culture positive for gram+ cocci in clusters and coag neg staph  -subsequent blood cx have been negative; most likely a contaminant   -will check Echo to r/o endocarditis       #)DFU  - Podiatry: local wound care, outpatient follow up  - vascular - no acute intervention. f/u Dr. Dorman in 2 weeks.  - c/w IV morphine for now  - ESR elevated at 74  - Foot xray: unremarkable for Bony or soft tissue infection   - CXR - Bibasilar atelectasis. No focal consolidation.  - will find history of wound cx (previous CT scan)  - A1C at 6.7  - doxycycline 100mg BID PO x 7 days (10/29-11/5)      #) Troponemia  - no chest pain, no EKG changes  - likely demand ischemia. no need to trend CE anymore    #) RA  - c/w Prednisone 15 qd  - recently started on Leucovorin and Folic acid  - due for Rutixan therapy   - can follow up outpatient Dr. Henry     #) Latent TB - c/w Rifampin (total 4 month treatment)    #) LLE DVT - c/w Eliquis 5mg BID    #) DM - c/w basal bolus insulin, iss    #) Gout - c/w Allopurinol    DVT ppx: on Eliquis  Diet: DASH, carb consistent  Activity: OOBTC  Code status: FULL  Dispo: Pt/R pending; pt wishes to go to [facility]

## 2018-10-29 NOTE — DISCHARGE NOTE ADULT - CARE PROVIDER_API CALL
Kenisha Henry), Allergy and Immunology  [address] [place] [address]  Phone: [phone]  Fax: [phone]    Christofer Fragoso (GAYLE), Podiatric Medicine  [address]  [place] [address]  Phone: [phone]  Fax: [phone]    Carlos Stephen), Family Medicine  [address] [place] [address]  [address]  [place] [address]  Phone: [phone]  Fax: [phone]

## 2018-10-29 NOTE — PHYSICAL THERAPY INITIAL EVALUATION ADULT - MANUAL MUSCLE TESTING RESULTS, REHAB EVAL
B UE's at least 3/5. R LE : hip and knee 3/5, ankle DF 3-/5.  L LE: hip flex 3/5, knee flex/ext 3/5, ankle DF 3/5

## 2018-10-29 NOTE — PROGRESS NOTE ADULT - PROVIDER SPECIALTY LIST ADULT
Infectious Disease
Internal Medicine
Podiatry
Internal Medicine
Infectious Disease

## 2018-10-29 NOTE — PROGRESS NOTE ADULT - SUBJECTIVE AND OBJECTIVE BOX
LINCOLN HEREDIA  78y Female    CHIEF COMPLAINT:    Patient is a 78y old  Female who presents with a chief complaint of UTI  3rd toe cellulitis  RA, UTI (29 Oct 2018 12:11)      INTERVAL HPI/OVERNIGHT EVENTS:    Patient seen and examined. No acute events overnight. Complains of Right third digit pain when touched    ROS: All other systems are negative.    Vital Signs:    T(F): 97.6 (10-29-18 @ 05:20), Max: 97.6 (10-29-18 @ 05:20)  HR: 107 (10-29-18 @ 05:20) (85 - 107)  BP: 141/67 (10-29-18 @ 05:20) (122/61 - 141/67)  RR: 18 (10-29-18 @ 05:20) (18 - 18)  SpO2: 94% (10-29-18 @ 07:55) (94% - 94%)  I&O's Summary    POCT Blood Glucose.: 165 mg/dL (29 Oct 2018 11:41)  POCT Blood Glucose.: 134 mg/dL (29 Oct 2018 07:58)  POCT Blood Glucose.: 146 mg/dL (29 Oct 2018 02:35)  POCT Blood Glucose.: 191 mg/dL (28 Oct 2018 22:55)  POCT Blood Glucose.: 152 mg/dL (28 Oct 2018 20:35)  POCT Blood Glucose.: 99 mg/dL (28 Oct 2018 16:58)      PHYSICAL EXAM:    GENERAL:  NAD  SKIN: No rashes or lesions  HEENT: Atraumatic. Normocephalic.   NECK: Supple, No JVD. No lymphadenopathy.  PULMONARY: CTA B/L. No wheezing. No rales  CVS: Normal S1, S2. Rate and Rhythm are regular.   ABDOMEN/GI: Soft, Nontender, Nondistended; BS present  MSK: B/L LE edema, L>R LLE distal ulcer, no purulent drainage. R third digit DFU, dry  NEUROLOGIC:  No motor or sensory deficit.  PSYCH: Alert & oriented x 3, normal affect    Consultant(s) Notes Reviewed:  [x ] YES  [ ] NO  Care Discussed with Consultants/Other Providers [ x] YES  [ ] NO    LABS:                        8.8    7.82  )-----------( 306      ( 29 Oct 2018 08:38 )             28.7     10-29    131<L>  |  97<L>  |  19  ----------------------------<  154<H>  4.5   |  22  |  1.1    Ca    8.4<L>      29 Oct 2018 08:38    Culture - Blood (collected 27 Oct 2018 08:08)  Source: .Blood None  Preliminary Report (28 Oct 2018 14:01):    No growth to date.    Culture - Blood (collected 27 Oct 2018 08:08)  Source: .Blood None  Preliminary Report (28 Oct 2018 14:01):    No growth to date.        RADIOLOGY & ADDITIONAL TESTS:    Imaging or report Personally Reviewed:  [x] YES  [ ] NO  EKG reviewed: [x] YES  [ ] NO    Medications:  Standing  allopurinol 300 milliGRAM(s) Oral daily  apixaban 5 milliGRAM(s) Oral every 12 hours  cefTRIAXone   IVPB 1 Gram(s) IV Intermittent every 24 hours  chlorhexidine 4% Liquid 1 Application(s) Topical <User Schedule>  dextrose 5%. 1000 milliLiter(s) IV Continuous <Continuous>  dextrose 50% Injectable 12.5 Gram(s) IV Push once  dextrose 50% Injectable 25 Gram(s) IV Push once  dextrose 50% Injectable 25 Gram(s) IV Push once  docusate sodium 100 milliGRAM(s) Oral two times a day  folic acid 1 milliGRAM(s) Oral daily  insulin glargine Injectable (LANTUS) 12 Unit(s) SubCutaneous at bedtime  insulin lispro (HumaLOG) corrective regimen sliding scale   SubCutaneous three times a day before meals  insulin lispro Injectable (HumaLOG) 4 Unit(s) SubCutaneous three times a day before meals  leucovorin 5 milliGRAM(s) Oral daily  predniSONE   Tablet 15 milliGRAM(s) Oral daily  rifampin 300 milliGRAM(s) Oral two times a day  senna 2 Tablet(s) Oral at bedtime    PRN Meds  dextrose 40% Gel 15 Gram(s) Oral once PRN  glucagon  Injectable 1 milliGRAM(s) IntraMuscular once PRN  oxyCODONE    5 mG/acetaminophen 325 mG 2 Tablet(s) Oral every 4 hours PRN

## 2018-10-29 NOTE — DISCHARGE NOTE ADULT - MEDICATION SUMMARY - MEDICATIONS TO TAKE
I will START or STAY ON the medications listed below when I get home from the hospital:    predniSONE 5 mg oral tablet  -- 3 tab(s) by mouth once a day  -- Indication: For Rheumatoid arteritis    oxyCODONE-acetaminophen 5 mg-325 mg oral tablet  -- 1 tab(s) by mouth every 4 hours, As needed, Severe Pain (7 - 10)  -- Indication: For Pain    Eliquis 5 mg oral tablet  -- 2 tab(s) by mouth 2 times a day for 5 days and then 1 tab by mouth 2 times a day.   -- Check with your doctor before becoming pregnant.  It is very important that you take or use this exactly as directed.  Do not skip doses or discontinue unless directed by your doctor.  Obtain medical advice before taking any non-prescription drugs as some may affect the action of this medication.    -- Indication: For DVT    gabapentin 100 mg oral tablet  -- 1 tab(s) by mouth 2 times a day, As Needed  -- Indication: For Neuropathy    Actos 30 mg oral tablet  -- 1 tab(s) by mouth once a day  -- Indication: For DIabetes    glimepiride 2 mg oral tablet  -- 1  by mouth 3 times a day  -- Indication: For Diabetes    leucovorin 5 mg oral tablet  -- 1 tab(s) by mouth once a day  -- Indication: For Rheumatoid arteritis    allopurinol 300 mg oral tablet  -- 1 tab(s) by mouth once a day  -- Indication: For Rheumatoid arteritis    doxycycline monohydrate 100 mg oral capsule  -- 1 cap(s) by mouth every 12 hours until 11/5/18  -- Indication: For Infection    rifAMPin 300 mg oral capsule  -- 1 cap(s) by mouth 2 times a day  -- Indication: For Latent TB    docusate sodium 100 mg oral capsule  -- 1 cap(s) by mouth 2 times a day  -- Indication: For Constipation    folic acid 1 mg oral tablet  -- 1 tab(s) by mouth once a day  -- Indication: For Rheumatoid arteritis

## 2018-10-29 NOTE — CONSULT NOTE ADULT - ASSESSMENT
IMPRESSION: Rehab of   RA, gait abnormality, right 3rd toe cellulitis    PRECAUTIONS: [ x ] Cardiac  [  ] Respiratory  [  ] Seizures [  ] Contact Isolation  [  ] Droplet Isolation  [  ] Other    Weight Bearing Status: WBAT RLE    RECOMMENDATION:  ID follow up    Out of Bed to Chair     DVT/Decubiti Prophylaxis    REHAB PLAN:     [ xx  ] Bedside P/T 3-5 times a week   [   ]   Bedside O/T  2-3 times a week             [   ] No Rehab Therapy Indicated                   [   ]  Speech Therapy   Conditioning/ROM                                    ADL  Bed Mobility                                               Conditioning/ROM  Transfers                                                     Bed Mobility  Sitting /Standing Balance                         Transfers                                        Gait Training                                               Sitting/Standing Balance  Stair Training [   ]Applicable                    Home equipment Eval                                                                        Splinting  [   ] Only      GOALS:   ADL   [x   ]   Independent                    Transfers  [ x  ] Independent                          Ambulation  [x   ] Independent     [  x  ] With device                            [   ]  CG                                                         [   ]  CG                                                                  [   ] CG                            [    ] Min A                                                   [   ] Min A                                                              [   ] Min  A          DISCHARGE PLAN:   [   ]  Good candidate for Intensive Rehabilitation/Hospital based-[facility]A [facility]                                             Will tolerate 3hrs Intensive Rehab Daily                                       [  xx  ]  Short Term Rehab in Skilled Nursing Facility                                       [    ]  Home with Outpatient or VN services                                         [    ]  Possible Candidate for Intensive Hospital based Rehab

## 2018-10-29 NOTE — PROGRESS NOTE ADULT - ASSESSMENT
79 yo F with PMH of DM2, RA, LLE DVT on Eliquis, CKD 3, gout, latent TB on Rifampin and foot ulcer admitted for fever, likely 2/2 UTI. I week PTP patient underwent an uneventful debridement by podiatry, 1 day post-op was complaining of RLE pain, was advised by podiatry office to take pain relievers until her follow up appt. She was then referred to ED by visiting nurse after noting a fever    1. UTI - nl WBC, afebrile, asymptomatic  - UA+, ucx normal urogenital reva  - blood Cx + x2 --->1st is Coag negative staph, 2nd gm + cocci in clusters  subsequent blood cultures negative - likely contamination  -s/p Rocephin x6 days    3. Choking episode  -no further occurrences but patient complains of a dry throat, no dysphagia or odynophagia  -speech and swallow eval appreciated      4. DFU/LLE small ulcer  s/p bedside debridement by podiatryof 3rd digit  c/w wound care  f/u podiatry if any further plans for debriedments  Doxycycline 100 Q12H PO x7 day per ID    6. RA - follows Dr Henry  patient is on Prednisone 15 daily, leucovorin/Folic Acid  as per patient, she was supposed to start Rituxan infusions this week   f/u Dr Henry as outpatient    7. LE pitting edema  improved since patent has been sitting up in the chair  2d echo from 6/2018 shows normal systolic fxn, mild HI, mild TR  cxray shows no effusions/no pulm vascular congestion  possibly due to hypoalbuminemia/sedentary lifestyle  trial of compression stockings    8 Latent TB - c/w Rifampin (total 4 month treatment)    9. LLE DVT - c/w Eliquis 5mg BID    10. DM - holding oral agents, monitor fs, insulin PRN    11. CKD 3 - stable    DVT ppx: on Eliquis    Dispo: d/c planning to SNF. Patient requesting eger    Case discussed with patient and housestaff

## 2018-10-29 NOTE — DISCHARGE NOTE ADULT - CARE PLAN
Principal Discharge DX:	Foot ulcer  Goal:	prevent  Secondary Diagnosis:	Rheumatoid arteritis Principal Discharge DX:	Foot ulcer  Goal:	prevent  Assessment and plan of treatment:	You came in with a diabetic foot ulcer. Podiatry evaluated your foot and recommend local wound care. Please follow up with podiatry in 1-2 weeks and your PMD. Take meds as prescribed.  Secondary Diagnosis:	Rheumatoid arteritis  Goal:	maintenance  Assessment and plan of treatment:	Please follow up with Dr. Henry regarding further management of your Rheumatoid Arthritis. Please take meds as prescribed.

## 2018-10-29 NOTE — DISCHARGE NOTE ADULT - PLAN OF CARE
prevent You came in with a diabetic foot ulcer. Podiatry evaluated your foot and recommend local wound care. Please follow up with podiatry in 1-2 weeks and your PMD. Take meds as prescribed. maintenance Please follow up with Dr. Henry regarding further management of your Rheumatoid Arthritis. Please take meds as prescribed.

## 2018-10-29 NOTE — PHYSICAL THERAPY INITIAL EVALUATION ADULT - GENERAL OBSERVATIONS, REHAB EVAL
11:00-11:45. Pt encountered sitting in chair in NAD, +dressing to L LE (shin), + darco shoes R foot, +low back brace (2* to hx of low back due to fall in the past ). Pt reported pain R LE Pt agreeable to PT.  Pt's rollator present at b/s. PT adjusted Rollator as it was too low for pt. 11:00-11:45. Pt encountered sitting in chair in NAD, +dressing to L LE (shin), + darco shoes R foot with R LE elevated on stool with pillow support,+low back brace (2* to hx of low back due to fall in the past ). Pt reported pain R LE Pt agreeable to PT.  Pt's rollator present at b/s. PT adjusted Rollator as it was too low for pt.

## 2018-10-29 NOTE — DISCHARGE NOTE ADULT - CARE PROVIDERS DIRECT ADDRESSES
,DirectAddress_Unknown,[email or web address]

## 2018-10-29 NOTE — PROGRESS NOTE ADULT - SUBJECTIVE AND OBJECTIVE BOX
LINCOLN HEREDIA  78y, Female      OVERNIGHT EVENTS:    pain LLE, SOB with minimal cough    VITALS:  T(F): 97.6, Max: 97.6 (10-29-18 @ 05:20)  HR: 107  BP: 141/67  RR: 18Vital Signs Last 24 Hrs  T(C): 36.4 (29 Oct 2018 05:20), Max: 36.4 (28 Oct 2018 20:40)  T(F): 97.6 (29 Oct 2018 05:20), Max: 97.6 (29 Oct 2018 05:20)  HR: 107 (29 Oct 2018 05:20) (85 - 107)  BP: 141/67 (29 Oct 2018 05:20) (122/61 - 141/67)  BP(mean): --  RR: 18 (29 Oct 2018 05:20) (18 - 18)  SpO2: --    TESTS & MEASUREMENTS:                        8.8    7.82  )-----------( 306      ( 29 Oct 2018 08:38 )             28.7     10-29    131<L>  |  97<L>  |  19  ----------------------------<  154<H>  4.5   |  22  |  1.1    Ca    8.4<L>      29 Oct 2018 08:38          Culture - Blood (collected 10-27-18 @ 08:08)  Source: .Blood None  Preliminary Report (10-28-18 @ 14:01):    No growth to date.    Culture - Blood (collected 10-27-18 @ 08:08)  Source: .Blood None  Preliminary Report (10-28-18 @ 14:01):    No growth to date.    Culture - Blood (collected 10-26-18 @ 11:17)  Source: .Blood None  Preliminary Report (10-27-18 @ 18:01):    No growth to date.    Culture - Urine (collected 10-24-18 @ 02:22)  Source: .Urine Clean Catch (Midstream)  Final Report (10-25-18 @ 20:43):    <10,000 CFU/ml Normal Urogenital reva present    Culture - Blood (collected 10-23-18 @ 18:41)  Source: .Blood Blood  Gram Stain (10-25-18 @ 22:48):    Growth in aerobic bottle: Gram Positive Cocci in Clusters  Final Report (10-26-18 @ 21:28):    Growth in aerobic bottle: Staphylococcus epidermidis    "Due to technical problems, Proteus sp. will Not be reported as part of    the BCID panel until further notice"    ***Blood Panel PCR results on this specimen are available    approximately 3 hours after the Gram stain result.***    Gram stain, PCR, and/or culture results may not always    correspond due to difference in methodologies.    ************************************************************    This PCR assay was performed using [employer].    The following targets are tested for: Enterococcus,    vancomycin resistant enterococci, Listeria monocytogenes,    coagulase negative staphylococci, S. aureus,    methicillin resistant S. aureus, Streptococcus agalactiae    (Group B), S. pneumoniae, S. pyogenes (Group A),    Acinetobacter baumannii, Enterobacter cloacae, E. coli,    Klebsiella oxytoca, K. pneumoniae, Proteus sp.,    Serratia marcescens, Haemophilus influenzae,    Neisseria meningitidis, Pseudomonas aeruginosa, Candida    albicans, C. glabrata, C krusei, C parapsilosis,    C. tropicalis and the KPC resistance gene.  Organism: Blood Culture PCR (10-26-18 @ 21:28)  Organism: Blood Culture PCR (10-26-18 @ 21:28)      -  Coagulase negative Staphylococcus: Detec      Method Type: PCR    Culture - Blood (collected 10-23-18 @ 18:41)  Source: .Blood Blood  Gram Stain (10-26-18 @ 08:50):    Growth in anaerobic bottle: Gram Positive Cocci in Clusters  Final Report (10-29-18 @ 11:18):    Growth in anaerobic bottle: Coag Negative Staphylococcus Susceptibility    to follow.            RADIOLOGY & ADDITIONAL TESTS:    ANTIBIOTICS:  cefTRIAXone   IVPB 1 Gram(s) IV Intermittent every 24 hours  rifampin 300 milliGRAM(s) Oral two times a day

## 2018-10-29 NOTE — PROGRESS NOTE ADULT - SUBJECTIVE AND OBJECTIVE BOX
SUBJECTIVE:    Patient is a 78y old Female who presents with a chief complaint of UTI (28 Oct 2018 17:17)    Currently admitted to medicine with the primary diagnosis of UTI (urinary tract infection)     Today is hospital day 5d. This morning she is resting comfortably in bed and reports no new issues or overnight events.     PAST MEDICAL & SURGICAL HISTORY  Gout  DVT (deep venous thrombosis)  HTN (hypertension)  Rheumatoid arteritis  Other specified diabetes mellitus with other specified complication, unspecified whether long term insulin use  Primary osteoarthritis of right knee: s/p knee repalcement    SOCIAL HISTORY:  Negative for smoking/alcohol/drug use.     ALLERGIES:  clindamycin (Unknown)  erythromycin (Unknown)  penicillin (Unknown)  strawberry (Unknown)    MEDICATIONS:  STANDING MEDICATIONS  allopurinol 300 milliGRAM(s) Oral daily  apixaban 5 milliGRAM(s) Oral every 12 hours  cefTRIAXone   IVPB 1 Gram(s) IV Intermittent every 24 hours  chlorhexidine 4% Liquid 1 Application(s) Topical <User Schedule>  dextrose 5%. 1000 milliLiter(s) IV Continuous <Continuous>  dextrose 50% Injectable 12.5 Gram(s) IV Push once  dextrose 50% Injectable 25 Gram(s) IV Push once  dextrose 50% Injectable 25 Gram(s) IV Push once  docusate sodium 100 milliGRAM(s) Oral two times a day  folic acid 1 milliGRAM(s) Oral daily  insulin glargine Injectable (LANTUS) 12 Unit(s) SubCutaneous at bedtime  insulin lispro (HumaLOG) corrective regimen sliding scale   SubCutaneous three times a day before meals  insulin lispro Injectable (HumaLOG) 4 Unit(s) SubCutaneous three times a day before meals  leucovorin 5 milliGRAM(s) Oral daily  predniSONE   Tablet 15 milliGRAM(s) Oral daily  rifampin 300 milliGRAM(s) Oral two times a day  senna 2 Tablet(s) Oral at bedtime    PRN MEDICATIONS  dextrose 40% Gel 15 Gram(s) Oral once PRN  glucagon  Injectable 1 milliGRAM(s) IntraMuscular once PRN  oxyCODONE    5 mG/acetaminophen 325 mG 2 Tablet(s) Oral every 4 hours PRN    VITALS:   T(F): 97.6  HR: 107  BP: 141/67  RR: 18  SpO2: --    LABS:                        8.8    7.82  )-----------( 306      ( 29 Oct 2018 08:38 )             28.7     10-29    131<L>  |  97<L>  |  19  ----------------------------<  154<H>  4.5   |  22  |  1.1    Ca    8.4<L>      29 Oct 2018 08:38                Culture - Blood (collected 27 Oct 2018 08:08)  Source: .Blood None  Preliminary Report (28 Oct 2018 14:01):    No growth to date.    Culture - Blood (collected 27 Oct 2018 08:08)  Source: .Blood None  Preliminary Report (28 Oct 2018 14:01):    No growth to date.    Culture - Blood (collected 26 Oct 2018 11:17)  Source: .Blood None  Preliminary Report (27 Oct 2018 18:01):    No growth to date.          RADIOLOGY:    PHYSICAL EXAM:  GEN: No acute distress  LUNGS: Clear to auscultation bilaterally   HEART: S1/S2 present. RRR.   ABD: Soft, non-tender, non-distended. Bowel sounds present  EXT: 1+edema, Right 3rd metatarsal distal tip ulcer with central necrosis/granulation tissue- left anterior distal shin medial skin break- chronic venous stasis changes  NEURO: AAOX3

## 2018-10-30 LAB
-  AMOXICILLIN/CLAVULANIC ACID: SIGNIFICANT CHANGE UP
-  AMPICILLIN/SULBACTAM: SIGNIFICANT CHANGE UP
-  AMPICILLIN: SIGNIFICANT CHANGE UP
-  CEFAZOLIN: SIGNIFICANT CHANGE UP
-  CEFTRIAXONE: SIGNIFICANT CHANGE UP
-  CIPROFLOXACIN: SIGNIFICANT CHANGE UP
-  CLINDAMYCIN: SIGNIFICANT CHANGE UP
-  DAPTOMYCIN: SIGNIFICANT CHANGE UP
-  ERYTHROMYCIN: SIGNIFICANT CHANGE UP
-  GENTAMICIN: SIGNIFICANT CHANGE UP
-  LEVOFLOXACIN: SIGNIFICANT CHANGE UP
-  LINEZOLID: SIGNIFICANT CHANGE UP
-  MEROPENEM: SIGNIFICANT CHANGE UP
-  MOXIFLOXACIN(AEROBIC): SIGNIFICANT CHANGE UP
-  OXACILLIN: SIGNIFICANT CHANGE UP
-  PENICILLIN: SIGNIFICANT CHANGE UP
-  RIFAMPIN: SIGNIFICANT CHANGE UP
-  TETRACYCLINE: SIGNIFICANT CHANGE UP
-  TRIMETHOPRIM/SULFAMETHOXAZOLE: SIGNIFICANT CHANGE UP
-  VANCOMYCIN: SIGNIFICANT CHANGE UP
CULTURE RESULTS: SIGNIFICANT CHANGE UP
METHOD TYPE: SIGNIFICANT CHANGE UP
ORGANISM # SPEC MICROSCOPIC CNT: SIGNIFICANT CHANGE UP
ORGANISM # SPEC MICROSCOPIC CNT: SIGNIFICANT CHANGE UP

## 2018-10-31 ENCOUNTER — OUTPATIENT (OUTPATIENT)
Dept: OUTPATIENT SERVICES | Facility: HOSPITAL | Age: 78
LOS: 1 days | Discharge: HOME | End: 2018-10-31

## 2018-10-31 DIAGNOSIS — M17.11 UNILATERAL PRIMARY OSTEOARTHRITIS, RIGHT KNEE: Chronic | ICD-10-CM

## 2018-10-31 DIAGNOSIS — N18.9 CHRONIC KIDNEY DISEASE, UNSPECIFIED: ICD-10-CM

## 2018-10-31 DIAGNOSIS — I10 ESSENTIAL (PRIMARY) HYPERTENSION: ICD-10-CM

## 2018-10-31 DIAGNOSIS — E11.9 TYPE 2 DIABETES MELLITUS WITHOUT COMPLICATIONS: ICD-10-CM

## 2018-10-31 DIAGNOSIS — R78.81 BACTEREMIA: ICD-10-CM

## 2018-10-31 LAB
CULTURE RESULTS: SIGNIFICANT CHANGE UP
SPECIMEN SOURCE: SIGNIFICANT CHANGE UP

## 2018-11-01 PROBLEM — I82.409 ACUTE EMBOLISM AND THROMBOSIS OF UNSPECIFIED DEEP VEINS OF UNSPECIFIED LOWER EXTREMITY: Chronic | Status: ACTIVE | Noted: 2018-10-24

## 2018-11-01 PROBLEM — M10.9 GOUT, UNSPECIFIED: Chronic | Status: ACTIVE | Noted: 2018-10-24

## 2018-11-01 LAB
CULTURE RESULTS: SIGNIFICANT CHANGE UP
CULTURE RESULTS: SIGNIFICANT CHANGE UP
SPECIMEN SOURCE: SIGNIFICANT CHANGE UP
SPECIMEN SOURCE: SIGNIFICANT CHANGE UP

## 2018-11-02 LAB
CULTURE RESULTS: SIGNIFICANT CHANGE UP
SPECIMEN SOURCE: SIGNIFICANT CHANGE UP

## 2018-11-03 ENCOUNTER — OUTPATIENT (OUTPATIENT)
Dept: OUTPATIENT SERVICES | Facility: HOSPITAL | Age: 78
LOS: 1 days | Discharge: HOME | End: 2018-11-03

## 2018-11-03 DIAGNOSIS — M17.11 UNILATERAL PRIMARY OSTEOARTHRITIS, RIGHT KNEE: Chronic | ICD-10-CM

## 2018-11-03 DIAGNOSIS — D64.9 ANEMIA, UNSPECIFIED: ICD-10-CM

## 2018-11-03 DIAGNOSIS — R78.81 BACTEREMIA: ICD-10-CM

## 2018-11-08 ENCOUNTER — OUTPATIENT (OUTPATIENT)
Dept: OUTPATIENT SERVICES | Facility: HOSPITAL | Age: 78
LOS: 1 days | Discharge: HOME | End: 2018-11-08

## 2018-11-08 DIAGNOSIS — R79.9 ABNORMAL FINDING OF BLOOD CHEMISTRY, UNSPECIFIED: ICD-10-CM

## 2018-11-08 DIAGNOSIS — M17.11 UNILATERAL PRIMARY OSTEOARTHRITIS, RIGHT KNEE: Chronic | ICD-10-CM

## 2018-11-09 NOTE — CHART NOTE - NSCHARTNOTEFT_GEN_A_CORE
As per request of documentation RN documenting hereby (Even though I took care of patient only 2 days out of her entire stay) that patient's DVT was chronic for which she was already on Eliquis that we just continued.

## 2018-11-13 DIAGNOSIS — L97.519 NON-PRESSURE CHRONIC ULCER OF OTHER PART OF RIGHT FOOT WITH UNSPECIFIED SEVERITY: ICD-10-CM

## 2018-11-13 DIAGNOSIS — R76.11 NONSPECIFIC REACTION TO TUBERCULIN SKIN TEST WITHOUT ACTIVE TUBERCULOSIS: ICD-10-CM

## 2018-11-13 DIAGNOSIS — N18.3 CHRONIC KIDNEY DISEASE, STAGE 3 (MODERATE): ICD-10-CM

## 2018-11-13 DIAGNOSIS — Z88.0 ALLERGY STATUS TO PENICILLIN: ICD-10-CM
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
## 2018-11-13 DIAGNOSIS — Z91.018 ALLERGY TO OTHER FOODS: ICD-10-CM

## 2018-11-13 DIAGNOSIS — Z96.651 PRESENCE OF RIGHT ARTIFICIAL KNEE JOINT: ICD-10-CM

## 2018-11-13 DIAGNOSIS — R78.81 BACTEREMIA: ICD-10-CM

## 2018-11-13 DIAGNOSIS — E11.621 TYPE 2 DIABETES MELLITUS WITH FOOT ULCER: ICD-10-CM

## 2018-11-13 DIAGNOSIS — Z79.01 LONG TERM (CURRENT) USE OF ANTICOAGULANTS: ICD-10-CM

## 2018-11-13 DIAGNOSIS — I12.9 HYPERTENSIVE CHRONIC KIDNEY DISEASE WITH STAGE 1 THROUGH STAGE 4 CHRONIC KIDNEY DISEASE, OR UNSPECIFIED CHRONIC KIDNEY DISEASE: ICD-10-CM

## 2018-11-13 DIAGNOSIS — E11.22 TYPE 2 DIABETES MELLITUS WITH DIABETIC CHRONIC KIDNEY DISEASE: ICD-10-CM

## 2018-11-13 DIAGNOSIS — Z28.21 IMMUNIZATION NOT CARRIED OUT BECAUSE OF PATIENT REFUSAL: ICD-10-CM

## 2018-11-13 DIAGNOSIS — N30.90 CYSTITIS, UNSPECIFIED WITHOUT HEMATURIA: ICD-10-CM

## 2018-11-13 DIAGNOSIS — M06.9 RHEUMATOID ARTHRITIS, UNSPECIFIED: ICD-10-CM

## 2018-11-13 DIAGNOSIS — I82.533 CHRONIC EMBOLISM AND THROMBOSIS OF POPLITEAL VEIN, BILATERAL: ICD-10-CM

## 2018-11-13 DIAGNOSIS — Z88.1 ALLERGY STATUS TO OTHER ANTIBIOTIC AGENTS STATUS: ICD-10-CM

## 2018-11-13 DIAGNOSIS — M10.9 GOUT, UNSPECIFIED: ICD-10-CM

## 2018-11-13 DIAGNOSIS — I82.513 CHRONIC EMBOLISM AND THROMBOSIS OF FEMORAL VEIN, BILATERAL: ICD-10-CM

## 2018-11-13 DIAGNOSIS — B95.8 UNSPECIFIED STAPHYLOCOCCUS AS THE CAUSE OF DISEASES CLASSIFIED ELSEWHERE: ICD-10-CM

## 2018-11-13 DIAGNOSIS — T17.928A FOOD IN RESPIRATORY TRACT, PART UNSPECIFIED CAUSING OTHER INJURY, INITIAL ENCOUNTER: ICD-10-CM

## 2018-11-16 ENCOUNTER — OUTPATIENT (OUTPATIENT)
Dept: OUTPATIENT SERVICES | Facility: HOSPITAL | Age: 78
LOS: 1 days | Discharge: HOME | End: 2018-11-16

## 2018-11-16 ENCOUNTER — APPOINTMENT (OUTPATIENT)
Dept: OPHTHALMOLOGY | Facility: CLINIC | Age: 78
End: 2018-11-16

## 2018-11-16 DIAGNOSIS — M17.11 UNILATERAL PRIMARY OSTEOARTHRITIS, RIGHT KNEE: Chronic | ICD-10-CM

## 2018-11-16 DIAGNOSIS — R79.9 ABNORMAL FINDING OF BLOOD CHEMISTRY, UNSPECIFIED: ICD-10-CM

## 2018-11-27 ENCOUNTER — OUTPATIENT (OUTPATIENT)
Dept: OUTPATIENT SERVICES | Facility: HOSPITAL | Age: 78
LOS: 1 days | Discharge: HOME | End: 2018-11-27

## 2018-11-27 DIAGNOSIS — R79.9 ABNORMAL FINDING OF BLOOD CHEMISTRY, UNSPECIFIED: ICD-10-CM

## 2018-11-27 DIAGNOSIS — M17.11 UNILATERAL PRIMARY OSTEOARTHRITIS, RIGHT KNEE: Chronic | ICD-10-CM

## 2018-12-19 ENCOUNTER — INPATIENT (INPATIENT)
Facility: HOSPITAL | Age: 78
LOS: 30 days | Discharge: NURSING, REHAB OR LONG-TERM CARE | End: 2019-01-19
Attending: HOSPITALIST | Admitting: HOSPITALIST
Payer: MEDICARE

## 2018-12-19 VITALS
DIASTOLIC BLOOD PRESSURE: 80 MMHG | HEART RATE: 90 BPM | TEMPERATURE: 98 F | SYSTOLIC BLOOD PRESSURE: 140 MMHG | OXYGEN SATURATION: 95 % | RESPIRATION RATE: 18 BRPM

## 2018-12-19 DIAGNOSIS — M17.11 UNILATERAL PRIMARY OSTEOARTHRITIS, RIGHT KNEE: Chronic | ICD-10-CM

## 2018-12-19 DIAGNOSIS — I80.10 PHLEBITIS AND THROMBOPHLEBITIS OF UNSPECIFIED FEMORAL VEIN: ICD-10-CM

## 2018-12-19 LAB
ALBUMIN SERPL ELPH-MCNC: 3.8 G/DL — SIGNIFICANT CHANGE UP (ref 3.5–5.2)
ALP SERPL-CCNC: 45 U/L — SIGNIFICANT CHANGE UP (ref 30–115)
ALT FLD-CCNC: 8 U/L — SIGNIFICANT CHANGE UP (ref 0–41)
ANION GAP SERPL CALC-SCNC: 18 MMOL/L — HIGH (ref 7–14)
APTT BLD: 28.2 SEC — SIGNIFICANT CHANGE UP (ref 27–39.2)
AST SERPL-CCNC: 17 U/L — SIGNIFICANT CHANGE UP (ref 0–41)
BASOPHILS # BLD AUTO: 0.03 K/UL — SIGNIFICANT CHANGE UP (ref 0–0.2)
BASOPHILS NFR BLD AUTO: 0.3 % — SIGNIFICANT CHANGE UP (ref 0–1)
BILIRUB SERPL-MCNC: 0.5 MG/DL — SIGNIFICANT CHANGE UP (ref 0.2–1.2)
BUN SERPL-MCNC: 27 MG/DL — HIGH (ref 10–20)
CALCIUM SERPL-MCNC: 9 MG/DL — SIGNIFICANT CHANGE UP (ref 8.5–10.1)
CHLORIDE SERPL-SCNC: 102 MMOL/L — SIGNIFICANT CHANGE UP (ref 98–110)
CO2 SERPL-SCNC: 20 MMOL/L — SIGNIFICANT CHANGE UP (ref 17–32)
CREAT SERPL-MCNC: 1 MG/DL — SIGNIFICANT CHANGE UP (ref 0.7–1.5)
EOSINOPHIL # BLD AUTO: 0.16 K/UL — SIGNIFICANT CHANGE UP (ref 0–0.7)
EOSINOPHIL NFR BLD AUTO: 1.6 % — SIGNIFICANT CHANGE UP (ref 0–8)
GLUCOSE BLDC GLUCOMTR-MCNC: 75 MG/DL — SIGNIFICANT CHANGE UP (ref 70–99)
GLUCOSE SERPL-MCNC: 54 MG/DL — LOW (ref 70–99)
HCT VFR BLD CALC: 27.8 % — LOW (ref 37–47)
HGB BLD-MCNC: 8.8 G/DL — LOW (ref 12–16)
IMM GRANULOCYTES NFR BLD AUTO: 0.4 % — HIGH (ref 0.1–0.3)
INR BLD: 1.09 RATIO — SIGNIFICANT CHANGE UP (ref 0.65–1.3)
LACTATE SERPL-SCNC: 1.4 MMOL/L — SIGNIFICANT CHANGE UP (ref 0.5–2.2)
LYMPHOCYTES # BLD AUTO: 1.31 K/UL — SIGNIFICANT CHANGE UP (ref 1.2–3.4)
LYMPHOCYTES # BLD AUTO: 13 % — LOW (ref 20.5–51.1)
MCHC RBC-ENTMCNC: 29.2 PG — SIGNIFICANT CHANGE UP (ref 27–31)
MCHC RBC-ENTMCNC: 31.7 G/DL — LOW (ref 32–37)
MCV RBC AUTO: 92.4 FL — SIGNIFICANT CHANGE UP (ref 81–99)
MONOCYTES # BLD AUTO: 1.07 K/UL — HIGH (ref 0.1–0.6)
MONOCYTES NFR BLD AUTO: 10.6 % — HIGH (ref 1.7–9.3)
NEUTROPHILS # BLD AUTO: 7.47 K/UL — HIGH (ref 1.4–6.5)
NEUTROPHILS NFR BLD AUTO: 74.1 % — SIGNIFICANT CHANGE UP (ref 42.2–75.2)
NRBC # BLD: 0 /100 WBCS — SIGNIFICANT CHANGE UP (ref 0–0)
PLATELET # BLD AUTO: 329 K/UL — SIGNIFICANT CHANGE UP (ref 130–400)
POTASSIUM SERPL-MCNC: 4.5 MMOL/L — SIGNIFICANT CHANGE UP (ref 3.5–5)
POTASSIUM SERPL-SCNC: 4.5 MMOL/L — SIGNIFICANT CHANGE UP (ref 3.5–5)
PROT SERPL-MCNC: 6.4 G/DL — SIGNIFICANT CHANGE UP (ref 6–8)
PROTHROM AB SERPL-ACNC: 12.5 SEC — SIGNIFICANT CHANGE UP (ref 9.95–12.87)
RBC # BLD: 3.01 M/UL — LOW (ref 4.2–5.4)
RBC # FLD: 16.1 % — HIGH (ref 11.5–14.5)
SODIUM SERPL-SCNC: 140 MMOL/L — SIGNIFICANT CHANGE UP (ref 135–146)
WBC # BLD: 10.08 K/UL — SIGNIFICANT CHANGE UP (ref 4.8–10.8)
WBC # FLD AUTO: 10.08 K/UL — SIGNIFICANT CHANGE UP (ref 4.8–10.8)

## 2018-12-19 RX ORDER — APIXABAN 2.5 MG/1
5 TABLET, FILM COATED ORAL EVERY 12 HOURS
Qty: 0 | Refills: 0 | Status: DISCONTINUED | OUTPATIENT
Start: 2018-12-19 | End: 2018-12-27

## 2018-12-19 RX ORDER — GABAPENTIN 400 MG/1
0 CAPSULE ORAL
Qty: 0 | Refills: 0 | COMMUNITY

## 2018-12-19 RX ORDER — OXYCODONE HYDROCHLORIDE 5 MG/1
20 TABLET ORAL EVERY 12 HOURS
Qty: 0 | Refills: 0 | Status: DISCONTINUED | OUTPATIENT
Start: 2018-12-19 | End: 2018-12-21

## 2018-12-19 RX ORDER — GABAPENTIN 400 MG/1
1 CAPSULE ORAL
Qty: 0 | Refills: 0 | COMMUNITY

## 2018-12-19 RX ORDER — GABAPENTIN 400 MG/1
300 CAPSULE ORAL EVERY 12 HOURS
Qty: 0 | Refills: 0 | Status: DISCONTINUED | OUTPATIENT
Start: 2018-12-19 | End: 2018-12-28

## 2018-12-19 RX ORDER — CHLORHEXIDINE GLUCONATE 213 G/1000ML
1 SOLUTION TOPICAL
Qty: 0 | Refills: 0 | Status: DISCONTINUED | OUTPATIENT
Start: 2018-12-19 | End: 2018-12-28

## 2018-12-19 RX ORDER — FOLIC ACID 0.8 MG
1 TABLET ORAL DAILY
Qty: 0 | Refills: 0 | Status: DISCONTINUED | OUTPATIENT
Start: 2018-12-19 | End: 2018-12-28

## 2018-12-19 RX ORDER — ALLOPURINOL 300 MG
300 TABLET ORAL DAILY
Qty: 0 | Refills: 0 | Status: DISCONTINUED | OUTPATIENT
Start: 2018-12-19 | End: 2018-12-28

## 2018-12-19 RX ORDER — MORPHINE SULFATE 50 MG/1
2 CAPSULE, EXTENDED RELEASE ORAL ONCE
Qty: 0 | Refills: 0 | Status: DISCONTINUED | OUTPATIENT
Start: 2018-12-19 | End: 2018-12-19

## 2018-12-19 RX ORDER — LEUCOVORIN CALCIUM 5 MG
5 TABLET ORAL DAILY
Qty: 0 | Refills: 0 | Status: DISCONTINUED | OUTPATIENT
Start: 2018-12-19 | End: 2018-12-28

## 2018-12-19 RX ORDER — MORPHINE SULFATE 50 MG/1
4 CAPSULE, EXTENDED RELEASE ORAL ONCE
Qty: 0 | Refills: 0 | Status: DISCONTINUED | OUTPATIENT
Start: 2018-12-19 | End: 2018-12-19

## 2018-12-19 RX ADMIN — Medication 300 MILLIGRAM(S): at 16:52

## 2018-12-19 RX ADMIN — MORPHINE SULFATE 4 MILLIGRAM(S): 50 CAPSULE, EXTENDED RELEASE ORAL at 10:57

## 2018-12-19 RX ADMIN — OXYCODONE HYDROCHLORIDE 20 MILLIGRAM(S): 5 TABLET ORAL at 14:53

## 2018-12-19 RX ADMIN — APIXABAN 5 MILLIGRAM(S): 2.5 TABLET, FILM COATED ORAL at 16:52

## 2018-12-19 RX ADMIN — Medication 1 MILLIGRAM(S): at 16:52

## 2018-12-19 RX ADMIN — Medication 1 TABLET(S): at 16:53

## 2018-12-19 RX ADMIN — MORPHINE SULFATE 2 MILLIGRAM(S): 50 CAPSULE, EXTENDED RELEASE ORAL at 09:00

## 2018-12-19 RX ADMIN — GABAPENTIN 300 MILLIGRAM(S): 400 CAPSULE ORAL at 16:52

## 2018-12-19 RX ADMIN — CHLORHEXIDINE GLUCONATE 1 APPLICATION(S): 213 SOLUTION TOPICAL at 16:52

## 2018-12-19 RX ADMIN — Medication 10 MILLIGRAM(S): at 16:53

## 2018-12-19 NOTE — ED PROVIDER NOTE - ATTENDING CONTRIBUTION TO CARE
79 yo F with h/o DM, RA, HTN, DVT, on eliqius, chronic b/l LE ulcers p/w inability to ambulate and b/l foot pain. Pt states that she was unable to get out of her recliner last week 2/2 to worsening pain in b/l feet. Pt has been since unable to ambulate. Pt admits to b/l ulcers to lower extremities and nonhealing ulcer to distal right third toe. Pt f/u weekly with  whom she saw last week and had her toe debrided. Pt states that she is now having more pain to the right toe and to a new ulcer to RLE. Pt did c/o chills yesterday. Pt recently admitted in 10/18 for same complaints. Pt states that she lives alone and has VNS come to dress wounds every 2 days. Pt denies any fever, ha, dizziness, chest pain, sob, abdominal pain, n/v/d. a/p: borderline febrile, tachy, pt appears in mild distress, nontoxic appearing,  norm cardiac exam, lungs cta b/l, no w/r/r, abd is soft and nt, +chronic appearing nonhealing ulcer to distal R toe with crusting and necrotic tissue, no surrounding erythema/warmth, chronic swelling noted around wound, +multiple superficial ulcers at different stages noted on b/l calves, two on R calf and two on L calf, +minimal erythema to calves with chronic swelling, no streaking, +limited ROM of b/l LE 2/2 to pain, will check labs, XR, pain meds, podiatry c/s and reassess 77 yo F with h/o DM, RA, HTN, DVT, on eliqius, chronic b/l LE ulcers p/w inability to ambulate and b/l foot pain. Pt states that she was unable to get out of her recliner last week 2/2 to worsening pain in b/l feet. Pt has been since unable to ambulate. Pt admits to b/l ulcers to lower extremities and nonhealing ulcer to distal right third toe. Pt f/u weekly with  whom she saw last week and had her toe debrided. Pt states that she is now having more pain to the right toe and to a new ulcer to RLE. Pt did c/o chills yesterday. Pt recently admitted in 10/18 for same complaints. Pt states that she lives alone and has VNS come to dress wounds every 2 days. Pt denies any fever, ha, dizziness, chest pain, sob, abdominal pain, n/v/d. a/p: vss, pt appears in mild distress, nontoxic appearing,  norm cardiac exam, lungs cta b/l, no w/r/r, abd is soft and nt, +chronic appearing nonhealing ulcer to distal R toe with crusting and necrotic tissue, no surrounding erythema/warmth, chronic swelling noted around wound, +multiple superficial ulcers at different stages noted on b/l calves, two on R calf and two on L calf, +minimal erythema to calves with chronic swelling, no streaking, +limited ROM of b/l LE 2/2 to pain, will check labs, XR, pain meds, podiatry c/s and reassess

## 2018-12-19 NOTE — H&P ADULT - NSHPSOCIALHISTORY_GEN_ALL_CORE
Patient lives alone with visiting nurse services every other day (for dressing changes), patient has meals delivered. Patient quit smoking in 1976, previously smoked 1-2.5 ppd for 15 years, denies ETOH and illicit drug abuse.

## 2018-12-19 NOTE — ED PROVIDER NOTE - CONSTITUTIONAL, MLM
elderly appearing, awake, alert, oriented to person, place, time/situation and in no apparent distress. normal...

## 2018-12-19 NOTE — ED PROVIDER NOTE - CARE PLAN
Principal Discharge DX:	Wounds, multiple open, lower extremity  Secondary Diagnosis:	Leg pain  Secondary Diagnosis:	Gait abnormality

## 2018-12-19 NOTE — ED PROVIDER NOTE - MEDICAL DECISION MAKING DETAILS
79 yo F with h/o DM, RA, HTN, DVT, on eliqius, chronic b/l LE ulcers p/w inability to ambulate and b/l foot pain. Pt noted with chronic b/l nonhealing ulcers in various stages. Pt seen by podiatry, who stats that pt's wounds appear consistent, no need for emergent IV abx. Pt unable to ambulate at home or in ED 2/2 to persistent pain and pt lives alone. Will admit for rehab/PT/SW. Pt stable for admission to medical floor.

## 2018-12-19 NOTE — H&P ADULT - NSHPREVIEWOFSYSTEMS_GEN_ALL_CORE
REVIEW OF SYSTEMS:  CONSTITUTIONAL: subjective fever, no weight loss, or fatigue  EYES: No eye pain, visual disturbances, or discharge  ENMT:  No difficulty hearing, tinnitus, vertigo; No sinus or throat pain  NECK: No pain or stiffness  BREASTS: No pain, masses, or nipple discharge  RESPIRATORY: No cough, wheezing, chills or hemoptysis; No shortness of breath  CARDIOVASCULAR: No chest pain, palpitations, dizziness, positive leg swelling  GASTROINTESTINAL: No abdominal or epigastric pain. No nausea, vomiting, or hematemesis; No diarrhea or constipation. No melena or hematochezia.  GENITOURINARY: No dysuria, frequency, hematuria, or incontinence  NEUROLOGICAL: No headaches, memory loss, loss of strength, numbness, or tremors  SKIN: No itching, burning, rashes, or lesions   LYMPH NODES: No enlarged glands  ENDOCRINE: No heat or cold intolerance; No hair loss  MUSCULOSKELETAL: bilateral lower extremity pain, ulcers, right hip pain  PSYCHIATRIC: No depression, anxiety, mood swings, or difficulty sleeping  HEME/LYMPH: No easy bruising, or bleeding gums  ALLERGY AND IMMUNOLOGIC: No hives or eczema

## 2018-12-19 NOTE — ED ADULT NURSE NOTE - NSIMPLEMENTINTERV_GEN_ALL_ED
Implemented All Fall Risk Interventions:  [place] to call system. Call bell, personal items and telephone within reach. Instruct patient to call for assistance. Room bathroom lighting operational. Non-slip footwear when patient is off stretcher. Physically safe environment: no spills, clutter or unnecessary equipment. Stretcher in lowest position, wheels locked, appropriate side rails in place. Provide visual cue, wrist band, yellow gown, etc. Monitor gait and stability. Monitor for mental status changes and reorient to person, place, and time. Review medications for side effects contributing to fall risk. Reinforce activity limits and safety measures with patient and family.

## 2018-12-19 NOTE — H&P ADULT - NSHPLABSRESULTS_GEN_ALL_CORE
8.8    10.08 )-----------( 329      ( 19 Dec 2018 09:24 )             27.8       12-19    140  |  102  |  27<H>  ----------------------------<  54<L>  4.5   |  20  |  1.0    Ca    9.0      19 Dec 2018 09:24    TPro  6.4  /  Alb  3.8  /  TBili  0.5  /  DBili  x   /  AST  17  /  ALT  8   /  AlkPhos  45  12-19          PT/INR - ( 19 Dec 2018 09:24 )   PT: 12.50 sec;   INR: 1.09 ratio         PTT - ( 19 Dec 2018 09:24 )  PTT:28.2 sec    Lactate Trend  12-19 @ 09:24 Lactate:1.4

## 2018-12-19 NOTE — ED PROVIDER NOTE - SKIN, MLM
+chronic appearing nonhealing ulcer to distal R toe with crusting and necrotic tissue, no surrounding erythema/warmth, chronic swelling noted around wound, +multiple superficial ulcers at different stages noted on b/l calves, two on R calf and two on L calf, +minimal erythema to calves with chronic swelling, no streaking

## 2018-12-19 NOTE — H&P ADULT - ASSESSMENT
77 yo F presents with acute onset difficult rising from recliner secondary to worsening bilateral lower extremity pain.    Bilateral Chronic Nonhealing Ulcers and concurrent LLE cellulitis   -Seen by podiatry, continue with wound care  -F/U arterial and venous duplex  -Will   -Consider infectious disease evaluation if worsening progression    Chronic Right Hip Pain  -F/U Right Hip Xray    Rheumatoid Arthritis   -Continue home prednisone tapering     DM II  -Monitor finger sticks, start insulin sliding scale if fingersticks are greater than 180    Code Status: DNR/DNI, MOLST form completed with health care proxy as witness by bedside, form in chart  Disposition: Consider PT/Rehab evaluation 79 yo F presents with acute onset difficult rising from recliner secondary to worsening bilateral lower extremity pain.    Bilateral Chronic Nonhealing Ulcers and concurrent LLE cellulitis   -Seen by podiatry, continue with wound care  -F/U arterial and venous duplex  -Will start PO Bactrim DS  -Consider infectious disease evaluation if worsening progression    Chronic Right Hip Pain  -F/U Right Hip Xray    Rheumatoid Arthritis   -Continue home prednisone tapering     DM II  -Monitor finger sticks, start insulin sliding scale if fingersticks are greater than 180    Code Status: DNR/DNI, MOLST form completed with health care proxy as witness by bedside, form in chart  Disposition: Consider PT/Rehab evaluation

## 2018-12-19 NOTE — CONSULT NOTE ADULT - SUBJECTIVE AND OBJECTIVE BOX
PROGRESS NOTE   Patient is a 78y old  Female who presents with a chief complaint of b/l leg pain    HPI:  Pt is seen by  for wound care on a regular basis. Pt had appt today but due to pain, she came to ED accompanied with her daughter. Pt has suffered from chronic wounds which have not healed due to chronic steroid use. Pt has no other pedal complaints at this time. Denies n/v/f/c/sob    ^FOOT PAIN  No pertinent family history in first degree relatives  MEWS Score  Gout  DVT (deep venous thrombosis)  HTN (hypertension)  Rheumatoid arteritis  Other specified diabetes mellitus with other specified complication, unspecified whether long term insulin use  Primary osteoarthritis of right knee  FOOT PAIN  50      VITALS:  Vital Signs Last 24 Hrs  T(C): 36.7 (19 Dec 2018 08:14), Max: 36.7 (19 Dec 2018 08:14)  T(F): 98 (19 Dec 2018 08:14), Max: 98 (19 Dec 2018 08:14)  HR: 90 (19 Dec 2018 08:14) (90 - 90)  BP: 140/80 (19 Dec 2018 08:14) (140/80 - 140/80)  BP(mean): --  RR: 18 (19 Dec 2018 08:14) (18 - 18)  SpO2: 95% (19 Dec 2018 08:14) (95% - 95%)    LABS:                        8.8    10.08 )-----------( 329      ( 19 Dec 2018 09:24 )             27.8     12-19    140  |  102  |  27<H>  ----------------------------<  54<L>  4.5   |  20  |  1.0    Ca    9.0      19 Dec 2018 09:24    TPro  6.4  /  Alb  3.8  /  TBili  0.5  /  DBili  x   /  AST  17  /  ALT  8   /  AlkPhos  45  12-19    PT/INR - ( 19 Dec 2018 09:24 )   PT: 12.50 sec;   INR: 1.09 ratio         PTT - ( 19 Dec 2018 09:24 )  PTT:28.2 sec  Hemoglobin A1C     PHYSICAL EXAM  GEN: LINCOLN HEREDIA is a pleasant well-nourished, well developed 78y Female in no acute distress, alert awake, and oriented to person, place and time.     Medication(s):       LE Focused Exam:      Vasc:    - DP/PT pulses non-palpable B/L   - Skin temp increased , proximal to distal B/L  - CFT < 3 sec B/L    Neuro:   - Gross sensation in tact B/L     Derm:   - No interdigital macerations B/L   - cellulitic changes b/l LE.   - right 3rd digit chronic ulceration  - multiple ulcerations b/l LE. + edema, + erythema, + serous drainage, - malodor, - purulence, - CSOI @ this time     MSK:   - Muscle strength 3/5 in all quadrants w/ no defects B/L

## 2018-12-19 NOTE — ED PROVIDER NOTE - OBJECTIVE STATEMENT
79 y/o F, PMHx DM, Rheumatoid Arthritis (on Prednisone daily), DVT (LLE) - on Eliquis x three years, CKD III & Chronic LE wounds, presents to the ED with complaints of lower extremity pain since last night. She states that her LE wounds are causing her significant discomfort that is impeding her ability to ambulate 79 y/o F, PMHx DM, Rheumatoid Arthritis (on Prednisone daily), DVT (LLE) - on Eliquis x three years, CKD III & Chronic LE wounds, presents to the ED with complaints of lower extremity pain since last night. She states that her LE wounds are causing her significant discomfort that is impeding her ability to ambulate. Her podiatrist is Dr. Oden and she has a visiting RN come to her home every two days t 77 y/o F, PMHx DM, Rheumatoid Arthritis (on Prednisone daily), DVT (LLE) - on Eliquis x three years, CKD III & Chronic LE wounds, presents to the ED with complaints of lower extremity pain since last night. She states that her LE wounds are causing her significant discomfort that is impeding her ability to ambulate. Her podiatrist is Dr. Oden and she has a visiting RN come to her home every two days for wound care/dressing changes. She denies any chest pain, dyspnea, fever, chills, nausea and vomiting. She was just d/c from [facility] facility 3 weeks ago.

## 2018-12-19 NOTE — CONSULT NOTE ADULT - SUBJECTIVE AND OBJECTIVE BOX
PROGRESS NOTE   Patient is a 78y old  Female who presents with a chief complaint of b/l leg pain    HPI:  Pt is seen by  for routine podiatric care. Pt had appt today but due to pain, she came to ED accompanied with her daughter. Pt has suffered from chronic wounds which have not healed due to chronic steriod use. P    ^FOOT PAIN  No pertinent family history in first degree relatives  MEWS Score  Gout  DVT (deep venous thrombosis)  HTN (hypertension)  Rheumatoid arteritis  Other specified diabetes mellitus with other specified complication, unspecified whether long term insulin use  Primary osteoarthritis of right knee  FOOT PAIN  50      VITALS:  Vital Signs Last 24 Hrs  T(C): 36.7 (19 Dec 2018 08:14), Max: 36.7 (19 Dec 2018 08:14)  T(F): 98 (19 Dec 2018 08:14), Max: 98 (19 Dec 2018 08:14)  HR: 90 (19 Dec 2018 08:14) (90 - 90)  BP: 140/80 (19 Dec 2018 08:14) (140/80 - 140/80)  BP(mean): --  RR: 18 (19 Dec 2018 08:14) (18 - 18)  SpO2: 95% (19 Dec 2018 08:14) (95% - 95%)    LABS:                        8.8    10.08 )-----------( 329      ( 19 Dec 2018 09:24 )             27.8     12-19    140  |  102  |  27<H>  ----------------------------<  54<L>  4.5   |  20  |  1.0    Ca    9.0      19 Dec 2018 09:24    TPro  6.4  /  Alb  3.8  /  TBili  0.5  /  DBili  x   /  AST  17  /  ALT  8   /  AlkPhos  45  12-19    PT/INR - ( 19 Dec 2018 09:24 )   PT: 12.50 sec;   INR: 1.09 ratio         PTT - ( 19 Dec 2018 09:24 )  PTT:28.2 sec  Hemoglobin A1C     PHYSICAL EXAM  GEN: LINCOLN HEREDIA is a pleasant well-nourished, well developed 78y Female in no acute distress, alert awake, and oriented to person, place and time.     Medication(s):       LE Focused Exam:      Vasc:    - DP/PT pulses non-palpable B/L   - Skin temp increased , proximal to distal B/L  - CFT < 3 sec B/L    Neuro:   - Gross sensation in tact B/L     Derm:   - No interdigital macerations B/L   - cellulitic changes b/l LE.   - right 3rd digit chronic ulceration  - multiple ulcerations b/l LE. + edema, + erythema, + serous drainage, - malodor, - purulence, - CSOI @ this time     MSK:   - Muscle strength 3/5 in all quadrants w/ no defects B/L

## 2018-12-19 NOTE — H&P ADULT - NSHPPHYSICALEXAM_GEN_ALL_CORE
PHYSICAL EXAM:  GENERAL: NAD, obese, anxious  HEAD:  Atraumatic, Normocephalic  EYES: EOMI, PERRLA  ENMT: No tonsillar erythema, exudates, or enlargement  NECK: Supple, No JVD, Normal thyroid  NERVOUS SYSTEM:  Alert & Oriented X3, Good concentration, motor strength 4/5 bilaterally  CHEST/LUNG: Clear to percussion bilaterally; No rales, rhonchi, wheezing, or rubs  HEART: Regular rate and rhythm; No murmurs, rubs, or gallops  ABDOMEN: Soft, Nontender, Nondistended; Bowel sounds present  EXTREMITIES:  2+ Peripheral Pulses, gangrenous changes of bilateral 3rd toes, 4x4cm (x2) ulcers over right shin, 3x3cm (x2) ulcers of left shin associated with erythema that extends from inner thigh involving whole calf PHYSICAL EXAM:  GENERAL: NAD, obese, anxious  HEAD:  Atraumatic, Normocephalic  EYES: EOMI, PERRLA  ENMT: No tonsillar erythema, exudates, or enlargement  NECK: Supple, No JVD, Normal thyroid  NERVOUS SYSTEM:  Alert & Oriented X3, Good concentration, motor strength 4/5 bilaterally  CHEST/LUNG: Clear to percussion bilaterally; No rales, rhonchi, wheezing, or rubs  HEART: Regular rate and rhythm; No murmurs, rubs, or gallops  ABDOMEN: Soft, Nontender, Nondistended; Bowel sounds present  EXTREMITIES:  1+ Peripheral Pulses, gangrenous changes of right 3rd toe, 4x4cm (x2) ulcers over right shin, 3x3cm (x2) ulcers of left shin, bilateral serous drainage, LLE erythema and warmth that extends from inner thigh involving whole calf, negative hip skin changes, negative tenderness to palpation, dressings over bilateral shins taken down and redone, now clean, dry and intact

## 2018-12-19 NOTE — H&P ADULT - ATTENDING COMMENTS
I saw and examined the patient independently. I reviewed and agree with resident's HPI, physical exam findings and plan of care and edited where appropriate with following additions:    Assessment:  Bilateral Chronic Nonhealing Ulcers with sorrounding cellulitis   Chronic Right Hip Pain  Rheumatoid Arthritis  DM II  anemia of chronic disease    Plan:   continue local wound care  podiatry consult appreciated  FU arterial and venous duplex LE and vascular surgery consult for possible gangrene right 3rd toe gangrene.  FU x ray right hip  continue bactrim for now, ID consult for further eval  continue neurontin/oxycodone for pain  c/w maintenance dose of prednisone for RA.  Monitor FS glucose   PTeval/physiatry consult    DVT PPX I saw and examined the patient independently. I reviewed and agree with resident's HPI, physical exam findings and plan of care and edited where appropriate with following additions:    Assessment:  Bilateral Chronic Nonhealing Ulcers with surrounding cellulitis   osteopenia  suspect vitamin D deficiency  Chronic Right Hip Pain  Rheumatoid Arthritis  DM II  anemia of chronic disease  ambulatory dysfunction due to painful LE and arthritis    Plan:   continue local wound care  podiatry consult appreciated  FU arterial and venous duplex LE and vascular surgery consult for possible gangrene right 3rd toe gangrene.  FU x ray right hip  continue bactrim for now, ID consult for further eval  continue neurontin/oxycodone for pain  c/w maintenance dose of prednisone for RA.  Monitor FS glucose   PTeval/physiatry consult    DVT PPX

## 2018-12-19 NOTE — H&P ADULT - HISTORY OF PRESENT ILLNESS
77 yo F with PMH of DM2, RA, DVT on Eliquis, gout, latent TB on Rifampin and foot ulcer presented to ED for fever after being advised by visiting nurse to come in. Patient reports she had a fever of 103 when visiting nurse came to see her day PTP. Was told she should come into ED. Also reports she had her foot ulcer debrided by Podiatry on Monday. Afterwards Monday evening, patient began experiencing severe, unbearable pain. Visiting nurse came next day and noted fever. Patient reports some lower abdominal discomfort. Denies any burning while urinating, foul odor from urine, constipation, diarrhea, chest pain, SOB, cough or other complaint or symptoms. 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 Patient describes sharp, "jabbing" persistent pain as well as aching intermittent pain of her right hip for several years duration. Patient endorses subjective fevers, denies chills, nausea, vomiting, chest pain, palpitations, lightheadedness, headaches, sick contacts or recent travel. Patient was recently admitted on 10/24-10/29 for UTI was discharged to [facility] nursing home, from where she was recently discharged 2 weeks ago.

## 2018-12-19 NOTE — ED ADULT NURSE NOTE - OBJECTIVE STATEMENT
Patient p/w BLLE DFU with severe pain worsening this morning and inability to ambulate. Patient denies Fevers chill N/V, CP SOB Urinary symptoms and recent trauma to the effected areas

## 2018-12-20 LAB
ALBUMIN SERPL ELPH-MCNC: 3.6 G/DL — SIGNIFICANT CHANGE UP (ref 3.5–5.2)
ALP SERPL-CCNC: 43 U/L — SIGNIFICANT CHANGE UP (ref 30–115)
ALT FLD-CCNC: 11 U/L — SIGNIFICANT CHANGE UP (ref 0–41)
ANION GAP SERPL CALC-SCNC: 18 MMOL/L — HIGH (ref 7–14)
AST SERPL-CCNC: 32 U/L — SIGNIFICANT CHANGE UP (ref 0–41)
BASOPHILS # BLD AUTO: 0.03 K/UL — SIGNIFICANT CHANGE UP (ref 0–0.2)
BASOPHILS NFR BLD AUTO: 0.3 % — SIGNIFICANT CHANGE UP (ref 0–1)
BILIRUB SERPL-MCNC: 0.6 MG/DL — SIGNIFICANT CHANGE UP (ref 0.2–1.2)
BUN SERPL-MCNC: 19 MG/DL — SIGNIFICANT CHANGE UP (ref 10–20)
CALCIUM SERPL-MCNC: 9 MG/DL — SIGNIFICANT CHANGE UP (ref 8.5–10.1)
CHLORIDE SERPL-SCNC: 100 MMOL/L — SIGNIFICANT CHANGE UP (ref 98–110)
CO2 SERPL-SCNC: 20 MMOL/L — SIGNIFICANT CHANGE UP (ref 17–32)
CREAT SERPL-MCNC: 1.1 MG/DL — SIGNIFICANT CHANGE UP (ref 0.7–1.5)
EOSINOPHIL # BLD AUTO: 0.16 K/UL — SIGNIFICANT CHANGE UP (ref 0–0.7)
EOSINOPHIL NFR BLD AUTO: 1.5 % — SIGNIFICANT CHANGE UP (ref 0–8)
ERYTHROCYTE [SEDIMENTATION RATE] IN BLOOD: 109 MM/HR — HIGH (ref 0–20)
GLUCOSE BLDC GLUCOMTR-MCNC: 103 MG/DL — HIGH (ref 70–99)
GLUCOSE BLDC GLUCOMTR-MCNC: 107 MG/DL — HIGH (ref 70–99)
GLUCOSE BLDC GLUCOMTR-MCNC: 113 MG/DL — HIGH (ref 70–99)
GLUCOSE BLDC GLUCOMTR-MCNC: 70 MG/DL — SIGNIFICANT CHANGE UP (ref 70–99)
GLUCOSE BLDC GLUCOMTR-MCNC: 83 MG/DL — SIGNIFICANT CHANGE UP (ref 70–99)
GLUCOSE BLDC GLUCOMTR-MCNC: 91 MG/DL — SIGNIFICANT CHANGE UP (ref 70–99)
GLUCOSE BLDC GLUCOMTR-MCNC: 95 MG/DL — SIGNIFICANT CHANGE UP (ref 70–99)
GLUCOSE SERPL-MCNC: 92 MG/DL — SIGNIFICANT CHANGE UP (ref 70–99)
HCT VFR BLD CALC: 28.3 % — LOW (ref 37–47)
HGB BLD-MCNC: 8.8 G/DL — LOW (ref 12–16)
IMM GRANULOCYTES NFR BLD AUTO: 0.5 % — HIGH (ref 0.1–0.3)
LYMPHOCYTES # BLD AUTO: 1.33 K/UL — SIGNIFICANT CHANGE UP (ref 1.2–3.4)
LYMPHOCYTES # BLD AUTO: 12.5 % — LOW (ref 20.5–51.1)
MAGNESIUM SERPL-MCNC: 1.8 MG/DL — SIGNIFICANT CHANGE UP (ref 1.8–2.4)
MCHC RBC-ENTMCNC: 28.4 PG — SIGNIFICANT CHANGE UP (ref 27–31)
MCHC RBC-ENTMCNC: 31.1 G/DL — LOW (ref 32–37)
MCV RBC AUTO: 91.3 FL — SIGNIFICANT CHANGE UP (ref 81–99)
MONOCYTES # BLD AUTO: 0.97 K/UL — HIGH (ref 0.1–0.6)
MONOCYTES NFR BLD AUTO: 9.1 % — SIGNIFICANT CHANGE UP (ref 1.7–9.3)
NEUTROPHILS # BLD AUTO: 8.1 K/UL — HIGH (ref 1.4–6.5)
NEUTROPHILS NFR BLD AUTO: 76.1 % — HIGH (ref 42.2–75.2)
PLATELET # BLD AUTO: 339 K/UL — SIGNIFICANT CHANGE UP (ref 130–400)
POTASSIUM SERPL-MCNC: 4.9 MMOL/L — SIGNIFICANT CHANGE UP (ref 3.5–5)
POTASSIUM SERPL-SCNC: 4.9 MMOL/L — SIGNIFICANT CHANGE UP (ref 3.5–5)
PROT SERPL-MCNC: 5.8 G/DL — LOW (ref 6–8)
RBC # BLD: 3.1 M/UL — LOW (ref 4.2–5.4)
RBC # FLD: 16.1 % — HIGH (ref 11.5–14.5)
SODIUM SERPL-SCNC: 138 MMOL/L — SIGNIFICANT CHANGE UP (ref 135–146)
URATE SERPL-MCNC: 5.3 MG/DL — SIGNIFICANT CHANGE UP (ref 2.5–7)
WBC # BLD: 10.64 K/UL — SIGNIFICANT CHANGE UP (ref 4.8–10.8)
WBC # FLD AUTO: 10.64 K/UL — SIGNIFICANT CHANGE UP (ref 4.8–10.8)

## 2018-12-20 PROCEDURE — 93970 EXTREMITY STUDY: CPT | Mod: 26

## 2018-12-20 PROCEDURE — 93925 LOWER EXTREMITY STUDY: CPT | Mod: 26

## 2018-12-20 PROCEDURE — 99222 1ST HOSP IP/OBS MODERATE 55: CPT

## 2018-12-20 RX ORDER — DOCUSATE SODIUM 100 MG
100 CAPSULE ORAL
Qty: 0 | Refills: 0 | Status: DISCONTINUED | OUTPATIENT
Start: 2018-12-20 | End: 2018-12-28

## 2018-12-20 RX ORDER — OXYCODONE AND ACETAMINOPHEN 5; 325 MG/1; MG/1
1 TABLET ORAL ONCE
Qty: 0 | Refills: 0 | Status: DISCONTINUED | OUTPATIENT
Start: 2018-12-20 | End: 2018-12-20

## 2018-12-20 RX ADMIN — Medication 1 TABLET(S): at 06:26

## 2018-12-20 RX ADMIN — Medication 100 MILLIGRAM(S): at 18:25

## 2018-12-20 RX ADMIN — GABAPENTIN 300 MILLIGRAM(S): 400 CAPSULE ORAL at 18:25

## 2018-12-20 RX ADMIN — APIXABAN 5 MILLIGRAM(S): 2.5 TABLET, FILM COATED ORAL at 06:26

## 2018-12-20 RX ADMIN — Medication 1 MILLIGRAM(S): at 12:55

## 2018-12-20 RX ADMIN — Medication 5 MILLIGRAM(S): at 18:26

## 2018-12-20 RX ADMIN — OXYCODONE HYDROCHLORIDE 20 MILLIGRAM(S): 5 TABLET ORAL at 13:58

## 2018-12-20 RX ADMIN — GABAPENTIN 300 MILLIGRAM(S): 400 CAPSULE ORAL at 06:26

## 2018-12-20 RX ADMIN — OXYCODONE HYDROCHLORIDE 20 MILLIGRAM(S): 5 TABLET ORAL at 14:30

## 2018-12-20 RX ADMIN — OXYCODONE AND ACETAMINOPHEN 1 TABLET(S): 5; 325 TABLET ORAL at 19:30

## 2018-12-20 RX ADMIN — Medication 300 MILLIGRAM(S): at 12:55

## 2018-12-20 RX ADMIN — Medication 1 TABLET(S): at 18:25

## 2018-12-20 RX ADMIN — OXYCODONE HYDROCHLORIDE 20 MILLIGRAM(S): 5 TABLET ORAL at 02:28

## 2018-12-20 RX ADMIN — OXYCODONE AND ACETAMINOPHEN 1 TABLET(S): 5; 325 TABLET ORAL at 19:09

## 2018-12-20 RX ADMIN — Medication 10 MILLIGRAM(S): at 06:26

## 2018-12-20 RX ADMIN — CHLORHEXIDINE GLUCONATE 1 APPLICATION(S): 213 SOLUTION TOPICAL at 06:18

## 2018-12-20 RX ADMIN — APIXABAN 5 MILLIGRAM(S): 2.5 TABLET, FILM COATED ORAL at 18:25

## 2018-12-20 NOTE — CONSULT NOTE ADULT - SUBJECTIVE AND OBJECTIVE BOX
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 Patient describes sharp, "jabbing" persistent pain as well as aching intermittent pain of her right hip for several years duration. Patient endorses subjective fevers, denies chills, nausea, vomiting, chest pain, palpitations, lightheadedness, headaches, sick contacts or recent travel. Patient was recently admitted on 10/24-10/29 for UTI was discharged to [facility], from where she was recently discharged 2 weeks ago. (19 Dec 2018 13:15)      PAST MEDICAL & SURGICAL HISTORY:  Gout  DVT (deep venous thrombosis)  HTN (hypertension)  Rheumatoid arteritis  Other specified diabetes mellitus with other specified complication, unspecified whether long term insulin use  Primary osteoarthritis of right knee: s/p knee repalcement      Hospital Course:    TODAY'S SUBJECTIVE & REVIEW OF SYMPTOMS:     Constitutional WNL   Cardio WNL   Resp WNL   GI WNL  Heme WNL  Endo WNL  Skin Weakness  MSK WNL  Neuro WNL  Cognitive WNL  Psych WNL      MEDICATIONS  (STANDING):  allopurinol 300 milliGRAM(s) Oral daily  apixaban 5 milliGRAM(s) Oral every 12 hours  chlorhexidine 4% Liquid 1 Application(s) Topical <User Schedule>  docusate sodium 100 milliGRAM(s) Oral two times a day  folic acid 1 milliGRAM(s) Oral daily  gabapentin 300 milliGRAM(s) Oral every 12 hours  leucovorin 5 milliGRAM(s) Oral daily  predniSONE   Tablet 10 milliGRAM(s) Oral daily  rifampin 300 milliGRAM(s) Oral two times a day  trimethoprim  160 mG/sulfamethoxazole 800 mG 1 Tablet(s) Oral two times a day    MEDICATIONS  (PRN):  oxyCODONE  ER Tablet 20 milliGRAM(s) Oral every 12 hours PRN pain      FAMILY HISTORY:  Family history of early CAD (Father)      Allergies    clindamycin (Unknown)  erythromycin (Unknown)  penicillin (Unknown)  strawberry (Unknown)    Intolerances        SOCIAL HISTORY:    [  ] Etoh  [  ] Smoking  [  ] Substance abuse     Home Environment:  [x  ] Home Alone  [  ] Lives with Family  [  ] Home Health Aid    Dwelling:  [  ] Apartment  [ x ] Private House  [  ] Adult Home  [  ] Skilled Nursing Facility      [  ] Short Term  [  ] Long Term  [  ] Stairs       Elevator [  ]    FUNCTIONAL STATUS PTA: (Check all that apply)  Ambulation: [x   ]Independent    [  ] Dependent     [  ] Non-Ambulatory  Assistive Device: [  ] SA Cane  [  ]  Q Cane  [ x ] Walker  [  ]  Wheelchair  ADL : [ x ] Independent  [  ]  Dependent       Vital Signs Last 24 Hrs  T(C): 36.2 (20 Dec 2018 14:25), Max: 37.6 (19 Dec 2018 22:38)  T(F): 97.1 (20 Dec 2018 14:25), Max: 99.6 (19 Dec 2018 22:38)  HR: 92 (20 Dec 2018 14:25) (92 - 103)  BP: 130/65 (20 Dec 2018 14:25) (130/65 - 149/87)  BP(mean): --  RR: 18 (20 Dec 2018 14:25) (18 - 19)  SpO2: 95% (20 Dec 2018 07:30) (95% - 95%)      PHYSICAL EXAM: Alert & Oriented X3  GENERAL: NAD, well-groomed, well-developed  HEAD:  Atraumatic, Normocephalic  CHEST/LUNG: Clear   HEART: S1S2+  ABDOMEN: Soft, Nontender  EXTREMITIES:  + erythema lico legs, R>L    NERVOUS SYSTEM:  Cranial Nerves 2-12 intact [  ] Abnormal  [  ]  ROM: WFL all extremities [  ]  Abnormal [x  ]limited right side  Motor Strength: WFL all extremities  [  ]  Abnormal [ x ]3-4/5 all ext  Sensation: intact to light touch [  ] Abnormal [  ]  Reflexes: Symmetric [  ]  Abnormal [  ]    FUNCTIONAL STATUS:  Bed Mobility: Independent [  ]  Supervision [  ]  Needs Assistance [x  ]  N/A [  ]  Transfers: Independent [  ]  Supervision [  ]  Needs Assistance [x  ]  N/A [  ]   Ambulation: Independent [  ]  Supervision [  ]  Needs Assistance [  ]  N/A [  ]  ADL: Independent [  ] Requires Assistance [  ] N/A [  ]      LABS:                        8.8    10.64 )-----------( 339      ( 20 Dec 2018 08:25 )             28.3     12-20    138  |  100  |  19  ----------------------------<  92  4.9   |  20  |  1.1    Ca    9.0      20 Dec 2018 08:25  Mg     1.8     12-20    TPro  5.8<L>  /  Alb  3.6  /  TBili  0.6  /  DBili  x   /  AST  32  /  ALT  11  /  AlkPhos  43  12-20    PT/INR - ( 19 Dec 2018 09:24 )   PT: 12.50 sec;   INR: 1.09 ratio         PTT - ( 19 Dec 2018 09:24 )  PTT:28.2 sec      RADIOLOGY & ADDITIONAL STUDIES:    Assesment:

## 2018-12-20 NOTE — PROGRESS NOTE ADULT - ASSESSMENT
Assessment  - cellulitic changes b/l LE.   - right 3rd digit chronic ulceration  - multiple ulcerations b/l LE. + edema, + erythema, + serous drainage, - malodor, - purulence, - CSOI @ this time   - inability of wounds to heal complicated by long term use of Prednisone     Plan:   - pt seen/evaluated @ bedside w/ attending  - f/u ESR/CRP  - consult Rheum and vascular  - dressed w/ adaptic/dsd  - right foot dressed w/ betadine/dsd

## 2018-12-20 NOTE — PROGRESS NOTE ADULT - SUBJECTIVE AND OBJECTIVE BOX
LINCOLN HEREDIA 78y Female  MRN#: [id number]   CODE STATUS:________      SUBJECTIVE  HPI    Overnight events     Subjective complaints     Present Today:   - Hooper  - Drains   - Central Line :                                             ----------------------------------------------------------  OBJECTIVE  PAST MEDICAL & SURGICAL HISTORY  Gout  DVT (deep venous thrombosis)  HTN (hypertension)  Rheumatoid arteritis  Other specified diabetes mellitus with other specified complication, unspecified whether long term insulin use  Primary osteoarthritis of right knee: s/p knee repalcement                                            -----------------------------------------------------------  ALLERGIES:  clindamycin (Unknown)  erythromycin (Unknown)  penicillin (Unknown)  strawberry (Unknown)                                            ------------------------------------------------------------  MEDICATIONS:  STANDING MEDICATIONS  allopurinol 300 milliGRAM(s) Oral daily  apixaban 5 milliGRAM(s) Oral every 12 hours  chlorhexidine 4% Liquid 1 Application(s) Topical <User Schedule>  docusate sodium 100 milliGRAM(s) Oral two times a day  folic acid 1 milliGRAM(s) Oral daily  gabapentin 300 milliGRAM(s) Oral every 12 hours  leucovorin 5 milliGRAM(s) Oral daily  predniSONE   Tablet 10 milliGRAM(s) Oral daily  rifampin 300 milliGRAM(s) Oral two times a day  trimethoprim  160 mG/sulfamethoxazole 800 mG 1 Tablet(s) Oral two times a day    PRN MEDICATIONS  oxyCODONE  ER Tablet 20 milliGRAM(s) Oral every 12 hours PRN                                            ------------------------------------------------------------  VITAL SIGNS: Last 24 Hours  T(C): 37.1 (20 Dec 2018 05:44), Max: 37.6 (19 Dec 2018 22:38)  T(F): 98.8 (20 Dec 2018 05:44), Max: 99.6 (19 Dec 2018 22:38)  HR: 100 (20 Dec 2018 05:44) (100 - 103)  BP: 142/65 (20 Dec 2018 05:44) (142/65 - 149/87)  BP(mean): --  RR: 18 (20 Dec 2018 05:44) (18 - 19)  SpO2: 95% (20 Dec 2018 07:30) (95% - 95%)                                             --------------------------------------------------------------  LABS:                        8.8    10.08 )-----------( 329      ( 19 Dec 2018 09:24 )             27.8     12-19    140  |  102  |  27<H>  ----------------------------<  54<L>  4.5   |  20  |  1.0    Ca    9.0      19 Dec 2018 09:24    TPro  6.4  /  Alb  3.8  /  TBili  0.5  /  DBili  x   /  AST  17  /  ALT  8   /  AlkPhos  45  12-19    PT/INR - ( 19 Dec 2018 09:24 )   PT: 12.50 sec;   INR: 1.09 ratio         PTT - ( 19 Dec 2018 09:24 )  PTT:28.2 sec      Lactate, Blood: 1.4 mmol/L (12-19-18 @ 09:24)                                                      -------------------------------------------------------------  RADIOLOGY:                                            --------------------------------------------------------------    PHYSICAL EXAM:    GENERAL: NAD, well-developed, AAOx3  HEENT:  Atraumatic, Normocephalic. EOMI, PERRLA, conjunctiva and sclera clear, No JVD  PULMONARY: Clear to auscultation bilaterally; No wheeze  CARDIOVASCULAR: Regular rate and rhythm; No murmurs, rubs, or gallops  GASTROINTESTINAL: Soft, Nontender, Nondistended; Bowel sounds present  MUSCULOSKELETAL:  2+ Peripheral Pulses, No clubbing, cyanosis, or edema  NEUROLOGY: non-focal  SKIN: No rashes or lesions                                           --------------------------------------------------------------    ASSESSMENT & PLAN    79 yo F presents with acute onset difficult rising from recliner secondary to worsening bilateral lower extremity pain.    Bilateral Chronic Nonhealing Ulcers and concurrent LLE cellulitis   -Seen by podiatry, continue with wound care  -F/U arterial and venous duplex  -Will start PO Bactrim DS  -Consider infectious disease evaluation if worsening progression    Chronic Right Hip Pain  -F/U Right Hip Xray    Rheumatoid Arthritis   -Continue home prednisone tapering     DM II  -Monitor finger sticks, start insulin sliding scale if fingersticks are greater than 180    Code Status: DNR/DNI, MOLST form completed with health care proxy as witness by bedside, form in chart  Disposition: Consider PT/Rehab evaluation LINCOLN HEREDIA 78y Female  MRN#: [id number]   CODE STATUS: DNR/      SUBJECTIVE  HPI    Overnight events     Subjective complaints     Present Today:   - Hooper  - Drains   - [place] Line :                                             ----------------------------------------------------------  OBJECTIVE  PAST MEDICAL & SURGICAL HISTORY  Gout  DVT (deep venous thrombosis)  HTN (hypertension)  Rheumatoid arteritis  Other specified diabetes mellitus with other specified complication, unspecified whether long term insulin use  Primary osteoarthritis of right knee: s/p knee repalcement                                            -----------------------------------------------------------  ALLERGIES:  clindamycin (Unknown)  erythromycin (Unknown)  penicillin (Unknown)  strawberry (Unknown)                                            ------------------------------------------------------------  MEDICATIONS:  STANDING MEDICATIONS  allopurinol 300 milliGRAM(s) Oral daily  apixaban 5 milliGRAM(s) Oral every 12 hours  chlorhexidine 4% Liquid 1 Application(s) Topical <User Schedule>  docusate sodium 100 milliGRAM(s) Oral two times a day  folic acid 1 milliGRAM(s) Oral daily  gabapentin 300 milliGRAM(s) Oral every 12 hours  leucovorin 5 milliGRAM(s) Oral daily  predniSONE   Tablet 10 milliGRAM(s) Oral daily  rifampin 300 milliGRAM(s) Oral two times a day  trimethoprim  160 mG/sulfamethoxazole 800 mG 1 Tablet(s) Oral two times a day    PRN MEDICATIONS  oxyCODONE  ER Tablet 20 milliGRAM(s) Oral every 12 hours PRN                                            ------------------------------------------------------------  VITAL SIGNS: Last 24 Hours  T(C): 37.1 (20 Dec 2018 05:44), Max: 37.6 (19 Dec 2018 22:38)  T(F): 98.8 (20 Dec 2018 05:44), Max: 99.6 (19 Dec 2018 22:38)  HR: 100 (20 Dec 2018 05:44) (100 - 103)  BP: 142/65 (20 Dec 2018 05:44) (142/65 - 149/87)  BP(mean): --  RR: 18 (20 Dec 2018 05:44) (18 - 19)  SpO2: 95% (20 Dec 2018 07:30) (95% - 95%)                                             --------------------------------------------------------------  LABS:                        8.8    10.08 )-----------( 329      ( 19 Dec 2018 09:24 )             27.8     12-19    140  |  102  |  27<H>  ----------------------------<  54<L>  4.5   |  20  |  1.0    Ca    9.0      19 Dec 2018 09:24    TPro  6.4  /  Alb  3.8  /  TBili  0.5  /  DBili  x   /  AST  17  /  ALT  8   /  AlkPhos  45  12-19    PT/INR - ( 19 Dec 2018 09:24 )   PT: 12.50 sec;   INR: 1.09 ratio         PTT - ( 19 Dec 2018 09:24 )  PTT:28.2 sec      Lactate, Blood: 1.4 mmol/L (12-19-18 @ 09:24)                                                      -------------------------------------------------------------  RADIOLOGY:                                            --------------------------------------------------------------    PHYSICAL EXAM:    GENERAL: NAD, well-developed, AAOx3  HEENT:  Atraumatic, Normocephalic. EOMI, PERRLA, conjunctiva and sclera clear, No JVD  PULMONARY: Clear to auscultation bilaterally; No wheeze  CARDIOVASCULAR: Regular rate and rhythm; No murmurs, rubs, or gallops  GASTROINTESTINAL: Soft, Nontender, Nondistended; Bowel sounds present  MUSCULOSKELETAL:  2+ Peripheral Pulses, No clubbing, cyanosis, or edema  NEUROLOGY: non-focal  SKIN: No rashes or lesions                                           --------------------------------------------------------------    ASSESSMENT & PLAN    77 yo F presents with acute onset difficult rising from recliner secondary to worsening bilateral lower extremity pain.    Bilateral Chronic Nonhealing Ulcers and concurrent LLE cellulitis   -Seen by podiatry, continue with wound care  -F/U arterial and venous duplex  -Will start PO Bactrim DS  -Consider infectious disease evaluation if worsening progression    Chronic Right Hip Pain  -F/U Right Hip Xray    Rheumatoid Arthritis   -Continue home prednisone tapering     DM II  -Monitor finger sticks, start insulin sliding scale if fingersticks are greater than 180    Code Status: DNR/DNI, MOLST form completed with health care proxy as witness by bedside, form in chart  Disposition: Consider PT/Rehab evaluation LINCOLN HEREDIA 78y Female  MRN#: [id number]   CODE STATUS: DNR/DNI      SUBJECTIVE  Overnight events -None    Subjective complaints -Pt was complaining of pain in the b/l LE and did not let me examine the LE fully.     OBJECTIVE  PAST MEDICAL & SURGICAL HISTORY  Gout  DVT (deep venous thrombosis)  HTN (hypertension)  Rheumatoid arteritis  Other specified diabetes mellitus with other specified complication, unspecified whether long term insulin use  Primary osteoarthritis of right knee: s/p knee replacement                                              ALLERGIES:  clindamycin (Unknown)  erythromycin (Unknown)  penicillin (Unknown)  strawberry (Unknown)                                              MEDICATIONS:  STANDING MEDICATIONS  allopurinol 300 milliGRAM(s) Oral daily  apixaban 5 milliGRAM(s) Oral every 12 hours  chlorhexidine 4% Liquid 1 Application(s) Topical <User Schedule>  docusate sodium 100 milliGRAM(s) Oral two times a day  folic acid 1 milliGRAM(s) Oral daily  gabapentin 300 milliGRAM(s) Oral every 12 hours  leucovorin 5 milliGRAM(s) Oral daily  predniSONE   Tablet 10 milliGRAM(s) Oral daily  rifampin 300 milliGRAM(s) Oral two times a day  trimethoprim  160 mG/sulfamethoxazole 800 mG 1 Tablet(s) Oral two times a day    PRN MEDICATIONS  oxyCODONE  ER Tablet 20 milliGRAM(s) Oral every 12 hours PRN                                              VITAL SIGNS: Last 24 Hours  T(C): 37.1 (20 Dec 2018 05:44), Max: 37.6 (19 Dec 2018 22:38)  T(F): 98.8 (20 Dec 2018 05:44), Max: 99.6 (19 Dec 2018 22:38)  HR: 100 (20 Dec 2018 05:44) (100 - 103)  BP: 142/65 (20 Dec 2018 05:44) (142/65 - 149/87)  BP(mean): --  RR: 18 (20 Dec 2018 05:44) (18 - 19)  SpO2: 95% (20 Dec 2018 07:30) (95% - 95%)                                               LABS:                              8.8    10.64 )-----------( 339      ( 20 Dec 2018 08:25 )             28.3     12-19    140  |  102  |  27<H>  ----------------------------<  54<L>  4.5   |  20  |  1.0    Ca    9.0      19 Dec 2018 09:24    TPro  6.4  /  Alb  3.8  /  TBili  0.5  /  DBili  x   /  AST  17  /  ALT  8   /  AlkPhos  45  12-19    PT/INR - ( 19 Dec 2018 09:24 )   PT: 12.50 sec;   INR: 1.09 ratio         PTT - ( 19 Dec 2018 09:24 )  PTT:28.2 sec      Lactate, Blood: 1.4 mmol/L (12-19-18 @ 09:24)                                             RADIOLOGY:  < from: Xray Hip 2-3 Views, Right (12.19.18 @ 15:57) >  Impression:    Diffuse osteopenia without definite acute osseous abnormality.    < from: Xray Foot AP + Lateral + Oblique, Right (12.19.18 @ 10:56) >  There is dorsal soft tissue swelling. Vascular calcifications.  Osteopenic bones. Degenerative changes of the midfoot. There is hammertoe   deformity of the toes. Degenerative changes involving the DIP joints of   the toes.                                          PHYSICAL EXAM:    GENERAL: NAD, well-developed, AAOx3  HEENT:  Atraumatic, Normocephalic. EOMI, PERRLA, conjunctiva and sclera clear  PULMONARY: Clear to auscultation bilaterally  CARDIOVASCULAR: Regular rate and rhythm  GASTROINTESTINAL: Soft, Nontender, globose; Bowel sounds present  MUSCULOSKELETAL: Bandage+ b/l LE with superficial ulcers. Pulses nonpalpable on the dorsum of the feet. Increased erythema on the right foot and LE. Gangrenous rt 3rd toe present. 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    #Bilateral Chronic Nonhealing Ulcers and concurrent LE cellulitis   -Podiatry: Betadine paint to digit 3R BID; Xeroform to ulcers with loose dressing; Limb elevation  -F/U arterial and venous duplex  -F/U uric acid levels. .  -F/U Vascular recs  -C/w Bactrim DS; Afebrile. No leukocytosis    #Chronic Right Hip Pain  -RIght hip xray: Diffuse osteopenia  -C/w Physical therapy. Will check Vit D levels and replete if deficient    #Chronic B/L femoral and popliteal DVT- C/w Eliquis  #Chronic Normocytic Anemia- Likely 2/2 RA; Hb at baseline  #Rheumatoid Arthritis -Continue home prednisone  #DM II-Controlled; Monitor finger sticks, start insulin sliding scale if fingersticks are greater than 180  #Latent TB- C/w Rifampin  #Gout- F/U uric acid levels; c/w Allopurinol    DVT ppx: On Eliquis  GI ppx: Not indicated  Diet: DASH Carb consistent   Activity: AAT  Code Status: DNR/DNI, MOLST form completed with health care proxy as witness by bedside, form in chart  Disposition: Pending PT/Rehab eval

## 2018-12-20 NOTE — CONSULT NOTE ADULT - SUBJECTIVE AND OBJECTIVE BOX
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 Patient describes sharp, "jabbing" persistent pain as well as aching intermittent pain of her right hip for several years duration. Patient endorses subjective fevers, denies chills, nausea, vomiting, chest pain, palpitations, lightheadedness, headaches, sick contacts or recent travel. Patient was recently admitted on 10/24-10/29 for UTI was discharged to [facility], from where she was recently discharged 2 weeks ago. (19 Dec 2018 13:15)      WOUNDS; LEG PAIN; GAIT ABNORMALITY  ^WOUNDS; LEG PAIN; GAIT ABNORMALITY  Family history of early CAD (Father)  No pertinent family history in first degree relatives  Handoff  MEWS Score  Gout  DVT (deep venous thrombosis)  HTN (hypertension)  Rheumatoid arteritis  Other specified diabetes mellitus with other specified complication, unspecified whether long term insulin use  Wounds, multiple open, lower extremity  Thrombophlebitis of the femoral vein  Primary osteoarthritis of right knee  FOOT PAIN  50  Gait abnormality  Leg pain      VITALS:  Vital Signs Last 24 Hrs  T(C): 37.1 (20 Dec 2018 05:44), Max: 37.6 (19 Dec 2018 22:38)  T(F): 98.8 (20 Dec 2018 05:44), Max: 99.6 (19 Dec 2018 22:38)  HR: 100 (20 Dec 2018 05:44) (90 - 103)  BP: 142/65 (20 Dec 2018 05:44) (140/80 - 149/87)  BP(mean): --  RR: 18 (20 Dec 2018 05:44) (18 - 19)  SpO2: 95% (20 Dec 2018 02:06) (95% - 95%)    LABS:                        8.8    10.08 )-----------( 329      ( 19 Dec 2018 09:24 )             27.8     12-19    140  |  102  |  27<H>  ----------------------------<  54<L>  4.5   |  20  |  1.0    Ca    9.0      19 Dec 2018 09:24    TPro  6.4  /  Alb  3.8  /  TBili  0.5  /  DBili  x   /  AST  17  /  ALT  8   /  AlkPhos  45  12-19    PT/INR - ( 19 Dec 2018 09:24 )   PT: 12.50 sec;   INR: 1.09 ratio         PTT - ( 19 Dec 2018 09:24 )  PTT:28.2 sec  Hemoglobin A1C     PHYSICAL EXAM  GEN: LINCOLN HEREDIA is a pleasant well-nourished, well developed 78y Female in no acute distress, alert awake, and oriented to person, place and time.     Medication(s):   allopurinol 300 milliGRAM(s) Oral daily  apixaban 5 milliGRAM(s) Oral every 12 hours  chlorhexidine 4% Liquid 1 Application(s) Topical <User Schedule>  folic acid 1 milliGRAM(s) Oral daily  gabapentin 300 milliGRAM(s) Oral every 12 hours  leucovorin 5 milliGRAM(s) Oral daily  oxyCODONE  ER Tablet 20 milliGRAM(s) Oral every 12 hours PRN  predniSONE   Tablet 10 milliGRAM(s) Oral daily  rifampin 300 milliGRAM(s) Oral two times a day  trimethoprim  160 mG/sulfamethoxazole 800 mG 1 Tablet(s) Oral two times a day      LE Focused Exam:      Vasc:    - DP/PT pulses non-palpable B/L   - Skin temp gradient increased B/L  - CFT < 3 sec B/L    Neuro:   - Gross sensation in tact B/L     Derm:   - No interdigital macerations B/L   - cellulitic changes b/l LE.   - right 3rd digit chronic ulceration  - multiple ulcerations b/l LE. + edema, + erythema, + serous drainage, - malodor, - purulence, - CSOI @ this time       MSK:   - Muscle strength 3/5 in all quadrants w/ no defects B/L

## 2018-12-20 NOTE — PROGRESS NOTE ADULT - ASSESSMENT
ASSESSMENT:  78 year old female with PMH of DM, RA on Rituximab (s/p 2 infusions, last dose received last Monday , next dose in 6 months), Chronic bilateral femoral and popliteal DVT on Eliquis, Gout, latent TB on Rifampin and chronic bilateral lower extremity ulcers presents with complaint of inability to stand up from her recliner due to worsening lower extremity pain, found to have right gangrenous 3rd digit.    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    PLAN:   ·	May benefit from elective angiogram for revascularization; would likely not take place until the week of Dec 31-Jan 4  ·	Please continue medical management: add aspirin 81mg and statin therapy to current medications  ·	Optimize renal function as much as possible prior to angiogram  ·	Appreciate ID and podiatry care  ·	Continue eliquis for BL acute on chronic DVTs  ·	Please call with questions (spectra [phone])    --------------------------------------------------------------------------------------    12-20-18 @ 15:00

## 2018-12-20 NOTE — PROGRESS NOTE ADULT - SUBJECTIVE AND OBJECTIVE BOX
LINCOLN HEREDIA MRN-[id number]    Hospitalist Note  79yo F with Past Medical History DMII, Rheumatoid Arthritis on Rituximab (status post 2 infusions), 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    Overnight events/Updates: The patient complains of severe bilateral lower extremity pain.    Vital Signs Last 24 Hrs  T(C): 36.2 (20 Dec 2018 14:25), Max: 37.6 (19 Dec 2018 22:38)  T(F): 97.1 (20 Dec 2018 14:25), Max: 99.6 (19 Dec 2018 22:38)  HR: 92 (20 Dec 2018 14:25) (92 - 103)  BP: 130/65 (20 Dec 2018 14:25) (130/65 - 149/87)  BP(mean): --  RR: 18 (20 Dec 2018 14:25) (18 - 19)  SpO2: 95% (20 Dec 2018 07:30) (95% - 95%)    Physical Examination:  General: AAO x 3  HEENT: PERRLA, EOMI  CV= S1 & S2 appreciated  Lungs= CTA BL  Abdominal Examination= + BS, Soft, NT/ND  Extremity Examination= dressings to the bilateral lower extremity.  Right foot worse than left.    ROS: No chest pain, no shortness of breath.  All other systems reviewed and are within normal limits except for the complaints in the HPI.    MEDICATIONS  (STANDING):  allopurinol 300 milliGRAM(s) Oral daily  apixaban 5 milliGRAM(s) Oral every 12 hours  chlorhexidine 4% Liquid 1 Application(s) Topical <User Schedule>  docusate sodium 100 milliGRAM(s) Oral two times a day  folic acid 1 milliGRAM(s) Oral daily  gabapentin 300 milliGRAM(s) Oral every 12 hours  leucovorin 5 milliGRAM(s) Oral daily  predniSONE   Tablet 10 milliGRAM(s) Oral daily  rifampin 300 milliGRAM(s) Oral two times a day  trimethoprim  160 mG/sulfamethoxazole 800 mG 1 Tablet(s) Oral two times a day    MEDICATIONS  (PRN):  oxyCODONE  ER Tablet 20 milliGRAM(s) Oral every 12 hours PRN pain                            8.8    10.64 )-----------( 339      ( 20 Dec 2018 08:25 )             28.3     12-20    138  |  100  |  19  ----------------------------<  92  4.9   |  20  |  1.1    Ca    9.0      20 Dec 2018 08:25  Mg     1.8     12-20    TPro  5.8<L>  /  Alb  3.6  /  TBili  0.6  /  DBili  x   /  AST  32  /  ALT  11  /  AlkPhos  43  12-20      Case discussed with housesta & family  SEBAS Segal [id number]

## 2018-12-20 NOTE — PROGRESS NOTE ADULT - SUBJECTIVE AND OBJECTIVE BOX
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 Patient describes sharp, "jabbing" persistent pain as well as aching intermittent pain of her right hip for several years duration. Has been on Pred x many years for RA-now at 10 mg/day  AF  Pt found with no dressings Pulses non palpable possible due to edema. Scattered superficial dry ulcers, extremely painful and appearing arterial in nature. Digit 3 R is ulcerated dorsally with thick black eschar and wendie-digital erythema. No drainage or mal odor.   WBC 7.82-> 10.08   HH  8.8/27.8  INR 1.09  PIA/CRE  18/ 1,0  eGFR 54   Xray- cannot r/o OM digit 3 R MR recommended, vascular calcifications   Rifampin (latent TB) Bactrim DS  Arterial dopplr of 6-13-18 wnl?  Venous doppler chronic DVT femoral and popliteal veins b/l    DX: multiple superficial skin ulcers, ischemic gangrene dorsum 3 R Chronic DVT, arterial insufficiency    Plan  Pt would not tolerate MRI to r/o digital OM. Better followed with serial xrays  Needs ESR, Serum uric acid levels  Arterial duplex for PAD  Needs Vascular consult  Needs Rheum consult  Betadine paint to digit 3R BID  Xeroform to ulcers with loose dressing  Requires limb elevation but dependency feels better for pt

## 2018-12-20 NOTE — PROGRESS NOTE ADULT - SUBJECTIVE AND OBJECTIVE BOX
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 Patient describes sharp, "jabbing" persistent pain as well as aching intermittent pain of her right hip for several years duration. Patient endorses subjective fevers, denies chills, nausea, vomiting, chest pain, palpitations, lightheadedness, headaches, sick contacts or recent travel. Patient was recently admitted on 10/24-10/29 for UTI was discharged to [facility], from where she was recently discharged 2 weeks ago. (19 Dec 2018 13:15)      WOUNDS; LEG PAIN; GAIT ABNORMALITY  ^WOUNDS; LEG PAIN; GAIT ABNORMALITY  Family history of early CAD (Father)  No pertinent family history in first degree relatives  Handoff  MEWS Score  Gout  DVT (deep venous thrombosis)  HTN (hypertension)  Rheumatoid arteritis  Other specified diabetes mellitus with other specified complication, unspecified whether long term insulin use  Wounds, multiple open, lower extremity  Thrombophlebitis of the femoral vein  Primary osteoarthritis of right knee  FOOT PAIN  50  Gait abnormality  Leg pain      VITALS:  Vital Signs Last 24 Hrs  T(C): 37.1 (20 Dec 2018 05:44), Max: 37.6 (19 Dec 2018 22:38)  T(F): 98.8 (20 Dec 2018 05:44), Max: 99.6 (19 Dec 2018 22:38)  HR: 100 (20 Dec 2018 05:44) (90 - 103)  BP: 142/65 (20 Dec 2018 05:44) (140/80 - 149/87)  BP(mean): --  RR: 18 (20 Dec 2018 05:44) (18 - 19)  SpO2: 95% (20 Dec 2018 02:06) (95% - 95%)    LABS:                        8.8    10.08 )-----------( 329      ( 19 Dec 2018 09:24 )             27.8     12-19    140  |  102  |  27<H>  ----------------------------<  54<L>  4.5   |  20  |  1.0    Ca    9.0      19 Dec 2018 09:24    TPro  6.4  /  Alb  3.8  /  TBili  0.5  /  DBili  x   /  AST  17  /  ALT  8   /  AlkPhos  45  12-19    PT/INR - ( 19 Dec 2018 09:24 )   PT: 12.50 sec;   INR: 1.09 ratio         PTT - ( 19 Dec 2018 09:24 )  PTT:28.2 sec  Hemoglobin A1C     PHYSICAL EXAM  GEN: LINCOLN HEREDIA is a pleasant well-nourished, well developed 78y Female in no acute distress, alert awake, and oriented to person, place and time.     Medication(s):   allopurinol 300 milliGRAM(s) Oral daily  apixaban 5 milliGRAM(s) Oral every 12 hours  chlorhexidine 4% Liquid 1 Application(s) Topical <User Schedule>  folic acid 1 milliGRAM(s) Oral daily  gabapentin 300 milliGRAM(s) Oral every 12 hours  leucovorin 5 milliGRAM(s) Oral daily  oxyCODONE  ER Tablet 20 milliGRAM(s) Oral every 12 hours PRN  predniSONE   Tablet 10 milliGRAM(s) Oral daily  rifampin 300 milliGRAM(s) Oral two times a day  trimethoprim  160 mG/sulfamethoxazole 800 mG 1 Tablet(s) Oral two times a day

## 2018-12-20 NOTE — PROGRESS NOTE ADULT - SUBJECTIVE AND OBJECTIVE BOX
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  Patient complains of sharp persistent pain that she states has been getting worse over the past few days.  Patient also noticed worsening erythema and edema.  Complains of fevers, denies chills, chest pain, shortness of breath, nausea, vomiting, abdominal pain.  Podiatry was consulted for LE wounds, currently being treated with dressing changes.  Arterial duplex and venous duplex pending, requested vascular consult.     PAST MEDICAL & SURGICAL HISTORY:  Gout  DVT (deep venous thrombosis)  HTN (hypertension)  Rheumatoid arteritis  Other specified diabetes mellitus with other specified complication, unspecified whether long term insulin use  Primary osteoarthritis of right knee: s/p knee repalcement    Home Meds: Home Medications:  Actos 30 mg oral tablet: 1 tab(s) orally once a day (08 Jun 2018 17:15)  allopurinol 300 mg oral tablet: 1 tab(s) orally once a day (15 Jin 2018 13:43)  Eliquis 5 mg oral tablet:  (19 Dec 2018 13:57)  folic acid 1 mg oral tablet: 1 tab(s) orally once a day (14 Jun 2018 09:59)  gabapentin 300 mg oral capsule: orally every 12 hours (19 Dec 2018 13:59)  glimepiride 2 mg oral tablet: 1  orally 3 times a day (08 Jun 2018 17:15)  leucovorin 5 mg oral tablet: 1 tab(s) orally once a day (24 Aug 2018 23:35)  OxyCONTIN 20 mg oral tablet, extended release: 1 tab(s) orally every 12 hours, As Needed (19 Dec 2018 13:57)  predniSONE 10 mg oral tablet: 1 tab(s) orally once a day (19 Dec 2018 13:56)  rifAMPin 300 mg oral capsule: 1 cap(s) orally 2 times a day (24 Oct 2018 02:13)    Allergies: Allergies    clindamycin (Unknown)  erythromycin (Unknown)  penicillin (Unknown)  strawberry (Unknown)    Soc:   Advanced Directives: Presumed Full Code     ROS:    REVIEW OF SYSTEMS    [x ] A ten-point review of systems was otherwise negative except as noted.  [ ] Due to altered mental status/intubation, subjective information were not able to be obtained from the patient. History was obtained, to the extent possible, from review of the chart and collateral sources of information.      CURRENT MEDICATIONS:   --------------------------------------------------------------------------------------  Neurologic Medications  gabapentin 300 milliGRAM(s) Oral every 12 hours  oxyCODONE  ER Tablet 20 milliGRAM(s) Oral every 12 hours PRN pain    Gastrointestinal Medications  docusate sodium 100 milliGRAM(s) Oral two times a day  folic acid 1 milliGRAM(s) Oral daily    Hematologic/Oncologic Medications  apixaban 5 milliGRAM(s) Oral every 12 hours    Antimicrobial/Immunologic Medications  rifampin 300 milliGRAM(s) Oral two times a day  trimethoprim  160 mG/sulfamethoxazole 800 mG 1 Tablet(s) Oral two times a day    Endocrine/Metabolic Medications  allopurinol 300 milliGRAM(s) Oral daily  predniSONE   Tablet 10 milliGRAM(s) Oral daily    Topical/Other Medications  chlorhexidine 4% Liquid 1 Application(s) Topical <User Schedule>  leucovorin 5 milliGRAM(s) Oral daily    --------------------------------------------------------------------------------------    VITAL SIGNS, INS/OUTS (last 24 hours):  --------------------------------------------------------------------------------------  ICU Vital Signs Last 24 Hrs  T(C): 36.2 (20 Dec 2018 14:25), Max: 37.6 (19 Dec 2018 22:38)  T(F): 97.1 (20 Dec 2018 14:25), Max: 99.6 (19 Dec 2018 22:38)  HR: 92 (20 Dec 2018 14:25) (92 - 103)  BP: 130/65 (20 Dec 2018 14:25) (130/65 - 149/87)  RR: 18 (20 Dec 2018 14:25) (18 - 19)  SpO2: 95% (20 Dec 2018 07:30) (95% - 95%)    I&O's Summary    --------------------------------------------------------------------------------------  PHYSICAL EXAM    General: NAD, AAOx3  HEENT: NCAT, ANTONIO, EOMI  Cardiac: RRR S1, S2, no Murmurs, rubs or gallops  Respiratory: CTAB  Abdomen: Soft, non-distended, non-tender, +bowel sounds  Extremities: necrosis over right 3rd digit, bilateral edema and erythema, bilateral ulcerations over feet and anterior lower extremities wrapped with xeroform and kerlex    LABS  --------------------------------------------------------------------------------------  Labs:  CAPILLARY BLOOD GLUCOSE      POCT Blood Glucose.: 113 mg/dL (20 Dec 2018 12:24)  POCT Blood Glucose.: 103 mg/dL (20 Dec 2018 08:10)  POCT Blood Glucose.: 95 mg/dL (20 Dec 2018 06:30)  POCT Blood Glucose.: 70 mg/dL (20 Dec 2018 01:59)  POCT Blood Glucose.: 75 mg/dL (19 Dec 2018 16:48)                          8.8    10.64 )-----------( 339      ( 20 Dec 2018 08:25 )             28.3       Auto Neutrophil %: 76.1 % (12-20-18 @ 08:25)  Auto Immature Granulocyte %: 0.5 % (12-20-18 @ 08:25)    12-20    138  |  100  |  19  ----------------------------<  92  4.9   |  20  |  1.1      Calcium, Total Serum: 9.0 mg/dL (12-20-18 @ 08:25)      LFTs:             5.8  | 0.6  | 32       ------------------[43      ( 20 Dec 2018 08:25 )  3.6  | x    | 11          Lipase:x      Amylase:x         Lactate, Blood: 1.4 mmol/L (12-19-18 @ 09:24)      Coags:     12.50  ----< 1.09    ( 19 Dec 2018 09:24 )     28.2          IMAGING RESULTS Vascular Surgery Consultation Note  =====================================================  HPI:  78 year old female with PMH of DM, RA on Rituximab (s/p 2 infusions, last dose received last Monday , next dose in 6 months), 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  Patient complains of sharp persistent pain that she states has been getting worse over the past few days.  Patient also noticed worsening erythema and edema.  Complains of fevers, denies chills, chest pain, shortness of breath, nausea, vomiting, abdominal pain.  Podiatry was consulted for LE wounds, currently being treated with dressing changes.  Arterial duplex and venous duplex pending, requested vascular consult.     PAST MEDICAL & SURGICAL HISTORY:  Gout  DVT (deep venous thrombosis)  HTN (hypertension)  Rheumatoid arteritis  Other specified diabetes mellitus with other specified complication, unspecified whether long term insulin use  Primary osteoarthritis of right knee: s/p knee repalcement    Home Meds: Home Medications:  Actos 30 mg oral tablet: 1 tab(s) orally once a day (08 Jun 2018 17:15)  allopurinol 300 mg oral tablet: 1 tab(s) orally once a day (15 Jin 2018 13:43)  Eliquis 5 mg oral tablet:  (19 Dec 2018 13:57)  folic acid 1 mg oral tablet: 1 tab(s) orally once a day (14 Jun 2018 09:59)  gabapentin 300 mg oral capsule: orally every 12 hours (19 Dec 2018 13:59)  glimepiride 2 mg oral tablet: 1  orally 3 times a day (08 Jun 2018 17:15)  leucovorin 5 mg oral tablet: 1 tab(s) orally once a day (24 Aug 2018 23:35)  OxyCONTIN 20 mg oral tablet, extended release: 1 tab(s) orally every 12 hours, As Needed (19 Dec 2018 13:57)  predniSONE 10 mg oral tablet: 1 tab(s) orally once a day (19 Dec 2018 13:56)  rifAMPin 300 mg oral capsule: 1 cap(s) orally 2 times a day (24 Oct 2018 02:13)    Allergies: Allergies    clindamycin (Unknown)  erythromycin (Unknown)  penicillin (Unknown)  strawberry (Unknown)    Soc:   Advanced Directives: Presumed Full Code     ROS:    REVIEW OF SYSTEMS    [x ] A ten-point review of systems was otherwise negative except as noted.  [ ] Due to altered mental status/intubation, subjective information were not able to be obtained from the patient. History was obtained, to the extent possible, from review of the chart and collateral sources of information.      CURRENT MEDICATIONS:   --------------------------------------------------------------------------------------  Neurologic Medications  gabapentin 300 milliGRAM(s) Oral every 12 hours  oxyCODONE  ER Tablet 20 milliGRAM(s) Oral every 12 hours PRN pain    Gastrointestinal Medications  docusate sodium 100 milliGRAM(s) Oral two times a day  folic acid 1 milliGRAM(s) Oral daily    Hematologic/Oncologic Medications  apixaban 5 milliGRAM(s) Oral every 12 hours    Antimicrobial/Immunologic Medications  rifampin 300 milliGRAM(s) Oral two times a day  trimethoprim  160 mG/sulfamethoxazole 800 mG 1 Tablet(s) Oral two times a day    Endocrine/Metabolic Medications  allopurinol 300 milliGRAM(s) Oral daily  predniSONE   Tablet 10 milliGRAM(s) Oral daily    Topical/Other Medications  chlorhexidine 4% Liquid 1 Application(s) Topical <User Schedule>  leucovorin 5 milliGRAM(s) Oral daily    --------------------------------------------------------------------------------------    VITAL SIGNS, INS/OUTS (last 24 hours):  --------------------------------------------------------------------------------------  ICU Vital Signs Last 24 Hrs  T(C): 36.2 (20 Dec 2018 14:25), Max: 37.6 (19 Dec 2018 22:38)  T(F): 97.1 (20 Dec 2018 14:25), Max: 99.6 (19 Dec 2018 22:38)  HR: 92 (20 Dec 2018 14:25) (92 - 103)  BP: 130/65 (20 Dec 2018 14:25) (130/65 - 149/87)  RR: 18 (20 Dec 2018 14:25) (18 - 19)  SpO2: 95% (20 Dec 2018 07:30) (95% - 95%)    I&O's Summary    --------------------------------------------------------------------------------------  PHYSICAL EXAM    General: NAD, AAOx3  HEENT: NCAT, ANTONIO, EOMI  Cardiac: RRR S1, S2, no Murmurs, rubs or gallops  Respiratory: CTAB  Abdomen: Soft, non-distended, non-tender, +bowel sounds  Extremities: necrosis over right 3rd digit, bilateral edema and erythema, bilateral ulcerations over feet and anterior lower extremities wrapped with xeroform and kerlex    LABS  --------------------------------------------------------------------------------------  Labs:  CAPILLARY BLOOD GLUCOSE      POCT Blood Glucose.: 113 mg/dL (20 Dec 2018 12:24)  POCT Blood Glucose.: 103 mg/dL (20 Dec 2018 08:10)  POCT Blood Glucose.: 95 mg/dL (20 Dec 2018 06:30)  POCT Blood Glucose.: 70 mg/dL (20 Dec 2018 01:59)  POCT Blood Glucose.: 75 mg/dL (19 Dec 2018 16:48)                          8.8    10.64 )-----------( 339      ( 20 Dec 2018 08:25 )             28.3       Auto Neutrophil %: 76.1 % (12-20-18 @ 08:25)  Auto Immature Granulocyte %: 0.5 % (12-20-18 @ 08:25)    12-20    138  |  100  |  19  ----------------------------<  92  4.9   |  20  |  1.1      Calcium, Total Serum: 9.0 mg/dL (12-20-18 @ 08:25)      LFTs:             5.8  | 0.6  | 32       ------------------[43      ( 20 Dec 2018 08:25 )  3.6  | x    | 11          Lipase:x      Amylase:x         Lactate, Blood: 1.4 mmol/L (12-19-18 @ 09:24)      Coags:     12.50  ----< 1.09    ( 19 Dec 2018 09:24 )     28.2          IMAGING RESULTS  Arterial Duplex: On the right: Mild to moderate disease at the popliteal artery level. Tibial   arteries not visualized secondary to edema.     On the left: Mild popliteal and posterior tibial artery disease; remaining   tibial arteries not visualized secondary to edema.

## 2018-12-21 LAB
ANION GAP SERPL CALC-SCNC: 18 MMOL/L — HIGH (ref 7–14)
BUN SERPL-MCNC: 23 MG/DL — HIGH (ref 10–20)
CALCIUM SERPL-MCNC: 8.1 MG/DL — LOW (ref 8.5–10.1)
CHLORIDE SERPL-SCNC: 100 MMOL/L — SIGNIFICANT CHANGE UP (ref 98–110)
CO2 SERPL-SCNC: 19 MMOL/L — SIGNIFICANT CHANGE UP (ref 17–32)
CREAT SERPL-MCNC: 1.4 MG/DL — SIGNIFICANT CHANGE UP (ref 0.7–1.5)
CRP SERPL-MCNC: 14.18 MG/DL — HIGH (ref 0–0.4)
GLUCOSE BLDC GLUCOMTR-MCNC: 124 MG/DL — HIGH (ref 70–99)
GLUCOSE BLDC GLUCOMTR-MCNC: 124 MG/DL — HIGH (ref 70–99)
GLUCOSE BLDC GLUCOMTR-MCNC: 140 MG/DL — HIGH (ref 70–99)
GLUCOSE BLDC GLUCOMTR-MCNC: 157 MG/DL — HIGH (ref 70–99)
GLUCOSE BLDC GLUCOMTR-MCNC: 67 MG/DL — LOW (ref 70–99)
GLUCOSE BLDC GLUCOMTR-MCNC: 84 MG/DL — SIGNIFICANT CHANGE UP (ref 70–99)
GLUCOSE SERPL-MCNC: 106 MG/DL — HIGH (ref 70–99)
HCT VFR BLD CALC: 28.9 % — LOW (ref 37–47)
HGB BLD-MCNC: 9.1 G/DL — LOW (ref 12–16)
MCHC RBC-ENTMCNC: 28.9 PG — SIGNIFICANT CHANGE UP (ref 27–31)
MCHC RBC-ENTMCNC: 31.5 G/DL — LOW (ref 32–37)
MCV RBC AUTO: 91.7 FL — SIGNIFICANT CHANGE UP (ref 81–99)
NRBC # BLD: 0 /100 WBCS — SIGNIFICANT CHANGE UP (ref 0–0)
PLATELET # BLD AUTO: 306 K/UL — SIGNIFICANT CHANGE UP (ref 130–400)
POTASSIUM SERPL-MCNC: 5.1 MMOL/L — HIGH (ref 3.5–5)
POTASSIUM SERPL-SCNC: 5.1 MMOL/L — HIGH (ref 3.5–5)
RBC # BLD: 3.15 M/UL — LOW (ref 4.2–5.4)
RBC # FLD: 16 % — HIGH (ref 11.5–14.5)
SODIUM SERPL-SCNC: 137 MMOL/L — SIGNIFICANT CHANGE UP (ref 135–146)
VIT D25+D1,25 OH+D1,25 PNL SERPL-MCNC: 40.1 PG/ML — SIGNIFICANT CHANGE UP (ref 19.9–79.3)
WBC # BLD: 8.56 K/UL — SIGNIFICANT CHANGE UP (ref 4.8–10.8)
WBC # FLD AUTO: 8.56 K/UL — SIGNIFICANT CHANGE UP (ref 4.8–10.8)

## 2018-12-21 PROCEDURE — 99222 1ST HOSP IP/OBS MODERATE 55: CPT

## 2018-12-21 RX ORDER — OXYCODONE AND ACETAMINOPHEN 5; 325 MG/1; MG/1
2 TABLET ORAL EVERY 4 HOURS
Qty: 0 | Refills: 0 | Status: DISCONTINUED | OUTPATIENT
Start: 2018-12-21 | End: 2018-12-28

## 2018-12-21 RX ORDER — CEFEPIME 1 G/1
INJECTION, POWDER, FOR SOLUTION INTRAMUSCULAR; INTRAVENOUS
Qty: 0 | Refills: 0 | Status: DISCONTINUED | OUTPATIENT
Start: 2018-12-21 | End: 2018-12-23

## 2018-12-21 RX ORDER — CEFEPIME 1 G/1
2000 INJECTION, POWDER, FOR SOLUTION INTRAMUSCULAR; INTRAVENOUS EVERY 24 HOURS
Qty: 0 | Refills: 0 | Status: DISCONTINUED | OUTPATIENT
Start: 2018-12-22 | End: 2018-12-23

## 2018-12-21 RX ORDER — CEFEPIME 1 G/1
2000 INJECTION, POWDER, FOR SOLUTION INTRAMUSCULAR; INTRAVENOUS ONCE
Qty: 0 | Refills: 0 | Status: COMPLETED | OUTPATIENT
Start: 2018-12-21 | End: 2018-12-21

## 2018-12-21 RX ORDER — SENNA PLUS 8.6 MG/1
2 TABLET ORAL AT BEDTIME
Qty: 0 | Refills: 0 | Status: DISCONTINUED | OUTPATIENT
Start: 2018-12-21 | End: 2018-12-28

## 2018-12-21 RX ORDER — VANCOMYCIN HCL 1 G
1250 VIAL (EA) INTRAVENOUS ONCE
Qty: 0 | Refills: 0 | Status: COMPLETED | OUTPATIENT
Start: 2018-12-21 | End: 2018-12-21

## 2018-12-21 RX ORDER — ACETAMINOPHEN 500 MG
650 TABLET ORAL EVERY 6 HOURS
Qty: 0 | Refills: 0 | Status: DISCONTINUED | OUTPATIENT
Start: 2018-12-21 | End: 2018-12-28

## 2018-12-21 RX ORDER — VANCOMYCIN HCL 1 G
1250 VIAL (EA) INTRAVENOUS EVERY 24 HOURS
Qty: 0 | Refills: 0 | Status: DISCONTINUED | OUTPATIENT
Start: 2018-12-22 | End: 2018-12-23

## 2018-12-21 RX ORDER — POLYETHYLENE GLYCOL 3350 17 G/17G
17 POWDER, FOR SOLUTION ORAL ONCE
Qty: 0 | Refills: 0 | Status: COMPLETED | OUTPATIENT
Start: 2018-12-21 | End: 2018-12-21

## 2018-12-21 RX ORDER — VANCOMYCIN HCL 1 G
VIAL (EA) INTRAVENOUS
Qty: 0 | Refills: 0 | Status: DISCONTINUED | OUTPATIENT
Start: 2018-12-21 | End: 2018-12-23

## 2018-12-21 RX ADMIN — OXYCODONE AND ACETAMINOPHEN 2 TABLET(S): 5; 325 TABLET ORAL at 14:00

## 2018-12-21 RX ADMIN — Medication 5 MILLIGRAM(S): at 13:12

## 2018-12-21 RX ADMIN — SENNA PLUS 2 TABLET(S): 8.6 TABLET ORAL at 21:07

## 2018-12-21 RX ADMIN — APIXABAN 5 MILLIGRAM(S): 2.5 TABLET, FILM COATED ORAL at 17:42

## 2018-12-21 RX ADMIN — GABAPENTIN 300 MILLIGRAM(S): 400 CAPSULE ORAL at 17:42

## 2018-12-21 RX ADMIN — POLYETHYLENE GLYCOL 3350 17 GRAM(S): 17 POWDER, FOR SOLUTION ORAL at 13:12

## 2018-12-21 RX ADMIN — Medication 300 MILLIGRAM(S): at 13:12

## 2018-12-21 RX ADMIN — OXYCODONE AND ACETAMINOPHEN 2 TABLET(S): 5; 325 TABLET ORAL at 13:19

## 2018-12-21 RX ADMIN — CEFEPIME 100 MILLIGRAM(S): 1 INJECTION, POWDER, FOR SOLUTION INTRAMUSCULAR; INTRAVENOUS at 17:40

## 2018-12-21 RX ADMIN — Medication 1 MILLIGRAM(S): at 13:12

## 2018-12-21 RX ADMIN — Medication 100 MILLIGRAM(S): at 17:42

## 2018-12-21 RX ADMIN — GABAPENTIN 300 MILLIGRAM(S): 400 CAPSULE ORAL at 05:43

## 2018-12-21 RX ADMIN — OXYCODONE HYDROCHLORIDE 20 MILLIGRAM(S): 5 TABLET ORAL at 06:40

## 2018-12-21 RX ADMIN — Medication 650 MILLIGRAM(S): at 09:06

## 2018-12-21 RX ADMIN — Medication 100 MILLIGRAM(S): at 05:43

## 2018-12-21 RX ADMIN — APIXABAN 5 MILLIGRAM(S): 2.5 TABLET, FILM COATED ORAL at 05:43

## 2018-12-21 RX ADMIN — Medication 10 MILLIGRAM(S): at 05:43

## 2018-12-21 RX ADMIN — OXYCODONE AND ACETAMINOPHEN 2 TABLET(S): 5; 325 TABLET ORAL at 20:48

## 2018-12-21 RX ADMIN — Medication 650 MILLIGRAM(S): at 13:17

## 2018-12-21 RX ADMIN — CHLORHEXIDINE GLUCONATE 1 APPLICATION(S): 213 SOLUTION TOPICAL at 09:06

## 2018-12-21 RX ADMIN — Medication 1 TABLET(S): at 05:43

## 2018-12-21 RX ADMIN — Medication 166.67 MILLIGRAM(S): at 17:41

## 2018-12-21 NOTE — PHYSICAL THERAPY INITIAL EVALUATION ADULT - GENERAL OBSERVATIONS, REHAB EVAL
1050 - 1120 Pt rec supine in bed with b/l feet wrapped, +bed alarm, in NAD. Pt c/o substantial LE pain, not allowing PT to touch any part of pt LE except for behind b/l heels, otherwise pt becomes very upset; limiting mobility assessment.

## 2018-12-21 NOTE — PHYSICAL THERAPY INITIAL EVALUATION ADULT - PASSIVE RANGE OF MOTION EXAMINATION, REHAB EVAL
Grossly WFL t/o however limited assessment of b/l LE as pt will not allow PT to touch LE/no Passive ROM deficits were identified

## 2018-12-21 NOTE — PROGRESS NOTE ADULT - ASSESSMENT
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    Bilateral non-healing ulcers: her case was discussed with podiatry; no need for surgical debridement.  Apply Xeroform with Betadine to the lower extremities, with loose dressings.  Venous duplex was consistent with chronic DVTs.  Arterial duplex demonstrated mild to moderate disease to right popliteal and mild disease to the left popliteal.  The patient was febrile overnight, start Vancomycin and Cefepime.   Follow-up with Infectious Disease.  She previously tolerated Rocephin in October 2018.  Rheumatoid Arthritis: patient has been taking chronic prednisone therapy which likely contributed to impaired healing of lower extremity ulcers.  Decrease Prednisone to 7.5mg PO q24 as per Rheum and continue Leucovorin 5mg q24 as directed.    Chronic Bilateral Femoral and Popliteal DVT: continue Eliquis  Chronic Anemia: Hgb stable @ 9.1  History of LTBI: continue Rifampin  DMII: continue Lantus/Lispro and monitor FS with meals.  Gout: continue Allopurinol  GI/DVT prophylaxis  DNR/DNI

## 2018-12-21 NOTE — PROGRESS NOTE ADULT - SUBJECTIVE AND OBJECTIVE BOX
LINCOLN HEREDIA 78y Female  MRN#: [id number]   CODE STATUS:________      SUBJECTIVE  HPI    Overnight events     Subjective complaints     Present Today:   - [place]  - Drains   - [place] :                                             ----------------------------------------------------------  OBJECTIVE  PAST MEDICAL & SURGICAL HISTORY  Gout  DVT (deep venous thrombosis)  HTN (hypertension)  Rheumatoid arteritis  Other specified diabetes mellitus with other specified complication, unspecified whether long term insulin use  Primary osteoarthritis of right knee: s/p knee repalcement                                            -----------------------------------------------------------  ALLERGIES:  clindamycin (Unknown)  erythromycin (Unknown)  penicillin (Unknown)  strawberry (Unknown)                                            ------------------------------------------------------------  MEDICATIONS:  STANDING MEDICATIONS  allopurinol 300 milliGRAM(s) Oral daily  apixaban 5 milliGRAM(s) Oral every 12 hours  chlorhexidine 4% Liquid 1 Application(s) Topical <User Schedule>  docusate sodium 100 milliGRAM(s) Oral two times a day  folic acid 1 milliGRAM(s) Oral daily  gabapentin 300 milliGRAM(s) Oral every 12 hours  leucovorin 5 milliGRAM(s) Oral daily  predniSONE   Tablet 10 milliGRAM(s) Oral daily  rifampin 300 milliGRAM(s) Oral two times a day  trimethoprim  160 mG/sulfamethoxazole 800 mG 1 Tablet(s) Oral two times a day    PRN MEDICATIONS  acetaminophen   Tablet .. 650 milliGRAM(s) Oral every 6 hours PRN  oxyCODONE    5 mG/acetaminophen 325 mG 2 Tablet(s) Oral every 4 hours PRN  oxyCODONE  ER Tablet 20 milliGRAM(s) Oral every 12 hours PRN                                            ------------------------------------------------------------  VITAL SIGNS: Last 24 Hours  T(C): 39 (21 Dec 2018 08:56), Max: 39 (21 Dec 2018 08:56)  T(F): 102.2 (21 Dec 2018 08:56), Max: 102.2 (21 Dec 2018 08:56)  HR: 104 (21 Dec 2018 06:02) (91 - 104)  BP: 130/75 (21 Dec 2018 05:34) (108/57 - 130/75)  BP(mean): --  RR: 18 (20 Dec 2018 20:15) (18 - 18)  SpO2: 97% (21 Dec 2018 08:56) (96% - 104%)                                             --------------------------------------------------------------  LABS:                        9.1    8.56  )-----------( 306      ( 21 Dec 2018 07:28 )             28.9     12-21    137  |  100  |  23<H>  ----------------------------<  106<H>  5.1<H>   |  19  |  1.4    Ca    8.1<L>      21 Dec 2018 07:28  Mg     1.8     12-20    TPro  5.8<L>  /  Alb  3.6  /  TBili  0.6  /  DBili  x   /  AST  32  /  ALT  11  /  AlkPhos  43  12-20    PT/INR - ( 19 Dec 2018 09:24 )   PT: 12.50 sec;   INR: 1.09 ratio         PTT - ( 19 Dec 2018 09:24 )  PTT:28.2 sec    Sedimentation Rate, Erythrocyte: 109 mm/hr <H> (12-20-18 @ 12:42)                                             RADIOLOGY:  < from: [facility] Duplex Lower Ext Vein Scan, Bilat (12.20.18 @ 18:05) >  Chronic-appearing right common femoral and femoral deep venous   thromboses; left popliteal deep venous thrombosis.    This is similar to previous duplex of 10/23/2018, when chronic bilateral   femoral and popliteal DVTs were visualized.      < from: [facility] Duplex Lower Extrem Arterial, Bilat (12.20.18 @ 18:04) >  On the right: Mild to moderate disease at the popliteal artery level.   Tibial arteries not visualized secondary to edema.     On the left: Mild popliteal and posterior tibial arterydisease;   remaining tibial arteries not visualized secondary to edema.      < from: Xray Hip 2-3 Views, Right (12.19.18 @ 15:57) >  Impression:    Diffuse osteopenia without definite acute osseous abnormality.    < from: Xray Foot AP + Lateral + Oblique, Right (12.19.18 @ 10:56) >  There is dorsal soft tissue swelling. Vascular calcifications.  Osteopenic bones. Degenerative changes of the midfoot. There is hammertoe   deformity of the toes. Degenerative changes involving the DIP joints of   the toes.                                          PHYSICAL EXAM:    GENERAL: Distressed due to pain, shaking, well-developed, AAOx3  HEENT:  Atraumatic, Normocephalic. EOMI, PERRLA, conjunctiva and sclera clear  PULMONARY: Clear to auscultation bilaterally  CARDIOVASCULAR: Regular rate and rhythm  GASTROINTESTINAL: Soft, Nontender, globose; Bowel sounds present  MUSCULOSKELETAL: dressing+ b/l LE with superficial ulcers. Pulses nonpalpable on the dorsum of the feet. Increased erythema on the right foot and LE. Gangrenous rt 3rd toe present. 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    #Bilateral Chronic Nonhealing Ulcers and concurrent LE cellulitis   -Temp 101F overnight; 102.2F this AM; No leukocytosis  -Podiatry: Betadine paint to digit 3R BID; Xeroform to ulcers with loose dressing; Limb elevation    -F/U uric acid levels. .  -F/U Vascular recs  -C/w Bactrim DS;     #Chronic Right Hip Pain  -RIght hip xray: Diffuse osteopenia  -C/w Physical therapy. Will check Vit D levels and replete if deficient    #Chronic B/L femoral and popliteal DVT- C/w Eliquis  #Chronic Normocytic Anemia- Likely 2/2 RA; Hb at baseline  #Rheumatoid Arthritis -Continue home prednisone  #DM II-Controlled; Monitor finger sticks, start insulin sliding scale if fingersticks are greater than 180  #Latent TB- C/w Rifampin  #Gout- F/U uric acid levels; c/w Allopurinol    DVT ppx: On Eliquis  GI ppx: Not indicated  Diet: DASH Carb consistent   Activity: AAT  Code Status: DNR/DNI, MOLST form completed with health care proxy as witness by bedside, form in chart  Disposition: Pending PT/Rehab eval LINCOLN HEREDIA 78y Female  MRN#: [id number]   CODE STATUS: DNR/DNI      SUBJECTIVE  Overnight events -Pt spiked fever of 101F last night. Sepsis workup sent. Pt complained of pain in the Right toe and right LE    Subjective complaints -Pt was shaking this AM, spiked a fever of 12.2F this AM. Pt was complaining of pain in the right 3rd toe and right LE, also complaining of increasing immobility of left LE along with right LE.     OBJECTIVE  PAST MEDICAL & SURGICAL HISTORY  Gout  DVT (deep venous thrombosis)  HTN (hypertension)  Rheumatoid arteritis  Other specified diabetes mellitus with other specified complication, unspecified whether long term insulin use  Primary osteoarthritis of right knee: s/p knee replacement                                            -----------------------------------------------------------  ALLERGIES:  clindamycin (Unknown)  erythromycin (Unknown)  penicillin (Unknown)  strawberry (Unknown)                                            ------------------------------------------------------------  MEDICATIONS:  STANDING MEDICATIONS  allopurinol 300 milliGRAM(s) Oral daily  apixaban 5 milliGRAM(s) Oral every 12 hours  chlorhexidine 4% Liquid 1 Application(s) Topical <User Schedule>  docusate sodium 100 milliGRAM(s) Oral two times a day  folic acid 1 milliGRAM(s) Oral daily  gabapentin 300 milliGRAM(s) Oral every 12 hours  leucovorin 5 milliGRAM(s) Oral daily  predniSONE   Tablet 10 milliGRAM(s) Oral daily  rifampin 300 milliGRAM(s) Oral two times a day  trimethoprim  160 mG/sulfamethoxazole 800 mG 1 Tablet(s) Oral two times a day    PRN MEDICATIONS  acetaminophen   Tablet .. 650 milliGRAM(s) Oral every 6 hours PRN  oxyCODONE    5 mG/acetaminophen 325 mG 2 Tablet(s) Oral every 4 hours PRN  oxyCODONE  ER Tablet 20 milliGRAM(s) Oral every 12 hours PRN                                            ------------------------------------------------------------  VITAL SIGNS: Last 24 Hours  T(C): 39 (21 Dec 2018 08:56), Max: 39 (21 Dec 2018 08:56)  T(F): 102.2 (21 Dec 2018 08:56), Max: 102.2 (21 Dec 2018 08:56)  HR: 104 (21 Dec 2018 06:02) (91 - 104)  BP: 130/75 (21 Dec 2018 05:34) (108/57 - 130/75)  BP(mean): --  RR: 18 (20 Dec 2018 20:15) (18 - 18)  SpO2: 97% (21 Dec 2018 08:56) (96% - 104%)                                             --------------------------------------------------------------  LABS:                        9.1    8.56  )-----------( 306      ( 21 Dec 2018 07:28 )             28.9     12-21    137  |  100  |  23<H>  ----------------------------<  106<H>  5.1<H>   |  19  |  1.4    Ca    8.1<L>      21 Dec 2018 07:28  Mg     1.8     12-20    TPro  5.8<L>  /  Alb  3.6  /  TBili  0.6  /  DBili  x   /  AST  32  /  ALT  11  /  AlkPhos  43  12-20    PT/INR - ( 19 Dec 2018 09:24 )   PT: 12.50 sec;   INR: 1.09 ratio         PTT - ( 19 Dec 2018 09:24 )  PTT:28.2 sec    Sedimentation Rate, Erythrocyte: 109 mm/hr <H> (12-20-18 @ 12:42)                                             RADIOLOGY:  < from: [facility] Duplex Lower Ext Vein Scan, Bilat (12.20.18 @ 18:05) >  Chronic-appearing right common femoral and femoral deep venous   thromboses; left popliteal deep venous thrombosis.    This is similar to previous duplex of 10/23/2018, when chronic bilateral   femoral and popliteal DVTs were visualized.      < from: [facility] Duplex Lower Extrem Arterial, Bilat (12.20.18 @ 18:04) >  On the right: Mild to moderate disease at the popliteal artery level.   Tibial arteries not visualized secondary to edema.     On the left: Mild popliteal and posterior tibial artery disease;   remaining tibial arteries not visualized secondary to edema.      < from: Xray Hip 2-3 Views, Right (12.19.18 @ 15:57) >  Impression:    Diffuse osteopenia without definite acute osseous abnormality.    < from: Xray Foot AP + Lateral + Oblique, Right (12.19.18 @ 10:56) >  There is dorsal soft tissue swelling. Vascular calcifications.  Osteopenic bones. Degenerative changes of the midfoot. There is hammertoe   deformity of the toes. Degenerative changes involving the DIP joints of   the toes.                                          PHYSICAL EXAM:    GENERAL: Distressed due to pain, shaking, well-developed, AAOx3  HEENT:  Atraumatic, Normocephalic. EOMI, PERRLA, conjunctiva and sclera clear  PULMONARY: Clear to auscultation bilaterally  CARDIOVASCULAR: Regular rate and rhythm  GASTROINTESTINAL: Soft, Nontender, globose; Bowel sounds present  MUSCULOSKELETAL: dressing+ b/l LE with superficial ulcers. Pulses nonpalpable on the dorsum of the feet. Increased erythema on the right foot and LE. Gangrenous rt 3rd toe present. 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    #Bilateral Chronic Nonhealing Ulcers and concurrent LE cellulitis   -Temp 101F overnight; 102.2F this AM; No leukocytosis  -Podiatry: Betadine paint to digit 3R BID; Xeroform to ulcers with loose dressing; Limb elevation  -Duplex LE venous: Chronic-appearing right common femoral and femoral deep venous thromboses; left popliteal deep venous thrombosis  -Duplex LE arterial: On the right: Mild to moderate disease at the popliteal artery level. On the left: Mild popliteal and posterior tibial artery disease; Remaining tibial arteries not visualized secondary to edema   -F/U uric acid levels. .  -Vascular following-Will f/u recs  -C/w Bactrim DS; ID c/s for antibiotic optimization.     #Chronic Right Hip Pain  -RIght hip xray: Diffuse osteopenia  -C/w Physical therapy. Will check Vit D levels and replete if deficient    #Chronic B/L femoral and popliteal DVT- C/w Eliquis  #Chronic Normocytic Anemia- Likely 2/2 RA; Hb at baseline  #Rheumatoid Arthritis -Continue home prednisone  #DM II-Controlled; Monitor finger sticks, start insulin sliding scale if fingersticks are greater than 180  #Latent TB- C/w Rifampin  #Gout- F/U uric acid levels; c/w Allopurinol    DVT ppx: On Eliquis  GI ppx: Not indicated  Diet: DASH Carb consistent   Activity: AAT  Code Status: DNR/DNI, MOLST form completed with health care proxy as witness by bedside, form in chart  Disposition: Pending PT/Rehab eval LINCOLN HEREDIA 78y Female  MRN#: [id number]   CODE STATUS: DNR/DNI      SUBJECTIVE  Overnight events -Pt spiked fever of 101F last night. Sepsis workup sent. Pt complained of pain in the Right toe and right LE    Subjective complaints -Pt was shaking this AM, spiked a fever of 12.2F this AM. Pt was complaining of pain in the right 3rd toe and right LE, also complaining of increasing immobility of left LE along with right LE.     OBJECTIVE  PAST MEDICAL & SURGICAL HISTORY  Gout  DVT (deep venous thrombosis)  HTN (hypertension)  Rheumatoid arteritis  Other specified diabetes mellitus with other specified complication, unspecified whether long term insulin use  Primary osteoarthritis of right knee: s/p knee replacement                                            -----------------------------------------------------------  ALLERGIES:  clindamycin (Unknown)  erythromycin (Unknown)  penicillin (Unknown)  strawberry (Unknown)                                            ------------------------------------------------------------  MEDICATIONS:  STANDING MEDICATIONS  allopurinol 300 milliGRAM(s) Oral daily  apixaban 5 milliGRAM(s) Oral every 12 hours  chlorhexidine 4% Liquid 1 Application(s) Topical <User Schedule>  docusate sodium 100 milliGRAM(s) Oral two times a day  folic acid 1 milliGRAM(s) Oral daily  gabapentin 300 milliGRAM(s) Oral every 12 hours  leucovorin 5 milliGRAM(s) Oral daily  predniSONE   Tablet 10 milliGRAM(s) Oral daily  rifampin 300 milliGRAM(s) Oral two times a day  trimethoprim  160 mG/sulfamethoxazole 800 mG 1 Tablet(s) Oral two times a day    PRN MEDICATIONS  acetaminophen   Tablet .. 650 milliGRAM(s) Oral every 6 hours PRN  oxyCODONE    5 mG/acetaminophen 325 mG 2 Tablet(s) Oral every 4 hours PRN  oxyCODONE  ER Tablet 20 milliGRAM(s) Oral every 12 hours PRN                                            ------------------------------------------------------------  VITAL SIGNS: Last 24 Hours  T(C): 39 (21 Dec 2018 08:56), Max: 39 (21 Dec 2018 08:56)  T(F): 102.2 (21 Dec 2018 08:56), Max: 102.2 (21 Dec 2018 08:56)  HR: 104 (21 Dec 2018 06:02) (91 - 104)  BP: 130/75 (21 Dec 2018 05:34) (108/57 - 130/75)  BP(mean): --  RR: 18 (20 Dec 2018 20:15) (18 - 18)  SpO2: 97% (21 Dec 2018 08:56) (96% - 104%)                                             --------------------------------------------------------------  LABS:                        9.1    8.56  )-----------( 306      ( 21 Dec 2018 07:28 )             28.9     12-21    137  |  100  |  23<H>  ----------------------------<  106<H>  5.1<H>   |  19  |  1.4    Ca    8.1<L>      21 Dec 2018 07:28  Mg     1.8     12-20    TPro  5.8<L>  /  Alb  3.6  /  TBili  0.6  /  DBili  x   /  AST  32  /  ALT  11  /  AlkPhos  43  12-20    PT/INR - ( 19 Dec 2018 09:24 )   PT: 12.50 sec;   INR: 1.09 ratio         PTT - ( 19 Dec 2018 09:24 )  PTT:28.2 sec    Sedimentation Rate, Erythrocyte: 109 mm/hr <H> (12-20-18 @ 12:42)                                             RADIOLOGY:  < from: [facility] Duplex Lower Ext Vein Scan, Bilat (12.20.18 @ 18:05) >  Chronic-appearing right common femoral and femoral deep venous   thromboses; left popliteal deep venous thrombosis.    This is similar to previous duplex of 10/23/2018, when chronic bilateral   femoral and popliteal DVTs were visualized.      < from: [facility] Duplex Lower Extrem Arterial, Bilat (12.20.18 @ 18:04) >  On the right: Mild to moderate disease at the popliteal artery level.   Tibial arteries not visualized secondary to edema.     On the left: Mild popliteal and posterior tibial artery disease;   remaining tibial arteries not visualized secondary to edema.      < from: Xray Hip 2-3 Views, Right (12.19.18 @ 15:57) >  Impression:    Diffuse osteopenia without definite acute osseous abnormality.    < from: Xray Foot AP + Lateral + Oblique, Right (12.19.18 @ 10:56) >  There is dorsal soft tissue swelling. Vascular calcifications.  Osteopenic bones. Degenerative changes of the midfoot. There is hammertoe   deformity of the toes. Degenerative changes involving the DIP joints of   the toes.                                          PHYSICAL EXAM:    GENERAL: Distressed due to pain, shaking, well-developed, AAOx3  HEENT:  Atraumatic, Normocephalic. EOMI, PERRLA, conjunctiva and sclera clear  PULMONARY: Clear to auscultation bilaterally  CARDIOVASCULAR: Regular rate and rhythm  GASTROINTESTINAL: Soft, Nontender, globose; Bowel sounds present  MUSCULOSKELETAL: dressing+ b/l LE with superficial ulcers. Pulses nonpalpable on the dorsum of the feet. Increased erythema on the right foot and LE. Gangrenous rt 3rd toe present. 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    #Bilateral Chronic Nonhealing Ulcers and concurrent LE cellulitis   -Temp 101F overnight; 102.2F this AM; No leukocytosis  -Started on Cefepime and Vancomycin empirically. F/U ID recs.   -Podiatry: Betadine paint to digit 3R BID; Xeroform to ulcers with loose dressing; Limb elevation. No surgery needed.   -Duplex LE venous: Chronic-appearing right common femoral and femoral deep venous thromboses; left popliteal deep venous thrombosis  -Duplex LE arterial: On the right: Mild to moderate disease at the popliteal artery level. On the left: Mild popliteal and posterior tibial artery disease; Remaining tibial arteries not visualized secondary to edema   -Uric acid levels normal. .  -Vascular following-Will f/u recs    #Chronic Right Hip Pain  -RIght hip xray: Diffuse osteopenia  -C/w Physical therapy. Will check Vit D levels and replete if deficient    #Chronic B/L femoral and popliteal DVT- C/w Eliquis  #Chronic Normocytic Anemia- Likely 2/2 RA; Hb at baseline  #Rheumatoid Arthritis -Continue home prednisone. talked to Dr Ascencio (Dr Henry's team)- Can decrease prednisone 7.5mg if healing hampered. No other adjustments needed.   #DM II-Controlled; Monitor finger sticks, start insulin sliding scale if fingersticks are greater than 180  #Latent TB- C/w Rifampin  #Gout- Normal uric acid levels; c/w Allopurinol    DVT ppx: On Eliquis  GI ppx: Not indicated  Diet: DASH Carb consistent   Activity: AAT  Code Status: DNR/DNI, MOLST form completed with health care proxy as witness by bedside, form in chart  Disposition: pending

## 2018-12-21 NOTE — PHYSICAL THERAPY INITIAL EVALUATION ADULT - LEVEL OF INDEPENDENCE: SIT/STAND, REHAB EVAL
maximum assist (25% patients effort)/From elevated EOB. Pt only able to tolerate ~8 seconds of standing 2* substantial b/l LE/Feet pain. Pt declined further attempts despite PT encouragement.

## 2018-12-21 NOTE — PHYSICAL THERAPY INITIAL EVALUATION ADULT - PHYSICAL ASSIST/NONPHYSICAL ASSIST: SIT/SUPINE, REHAB EVAL
Required 2nd person (PT Daria) as pt would only allow PT to touch back of heels for transfer. Pt becomes very upset otherwise./2 person assist

## 2018-12-21 NOTE — PHYSICAL THERAPY INITIAL EVALUATION ADULT - PERTINENT HX OF CURRENT PROBLEM, REHAB EVAL
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

## 2018-12-21 NOTE — PROGRESS NOTE ADULT - SUBJECTIVE AND OBJECTIVE BOX
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    Overnight events/Updates: The patient complains of severe bilateral lower extremity pain.    Vital Signs Last 24 Hrs  T(C): 39 (21 Dec 2018 08:56), Max: 39 (21 Dec 2018 08:56)  T(F): 102.2 (21 Dec 2018 08:56), Max: 102.2 (21 Dec 2018 08:56)  HR: 104 (21 Dec 2018 06:02) (91 - 104)  BP: 130/75 (21 Dec 2018 05:34) (108/57 - 130/75)  BP(mean): --  RR: 18 (20 Dec 2018 20:15) (18 - 18)  SpO2: 97% (21 Dec 2018 08:56) (96% - 104%)    Physical Examination:  General: AAO x 3  HEENT: PERRLA, EOMI  CV= S1 & S2 appreciated  Lungs= CTA BL  Abdominal Examination= + BS, Soft, NT/ND  Extremity Examination= dressings to the BL LE    ROS: No chest pain, no shortness of breath.  All other systems reviewed and are within normal limits except for the complaints in the HPI.    MEDICATIONS  (STANDING):  allopurinol 300 milliGRAM(s) Oral daily  apixaban 5 milliGRAM(s) Oral every 12 hours  cefepime   IVPB      chlorhexidine 4% Liquid 1 Application(s) Topical <User Schedule>  docusate sodium 100 milliGRAM(s) Oral two times a day  folic acid 1 milliGRAM(s) Oral daily  gabapentin 300 milliGRAM(s) Oral every 12 hours  leucovorin 5 milliGRAM(s) Oral daily  polyethylene glycol 3350 17 Gram(s) Oral once  predniSONE   Tablet 10 milliGRAM(s) Oral daily  rifampin 300 milliGRAM(s) Oral two times a day  senna 2 Tablet(s) Oral at bedtime  vancomycin  IVPB      vancomycin  IVPB 1250 milliGRAM(s) IV Intermittent once    MEDICATIONS  (PRN):  acetaminophen   Tablet .. 650 milliGRAM(s) Oral every 6 hours PRN Temp greater or equal to 38C (100.4F)  oxyCODONE    5 mG/acetaminophen 325 mG 2 Tablet(s) Oral every 4 hours PRN Moderate Pain (4 - 6)                            9.1    8.56  )-----------( 306      ( 21 Dec 2018 07:28 )             28.9     12-21    137  |  100  |  23<H>  ----------------------------<  106<H>  5.1<H>   |  19  |  1.4    Ca    8.1<L>      21 Dec 2018 07:28  Mg     1.8     12-20    TPro  5.8<L>  /  Alb  3.6  /  TBili  0.6  /  DBili  x   /  AST  32  /  ALT  11  /  AlkPhos  43  12-20      Case discussed with housestaff & family  SEBAS Segal [id number]

## 2018-12-22 LAB
ANION GAP SERPL CALC-SCNC: 17 MMOL/L — HIGH (ref 7–14)
BUN SERPL-MCNC: 27 MG/DL — HIGH (ref 10–20)
CALCIUM SERPL-MCNC: 8.7 MG/DL — SIGNIFICANT CHANGE UP (ref 8.5–10.1)
CHLORIDE SERPL-SCNC: 97 MMOL/L — LOW (ref 98–110)
CO2 SERPL-SCNC: 20 MMOL/L — SIGNIFICANT CHANGE UP (ref 17–32)
CREAT SERPL-MCNC: 1.5 MG/DL — SIGNIFICANT CHANGE UP (ref 0.7–1.5)
GLUCOSE BLDC GLUCOMTR-MCNC: 111 MG/DL — HIGH (ref 70–99)
GLUCOSE BLDC GLUCOMTR-MCNC: 129 MG/DL — HIGH (ref 70–99)
GLUCOSE BLDC GLUCOMTR-MCNC: 133 MG/DL — HIGH (ref 70–99)
GLUCOSE BLDC GLUCOMTR-MCNC: 146 MG/DL — HIGH (ref 70–99)
GLUCOSE SERPL-MCNC: 96 MG/DL — SIGNIFICANT CHANGE UP (ref 70–99)
HCT VFR BLD CALC: 26.5 % — LOW (ref 37–47)
HGB BLD-MCNC: 8.5 G/DL — LOW (ref 12–16)
MCHC RBC-ENTMCNC: 29.4 PG — SIGNIFICANT CHANGE UP (ref 27–31)
MCHC RBC-ENTMCNC: 32.1 G/DL — SIGNIFICANT CHANGE UP (ref 32–37)
MCV RBC AUTO: 91.7 FL — SIGNIFICANT CHANGE UP (ref 81–99)
NRBC # BLD: 0 /100 WBCS — SIGNIFICANT CHANGE UP (ref 0–0)
PLATELET # BLD AUTO: 298 K/UL — SIGNIFICANT CHANGE UP (ref 130–400)
POTASSIUM SERPL-MCNC: 5.2 MMOL/L — HIGH (ref 3.5–5)
POTASSIUM SERPL-SCNC: 5.2 MMOL/L — HIGH (ref 3.5–5)
RBC # BLD: 2.89 M/UL — LOW (ref 4.2–5.4)
RBC # FLD: 16 % — HIGH (ref 11.5–14.5)
SODIUM SERPL-SCNC: 134 MMOL/L — LOW (ref 135–146)
WBC # BLD: 8.32 K/UL — SIGNIFICANT CHANGE UP (ref 4.8–10.8)
WBC # FLD AUTO: 8.32 K/UL — SIGNIFICANT CHANGE UP (ref 4.8–10.8)

## 2018-12-22 RX ADMIN — SENNA PLUS 2 TABLET(S): 8.6 TABLET ORAL at 21:26

## 2018-12-22 RX ADMIN — APIXABAN 5 MILLIGRAM(S): 2.5 TABLET, FILM COATED ORAL at 05:15

## 2018-12-22 RX ADMIN — CEFEPIME 100 MILLIGRAM(S): 1 INJECTION, POWDER, FOR SOLUTION INTRAMUSCULAR; INTRAVENOUS at 11:25

## 2018-12-22 RX ADMIN — OXYCODONE AND ACETAMINOPHEN 2 TABLET(S): 5; 325 TABLET ORAL at 14:30

## 2018-12-22 RX ADMIN — Medication 166.67 MILLIGRAM(S): at 11:26

## 2018-12-22 RX ADMIN — OXYCODONE AND ACETAMINOPHEN 2 TABLET(S): 5; 325 TABLET ORAL at 18:22

## 2018-12-22 RX ADMIN — APIXABAN 5 MILLIGRAM(S): 2.5 TABLET, FILM COATED ORAL at 17:22

## 2018-12-22 RX ADMIN — Medication 1 MILLIGRAM(S): at 11:27

## 2018-12-22 RX ADMIN — OXYCODONE AND ACETAMINOPHEN 2 TABLET(S): 5; 325 TABLET ORAL at 15:29

## 2018-12-22 RX ADMIN — OXYCODONE AND ACETAMINOPHEN 2 TABLET(S): 5; 325 TABLET ORAL at 03:40

## 2018-12-22 RX ADMIN — OXYCODONE AND ACETAMINOPHEN 2 TABLET(S): 5; 325 TABLET ORAL at 22:46

## 2018-12-22 RX ADMIN — Medication 100 MILLIGRAM(S): at 05:15

## 2018-12-22 RX ADMIN — OXYCODONE AND ACETAMINOPHEN 2 TABLET(S): 5; 325 TABLET ORAL at 04:30

## 2018-12-22 RX ADMIN — OXYCODONE AND ACETAMINOPHEN 2 TABLET(S): 5; 325 TABLET ORAL at 09:28

## 2018-12-22 RX ADMIN — GABAPENTIN 300 MILLIGRAM(S): 400 CAPSULE ORAL at 05:15

## 2018-12-22 RX ADMIN — GABAPENTIN 300 MILLIGRAM(S): 400 CAPSULE ORAL at 17:21

## 2018-12-22 RX ADMIN — OXYCODONE AND ACETAMINOPHEN 2 TABLET(S): 5; 325 TABLET ORAL at 10:07

## 2018-12-22 RX ADMIN — OXYCODONE AND ACETAMINOPHEN 2 TABLET(S): 5; 325 TABLET ORAL at 23:15

## 2018-12-22 RX ADMIN — Medication 10 MILLIGRAM(S): at 05:15

## 2018-12-22 RX ADMIN — Medication 100 MILLIGRAM(S): at 17:22

## 2018-12-22 RX ADMIN — Medication 5 MILLIGRAM(S): at 11:27

## 2018-12-22 RX ADMIN — Medication 300 MILLIGRAM(S): at 11:27

## 2018-12-22 RX ADMIN — OXYCODONE AND ACETAMINOPHEN 2 TABLET(S): 5; 325 TABLET ORAL at 18:20

## 2018-12-22 NOTE — CONSULT NOTE ADULT - SUBJECTIVE AND OBJECTIVE BOX
Patient is a 78y old  Female who presents with a chief complaint of "I couldn't stand up from the recliner", and worsening pain of bilateral lower extremities (22 Dec 2018 09:16)      INTERVAL HPI/OVERNIGHT EVENTS:  T(C): 37.7 (12-22-18 @ 13:05), Max: 37.7 (12-22-18 @ 13:05)  HR: 92 (12-22-18 @ 13:05) (89 - 92)  BP: 117/58 (12-22-18 @ 13:05) (117/58 - 130/58)  RR: 20 (12-22-18 @ 13:05) (18 - 20)  SpO2: 97% (12-21-18 @ 23:50) (97% - 97%)  Wt(kg): --  I&O's Summary      PAST MEDICAL & SURGICAL HISTORY:  Gout  DVT (deep venous thrombosis)  HTN (hypertension)  Rheumatoid arteritis  Other specified diabetes mellitus with other specified complication, unspecified whether long term insulin use  Primary osteoarthritis of right knee: s/p knee repalcement      SOCIAL HISTORY  Alcohol:  Tobacco:  Illicit substance use:      FAMILY HISTORY:      LABS:                        8.5    8.32  )-----------( 298      ( 22 Dec 2018 08:00 )             26.5     12-22    134<L>  |  97<L>  |  27<H>  ----------------------------<  96  5.2<H>   |  20  |  1.5    Ca    8.7      22 Dec 2018 08:00          CAPILLARY BLOOD GLUCOSE      POCT Blood Glucose.: 129 mg/dL (22 Dec 2018 17:00)  POCT Blood Glucose.: 146 mg/dL (22 Dec 2018 11:11)  POCT Blood Glucose.: 111 mg/dL (22 Dec 2018 07:52)  POCT Blood Glucose.: 124 mg/dL (21 Dec 2018 21:25)            MEDICATIONS  (STANDING):  allopurinol 300 milliGRAM(s) Oral daily  apixaban 5 milliGRAM(s) Oral every 12 hours  cefepime   IVPB      cefepime   IVPB 2000 milliGRAM(s) IV Intermittent every 24 hours  chlorhexidine 4% Liquid 1 Application(s) Topical <User Schedule>  docusate sodium 100 milliGRAM(s) Oral two times a day  folic acid 1 milliGRAM(s) Oral daily  gabapentin 300 milliGRAM(s) Oral every 12 hours  leucovorin 5 milliGRAM(s) Oral daily  predniSONE   Tablet 10 milliGRAM(s) Oral daily  rifampin 300 milliGRAM(s) Oral two times a day  senna 2 Tablet(s) Oral at bedtime  vancomycin  IVPB      vancomycin  IVPB 1250 milliGRAM(s) IV Intermittent every 24 hours    MEDICATIONS  (PRN):  acetaminophen   Tablet .. 650 milliGRAM(s) Oral every 6 hours PRN Temp greater or equal to 38C (100.4F)  oxyCODONE    5 mG/acetaminophen 325 mG 2 Tablet(s) Oral every 4 hours PRN Moderate Pain (4 - 6)      REVIEW OF SYSTEMS:  CONSTITUTIONAL: No fever, weight loss, or fatigue  EYES: No eye pain, visual disturbances, or discharge  ENMT:  No difficulty hearing, tinnitus, vertigo; No sinus or throat pain  NECK: No pain or stiffness  RESPIRATORY: No cough, wheezing, chills or hemoptysis; No shortness of breath  CARDIOVASCULAR: No chest pain, palpitations, dizziness, or leg swelling  GASTROINTESTINAL: No abdominal or epigastric pain. No nausea, vomiting, or hematemesis; No diarrhea or constipation. No melena or hematochezia.  GENITOURINARY: No dysuria, frequency, hematuria, or incontinence  NEUROLOGICAL: No headaches, memory loss, loss of strength, numbness, or tremors  SKIN: No itching, burning, rashes, or lesions   LYMPH NODES: No enlarged glands  ENDOCRINE: No heat or cold intolerance; No hair loss  MUSCULOSKELETAL: No joint pain or swelling; No muscle, back, or extremity pain  PSYCHIATRIC: No depression, anxiety, mood swings, or difficulty sleeping  HEME/LYMPH: No easy bruising, or bleeding gums  ALLERY AND IMMUNOLOGIC: No hives or eczema    RADIOLOGY & ADDITIONAL TESTS:    Imaging Personally Reviewed:  [ ] YES  [ ] NO    Consultant(s) Notes Reviewed:  [ ] YES  [ ] NO    PHYSICAL EXAM:  GENERAL: NAD, well-groomed, well-developed  HEAD:  Atraumatic, Normocephalic  EYES: EOMI, PERRLA, conjunctiva and sclera clear  ENMT: No tonsillar erythema, exudates, or enlargement; Moist mucous membranes, Good dentition, No lesions  NECK: Supple, No JVD, Normal thyroid  NERVOUS SYSTEM:  Alert & Oriented X3, Good concentration; Motor Strength 5/5 B/L upper and lower extremities; DTRs 2+ intact and symmetric  CHEST/LUNG: Clear to percussion bilaterally; No rales, rhonchi, wheezing, or rubs  HEART: Regular rate and rhythm; No murmurs, rubs, or gallops  ABDOMEN: Soft, Nontender, Nondistended; Bowel sounds present  EXTREMITIES:  2+ Peripheral Pulses, No clubbing, cyanosis, or edema  LYMPH: No lymphadenopathy noted  SKIN: No rashes or lesions    Care Discussed with Consultants/Other Providers [ ] YES  [ ] NO Patient is a 78y old  Female who presents with a chief complaint of "I couldn't stand up from the recliner", and worsening pain of bilateral lower extremities (22 Dec 2018 09:16)      REVIEW OF SYSTEMS: Total of twelve systems have been reviewed with patient and found to be negative unless mentioned in HPI        PAST MEDICAL & SURGICAL HISTORY:  Gout  DVT (deep venous thrombosis)  HTN (hypertension)  Rheumatoid arteritis  Other specified diabetes mellitus with other specified complication, unspecified whether long term insulin use  Primary osteoarthritis of right knee: s/p knee replacement         SOCIAL HISTORY  Alcohol: does not drink  Tobacco: Does not smoke  Illicit substance use: None        FAMILY HISTORY: Non contributory to the present illness      ALLERGIES: PCN,. Clindamycin and Erythromycin          T(C): 37.7 (12-22-18 @ 13:05), Max: 37.7 (12-22-18 @ 13:05)  HR: 92 (12-22-18 @ 13:05) (89 - 92)  BP: 117/58 (12-22-18 @ 13:05) (117/58 - 130/58)  RR: 20 (12-22-18 @ 13:05) (18 - 20)  SpO2: 97% (12-21-18 @ 23:50) (97% - 97%)  Wt(kg): --  I&O's Summary        PHYSICAL EXAM:  GENERAL: Not in acute distress  CHEST/LUNG:  Air entry bilaterally  HEART: s1 and s2 present  ABDOMEN: Nontender and  Nondistended  EXTREMITIES:  B/L LE swollen, warm, red and tender to touch, extended upto thigh  CNS: Awake and alert        LABS:                        8.5    8.32  )-----------( 298      ( 22 Dec 2018 08:00 )             26.5         12-22    134<L>  |  97<L>  |  27<H>  ----------------------------<  96  5.2<H>   |  20  |  1.5    Ca    8.7      22 Dec 2018 08:00          CAPILLARY BLOOD GLUCOSE  POCT Blood Glucose.: 129 mg/dL (22 Dec 2018 17:00)  POCT Blood Glucose.: 146 mg/dL (22 Dec 2018 11:11)  POCT Blood Glucose.: 111 mg/dL (22 Dec 2018 07:52)  POCT Blood Glucose.: 124 mg/dL (21 Dec 2018 21:25)          MEDICATIONS  (STANDING):  allopurinol 300 milliGRAM(s) Oral daily  apixaban 5 milliGRAM(s) Oral every 12 hours  cefepime   IVPB      cefepime   IVPB 2000 milliGRAM(s) IV Intermittent every 24 hours  chlorhexidine 4% Liquid 1 Application(s) Topical <User Schedule>  docusate sodium 100 milliGRAM(s) Oral two times a day  folic acid 1 milliGRAM(s) Oral daily  gabapentin 300 milliGRAM(s) Oral every 12 hours  leucovorin 5 milliGRAM(s) Oral daily  predniSONE   Tablet 10 milliGRAM(s) Oral daily  rifampin 300 milliGRAM(s) Oral two times a day  senna 2 Tablet(s) Oral at bedtime  vancomycin  IVPB      vancomycin  IVPB 1250 milliGRAM(s) IV Intermittent every 24 hours    MEDICATIONS  (PRN):  acetaminophen   Tablet .. 650 milliGRAM(s) Oral every 6 hours PRN Temp greater or equal to 38C (100.4F)  oxyCODONE    5 mG/acetaminophen 325 mG 2 Tablet(s) Oral every 4 hours PRN Moderate Pain (4 - 6)        RADIOLOGY & ADDITIONAL TESTS:    < from: Xray Foot AP + Lateral + Oblique, Right (12.19.18 @ 10:56) >  There is dorsal soft tissue swelling. Vascular calcifications.  Osteopenic bones. Degenerative changes of the midfoot. There is hammertoe   deformity of the toes. Degenerative changes involving the DIP joints of   the toes.    < end of copied text >      MICROBIOLOGY DATA:    Culture - Blood (12.21.18 @ 12:57)    Specimen Source: .Blood None    Culture Results:   No growth to date.

## 2018-12-22 NOTE — PROGRESS NOTE ADULT - ATTENDING COMMENTS
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    #Bilateral non-healing ulcers: her case was discussed with podiatry; no need for surgical debridement.  Apply Xeroform with Betadine to the lower extremities, with loose dressings.  Venous duplex was consistent with chronic DVTs.  Arterial duplex demonstrated mild to moderate disease to right popliteal and mild disease to the left popliteal.  The patient was febrile 12/20 overnight, started Vancomycin and Cefepime.   Afebrile now. Follow-up with Infectious Disease.  She previously tolerated Rocephin in October 2018.  #PAD / Right 3rd digit dorsum ischemic changes. f/u Vascular, Burn  #Rheumatoid Arthritis: patient has been taking chronic prednisone therapy which likely contributed to impaired healing of lower extremity ulcers.  Decrease Prednisone to 7.5mg PO q24 as per Rheum and continue Leucovorin 5mg q24 as directed.    #Chronic Bilateral Femoral and Popliteal DVT: continue Eliquis  #Chronic Anemia: Hgb stable @ 9.1  #History of LTBI: continue Rifampin  #DMII: continue Lantus/Lispro and monitor FS with meals.  #Gout: continue Allopurinol  GI/DVT prophylaxis  DNR/DNI

## 2018-12-22 NOTE — PROGRESS NOTE ADULT - SUBJECTIVE AND OBJECTIVE BOX
LINCOLN HEREDIA 78y Female  MRN#: [id number]   CODE STATUS: DNR/DNI    SUBJECTIVE  Overnight events -No fever overnight    Subjective complaints -pain in the right LE.     OBJECTIVE  PAST MEDICAL & SURGICAL HISTORY  Gout  DVT (deep venous thrombosis)  HTN (hypertension)  Rheumatoid arteritis  Other specified diabetes mellitus with other specified complication, unspecified whether long term insulin use  Primary osteoarthritis of right knee: s/p knee replacement                                            -----------------------------------------------------------  ALLERGIES:  clindamycin (Unknown)  erythromycin (Unknown)  penicillin (Unknown)  strawberry (Unknown)                                            ------------------------------------------------------------  MEDICATIONS:  STANDING MEDICATIONS  allopurinol 300 milliGRAM(s) Oral daily  apixaban 5 milliGRAM(s) Oral every 12 hours  cefepime   IVPB      cefepime   IVPB 2000 milliGRAM(s) IV Intermittent every 24 hours  chlorhexidine 4% Liquid 1 Application(s) Topical <User Schedule>  docusate sodium 100 milliGRAM(s) Oral two times a day  folic acid 1 milliGRAM(s) Oral daily  gabapentin 300 milliGRAM(s) Oral every 12 hours  leucovorin 5 milliGRAM(s) Oral daily  predniSONE   Tablet 10 milliGRAM(s) Oral daily  rifampin 300 milliGRAM(s) Oral two times a day  senna 2 Tablet(s) Oral at bedtime  vancomycin  IVPB      vancomycin  IVPB 1250 milliGRAM(s) IV Intermittent every 24 hours    PRN MEDICATIONS  acetaminophen   Tablet .. 650 milliGRAM(s) Oral every 6 hours PRN  oxyCODONE    5 mG/acetaminophen 325 mG 2 Tablet(s) Oral every 4 hours PRN                                            ------------------------------------------------------------  VITAL SIGNS: Last 24 Hours  T(C): 37 (22 Dec 2018 05:47), Max: 38.1 (21 Dec 2018 12:59)  T(F): 98.6 (22 Dec 2018 05:47), Max: 100.6 (21 Dec 2018 12:59)  HR: 90 (22 Dec 2018 05:47) (89 - 97)  BP: 119/59 (22 Dec 2018 05:47) (99/50 - 130/58)  BP(mean): --  RR: 18 (22 Dec 2018 05:47) (18 - 22)  SpO2: 97% (21 Dec 2018 23:50) (97% - 97%)                                             --------------------------------------------------------------  LABS:                        8.5    8.32  )-----------( 298      ( 22 Dec 2018 08:00 )             26.5     12-21    137  |  100  |  23<H>  ----------------------------<  106<H>  5.1<H>   |  19  |  1.4    Ca    8.1<L>      21 Dec 2018 07:28    RADIOLOGY:  < from: [facility] Duplex Lower Ext Vein Scan, Bilat (12.20.18 @ 18:05) >  Chronic-appearing right common femoral and femoral deep venous   thromboses; left popliteal deep venous thrombosis.    This is similar to previous duplex of 10/23/2018, when chronic bilateral   femoral and popliteal DVTs were visualized.      < from: VA Duplex Lower Extrem Arterial, Bilat (12.20.18 @ 18:04) >  On the right: Mild to moderate disease at the popliteal artery level.   Tibial arteries not visualized secondary to edema.     On the left: Mild popliteal and posterior tibial artery disease;   remaining tibial arteries not visualized secondary to edema.      < from: Xray Hip 2-3 Views, Right (12.19.18 @ 15:57) >  Impression:    Diffuse osteopenia without definite acute osseous abnormality.    < from: Xray Foot AP + Lateral + Oblique, Right (12.19.18 @ 10:56) >  There is dorsal soft tissue swelling. Vascular calcifications.  Osteopenic bones. Degenerative changes of the midfoot. There is hammertoe   deformity of the toes. Degenerative changes involving the DIP joints of   the toes.                                          PHYSICAL EXAM:    GENERAL: Distressed due to pain, shaking, well-developed, AAOx3  HEENT:  Atraumatic, Normocephalic. EOMI, PERRLA, conjunctiva and sclera clear  PULMONARY: Clear to auscultation bilaterally  CARDIOVASCULAR: Regular rate and rhythm  GASTROINTESTINAL: Soft, Nontender, globose; Bowel sounds present  MUSCULOSKELETAL: dressing+ b/l LE with superficial ulcers. Pulses nonpalpable on the dorsum of the feet. Increased erythema on the right foot and LE. Gangrenous rt 3rd toe present. 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    #Bilateral Chronic Nonhealing Ulcers and concurrent LE cellulitis   -Temp 102.2F 12/21 ; Afebrile overnight; No leukocytosis  -C/w Cefepime and Vancomycin empirically. F/U ID recs. F/U blood cx, urine cx  -Podiatry: Betadine paint to digit 3R BID; Xeroform to ulcers with loose dressing; Limb elevation. No surgery needed.   -Duplex LE venous: Chronic-appearing right common femoral and femoral deep venous thromboses; left popliteal deep venous thrombosis  -Duplex LE arterial: On the right: Mild to moderate disease at the popliteal artery level. On the left: Mild popliteal and posterior tibial artery disease; Remaining tibial arteries not visualized secondary to edema   -Uric acid levels normal. .  -Vascular following-Will f/u recs    #Chronic Right Hip Pain  -RIght hip xray: Diffuse osteopenia  -C/w Physical therapy. Will check Vit D levels and replete if deficient    #Chronic B/L femoral and popliteal DVT- C/w Eliquis  #Chronic Normocytic Anemia- Likely 2/2 RA; Hb at baseline  #Rheumatoid Arthritis -Continue home prednisone. talked to Dr Ascencio (Dr Henry's team)- Can decrease prednisone 7.5mg if healing hampered. No other adjustments needed.   #DM II-Controlled; Monitor finger sticks, start insulin sliding scale if fingersticks are greater than 180  #Latent TB- C/w Rifampin  #Gout- Normal uric acid levels; c/w Allopurinol    DVT ppx: On Eliquis  GI ppx: Not indicated  Diet: DASH Carb consistent   Activity: AAT  Code Status: DNR/DNI, MOLST form completed with health care proxy as witness by bedside, form in chart  Disposition: pending

## 2018-12-22 NOTE — PROGRESS NOTE ADULT - SUBJECTIVE AND OBJECTIVE BOX
Pt seen at bedside.  T 96.9  C/o pain level 7 0n 1-10 scale. R>L. Multiple scattered lower extremity ulcerations . Painful on dressing change.  There are no palpable pulses right   Digit 3 R dry ischemic gangrene of dorsum.  Arterial and venous US appreciated. Multiple areas of acute and chronic venous thrombosis as well as PAD .  WBC  8.56  HH 9.1/28.9  Urate 5.3    Rifampin, Cefepime, Vanco  Requires vascular and burn consults.  Should be allowed to place R leg over bed in dependent position for Pain relief.  Betadine paint to 3R . Xeroform, DSD

## 2018-12-23 LAB
ALBUMIN SERPL ELPH-MCNC: 3.1 G/DL — LOW (ref 3.5–5.2)
ALP SERPL-CCNC: 40 U/L — SIGNIFICANT CHANGE UP (ref 30–115)
ALT FLD-CCNC: 10 U/L — SIGNIFICANT CHANGE UP (ref 0–41)
ANION GAP SERPL CALC-SCNC: 16 MMOL/L — HIGH (ref 7–14)
APPEARANCE UR: ABNORMAL
AST SERPL-CCNC: 17 U/L — SIGNIFICANT CHANGE UP (ref 0–41)
BACTERIA # UR AUTO: ABNORMAL /HPF
BASOPHILS # BLD AUTO: 0.01 K/UL — SIGNIFICANT CHANGE UP (ref 0–0.2)
BASOPHILS NFR BLD AUTO: 0.2 % — SIGNIFICANT CHANGE UP (ref 0–1)
BILIRUB SERPL-MCNC: 0.3 MG/DL — SIGNIFICANT CHANGE UP (ref 0.2–1.2)
BILIRUB UR-MCNC: NEGATIVE — SIGNIFICANT CHANGE UP
BUN SERPL-MCNC: 28 MG/DL — HIGH (ref 10–20)
CALCIUM SERPL-MCNC: 8.6 MG/DL — SIGNIFICANT CHANGE UP (ref 8.5–10.1)
CHLORIDE SERPL-SCNC: 99 MMOL/L — SIGNIFICANT CHANGE UP (ref 98–110)
CO2 SERPL-SCNC: 20 MMOL/L — SIGNIFICANT CHANGE UP (ref 17–32)
COLOR SPEC: YELLOW — SIGNIFICANT CHANGE UP
CREAT SERPL-MCNC: 1.5 MG/DL — SIGNIFICANT CHANGE UP (ref 0.7–1.5)
DIFF PNL FLD: NEGATIVE — SIGNIFICANT CHANGE UP
EOSINOPHIL # BLD AUTO: 0.69 K/UL — SIGNIFICANT CHANGE UP (ref 0–0.7)
EOSINOPHIL NFR BLD AUTO: 12.8 % — HIGH (ref 0–8)
GLUCOSE BLDC GLUCOMTR-MCNC: 102 MG/DL — HIGH (ref 70–99)
GLUCOSE BLDC GLUCOMTR-MCNC: 111 MG/DL — HIGH (ref 70–99)
GLUCOSE BLDC GLUCOMTR-MCNC: 129 MG/DL — HIGH (ref 70–99)
GLUCOSE BLDC GLUCOMTR-MCNC: 151 MG/DL — HIGH (ref 70–99)
GLUCOSE SERPL-MCNC: 129 MG/DL — HIGH (ref 70–99)
GLUCOSE UR QL: NEGATIVE MG/DL — SIGNIFICANT CHANGE UP
HCT VFR BLD CALC: 25.5 % — LOW (ref 37–47)
HGB BLD-MCNC: 8 G/DL — LOW (ref 12–16)
IMM GRANULOCYTES NFR BLD AUTO: 0.4 % — HIGH (ref 0.1–0.3)
KETONES UR-MCNC: NEGATIVE — SIGNIFICANT CHANGE UP
LEUKOCYTE ESTERASE UR-ACNC: NEGATIVE — SIGNIFICANT CHANGE UP
LYMPHOCYTES # BLD AUTO: 0.6 K/UL — LOW (ref 1.2–3.4)
LYMPHOCYTES # BLD AUTO: 11.1 % — LOW (ref 20.5–51.1)
MCHC RBC-ENTMCNC: 28.9 PG — SIGNIFICANT CHANGE UP (ref 27–31)
MCHC RBC-ENTMCNC: 31.4 G/DL — LOW (ref 32–37)
MCV RBC AUTO: 92.1 FL — SIGNIFICANT CHANGE UP (ref 81–99)
MONOCYTES # BLD AUTO: 0.69 K/UL — HIGH (ref 0.1–0.6)
MONOCYTES NFR BLD AUTO: 12.8 % — HIGH (ref 1.7–9.3)
NEUTROPHILS # BLD AUTO: 3.4 K/UL — SIGNIFICANT CHANGE UP (ref 1.4–6.5)
NEUTROPHILS NFR BLD AUTO: 62.7 % — SIGNIFICANT CHANGE UP (ref 42.2–75.2)
NITRITE UR-MCNC: NEGATIVE — SIGNIFICANT CHANGE UP
NRBC # BLD: 0 /100 WBCS — SIGNIFICANT CHANGE UP (ref 0–0)
PH UR: 6 — SIGNIFICANT CHANGE UP (ref 5–8)
PLATELET # BLD AUTO: 275 K/UL — SIGNIFICANT CHANGE UP (ref 130–400)
POTASSIUM SERPL-MCNC: 4.7 MMOL/L — SIGNIFICANT CHANGE UP (ref 3.5–5)
POTASSIUM SERPL-SCNC: 4.7 MMOL/L — SIGNIFICANT CHANGE UP (ref 3.5–5)
PROT SERPL-MCNC: 5.5 G/DL — LOW (ref 6–8)
PROT UR-MCNC: ABNORMAL MG/DL
RBC # BLD: 2.77 M/UL — LOW (ref 4.2–5.4)
RBC # FLD: 15.9 % — HIGH (ref 11.5–14.5)
SODIUM SERPL-SCNC: 135 MMOL/L — SIGNIFICANT CHANGE UP (ref 135–146)
SP GR SPEC: 1.01 — SIGNIFICANT CHANGE UP (ref 1.01–1.03)
UROBILINOGEN FLD QL: 0.2 MG/DL — SIGNIFICANT CHANGE UP (ref 0.2–0.2)
WBC # BLD: 5.41 K/UL — SIGNIFICANT CHANGE UP (ref 4.8–10.8)
WBC # FLD AUTO: 5.41 K/UL — SIGNIFICANT CHANGE UP (ref 4.8–10.8)

## 2018-12-23 RX ORDER — MORPHINE SULFATE 50 MG/1
2 CAPSULE, EXTENDED RELEASE ORAL EVERY 6 HOURS
Qty: 0 | Refills: 0 | Status: DISCONTINUED | OUTPATIENT
Start: 2018-12-23 | End: 2018-12-26

## 2018-12-23 RX ORDER — LINEZOLID 600 MG/300ML
600 INJECTION, SOLUTION INTRAVENOUS ONCE
Qty: 0 | Refills: 0 | Status: COMPLETED | OUTPATIENT
Start: 2018-12-23 | End: 2018-12-23

## 2018-12-23 RX ORDER — CEFEPIME 1 G/1
1000 INJECTION, POWDER, FOR SOLUTION INTRAMUSCULAR; INTRAVENOUS EVERY 12 HOURS
Qty: 0 | Refills: 0 | Status: DISCONTINUED | OUTPATIENT
Start: 2018-12-23 | End: 2018-12-24

## 2018-12-23 RX ORDER — LINEZOLID 600 MG/300ML
600 INJECTION, SOLUTION INTRAVENOUS EVERY 12 HOURS
Qty: 0 | Refills: 0 | Status: DISCONTINUED | OUTPATIENT
Start: 2018-12-23 | End: 2018-12-24

## 2018-12-23 RX ORDER — LINEZOLID 600 MG/300ML
INJECTION, SOLUTION INTRAVENOUS
Qty: 0 | Refills: 0 | Status: DISCONTINUED | OUTPATIENT
Start: 2018-12-23 | End: 2018-12-24

## 2018-12-23 RX ORDER — METHOCARBAMOL 500 MG/1
500 TABLET, FILM COATED ORAL EVERY 8 HOURS
Qty: 0 | Refills: 0 | Status: DISCONTINUED | OUTPATIENT
Start: 2018-12-23 | End: 2018-12-23

## 2018-12-23 RX ADMIN — Medication 100 MILLIGRAM(S): at 17:25

## 2018-12-23 RX ADMIN — OXYCODONE AND ACETAMINOPHEN 2 TABLET(S): 5; 325 TABLET ORAL at 10:46

## 2018-12-23 RX ADMIN — Medication 5 MILLIGRAM(S): at 10:48

## 2018-12-23 RX ADMIN — MORPHINE SULFATE 2 MILLIGRAM(S): 50 CAPSULE, EXTENDED RELEASE ORAL at 00:15

## 2018-12-23 RX ADMIN — OXYCODONE AND ACETAMINOPHEN 2 TABLET(S): 5; 325 TABLET ORAL at 02:32

## 2018-12-23 RX ADMIN — OXYCODONE AND ACETAMINOPHEN 2 TABLET(S): 5; 325 TABLET ORAL at 17:23

## 2018-12-23 RX ADMIN — GABAPENTIN 300 MILLIGRAM(S): 400 CAPSULE ORAL at 17:25

## 2018-12-23 RX ADMIN — CEFEPIME 100 MILLIGRAM(S): 1 INJECTION, POWDER, FOR SOLUTION INTRAMUSCULAR; INTRAVENOUS at 17:25

## 2018-12-23 RX ADMIN — GABAPENTIN 300 MILLIGRAM(S): 400 CAPSULE ORAL at 06:00

## 2018-12-23 RX ADMIN — Medication 1 MILLIGRAM(S): at 10:49

## 2018-12-23 RX ADMIN — APIXABAN 5 MILLIGRAM(S): 2.5 TABLET, FILM COATED ORAL at 17:25

## 2018-12-23 RX ADMIN — OXYCODONE AND ACETAMINOPHEN 2 TABLET(S): 5; 325 TABLET ORAL at 16:03

## 2018-12-23 RX ADMIN — OXYCODONE AND ACETAMINOPHEN 2 TABLET(S): 5; 325 TABLET ORAL at 07:33

## 2018-12-23 RX ADMIN — SENNA PLUS 2 TABLET(S): 8.6 TABLET ORAL at 22:33

## 2018-12-23 RX ADMIN — Medication 10 MILLIGRAM(S): at 06:00

## 2018-12-23 RX ADMIN — LINEZOLID 300 MILLIGRAM(S): 600 INJECTION, SOLUTION INTRAVENOUS at 22:31

## 2018-12-23 RX ADMIN — OXYCODONE AND ACETAMINOPHEN 2 TABLET(S): 5; 325 TABLET ORAL at 20:55

## 2018-12-23 RX ADMIN — OXYCODONE AND ACETAMINOPHEN 2 TABLET(S): 5; 325 TABLET ORAL at 12:41

## 2018-12-23 RX ADMIN — MORPHINE SULFATE 2 MILLIGRAM(S): 50 CAPSULE, EXTENDED RELEASE ORAL at 01:00

## 2018-12-23 RX ADMIN — APIXABAN 5 MILLIGRAM(S): 2.5 TABLET, FILM COATED ORAL at 06:00

## 2018-12-23 RX ADMIN — LINEZOLID 300 MILLIGRAM(S): 600 INJECTION, SOLUTION INTRAVENOUS at 10:48

## 2018-12-23 RX ADMIN — CHLORHEXIDINE GLUCONATE 1 APPLICATION(S): 213 SOLUTION TOPICAL at 05:26

## 2018-12-23 RX ADMIN — OXYCODONE AND ACETAMINOPHEN 2 TABLET(S): 5; 325 TABLET ORAL at 03:00

## 2018-12-23 RX ADMIN — OXYCODONE AND ACETAMINOPHEN 2 TABLET(S): 5; 325 TABLET ORAL at 06:30

## 2018-12-23 RX ADMIN — Medication 100 MILLIGRAM(S): at 06:00

## 2018-12-23 RX ADMIN — Medication 300 MILLIGRAM(S): at 10:49

## 2018-12-23 RX ADMIN — OXYCODONE AND ACETAMINOPHEN 2 TABLET(S): 5; 325 TABLET ORAL at 20:20

## 2018-12-23 NOTE — PROGRESS NOTE ADULT - SUBJECTIVE AND OBJECTIVE BOX
S : No new event./s complaints  comfortable in bed  eating, drinking fine.   Having BM      All other pertinent ROS negative.      Vital Signs Last 24 Hrs  T(C): 36.3 (23 Dec 2018 13:47), Max: 37 (22 Dec 2018 19:40)  T(F): 97.4 (23 Dec 2018 13:47), Max: 98.6 (22 Dec 2018 19:40)  HR: 82 (23 Dec 2018 13:47) (80 - 84)  BP: 118/56 (23 Dec 2018 13:47) (107/52 - 126/58)  BP(mean): --  RR: 18 (23 Dec 2018 13:47) (18 - 18)  SpO2: 95% (23 Dec 2018 10:57) (95% - 95%)  PHYSICAL EXAM:    Constitutional: NAD, awake and alert, well-developed  HEENT: PERR, EOMI, Normal Hearing, MMM  Neck: Soft and supple, No LAD, No JVD  Respiratory: Breath sounds are clear bilaterally, No wheezing, rales or rhonchi  Cardiovascular: S1 and S2, regular rate and rhythm, no Murmurs, gallops or rubs  Gastrointestinal: Bowel Sounds present, soft, nontender, nondistended, no guarding, no rebound  Extremities: No change from prior    MEDICATIONS:  MEDICATIONS  (STANDING):  allopurinol 300 milliGRAM(s) Oral daily  apixaban 5 milliGRAM(s) Oral every 12 hours  cefepime   IVPB 1000 milliGRAM(s) IV Intermittent every 12 hours  chlorhexidine 4% Liquid 1 Application(s) Topical <User Schedule>  docusate sodium 100 milliGRAM(s) Oral two times a day  folic acid 1 milliGRAM(s) Oral daily  gabapentin 300 milliGRAM(s) Oral every 12 hours  leucovorin 5 milliGRAM(s) Oral daily  linezolid  IVPB 600 milliGRAM(s) IV Intermittent every 12 hours  linezolid  IVPB      predniSONE   Tablet 10 milliGRAM(s) Oral daily  senna 2 Tablet(s) Oral at bedtime      LABS: All Labs Reviewed:                        8.0    5.41  )-----------( 275      ( 23 Dec 2018 08:07 )             25.5     12-23    135  |  99  |  28<H>  ----------------------------<  129<H>  4.7   |  20  |  1.5    Ca    8.6      23 Dec 2018 08:07    TPro  5.5<L>  /  Alb  3.1<L>  /  TBili  0.3  /  DBili  x   /  AST  17  /  ALT  10  /  AlkPhos  40  12-23          Blood Culture: 12-21 @ 12:57  Organism --  Gram Stain Blood -- Gram Stain --  Specimen Source .Blood None  Culture-Blood --        Radiology: reviewed

## 2018-12-23 NOTE — PROGRESS NOTE ADULT - ASSESSMENT
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    #Bilateral non-healing ulcers: her case was discussed with podiatry; no need for surgical debridement.  Apply Xeroform with Betadine to the lower extremities, with loose dressings.  Venous duplex was consistent with chronic DVTs.  Arterial duplex demonstrated mild to moderate disease to right popliteal and mild disease to the left popliteal.  The patient was febrile 12/20 overnight, started Vancomycin and Cefepime.   Afebrile now. Follow-up with Infectious Disease.  She previously tolerated Rocephin in October 2018.  #PAD / Right 3rd digit dorsum ischemic changes. f/u Vascular, Burn - awaiting to be seen by them.  #Rheumatoid Arthritis: patient has been taking chronic prednisone therapy which likely contributed to impaired healing of lower extremity ulcers.  Decrease Prednisone to 7.5mg PO q24 as per Rheum and continue Leucovorin 5mg q24 as directed.    #Chronic Bilateral Femoral and Popliteal DVT: continue Eliquis  #Chronic Anemia: Hgb stable @ 9.1  #History of LTBI: continue Rifampin  #DMII: continue Lantus/Lispro and monitor FS with meals.  #Gout: continue Allopurinol  GI/DVT prophylaxis  DNR/DNI .

## 2018-12-24 ENCOUNTER — TRANSCRIPTION ENCOUNTER (OUTPATIENT)
Age: 78
End: 2018-12-24

## 2018-12-24 LAB
ALBUMIN SERPL ELPH-MCNC: 3.1 G/DL — LOW (ref 3.5–5.2)
ALP SERPL-CCNC: 40 U/L — SIGNIFICANT CHANGE UP (ref 30–115)
ALT FLD-CCNC: 9 U/L — SIGNIFICANT CHANGE UP (ref 0–41)
ANION GAP SERPL CALC-SCNC: 16 MMOL/L — HIGH (ref 7–14)
AST SERPL-CCNC: 14 U/L — SIGNIFICANT CHANGE UP (ref 0–41)
BILIRUB SERPL-MCNC: <0.2 MG/DL — SIGNIFICANT CHANGE UP (ref 0.2–1.2)
BUN SERPL-MCNC: 24 MG/DL — HIGH (ref 10–20)
CALCIUM SERPL-MCNC: 8.7 MG/DL — SIGNIFICANT CHANGE UP (ref 8.5–10.1)
CHLORIDE SERPL-SCNC: 100 MMOL/L — SIGNIFICANT CHANGE UP (ref 98–110)
CO2 SERPL-SCNC: 21 MMOL/L — SIGNIFICANT CHANGE UP (ref 17–32)
CREAT SERPL-MCNC: 1.3 MG/DL — SIGNIFICANT CHANGE UP (ref 0.7–1.5)
GLUCOSE BLDC GLUCOMTR-MCNC: 125 MG/DL — HIGH (ref 70–99)
GLUCOSE BLDC GLUCOMTR-MCNC: 127 MG/DL — HIGH (ref 70–99)
GLUCOSE BLDC GLUCOMTR-MCNC: 168 MG/DL — HIGH (ref 70–99)
GLUCOSE BLDC GLUCOMTR-MCNC: 188 MG/DL — HIGH (ref 70–99)
GLUCOSE BLDC GLUCOMTR-MCNC: 235 MG/DL — HIGH (ref 70–99)
GLUCOSE BLDC GLUCOMTR-MCNC: 86 MG/DL — SIGNIFICANT CHANGE UP (ref 70–99)
GLUCOSE SERPL-MCNC: 212 MG/DL — HIGH (ref 70–99)
HCT VFR BLD CALC: 24.9 % — LOW (ref 37–47)
HGB BLD-MCNC: 7.8 G/DL — LOW (ref 12–16)
MCHC RBC-ENTMCNC: 28.6 PG — SIGNIFICANT CHANGE UP (ref 27–31)
MCHC RBC-ENTMCNC: 31.3 G/DL — LOW (ref 32–37)
MCV RBC AUTO: 91.2 FL — SIGNIFICANT CHANGE UP (ref 81–99)
NRBC # BLD: 0 /100 WBCS — SIGNIFICANT CHANGE UP (ref 0–0)
PLATELET # BLD AUTO: 285 K/UL — SIGNIFICANT CHANGE UP (ref 130–400)
POTASSIUM SERPL-MCNC: 4.8 MMOL/L — SIGNIFICANT CHANGE UP (ref 3.5–5)
POTASSIUM SERPL-SCNC: 4.8 MMOL/L — SIGNIFICANT CHANGE UP (ref 3.5–5)
PROT SERPL-MCNC: 5.4 G/DL — LOW (ref 6–8)
RBC # BLD: 2.73 M/UL — LOW (ref 4.2–5.4)
RBC # FLD: 15.9 % — HIGH (ref 11.5–14.5)
SODIUM SERPL-SCNC: 137 MMOL/L — SIGNIFICANT CHANGE UP (ref 135–146)
WBC # BLD: 5.57 K/UL — SIGNIFICANT CHANGE UP (ref 4.8–10.8)
WBC # FLD AUTO: 5.57 K/UL — SIGNIFICANT CHANGE UP (ref 4.8–10.8)

## 2018-12-24 RX ADMIN — GABAPENTIN 300 MILLIGRAM(S): 400 CAPSULE ORAL at 06:08

## 2018-12-24 RX ADMIN — OXYCODONE AND ACETAMINOPHEN 2 TABLET(S): 5; 325 TABLET ORAL at 00:16

## 2018-12-24 RX ADMIN — OXYCODONE AND ACETAMINOPHEN 2 TABLET(S): 5; 325 TABLET ORAL at 17:19

## 2018-12-24 RX ADMIN — MORPHINE SULFATE 2 MILLIGRAM(S): 50 CAPSULE, EXTENDED RELEASE ORAL at 13:25

## 2018-12-24 RX ADMIN — MORPHINE SULFATE 2 MILLIGRAM(S): 50 CAPSULE, EXTENDED RELEASE ORAL at 20:10

## 2018-12-24 RX ADMIN — MORPHINE SULFATE 2 MILLIGRAM(S): 50 CAPSULE, EXTENDED RELEASE ORAL at 20:30

## 2018-12-24 RX ADMIN — Medication 100 MILLIGRAM(S): at 06:08

## 2018-12-24 RX ADMIN — OXYCODONE AND ACETAMINOPHEN 2 TABLET(S): 5; 325 TABLET ORAL at 12:13

## 2018-12-24 RX ADMIN — APIXABAN 5 MILLIGRAM(S): 2.5 TABLET, FILM COATED ORAL at 19:10

## 2018-12-24 RX ADMIN — Medication 100 MILLIGRAM(S): at 19:10

## 2018-12-24 RX ADMIN — APIXABAN 5 MILLIGRAM(S): 2.5 TABLET, FILM COATED ORAL at 06:08

## 2018-12-24 RX ADMIN — OXYCODONE AND ACETAMINOPHEN 2 TABLET(S): 5; 325 TABLET ORAL at 11:00

## 2018-12-24 RX ADMIN — OXYCODONE AND ACETAMINOPHEN 2 TABLET(S): 5; 325 TABLET ORAL at 00:45

## 2018-12-24 RX ADMIN — Medication 5 MILLIGRAM(S): at 11:01

## 2018-12-24 RX ADMIN — Medication 300 MILLIGRAM(S): at 11:01

## 2018-12-24 RX ADMIN — OXYCODONE AND ACETAMINOPHEN 2 TABLET(S): 5; 325 TABLET ORAL at 04:35

## 2018-12-24 RX ADMIN — CHLORHEXIDINE GLUCONATE 1 APPLICATION(S): 213 SOLUTION TOPICAL at 06:58

## 2018-12-24 RX ADMIN — Medication 10 MILLIGRAM(S): at 06:08

## 2018-12-24 RX ADMIN — Medication 1 MILLIGRAM(S): at 11:01

## 2018-12-24 RX ADMIN — MORPHINE SULFATE 2 MILLIGRAM(S): 50 CAPSULE, EXTENDED RELEASE ORAL at 03:37

## 2018-12-24 RX ADMIN — MORPHINE SULFATE 2 MILLIGRAM(S): 50 CAPSULE, EXTENDED RELEASE ORAL at 04:00

## 2018-12-24 RX ADMIN — MORPHINE SULFATE 2 MILLIGRAM(S): 50 CAPSULE, EXTENDED RELEASE ORAL at 14:19

## 2018-12-24 RX ADMIN — CEFEPIME 100 MILLIGRAM(S): 1 INJECTION, POWDER, FOR SOLUTION INTRAMUSCULAR; INTRAVENOUS at 06:08

## 2018-12-24 RX ADMIN — OXYCODONE AND ACETAMINOPHEN 2 TABLET(S): 5; 325 TABLET ORAL at 16:10

## 2018-12-24 RX ADMIN — GABAPENTIN 300 MILLIGRAM(S): 400 CAPSULE ORAL at 19:10

## 2018-12-24 NOTE — PROGRESS NOTE ADULT - SUBJECTIVE AND OBJECTIVE BOX
SUBJECTIVE:    Patient is a 78y old Female who presents with a chief complaint of "I couldn't stand up from the recliner", and worsening pain of bilateral lower extremities (24 Dec 2018 07:10)    Currently admitted to medicine with the primary diagnosis of Wounds, multiple open, lower extremity     Today is hospital day 5d. still complaining of lower extremity pain b/l.    PAST MEDICAL & SURGICAL HISTORY  Gout  DVT (deep venous thrombosis)  HTN (hypertension)  Rheumatoid arteritis  Other specified diabetes mellitus with other specified complication, unspecified whether long term insulin use  Primary osteoarthritis of right knee: s/p knee repalcement    SOCIAL HISTORY:  Negative for smoking/alcohol/drug use.     ALLERGIES:  clindamycin (Unknown)  erythromycin (Unknown)  penicillin (Unknown)  strawberry (Unknown)    MEDICATIONS:  STANDING MEDICATIONS  allopurinol 300 milliGRAM(s) Oral daily  apixaban 5 milliGRAM(s) Oral every 12 hours  cefepime   IVPB 1000 milliGRAM(s) IV Intermittent every 12 hours  chlorhexidine 4% Liquid 1 Application(s) Topical <User Schedule>  docusate sodium 100 milliGRAM(s) Oral two times a day  folic acid 1 milliGRAM(s) Oral daily  gabapentin 300 milliGRAM(s) Oral every 12 hours  leucovorin 5 milliGRAM(s) Oral daily  linezolid  IVPB 600 milliGRAM(s) IV Intermittent every 12 hours  linezolid  IVPB      predniSONE   Tablet 10 milliGRAM(s) Oral daily  senna 2 Tablet(s) Oral at bedtime    PRN MEDICATIONS  acetaminophen   Tablet .. 650 milliGRAM(s) Oral every 6 hours PRN  morphine  - Injectable 2 milliGRAM(s) IV Push every 6 hours PRN  oxyCODONE    5 mG/acetaminophen 325 mG 2 Tablet(s) Oral every 4 hours PRN    VITALS:   T(F): 97.8  HR: 85  BP: 150/65  RR: 20  SpO2: 95%    PHYSICAL EXAM:  GEN: No acute distress  LUNGS: Clear to auscultation bilaterally   HEART: S1/S2 present.   ABD: Soft, non-tender, non-distended.   EXT: lower extremities wrapped in bandage b/l and pt complaining of pain  NEURO: AAOX3      LABS:                        8.0    5.41  )-----------( 275      ( 23 Dec 2018 08:07 )             25.5     12-    135  |  99  |  28<H>  ----------------------------<  129<H>  4.7   |  20  |  1.5    Ca    8.6      23 Dec 2018 08:07    TPro  5.5<L>  /  Alb  3.1<L>  /  TBili  0.3  /  DBili  x   /  AST  17  /  ALT  10  /  AlkPhos  40  12-23      Urinalysis Basic - ( 23 Dec 2018 21:00 )    Color: Yellow / Appearance: Turbid / S.010 / pH: x  Gluc: x / Ketone: Negative  / Bili: Negative / Urobili: 0.2 mg/dL   Blood: x / Protein: Trace mg/dL / Nitrite: Negative   Leuk Esterase: Negative / RBC: x / WBC x   Sq Epi: x / Non Sq Epi: x / Bacteria: Few /HPF              Culture - Blood (collected 21 Dec 2018 12:57)  Source: .Blood None  Preliminary Report (23 Dec 2018 01:01):    No growth to date.            RADIOLOGY:

## 2018-12-24 NOTE — DISCHARGE NOTE ADULT - CARE PLAN
Principal Discharge DX:	Wounds, multiple open, lower extremity  Goal:	improvement of symptoms and functional status  Assessment and plan of treatment:	pt was evaluated to have lower extremity lesions. will need angiogram in future. Principal Discharge DX:	Wounds, multiple open, lower extremity  Goal:	improvement of symptoms and functional status  Assessment and plan of treatment:	pt was evaluated to have lower extremity lesions. you will need an angiogram. Vascular surgery  will perform your angiogram on 12/30/18. please contact the number to confirm the appointment. Principal Discharge DX:	Wounds, multiple open, lower extremity  Goal:	improvement of symptoms and functional status  Assessment and plan of treatment:	pt was evaluated to have lower extremity lesions. you will need an angiogram. Vascular surgery  will perform your angiogram on 12/30/18. please contact the number to confirm the appointment.  Please take doxycycline till 5 January 2019. Principal Discharge DX:	Wounds, multiple open, lower extremity  Goal:	improvement of symptoms and functional status  Assessment and plan of treatment:	pt was evaluated to have lower extremity lesions. angiogram was consistent with small vessel disease. Pt went debridement and toe amputation with podiatry ,found to have Osteomyelitis. being sent to nursing home on IV antibiotics for 6 weeks until 27 feb 2019. Principal Discharge DX:	Wounds, multiple open, lower extremity  Goal:	improvement of symptoms and functional status  Assessment and plan of treatment:	pt was evaluated to have lower extremity lesions. angiogram was consistent with small vessel disease. Pt went debridement and toe amputation with podiatry ,found to have Osteomyelitis. being sent to nursing home on IV antibiotics for 6 weeks until 27 feb 2019. f/u with ID as out pt for PICC line and antibiotics.

## 2018-12-24 NOTE — DISCHARGE NOTE ADULT - HOSPITAL COURSE
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    #Bilateral Chronic Nonhealing Ulcers and concurrent LE cellulitis   -Temp 102.2F 12/21 ; Afebrile overnight; No leukocytosis  -C/w Cefepime and Linezolid empirically. F/U ID recs. F/U bC negative  -Podiatry: Betadine paint to digit 3R BID; Xeroform to ulcers with loose dressing; Limb elevation. No surgery needed.   -Duplex LE venous: Chronic-appearing right common femoral and femoral deep venous thromboses; left popliteal deep venous thrombosis  -Duplex LE arterial: On the right: Mild to moderate disease at the popliteal artery level. On the left: Mild popliteal and posterior tibial artery disease; Remaining tibial arteries not visualized secondary to edema   -Uric acid levels normal. .  -Vascular following-Will f/u recs    #Chronic Right Hip Pain  -RIght hip xray: Diffuse osteopenia  -C/w Physical therapy. Will check Vit D levels and replete if deficient    #Chronic B/L femoral and popliteal DVT- C/w Eliquis  #Chronic Normocytic Anemia- Likely 2/2 RA; Hb at baseline  #Rheumatoid Arthritis -Continue home prednisone. talked to Dr Ascencio (Dr Henry's team)- Can decrease prednisone 7.5mg if healing hampered. No other adjustments needed.   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    #Bilateral Chronic Nonhealing Ulcers and concurrent LE cellulitis   -per podiatry betadine to lesions,s/p debridement by burn , s/p angiogram. Large vessels patent , small vessel disease. s/p amputation  -Temp on 102.2F 12/21 ; Afebrile since  -New lucency on xray at third metatarsal head. MRI not required for now per podiatry  -doxycycline oral 100mg q 12 for 14 days , growing Pseudomonas (carbapenum resistant) , will require cefepime but pt refusing cefepime. Called ID , cefepime is only option for now , PICC line and abx x 6 weeks. bone biopsy consistent with OM-Podiatry: Betadine paint to digit 3R BID; Xeroform to ulcers with loose dressing; Limb elevation.    -Duplex LE venous: Chronic-appearing right common femoral and femoral deep venous thromboses; left popliteal deep venous thrombosis  -Duplex LE arterial: On the right: Mild to moderate disease at the popliteal artery level. On the left: Mild popliteal and posterior tibial artery disease; Remaining tibial arteries not visualized secondary to edema   -Uric acid levels normal. .  -serial xrays    #) Direct antiglobin positive   - Blood was sent for type and cross match, hb holding.   -Might need hem /onc consult     #Chronic Right Hip Pain  -RIght hip xray: Diffuse osteopenia  -C/w Physical therapy. Will check Vit D levels and replete if deficient    #Chronic B/L femoral and popliteal DVT- eliquis     #Chronic Normocytic Anemia- Likely 2/2 RA; Hb at baseline  #Rheumatoid Arthritis -Continue home prednisone. talked to Dr Ascencio (Dr Henry's team)- Can decrease prednisone 5mg if healing hampered. No other adjustments needed. one dose of solumedrol before going for OR  #DM II-Controlled; Monitor finger sticks, start insulin sliding scale if fingersticks are greater than 180  #Latent TB- rifampin  #Gout- Normal uric acid levels; c/w Allopurinol    DVT ppx: On Eliquis ,   GI ppx: Not indicated  Diet: DASH Carb consistent  ,   Activity: AAT  Code Status: DNR/DNI, MOLST form completed with health care proxy as witness by bedside, form in chart  Disposition: pending

## 2018-12-24 NOTE — DISCHARGE NOTE ADULT - MEDICATION SUMMARY - MEDICATIONS TO STOP TAKING
I will STOP taking the medications listed below when I get home from the hospital:    rifAMPin 300 mg oral capsule  -- 1 cap(s) by mouth 2 times a day I will STOP taking the medications listed below when I get home from the hospital:  None I will STOP taking the medications listed below when I get home from the hospital:    doxycycline monohydrate 100 mg oral capsule  -- 1 cap(s) by mouth every 12 hours until 11/5/18

## 2018-12-24 NOTE — DISCHARGE NOTE ADULT - MEDICATION SUMMARY - MEDICATIONS TO CHANGE
I will SWITCH the dose or number of times a day I take the medications listed below when I get home from the hospital:  None I will SWITCH the dose or number of times a day I take the medications listed below when I get home from the hospital:    predniSONE 10 mg oral tablet  -- 1 tab(s) by mouth once a day

## 2018-12-24 NOTE — DISCHARGE NOTE ADULT - PROVIDER TOKENS
FRANCISCO:'[id number]' TOKJS:'[id number]',TOKEN:'[id number]' TOKEN:'[id number]',TOKEN:'[id number]',TOKEN:'[id number]' TOKEN:'[id number]',TOKEN:'[id number]',TOKEN:'[id number]',TOKEN:'[id number]'

## 2018-12-24 NOTE — DISCHARGE NOTE ADULT - INSTRUCTIONS
-please continue taking medications  -f/u with your PMD -please continue taking medications  -f/u with your PMD , please call the number provided above of vascular dr to confirm your appointment for angiogram elective.

## 2018-12-24 NOTE — DISCHARGE NOTE ADULT - CARE PROVIDERS DIRECT ADDRESSES
,[email or web address] ,[email or web address],DirectAddress_Unknown ,[email or web address],DirectAddress_Unknown,DirectAddress_Unknown

## 2018-12-24 NOTE — PROGRESS NOTE ADULT - SUBJECTIVE AND OBJECTIVE BOX
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    Overnight events/Updates: The patient was able to transfer from bed to chair, but remains unable to ambulate.    Vital Signs Last 24 Hrs  T(C): 36.4 (24 Dec 2018 13:32), Max: 36.7 (23 Dec 2018 21:24)  T(F): 97.6 (24 Dec 2018 13:32), Max: 98 (23 Dec 2018 21:24)  HR: 94 (24 Dec 2018 13:32) (85 - 94)  BP: 132/63 (24 Dec 2018 13:32) (132/63 - 152/75)  BP(mean): --  RR: 18 (24 Dec 2018 13:32) (18 - 20)  SpO2: --    Physical Examination:  General: AAO x 3  HEENT: PERRLA, EOMI  CV= S1 & S2 appreciated  Lungs= CTA BL  Abdominal Examination= + BS, Soft, NT/ND  Extremity Examination= dressings to the BL LE  necrosis to dorsum of the right third digit, decreased RLE pulses    ROS: No chest pain, no shortness of breath.  All other systems reviewed and are within normal limits except for the complaints in the HPI.    MEDICATIONS  (STANDING):  allopurinol 300 milliGRAM(s) Oral daily  apixaban 5 milliGRAM(s) Oral every 12 hours  chlorhexidine 4% Liquid 1 Application(s) Topical <User Schedule>  docusate sodium 100 milliGRAM(s) Oral two times a day  doxycycline hyclate Capsule 100 milliGRAM(s) Oral every 12 hours  folic acid 1 milliGRAM(s) Oral daily  gabapentin 300 milliGRAM(s) Oral every 12 hours  leucovorin 5 milliGRAM(s) Oral daily  predniSONE   Tablet 10 milliGRAM(s) Oral daily  senna 2 Tablet(s) Oral at bedtime    MEDICATIONS  (PRN):  acetaminophen   Tablet .. 650 milliGRAM(s) Oral every 6 hours PRN Temp greater or equal to 38C (100.4F)  morphine  - Injectable 2 milliGRAM(s) IV Push every 6 hours PRN Severe Pain (7 - 10)  oxyCODONE    5 mG/acetaminophen 325 mG 2 Tablet(s) Oral every 4 hours PRN Moderate Pain (4 - 6)                            7.8    5.57  )-----------( 285      ( 24 Dec 2018 10:04 )             24.9     12-24    137  |  100  |  24<H>  ----------------------------<  212<H>  4.8   |  21  |  1.3    Ca    8.7      24 Dec 2018 10:04    TPro  5.4<L>  /  Alb  3.1<L>  /  TBili  <0.2  /  DBili  x   /  AST  14  /  ALT  9   /  AlkPhos  40  12-24      Case discussed with housestamukesh & family SEBAS Segal 2272

## 2018-12-24 NOTE — PROGRESS NOTE ADULT - ASSESSMENT
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    Bilateral non-healing ulcers: Continue Xeroform with Betadine to the lower extremities, with loose dressings.  Vascular Surgery recommendations were reviewed; the patient suffers moderate RLE disease, but vascular attending will not be available for angiography until 12/29/18.  Fevers have resolved from 12/21/18, and blood cultures have been negative to date.  Infectious Disease advised transitioning to PO Doxycycline.  Rheumatoid Arthritis: patient has been taking chronic prednisone therapy which likely contributed to impaired healing of lower extremity ulcers.  Decrease Prednisone to 7.5mg PO q24 as per Rheum and continue Leucovorin 5mg q24 as directed.    Chronic Bilateral Femoral and Popliteal DVT: continue Eliquis  Chronic Anemia: Hgb decreased to 7.8.   This may be due to chronic infection  History of LTBI: continue Rifampin  DMII: continue Lantus/Lispro and monitor FS with meals.  Gout: continue Allopurinol  GI/DVT prophylaxis  DNR/DNI  Discharge planning to SNF

## 2018-12-24 NOTE — DISCHARGE NOTE ADULT - MEDICATION SUMMARY - MEDICATIONS TO TAKE
I will START or STAY ON the medications listed below when I get home from the hospital:    predniSONE 10 mg oral tablet  -- 1 tab(s) by mouth once a day  -- Indication: For joint    OxyCONTIN 20 mg oral tablet, extended release  -- 1 tab(s) by mouth every 12 hours, As Needed  -- Indication: For pain    Eliquis 5 mg oral tablet  -- Check with your doctor before becoming pregnant.  It is very important that you take or use this exactly as directed.  Do not skip doses or discontinue unless directed by your doctor.  Obtain medical advice before taking any non-prescription drugs as some may affect the action of this medication.    -- Indication: For dvt/pe    gabapentin 300 mg oral capsule  -- orally every 12 hours  -- Indication: For pain    Actos 30 mg oral tablet  -- 1 tab(s) by mouth once a day  -- Indication: For actos    glimepiride 2 mg oral tablet  -- 1  by mouth 3 times a day  -- Indication: For dm    leucovorin 5 mg oral tablet  -- 1 tab(s) by mouth once a day  -- Indication: For Leucovorin    allopurinol 300 mg oral tablet  -- 1 tab(s) by mouth once a day  -- Indication: For Gout    doxycycline monohydrate 100 mg oral capsule  -- 1 cap(s) by mouth every 12 hours  -- Indication: For infection    folic acid 1 mg oral tablet  -- 1 tab(s) by mouth once a day  -- Indication: For folic acid I will START or STAY ON the medications listed below when I get home from the hospital:    predniSONE 10 mg oral tablet  -- 1 tab(s) by mouth once a day  -- Indication: For joint    OxyCONTIN 20 mg oral tablet, extended release  -- 1 tab(s) by mouth every 12 hours, As Needed  -- Indication: For pain    Eliquis 5 mg oral tablet  -- Check with your doctor before becoming pregnant.  It is very important that you take or use this exactly as directed.  Do not skip doses or discontinue unless directed by your doctor.  Obtain medical advice before taking any non-prescription drugs as some may affect the action of this medication.    -- Indication: For dvt/pe    gabapentin 300 mg oral capsule  -- orally every 12 hours  -- Indication: For pain    Actos 30 mg oral tablet  -- 1 tab(s) by mouth once a day  -- Indication: For actos    glimepiride 2 mg oral tablet  -- 1  by mouth 3 times a day  -- Indication: For dm    leucovorin 5 mg oral tablet  -- 1 tab(s) by mouth once a day  -- Indication: For Leucovorin    allopurinol 300 mg oral tablet  -- 1 tab(s) by mouth once a day  -- Indication: For Gout    doxycycline monohydrate 100 mg oral capsule  -- 1 cap(s) by mouth every 12 hours  -- Indication: For infection    rifAMPin 300 mg oral capsule  -- 1 cap(s) by mouth 2 times a day  -- Indication: For LTB    folic acid 1 mg oral tablet  -- 1 tab(s) by mouth once a day  -- Indication: For folic acid I will START or STAY ON the medications listed below when I get home from the hospital:    predniSONE 10 mg oral tablet  -- 1 tab(s) by mouth once a day  -- Indication: For joint    OxyCONTIN 20 mg oral tablet, extended release  -- 1 tab(s) by mouth every 12 hours, As Needed  -- Indication: For pain    Eliquis 5 mg oral tablet  -- Check with your doctor before becoming pregnant.  It is very important that you take or use this exactly as directed.  Do not skip doses or discontinue unless directed by your doctor.  Obtain medical advice before taking any non-prescription drugs as some may affect the action of this medication.    -- Indication: For dvt/pe    gabapentin 300 mg oral capsule  -- orally every 12 hours  -- Indication: For pain    Actos 30 mg oral tablet  -- 1 tab(s) by mouth once a day  -- Indication: For actos    glimepiride 2 mg oral tablet  -- 1  by mouth 3 times a day  -- Indication: For dm    leucovorin 5 mg oral tablet  -- 1 tab(s) by mouth once a day  -- Indication: For Leucovorin    allopurinol 300 mg oral tablet  -- 1 tab(s) by mouth once a day  -- Indication: For Gout    doxycycline monohydrate 100 mg oral capsule  -- 1 cap(s) by mouth every 12 hours  -- Indication: For infection. Please continue till 5 January 2019    rifAMPin 300 mg oral capsule  -- 1 cap(s) by mouth 2 times a day  -- Indication: For LTB    folic acid 1 mg oral tablet  -- 1 tab(s) by mouth once a day  -- Indication: For folic acid I will START or STAY ON the medications listed below when I get home from the hospital:    predniSONE 5 mg oral tablet  -- 1 tab(s) by mouth once a day  -- Indication: For RA    OxyCONTIN 20 mg oral tablet, extended release  -- 1 tab(s) by mouth every 12 hours, As Needed  -- Indication: For pain    Eliquis 5 mg oral tablet  -- Check with your doctor before becoming pregnant.  It is very important that you take or use this exactly as directed.  Do not skip doses or discontinue unless directed by your doctor.  Obtain medical advice before taking any non-prescription drugs as some may affect the action of this medication.    -- Indication: For dvt/pe    gabapentin 300 mg oral capsule  -- orally every 12 hours  -- Indication: For pain    Actos 30 mg oral tablet  -- 1 tab(s) by mouth once a day  -- Indication: For actos    glimepiride 2 mg oral tablet  -- 1  by mouth 3 times a day  -- Indication: For dm    leucovorin 5 mg oral tablet  -- 1 tab(s) by mouth once a day  -- Indication: For Leucovorin    allopurinol 300 mg oral tablet  -- 1 tab(s) by mouth once a day  -- Indication: For Gout    rifAMPin 300 mg oral capsule  -- 1 cap(s) by mouth 2 times a day  -- Indication: For LTB    cefepime  -- 2 gram(s) intravenous once a day until feb 27 , 2019  -- Indication: For Osteomyleitis    senna oral tablet  -- 2 tab(s) by mouth once a day (at bedtime)  -- Indication: For constipation    folic acid 1 mg oral tablet  -- 1 tab(s) by mouth once a day  -- Indication: For folic acid I will START or STAY ON the medications listed below when I get home from the hospital:    predniSONE 5 mg oral tablet  -- 1 tab(s) by mouth once a day  -- Indication: For RA    OxyCONTIN 20 mg oral tablet, extended release  -- 1 tab(s) by mouth every 12 hours, As Needed  -- Indication: For pain    Eliquis 5 mg oral tablet  -- Check with your doctor before becoming pregnant.  It is very important that you take or use this exactly as directed.  Do not skip doses or discontinue unless directed by your doctor.  Obtain medical advice before taking any non-prescription drugs as some may affect the action of this medication.    -- Indication: For dvt/pe    gabapentin 300 mg oral capsule  -- orally every 12 hours  -- Indication: For pain    Actos 30 mg oral tablet  -- 1 tab(s) by mouth once a day  -- Indication: For actos    glimepiride 2 mg oral tablet  -- 1  by mouth 3 times a day  -- Indication: For dm    leucovorin 5 mg oral tablet  -- 1 tab(s) by mouth once a day  -- Indication: For Leucovorin    allopurinol 300 mg oral tablet  -- 1 tab(s) by mouth once a day  -- Indication: For Gout    rifAMPin 300 mg oral capsule  -- 1 cap(s) by mouth 2 times a day  -- Indication: For LTB    cefepime  -- 2 gram(s) intravenous once a day until feb 27 , 2019  -- Indication: For Osteomyleitis    collagenase 250 units/g topical ointment  -- 1 application on skin once a day  -- Indication: For Wounds, multiple open, lower extremity    senna oral tablet  -- 2 tab(s) by mouth once a day (at bedtime)  -- Indication: For constipation    folic acid 1 mg oral tablet  -- 1 tab(s) by mouth once a day  -- Indication: For folic acid

## 2018-12-24 NOTE — DISCHARGE NOTE ADULT - PLAN OF CARE
improvement of symptoms and functional status pt was evaluated to have lower extremity lesions. will need angiogram in future. pt was evaluated to have lower extremity lesions. you will need an angiogram. Vascular surgery  will perform your angiogram on 12/30/18. please contact the number to confirm the appointment. pt was evaluated to have lower extremity lesions. you will need an angiogram. Vascular surgery  will perform your angiogram on 12/30/18. please contact the number to confirm the appointment.  Please take doxycycline till 5 January 2019. pt was evaluated to have lower extremity lesions. angiogram was consistent with small vessel disease. Pt went debridement and toe amputation with podiatry ,found to have Osteomyelitis. being sent to nursing home on IV antibiotics for 6 weeks until 27 feb 2019. pt was evaluated to have lower extremity lesions. angiogram was consistent with small vessel disease. Pt went debridement and toe amputation with podiatry ,found to have Osteomyelitis. being sent to nursing home on IV antibiotics for 6 weeks until 27 feb 2019. f/u with ID as out pt for PICC line and antibiotics.

## 2018-12-24 NOTE — DISCHARGE NOTE ADULT - PATIENT PORTAL LINK FT
You can access the [facility], offered by [facility], by registering with the following website: [email or web address]

## 2018-12-24 NOTE — PROGRESS NOTE ADULT - SUBJECTIVE AND OBJECTIVE BOX
Pt seen at bedside.  T 99.8  C/o pain level 7 0n 1-10 scale. R>L. Multiple scattered lower extremity ulcerations . Painful on dressing change.  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  Multiple areas of acute and chronic venous thrombosis as well as PAD .  WBC 5.41  HH 8.0/25.5  Urate 5.3    Rifampin, Cefepime, Vanco Zyvox  Awaiting vascular and burn consults.  Should be allowed to place R leg over bed in dependent position for Pain relief.  Non healing secondary to PAD, Chronic steroid therapy, low Hb?  Xeroform DSD  Betadine paint to 3 R

## 2018-12-24 NOTE — PROGRESS NOTE ADULT - SUBJECTIVE AND OBJECTIVE BOX
LINCOLN HEREDIA  78y, Female      OVERNIGHT EVENTS:    discomfort LEs bilaterally    VITALS:  T(F): 97.8, Max: 98 (18 @ 21:24)  HR: 85  BP: 150/65  RR: 20Vital Signs Last 24 Hrs  T(C): 36.6 (24 Dec 2018 04:50), Max: 36.7 (23 Dec 2018 21:24)  T(F): 97.8 (24 Dec 2018 04:50), Max: 98 (23 Dec 2018 21:24)  HR: 85 (24 Dec 2018 04:50) (82 - 85)  BP: 150/65 (24 Dec 2018 04:50) (118/56 - 152/75)  BP(mean): --  RR: 20 (24 Dec 2018 04:50) (18 - 20)  SpO2: --    TESTS & MEASUREMENTS:                        7.8    5.57  )-----------( 285      ( 24 Dec 2018 10:04 )             24.9     12-24    137  |  100  |  24<H>  ----------------------------<  212<H>  4.8   |  21  |  1.3    Ca    8.7      24 Dec 2018 10:04    TPro  5.4<L>  /  Alb  3.1<L>  /  TBili  <0.2  /  DBili  x   /  AST  14  /  ALT  9   /  AlkPhos  40  12-24    LIVER FUNCTIONS - ( 24 Dec 2018 10:04 )  Alb: 3.1 g/dL / Pro: 5.4 g/dL / ALK PHOS: 40 U/L / ALT: 9 U/L / AST: 14 U/L / GGT: x             Culture - Blood (collected 18 @ 12:57)  Source: .Blood None  Preliminary Report (18 @ 01:01):    No growth to date.      Urinalysis Basic - ( 23 Dec 2018 21:00 )    Color: Yellow / Appearance: Turbid / S.010 / pH: x  Gluc: x / Ketone: Negative  / Bili: Negative / Urobili: 0.2 mg/dL   Blood: x / Protein: Trace mg/dL / Nitrite: Negative   Leuk Esterase: Negative / RBC: x / WBC x   Sq Epi: x / Non Sq Epi: x / Bacteria: Few /HPF          RADIOLOGY & ADDITIONAL TESTS:    ANTIBIOTICS:  cefepime   IVPB 1000 milliGRAM(s) IV Intermittent every 12 hours  linezolid  IVPB 600 milliGRAM(s) IV Intermittent every 12 hours  linezolid  IVPB

## 2018-12-24 NOTE — DISCHARGE NOTE ADULT - CARE PROVIDER_API CALL
Carlos Stephen), Family Medicine  [address] [place] [address]  Phone: [phone]  Fax: [phone] Carlos Stephen), Family Medicine  [address] [facility]  [address]  [place] [address]  Phone: [phone]  Fax: [phone]    Richard Martínez), Vascular Surgery  [address] [place] [address]  Phone: [phone]  Fax: [phone] Carlos Stephen), Family Medicine  [address] [facility]  [address]  [place] [address]  Phone: [phone]  Fax: [phone]    Richard Martínez), Vascular Surgery  [address] [place] [address]  Phone: [phone]  Fax: [phone]    Arthur Villasenor), Podiatric Medicine and Surgery  [address] [place] [address]  Phone: [phone]  Fax: [phone] Carlos Stephen), Family Medicine  [address] [facility]  [address]  [place] [address]  Phone: [phone]  Fax: [phone]    Richard Martínez), Vascular Surgery  [address] [place] [address]  Phone: [phone]  Fax: [phone]    Arthur Villasenor (DPROSA ISELA), Podiatric Medicine and Surgery  [address] [place] [address]  Phone: [phone]  Fax: [phone]    Gianni Mariee), Infectious Disease; Internal Medicine  [address] [place] [address]  Phone: [phone]  Fax: [phone]

## 2018-12-24 NOTE — PROGRESS NOTE ADULT - ASSESSMENT
IMPRESSION:  Ischemic changes LEs bilaterally with maybe a mild bacterial superinfection    RECOMMENDATIONS:  Doxycycline 100 mg po q12h for 10 days  D/c other ABx  Recall prn please

## 2018-12-24 NOTE — PROGRESS NOTE ADULT - ASSESSMENT
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    #Bilateral Chronic Nonhealing Ulcers and concurrent LE cellulitis   -Temp 102.2F 12/21 ; Afebrile overnight; No leukocytosis  -C/w Cefepime and Linezolid empirically. F/U ID recs. F/U bC negative  -Podiatry: Betadine paint to digit 3R BID; Xeroform to ulcers with loose dressing; Limb elevation. No surgery needed.   -Duplex LE venous: Chronic-appearing right common femoral and femoral deep venous thromboses; left popliteal deep venous thrombosis  -Duplex LE arterial: On the right: Mild to moderate disease at the popliteal artery level. On the left: Mild popliteal and posterior tibial artery disease; Remaining tibial arteries not visualized secondary to edema   -Uric acid levels normal. .  -Vascular following-Will f/u recs    #Chronic Right Hip Pain  -RIght hip xray: Diffuse osteopenia  -C/w Physical therapy. Will check Vit D levels and replete if deficient    #Chronic B/L femoral and popliteal DVT- C/w Eliquis  #Chronic Normocytic Anemia- Likely 2/2 RA; Hb at baseline  #Rheumatoid Arthritis -Continue home prednisone. talked to Dr Ascencio (Dr Henry's team)- Can decrease prednisone 7.5mg if healing hampered. No other adjustments needed.   #DM II-Controlled; Monitor finger sticks, start insulin sliding scale if fingersticks are greater than 180  #Latent TB- d/c rifampin as per ID  #Gout- Normal uric acid levels; c/w Allopurinol    DVT ppx: On Eliquis  GI ppx: Not indicated  Diet: DASH Carb consistent   Activity: AAT  Code Status: DNR/DNI, MOLST form completed with health care proxy as witness by bedside, form in chart  Disposition: pending

## 2018-12-25 LAB
ANION GAP SERPL CALC-SCNC: 16 MMOL/L — HIGH (ref 7–14)
APTT BLD: 35.2 SEC — SIGNIFICANT CHANGE UP (ref 27–39.2)
BUN SERPL-MCNC: 22 MG/DL — HIGH (ref 10–20)
CALCIUM SERPL-MCNC: 8.7 MG/DL — SIGNIFICANT CHANGE UP (ref 8.5–10.1)
CHLORIDE SERPL-SCNC: 101 MMOL/L — SIGNIFICANT CHANGE UP (ref 98–110)
CO2 SERPL-SCNC: 21 MMOL/L — SIGNIFICANT CHANGE UP (ref 17–32)
CREAT SERPL-MCNC: 1 MG/DL — SIGNIFICANT CHANGE UP (ref 0.7–1.5)
CULTURE RESULTS: NO GROWTH — SIGNIFICANT CHANGE UP
GLUCOSE BLDC GLUCOMTR-MCNC: 119 MG/DL — HIGH (ref 70–99)
GLUCOSE BLDC GLUCOMTR-MCNC: 120 MG/DL — HIGH (ref 70–99)
GLUCOSE BLDC GLUCOMTR-MCNC: 162 MG/DL — HIGH (ref 70–99)
GLUCOSE SERPL-MCNC: 224 MG/DL — HIGH (ref 70–99)
HCT VFR BLD CALC: 25.4 % — LOW (ref 37–47)
HGB BLD-MCNC: 7.8 G/DL — LOW (ref 12–16)
MAGNESIUM SERPL-MCNC: 2 MG/DL — SIGNIFICANT CHANGE UP (ref 1.8–2.4)
MCHC RBC-ENTMCNC: 28.3 PG — SIGNIFICANT CHANGE UP (ref 27–31)
MCHC RBC-ENTMCNC: 30.7 G/DL — LOW (ref 32–37)
MCV RBC AUTO: 92 FL — SIGNIFICANT CHANGE UP (ref 81–99)
NRBC # BLD: 0 /100 WBCS — SIGNIFICANT CHANGE UP (ref 0–0)
PLATELET # BLD AUTO: 288 K/UL — SIGNIFICANT CHANGE UP (ref 130–400)
POTASSIUM SERPL-MCNC: 4.8 MMOL/L — SIGNIFICANT CHANGE UP (ref 3.5–5)
POTASSIUM SERPL-SCNC: 4.8 MMOL/L — SIGNIFICANT CHANGE UP (ref 3.5–5)
RBC # BLD: 2.76 M/UL — LOW (ref 4.2–5.4)
RBC # FLD: 15.8 % — HIGH (ref 11.5–14.5)
SODIUM SERPL-SCNC: 138 MMOL/L — SIGNIFICANT CHANGE UP (ref 135–146)
SPECIMEN SOURCE: SIGNIFICANT CHANGE UP
WBC # BLD: 6.15 K/UL — SIGNIFICANT CHANGE UP (ref 4.8–10.8)
WBC # FLD AUTO: 6.15 K/UL — SIGNIFICANT CHANGE UP (ref 4.8–10.8)

## 2018-12-25 RX ADMIN — Medication 7.5 MILLIGRAM(S): at 05:27

## 2018-12-25 RX ADMIN — Medication 100 MILLIGRAM(S): at 17:12

## 2018-12-25 RX ADMIN — Medication 100 MILLIGRAM(S): at 05:26

## 2018-12-25 RX ADMIN — OXYCODONE AND ACETAMINOPHEN 2 TABLET(S): 5; 325 TABLET ORAL at 13:10

## 2018-12-25 RX ADMIN — Medication 300 MILLIGRAM(S): at 12:32

## 2018-12-25 RX ADMIN — CHLORHEXIDINE GLUCONATE 1 APPLICATION(S): 213 SOLUTION TOPICAL at 05:23

## 2018-12-25 RX ADMIN — OXYCODONE AND ACETAMINOPHEN 2 TABLET(S): 5; 325 TABLET ORAL at 22:03

## 2018-12-25 RX ADMIN — APIXABAN 5 MILLIGRAM(S): 2.5 TABLET, FILM COATED ORAL at 05:26

## 2018-12-25 RX ADMIN — SENNA PLUS 2 TABLET(S): 8.6 TABLET ORAL at 21:25

## 2018-12-25 RX ADMIN — Medication 100 MILLIGRAM(S): at 17:11

## 2018-12-25 RX ADMIN — OXYCODONE AND ACETAMINOPHEN 2 TABLET(S): 5; 325 TABLET ORAL at 01:56

## 2018-12-25 RX ADMIN — MORPHINE SULFATE 2 MILLIGRAM(S): 50 CAPSULE, EXTENDED RELEASE ORAL at 05:25

## 2018-12-25 RX ADMIN — OXYCODONE AND ACETAMINOPHEN 2 TABLET(S): 5; 325 TABLET ORAL at 17:14

## 2018-12-25 RX ADMIN — Medication 1 MILLIGRAM(S): at 12:32

## 2018-12-25 RX ADMIN — OXYCODONE AND ACETAMINOPHEN 2 TABLET(S): 5; 325 TABLET ORAL at 21:31

## 2018-12-25 RX ADMIN — OXYCODONE AND ACETAMINOPHEN 2 TABLET(S): 5; 325 TABLET ORAL at 02:30

## 2018-12-25 RX ADMIN — APIXABAN 5 MILLIGRAM(S): 2.5 TABLET, FILM COATED ORAL at 17:11

## 2018-12-25 RX ADMIN — Medication 5 MILLIGRAM(S): at 12:32

## 2018-12-25 RX ADMIN — GABAPENTIN 300 MILLIGRAM(S): 400 CAPSULE ORAL at 05:26

## 2018-12-25 RX ADMIN — GABAPENTIN 300 MILLIGRAM(S): 400 CAPSULE ORAL at 17:11

## 2018-12-25 NOTE — PROGRESS NOTE ADULT - ASSESSMENT
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    Bilateral non-healing ulcers: Continue Xeroform with Betadine to the lower extremities, with loose dressings.  Follow-up with Vascular Surgery as outpatient for elective angiography.  blood cultures were negative from .  Continue treatment with PO Doxycline 100mg PO q12 until 1/3/18  Rheumatoid Arthritis: patient has been taking chronic prednisone therapy which likely contributed to impaired healing of lower extremity ulcers.  Continue Prednisone 7.5mg PO q24 as per Rheum and continue Leucovorin 5mg q24.    Chronic Bilateral Femoral and Popliteal DVT: continue Eliquis  Chronic Anemia: Hgb stabilized at 7.8  History of LTBI: continue Rifampin  DMII: continue Lantus/Lispro and monitor FS with meals.  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    Bilateral non-healing ulcers: Continue Xeroform with Betadine to the lower extremities, with loose dressings.  Follow-up with Vascular Surgery as outpatient for elective angiography.  blood cultures were negative from 12/21/18.  Continue treatment with PO Doxycline 100mg PO q12 until 1/3/18  Rheumatoid Arthritis: patient has been taking chronic prednisone therapy which likely contributed to impaired healing of lower extremity ulcers.  Continue Prednisone 7.5mg PO q24 and continue Leucovorin 5mg q24.    Chronic Bilateral Femoral and Popliteal DVT: continue Eliquis  Chronic Anemia: Hgb stabilized at 7.8  History of LTBI: continue Rifampin  DMII: continue Lantus/Lispro and monitor FS with meals.  Gout: continue Allopurinol  GI/DVT prophylaxis  DNR/DNI  Anticipate discharge to SNF in 24 hours  Discharge planning to SNF

## 2018-12-25 NOTE — PROGRESS NOTE ADULT - SUBJECTIVE AND OBJECTIVE BOX
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    Overnight events/Updates: The patient was able to transfer from bed to chair, but remains unable to ambulate.    Vital Signs Last 24 Hrs  T(C): 36.4 (25 Dec 2018 04:30), Max: 36.8 (24 Dec 2018 20:50)  T(F): 97.6 (25 Dec 2018 04:30), Max: 98.2 (24 Dec 2018 20:50)  HR: 86 (25 Dec 2018 04:30) (86 - 94)  BP: 143/65 (25 Dec 2018 04:30) (132/63 - 149/65)  BP(mean): --  RR: 18 (25 Dec 2018 04:30) (18 - 18)  SpO2: 97% (25 Dec 2018 08:19) (97% - 97%)    Physical Examination:  General: AAO x   HEENT: PERRLA, EOMI  CV= S1 & S2 appreciated  Lungs=  Abdominal Examination= + BS  Extremity Examination=     ROS: No chest pain, no shortness of breath.  All other systems reviewed and are within normal limits except for the complaints in the HPI.    MEDICATIONS  (STANDING):  allopurinol 300 milliGRAM(s) Oral daily  apixaban 5 milliGRAM(s) Oral every 12 hours  chlorhexidine 4% Liquid 1 Application(s) Topical <User Schedule>  docusate sodium 100 milliGRAM(s) Oral two times a day  doxycycline hyclate Capsule 100 milliGRAM(s) Oral every 12 hours  folic acid 1 milliGRAM(s) Oral daily  gabapentin 300 milliGRAM(s) Oral every 12 hours  leucovorin 5 milliGRAM(s) Oral daily  predniSONE   Tablet 7.5 milliGRAM(s) Oral daily  senna 2 Tablet(s) Oral at bedtime    MEDICATIONS  (PRN):  acetaminophen   Tablet .. 650 milliGRAM(s) Oral every 6 hours PRN Temp greater or equal to 38C (100.4F)  morphine  - Injectable 2 milliGRAM(s) IV Push every 6 hours PRN Severe Pain (7 - 10)  oxyCODONE    5 mG/acetaminophen 325 mG 2 Tablet(s) Oral every 4 hours PRN Moderate Pain (4 - 6)                            7.8    6.15  )-----------( 288      ( 25 Dec 2018 09:41 )             25.4     12-25    138  |  101  |  22<H>  ----------------------------<  224<H>  4.8   |  21  |  1.0    Ca    8.7      25 Dec 2018 09:41  Mg     2.0     12-25    TPro  5.4<L>  /  Alb  3.1<L>  /  TBili  <0.2  /  DBili  x   /  AST  14  /  ALT  9   /  AlkPhos  40  12-24      Case discussed with housestaff & family  SEBAS Segal [id number] YAN LINCOLN [id number]    Hospitalist Note  77yo F with Past Medical History DMII, Rheumatoid Arthritis on Rituximab (status post 2 infusions), 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    Overnight events/Updates: The patient was able to transfer from bed to chair, but remains unable to ambulate.    Vital Signs Last 24 Hrs  T(C): 36.4 (25 Dec 2018 04:30), Max: 36.8 (24 Dec 2018 20:50)  T(F): 97.6 (25 Dec 2018 04:30), Max: 98.2 (24 Dec 2018 20:50)  HR: 86 (25 Dec 2018 04:30) (86 - 94)  BP: 143/65 (25 Dec 2018 04:30) (132/63 - 149/65)  BP(mean): --  RR: 18 (25 Dec 2018 04:30) (18 - 18)  SpO2: 97% (25 Dec 2018 08:19) (97% - 97%)    Physical Examination:  General: AAO x 3  HEENT: PERRLA, EOMI  CV= S1 & S2 appreciated  Lungs= CTA BL  Abdominal Examination= + BS  Extremity Examination= bilateral lower extremity dressings  necrosis to the third toe of the RLE    ROS: No chest pain, no shortness of breath.  All other systems reviewed and are within normal limits except for the complaints in the HPI.    MEDICATIONS  (STANDING):  allopurinol 300 milliGRAM(s) Oral daily  apixaban 5 milliGRAM(s) Oral every 12 hours  chlorhexidine 4% Liquid 1 Application(s) Topical <User Schedule>  docusate sodium 100 milliGRAM(s) Oral two times a day  doxycycline hyclate Capsule 100 milliGRAM(s) Oral every 12 hours  folic acid 1 milliGRAM(s) Oral daily  gabapentin 300 milliGRAM(s) Oral every 12 hours  leucovorin 5 milliGRAM(s) Oral daily  predniSONE   Tablet 7.5 milliGRAM(s) Oral daily  senna 2 Tablet(s) Oral at bedtime    MEDICATIONS  (PRN):  acetaminophen   Tablet .. 650 milliGRAM(s) Oral every 6 hours PRN Temp greater or equal to 38C (100.4F)  morphine  - Injectable 2 milliGRAM(s) IV Push every 6 hours PRN Severe Pain (7 - 10)  oxyCODONE    5 mG/acetaminophen 325 mG 2 Tablet(s) Oral every 4 hours PRN Moderate Pain (4 - 6)                            7.8    6.15  )-----------( 288      ( 25 Dec 2018 09:41 )             25.4     12-25    138  |  101  |  22<H>  ----------------------------<  224<H>  4.8   |  21  |  1.0    Ca    8.7      25 Dec 2018 09:41  Mg     2.0     12-25    TPro  5.4<L>  /  Alb  3.1<L>  /  TBili  <0.2  /  DBili  x   /  AST  14  /  ALT  9   /  AlkPhos  40  12-24      Case discussed with housestaff & family  SEBAS Segal [id number]

## 2018-12-26 LAB
ANION GAP SERPL CALC-SCNC: 17 MMOL/L — HIGH (ref 7–14)
BUN SERPL-MCNC: 29 MG/DL — HIGH (ref 10–20)
CALCIUM SERPL-MCNC: 9.2 MG/DL — SIGNIFICANT CHANGE UP (ref 8.5–10.1)
CHLORIDE SERPL-SCNC: 99 MMOL/L — SIGNIFICANT CHANGE UP (ref 98–110)
CO2 SERPL-SCNC: 22 MMOL/L — SIGNIFICANT CHANGE UP (ref 17–32)
CREAT SERPL-MCNC: 1 MG/DL — SIGNIFICANT CHANGE UP (ref 0.7–1.5)
GLUCOSE BLDC GLUCOMTR-MCNC: 130 MG/DL — HIGH (ref 70–99)
GLUCOSE BLDC GLUCOMTR-MCNC: 140 MG/DL — HIGH (ref 70–99)
GLUCOSE BLDC GLUCOMTR-MCNC: 193 MG/DL — HIGH (ref 70–99)
GLUCOSE BLDC GLUCOMTR-MCNC: 195 MG/DL — HIGH (ref 70–99)
GLUCOSE SERPL-MCNC: 131 MG/DL — HIGH (ref 70–99)
HCT VFR BLD CALC: 27.9 % — LOW (ref 37–47)
HGB BLD-MCNC: 8.8 G/DL — LOW (ref 12–16)
MCHC RBC-ENTMCNC: 28.9 PG — SIGNIFICANT CHANGE UP (ref 27–31)
MCHC RBC-ENTMCNC: 31.5 G/DL — LOW (ref 32–37)
MCV RBC AUTO: 91.5 FL — SIGNIFICANT CHANGE UP (ref 81–99)
NRBC # BLD: 0 /100 WBCS — SIGNIFICANT CHANGE UP (ref 0–0)
PLATELET # BLD AUTO: 321 K/UL — SIGNIFICANT CHANGE UP (ref 130–400)
POTASSIUM SERPL-MCNC: 4.8 MMOL/L — SIGNIFICANT CHANGE UP (ref 3.5–5)
POTASSIUM SERPL-SCNC: 4.8 MMOL/L — SIGNIFICANT CHANGE UP (ref 3.5–5)
RBC # BLD: 3.05 M/UL — LOW (ref 4.2–5.4)
RBC # FLD: 15.8 % — HIGH (ref 11.5–14.5)
SODIUM SERPL-SCNC: 138 MMOL/L — SIGNIFICANT CHANGE UP (ref 135–146)
WBC # BLD: 6.98 K/UL — SIGNIFICANT CHANGE UP (ref 4.8–10.8)
WBC # FLD AUTO: 6.98 K/UL — SIGNIFICANT CHANGE UP (ref 4.8–10.8)

## 2018-12-26 RX ADMIN — OXYCODONE AND ACETAMINOPHEN 2 TABLET(S): 5; 325 TABLET ORAL at 13:13

## 2018-12-26 RX ADMIN — OXYCODONE AND ACETAMINOPHEN 2 TABLET(S): 5; 325 TABLET ORAL at 10:45

## 2018-12-26 RX ADMIN — OXYCODONE AND ACETAMINOPHEN 2 TABLET(S): 5; 325 TABLET ORAL at 08:50

## 2018-12-26 RX ADMIN — Medication 7.5 MILLIGRAM(S): at 06:44

## 2018-12-26 RX ADMIN — APIXABAN 5 MILLIGRAM(S): 2.5 TABLET, FILM COATED ORAL at 06:43

## 2018-12-26 RX ADMIN — OXYCODONE AND ACETAMINOPHEN 2 TABLET(S): 5; 325 TABLET ORAL at 05:00

## 2018-12-26 RX ADMIN — OXYCODONE AND ACETAMINOPHEN 2 TABLET(S): 5; 325 TABLET ORAL at 18:40

## 2018-12-26 RX ADMIN — OXYCODONE AND ACETAMINOPHEN 2 TABLET(S): 5; 325 TABLET ORAL at 18:39

## 2018-12-26 RX ADMIN — Medication 300 MILLIGRAM(S): at 11:08

## 2018-12-26 RX ADMIN — Medication 100 MILLIGRAM(S): at 17:17

## 2018-12-26 RX ADMIN — GABAPENTIN 300 MILLIGRAM(S): 400 CAPSULE ORAL at 17:17

## 2018-12-26 RX ADMIN — GABAPENTIN 300 MILLIGRAM(S): 400 CAPSULE ORAL at 06:44

## 2018-12-26 RX ADMIN — APIXABAN 5 MILLIGRAM(S): 2.5 TABLET, FILM COATED ORAL at 17:17

## 2018-12-26 RX ADMIN — Medication 100 MILLIGRAM(S): at 06:43

## 2018-12-26 RX ADMIN — OXYCODONE AND ACETAMINOPHEN 2 TABLET(S): 5; 325 TABLET ORAL at 13:37

## 2018-12-26 RX ADMIN — CHLORHEXIDINE GLUCONATE 1 APPLICATION(S): 213 SOLUTION TOPICAL at 06:43

## 2018-12-26 RX ADMIN — OXYCODONE AND ACETAMINOPHEN 2 TABLET(S): 5; 325 TABLET ORAL at 04:33

## 2018-12-26 RX ADMIN — Medication 1 MILLIGRAM(S): at 11:08

## 2018-12-26 RX ADMIN — SENNA PLUS 2 TABLET(S): 8.6 TABLET ORAL at 22:01

## 2018-12-26 RX ADMIN — Medication 5 MILLIGRAM(S): at 11:08

## 2018-12-26 NOTE — PROGRESS NOTE ADULT - ASSESSMENT
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    #Bilateral Chronic Nonhealing Ulcers and concurrent LE cellulitis   -Temp 102.2F 12/21 ; Afebrile overnight; No leukocytosis  -doxycycline oral 100mg q 12 for 14 days	  -Podiatry: Betadine paint to digit 3R BID; Xeroform to ulcers with loose dressing; Limb elevation. No surgery needed.   -Duplex LE venous: Chronic-appearing right common femoral and femoral deep venous thromboses; left popliteal deep venous thrombosis  -Duplex LE arterial: On the right: Mild to moderate disease at the popliteal artery level. On the left: Mild popliteal and posterior tibial artery disease; Remaining tibial arteries not visualized secondary to edema   -Uric acid levels normal. .  -angiogram on 30 th by  as elective    #Chronic Right Hip Pain  -RIght hip xray: Diffuse osteopenia  -C/w Physical therapy. Will check Vit D levels and replete if deficient    #Chronic B/L femoral and popliteal DVT- C/w Eliquis  #Chronic Normocytic Anemia- Likely 2/2 RA; Hb at baseline  #Rheumatoid Arthritis -Continue home prednisone. talked to Dr Ascencio (Dr Henry's team)- Can decrease prednisone 7.5mg if healing hampered. No other adjustments needed.   #DM II-Controlled; Monitor finger sticks, start insulin sliding scale if fingersticks are greater than 180  #Latent TB- rifampin  #Gout- Normal uric acid levels; c/w Allopurinol    DVT ppx: On Eliquis  GI ppx: Not indicated  Diet: DASH Carb consistent   Activity: AAT  Code Status: DNR/DNI, MOLST form completed with health care proxy as witness by bedside, form in chart  Disposition: pending

## 2018-12-26 NOTE — PROGRESS NOTE ADULT - SUBJECTIVE AND OBJECTIVE BOX
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    Overnight events/Updates: The patient reports decreased pain/discomfort to her lower extremities.    Vital Signs Last 24 Hrs  T(C): 36.7 (26 Dec 2018 13:01), Max: 37.1 (25 Dec 2018 21:21)  T(F): 98 (26 Dec 2018 13:01), Max: 98.7 (25 Dec 2018 21:21)  HR: 88 (26 Dec 2018 13:01) (82 - 88)  BP: 140/60 (26 Dec 2018 13:01) (129/63 - 144/64)  BP(mean): --  RR: 20 (26 Dec 2018 13:01) (18 - 20)  SpO2: 97% (26 Dec 2018 07:54) (97% - 97%)    Physical Examination:  General: AAO x 3  HEENT: PERRLA, EOMI  CV= S1 & S2 appreciated  Lungs= CTA BL  Abdominal Examination= + BS  Extremity Examination= bilateral lower extremity dressings  necrosis to the third toe of the RLE    ROS: No chest pain, no shortness of breath.  All other systems reviewed and are within normal limits except for the complaints in the HPI.    MEDICATIONS  (STANDING):  allopurinol 300 milliGRAM(s) Oral daily  apixaban 5 milliGRAM(s) Oral every 12 hours  chlorhexidine 4% Liquid 1 Application(s) Topical <User Schedule>  docusate sodium 100 milliGRAM(s) Oral two times a day  doxycycline hyclate Capsule 100 milliGRAM(s) Oral every 12 hours  folic acid 1 milliGRAM(s) Oral daily  gabapentin 300 milliGRAM(s) Oral every 12 hours  leucovorin 5 milliGRAM(s) Oral daily  predniSONE   Tablet 7.5 milliGRAM(s) Oral daily  rifampin 300 milliGRAM(s) Oral two times a day  senna 2 Tablet(s) Oral at bedtime    MEDICATIONS  (PRN):  acetaminophen   Tablet .. 650 milliGRAM(s) Oral every 6 hours PRN Temp greater or equal to 38C (100.4F)  oxyCODONE    5 mG/acetaminophen 325 mG 2 Tablet(s) Oral every 4 hours PRN Moderate Pain (4 - 6)                            8.8    6.98  )-----------( 321      ( 26 Dec 2018 07:39 )             27.9     12-26    138  |  99  |  29<H>  ----------------------------<  131<H>  4.8   |  22  |  1.0    Ca    9.2      26 Dec 2018 07:39  Mg     2.0     12-25        Case discussed with housestaff & family  SEBAS Segal [id number]

## 2018-12-26 NOTE — PROGRESS NOTE ADULT - ASSESSMENT
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    Bilateral non-healing ulcers: Continue Xeroform with Betadine to the lower extremities, with loose dressings.  Follow-up with Vascular Surgery as outpatient for elective angiography.  Blood cultures are negative from 12/21/18.  Continue treatment with PO Doxycline 100mg PO q12 until 1/3/18  Rheumatoid Arthritis: continue Prednisone 7.5mg PO q24 and Leucovorin 5mg q24.  Follow-up with Rheumatology, Dr. Henry as outpatient.    Chronic Bilateral Femoral and Popliteal DVT: continue Eliquis  Chronic Anemia: Hgb improved to 8.8  History of LTBI: continue Rifampin  DMII: continue Lantus/Lispro and monitor FS with meals.  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    Bilateral non-healing ulcers: Continue Xeroform with Betadine to the lower extremities, with loose dressings.  Follow-up with Vascular Surgery as outpatient for elective angiography.  Blood cultures are negative from 12/21/18.  Continue treatment with PO Doxycline 100mg PO q12 until 1/3/18  Rheumatoid Arthritis: continue Prednisone 7.5mg PO q24 and Leucovorin 5mg q24.  Follow-up with Rheumatology, Dr. Henry as outpatient.    Chronic Bilateral Femoral and Popliteal DVT: continue Eliquis  Chronic Anemia: Hgb improved to 8.8  History of LTBI: continue Rifampin  DMII: continue Lantus/Lispro and monitor FS with meals.  Gout: continue Allopurinol  GI/DVT prophylaxis  DNR/DNI  Discharge to SNF when bed available; patient requesting [facility]

## 2018-12-26 NOTE — DIETITIAN INITIAL EVALUATION ADULT. - DIET TYPE
consistent carbohydrate renal with no snacks/reports 100% PO at meals/DASH/TLC (sodium and cholesterol restricted diet)

## 2018-12-26 NOTE — DIETITIAN INITIAL EVALUATION ADULT. - ENERGY NEEDS
calorie 1192-1431kcal (25-30kcal/kg IBW) for obesity  protein 48-57g (1-1.2g/kg IBW) for obesity  fluid 1ml/kcal or per LIP

## 2018-12-26 NOTE — DIETITIAN INITIAL EVALUATION ADULT. - OTHER INFO
Initial assessment for LOS. P/w: worsening pain of bilateral lower extremities. Bilateral Chronic Nonhealing Ulcers and concurrent LE cellulitis: s/p duplex. DM2: on insulin. Gout: normal uric acid levels.

## 2018-12-26 NOTE — DIETITIAN INITIAL EVALUATION ADULT. - FACTORS AFF FOOD INTAKE
denies chew/swallow difficulty, c/o constipation, last BM doc on 12/24, pt. states "a few days ago"- on bowel regimen/none

## 2018-12-26 NOTE — PROGRESS NOTE ADULT - SUBJECTIVE AND OBJECTIVE BOX
SUBJECTIVE:    Patient is a 78y old Female who presents with a chief complaint of "I couldn't stand up from the recliner", and worsening pain of bilateral lower extremities (25 Dec 2018 11:33)    Currently admitted to medicine with the primary diagnosis of Wounds, multiple open, lower extremity     Today is hospital day 7d. This morning she is resting comfortably in bed and reports no new issues or overnight events.     PAST MEDICAL & SURGICAL HISTORY  Gout  DVT (deep venous thrombosis)  HTN (hypertension)  Rheumatoid arteritis  Other specified diabetes mellitus with other specified complication, unspecified whether long term insulin use  Primary osteoarthritis of right knee: s/p knee repalcement    SOCIAL HISTORY:  Negative for smoking/alcohol/drug use.     ALLERGIES:  clindamycin (Unknown)  erythromycin (Unknown)  penicillin (Unknown)  strawberry (Unknown)    MEDICATIONS:  STANDING MEDICATIONS  allopurinol 300 milliGRAM(s) Oral daily  apixaban 5 milliGRAM(s) Oral every 12 hours  chlorhexidine 4% Liquid 1 Application(s) Topical <User Schedule>  docusate sodium 100 milliGRAM(s) Oral two times a day  doxycycline hyclate Capsule 100 milliGRAM(s) Oral every 12 hours  folic acid 1 milliGRAM(s) Oral daily  gabapentin 300 milliGRAM(s) Oral every 12 hours  leucovorin 5 milliGRAM(s) Oral daily  predniSONE   Tablet 7.5 milliGRAM(s) Oral daily  rifampin 300 milliGRAM(s) Oral two times a day  senna 2 Tablet(s) Oral at bedtime    PRN MEDICATIONS  acetaminophen   Tablet .. 650 milliGRAM(s) Oral every 6 hours PRN  oxyCODONE    5 mG/acetaminophen 325 mG 2 Tablet(s) Oral every 4 hours PRN    VITALS:   T(F): 98  HR: 86  BP: 144/64  RR: 18  SpO2: 97%    PHYSICAL EXAM:  GEN: No acute distress  LUNGS: Clear to auscultation bilaterally   HEART: S1/S2 present. RRR.   EXT: lower extremity wrapped in bandage b/l  NEURO: AAOX3      LABS:                        8.8    6.98  )-----------( 321      ( 26 Dec 2018 07:39 )             27.9     12-26    138  |  99  |  29<H>  ----------------------------<  131<H>  4.8   |  22  |  1.0    Ca    9.2      26 Dec 2018 07:39  Mg     2.0     12-25      PTT - ( 25 Dec 2018 09:41 )  PTT:35.2 sec            Culture - Urine (collected 23 Dec 2018 21:00)  Source: .Urine Clean Catch (Midstream)  Final Report (25 Dec 2018 11:02):    No growth            RADIOLOGY:

## 2018-12-27 LAB
ALLERGY+IMMUNOLOGY DIAG STUDY NOTE: SIGNIFICANT CHANGE UP
ANION GAP SERPL CALC-SCNC: 18 MMOL/L — HIGH (ref 7–14)
APTT BLD: 36.1 SEC — SIGNIFICANT CHANGE UP (ref 27–39.2)
BLD GP AB SCN SERPL QL: ABNORMAL
BUN SERPL-MCNC: 31 MG/DL — HIGH (ref 10–20)
CALCIUM SERPL-MCNC: 9.1 MG/DL — SIGNIFICANT CHANGE UP (ref 8.5–10.1)
CHLORIDE SERPL-SCNC: 97 MMOL/L — LOW (ref 98–110)
CO2 SERPL-SCNC: 22 MMOL/L — SIGNIFICANT CHANGE UP (ref 17–32)
CREAT SERPL-MCNC: 1.1 MG/DL — SIGNIFICANT CHANGE UP (ref 0.7–1.5)
CULTURE RESULTS: SIGNIFICANT CHANGE UP
DIR ANTIGLOB POLYSPECIFIC INTERPRETATION: SIGNIFICANT CHANGE UP
GLUCOSE BLDC GLUCOMTR-MCNC: 133 MG/DL — HIGH (ref 70–99)
GLUCOSE BLDC GLUCOMTR-MCNC: 140 MG/DL — HIGH (ref 70–99)
GLUCOSE BLDC GLUCOMTR-MCNC: 157 MG/DL — HIGH (ref 70–99)
GLUCOSE BLDC GLUCOMTR-MCNC: 189 MG/DL — HIGH (ref 70–99)
GLUCOSE SERPL-MCNC: 120 MG/DL — HIGH (ref 70–99)
HCT VFR BLD CALC: 27.6 % — LOW (ref 37–47)
HGB BLD-MCNC: 8.7 G/DL — LOW (ref 12–16)
INR BLD: 1.47 RATIO — HIGH (ref 0.65–1.3)
MCHC RBC-ENTMCNC: 28.7 PG — SIGNIFICANT CHANGE UP (ref 27–31)
MCHC RBC-ENTMCNC: 31.5 G/DL — LOW (ref 32–37)
MCV RBC AUTO: 91.1 FL — SIGNIFICANT CHANGE UP (ref 81–99)
NRBC # BLD: 0 /100 WBCS — SIGNIFICANT CHANGE UP (ref 0–0)
PLATELET # BLD AUTO: 342 K/UL — SIGNIFICANT CHANGE UP (ref 130–400)
POTASSIUM SERPL-MCNC: 4.6 MMOL/L — SIGNIFICANT CHANGE UP (ref 3.5–5)
POTASSIUM SERPL-SCNC: 4.6 MMOL/L — SIGNIFICANT CHANGE UP (ref 3.5–5)
PROTHROM AB SERPL-ACNC: 16.8 SEC — HIGH (ref 9.95–12.87)
RBC # BLD: 3.03 M/UL — LOW (ref 4.2–5.4)
RBC # FLD: 15.7 % — HIGH (ref 11.5–14.5)
SODIUM SERPL-SCNC: 137 MMOL/L — SIGNIFICANT CHANGE UP (ref 135–146)
SPECIMEN SOURCE: SIGNIFICANT CHANGE UP
TYPE + AB SCN PNL BLD: SIGNIFICANT CHANGE UP
WBC # BLD: 7.18 K/UL — SIGNIFICANT CHANGE UP (ref 4.8–10.8)
WBC # FLD AUTO: 7.18 K/UL — SIGNIFICANT CHANGE UP (ref 4.8–10.8)

## 2018-12-27 RX ADMIN — OXYCODONE AND ACETAMINOPHEN 2 TABLET(S): 5; 325 TABLET ORAL at 10:45

## 2018-12-27 RX ADMIN — Medication 100 MILLIGRAM(S): at 05:50

## 2018-12-27 RX ADMIN — APIXABAN 5 MILLIGRAM(S): 2.5 TABLET, FILM COATED ORAL at 05:50

## 2018-12-27 RX ADMIN — CHLORHEXIDINE GLUCONATE 1 APPLICATION(S): 213 SOLUTION TOPICAL at 05:52

## 2018-12-27 RX ADMIN — OXYCODONE AND ACETAMINOPHEN 2 TABLET(S): 5; 325 TABLET ORAL at 02:00

## 2018-12-27 RX ADMIN — GABAPENTIN 300 MILLIGRAM(S): 400 CAPSULE ORAL at 17:12

## 2018-12-27 RX ADMIN — Medication 7.5 MILLIGRAM(S): at 05:51

## 2018-12-27 RX ADMIN — OXYCODONE AND ACETAMINOPHEN 2 TABLET(S): 5; 325 TABLET ORAL at 18:00

## 2018-12-27 RX ADMIN — OXYCODONE AND ACETAMINOPHEN 2 TABLET(S): 5; 325 TABLET ORAL at 17:05

## 2018-12-27 RX ADMIN — Medication 300 MILLIGRAM(S): at 10:03

## 2018-12-27 RX ADMIN — OXYCODONE AND ACETAMINOPHEN 2 TABLET(S): 5; 325 TABLET ORAL at 21:05

## 2018-12-27 RX ADMIN — Medication 100 MILLIGRAM(S): at 17:12

## 2018-12-27 RX ADMIN — OXYCODONE AND ACETAMINOPHEN 2 TABLET(S): 5; 325 TABLET ORAL at 01:33

## 2018-12-27 RX ADMIN — Medication 1 MILLIGRAM(S): at 10:03

## 2018-12-27 RX ADMIN — Medication 5 MILLIGRAM(S): at 10:03

## 2018-12-27 RX ADMIN — OXYCODONE AND ACETAMINOPHEN 2 TABLET(S): 5; 325 TABLET ORAL at 10:02

## 2018-12-27 RX ADMIN — OXYCODONE AND ACETAMINOPHEN 2 TABLET(S): 5; 325 TABLET ORAL at 14:30

## 2018-12-27 RX ADMIN — OXYCODONE AND ACETAMINOPHEN 2 TABLET(S): 5; 325 TABLET ORAL at 13:52

## 2018-12-27 RX ADMIN — GABAPENTIN 300 MILLIGRAM(S): 400 CAPSULE ORAL at 05:50

## 2018-12-27 NOTE — PROGRESS NOTE ADULT - ASSESSMENT
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    #Bilateral Chronic Nonhealing Ulcers and concurrent LE cellulitis   -Talked to podiatry , they want burn on board. If burn is not taking pt for debridement , they will take her to OR tommorow.  -Temp on 102.2F 12/21 ; Afebrile since  -doxycycline oral 100mg q 12 for 14 days	  -Podiatry: Betadine paint to digit 3R BID; Xeroform to ulcers with loose dressing; Limb elevation. No surgery needed.   -Duplex LE venous: Chronic-appearing right common femoral and femoral deep venous thromboses; left popliteal deep venous thrombosis  -Duplex LE arterial: On the right: Mild to moderate disease at the popliteal artery level. On the left: Mild popliteal and posterior tibial artery disease; Remaining tibial arteries not visualized secondary to edema   -Uric acid levels normal. .  -angiogram on 30 th by  as elective    #Chronic Right Hip Pain  -RIght hip xray: Diffuse osteopenia  -C/w Physical therapy. Will check Vit D levels and replete if deficient    #Chronic B/L femoral and popliteal DVT- C/w Eliquis  #Chronic Normocytic Anemia- Likely 2/2 RA; Hb at baseline  #Rheumatoid Arthritis -Continue home prednisone. talked to Dr Ascencio (Dr Henry's team)- Can decrease prednisone 7.5mg if healing hampered. No other adjustments needed.   #DM II-Controlled; Monitor finger sticks, start insulin sliding scale if fingersticks are greater than 180  #Latent TB- rifampin  #Gout- Normal uric acid levels; c/w Allopurinol    DVT ppx: On Eliquis  GI ppx: Not indicated  Diet: DASH Carb consistent   Activity: AAT  Code Status: DNR/DNI, MOLST form completed with health care proxy as witness by bedside, form in chart  Disposition: pending

## 2018-12-27 NOTE — PROGRESS NOTE ADULT - SUBJECTIVE AND OBJECTIVE BOX
SUBJECTIVE:    Patient is a 78y old Female who presents with a chief complaint of "I couldn't stand up from the recliner", and worsening pain of bilateral lower extremities (26 Dec 2018 17:29)    Currently admitted to medicine with the primary diagnosis of Wounds, multiple open, lower extremity     Today is hospital day 8d. pt getting hostile about treatment.    PAST MEDICAL & SURGICAL HISTORY  Gout  DVT (deep venous thrombosis)  HTN (hypertension)  Rheumatoid arteritis  Other specified diabetes mellitus with other specified complication, unspecified whether long term insulin use  Primary osteoarthritis of right knee: s/p knee repalcement    SOCIAL HISTORY:  Negative for smoking/alcohol/drug use.     ALLERGIES:  clindamycin (Unknown)  erythromycin (Unknown)  penicillin (Unknown)  strawberry (Unknown)    MEDICATIONS:  STANDING MEDICATIONS  allopurinol 300 milliGRAM(s) Oral daily  apixaban 5 milliGRAM(s) Oral every 12 hours  chlorhexidine 4% Liquid 1 Application(s) Topical <User Schedule>  docusate sodium 100 milliGRAM(s) Oral two times a day  doxycycline hyclate Capsule 100 milliGRAM(s) Oral every 12 hours  folic acid 1 milliGRAM(s) Oral daily  gabapentin 300 milliGRAM(s) Oral every 12 hours  leucovorin 5 milliGRAM(s) Oral daily  predniSONE   Tablet 7.5 milliGRAM(s) Oral daily  rifampin 300 milliGRAM(s) Oral two times a day  senna 2 Tablet(s) Oral at bedtime    PRN MEDICATIONS  acetaminophen   Tablet .. 650 milliGRAM(s) Oral every 6 hours PRN  oxyCODONE    5 mG/acetaminophen 325 mG 2 Tablet(s) Oral every 4 hours PRN    VITALS:   T(F): 98.5  HR: 86  BP: 152/68  RR: 18  SpO2: 97%    PHYSICAL EXAM:  GEN: No acute distress  LUNGS: Clear to auscultation bilaterally   HEART: S1/S2 present   ABD: Soft, non-tender  Ext:- lower ext in bandage b/l with changes in foot compatible with ischemia  NEURO: AAOX3      LABS:                        8.8    6.98  )-----------( 321      ( 26 Dec 2018 07:39 )             27.9     12-26    138  |  99  |  29<H>  ----------------------------<  131<H>  4.8   |  22  |  1.0    Ca    9.2      26 Dec 2018 07:39  Mg     2.0     12-25      PTT - ( 25 Dec 2018 09:41 )  PTT:35.2 sec            Culture - Blood (collected 25 Dec 2018 09:41)  Source: .Blood None  Preliminary Report (26 Dec 2018 18:01):    No growth to date.            RADIOLOGY:

## 2018-12-27 NOTE — PROGRESS NOTE ADULT - SUBJECTIVE AND OBJECTIVE BOX
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    Overnight events/Updates: Podiatry advised further debridement of the lower extremities.    Vital Signs Last 24 Hrs  T(C): 37.3 (27 Dec 2018 12:58), Max: 37.3 (27 Dec 2018 12:58)  T(F): 99.1 (27 Dec 2018 12:58), Max: 99.1 (27 Dec 2018 12:58)  HR: 89 (27 Dec 2018 12:58) (86 - 89)  BP: 135/60 (27 Dec 2018 12:58) (135/60 - 152/68)  BP(mean): --  RR: 20 (27 Dec 2018 12:58) (18 - 20)  SpO2: 96% (27 Dec 2018 08:00) (96% - 96%)    Physical Examination:  General: AAO x 3  HEENT: PERRLA, EOMI  CV= S1 & S2 appreciated  Lungs=CTA BL  Abdominal Examination= + BS, Soft, NT/ND  Extremity Examination= dressing to the right foot and bilateral calves    ROS: No chest pain, no shortness of breath.  All other systems reviewed and are within normal limits except for the complaints in the HPI.    MEDICATIONS  (STANDING):  allopurinol 300 milliGRAM(s) Oral daily  chlorhexidine 4% Liquid 1 Application(s) Topical <User Schedule>  docusate sodium 100 milliGRAM(s) Oral two times a day  doxycycline hyclate Capsule 100 milliGRAM(s) Oral every 12 hours  folic acid 1 milliGRAM(s) Oral daily  gabapentin 300 milliGRAM(s) Oral every 12 hours  leucovorin 5 milliGRAM(s) Oral daily  predniSONE   Tablet 7.5 milliGRAM(s) Oral daily  rifampin 300 milliGRAM(s) Oral two times a day  senna 2 Tablet(s) Oral at bedtime    MEDICATIONS  (PRN):  acetaminophen   Tablet .. 650 milliGRAM(s) Oral every 6 hours PRN Temp greater or equal to 38C (100.4F)  oxyCODONE    5 mG/acetaminophen 325 mG 2 Tablet(s) Oral every 4 hours PRN Moderate Pain (4 - 6)                            8.7    7.18  )-----------( 342      ( 27 Dec 2018 08:21 )             27.6     12-27    137  |  97<L>  |  31<H>  ----------------------------<  120<H>  4.6   |  22  |  1.1    Ca    9.1      27 Dec 2018 08:21        Case discussed with housestaff & family  SEBAS Segal [id number]

## 2018-12-27 NOTE — PROGRESS NOTE ADULT - SUBJECTIVE AND OBJECTIVE BOX
Pt seen at bedside. C/o increasing lower extremity pain L>R  Vascular states they can perform angiogram on outpatient basis.   Exam reveals dry eschars becoming boggy. No ascending cellulitis  Pt requires debridement as status of ulcers hhas changed.  Asking for burn consult.  Will bring pt to OR in am for versajet debridement pending burn opinion.  WBC 6.98  HH 8.8/ 27.9 INR 1.09  BUN CRE  29/1.0  Delayed healing anticipated because of low Hb, prednisone therapy, PAD.  Needs physical therapy

## 2018-12-27 NOTE — CONSULT NOTE ADULT - SUBJECTIVE AND OBJECTIVE BOX
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 Patient describes sharp, "jabbing" persistent pain as well as aching intermittent pain of her right hip for several years duration. Patient endorses subjective fevers, denies chills, nausea, vomiting, chest pain, palpitations, lightheadedness, headaches, sick contacts or recent travel. Patient was recently admitted on 10/24-10/29 for UTI was discharged to [facility], from where she was recently discharged 2 weeks ago. (19 Dec 2018 13:15)    Chart reviewed   CONSULT  pt c/o pain and wounds both legs. Seen by outside vascular Surgeon - Dr Moralez                            8.7    7.18  )-----------( 342      ( 27 Dec 2018 08:21 )             27.6       Right leg - 4 x 3 cm area of black necrotic eschar slightly soft   (pt does not allow thorough examination )  small open pink wound proximal leg;  necrotic crusted 2nd toe

## 2018-12-27 NOTE — PROGRESS NOTE ADULT - ASSESSMENT
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    Bilateral non-healing ulcers: continue Xeroform with Betadine to the lower extremities, with loose dressings.  Follow-up with Vascular Surgery as outpatient for elective angiography.  Continue treatment with PO Doxycline 100mg PO q12 until 1/3/18.  Keep NPO after midnight prior to debridement.  The patient is moderate risk for surgical complications.  Check Xray of C-Spine prior to OR.  Rheumatoid Arthritis: continue Prednisone 7.5mg PO q24 and Leucovorin 5mg q24.  Follow-up with Rheumatology, Dr. Henry as outpatient.    Chronic Bilateral Femoral and Popliteal DVT: continue Eliquis  Chronic Anemia: Hgb stable @ 8.7  History of LTBI: continue Rifampin  DMII: continue Lantus/Lispro and monitor FS with meals.  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    Bilateral non-healing ulcers: continue Xeroform with Betadine to the lower extremities, with loose dressings.  Follow-up with Vascular Surgery as outpatient for elective angiography.  Continue treatment with PO Doxycline 100mg PO q12 until 1/3/18.  Recall Burn Surgery to evaluate lower extremity wounds.  Keep NPO after midnight prior to debridement.  The patient is moderate risk for surgical complications.  Check Xray of C-Spine prior to OR.  Rheumatoid Arthritis: continue Prednisone 7.5mg PO q24 and Leucovorin 5mg q24.  Follow-up with Rheumatology, Dr. Henry as outpatient.    Chronic Bilateral Femoral and Popliteal DVT: continue Eliquis  Chronic Anemia: Hgb stable @ 8.7  History of LTBI: continue Rifampin  DMII: continue Lantus/Lispro and monitor FS with meals.  Gout: continue Allopurinol  GI/DVT prophylaxis  DNR/DNI

## 2018-12-27 NOTE — PROGRESS NOTE ADULT - SUBJECTIVE AND OBJECTIVE BOX
Podiatry Pre-Operative Note   LINCOLN HEREDIA is a 78y year old Female patient presenting to the operating room for surgical management of B/L feet. The patient has failed all conservative measures and requires surgical intervention at this time.       PAST MEDICAL & SURGICAL HISTORY:  Gout  DVT (deep venous thrombosis)  HTN (hypertension)  Rheumatoid arteritis  Other specified diabetes mellitus with other specified complication, unspecified whether long term insulin use  Primary osteoarthritis of right knee: s/p knee repalcement      Medications:   acetaminophen   Tablet .. 650 milliGRAM(s) Oral every 6 hours PRN  allopurinol 300 milliGRAM(s) Oral daily  chlorhexidine 4% Liquid 1 Application(s) Topical <User Schedule>  docusate sodium 100 milliGRAM(s) Oral two times a day  doxycycline hyclate Capsule 100 milliGRAM(s) Oral every 12 hours  folic acid 1 milliGRAM(s) Oral daily  gabapentin 300 milliGRAM(s) Oral every 12 hours  leucovorin 5 milliGRAM(s) Oral daily  oxyCODONE    5 mG/acetaminophen 325 mG 2 Tablet(s) Oral every 4 hours PRN  predniSONE   Tablet 7.5 milliGRAM(s) Oral daily  rifampin 300 milliGRAM(s) Oral two times a day  senna 2 Tablet(s) Oral at bedtime    Allergies    clindamycin (Unknown)  erythromycin (Unknown)  penicillin (Unknown)  strawberry (Unknown)    Intolerances        Consent [x_]  H&P [x_]  Medical Clearance [x_]  EKG [x_]  CXR [x_]  UCG [n/a_]  T&S [x_]                          8.7    7.18  )-----------( 342      ( 27 Dec 2018 08:21 )             27.6     12-27    137  |  97<L>  |  31<H>  ----------------------------<  120<H>  4.6   |  22  |  1.1    Ca    9.1      27 Dec 2018 08:21      PT/INR - ( 27 Dec 2018 11:35 )   PT: 16.80 sec;   INR: 1.47 ratio         PTT - ( 27 Dec 2018 11:35 )  PTT:36.1 sec    CAPILLARY BLOOD GLUCOSE      POCT Blood Glucose.: 157 mg/dL (27 Dec 2018 16:43)  POCT Blood Glucose.: 189 mg/dL (27 Dec 2018 11:15)  POCT Blood Glucose.: 133 mg/dL (27 Dec 2018 07:47)  POCT Blood Glucose.: 140 mg/dL (26 Dec 2018 21:27)            T(C): 37.3 (12-27-18 @ 12:58), Max: 37.3 (12-27-18 @ 12:58)  HR: 89 (12-27-18 @ 12:58) (86 - 89)  BP: 135/60 (12-27-18 @ 12:58) (135/60 - 152/68)  RR: 20 (12-27-18 @ 12:58) (18 - 20)  SpO2: 96% (12-27-18 @ 08:00) (96% - 96%)    Surgeon: Dr. Villasenor  Assistant (s): [facility] residents   Planned Procedure: excisional debridement soft tissue/bone B/L feet using versajet    The patient has beenn educated on the above procedure, with all risks and benefits described. No guarentees were given or implied for the aforementioned procedure.  The above assistant(s) have introduced themselves to the patient. The patient consents to their participation in the procedure listed above.

## 2018-12-28 ENCOUNTER — RESULT REVIEW (OUTPATIENT)
Age: 78
End: 2018-12-28

## 2018-12-28 LAB
ANION GAP SERPL CALC-SCNC: 17 MMOL/L — HIGH (ref 7–14)
ANION GAP SERPL CALC-SCNC: 18 MMOL/L — HIGH (ref 7–14)
APTT BLD: 34.2 SEC — SIGNIFICANT CHANGE UP (ref 27–39.2)
BASOPHILS # BLD AUTO: 0.02 K/UL — SIGNIFICANT CHANGE UP (ref 0–0.2)
BASOPHILS NFR BLD AUTO: 0.3 % — SIGNIFICANT CHANGE UP (ref 0–1)
BUN SERPL-MCNC: 32 MG/DL — HIGH (ref 10–20)
BUN SERPL-MCNC: 33 MG/DL — HIGH (ref 10–20)
CALCIUM SERPL-MCNC: 8.6 MG/DL — SIGNIFICANT CHANGE UP (ref 8.5–10.1)
CALCIUM SERPL-MCNC: 9.1 MG/DL — SIGNIFICANT CHANGE UP (ref 8.5–10.1)
CHLORIDE SERPL-SCNC: 98 MMOL/L — SIGNIFICANT CHANGE UP (ref 98–110)
CHLORIDE SERPL-SCNC: 99 MMOL/L — SIGNIFICANT CHANGE UP (ref 98–110)
CO2 SERPL-SCNC: 23 MMOL/L — SIGNIFICANT CHANGE UP (ref 17–32)
CO2 SERPL-SCNC: 23 MMOL/L — SIGNIFICANT CHANGE UP (ref 17–32)
CREAT SERPL-MCNC: 0.9 MG/DL — SIGNIFICANT CHANGE UP (ref 0.7–1.5)
CREAT SERPL-MCNC: 1.1 MG/DL — SIGNIFICANT CHANGE UP (ref 0.7–1.5)
EOSINOPHIL # BLD AUTO: 0 K/UL — SIGNIFICANT CHANGE UP (ref 0–0.7)
EOSINOPHIL NFR BLD AUTO: 0 % — SIGNIFICANT CHANGE UP (ref 0–8)
GLUCOSE BLDC GLUCOMTR-MCNC: 129 MG/DL — HIGH (ref 70–99)
GLUCOSE BLDC GLUCOMTR-MCNC: 202 MG/DL — HIGH (ref 70–99)
GLUCOSE BLDC GLUCOMTR-MCNC: 216 MG/DL — HIGH (ref 70–99)
GLUCOSE SERPL-MCNC: 110 MG/DL — HIGH (ref 70–99)
GLUCOSE SERPL-MCNC: 222 MG/DL — HIGH (ref 70–99)
HCT VFR BLD CALC: 26.2 % — LOW (ref 37–47)
HCT VFR BLD CALC: 27.8 % — LOW (ref 37–47)
HGB BLD-MCNC: 8.2 G/DL — LOW (ref 12–16)
HGB BLD-MCNC: 8.8 G/DL — LOW (ref 12–16)
IMM GRANULOCYTES NFR BLD AUTO: 0.7 % — HIGH (ref 0.1–0.3)
INR BLD: 1.19 RATIO — SIGNIFICANT CHANGE UP (ref 0.65–1.3)
LYMPHOCYTES # BLD AUTO: 1.05 K/UL — LOW (ref 1.2–3.4)
LYMPHOCYTES # BLD AUTO: 14.4 % — LOW (ref 20.5–51.1)
MAGNESIUM SERPL-MCNC: 2 MG/DL — SIGNIFICANT CHANGE UP (ref 1.8–2.4)
MAGNESIUM SERPL-MCNC: 2 MG/DL — SIGNIFICANT CHANGE UP (ref 1.8–2.4)
MCHC RBC-ENTMCNC: 28.1 PG — SIGNIFICANT CHANGE UP (ref 27–31)
MCHC RBC-ENTMCNC: 29.2 PG — SIGNIFICANT CHANGE UP (ref 27–31)
MCHC RBC-ENTMCNC: 31.3 G/DL — LOW (ref 32–37)
MCHC RBC-ENTMCNC: 31.7 G/DL — LOW (ref 32–37)
MCV RBC AUTO: 89.7 FL — SIGNIFICANT CHANGE UP (ref 81–99)
MCV RBC AUTO: 92.4 FL — SIGNIFICANT CHANGE UP (ref 81–99)
MONOCYTES # BLD AUTO: 0.27 K/UL — SIGNIFICANT CHANGE UP (ref 0.1–0.6)
MONOCYTES NFR BLD AUTO: 3.7 % — SIGNIFICANT CHANGE UP (ref 1.7–9.3)
NEUTROPHILS # BLD AUTO: 5.9 K/UL — SIGNIFICANT CHANGE UP (ref 1.4–6.5)
NEUTROPHILS NFR BLD AUTO: 80.9 % — HIGH (ref 42.2–75.2)
NRBC # BLD: 0 /100 WBCS — SIGNIFICANT CHANGE UP (ref 0–0)
NRBC # BLD: 0 /100 WBCS — SIGNIFICANT CHANGE UP (ref 0–0)
PHOSPHATE SERPL-MCNC: 3.9 MG/DL — SIGNIFICANT CHANGE UP (ref 2.1–4.9)
PHOSPHATE SERPL-MCNC: 4.8 MG/DL — SIGNIFICANT CHANGE UP (ref 2.1–4.9)
PLATELET # BLD AUTO: 331 K/UL — SIGNIFICANT CHANGE UP (ref 130–400)
PLATELET # BLD AUTO: 351 K/UL — SIGNIFICANT CHANGE UP (ref 130–400)
POTASSIUM SERPL-MCNC: 4.8 MMOL/L — SIGNIFICANT CHANGE UP (ref 3.5–5)
POTASSIUM SERPL-MCNC: 5 MMOL/L — SIGNIFICANT CHANGE UP (ref 3.5–5)
POTASSIUM SERPL-SCNC: 4.8 MMOL/L — SIGNIFICANT CHANGE UP (ref 3.5–5)
POTASSIUM SERPL-SCNC: 5 MMOL/L — SIGNIFICANT CHANGE UP (ref 3.5–5)
PROTHROM AB SERPL-ACNC: 13.7 SEC — HIGH (ref 9.95–12.87)
RBC # BLD: 2.92 M/UL — LOW (ref 4.2–5.4)
RBC # BLD: 3.01 M/UL — LOW (ref 4.2–5.4)
RBC # FLD: 15.3 % — HIGH (ref 11.5–14.5)
RBC # FLD: 15.8 % — HIGH (ref 11.5–14.5)
SODIUM SERPL-SCNC: 139 MMOL/L — SIGNIFICANT CHANGE UP (ref 135–146)
SODIUM SERPL-SCNC: 139 MMOL/L — SIGNIFICANT CHANGE UP (ref 135–146)
WBC # BLD: 7.22 K/UL — SIGNIFICANT CHANGE UP (ref 4.8–10.8)
WBC # BLD: 7.29 K/UL — SIGNIFICANT CHANGE UP (ref 4.8–10.8)
WBC # FLD AUTO: 7.22 K/UL — SIGNIFICANT CHANGE UP (ref 4.8–10.8)
WBC # FLD AUTO: 7.29 K/UL — SIGNIFICANT CHANGE UP (ref 4.8–10.8)

## 2018-12-28 RX ORDER — OXYCODONE AND ACETAMINOPHEN 5; 325 MG/1; MG/1
2 TABLET ORAL EVERY 4 HOURS
Qty: 0 | Refills: 0 | Status: DISCONTINUED | OUTPATIENT
Start: 2018-12-28 | End: 2019-01-04

## 2018-12-28 RX ORDER — HYDROMORPHONE HYDROCHLORIDE 2 MG/ML
1 INJECTION INTRAMUSCULAR; INTRAVENOUS; SUBCUTANEOUS
Qty: 0 | Refills: 0 | Status: DISCONTINUED | OUTPATIENT
Start: 2018-12-28 | End: 2018-12-28

## 2018-12-28 RX ORDER — SODIUM CHLORIDE 9 MG/ML
1000 INJECTION INTRAMUSCULAR; INTRAVENOUS; SUBCUTANEOUS
Qty: 0 | Refills: 0 | Status: DISCONTINUED | OUTPATIENT
Start: 2018-12-28 | End: 2018-12-28

## 2018-12-28 RX ORDER — MORPHINE SULFATE 50 MG/1
2 CAPSULE, EXTENDED RELEASE ORAL EVERY 6 HOURS
Qty: 0 | Refills: 0 | Status: DISCONTINUED | OUTPATIENT
Start: 2018-12-28 | End: 2019-01-02

## 2018-12-28 RX ORDER — CHLORHEXIDINE GLUCONATE 213 G/1000ML
1 SOLUTION TOPICAL
Qty: 0 | Refills: 0 | Status: DISCONTINUED | OUTPATIENT
Start: 2018-12-28 | End: 2019-01-07

## 2018-12-28 RX ORDER — APIXABAN 2.5 MG/1
5 TABLET, FILM COATED ORAL EVERY 12 HOURS
Qty: 0 | Refills: 0 | Status: DISCONTINUED | OUTPATIENT
Start: 2018-12-28 | End: 2019-01-06

## 2018-12-28 RX ORDER — ACETAMINOPHEN 500 MG
650 TABLET ORAL EVERY 6 HOURS
Qty: 0 | Refills: 0 | Status: DISCONTINUED | OUTPATIENT
Start: 2018-12-28 | End: 2019-01-07

## 2018-12-28 RX ORDER — ALLOPURINOL 300 MG
300 TABLET ORAL DAILY
Qty: 0 | Refills: 0 | Status: DISCONTINUED | OUTPATIENT
Start: 2018-12-28 | End: 2019-01-07

## 2018-12-28 RX ORDER — GABAPENTIN 400 MG/1
300 CAPSULE ORAL EVERY 12 HOURS
Qty: 0 | Refills: 0 | Status: DISCONTINUED | OUTPATIENT
Start: 2018-12-28 | End: 2019-01-07

## 2018-12-28 RX ORDER — LEUCOVORIN CALCIUM 5 MG
5 TABLET ORAL DAILY
Qty: 0 | Refills: 0 | Status: DISCONTINUED | OUTPATIENT
Start: 2018-12-28 | End: 2019-01-07

## 2018-12-28 RX ORDER — ONDANSETRON 8 MG/1
4 TABLET, FILM COATED ORAL ONCE
Qty: 0 | Refills: 0 | Status: DISCONTINUED | OUTPATIENT
Start: 2018-12-28 | End: 2018-12-28

## 2018-12-28 RX ORDER — DOCUSATE SODIUM 100 MG
100 CAPSULE ORAL
Qty: 0 | Refills: 0 | Status: DISCONTINUED | OUTPATIENT
Start: 2018-12-28 | End: 2019-01-07

## 2018-12-28 RX ORDER — SENNA PLUS 8.6 MG/1
2 TABLET ORAL AT BEDTIME
Qty: 0 | Refills: 0 | Status: DISCONTINUED | OUTPATIENT
Start: 2018-12-28 | End: 2019-01-07

## 2018-12-28 RX ORDER — FOLIC ACID 0.8 MG
1 TABLET ORAL DAILY
Qty: 0 | Refills: 0 | Status: DISCONTINUED | OUTPATIENT
Start: 2018-12-28 | End: 2019-01-07

## 2018-12-28 RX ORDER — HYDROMORPHONE HYDROCHLORIDE 2 MG/ML
0.5 INJECTION INTRAMUSCULAR; INTRAVENOUS; SUBCUTANEOUS
Qty: 0 | Refills: 0 | Status: DISCONTINUED | OUTPATIENT
Start: 2018-12-28 | End: 2018-12-28

## 2018-12-28 RX ADMIN — GABAPENTIN 300 MILLIGRAM(S): 400 CAPSULE ORAL at 05:17

## 2018-12-28 RX ADMIN — Medication 1 MILLIGRAM(S): at 13:53

## 2018-12-28 RX ADMIN — MORPHINE SULFATE 2 MILLIGRAM(S): 50 CAPSULE, EXTENDED RELEASE ORAL at 16:31

## 2018-12-28 RX ADMIN — HYDROMORPHONE HYDROCHLORIDE 0.5 MILLIGRAM(S): 2 INJECTION INTRAMUSCULAR; INTRAVENOUS; SUBCUTANEOUS at 11:46

## 2018-12-28 RX ADMIN — Medication 100 MILLIGRAM(S): at 05:17

## 2018-12-28 RX ADMIN — SENNA PLUS 2 TABLET(S): 8.6 TABLET ORAL at 22:04

## 2018-12-28 RX ADMIN — OXYCODONE AND ACETAMINOPHEN 2 TABLET(S): 5; 325 TABLET ORAL at 05:17

## 2018-12-28 RX ADMIN — CHLORHEXIDINE GLUCONATE 1 APPLICATION(S): 213 SOLUTION TOPICAL at 05:14

## 2018-12-28 RX ADMIN — Medication 5 MILLIGRAM(S): at 13:54

## 2018-12-28 RX ADMIN — Medication 100 MILLIGRAM(S): at 18:02

## 2018-12-28 RX ADMIN — Medication 7.5 MILLIGRAM(S): at 05:17

## 2018-12-28 RX ADMIN — SODIUM CHLORIDE 100 MILLILITER(S): 9 INJECTION INTRAMUSCULAR; INTRAVENOUS; SUBCUTANEOUS at 11:11

## 2018-12-28 RX ADMIN — Medication 100 MILLIGRAM(S): at 18:04

## 2018-12-28 RX ADMIN — HYDROMORPHONE HYDROCHLORIDE 0.5 MILLIGRAM(S): 2 INJECTION INTRAMUSCULAR; INTRAVENOUS; SUBCUTANEOUS at 11:35

## 2018-12-28 RX ADMIN — Medication 300 MILLIGRAM(S): at 13:53

## 2018-12-28 RX ADMIN — OXYCODONE AND ACETAMINOPHEN 2 TABLET(S): 5; 325 TABLET ORAL at 22:03

## 2018-12-28 RX ADMIN — GABAPENTIN 300 MILLIGRAM(S): 400 CAPSULE ORAL at 18:03

## 2018-12-28 RX ADMIN — APIXABAN 5 MILLIGRAM(S): 2.5 TABLET, FILM COATED ORAL at 23:58

## 2018-12-28 RX ADMIN — OXYCODONE AND ACETAMINOPHEN 2 TABLET(S): 5; 325 TABLET ORAL at 18:00

## 2018-12-28 RX ADMIN — HYDROMORPHONE HYDROCHLORIDE 0.5 MILLIGRAM(S): 2 INJECTION INTRAMUSCULAR; INTRAVENOUS; SUBCUTANEOUS at 11:20

## 2018-12-28 RX ADMIN — OXYCODONE AND ACETAMINOPHEN 2 TABLET(S): 5; 325 TABLET ORAL at 13:53

## 2018-12-28 NOTE — PROGRESS NOTE ADULT - ASSESSMENT
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    #Bilateral Chronic Nonhealing Ulcers and concurrent LE cellulitis   -Talked to podiatry ,OR today  -Temp on 102.2F 12/21 ; Afebrile since  -doxycycline oral 100mg q 12 for 14 days	  -Podiatry: Betadine paint to digit 3R BID; Xeroform to ulcers with loose dressing; Limb elevation.    -Duplex LE venous: Chronic-appearing right common femoral and femoral deep venous thromboses; left popliteal deep venous thrombosis  -Duplex LE arterial: On the right: Mild to moderate disease at the popliteal artery level. On the left: Mild popliteal and posterior tibial artery disease; Remaining tibial arteries not visualized secondary to edema   -Uric acid levels normal. .  -angiogram on 30 th by  as elective    #Chronic Right Hip Pain  -RIght hip xray: Diffuse osteopenia  -C/w Physical therapy. Will check Vit D levels and replete if deficient    #Chronic B/L femoral and popliteal DVT- eliquis held for procedure today  #Chronic Normocytic Anemia- Likely 2/2 RA; Hb at baseline  #Rheumatoid Arthritis -Continue home prednisone. talked to Dr Ascencio (Dr Hnery's team)- Can decrease prednisone 7.5mg if healing hampered. No other adjustments needed.   #DM II-Controlled; Monitor finger sticks, start insulin sliding scale if fingersticks are greater than 180  #Latent TB- rifampin  #Gout- Normal uric acid levels; c/w Allopurinol    DVT ppx: On Eliquis  GI ppx: Not indicated  Diet: DASH Carb consistent   Activity: AAT  Code Status: DNR/DNI, MOLST form completed with health care proxy as witness by bedside, form in chart  Disposition: pending

## 2018-12-28 NOTE — PROGRESS NOTE ADULT - SUBJECTIVE AND OBJECTIVE BOX
SUBJECTIVE:    Patient is a 78y old Female who presents with a chief complaint of "I couldn't stand up from the recliner", and worsening pain of bilateral lower extremities (28 Dec 2018 05:49)    Currently admitted to medicine with the primary diagnosis of Wounds, multiple open, lower extremity     Today is hospital day 9d. Will be going to OR today.    PAST MEDICAL & SURGICAL HISTORY  Gout  DVT (deep venous thrombosis)  HTN (hypertension)  Rheumatoid arteritis  Other specified diabetes mellitus with other specified complication, unspecified whether long term insulin use  Primary osteoarthritis of right knee: s/p knee repalcement    SOCIAL HISTORY:  Negative for smoking/alcohol/drug use.     ALLERGIES:  clindamycin (Unknown)  erythromycin (Unknown)  penicillin (Unknown)  strawberry (Unknown)    MEDICATIONS:  STANDING MEDICATIONS  allopurinol 300 milliGRAM(s) Oral daily  chlorhexidine 4% Liquid 1 Application(s) Topical <User Schedule>  docusate sodium 100 milliGRAM(s) Oral two times a day  doxycycline hyclate Capsule 100 milliGRAM(s) Oral every 12 hours  folic acid 1 milliGRAM(s) Oral daily  gabapentin 300 milliGRAM(s) Oral every 12 hours  leucovorin 5 milliGRAM(s) Oral daily  predniSONE   Tablet 7.5 milliGRAM(s) Oral daily  rifampin 300 milliGRAM(s) Oral two times a day  senna 2 Tablet(s) Oral at bedtime    PRN MEDICATIONS  acetaminophen   Tablet .. 650 milliGRAM(s) Oral every 6 hours PRN  oxyCODONE    5 mG/acetaminophen 325 mG 2 Tablet(s) Oral every 4 hours PRN    VITALS:   T(F): 97.5  HR: 85  BP: 148/63  RR: 18  SpO2: 98%    PHYSICAL EXAM:  GEN: No acute distress  LUNGS: Clear to auscultation bilaterally   HEART: S1/S2 present   ABD: Soft, non-tender  Ext:- lower ext in bandage b/l with changes in foot compatible with ischemia  NEURO: AAOX3      LABS:                        8.7    7.18  )-----------( 342      ( 27 Dec 2018 08:21 )             27.6     12-27    137  |  97<L>  |  31<H>  ----------------------------<  120<H>  4.6   |  22  |  1.1    Ca    9.1      27 Dec 2018 08:21      PT/INR - ( 27 Dec 2018 11:35 )   PT: 16.80 sec;   INR: 1.47 ratio         PTT - ( 27 Dec 2018 11:35 )  PTT:36.1 sec            Culture - Blood (collected 25 Dec 2018 09:41)  Source: .Blood None  Preliminary Report (26 Dec 2018 18:01):    No growth to date.            RADIOLOGY:

## 2018-12-28 NOTE — PROGRESS NOTE ADULT - ASSESSMENT
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    Bilateral non-healing ulcers: Follow-up with Vascular Surgery (Dr. Tristan) prior to discharge. Continue PO Doxycline 100mg PO q12 until 1/3/18.  Status post debridement by podiatry--follow-up with service for wound care instructions in AM.  Rheumatoid Arthritis: continue Prednisone 7.5mg PO q24 and Leucovorin 5mg q24.  Follow-up with Rheumatology, Dr. Henry as outpatient.    Chronic Bilateral Femoral and Popliteal DVT: continue Eliquis  Chronic Anemia: Hgb stable @ 8.7  History of LTBI: continue Rifampin  DMII: continue Lantus/Lispro and monitor FS with meals.  Gout: continue Allopurinol  GI/DVT prophylaxis  DNR/DNI

## 2018-12-28 NOTE — PROGRESS NOTE ADULT - SUBJECTIVE AND OBJECTIVE BOX
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    Overnight events/Updates: The patient underwent debridement of the lower extremities this AM.    Vital Signs Last 24 Hrs  T(C): 36.6 (28 Dec 2018 12:10), Max: 36.9 (28 Dec 2018 11:10)  T(F): 97.9 (28 Dec 2018 12:10), Max: 98.4 (28 Dec 2018 11:10)  HR: 78 (28 Dec 2018 12:10) (78 - 86)i  BP: 139/62 (28 Dec 2018 12:10) (120/51 - 148/63)  BP(mean): 87 (28 Dec 2018 12:10) (78 - 98)  RR: 20 (28 Dec 2018 12:10) (18 - 22)  SpO2: 100% (28 Dec 2018 12:10) (98% - 100%)    Physical Examination:  General: AAO x 3  HEENT: PERRLA, EOMI  CV= S1 & S2 appreciated  Lungs= CTA BL  Abdominal Examination= + BS, Soft, NT/ND  Extremity Examination= BL LE dressings    ROS: No chest pain, no shortness of breath.  All other systems reviewed and are within normal limits except for the complaints in the HPI.    MEDICATIONS  (STANDING):  allopurinol 300 milliGRAM(s) Oral daily  chlorhexidine 4% Liquid 1 Application(s) Topical <User Schedule>  docusate sodium 100 milliGRAM(s) Oral two times a day  doxycycline hyclate Capsule 100 milliGRAM(s) Oral every 12 hours  folic acid 1 milliGRAM(s) Oral daily  gabapentin 300 milliGRAM(s) Oral every 12 hours  leucovorin 5 milliGRAM(s) Oral daily  predniSONE   Tablet 7.5 milliGRAM(s) Oral daily  rifampin 300 milliGRAM(s) Oral two times a day  senna 2 Tablet(s) Oral at bedtime  sodium chloride 0.9%. 1000 milliLiter(s) (100 mL/Hr) IV Continuous <Continuous>    MEDICATIONS  (PRN):  acetaminophen   Tablet .. 650 milliGRAM(s) Oral every 6 hours PRN Temp greater or equal to 38C (100.4F)  HYDROmorphone  Injectable 0.5 milliGRAM(s) IV Push every 10 minutes PRN Moderate Pain (4 - 6)  HYDROmorphone  Injectable 1 milliGRAM(s) IV Push every 10 minutes PRN Severe Pain (7 - 10)  morphine  - Injectable 2 milliGRAM(s) IV Push every 6 hours PRN Severe Pain (7 - 10)  ondansetron Injectable 4 milliGRAM(s) IV Push once PRN Nausea and/or Vomiting  oxyCODONE    5 mG/acetaminophen 325 mG 2 Tablet(s) Oral every 4 hours PRN Moderate Pain (4 - 6)                            8.8    7.22  )-----------( 331      ( 28 Dec 2018 07:18 )             27.8     12-28    139  |  98  |  32<H>  ----------------------------<  110<H>  4.8   |  23  |  0.9    Ca    9.1      28 Dec 2018 07:18  Phos  3.9     12-28  Mg     2.0     12-28        Case discussed with housestaff & family  SEBAS Segal [id number]

## 2018-12-28 NOTE — CHART NOTE - NSCHARTNOTEFT_GEN_A_CORE
PACU ANESTHESIA ADMISSION NOTE      Procedure:   Post op diagnosis:      ____  Intubated  TV:______       Rate: ______      FiO2: ______    _x___  Patent Airway    _x___  Full return of protective reflexes    _x___  Full recovery from anesthesia / back to baseline status    Vitals:    See anesthesia record      Mental Status:  _x___ Awake   _____ Alert   _____ Drowsy   _____ Sedated    Nausea/Vomiting:  _x___  NO       ______Yes,   See Post - Op Orders         Pain Scale (0-10):  __8___    Treatment: ____ None    _x___ See Post - Op/PCA Orders    Post - Operative Fluids:   __x__ Oral   ____ See Post - Op Orders    Plan: Discharge:   ____Home       __x___Floor     _____Critical Care    _____  Other:_________________    Comments:  No anesthesia issues or complications noted.  Discharge when criteria met.

## 2018-12-28 NOTE — BRIEF OPERATIVE NOTE - PROCEDURE
<<-----Click on this checkbox to enter Procedure Debridement and irrigation of multiple wounds of extremity  12/28/2018  sharp excisional debridement of thickness ulcers both legs to but not including fascia  right 6 x 4 cm ; left 5 x 4 cm  Active  [id number]

## 2018-12-29 LAB
ANION GAP SERPL CALC-SCNC: 16 MMOL/L — HIGH (ref 7–14)
BUN SERPL-MCNC: 46 MG/DL — HIGH (ref 10–20)
CALCIUM SERPL-MCNC: 8.3 MG/DL — LOW (ref 8.5–10.1)
CHLORIDE SERPL-SCNC: 100 MMOL/L — SIGNIFICANT CHANGE UP (ref 98–110)
CO2 SERPL-SCNC: 22 MMOL/L — SIGNIFICANT CHANGE UP (ref 17–32)
CREAT SERPL-MCNC: 1.4 MG/DL — SIGNIFICANT CHANGE UP (ref 0.7–1.5)
GLUCOSE BLDC GLUCOMTR-MCNC: 117 MG/DL — HIGH (ref 70–99)
GLUCOSE BLDC GLUCOMTR-MCNC: 121 MG/DL — HIGH (ref 70–99)
GLUCOSE BLDC GLUCOMTR-MCNC: 137 MG/DL — HIGH (ref 70–99)
GLUCOSE BLDC GLUCOMTR-MCNC: 174 MG/DL — HIGH (ref 70–99)
GLUCOSE SERPL-MCNC: 188 MG/DL — HIGH (ref 70–99)
HCT VFR BLD CALC: 26 % — LOW (ref 37–47)
HGB BLD-MCNC: 8.1 G/DL — LOW (ref 12–16)
MCHC RBC-ENTMCNC: 28.2 PG — SIGNIFICANT CHANGE UP (ref 27–31)
MCHC RBC-ENTMCNC: 31.2 G/DL — LOW (ref 32–37)
MCV RBC AUTO: 90.6 FL — SIGNIFICANT CHANGE UP (ref 81–99)
NRBC # BLD: 0 /100 WBCS — SIGNIFICANT CHANGE UP (ref 0–0)
PLATELET # BLD AUTO: 354 K/UL — SIGNIFICANT CHANGE UP (ref 130–400)
POTASSIUM SERPL-MCNC: 4.8 MMOL/L — SIGNIFICANT CHANGE UP (ref 3.5–5)
POTASSIUM SERPL-SCNC: 4.8 MMOL/L — SIGNIFICANT CHANGE UP (ref 3.5–5)
RBC # BLD: 2.87 M/UL — LOW (ref 4.2–5.4)
RBC # FLD: 15.7 % — HIGH (ref 11.5–14.5)
SODIUM SERPL-SCNC: 138 MMOL/L — SIGNIFICANT CHANGE UP (ref 135–146)
WBC # BLD: 8.87 K/UL — SIGNIFICANT CHANGE UP (ref 4.8–10.8)
WBC # FLD AUTO: 8.87 K/UL — SIGNIFICANT CHANGE UP (ref 4.8–10.8)

## 2018-12-29 RX ADMIN — CHLORHEXIDINE GLUCONATE 1 APPLICATION(S): 213 SOLUTION TOPICAL at 05:14

## 2018-12-29 RX ADMIN — GABAPENTIN 300 MILLIGRAM(S): 400 CAPSULE ORAL at 05:14

## 2018-12-29 RX ADMIN — Medication 100 MILLIGRAM(S): at 17:38

## 2018-12-29 RX ADMIN — Medication 5 MILLIGRAM(S): at 12:13

## 2018-12-29 RX ADMIN — APIXABAN 5 MILLIGRAM(S): 2.5 TABLET, FILM COATED ORAL at 12:13

## 2018-12-29 RX ADMIN — GABAPENTIN 300 MILLIGRAM(S): 400 CAPSULE ORAL at 17:38

## 2018-12-29 RX ADMIN — SENNA PLUS 2 TABLET(S): 8.6 TABLET ORAL at 21:09

## 2018-12-29 RX ADMIN — OXYCODONE AND ACETAMINOPHEN 2 TABLET(S): 5; 325 TABLET ORAL at 14:53

## 2018-12-29 RX ADMIN — Medication 1 MILLIGRAM(S): at 12:13

## 2018-12-29 RX ADMIN — Medication 100 MILLIGRAM(S): at 05:13

## 2018-12-29 RX ADMIN — OXYCODONE AND ACETAMINOPHEN 2 TABLET(S): 5; 325 TABLET ORAL at 08:37

## 2018-12-29 RX ADMIN — OXYCODONE AND ACETAMINOPHEN 2 TABLET(S): 5; 325 TABLET ORAL at 05:13

## 2018-12-29 RX ADMIN — APIXABAN 5 MILLIGRAM(S): 2.5 TABLET, FILM COATED ORAL at 22:58

## 2018-12-29 RX ADMIN — Medication 100 MILLIGRAM(S): at 05:14

## 2018-12-29 RX ADMIN — Medication 7.5 MILLIGRAM(S): at 05:14

## 2018-12-29 RX ADMIN — MORPHINE SULFATE 2 MILLIGRAM(S): 50 CAPSULE, EXTENDED RELEASE ORAL at 00:52

## 2018-12-29 RX ADMIN — OXYCODONE AND ACETAMINOPHEN 2 TABLET(S): 5; 325 TABLET ORAL at 19:32

## 2018-12-29 RX ADMIN — Medication 300 MILLIGRAM(S): at 12:13

## 2018-12-29 NOTE — PROGRESS NOTE ADULT - SUBJECTIVE AND OBJECTIVE BOX
Pt is s/p 24 h debridement lower extremity ulcers by burn. No complaints except for pain digit 3 R  T 97.5  No dressing change by writer  WBC  8.87   HH 8.1/26.0    Discussed upcoming angio (12/31)  Discussed further management necrotic digit-surgical management vs. autoamputation depending on establishment of flow

## 2018-12-29 NOTE — PROGRESS NOTE ADULT - ASSESSMENT
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    #Bilateral Chronic Nonhealing Ulcers and concurrent LE cellulitis   -Talked to podiatry ,OR yesterday for debridement  -Temp on 102.2F 12/21 ; Afebrile since  -doxycycline oral 100mg q 12 for 14 days	  -Podiatry: Betadine paint to digit 3R BID; Xeroform to ulcers with loose dressing; Limb elevation.    -Duplex LE venous: Chronic-appearing right common femoral and femoral deep venous thromboses; left popliteal deep venous thrombosis  -Duplex LE arterial: On the right: Mild to moderate disease at the popliteal artery level. On the left: Mild popliteal and posterior tibial artery disease; Remaining tibial arteries not visualized secondary to edema   -Uric acid levels normal. .  -angiogram on 31 st by  as elective    #Chronic Right Hip Pain  -RIght hip xray: Diffuse osteopenia  -C/w Physical therapy. Will check Vit D levels and replete if deficient    #Chronic B/L femoral and popliteal DVT- eliquis   #Chronic Normocytic Anemia- Likely 2/2 RA; Hb at baseline  #Rheumatoid Arthritis -Continue home prednisone. talked to Dr Ascencio (Dr Henry's team)- Can decrease prednisone 7.5mg if healing hampered. No other adjustments needed.   #DM II-Controlled; Monitor finger sticks, start insulin sliding scale if fingersticks are greater than 180  #Latent TB- rifampin  #Gout- Normal uric acid levels; c/w Allopurinol    DVT ppx: On Eliquis  GI ppx: Not indicated  Diet: DASH Carb consistent   Activity: AAT  Code Status: DNR/DNI, MOLST form completed with health care proxy as witness by bedside, form in chart  Disposition: pending

## 2018-12-29 NOTE — PROGRESS NOTE ADULT - SUBJECTIVE AND OBJECTIVE BOX
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    Overnight events/Updates: status post debridement of the bilateral lower extremities.  The patient complains of discomfort to her legs.    Vital Signs Last 24 Hrs  T(C): 36.7 (29 Dec 2018 05:24), Max: 36.9 (28 Dec 2018 11:10)  T(F): 98.1 (29 Dec 2018 05:24), Max: 98.4 (28 Dec 2018 11:10)  HR: 81 (29 Dec 2018 05:24) (78 - 86)  BP: 130/69 (29 Dec 2018 05:24) (120/51 - 146/81)  BP(mean): 87 (28 Dec 2018 12:10) (78 - 98)  RR: 18 (29 Dec 2018 05:24) (18 - 22)  SpO2: 98% (28 Dec 2018 20:09) (98% - 100%)    Physical Examination:  General: AAO x 3  HEENT: PERRLA, EOMI  CV= S1 & S2 appreciated  Lungs= CTA BL  Abdominal Examination= + BS, Soft, NT/ND  Extremity Examination= dressings to the bilateral lower extremities    ROS: No chest pain, no shortness of breath.  All other systems reviewed and are within normal limits except for the complaints in the HPI.    MEDICATIONS  (STANDING):  allopurinol 300 milliGRAM(s) Oral daily  apixaban 5 milliGRAM(s) Oral every 12 hours  chlorhexidine 4% Liquid 1 Application(s) Topical <User Schedule>  docusate sodium 100 milliGRAM(s) Oral two times a day  doxycycline hyclate Capsule 100 milliGRAM(s) Oral every 12 hours  folic acid 1 milliGRAM(s) Oral daily  gabapentin 300 milliGRAM(s) Oral every 12 hours  leucovorin 5 milliGRAM(s) Oral daily  predniSONE   Tablet 7.5 milliGRAM(s) Oral daily  rifampin 300 milliGRAM(s) Oral two times a day  senna 2 Tablet(s) Oral at bedtime    MEDICATIONS  (PRN):  acetaminophen   Tablet .. 650 milliGRAM(s) Oral every 6 hours PRN Temp greater or equal to 38C (100.4F)  morphine  - Injectable 2 milliGRAM(s) IV Push every 6 hours PRN Severe Pain (7 - 10)  oxyCODONE    5 mG/acetaminophen 325 mG 2 Tablet(s) Oral every 4 hours PRN Moderate Pain (4 - 6)                            8.2    7.29  )-----------( 351      ( 28 Dec 2018 18:13 )             26.2     12-28    139  |  99  |  33<H>  ----------------------------<  222<H>  5.0   |  23  |  1.1    Ca    8.6      28 Dec 2018 18:13  Phos  4.8     12-28  Mg     2.0     12-28        Case discussed with housestaff & family  SEBAS Segal [id number]

## 2018-12-29 NOTE — PROGRESS NOTE ADULT - ASSESSMENT
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    Bilateral non-healing ulcers: recall Vascular Surgery (Dr. Tristan) prior to discharge. Continue PO Doxycline 100mg PO q12 until 1/3/18.  Status post debridement by Burn Surgery.  Follow-up with Burn/Podiatry for local wound care.  OOB to chair and encourage participation with physical therapy.  Rheumatoid Arthritis: continue Prednisone 7.5mg PO q24 and Leucovorin 5mg q24.  Follow-up with Rheumatology, Dr. Henry as outpatient.    Chronic Bilateral Femoral and Popliteal DVT: Eliquis was restarted following surgical debridement.  Chronic Anemia: Hgb decreased slightly to 8.2 following surgery  History of LTBI: continue Rifampin  DMII: continue Lantus/Lispro and monitor FS with meals.  Gout: continue Allopurinol  GI/DVT prophylaxis  DNR/DNI

## 2018-12-29 NOTE — PROGRESS NOTE ADULT - SUBJECTIVE AND OBJECTIVE BOX
SUBJECTIVE:    Patient is a 78y old Female who presents with a chief complaint of "I couldn't stand up from the recliner", and worsening pain of bilateral lower extremities (28 Dec 2018 14:29)    Currently admitted to medicine with the primary diagnosis of Wounds, multiple open, lower extremity     Today is hospital day 10d. underwent debridement of LE yesterday.    PAST MEDICAL & SURGICAL HISTORY  Gout  DVT (deep venous thrombosis)  HTN (hypertension)  Rheumatoid arteritis  Other specified diabetes mellitus with other specified complication, unspecified whether long term insulin use  Primary osteoarthritis of right knee: s/p knee repalcement    SOCIAL HISTORY:  Negative for smoking/alcohol/drug use.     ALLERGIES:  clindamycin (Unknown)  erythromycin (Unknown)  penicillin (Unknown)  strawberry (Unknown)    MEDICATIONS:  STANDING MEDICATIONS  allopurinol 300 milliGRAM(s) Oral daily  apixaban 5 milliGRAM(s) Oral every 12 hours  chlorhexidine 4% Liquid 1 Application(s) Topical <User Schedule>  docusate sodium 100 milliGRAM(s) Oral two times a day  doxycycline hyclate Capsule 100 milliGRAM(s) Oral every 12 hours  folic acid 1 milliGRAM(s) Oral daily  gabapentin 300 milliGRAM(s) Oral every 12 hours  leucovorin 5 milliGRAM(s) Oral daily  predniSONE   Tablet 7.5 milliGRAM(s) Oral daily  rifampin 300 milliGRAM(s) Oral two times a day  senna 2 Tablet(s) Oral at bedtime    PRN MEDICATIONS  acetaminophen   Tablet .. 650 milliGRAM(s) Oral every 6 hours PRN  morphine  - Injectable 2 milliGRAM(s) IV Push every 6 hours PRN  oxyCODONE    5 mG/acetaminophen 325 mG 2 Tablet(s) Oral every 4 hours PRN    VITALS:   T(F): 98.1  HR: 81  BP: 130/69  RR: 18  SpO2: 98%    PHYSICAL EXAM:  GEN: No acute distress  LUNGS: Clear to auscultation bilaterally   HEART: S1/S2 present.   ABD: Soft, non-tender, non-distended.    EXT: b/l LE in bandages  NEURO: AAOX3      LABS:                        8.2    7.29  )-----------( 351      ( 28 Dec 2018 18:13 )             26.2     12-28    139  |  99  |  33<H>  ----------------------------<  222<H>  5.0   |  23  |  1.1    Ca    8.6      28 Dec 2018 18:13  Phos  4.8     12-28  Mg     2.0     12-28      PT/INR - ( 28 Dec 2018 07:18 )   PT: 13.70 sec;   INR: 1.19 ratio         PTT - ( 28 Dec 2018 07:18 )  PTT:34.2 sec                  RADIOLOGY:

## 2018-12-30 LAB
-  AMIKACIN: SIGNIFICANT CHANGE UP
-  AZTREONAM: SIGNIFICANT CHANGE UP
-  CEFEPIME: SIGNIFICANT CHANGE UP
-  CEFTAZIDIME: SIGNIFICANT CHANGE UP
-  CIPROFLOXACIN: SIGNIFICANT CHANGE UP
-  GENTAMICIN: SIGNIFICANT CHANGE UP
-  IMIPENEM: SIGNIFICANT CHANGE UP
-  LEVOFLOXACIN: SIGNIFICANT CHANGE UP
-  MEROPENEM: SIGNIFICANT CHANGE UP
-  PIPERACILLIN/TAZOBACTAM: SIGNIFICANT CHANGE UP
-  TOBRAMYCIN: SIGNIFICANT CHANGE UP
ANION GAP SERPL CALC-SCNC: 17 MMOL/L — HIGH (ref 7–14)
BUN SERPL-MCNC: 45 MG/DL — HIGH (ref 10–20)
CALCIUM SERPL-MCNC: 8.6 MG/DL — SIGNIFICANT CHANGE UP (ref 8.5–10.1)
CHLORIDE SERPL-SCNC: 100 MMOL/L — SIGNIFICANT CHANGE UP (ref 98–110)
CO2 SERPL-SCNC: 22 MMOL/L — SIGNIFICANT CHANGE UP (ref 17–32)
CREAT SERPL-MCNC: 1.2 MG/DL — SIGNIFICANT CHANGE UP (ref 0.7–1.5)
CULTURE RESULTS: SIGNIFICANT CHANGE UP
GLUCOSE BLDC GLUCOMTR-MCNC: 113 MG/DL — HIGH (ref 70–99)
GLUCOSE BLDC GLUCOMTR-MCNC: 130 MG/DL — HIGH (ref 70–99)
GLUCOSE BLDC GLUCOMTR-MCNC: 132 MG/DL — HIGH (ref 70–99)
GLUCOSE BLDC GLUCOMTR-MCNC: 139 MG/DL — HIGH (ref 70–99)
GLUCOSE BLDC GLUCOMTR-MCNC: 88 MG/DL — SIGNIFICANT CHANGE UP (ref 70–99)
GLUCOSE SERPL-MCNC: 112 MG/DL — HIGH (ref 70–99)
HCT VFR BLD CALC: 25.4 % — LOW (ref 37–47)
HGB BLD-MCNC: 8 G/DL — LOW (ref 12–16)
MCHC RBC-ENTMCNC: 28.2 PG — SIGNIFICANT CHANGE UP (ref 27–31)
MCHC RBC-ENTMCNC: 31.5 G/DL — LOW (ref 32–37)
MCV RBC AUTO: 89.4 FL — SIGNIFICANT CHANGE UP (ref 81–99)
METHOD TYPE: SIGNIFICANT CHANGE UP
NRBC # BLD: 0 /100 WBCS — SIGNIFICANT CHANGE UP (ref 0–0)
PLATELET # BLD AUTO: 372 K/UL — SIGNIFICANT CHANGE UP (ref 130–400)
POTASSIUM SERPL-MCNC: 4.4 MMOL/L — SIGNIFICANT CHANGE UP (ref 3.5–5)
POTASSIUM SERPL-SCNC: 4.4 MMOL/L — SIGNIFICANT CHANGE UP (ref 3.5–5)
RBC # BLD: 2.84 M/UL — LOW (ref 4.2–5.4)
RBC # FLD: 15.7 % — HIGH (ref 11.5–14.5)
SODIUM SERPL-SCNC: 139 MMOL/L — SIGNIFICANT CHANGE UP (ref 135–146)
SPECIMEN SOURCE: SIGNIFICANT CHANGE UP
WBC # BLD: 9.28 K/UL — SIGNIFICANT CHANGE UP (ref 4.8–10.8)
WBC # FLD AUTO: 9.28 K/UL — SIGNIFICANT CHANGE UP (ref 4.8–10.8)

## 2018-12-30 RX ADMIN — Medication 300 MILLIGRAM(S): at 11:28

## 2018-12-30 RX ADMIN — OXYCODONE AND ACETAMINOPHEN 2 TABLET(S): 5; 325 TABLET ORAL at 16:50

## 2018-12-30 RX ADMIN — OXYCODONE AND ACETAMINOPHEN 2 TABLET(S): 5; 325 TABLET ORAL at 09:56

## 2018-12-30 RX ADMIN — Medication 7.5 MILLIGRAM(S): at 05:40

## 2018-12-30 RX ADMIN — Medication 100 MILLIGRAM(S): at 17:52

## 2018-12-30 RX ADMIN — GABAPENTIN 300 MILLIGRAM(S): 400 CAPSULE ORAL at 17:52

## 2018-12-30 RX ADMIN — GABAPENTIN 300 MILLIGRAM(S): 400 CAPSULE ORAL at 05:40

## 2018-12-30 RX ADMIN — Medication 1 MILLIGRAM(S): at 11:28

## 2018-12-30 RX ADMIN — Medication 100 MILLIGRAM(S): at 05:40

## 2018-12-30 RX ADMIN — Medication 5 MILLIGRAM(S): at 11:28

## 2018-12-30 RX ADMIN — CHLORHEXIDINE GLUCONATE 1 APPLICATION(S): 213 SOLUTION TOPICAL at 06:48

## 2018-12-30 RX ADMIN — OXYCODONE AND ACETAMINOPHEN 2 TABLET(S): 5; 325 TABLET ORAL at 10:46

## 2018-12-30 RX ADMIN — MORPHINE SULFATE 2 MILLIGRAM(S): 50 CAPSULE, EXTENDED RELEASE ORAL at 11:46

## 2018-12-30 RX ADMIN — OXYCODONE AND ACETAMINOPHEN 2 TABLET(S): 5; 325 TABLET ORAL at 06:57

## 2018-12-30 RX ADMIN — OXYCODONE AND ACETAMINOPHEN 2 TABLET(S): 5; 325 TABLET ORAL at 21:47

## 2018-12-30 RX ADMIN — MORPHINE SULFATE 2 MILLIGRAM(S): 50 CAPSULE, EXTENDED RELEASE ORAL at 05:39

## 2018-12-30 RX ADMIN — MORPHINE SULFATE 2 MILLIGRAM(S): 50 CAPSULE, EXTENDED RELEASE ORAL at 04:00

## 2018-12-30 RX ADMIN — OXYCODONE AND ACETAMINOPHEN 2 TABLET(S): 5; 325 TABLET ORAL at 06:08

## 2018-12-30 RX ADMIN — APIXABAN 5 MILLIGRAM(S): 2.5 TABLET, FILM COATED ORAL at 23:01

## 2018-12-30 RX ADMIN — SENNA PLUS 2 TABLET(S): 8.6 TABLET ORAL at 21:07

## 2018-12-30 RX ADMIN — MORPHINE SULFATE 2 MILLIGRAM(S): 50 CAPSULE, EXTENDED RELEASE ORAL at 10:49

## 2018-12-30 RX ADMIN — OXYCODONE AND ACETAMINOPHEN 2 TABLET(S): 5; 325 TABLET ORAL at 15:32

## 2018-12-30 NOTE — PROGRESS NOTE ADULT - ASSESSMENT
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    Bilateral non-healing ulcers: awaiting follow-up by Vascular Surgery (Dr. Tristan) to evaluate for angiography (inpatient vs. outpatient). Continue PO Doxycline 100mg PO q12 until 1/3/18.  Status post debridement by Burn Surgery.  Recall Burn Surgery for dressing changes.  Encourage participation with physical therapy.  Rheumatoid Arthritis: continue Prednisone 7.5mg PO q24 and Leucovorin 5mg q24.  Follow-up with Rheumatology, Dr. Henry as outpatient.    Chronic Bilateral Femoral and Popliteal DVT: continue Eliquis 5mg PO q12  Chronic Anemia: Hgb decreased slightly to 8.2 following surgery  History of LTBI: continue Rifampin  DMII: continue Lantus/Lispro and monitor FS with meals.  Gout: continue Allopurinol  GI/DVT prophylaxis  DNR/DNI

## 2018-12-30 NOTE — PROGRESS NOTE ADULT - SUBJECTIVE AND OBJECTIVE BOX
Patient is a 78y old  Female who presents with a chief complaint of "I couldn't stand up from the recliner", and worsening pain of bilateral lower extremities (30 Dec 2018 11:24)    No acute events overnight    Vital Signs Last 24 Hrs  T(C): 37.4 (30 Dec 2018 05:18), Max: 37.4 (30 Dec 2018 05:18)  T(F): 99.4 (30 Dec 2018 05:18), Max: 99.4 (30 Dec 2018 05:18)  HR: 85 (30 Dec 2018 05:18) (80 - 85)  BP: 148/65 (30 Dec 2018 05:18) (141/64 - 155/67)  BP(mean): --  RR: 18 (30 Dec 2018 05:18) (18 - 18)  SpO2: 98% (29 Dec 2018 22:01) (98% - 98%)  I&O's Summary      Meds:  MEDICATIONS  (STANDING):  allopurinol 300 milliGRAM(s) Oral daily  apixaban 5 milliGRAM(s) Oral every 12 hours  chlorhexidine 4% Liquid 1 Application(s) Topical <User Schedule>  docusate sodium 100 milliGRAM(s) Oral two times a day  doxycycline hyclate Capsule 100 milliGRAM(s) Oral every 12 hours  folic acid 1 milliGRAM(s) Oral daily  gabapentin 300 milliGRAM(s) Oral every 12 hours  leucovorin 5 milliGRAM(s) Oral daily  predniSONE   Tablet 7.5 milliGRAM(s) Oral daily  rifampin 300 milliGRAM(s) Oral two times a day  senna 2 Tablet(s) Oral at bedtime    MEDICATIONS  (PRN):  acetaminophen   Tablet .. 650 milliGRAM(s) Oral every 6 hours PRN Temp greater or equal to 38C (100.4F)  morphine  - Injectable 2 milliGRAM(s) IV Push every 6 hours PRN Severe Pain (7 - 10)  oxyCODONE    5 mG/acetaminophen 325 mG 2 Tablet(s) Oral every 4 hours PRN Moderate Pain (4 - 6)        Culture - Surgical Swab (collected 28 Dec 2018 11:25)  Source: .Surgical Swab leg right  Preliminary Report (29 Dec 2018 17:23):    Numerous Pseudomonas aeruginosa        Labs:                        8.0    9.28  )-----------( 372      ( 30 Dec 2018 09:19 )             25.4     12-30    139  |  100  |  45<H>  ----------------------------<  112<H>  4.4   |  22  |  1.2    Ca    8.6      30 Dec 2018 09:19  Phos  4.8     12-28  Mg     2.0     12-28          PE: AAO x 3  Full thickness wounds to b/l LE with granulation tissue, decreased edema  Large dressing changed

## 2018-12-30 NOTE — PROGRESS NOTE ADULT - SUBJECTIVE AND OBJECTIVE BOX
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    Overnight events/Updates: status post debridement of the bilateral lower extremities.  The patient complains of discomfort to her legs.    Vital Signs Last 24 Hrs  T(C): 37.4 (30 Dec 2018 05:18), Max: 37.4 (30 Dec 2018 05:18)  T(F): 99.4 (30 Dec 2018 05:18), Max: 99.4 (30 Dec 2018 05:18)  HR: 85 (30 Dec 2018 05:18) (80 - 85)  BP: 148/65 (30 Dec 2018 05:18) (141/64 - 155/67)  BP(mean): --  RR: 18 (30 Dec 2018 05:18) (18 - 18)  SpO2: 98% (29 Dec 2018 22:01) (98% - 98%)    Physical Examination:  General: AAO x 3  HEENT: PERRLA, EOMI  CV= S1 & S2 appreciated  Lungs=CTA BL  Abdominal Examination= + BS, Soft, NT/ND   Extremity Examination= bilateral lower extremity ulcers    ROS: No chest pain, no shortness of breath.  All other systems reviewed and are within normal limits except for the complaints in the HPI.    MEDICATIONS  (STANDING):  allopurinol 300 milliGRAM(s) Oral daily  apixaban 5 milliGRAM(s) Oral every 12 hours  chlorhexidine 4% Liquid 1 Application(s) Topical <User Schedule>  docusate sodium 100 milliGRAM(s) Oral two times a day  doxycycline hyclate Capsule 100 milliGRAM(s) Oral every 12 hours  folic acid 1 milliGRAM(s) Oral daily  gabapentin 300 milliGRAM(s) Oral every 12 hours  leucovorin 5 milliGRAM(s) Oral daily  predniSONE   Tablet 7.5 milliGRAM(s) Oral daily  rifampin 300 milliGRAM(s) Oral two times a day  senna 2 Tablet(s) Oral at bedtime    MEDICATIONS  (PRN):  acetaminophen   Tablet .. 650 milliGRAM(s) Oral every 6 hours PRN Temp greater or equal to 38C (100.4F)  morphine  - Injectable 2 milliGRAM(s) IV Push every 6 hours PRN Severe Pain (7 - 10)  oxyCODONE    5 mG/acetaminophen 325 mG 2 Tablet(s) Oral every 4 hours PRN Moderate Pain (4 - 6)                            8.0    9.28  )-----------( 372      ( 30 Dec 2018 09:19 )             25.4     12-30    139  |  100  |  45<H>  ----------------------------<  112<H>  4.4   |  22  |  1.2    Ca    8.6      30 Dec 2018 09:19  Phos  4.8     12-28  Mg     2.0     12-28        Case discussed with housestaff & family  SEBAS Segal [id number]

## 2018-12-30 NOTE — PROGRESS NOTE ADULT - ASSESSMENT
A/P: POD 2 s/p alcon B/L LE    cont wound care  Cont IV antibx  DVT GI Prophylaxis  Pain control  OT/PT  f/u cultures

## 2018-12-31 LAB
GLUCOSE BLDC GLUCOMTR-MCNC: 115 MG/DL — HIGH (ref 70–99)
GLUCOSE BLDC GLUCOMTR-MCNC: 153 MG/DL — HIGH (ref 70–99)
GLUCOSE BLDC GLUCOMTR-MCNC: 163 MG/DL — HIGH (ref 70–99)
GLUCOSE BLDC GLUCOMTR-MCNC: 175 MG/DL — HIGH (ref 70–99)
SURGICAL PATHOLOGY STUDY: SIGNIFICANT CHANGE UP

## 2018-12-31 RX ORDER — ONDANSETRON 8 MG/1
4 TABLET, FILM COATED ORAL ONCE
Qty: 0 | Refills: 0 | Status: COMPLETED | OUTPATIENT
Start: 2018-12-31 | End: 2018-12-31

## 2018-12-31 RX ADMIN — Medication 1 MILLIGRAM(S): at 11:16

## 2018-12-31 RX ADMIN — Medication 100 MILLIGRAM(S): at 05:37

## 2018-12-31 RX ADMIN — Medication 100 MILLIGRAM(S): at 17:24

## 2018-12-31 RX ADMIN — OXYCODONE AND ACETAMINOPHEN 2 TABLET(S): 5; 325 TABLET ORAL at 08:03

## 2018-12-31 RX ADMIN — OXYCODONE AND ACETAMINOPHEN 2 TABLET(S): 5; 325 TABLET ORAL at 21:14

## 2018-12-31 RX ADMIN — APIXABAN 5 MILLIGRAM(S): 2.5 TABLET, FILM COATED ORAL at 23:06

## 2018-12-31 RX ADMIN — OXYCODONE AND ACETAMINOPHEN 2 TABLET(S): 5; 325 TABLET ORAL at 21:32

## 2018-12-31 RX ADMIN — OXYCODONE AND ACETAMINOPHEN 2 TABLET(S): 5; 325 TABLET ORAL at 02:24

## 2018-12-31 RX ADMIN — Medication 5 MILLIGRAM(S): at 11:16

## 2018-12-31 RX ADMIN — GABAPENTIN 300 MILLIGRAM(S): 400 CAPSULE ORAL at 17:23

## 2018-12-31 RX ADMIN — CHLORHEXIDINE GLUCONATE 1 APPLICATION(S): 213 SOLUTION TOPICAL at 05:36

## 2018-12-31 RX ADMIN — OXYCODONE AND ACETAMINOPHEN 2 TABLET(S): 5; 325 TABLET ORAL at 03:00

## 2018-12-31 RX ADMIN — Medication 300 MILLIGRAM(S): at 11:17

## 2018-12-31 RX ADMIN — APIXABAN 5 MILLIGRAM(S): 2.5 TABLET, FILM COATED ORAL at 11:16

## 2018-12-31 RX ADMIN — Medication 100 MILLIGRAM(S): at 17:22

## 2018-12-31 RX ADMIN — OXYCODONE AND ACETAMINOPHEN 2 TABLET(S): 5; 325 TABLET ORAL at 14:36

## 2018-12-31 RX ADMIN — Medication 7.5 MILLIGRAM(S): at 05:37

## 2018-12-31 RX ADMIN — GABAPENTIN 300 MILLIGRAM(S): 400 CAPSULE ORAL at 05:37

## 2018-12-31 RX ADMIN — ONDANSETRON 4 MILLIGRAM(S): 8 TABLET, FILM COATED ORAL at 06:18

## 2018-12-31 RX ADMIN — SENNA PLUS 2 TABLET(S): 8.6 TABLET ORAL at 21:14

## 2018-12-31 NOTE — PROGRESS NOTE ADULT - SUBJECTIVE AND OBJECTIVE BOX
PM rounds   pt c/o pain with wound care   awake alert     EXAM :  bilateral leg debrided wounds -  mostly pink with thin yellow exudate;   right postero lateral leg ~ 2 cm area of black necrotic skin   right 2nd toe - unchanged area of necrosis

## 2018-12-31 NOTE — PROGRESS NOTE ADULT - ASSESSMENT
wounds   Rec: hydrogel and Adaptic to sites bid after washing with soap and water   Continue pain mgmt  Vascular eval / intervention pending

## 2018-12-31 NOTE — PROGRESS NOTE ADULT - ASSESSMENT
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    #Bilateral Chronic Nonhealing Ulcers and concurrent LE cellulitis   -Talked to podiatry ,s/p debridement by burn.  -Temp on 102.2F 12/21 ; Afebrile since  -doxycycline oral 100mg q 12 for 14 days , growing Pseudomonas	  -Podiatry: Betadine paint to digit 3R BID; Xeroform to ulcers with loose dressing; Limb elevation.    -Duplex LE venous: Chronic-appearing right common femoral and femoral deep venous thromboses; left popliteal deep venous thrombosis  -Duplex LE arterial: On the right: Mild to moderate disease at the popliteal artery level. On the left: Mild popliteal and posterior tibial artery disease; Remaining tibial arteries not visualized secondary to edema   -Uric acid levels normal. .  -angiogram on 31 st by      #Chronic Right Hip Pain  -RIght hip xray: Diffuse osteopenia  -C/w Physical therapy. Will check Vit D levels and replete if deficient    #Chronic B/L femoral and popliteal DVT- eliquis   #Chronic Normocytic Anemia- Likely 2/2 RA; Hb at baseline  #Rheumatoid Arthritis -Continue home prednisone. talked to Dr Ascencio (Dr Henry's team)- Can decrease prednisone 7.5mg if healing hampered. No other adjustments needed.   #DM II-Controlled; Monitor finger sticks, start insulin sliding scale if fingersticks are greater than 180  #Latent TB- rifampin  #Gout- Normal uric acid levels; c/w Allopurinol    DVT ppx: On Eliquis  GI ppx: Not indicated  Diet: DASH Carb consistent   Activity: AAT  Code Status: DNR/DNI, MOLST form completed with health care proxy as witness by bedside, form in chart  Disposition: pending

## 2018-12-31 NOTE — PROGRESS NOTE ADULT - SUBJECTIVE AND OBJECTIVE BOX
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    Overnight events/Updates: status post debridement of the bilateral lower extremities (12/28/18).  Burn Surgery changed the patient's dressings yesterday afternoon.  We are currently awaiting follow-up by Vascular Sx to schedule LE angiography (inpatient vs. outpatient).    Vital Signs Last 24 Hrs  T(C): 36.5 (31 Dec 2018 12:47), Max: 36.5 (31 Dec 2018 12:47)  T(F): 97.7 (31 Dec 2018 12:47), Max: 97.7 (31 Dec 2018 12:47)  HR: 84 (31 Dec 2018 13:54) (78 - 86)  BP: 137/63 (31 Dec 2018 12:47) (137/63 - 152/66)  BP(mean): --  RR: 20 (31 Dec 2018 12:47) (18 - 20)  SpO2: 97% (31 Dec 2018 13:54) (97% - 98%)    Physical Examination:  General: AAO x 3  HEENT: PERRLA, EOMI  CV= S1 & S2 appreciated  Lungs=CTA BL  Abdominal Examination= + BS, Soft, NT/ND   Extremity Examination= bilateral lower extremity ulcers    ROS: No chest pain, no shortness of breath.  All other systems reviewed and are within normal limits except for the complaints in the HPI.    MEDICATIONS  (STANDING):  allopurinol 300 milliGRAM(s) Oral daily  apixaban 5 milliGRAM(s) Oral every 12 hours  chlorhexidine 4% Liquid 1 Application(s) Topical <User Schedule>  docusate sodium 100 milliGRAM(s) Oral two times a day  doxycycline hyclate Capsule 100 milliGRAM(s) Oral every 12 hours  folic acid 1 milliGRAM(s) Oral daily  gabapentin 300 milliGRAM(s) Oral every 12 hours  leucovorin 5 milliGRAM(s) Oral daily  predniSONE   Tablet 7.5 milliGRAM(s) Oral daily  rifampin 300 milliGRAM(s) Oral two times a day  senna 2 Tablet(s) Oral at bedtime    MEDICATIONS  (PRN):  acetaminophen   Tablet .. 650 milliGRAM(s) Oral every 6 hours PRN Temp greater or equal to 38C (100.4F)  morphine  - Injectable 2 milliGRAM(s) IV Push every 6 hours PRN Severe Pain (7 - 10)  oxyCODONE    5 mG/acetaminophen 325 mG 2 Tablet(s) Oral every 4 hours PRN Moderate Pain (4 - 6)                            8.0    9.28  )-----------( 372      ( 30 Dec 2018 09:19 )             25.4     12-30    139  |  100  |  45<H>  ----------------------------<  112<H>  4.4   |  22  |  1.2    Ca    8.6      30 Dec 2018 09:19        Case discussed with housestaff & family  SEBAS Segal [id number]

## 2018-12-31 NOTE — PROGRESS NOTE ADULT - ASSESSMENT
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    Bilateral non-healing ulcers: recall Vascular Sx-Dr. Tristan to schedule angiography. Continue PO Doxycline 100mg PO q12 until 1/3/18.  Status post debridement and dressing change by Burn Surgery.  Encourage ambulation with physical therapy.  Rheumatoid Arthritis: continue Prednisone 7.5mg PO q24 and Leucovorin 5mg q24.  Follow-up with Rheumatology, Dr. Henry as outpatient.    Chronic Bilateral Femoral and Popliteal DVT: continue Eliquis 5mg PO q12  Chronic Anemia: Hgb decreased to 8.0 post-op.  Repeat CBC in AM  History of LTBI: continue Rifampin  DMII: continue Lantus/Lispro and monitor FS with meals.  Gout: continue Allopurinol  GI/DVT prophylaxis  DNR/DNI

## 2018-12-31 NOTE — PROGRESS NOTE ADULT - SUBJECTIVE AND OBJECTIVE BOX
SUBJECTIVE:    Patient is a 78y old Female who presents with a chief complaint of "I couldn't stand up from the recliner", and worsening pain of bilateral lower extremities (30 Dec 2018 12:41)    Currently admitted to medicine with the primary diagnosis of Wounds, multiple open, lower extremity     Today is hospital day 12d. Pt still complaining of lower extremity pain b/l. Surgical swab is growing PSEUDOMONAS.    PAST MEDICAL & SURGICAL HISTORY  Gout  DVT (deep venous thrombosis)  HTN (hypertension)  Rheumatoid arteritis  Other specified diabetes mellitus with other specified complication, unspecified whether long term insulin use  Primary osteoarthritis of right knee: s/p knee repalcement    SOCIAL HISTORY:  Negative for smoking/alcohol/drug use.     ALLERGIES:  clindamycin (Unknown)  erythromycin (Unknown)  penicillin (Unknown)  strawberry (Unknown)    MEDICATIONS:  STANDING MEDICATIONS  allopurinol 300 milliGRAM(s) Oral daily  apixaban 5 milliGRAM(s) Oral every 12 hours  chlorhexidine 4% Liquid 1 Application(s) Topical <User Schedule>  docusate sodium 100 milliGRAM(s) Oral two times a day  doxycycline hyclate Capsule 100 milliGRAM(s) Oral every 12 hours  folic acid 1 milliGRAM(s) Oral daily  gabapentin 300 milliGRAM(s) Oral every 12 hours  leucovorin 5 milliGRAM(s) Oral daily  predniSONE   Tablet 7.5 milliGRAM(s) Oral daily  rifampin 300 milliGRAM(s) Oral two times a day  senna 2 Tablet(s) Oral at bedtime    PRN MEDICATIONS  acetaminophen   Tablet .. 650 milliGRAM(s) Oral every 6 hours PRN  morphine  - Injectable 2 milliGRAM(s) IV Push every 6 hours PRN  oxyCODONE    5 mG/acetaminophen 325 mG 2 Tablet(s) Oral every 4 hours PRN    VITALS:   T(F): 96.8  HR: 78  BP: 152/66  RR: 18  SpO2: 98%    PHYSICAL EXAM:  GEN: No acute distress  LUNGS: Clear to auscultation bilaterally   HEART: S1/S2 present.    ABD: Soft, non-tender, non-distended.    EXT: b/l lwer extremity in dressing  NEURO: AAOX3      LABS:                        8.0    9.28  )-----------( 372      ( 30 Dec 2018 09:19 )             25.4     12-30    139  |  100  |  45<H>  ----------------------------<  112<H>  4.4   |  22  |  1.2    Ca    8.6      30 Dec 2018 09:19                  Culture - Surgical Swab (collected 28 Dec 2018 11:25)  Source: .Surgical Swab leg right  Preliminary Report (30 Dec 2018 20:49):    Numerous Pseudomonas aeruginosa (Carbapenem Resistant)  Organism: Pseudomonas aeruginosa (Carbapenem Resistant) (30 Dec 2018 20:48)  Organism: Pseudomonas aeruginosa (Carbapenem Resistant) (30 Dec 2018 20:48)            RADIOLOGY:

## 2019-01-01 LAB
ANION GAP SERPL CALC-SCNC: 15 MMOL/L — HIGH (ref 7–14)
BUN SERPL-MCNC: 38 MG/DL — HIGH (ref 10–20)
CALCIUM SERPL-MCNC: 9 MG/DL — SIGNIFICANT CHANGE UP (ref 8.5–10.1)
CHLORIDE SERPL-SCNC: 99 MMOL/L — SIGNIFICANT CHANGE UP (ref 98–110)
CO2 SERPL-SCNC: 23 MMOL/L — SIGNIFICANT CHANGE UP (ref 17–32)
CREAT SERPL-MCNC: 1.1 MG/DL — SIGNIFICANT CHANGE UP (ref 0.7–1.5)
GLUCOSE BLDC GLUCOMTR-MCNC: 112 MG/DL — HIGH (ref 70–99)
GLUCOSE BLDC GLUCOMTR-MCNC: 128 MG/DL — HIGH (ref 70–99)
GLUCOSE BLDC GLUCOMTR-MCNC: 145 MG/DL — HIGH (ref 70–99)
GLUCOSE BLDC GLUCOMTR-MCNC: 190 MG/DL — HIGH (ref 70–99)
GLUCOSE SERPL-MCNC: 90 MG/DL — SIGNIFICANT CHANGE UP (ref 70–99)
HCT VFR BLD CALC: 28.8 % — LOW (ref 37–47)
HGB BLD-MCNC: 9 G/DL — LOW (ref 12–16)
MCHC RBC-ENTMCNC: 28.1 PG — SIGNIFICANT CHANGE UP (ref 27–31)
MCHC RBC-ENTMCNC: 31.3 G/DL — LOW (ref 32–37)
MCV RBC AUTO: 90 FL — SIGNIFICANT CHANGE UP (ref 81–99)
NRBC # BLD: 0 /100 WBCS — SIGNIFICANT CHANGE UP (ref 0–0)
PLATELET # BLD AUTO: 432 K/UL — HIGH (ref 130–400)
POTASSIUM SERPL-MCNC: 4.4 MMOL/L — SIGNIFICANT CHANGE UP (ref 3.5–5)
POTASSIUM SERPL-SCNC: 4.4 MMOL/L — SIGNIFICANT CHANGE UP (ref 3.5–5)
RBC # BLD: 3.2 M/UL — LOW (ref 4.2–5.4)
RBC # FLD: 16 % — HIGH (ref 11.5–14.5)
SODIUM SERPL-SCNC: 137 MMOL/L — SIGNIFICANT CHANGE UP (ref 135–146)
WBC # BLD: 8.26 K/UL — SIGNIFICANT CHANGE UP (ref 4.8–10.8)
WBC # FLD AUTO: 8.26 K/UL — SIGNIFICANT CHANGE UP (ref 4.8–10.8)

## 2019-01-01 RX ADMIN — Medication 7.5 MILLIGRAM(S): at 09:28

## 2019-01-01 RX ADMIN — Medication 100 MILLIGRAM(S): at 08:23

## 2019-01-01 RX ADMIN — GABAPENTIN 300 MILLIGRAM(S): 400 CAPSULE ORAL at 17:19

## 2019-01-01 RX ADMIN — OXYCODONE AND ACETAMINOPHEN 2 TABLET(S): 5; 325 TABLET ORAL at 04:34

## 2019-01-01 RX ADMIN — Medication 5 MILLIGRAM(S): at 11:09

## 2019-01-01 RX ADMIN — Medication 100 MILLIGRAM(S): at 17:19

## 2019-01-01 RX ADMIN — APIXABAN 5 MILLIGRAM(S): 2.5 TABLET, FILM COATED ORAL at 10:30

## 2019-01-01 RX ADMIN — Medication 1 MILLIGRAM(S): at 11:09

## 2019-01-01 RX ADMIN — OXYCODONE AND ACETAMINOPHEN 2 TABLET(S): 5; 325 TABLET ORAL at 05:21

## 2019-01-01 RX ADMIN — Medication 300 MILLIGRAM(S): at 11:09

## 2019-01-01 RX ADMIN — MORPHINE SULFATE 2 MILLIGRAM(S): 50 CAPSULE, EXTENDED RELEASE ORAL at 14:55

## 2019-01-01 RX ADMIN — GABAPENTIN 300 MILLIGRAM(S): 400 CAPSULE ORAL at 08:24

## 2019-01-01 RX ADMIN — MORPHINE SULFATE 2 MILLIGRAM(S): 50 CAPSULE, EXTENDED RELEASE ORAL at 13:34

## 2019-01-01 RX ADMIN — SENNA PLUS 2 TABLET(S): 8.6 TABLET ORAL at 21:10

## 2019-01-01 RX ADMIN — CHLORHEXIDINE GLUCONATE 1 APPLICATION(S): 213 SOLUTION TOPICAL at 05:18

## 2019-01-01 RX ADMIN — OXYCODONE AND ACETAMINOPHEN 2 TABLET(S): 5; 325 TABLET ORAL at 10:29

## 2019-01-01 RX ADMIN — OXYCODONE AND ACETAMINOPHEN 2 TABLET(S): 5; 325 TABLET ORAL at 15:06

## 2019-01-01 RX ADMIN — APIXABAN 5 MILLIGRAM(S): 2.5 TABLET, FILM COATED ORAL at 21:10

## 2019-01-01 NOTE — PROGRESS NOTE ADULT - SUBJECTIVE AND OBJECTIVE BOX
SUBJECTIVE:    Patient is a 78y old Female who presents with a chief complaint of "I couldn't stand up from the recliner", and worsening pain of bilateral lower extremities (31 Dec 2018 16:47)    Currently admitted to medicine with the primary diagnosis of Wounds, multiple open, lower extremity     Today is hospital day 13d. This morning she is resting comfortably in bed and reports no new issues or overnight events.     PAST MEDICAL & SURGICAL HISTORY  Gout  DVT (deep venous thrombosis)  HTN (hypertension)  Rheumatoid arteritis  Other specified diabetes mellitus with other specified complication, unspecified whether long term insulin use  Primary osteoarthritis of right knee: s/p knee repalcement    SOCIAL HISTORY:  Negative for smoking/alcohol/drug use.     ALLERGIES:  clindamycin (Unknown)  erythromycin (Unknown)  penicillin (Unknown)  strawberry (Unknown)    MEDICATIONS:  STANDING MEDICATIONS  allopurinol 300 milliGRAM(s) Oral daily  apixaban 5 milliGRAM(s) Oral every 12 hours  chlorhexidine 4% Liquid 1 Application(s) Topical <User Schedule>  docusate sodium 100 milliGRAM(s) Oral two times a day  doxycycline hyclate Capsule 100 milliGRAM(s) Oral every 12 hours  folic acid 1 milliGRAM(s) Oral daily  gabapentin 300 milliGRAM(s) Oral every 12 hours  leucovorin 5 milliGRAM(s) Oral daily  predniSONE   Tablet 7.5 milliGRAM(s) Oral daily  rifampin 300 milliGRAM(s) Oral two times a day  senna 2 Tablet(s) Oral at bedtime    PRN MEDICATIONS  acetaminophen   Tablet .. 650 milliGRAM(s) Oral every 6 hours PRN  morphine  - Injectable 2 milliGRAM(s) IV Push every 6 hours PRN  oxyCODONE    5 mG/acetaminophen 325 mG 2 Tablet(s) Oral every 4 hours PRN    VITALS:   T(F): 97.8  HR: 81  BP: 141/63  RR: 18  SpO2: 97%    PHYSICAL EXAM:  GEN: No acute distress  LUNGS: Clear to auscultation bilaterally   HEART: S1/S2 present.    ABD: Soft, non-tender, non-distended. Bowel sounds present   NEURO: AAOX3      LABS:                        8.0    9.28  )-----------( 372      ( 30 Dec 2018 09:19 )             25.4     12-30    139  |  100  |  45<H>  ----------------------------<  112<H>  4.4   |  22  |  1.2    Ca    8.6      30 Dec 2018 09:19                        RADIOLOGY:

## 2019-01-01 NOTE — PROGRESS NOTE ADULT - ATTENDING COMMENTS
patient seen and examined independently on morning rounds, chart reviewed and discussed with the medicine resident and agree with the above resident progress note with the following addendum:    no overnight events---awaiting f/u with vascular re need for angiogram  continue pain control and local wound care (dressing changes)  on oral doxy for pseudomonos (complete course)  f/u burn recomm  continue AC (eliquis 5 mg bid for chronic bilateral LE dvt)    DVT/GI ppx  PT evaluation--patient reports decreased ambulation 2/2 pain    DNR/DNI

## 2019-01-02 LAB
ANION GAP SERPL CALC-SCNC: 16 MMOL/L — HIGH (ref 7–14)
BUN SERPL-MCNC: 40 MG/DL — HIGH (ref 10–20)
CALCIUM SERPL-MCNC: 9.2 MG/DL — SIGNIFICANT CHANGE UP (ref 8.5–10.1)
CHLORIDE SERPL-SCNC: 100 MMOL/L — SIGNIFICANT CHANGE UP (ref 98–110)
CO2 SERPL-SCNC: 21 MMOL/L — SIGNIFICANT CHANGE UP (ref 17–32)
CREAT SERPL-MCNC: 1.2 MG/DL — SIGNIFICANT CHANGE UP (ref 0.7–1.5)
CULTURE RESULTS: SIGNIFICANT CHANGE UP
GLUCOSE BLDC GLUCOMTR-MCNC: 108 MG/DL — HIGH (ref 70–99)
GLUCOSE BLDC GLUCOMTR-MCNC: 120 MG/DL — HIGH (ref 70–99)
GLUCOSE BLDC GLUCOMTR-MCNC: 144 MG/DL — HIGH (ref 70–99)
GLUCOSE BLDC GLUCOMTR-MCNC: 172 MG/DL — HIGH (ref 70–99)
GLUCOSE SERPL-MCNC: 119 MG/DL — HIGH (ref 70–99)
HCT VFR BLD CALC: 29.1 % — LOW (ref 37–47)
HGB BLD-MCNC: 9.2 G/DL — LOW (ref 12–16)
MCHC RBC-ENTMCNC: 28.4 PG — SIGNIFICANT CHANGE UP (ref 27–31)
MCHC RBC-ENTMCNC: 31.6 G/DL — LOW (ref 32–37)
MCV RBC AUTO: 89.8 FL — SIGNIFICANT CHANGE UP (ref 81–99)
NRBC # BLD: 0 /100 WBCS — SIGNIFICANT CHANGE UP (ref 0–0)
ORGANISM # SPEC MICROSCOPIC CNT: SIGNIFICANT CHANGE UP
ORGANISM # SPEC MICROSCOPIC CNT: SIGNIFICANT CHANGE UP
PLATELET # BLD AUTO: 422 K/UL — HIGH (ref 130–400)
POTASSIUM SERPL-MCNC: 4.4 MMOL/L — SIGNIFICANT CHANGE UP (ref 3.5–5)
POTASSIUM SERPL-SCNC: 4.4 MMOL/L — SIGNIFICANT CHANGE UP (ref 3.5–5)
RBC # BLD: 3.24 M/UL — LOW (ref 4.2–5.4)
RBC # FLD: 16 % — HIGH (ref 11.5–14.5)
SODIUM SERPL-SCNC: 137 MMOL/L — SIGNIFICANT CHANGE UP (ref 135–146)
SPECIMEN SOURCE: SIGNIFICANT CHANGE UP
WBC # BLD: 8.64 K/UL — SIGNIFICANT CHANGE UP (ref 4.8–10.8)
WBC # FLD AUTO: 8.64 K/UL — SIGNIFICANT CHANGE UP (ref 4.8–10.8)

## 2019-01-02 RX ORDER — ONDANSETRON 8 MG/1
4 TABLET, FILM COATED ORAL ONCE
Qty: 0 | Refills: 0 | Status: COMPLETED | OUTPATIENT
Start: 2019-01-02 | End: 2019-01-02

## 2019-01-02 RX ADMIN — GABAPENTIN 300 MILLIGRAM(S): 400 CAPSULE ORAL at 07:05

## 2019-01-02 RX ADMIN — APIXABAN 5 MILLIGRAM(S): 2.5 TABLET, FILM COATED ORAL at 11:12

## 2019-01-02 RX ADMIN — OXYCODONE AND ACETAMINOPHEN 2 TABLET(S): 5; 325 TABLET ORAL at 14:03

## 2019-01-02 RX ADMIN — Medication 5 MILLIGRAM(S): at 11:12

## 2019-01-02 RX ADMIN — Medication 100 MILLIGRAM(S): at 17:13

## 2019-01-02 RX ADMIN — Medication 100 MILLIGRAM(S): at 07:06

## 2019-01-02 RX ADMIN — OXYCODONE AND ACETAMINOPHEN 2 TABLET(S): 5; 325 TABLET ORAL at 21:09

## 2019-01-02 RX ADMIN — CHLORHEXIDINE GLUCONATE 1 APPLICATION(S): 213 SOLUTION TOPICAL at 07:05

## 2019-01-02 RX ADMIN — MORPHINE SULFATE 2 MILLIGRAM(S): 50 CAPSULE, EXTENDED RELEASE ORAL at 16:42

## 2019-01-02 RX ADMIN — Medication 300 MILLIGRAM(S): at 11:12

## 2019-01-02 RX ADMIN — OXYCODONE AND ACETAMINOPHEN 2 TABLET(S): 5; 325 TABLET ORAL at 03:37

## 2019-01-02 RX ADMIN — GABAPENTIN 300 MILLIGRAM(S): 400 CAPSULE ORAL at 17:13

## 2019-01-02 RX ADMIN — ONDANSETRON 4 MILLIGRAM(S): 8 TABLET, FILM COATED ORAL at 17:59

## 2019-01-02 RX ADMIN — Medication 1 MILLIGRAM(S): at 11:12

## 2019-01-02 RX ADMIN — OXYCODONE AND ACETAMINOPHEN 2 TABLET(S): 5; 325 TABLET ORAL at 21:40

## 2019-01-02 RX ADMIN — Medication 7.5 MILLIGRAM(S): at 07:06

## 2019-01-02 NOTE — PROGRESS NOTE ADULT - ASSESSMENT
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    #Bilateral Chronic Nonhealing Ulcers and concurrent LE cellulitis   -Talked to podiatry ,s/p debridement by burn.  -Temp on 102.2F 12/21 ; Afebrile since  -doxycycline oral 100mg q 12 for 14 days , growing Pseudomonas	  -Podiatry: Betadine paint to digit 3R BID; Xeroform to ulcers with loose dressing; Limb elevation.    -Duplex LE venous: Chronic-appearing right common femoral and femoral deep venous thromboses; left popliteal deep venous thrombosis  -Duplex LE arterial: On the right: Mild to moderate disease at the popliteal artery level. On the left: Mild popliteal and posterior tibial artery disease; Remaining tibial arteries not visualized secondary to edema   -Uric acid levels normal. .  -angiogram was initially planned for 31st. called vascular , possible angiogram next monday?    #Chronic Right Hip Pain  -RIght hip xray: Diffuse osteopenia  -C/w Physical therapy. Will check Vit D levels and replete if deficient    #Chronic B/L femoral and popliteal DVT- eliquis   #Chronic Normocytic Anemia- Likely 2/2 RA; Hb at baseline  #Rheumatoid Arthritis -Continue home prednisone. talked to Dr Ascencio (Dr Henry's team)- Can decrease prednisone 7.5mg if healing hampered. No other adjustments needed.   #DM II-Controlled; Monitor finger sticks, start insulin sliding scale if fingersticks are greater than 180  #Latent TB- rifampin  #Gout- Normal uric acid levels; c/w Allopurinol    DVT ppx: On Eliquis  GI ppx: Not indicated  Diet: DASH Carb consistent   Activity: AAT  Code Status: DNR/DNI, MOLST form completed with health care proxy as witness by bedside, form in chart  Disposition: pending

## 2019-01-02 NOTE — PROGRESS NOTE ADULT - SUBJECTIVE AND OBJECTIVE BOX
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    Overnight events/Updates: status post debridement of the bilateral lower extremities (12/28/18).  RLE angiography has been tentatively scheduled for 1/4/18.    Vital Signs Last 24 Hrs  T(C): 36.7 (02 Jan 2019 13:44), Max: 36.7 (02 Jan 2019 13:44)  T(F): 98 (02 Jan 2019 13:44), Max: 98 (02 Jan 2019 13:44)  HR: 79 (02 Jan 2019 13:44) (77 - 81)  BP: 130/58 (02 Jan 2019 13:44) (114/68 - 141/76)  BP(mean): --  RR: 20 (02 Jan 2019 13:44) (18 - 20)  SpO2: 98% (02 Jan 2019 08:00) (98% - 98%)    Physical Examination:  General: AAO x 3  HEENT: PERRLA, EOMI  CV= S1 & S2 appreciated  Lungs= CTA BL  Abdominal Examination= + BS, Soft, NT/ND  Extremity Examination= dressings to the BL LE    ROS: No chest pain, no shortness of breath.  All other systems reviewed and are within normal limits except for the complaints in the HPI.    MEDICATIONS  (STANDING):  allopurinol 300 milliGRAM(s) Oral daily  apixaban 5 milliGRAM(s) Oral every 12 hours  chlorhexidine 4% Liquid 1 Application(s) Topical <User Schedule>  docusate sodium 100 milliGRAM(s) Oral two times a day  doxycycline hyclate Capsule 100 milliGRAM(s) Oral every 12 hours  folic acid 1 milliGRAM(s) Oral daily  gabapentin 300 milliGRAM(s) Oral every 12 hours  leucovorin 5 milliGRAM(s) Oral daily  predniSONE   Tablet 7.5 milliGRAM(s) Oral daily  rifampin 300 milliGRAM(s) Oral two times a day  senna 2 Tablet(s) Oral at bedtime    MEDICATIONS  (PRN):  acetaminophen   Tablet .. 650 milliGRAM(s) Oral every 6 hours PRN Temp greater or equal to 38C (100.4F)  morphine  - Injectable 2 milliGRAM(s) IV Push every 6 hours PRN Severe Pain (7 - 10)  oxyCODONE    5 mG/acetaminophen 325 mG 2 Tablet(s) Oral every 4 hours PRN Moderate Pain (4 - 6)                            9.2    8.64  )-----------( 422      ( 02 Jan 2019 07:44 )             29.1     01-02    137  |  100  |  40<H>  ----------------------------<  119<H>  4.4   |  21  |  1.2    Ca    9.2      02 Jan 2019 07:44        Case discussed with housestaff & family  SEBAS Segal [id number]

## 2019-01-02 NOTE — CHART NOTE - NSCHARTNOTEFT_GEN_A_CORE
Registered Dietitian Limited follow Up:  -Pt sitting upright in bed at time of assessment. S/p debridement b/l LE 12/28. Pt awaiting LE angiogram to be scheduled. Reports appetite remains adequate and consuming 75% or greater of CHO Consistent, DASH/TLC diet order. denies difficulty chewing or swallowing. c/o constipation with LBM 12/27 but bowel regimen in place. Labs reviewed. Meds reviewed. No further nutrition intervention at this time. will reassess in 7 days.

## 2019-01-02 NOTE — PROGRESS NOTE ADULT - SUBJECTIVE AND OBJECTIVE BOX
SUBJECTIVE:    Patient is a 78y old Female who presents with a chief complaint of "I couldn't stand up from the recliner", and worsening pain of bilateral lower extremities (01 Jan 2019 09:16)    Currently admitted to medicine with the primary diagnosis of Wounds, multiple open, lower extremity     Today is hospital day 14d. This morning she is resting comfortably in bed and reports no new issues or overnight events.     PAST MEDICAL & SURGICAL HISTORY  Gout  DVT (deep venous thrombosis)  HTN (hypertension)  Rheumatoid arteritis  Other specified diabetes mellitus with other specified complication, unspecified whether long term insulin use  Primary osteoarthritis of right knee: s/p knee repalcement    SOCIAL HISTORY:  Negative for smoking/alcohol/drug use.     ALLERGIES:  clindamycin (Unknown)  erythromycin (Unknown)  penicillin (Unknown)  strawberry (Unknown)    MEDICATIONS:  STANDING MEDICATIONS  allopurinol 300 milliGRAM(s) Oral daily  apixaban 5 milliGRAM(s) Oral every 12 hours  chlorhexidine 4% Liquid 1 Application(s) Topical <User Schedule>  docusate sodium 100 milliGRAM(s) Oral two times a day  doxycycline hyclate Capsule 100 milliGRAM(s) Oral every 12 hours  folic acid 1 milliGRAM(s) Oral daily  gabapentin 300 milliGRAM(s) Oral every 12 hours  leucovorin 5 milliGRAM(s) Oral daily  predniSONE   Tablet 7.5 milliGRAM(s) Oral daily  rifampin 300 milliGRAM(s) Oral two times a day  senna 2 Tablet(s) Oral at bedtime    PRN MEDICATIONS  acetaminophen   Tablet .. 650 milliGRAM(s) Oral every 6 hours PRN  morphine  - Injectable 2 milliGRAM(s) IV Push every 6 hours PRN  oxyCODONE    5 mG/acetaminophen 325 mG 2 Tablet(s) Oral every 4 hours PRN    VITALS:   T(F): 97.6  HR: 78  BP: 114/68  RR: 18  SpO2: 97%    PHYSICAL EXAM:  GEN: No acute distress  LUNGS: Clear to auscultation bilaterally   HEART: S1/S2 present.    ABD: Soft, non-tender, non-distended. Bowel sounds present  EXT: b/l lower extremities in bandage , with ischemic changes in toes   NEURO: AAOX3      LABS:                        9.2    8.64  )-----------( 422      ( 02 Jan 2019 07:44 )             29.1     01-01    137  |  99  |  38<H>  ----------------------------<  90  4.4   |  23  |  1.1    Ca    9.0      01 Jan 2019 08:52                        RADIOLOGY:

## 2019-01-02 NOTE — PROGRESS NOTE ADULT - ASSESSMENT
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    Bilateral non-healing ulcers: follow-up Vascular Sx-Dr. Tristan to confirm angiography (tentatively planned for 1/7/18). Continue PO Doxycline 100mg PO q12 until 1/3/18.  Status post debridement and dressing change by Burn Surgery.  Encourage ambulation with physical therapy.  Rheumatoid Arthritis: continue Prednisone 7.5mg PO q24 and Leucovorin 5mg q24.  Follow-up with Rheumatology, Dr. Henry as outpatient.    Chronic Bilateral Femoral and Popliteal DVT: continue Eliquis 5mg PO q12  Chronic Anemia: Hgb improved to 9.2.  History of LTBI: continue Rifampin  DMII: continue Lantus/Lispro and monitor FS with meals.  Gout: continue Allopurinol  GI/DVT prophylaxis  DNR/DNI

## 2019-01-03 LAB
ANION GAP SERPL CALC-SCNC: 16 MMOL/L — HIGH (ref 7–14)
BUN SERPL-MCNC: 40 MG/DL — HIGH (ref 10–20)
CALCIUM SERPL-MCNC: 9.2 MG/DL — SIGNIFICANT CHANGE UP (ref 8.5–10.1)
CHLORIDE SERPL-SCNC: 101 MMOL/L — SIGNIFICANT CHANGE UP (ref 98–110)
CO2 SERPL-SCNC: 23 MMOL/L — SIGNIFICANT CHANGE UP (ref 17–32)
CREAT SERPL-MCNC: 1.3 MG/DL — SIGNIFICANT CHANGE UP (ref 0.7–1.5)
GLUCOSE BLDC GLUCOMTR-MCNC: 120 MG/DL — HIGH (ref 70–99)
GLUCOSE BLDC GLUCOMTR-MCNC: 135 MG/DL — HIGH (ref 70–99)
GLUCOSE BLDC GLUCOMTR-MCNC: 155 MG/DL — HIGH (ref 70–99)
GLUCOSE BLDC GLUCOMTR-MCNC: 171 MG/DL — HIGH (ref 70–99)
GLUCOSE SERPL-MCNC: 104 MG/DL — HIGH (ref 70–99)
HCT VFR BLD CALC: 29.1 % — LOW (ref 37–47)
HGB BLD-MCNC: 9.2 G/DL — LOW (ref 12–16)
MCHC RBC-ENTMCNC: 28.5 PG — SIGNIFICANT CHANGE UP (ref 27–31)
MCHC RBC-ENTMCNC: 31.6 G/DL — LOW (ref 32–37)
MCV RBC AUTO: 90.1 FL — SIGNIFICANT CHANGE UP (ref 81–99)
NRBC # BLD: 0 /100 WBCS — SIGNIFICANT CHANGE UP (ref 0–0)
PLATELET # BLD AUTO: 426 K/UL — HIGH (ref 130–400)
POTASSIUM SERPL-MCNC: 4.4 MMOL/L — SIGNIFICANT CHANGE UP (ref 3.5–5)
POTASSIUM SERPL-SCNC: 4.4 MMOL/L — SIGNIFICANT CHANGE UP (ref 3.5–5)
RBC # BLD: 3.23 M/UL — LOW (ref 4.2–5.4)
RBC # FLD: 16.1 % — HIGH (ref 11.5–14.5)
SODIUM SERPL-SCNC: 140 MMOL/L — SIGNIFICANT CHANGE UP (ref 135–146)
WBC # BLD: 8.44 K/UL — SIGNIFICANT CHANGE UP (ref 4.8–10.8)
WBC # FLD AUTO: 8.44 K/UL — SIGNIFICANT CHANGE UP (ref 4.8–10.8)

## 2019-01-03 RX ADMIN — Medication 7.5 MILLIGRAM(S): at 08:29

## 2019-01-03 RX ADMIN — Medication 100 MILLIGRAM(S): at 17:25

## 2019-01-03 RX ADMIN — CHLORHEXIDINE GLUCONATE 1 APPLICATION(S): 213 SOLUTION TOPICAL at 06:29

## 2019-01-03 RX ADMIN — APIXABAN 5 MILLIGRAM(S): 2.5 TABLET, FILM COATED ORAL at 23:48

## 2019-01-03 RX ADMIN — Medication 300 MILLIGRAM(S): at 12:29

## 2019-01-03 RX ADMIN — APIXABAN 5 MILLIGRAM(S): 2.5 TABLET, FILM COATED ORAL at 00:25

## 2019-01-03 RX ADMIN — GABAPENTIN 300 MILLIGRAM(S): 400 CAPSULE ORAL at 17:26

## 2019-01-03 RX ADMIN — Medication 100 MILLIGRAM(S): at 17:26

## 2019-01-03 RX ADMIN — SENNA PLUS 2 TABLET(S): 8.6 TABLET ORAL at 21:55

## 2019-01-03 RX ADMIN — APIXABAN 5 MILLIGRAM(S): 2.5 TABLET, FILM COATED ORAL at 12:30

## 2019-01-03 RX ADMIN — Medication 1 MILLIGRAM(S): at 12:30

## 2019-01-03 RX ADMIN — GABAPENTIN 300 MILLIGRAM(S): 400 CAPSULE ORAL at 08:30

## 2019-01-03 RX ADMIN — Medication 5 MILLIGRAM(S): at 12:30

## 2019-01-03 RX ADMIN — OXYCODONE AND ACETAMINOPHEN 2 TABLET(S): 5; 325 TABLET ORAL at 06:34

## 2019-01-03 RX ADMIN — OXYCODONE AND ACETAMINOPHEN 2 TABLET(S): 5; 325 TABLET ORAL at 11:30

## 2019-01-03 RX ADMIN — OXYCODONE AND ACETAMINOPHEN 2 TABLET(S): 5; 325 TABLET ORAL at 06:59

## 2019-01-03 RX ADMIN — OXYCODONE AND ACETAMINOPHEN 2 TABLET(S): 5; 325 TABLET ORAL at 17:25

## 2019-01-03 RX ADMIN — Medication 100 MILLIGRAM(S): at 08:29

## 2019-01-03 NOTE — PROGRESS NOTE ADULT - SUBJECTIVE AND OBJECTIVE BOX
F/u necrotic digit 3 R, multiple lower extremity ulcers s/p debridement by burn team.   T 97.6. C/o intermittent pain legs and right foot.  Digit 3 R dry with dorsal eschar/necrosis. Distal pulp remains pink and viable.  WBC 8.64  HH 9.2/29.1 n    Bun/Cre 40/1.2  eGFR 44  Scheduled for angio Monday 1/7  Doxy, Rifampin  Will obtain serial xray to eval underlying bone PIPJ 3R  Discussed surgical vs autoamp vs debridement of eschar 3R  Betadine to digit

## 2019-01-03 NOTE — PROGRESS NOTE ADULT - ASSESSMENT
bilateral lower extremity wounds post debridement--> healing--> local wound care, compression and elevation

## 2019-01-03 NOTE — PROGRESS NOTE ADULT - ASSESSMENT
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    #Bilateral Chronic Nonhealing Ulcers and concurrent LE cellulitis   -per podiatry betadine to lesions,s/p debridement by burn. Possible angiogram on Monday 1/7  -Temp on 102.2F 12/21 ; Afebrile since  -doxycycline oral 100mg q 12 for 14 days , growing Pseudomonas	  -Podiatry: Betadine paint to digit 3R BID; Xeroform to ulcers with loose dressing; Limb elevation.    -Duplex LE venous: Chronic-appearing right common femoral and femoral deep venous thromboses; left popliteal deep venous thrombosis  -Duplex LE arterial: On the right: Mild to moderate disease at the popliteal artery level. On the left: Mild popliteal and posterior tibial artery disease; Remaining tibial arteries not visualized secondary to edema   -Uric acid levels normal. .      #Chronic Right Hip Pain  -RIght hip xray: Diffuse osteopenia  -C/w Physical therapy. Will check Vit D levels and replete if deficient    #Chronic B/L femoral and popliteal DVT- eliquis   #Chronic Normocytic Anemia- Likely 2/2 RA; Hb at baseline  #Rheumatoid Arthritis -Continue home prednisone. talked to Dr Ascencio (Dr Henry's team)- Can decrease prednisone 7.5mg if healing hampered. No other adjustments needed.   #DM II-Controlled; Monitor finger sticks, start insulin sliding scale if fingersticks are greater than 180  #Latent TB- rifampin  #Gout- Normal uric acid levels; c/w Allopurinol    DVT ppx: On Eliquis  GI ppx: Not indicated  Diet: DASH Carb consistent   Activity: AAT  Code Status: DNR/DNI, MOLST form completed with health care proxy as witness by bedside, form in chart  Disposition: pending

## 2019-01-03 NOTE — PROGRESS NOTE ADULT - ATTENDING COMMENTS
Pt was seen and examined at bedside independently, patient  is c/o right leg pain at times, awaiting for CTA on Monday, consulted by vascular surgery and podiatry, otherwise she feel OK and asking for PT.  I agree with resident's findings, assessment and plan above with few corrections below.    Vital Signs Last 24 Hrs  T(C): 36.2 (03 Jan 2019 13:56), Max: 36.8 (03 Jan 2019 04:40)  T(F): 97.2 (03 Jan 2019 13:56), Max: 98.2 (03 Jan 2019 04:40)  HR: 77 (03 Jan 2019 13:56) (77 - 85)  BP: 134/62 (03 Jan 2019 13:56) (127/67 - 154/69)  BP(mean): --  RR: 16 (03 Jan 2019 13:56) (16 - 18)    PHYSICAL EXAM:  GEN: No acute distress  LUNGS: Clear to auscultation bilaterally   HEART: S1/S2 present.    ABD: Soft, non-tender, non-distended. Bowel sounds present  EXT: b/l lower extremities in bandage , with ischemic changes in toes ( partial dry gangrene on right 3rd toe)   NEURO: AAOX3, no focal neuro deficit     LABS:                        9.2    8.44  )-----------( 426      ( 03 Jan 2019 07:55 )             29.1   01-03    140  |  101  |  40<H>  ----------------------------<  104<H>  4.4   |  23  |  1.3    Ca    9.2      03 Jan 2019 07:55    MEDICATIONS  (STANDING):  allopurinol 300 milliGRAM(s) Oral daily  apixaban 5 milliGRAM(s) Oral every 12 hours  chlorhexidine 4% Liquid 1 Application(s) Topical <User Schedule>  docusate sodium 100 milliGRAM(s) Oral two times a day  doxycycline hyclate Capsule 100 milliGRAM(s) Oral every 12 hours  folic acid 1 milliGRAM(s) Oral daily  gabapentin 300 milliGRAM(s) Oral every 12 hours  leucovorin 5 milliGRAM(s) Oral daily  predniSONE   Tablet 7.5 milliGRAM(s) Oral daily  rifampin 300 milliGRAM(s) Oral two times a day  senna 2 Tablet(s) Oral at bedtime    MEDICATIONS  (PRN):  acetaminophen   Tablet .. 650 milliGRAM(s) Oral every 6 hours PRN Temp greater or equal to 38C (100.4F)  morphine  - Injectable 2 milliGRAM(s) IV Push every 6 hours PRN Severe Pain (7 - 10)  oxyCODONE    5 mG/acetaminophen 325 mG 2 Tablet(s) Oral every 4 hours PRN Moderate Pain (4 - 6)      A/P  # LE ulcer with area of dry gangrene on the right 3rd toe/ PVD  - follow up vascular Sx for possible CTA on Monday  - c/with local wound care as per podaitry  - on Antibiotics   - keep LE elevated  - on Neurontin for diabetic neuropathy   - pain meds PRN    # h/o RA  - on by mouth Prednisone   - pain meds PRN  - PT/Rehab     # DM  - carb consistent diet  - Insulin coverage     # Latent TB  - on Rifaximin     # chronic DVT  - on Eliquis     # c/with current medical management and DVT prophylaxis

## 2019-01-03 NOTE — PROGRESS NOTE ADULT - SUBJECTIVE AND OBJECTIVE BOX
SUBJECTIVE:    Patient is a 78y old Female who presents with a chief complaint of "I couldn't stand up from the recliner", and worsening pain of bilateral lower extremities (03 Jan 2019 05:37)    Currently admitted to medicine with the primary diagnosis of Wounds, multiple open, lower extremity     Today is hospital day 15d. This morning she is resting comfortably in bed and reports no new issues or overnight events.     PAST MEDICAL & SURGICAL HISTORY  Gout  DVT (deep venous thrombosis)  HTN (hypertension)  Rheumatoid arteritis  Other specified diabetes mellitus with other specified complication, unspecified whether long term insulin use  Primary osteoarthritis of right knee: s/p knee repalcement    SOCIAL HISTORY:  Negative for smoking/alcohol/drug use.     ALLERGIES:  clindamycin (Unknown)  erythromycin (Unknown)  penicillin (Unknown)  strawberry (Unknown)    MEDICATIONS:  STANDING MEDICATIONS  allopurinol 300 milliGRAM(s) Oral daily  apixaban 5 milliGRAM(s) Oral every 12 hours  chlorhexidine 4% Liquid 1 Application(s) Topical <User Schedule>  docusate sodium 100 milliGRAM(s) Oral two times a day  doxycycline hyclate Capsule 100 milliGRAM(s) Oral every 12 hours  folic acid 1 milliGRAM(s) Oral daily  gabapentin 300 milliGRAM(s) Oral every 12 hours  leucovorin 5 milliGRAM(s) Oral daily  predniSONE   Tablet 7.5 milliGRAM(s) Oral daily  rifampin 300 milliGRAM(s) Oral two times a day  senna 2 Tablet(s) Oral at bedtime    PRN MEDICATIONS  acetaminophen   Tablet .. 650 milliGRAM(s) Oral every 6 hours PRN  morphine  - Injectable 2 milliGRAM(s) IV Push every 6 hours PRN  oxyCODONE    5 mG/acetaminophen 325 mG 2 Tablet(s) Oral every 4 hours PRN    VITALS:   T(F): 98.2  HR: 85  BP: 154/69  RR: 18  SpO2: 98%    PHYSICAL EXAM:  GEN: No acute distress  LUNGS: Clear to auscultation bilaterally   HEART: S1/S2 present.    ABD: Soft, non-tender, non-distended. Bowel sounds present  EXT: b/l lower extremities in bandage , with ischemic changes in toes   NEURO: AAOX3        LABS:                        9.2    8.64  )-----------( 422      ( 02 Jan 2019 07:44 )             29.1     01-02    137  |  100  |  40<H>  ----------------------------<  119<H>  4.4   |  21  |  1.2    Ca    9.2      02 Jan 2019 07:44                        RADIOLOGY: SUBJECTIVE:    Patient is a 78y old Female who presents with a chief complaint of "I couldn't stand up from the recliner", and worsening pain of bilateral lower extremities (03 Jan 2019 05:37)    Currently admitted to medicine with the primary diagnosis of Wounds, multiple open, lower extremity     Today is hospital day 15d. This morning she is resting comfortably in bed and reports no new issues or overnight events.     PAST MEDICAL & SURGICAL HISTORY  Gout  DVT (deep venous thrombosis)  HTN (hypertension)  Rheumatoid arteritis  Other specified diabetes mellitus with other specified complication, unspecified whether long term insulin use  Primary osteoarthritis of right knee: s/p knee repalcement    SOCIAL HISTORY:  Negative for smoking/alcohol/drug use.     ALLERGIES:  clindamycin (Unknown)  erythromycin (Unknown)  penicillin (Unknown)  strawberry (Unknown)    MEDICATIONS:  STANDING MEDICATIONS  allopurinol 300 milliGRAM(s) Oral daily  apixaban 5 milliGRAM(s) Oral every 12 hours  chlorhexidine 4% Liquid 1 Application(s) Topical <User Schedule>  docusate sodium 100 milliGRAM(s) Oral two times a day  doxycycline hyclate Capsule 100 milliGRAM(s) Oral every 12 hours  folic acid 1 milliGRAM(s) Oral daily  gabapentin 300 milliGRAM(s) Oral every 12 hours  leucovorin 5 milliGRAM(s) Oral daily  predniSONE   Tablet 7.5 milliGRAM(s) Oral daily  rifampin 300 milliGRAM(s) Oral two times a day  senna 2 Tablet(s) Oral at bedtime    PRN MEDICATIONS  acetaminophen   Tablet .. 650 milliGRAM(s) Oral every 6 hours PRN  morphine  - Injectable 2 milliGRAM(s) IV Push every 6 hours PRN  oxyCODONE    5 mG/acetaminophen 325 mG 2 Tablet(s) Oral every 4 hours PRN    VITALS:   T(F): 98.2  HR: 85  BP: 154/69  RR: 18  SpO2: 98%    PHYSICAL EXAM:  GEN: No acute distress  LUNGS: Clear to auscultation bilaterally   HEART: S1/S2 present.    ABD: Soft, non-tender, non-distended. Bowel sounds present  EXT: b/l lower extremities in bandage , with ischemic changes in toes   NEURO: AAOX3        LABS:                        9.2    8.64  )-----------( 422      ( 02 Jan 2019 07:44 )             29.1     01-02    137  |  100  |  40<H>  ----------------------------<  119<H>  4.4   |  21  |  1.2    Ca    9.2      02 Jan 2019 07:44      RADIOLOGY:  < from: Xray Cervical Spine AP, Lat, Dens Max 3 View (12.27.18 @ 17:35) >  Impression:    Diffuse osteopenia without acute fracture.    Multilevel severe degenerative changes.    Trace 2 mm anterolisthesis of C3 on C4. Recommend flexion and extension   lateral views to evaluate for dynamic instability.    Atlantoaxial joint osteoarthritis.    < end of copied text >

## 2019-01-03 NOTE — PROGRESS NOTE ADULT - SUBJECTIVE AND OBJECTIVE BOX
PATIENT SEEN AND EXAMINED WEDNESDAY 1/2/19    s/p debridement lower legs    PE: lower legs--> open wounds granulating with residual 2x1cm area with black eschar right posterior legs--> no acute infection

## 2019-01-04 LAB
ANION GAP SERPL CALC-SCNC: 14 MMOL/L — SIGNIFICANT CHANGE UP (ref 7–14)
BUN SERPL-MCNC: 37 MG/DL — HIGH (ref 10–20)
CALCIUM SERPL-MCNC: 9.2 MG/DL — SIGNIFICANT CHANGE UP (ref 8.5–10.1)
CHLORIDE SERPL-SCNC: 100 MMOL/L — SIGNIFICANT CHANGE UP (ref 98–110)
CO2 SERPL-SCNC: 24 MMOL/L — SIGNIFICANT CHANGE UP (ref 17–32)
CREAT SERPL-MCNC: 1.2 MG/DL — SIGNIFICANT CHANGE UP (ref 0.7–1.5)
GLUCOSE BLDC GLUCOMTR-MCNC: 146 MG/DL — HIGH (ref 70–99)
GLUCOSE BLDC GLUCOMTR-MCNC: 158 MG/DL — HIGH (ref 70–99)
GLUCOSE BLDC GLUCOMTR-MCNC: 180 MG/DL — HIGH (ref 70–99)
GLUCOSE BLDC GLUCOMTR-MCNC: 90 MG/DL — SIGNIFICANT CHANGE UP (ref 70–99)
GLUCOSE SERPL-MCNC: 108 MG/DL — HIGH (ref 70–99)
HCT VFR BLD CALC: 29.9 % — LOW (ref 37–47)
HGB BLD-MCNC: 9.6 G/DL — LOW (ref 12–16)
MCHC RBC-ENTMCNC: 29 PG — SIGNIFICANT CHANGE UP (ref 27–31)
MCHC RBC-ENTMCNC: 32.1 G/DL — SIGNIFICANT CHANGE UP (ref 32–37)
MCV RBC AUTO: 90.3 FL — SIGNIFICANT CHANGE UP (ref 81–99)
NRBC # BLD: 0 /100 WBCS — SIGNIFICANT CHANGE UP (ref 0–0)
PLATELET # BLD AUTO: 428 K/UL — HIGH (ref 130–400)
POTASSIUM SERPL-MCNC: 4.2 MMOL/L — SIGNIFICANT CHANGE UP (ref 3.5–5)
POTASSIUM SERPL-SCNC: 4.2 MMOL/L — SIGNIFICANT CHANGE UP (ref 3.5–5)
RBC # BLD: 3.31 M/UL — LOW (ref 4.2–5.4)
RBC # FLD: 16.3 % — HIGH (ref 11.5–14.5)
SODIUM SERPL-SCNC: 138 MMOL/L — SIGNIFICANT CHANGE UP (ref 135–146)
WBC # BLD: 8.97 K/UL — SIGNIFICANT CHANGE UP (ref 4.8–10.8)
WBC # FLD AUTO: 8.97 K/UL — SIGNIFICANT CHANGE UP (ref 4.8–10.8)

## 2019-01-04 RX ORDER — ONDANSETRON 8 MG/1
4 TABLET, FILM COATED ORAL ONCE
Qty: 0 | Refills: 0 | Status: COMPLETED | OUTPATIENT
Start: 2019-01-04 | End: 2019-01-04

## 2019-01-04 RX ADMIN — Medication 1 MILLIGRAM(S): at 11:06

## 2019-01-04 RX ADMIN — OXYCODONE AND ACETAMINOPHEN 2 TABLET(S): 5; 325 TABLET ORAL at 20:36

## 2019-01-04 RX ADMIN — APIXABAN 5 MILLIGRAM(S): 2.5 TABLET, FILM COATED ORAL at 11:06

## 2019-01-04 RX ADMIN — OXYCODONE AND ACETAMINOPHEN 2 TABLET(S): 5; 325 TABLET ORAL at 08:25

## 2019-01-04 RX ADMIN — APIXABAN 5 MILLIGRAM(S): 2.5 TABLET, FILM COATED ORAL at 22:25

## 2019-01-04 RX ADMIN — OXYCODONE AND ACETAMINOPHEN 2 TABLET(S): 5; 325 TABLET ORAL at 09:54

## 2019-01-04 RX ADMIN — GABAPENTIN 300 MILLIGRAM(S): 400 CAPSULE ORAL at 08:25

## 2019-01-04 RX ADMIN — OXYCODONE AND ACETAMINOPHEN 2 TABLET(S): 5; 325 TABLET ORAL at 12:35

## 2019-01-04 RX ADMIN — SENNA PLUS 2 TABLET(S): 8.6 TABLET ORAL at 22:25

## 2019-01-04 RX ADMIN — Medication 300 MILLIGRAM(S): at 11:06

## 2019-01-04 RX ADMIN — Medication 100 MILLIGRAM(S): at 08:25

## 2019-01-04 RX ADMIN — ONDANSETRON 4 MILLIGRAM(S): 8 TABLET, FILM COATED ORAL at 12:04

## 2019-01-04 RX ADMIN — Medication 100 MILLIGRAM(S): at 17:03

## 2019-01-04 RX ADMIN — OXYCODONE AND ACETAMINOPHEN 2 TABLET(S): 5; 325 TABLET ORAL at 16:30

## 2019-01-04 RX ADMIN — Medication 5 MILLIGRAM(S): at 11:06

## 2019-01-04 RX ADMIN — GABAPENTIN 300 MILLIGRAM(S): 400 CAPSULE ORAL at 17:03

## 2019-01-04 RX ADMIN — Medication 7.5 MILLIGRAM(S): at 08:26

## 2019-01-04 NOTE — PROGRESS NOTE ADULT - ASSESSMENT
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    #Bilateral Chronic Nonhealing Ulcers and concurrent LE cellulitis   -per podiatry betadine to lesions,s/p debridement by burn. Possible angiogram on Monday 1/7  -Temp on 102.2F 12/21 ; Afebrile since  -doxycycline oral 100mg q 12 for 14 days , growing Pseudomonas	  -Podiatry: Betadine paint to digit 3R BID; Xeroform to ulcers with loose dressing; Limb elevation.    -Duplex LE venous: Chronic-appearing right common femoral and femoral deep venous thromboses; left popliteal deep venous thrombosis  -Duplex LE arterial: On the right: Mild to moderate disease at the popliteal artery level. On the left: Mild popliteal and posterior tibial artery disease; Remaining tibial arteries not visualized secondary to edema   -Uric acid levels normal. .  -serial xrays    #Chronic Right Hip Pain  -RIght hip xray: Diffuse osteopenia  -C/w Physical therapy. Will check Vit D levels and replete if deficient    #Chronic B/L femoral and popliteal DVT- eliquis will hold for angiogram 48 hrs prior?  #Chronic Normocytic Anemia- Likely 2/2 RA; Hb at baseline  #Rheumatoid Arthritis -Continue home prednisone. talked to Dr Ascencio (Dr Henry's team)- Can decrease prednisone 7.5mg if healing hampered. No other adjustments needed.   #DM II-Controlled; Monitor finger sticks, start insulin sliding scale if fingersticks are greater than 180  #Latent TB- rifampin  #Gout- Normal uric acid levels; c/w Allopurinol    DVT ppx: On Eliquis  GI ppx: Not indicated  Diet: DASH Carb consistent   Activity: AAT  Code Status: DNR/DNI, MOLST form completed with health care proxy as witness by bedside, form in chart  Disposition: pending

## 2019-01-04 NOTE — PROGRESS NOTE ADULT - SUBJECTIVE AND OBJECTIVE BOX
SUBJECTIVE:    Patient is a 78y old Female who presents with a chief complaint of "I couldn't stand up from the recliner", and worsening pain of bilateral lower extremities (03 Jan 2019 19:03)    Currently admitted to medicine with the primary diagnosis of Wounds, multiple open, lower extremity     Today is hospital day 16d. Pt was complaining pain in b/l legs    PAST MEDICAL & SURGICAL HISTORY  Gout  DVT (deep venous thrombosis)  HTN (hypertension)  Rheumatoid arteritis  Other specified diabetes mellitus with other specified complication, unspecified whether long term insulin use  Primary osteoarthritis of right knee: s/p knee repalcement    SOCIAL HISTORY:  Negative for smoking/alcohol/drug use.     ALLERGIES:  clindamycin (Unknown)  erythromycin (Unknown)  penicillin (Unknown)  strawberry (Unknown)    MEDICATIONS:  STANDING MEDICATIONS  allopurinol 300 milliGRAM(s) Oral daily  apixaban 5 milliGRAM(s) Oral every 12 hours  chlorhexidine 4% Liquid 1 Application(s) Topical <User Schedule>  docusate sodium 100 milliGRAM(s) Oral two times a day  doxycycline hyclate Capsule 100 milliGRAM(s) Oral every 12 hours  folic acid 1 milliGRAM(s) Oral daily  gabapentin 300 milliGRAM(s) Oral every 12 hours  leucovorin 5 milliGRAM(s) Oral daily  predniSONE   Tablet 7.5 milliGRAM(s) Oral daily  rifampin 300 milliGRAM(s) Oral two times a day  senna 2 Tablet(s) Oral at bedtime    PRN MEDICATIONS  acetaminophen   Tablet .. 650 milliGRAM(s) Oral every 6 hours PRN  morphine  - Injectable 2 milliGRAM(s) IV Push every 6 hours PRN  oxyCODONE    5 mG/acetaminophen 325 mG 2 Tablet(s) Oral every 4 hours PRN    VITALS:   T(F): 98  HR: 80  BP: 149/65  RR: 20  SpO2: --    PHYSICAL EXAM:  GEN: No acute distress  LUNGS: Clear to auscultation bilaterally   HEART: S1/S2 present.    ABD: Soft, non-tender, non-distended. Bowel sounds present  EXT: b/l lower extremities in bandage , with ischemic changes in toes   NEURO: AAOX3    LABS:                        9.2    8.44  )-----------( 426      ( 03 Jan 2019 07:55 )             29.1     01-03    140  |  101  |  40<H>  ----------------------------<  104<H>  4.4   |  23  |  1.3    Ca    9.2      03 Jan 2019 07:55                        RADIOLOGY:

## 2019-01-04 NOTE — PROGRESS NOTE ADULT - ATTENDING COMMENTS
Pt was seen and examined at bedside independently, she feels OK today, asking about angiogram.   I agree with residents findings, assessment and plan above with few corrections below.    Vital Signs Last 24 Hrs  T(C): 36.3 (04 Jan 2019 13:47), Max: 36.7 (04 Jan 2019 04:55)  T(F): 97.4 (04 Jan 2019 13:47), Max: 98 (04 Jan 2019 04:55)  HR: 82 (04 Jan 2019 13:47) (77 - 82)  BP: 130/59 (04 Jan 2019 13:47) (130/59 - 149/65)  BP(mean): --  RR: 18 (04 Jan 2019 13:47) (18 - 20)  SpO2: 97% (04 Jan 2019 09:58) (97% - 97%)    PHYSICAL EXAM:  GEN: No acute distress  LUNGS: Clear to auscultation bilaterally   HEART: S1/S2 present.    ABD: Soft, non-tender, non-distended. Bowel sounds present  EXT: b/l lower extremities in bandage , with ischemic changes in toes ( area of dry gangrene noted on 3rd toe)   NEURO: AAOX3, no focal neuro deficit noted     LABS:                        9.6    8.97  )-----------( 428      ( 04 Jan 2019 08:59 )             29.9   01-04    138  |  100  |  37<H>  ----------------------------<  108<H>  4.2   |  24  |  1.2    Ca    9.2      04 Jan 2019 08:59    RADIOLOGY:  < from: Xray Foot AP + Lateral + Oblique, Right (01.03.19 @ 19:42) >    Impression:    New lucency at the third metatarsal head only on the AP view,   osteomyelitis not excluded. Recommend MRI of the right forefoot for   further evaluation.    MEDICATIONS  (STANDING):  allopurinol 300 milliGRAM(s) Oral daily  apixaban 5 milliGRAM(s) Oral every 12 hours  chlorhexidine 4% Liquid 1 Application(s) Topical <User Schedule>  docusate sodium 100 milliGRAM(s) Oral two times a day  doxycycline hyclate Capsule 100 milliGRAM(s) Oral every 12 hours  folic acid 1 milliGRAM(s) Oral daily  gabapentin 300 milliGRAM(s) Oral every 12 hours  leucovorin 5 milliGRAM(s) Oral daily  predniSONE   Tablet 7.5 milliGRAM(s) Oral daily  rifampin 300 milliGRAM(s) Oral two times a day  senna 2 Tablet(s) Oral at bedtime    MEDICATIONS  (PRN):  acetaminophen   Tablet .. 650 milliGRAM(s) Oral every 6 hours PRN Temp greater or equal to 38C (100.4F)  morphine  - Injectable 2 milliGRAM(s) IV Push every 6 hours PRN Severe Pain (7 - 10)  oxyCODONE    5 mG/acetaminophen 325 mG 2 Tablet(s) Oral every 4 hours PRN Moderate Pain (4 - 6)      A/P  # DFU with h/o PVD  - consulted by podiatry and vascular surgery  - CTA scheduled by vascular Sx on Monday  - c/w local wound care'  - check ESR, CRP  - might need right foot MRI to r/o OM ( pt was febrile while in the hospital)  - ID consult   - pain meds PRN     # Chronic DVT  - on Eliquis   - vascular surgery is following     # Anemia  - monitor hemoglobin level  -stable for now    # DM  - well controlled on carb consistent diet  - monitor f/s    # Latent TB  - on Rifampin   # GI prophylaxis  # Dispo: no d/c planning until next week Pt was seen and examined at bedside independently, she feels OK today, asking about angiogram.   I agree with residents findings, assessment and plan above with few corrections below.    Vital Signs Last 24 Hrs  T(C): 36.3 (04 Jan 2019 13:47), Max: 36.7 (04 Jan 2019 04:55)  T(F): 97.4 (04 Jan 2019 13:47), Max: 98 (04 Jan 2019 04:55)  HR: 82 (04 Jan 2019 13:47) (77 - 82)  BP: 130/59 (04 Jan 2019 13:47) (130/59 - 149/65)  BP(mean): --  RR: 18 (04 Jan 2019 13:47) (18 - 20)  SpO2: 97% (04 Jan 2019 09:58) (97% - 97%)    PHYSICAL EXAM:  GEN: No acute distress  LUNGS: Clear to auscultation bilaterally   HEART: S1/S2 present.    ABD: Soft, non-tender, non-distended. Bowel sounds present  EXT: b/l lower extremities in bandage , with ischemic changes in toes ( area of dry gangrene noted on 3rd toe)   NEURO: AAOX3, no focal neuro deficit noted     LABS:                        9.6    8.97  )-----------( 428      ( 04 Jan 2019 08:59 )             29.9   01-04    138  |  100  |  37<H>  ----------------------------<  108<H>  4.2   |  24  |  1.2    Ca    9.2      04 Jan 2019 08:59    RADIOLOGY:  < from: Xray Foot AP + Lateral + Oblique, Right (01.03.19 @ 19:42) >    Impression:    New lucency at the third metatarsal head only on the AP view,   osteomyelitis not excluded. Recommend MRI of the right forefoot for   further evaluation.    MEDICATIONS  (STANDING):  allopurinol 300 milliGRAM(s) Oral daily  apixaban 5 milliGRAM(s) Oral every 12 hours  chlorhexidine 4% Liquid 1 Application(s) Topical <User Schedule>  docusate sodium 100 milliGRAM(s) Oral two times a day  doxycycline hyclate Capsule 100 milliGRAM(s) Oral every 12 hours  folic acid 1 milliGRAM(s) Oral daily  gabapentin 300 milliGRAM(s) Oral every 12 hours  leucovorin 5 milliGRAM(s) Oral daily  predniSONE   Tablet 7.5 milliGRAM(s) Oral daily  rifampin 300 milliGRAM(s) Oral two times a day  senna 2 Tablet(s) Oral at bedtime    MEDICATIONS  (PRN):  acetaminophen   Tablet .. 650 milliGRAM(s) Oral every 6 hours PRN Temp greater or equal to 38C (100.4F)  morphine  - Injectable 2 milliGRAM(s) IV Push every 6 hours PRN Severe Pain (7 - 10)  oxyCODONE    5 mG/acetaminophen 325 mG 2 Tablet(s) Oral every 4 hours PRN Moderate Pain (4 - 6)      A/P  # DFU with h/o PVD  - consulted by podiatry and vascular surgery  - CTA scheduled by vascular Sx on Monday ( keep NPO after midnight on Sunday, IV hydration 12 hours before and after angiogram)   - c/w local wound care'  - check ESR, CRP  - might need right foot MRI to r/o OM ( pt was febrile while in the hospital)  - ID consult   - pain meds PRN     # Chronic DVT  - on Eliquis   - vascular surgery is following     # Anemia  - monitor hemoglobin level  -stable for now    # DM  - well controlled on carb consistent diet  - monitor f/s    # Latent TB  - on Rifampin   # GI prophylaxis  # Dispo: no d/c planning until next week

## 2019-01-05 LAB
ANION GAP SERPL CALC-SCNC: 16 MMOL/L — HIGH (ref 7–14)
BUN SERPL-MCNC: 42 MG/DL — HIGH (ref 10–20)
CALCIUM SERPL-MCNC: 9.5 MG/DL — SIGNIFICANT CHANGE UP (ref 8.5–10.1)
CHLORIDE SERPL-SCNC: 101 MMOL/L — SIGNIFICANT CHANGE UP (ref 98–110)
CO2 SERPL-SCNC: 23 MMOL/L — SIGNIFICANT CHANGE UP (ref 17–32)
CREAT SERPL-MCNC: 1.4 MG/DL — SIGNIFICANT CHANGE UP (ref 0.7–1.5)
GLUCOSE BLDC GLUCOMTR-MCNC: 144 MG/DL — HIGH (ref 70–99)
GLUCOSE BLDC GLUCOMTR-MCNC: 168 MG/DL — HIGH (ref 70–99)
GLUCOSE BLDC GLUCOMTR-MCNC: 186 MG/DL — HIGH (ref 70–99)
GLUCOSE BLDC GLUCOMTR-MCNC: 90 MG/DL — SIGNIFICANT CHANGE UP (ref 70–99)
GLUCOSE SERPL-MCNC: 89 MG/DL — SIGNIFICANT CHANGE UP (ref 70–99)
HCT VFR BLD CALC: 28.7 % — LOW (ref 37–47)
HGB BLD-MCNC: 9.1 G/DL — LOW (ref 12–16)
MAGNESIUM SERPL-MCNC: 2 MG/DL — SIGNIFICANT CHANGE UP (ref 1.8–2.4)
MCHC RBC-ENTMCNC: 28.5 PG — SIGNIFICANT CHANGE UP (ref 27–31)
MCHC RBC-ENTMCNC: 31.7 G/DL — LOW (ref 32–37)
MCV RBC AUTO: 90 FL — SIGNIFICANT CHANGE UP (ref 81–99)
NRBC # BLD: 0 /100 WBCS — SIGNIFICANT CHANGE UP (ref 0–0)
PLATELET # BLD AUTO: 371 K/UL — SIGNIFICANT CHANGE UP (ref 130–400)
POTASSIUM SERPL-MCNC: 4.4 MMOL/L — SIGNIFICANT CHANGE UP (ref 3.5–5)
POTASSIUM SERPL-SCNC: 4.4 MMOL/L — SIGNIFICANT CHANGE UP (ref 3.5–5)
RBC # BLD: 3.19 M/UL — LOW (ref 4.2–5.4)
RBC # FLD: 16.2 % — HIGH (ref 11.5–14.5)
SODIUM SERPL-SCNC: 140 MMOL/L — SIGNIFICANT CHANGE UP (ref 135–146)
WBC # BLD: 7.97 K/UL — SIGNIFICANT CHANGE UP (ref 4.8–10.8)
WBC # FLD AUTO: 7.97 K/UL — SIGNIFICANT CHANGE UP (ref 4.8–10.8)

## 2019-01-05 RX ORDER — OXYCODONE AND ACETAMINOPHEN 5; 325 MG/1; MG/1
2 TABLET ORAL EVERY 4 HOURS
Qty: 0 | Refills: 0 | Status: DISCONTINUED | OUTPATIENT
Start: 2019-01-05 | End: 2019-01-07

## 2019-01-05 RX ORDER — CEFEPIME 1 G/1
1000 INJECTION, POWDER, FOR SOLUTION INTRAMUSCULAR; INTRAVENOUS EVERY 12 HOURS
Qty: 0 | Refills: 0 | Status: DISCONTINUED | OUTPATIENT
Start: 2019-01-05 | End: 2019-01-07

## 2019-01-05 RX ADMIN — GABAPENTIN 300 MILLIGRAM(S): 400 CAPSULE ORAL at 17:24

## 2019-01-05 RX ADMIN — Medication 100 MILLIGRAM(S): at 17:24

## 2019-01-05 RX ADMIN — OXYCODONE AND ACETAMINOPHEN 2 TABLET(S): 5; 325 TABLET ORAL at 12:37

## 2019-01-05 RX ADMIN — Medication 100 MILLIGRAM(S): at 05:34

## 2019-01-05 RX ADMIN — SENNA PLUS 2 TABLET(S): 8.6 TABLET ORAL at 22:16

## 2019-01-05 RX ADMIN — CHLORHEXIDINE GLUCONATE 1 APPLICATION(S): 213 SOLUTION TOPICAL at 05:33

## 2019-01-05 RX ADMIN — Medication 1 MILLIGRAM(S): at 12:08

## 2019-01-05 RX ADMIN — APIXABAN 5 MILLIGRAM(S): 2.5 TABLET, FILM COATED ORAL at 12:08

## 2019-01-05 RX ADMIN — OXYCODONE AND ACETAMINOPHEN 2 TABLET(S): 5; 325 TABLET ORAL at 03:35

## 2019-01-05 RX ADMIN — Medication 5 MILLIGRAM(S): at 12:08

## 2019-01-05 RX ADMIN — GABAPENTIN 300 MILLIGRAM(S): 400 CAPSULE ORAL at 05:34

## 2019-01-05 RX ADMIN — Medication 7.5 MILLIGRAM(S): at 05:34

## 2019-01-05 RX ADMIN — OXYCODONE AND ACETAMINOPHEN 2 TABLET(S): 5; 325 TABLET ORAL at 12:07

## 2019-01-05 RX ADMIN — Medication 300 MILLIGRAM(S): at 12:08

## 2019-01-05 RX ADMIN — APIXABAN 5 MILLIGRAM(S): 2.5 TABLET, FILM COATED ORAL at 22:16

## 2019-01-05 NOTE — PROGRESS NOTE ADULT - SUBJECTIVE AND OBJECTIVE BOX
LINCOLN HEREDIA  78y  Female      Patient is a 78y old  Female who presents with a chief complaint of "I couldn't stand up from the recliner", and worsening pain of bilateral lower extremities (05 Jan 2019 08:31)      INTERVAL HPI/OVERNIGHT EVENTS:  She feels ok, no fever, no acute pain.     Vital Signs Last 24 Hrs  T(C): 36 (05 Jan 2019 05:58), Max: 36.1 (04 Jan 2019 21:47)  T(F): 96.8 (05 Jan 2019 05:58), Max: 97 (04 Jan 2019 21:47)  HR: 72 (05 Jan 2019 05:58) (72 - 75)  BP: 153/67 (05 Jan 2019 05:58) (135/64 - 153/67)  BP(mean): --  RR: 18 (05 Jan 2019 05:58) (18 - 18)  SpO2: 100% (05 Jan 2019 09:00) (100% - 100%)            Consultant(s) Notes Reviewed:  [x ] YES  [ ] NO          MEDICATIONS  (STANDING):  allopurinol 300 milliGRAM(s) Oral daily  apixaban 5 milliGRAM(s) Oral every 12 hours  chlorhexidine 4% Liquid 1 Application(s) Topical <User Schedule>  docusate sodium 100 milliGRAM(s) Oral two times a day  doxycycline hyclate Capsule 100 milliGRAM(s) Oral every 12 hours  folic acid 1 milliGRAM(s) Oral daily  gabapentin 300 milliGRAM(s) Oral every 12 hours  leucovorin 5 milliGRAM(s) Oral daily  predniSONE   Tablet 7.5 milliGRAM(s) Oral daily  rifampin 300 milliGRAM(s) Oral two times a day  senna 2 Tablet(s) Oral at bedtime    MEDICATIONS  (PRN):  acetaminophen   Tablet .. 650 milliGRAM(s) Oral every 6 hours PRN Temp greater or equal to 38C (100.4F)  oxyCODONE    5 mG/acetaminophen 325 mG 2 Tablet(s) Oral every 4 hours PRN Severe Pain (7 - 10)      LABS                          9.1    7.97  )-----------( 371      ( 05 Jan 2019 07:30 )             28.7     01-05    140  |  101  |  42<H>  ----------------------------<  89  4.4   |  23  |  1.4    Ca    9.5      05 Jan 2019 07:30  Mg     2.0     01-05            Lactate Trend        CAPILLARY BLOOD GLUCOSE      POCT Blood Glucose.: 186 mg/dL (05 Jan 2019 11:24)        RADIOLOGY & ADDITIONAL TESTS:    Imaging Personally Reviewed:  [ ] YES  [ ] NO    HEALTH ISSUES - PROBLEM Dx:  Thrombophlebitis of the femoral vein        PHYSICAL EXAM:  GENERAL: NAD, well-developed  HEAD:  Atraumatic, Normocephalic  EYES: EOMI, PERRLA, conjunctiva and sclera clear  NECK: Supple, No JVD  CHEST/LUNG: Clear to auscultation bilaterally; No wheeze  HEART: Regular rate and rhythm; S1 s2  ABDOMEN: Soft, Nontender, Nondistended; Bowel sounds present  EXTREMITIES:  LE chronic healing ulcer with granulation tissue. right 3rd toe gangrene.   PSYCH: AAOx3  NEUROLOGY: non-focal  SKIN: No rashes or lesions

## 2019-01-05 NOTE — PROGRESS NOTE ADULT - SUBJECTIVE AND OBJECTIVE BOX
SUBJECTIVE:    Patient is a 78y old Female who presents with a chief complaint of "I couldn't stand up from the recliner", and worsening pain of bilateral lower extremities (05 Jan 2019 06:37)    Currently admitted to medicine with the primary diagnosis of Wounds, multiple open, lower extremity     Today is hospital day 17d. This morning she is resting comfortably in bed and reports no new issues or overnight events.     PAST MEDICAL & SURGICAL HISTORY  Gout  DVT (deep venous thrombosis)  HTN (hypertension)  Rheumatoid arteritis  Other specified diabetes mellitus with other specified complication, unspecified whether long term insulin use  Primary osteoarthritis of right knee: s/p knee repalcement    SOCIAL HISTORY:  Negative for smoking/alcohol/drug use.     ALLERGIES:  clindamycin (Unknown)  erythromycin (Unknown)  penicillin (Unknown)  strawberry (Unknown)    MEDICATIONS:  STANDING MEDICATIONS  allopurinol 300 milliGRAM(s) Oral daily  apixaban 5 milliGRAM(s) Oral every 12 hours  chlorhexidine 4% Liquid 1 Application(s) Topical <User Schedule>  docusate sodium 100 milliGRAM(s) Oral two times a day  doxycycline hyclate Capsule 100 milliGRAM(s) Oral every 12 hours  folic acid 1 milliGRAM(s) Oral daily  gabapentin 300 milliGRAM(s) Oral every 12 hours  leucovorin 5 milliGRAM(s) Oral daily  predniSONE   Tablet 7.5 milliGRAM(s) Oral daily  rifampin 300 milliGRAM(s) Oral two times a day  senna 2 Tablet(s) Oral at bedtime    PRN MEDICATIONS  acetaminophen   Tablet .. 650 milliGRAM(s) Oral every 6 hours PRN  oxyCODONE    5 mG/acetaminophen 325 mG 2 Tablet(s) Oral every 4 hours PRN    VITALS:   T(F): 96.8  HR: 72  BP: 153/67  RR: 18  SpO2: 97%    PHYSICAL EXAM:  GEN: No acute distress  LUNGS: Clear to auscultation bilaterally   HEART: S1/S2 present.    ABD: Soft, non-tender, non-distended. Bowel sounds present  EXT: b/l lower extremities in bandage , with ischemic changes in toes   NEURO: AAOX3    LABS:                        9.1    7.97  )-----------( 371      ( 05 Jan 2019 07:30 )             28.7     01-04    138  |  100  |  37<H>  ----------------------------<  108<H>  4.2   |  24  |  1.2    Ca    9.2      04 Jan 2019 08:59                        RADIOLOGY:    New lucency at the third metatarsal head only on the AP view,   osteomyelitis not excluded. Recommend MRI of the right forefoot for   further evaluation.

## 2019-01-05 NOTE — PROGRESS NOTE ADULT - ASSESSMENT
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   X ray shoed 3rd metatarsal Lucency, no need for MRI.   Pending angiogram for RLE on Monday.     Bilateral Chronic lower extremities Ulcers with superimposed  cellulitis   s/p debridement by burn.     Duplex LE venous: Chronic-appearing right common femoral and femoral deep venous thromboses; left popliteal deep venous thrombosis  Duplex LE arterial: On the right: Mild to moderate disease at the popliteal artery level. On the left: Mild popliteal and posterior tibial artery disease; Remaining tibial arteries not visualized secondary to edema   Continue wound care.   Wound cx 12/28 is growing Pseudomonas sensitive to Cefepime and Zosyn.   continue Doxycycline oral 100mg q 12  for 14 days. ID follow up for wound cx result.     Chronic Right Hip Pain  Right hip xray: Diffuse osteopenia   Physical therapy.     Chronic B/L femoral and popliteal DVT-   Continue Eliquis will hold for angiogram on Sunday.     Chronic Normocytic Anemia- Likely 2/2 RA; Hb at baseline    Rheumatoid Arthritis -  Continue home prednisone. talked to Dr Ascencio (Dr Henry's team)- Can decrease prednisone 7.5mg if healing hampered. No other adjustments needed.     DM II-Controlled; Monitor finger sticks, start insulin sliding scale if fingersticks are greater than 180    Latent TB- rifampin  Gout- Normal uric acid levels; c/w Allopurinol    DVT ppx: On Eliquis  GI ppx: Not indicated  Diet: DASH Carb consistent   Activity: AAT  Code Status: DNR/DNI, MOLST form completed with health care proxy as witness by bedside, form in chart  Disposition: pending

## 2019-01-05 NOTE — PROGRESS NOTE ADULT - ASSESSMENT
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    #Bilateral Chronic Nonhealing Ulcers and concurrent LE cellulitis   -per podiatry betadine to lesions,s/p debridement by burn. Possible angiogram on Monday 1/7 . Can continue eliquis till sunday per vascular   -Temp on 102.2F 12/21 ; Afebrile since  -New lucency on xray at third metatarsal head. MRI not required for now per podiatry  -doxycycline oral 100mg q 12 for 14 days , growing Pseudomonas	  -Podiatry: Betadine paint to digit 3R BID; Xeroform to ulcers with loose dressing; Limb elevation.    -Duplex LE venous: Chronic-appearing right common femoral and femoral deep venous thromboses; left popliteal deep venous thrombosis  -Duplex LE arterial: On the right: Mild to moderate disease at the popliteal artery level. On the left: Mild popliteal and posterior tibial artery disease; Remaining tibial arteries not visualized secondary to edema   -Uric acid levels normal. .  -serial xrays    #Chronic Right Hip Pain  -RIght hip xray: Diffuse osteopenia  -C/w Physical therapy. Will check Vit D levels and replete if deficient    #Chronic B/L femoral and popliteal DVT- eliquis will hold for angiogram 48 hrs prior?  #Chronic Normocytic Anemia- Likely 2/2 RA; Hb at baseline  #Rheumatoid Arthritis -Continue home prednisone. talked to Dr Ascencio (Dr Henry's team)- Can decrease prednisone 7.5mg if healing hampered. No other adjustments needed.   #DM II-Controlled; Monitor finger sticks, start insulin sliding scale if fingersticks are greater than 180  #Latent TB- rifampin  #Gout- Normal uric acid levels; c/w Allopurinol    DVT ppx: On Eliquis  GI ppx: Not indicated  Diet: DASH Carb consistent   Activity: AAT  Code Status: DNR/DNI, MOLST form completed with health care proxy as witness by bedside, form in chart  Disposition: pending

## 2019-01-05 NOTE — PROGRESS NOTE ADULT - SUBJECTIVE AND OBJECTIVE BOX
Pt seen with c/o moderate pain at debridement site medial R leg. Doing well.. AF  Dressings intact.  Digit 3 R remains dry with no signs infection. Distal pulp of toe remains viable and eschar is confined to dorsum of digit.   WBC 8.97  HH  9.6/29.9    BUN/CRE  37/1.2 Glucose controlled  RE xray digit 3- MRI likely not necessary at this time  Angio scheduled for Monday 1/7  Will follow

## 2019-01-06 LAB
ALLERGY+IMMUNOLOGY DIAG STUDY NOTE: SIGNIFICANT CHANGE UP
ANION GAP SERPL CALC-SCNC: 18 MMOL/L — HIGH (ref 7–14)
APTT BLD: 39.5 SEC — HIGH (ref 27–39.2)
BLD GP AB SCN SERPL QL: ABNORMAL
BUN SERPL-MCNC: 39 MG/DL — HIGH (ref 10–20)
CALCIUM SERPL-MCNC: 9.2 MG/DL — SIGNIFICANT CHANGE UP (ref 8.5–10.1)
CHLORIDE SERPL-SCNC: 98 MMOL/L — SIGNIFICANT CHANGE UP (ref 98–110)
CO2 SERPL-SCNC: 21 MMOL/L — SIGNIFICANT CHANGE UP (ref 17–32)
CREAT SERPL-MCNC: 1.3 MG/DL — SIGNIFICANT CHANGE UP (ref 0.7–1.5)
DAT IGG-SP REAG RBC-IMP: ABNORMAL
DIR ANTIGLOB POLYSPECIFIC INTERPRETATION: ABNORMAL
GLUCOSE BLDC GLUCOMTR-MCNC: 139 MG/DL — HIGH (ref 70–99)
GLUCOSE BLDC GLUCOMTR-MCNC: 142 MG/DL — HIGH (ref 70–99)
GLUCOSE BLDC GLUCOMTR-MCNC: 145 MG/DL — HIGH (ref 70–99)
GLUCOSE BLDC GLUCOMTR-MCNC: 93 MG/DL — SIGNIFICANT CHANGE UP (ref 70–99)
GLUCOSE SERPL-MCNC: 86 MG/DL — SIGNIFICANT CHANGE UP (ref 70–99)
HCT VFR BLD CALC: 29.9 % — LOW (ref 37–47)
HGB BLD-MCNC: 9.6 G/DL — LOW (ref 12–16)
IAT COMP-SP REAG SERPL QL: SIGNIFICANT CHANGE UP
INR BLD: 1.35 RATIO — HIGH (ref 0.65–1.3)
MCHC RBC-ENTMCNC: 28.8 PG — SIGNIFICANT CHANGE UP (ref 27–31)
MCHC RBC-ENTMCNC: 32.1 G/DL — SIGNIFICANT CHANGE UP (ref 32–37)
MCV RBC AUTO: 89.8 FL — SIGNIFICANT CHANGE UP (ref 81–99)
NRBC # BLD: 0 /100 WBCS — SIGNIFICANT CHANGE UP (ref 0–0)
PLATELET # BLD AUTO: 324 K/UL — SIGNIFICANT CHANGE UP (ref 130–400)
POTASSIUM SERPL-MCNC: 4.2 MMOL/L — SIGNIFICANT CHANGE UP (ref 3.5–5)
POTASSIUM SERPL-SCNC: 4.2 MMOL/L — SIGNIFICANT CHANGE UP (ref 3.5–5)
PROTHROM AB SERPL-ACNC: 15.5 SEC — HIGH (ref 9.95–12.87)
RBC # BLD: 3.33 M/UL — LOW (ref 4.2–5.4)
RBC # FLD: 16.2 % — HIGH (ref 11.5–14.5)
SODIUM SERPL-SCNC: 137 MMOL/L — SIGNIFICANT CHANGE UP (ref 135–146)
TYPE + AB SCN PNL BLD: SIGNIFICANT CHANGE UP
WBC # BLD: 7.57 K/UL — SIGNIFICANT CHANGE UP (ref 4.8–10.8)
WBC # FLD AUTO: 7.57 K/UL — SIGNIFICANT CHANGE UP (ref 4.8–10.8)

## 2019-01-06 RX ORDER — ONDANSETRON 8 MG/1
4 TABLET, FILM COATED ORAL ONCE
Qty: 0 | Refills: 0 | Status: COMPLETED | OUTPATIENT
Start: 2019-01-06 | End: 2019-01-06

## 2019-01-06 RX ADMIN — OXYCODONE AND ACETAMINOPHEN 2 TABLET(S): 5; 325 TABLET ORAL at 04:46

## 2019-01-06 RX ADMIN — Medication 100 MILLIGRAM(S): at 17:34

## 2019-01-06 RX ADMIN — OXYCODONE AND ACETAMINOPHEN 2 TABLET(S): 5; 325 TABLET ORAL at 13:29

## 2019-01-06 RX ADMIN — OXYCODONE AND ACETAMINOPHEN 2 TABLET(S): 5; 325 TABLET ORAL at 23:59

## 2019-01-06 RX ADMIN — Medication 100 MILLIGRAM(S): at 08:17

## 2019-01-06 RX ADMIN — Medication 1 MILLIGRAM(S): at 11:13

## 2019-01-06 RX ADMIN — OXYCODONE AND ACETAMINOPHEN 2 TABLET(S): 5; 325 TABLET ORAL at 18:37

## 2019-01-06 RX ADMIN — Medication 5 MILLIGRAM(S): at 11:14

## 2019-01-06 RX ADMIN — CHLORHEXIDINE GLUCONATE 1 APPLICATION(S): 213 SOLUTION TOPICAL at 05:01

## 2019-01-06 RX ADMIN — Medication 100 MILLIGRAM(S): at 08:16

## 2019-01-06 RX ADMIN — GABAPENTIN 300 MILLIGRAM(S): 400 CAPSULE ORAL at 08:17

## 2019-01-06 RX ADMIN — GABAPENTIN 300 MILLIGRAM(S): 400 CAPSULE ORAL at 17:33

## 2019-01-06 RX ADMIN — Medication 7.5 MILLIGRAM(S): at 08:16

## 2019-01-06 RX ADMIN — ONDANSETRON 4 MILLIGRAM(S): 8 TABLET, FILM COATED ORAL at 21:49

## 2019-01-06 RX ADMIN — Medication 300 MILLIGRAM(S): at 11:13

## 2019-01-06 NOTE — PROGRESS NOTE ADULT - SUBJECTIVE AND OBJECTIVE BOX
LINCOLN HEREDIA  78y  Female      Patient is a 78y old  Female who presents with a chief complaint of "I couldn't stand up from the recliner", and worsening pain of bilateral lower extremities (05 Jan 2019 13:56)      INTERVAL HPI/OVERNIGHT EVENTS:  She has no acute complaints, no overnight events.     Vital Signs Last 24 Hrs  T(C): 36.7 (06 Jan 2019 05:10), Max: 36.7 (06 Jan 2019 05:10)  T(F): 98 (06 Jan 2019 05:10), Max: 98 (06 Jan 2019 05:10)  HR: 94 (06 Jan 2019 05:10) (79 - 94)  BP: 155/72 (06 Jan 2019 05:10) (131/62 - 155/72)  BP(mean): --  RR: 18 (06 Jan 2019 05:10) (16 - 18)  SpO2: 99% (05 Jan 2019 19:32) (99% - 99%)            Consultant(s) Notes Reviewed:  [x ] YES  [ ] NO          MEDICATIONS  (STANDING):  allopurinol 300 milliGRAM(s) Oral daily  apixaban 5 milliGRAM(s) Oral every 12 hours  cefepime   IVPB 1000 milliGRAM(s) IV Intermittent every 12 hours  chlorhexidine 4% Liquid 1 Application(s) Topical <User Schedule>  docusate sodium 100 milliGRAM(s) Oral two times a day  doxycycline hyclate Capsule 100 milliGRAM(s) Oral every 12 hours  folic acid 1 milliGRAM(s) Oral daily  gabapentin 300 milliGRAM(s) Oral every 12 hours  leucovorin 5 milliGRAM(s) Oral daily  predniSONE   Tablet 7.5 milliGRAM(s) Oral daily  rifampin 300 milliGRAM(s) Oral two times a day  senna 2 Tablet(s) Oral at bedtime    MEDICATIONS  (PRN):  acetaminophen   Tablet .. 650 milliGRAM(s) Oral every 6 hours PRN Temp greater or equal to 38C (100.4F)  oxyCODONE    5 mG/acetaminophen 325 mG 2 Tablet(s) Oral every 4 hours PRN Severe Pain (7 - 10)      LABS                          9.6    7.57  )-----------( 324      ( 06 Jan 2019 07:10 )             29.9     01-06    137  |  98  |  39<H>  ----------------------------<  86  4.2   |  21  |  1.3    Ca    9.2      06 Jan 2019 07:10  Mg     2.0     01-05            Lactate Trend        CAPILLARY BLOOD GLUCOSE      POCT Blood Glucose.: 145 mg/dL (06 Jan 2019 11:10)        RADIOLOGY & ADDITIONAL TESTS:    Imaging Personally Reviewed:  [ ] YES  [ ] NO    HEALTH ISSUES - PROBLEM Dx:  Thrombophlebitis of the femoral vein        PHYSICAL EXAM:  GENERAL: NAD, well-developed  HEAD:  Atraumatic, Normocephalic  EYES: EOMI, PERRLA, conjunctiva and sclera clear  NECK: Supple, No JVD  CHEST/LUNG: Clear to auscultation bilaterally; No wheeze  HEART: Regular rate and rhythm; S1 s2  ABDOMEN: Soft, Nontender, Nondistended; Bowel sounds present  EXTREMITIES:  LE chronic healing ulcer with granulation tissue. right 3rd toe gangrene.   PSYCH: AAOx3  NEUROLOGY: non-focal  SKIN: No rashes or lesions

## 2019-01-06 NOTE — CHART NOTE - NSCHARTNOTEFT_GEN_A_CORE
Patient: LINCOLN HEREDIA  MRN: [id number]  78y Female  Location: [facility] [id number] A  01-06-19 @ 22:07    Vitals:  T(F): 97.4 (01-06-19 @ 20:38), Max: 98 (01-06-19 @ 05:10)  HR: 79 (01-06-19 @ 20:38) (79 - 94)  BP: 154/68 (01-06-19 @ 20:38) (154/68 - 155/86)  RR: 16 (01-06-19 @ 20:38) (16 - 18)    Procedure: Right lower extremity angiogram, possible endovascular revascularization  Consent in Chart: [ x ] Yes [  ] No  Diet: Diet, NPO after Midnight:      NPO Start Date: 06-Jan-2019,   NPO Start Time: 23:59 (01-06-19 @ 10:50)    Fluids: folic acid 1 milliGRAM(s) Oral daily    EKG:    CXR:  Xray Chest 1 View- PORTABLE-Routine:   EXAM:  XR CHEST PORTABLE ROUTINE 1V            PROCEDURE DATE:  12/21/2018            INTERPRETATION:  Clinical History / Reason for exam: Possible sepsis    Comparison : Chest radiograph 10/25/2018.    Technique/Positioning: Incomplete inspiration.    Findings:    Support devices: None.    Cardiac/mediastinum/hilum: Stable    Lung parenchyma/Pleura: No consolidation effusion or pneumothorax.   Reticular density, left lower lung field (retrocardiac area).    Skeleton/soft tissues: Stable    Impression:    Reticular density left lower lobe.     No consolidation effusion or pneumothorax                      LYNNE MARTINEZ M.D., ATTENDING RADIOLOGIST  This document has been electronically signed. Dec 21 2018  8:46AM             (12-21-18 @ 06:34)    Pre-OP Labs:  CAPILLARY BLOOD GLUCOSE      POCT Blood Glucose.: 142 mg/dL (06 Jan 2019 21:02)  POCT Blood Glucose.: 139 mg/dL (06 Jan 2019 16:54)  POCT Blood Glucose.: 145 mg/dL (06 Jan 2019 11:10)  POCT Blood Glucose.: 93 mg/dL (06 Jan 2019 07:59)                          9.6    7.57  )-----------( 324      ( 06 Jan 2019 07:10 )             29.9     01-06    137  |  98  |  39<H>  ----------------------------<  86  4.2   |  21  |  1.3    Ca    9.2      06 Jan 2019 07:10  Mg     2.0     01-05      PT/INR - ( 06 Jan 2019 18:58 )   PT: 15.50 sec;   INR: 1.35 ratio         PTT - ( 06 Jan 2019 18:58 )  PTT:39.5 sec    Type & Screen:  Type + Screen: A NEG (01-06-19 @ 18:58)    Plan:  -OR tomorrow for right lower extremity angiogram  -NPO after midnight  -IVF when NPO  -preoperative labs reviewed: active type and screen, coags, CBC, BMP

## 2019-01-06 NOTE — PROGRESS NOTE ADULT - ASSESSMENT
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   X ray shoed 3rd metatarsal Lucency, no need for MRI.   Pending angiogram on Monday.     Bilateral Chronic lower extremities Ulcers with Cellulitis:   s/p debridement by burn.     Duplex LE venous: Chronic-appearing right common femoral and femoral deep venous thromboses; left popliteal deep venous thrombosis  Duplex LE arterial: On the right: Mild to moderate disease at the popliteal artery level. On the left: Mild popliteal and posterior tibial artery disease; Remaining tibial arteries not visualized secondary to edema   Continue wound care.   Wound cx 12/28 is growing Pseudomonas sensitive to Cefepime and Zosyn.   Cefepime Added yesterday for wound cx results.   continue Doxycycline oral 100mg q 12  for 14 days. ID follow up for wound cx result.     Chronic Right Hip Pain  Right hip x-ray Diffuse osteopenia  Physical therapy.     Chronic B/L femoral and popliteal DVT-   Continue Eliquis, will hold for angiogram on Sunday.     Chronic Normocytic Anemia- Likely 2/2 RA; Hb at baseline    Rheumatoid Arthritis -  Continue home prednisone. As per Rheumatologist Dr Ascencio (Dr Henry's team)- Can decrease prednisone 7.5mg if healing hampered. No other adjustments needed.     DM II-Controlled; Monitor finger sticks, start insulin sliding scale if fingersticks are greater than 180    Latent TB- rifampin  Gout- Normal uric acid levels; c/w Allopurinol    DVT ppx: On Eliquis    Code Status: DNR/DNI, MOLST form completed with health care proxy as witness by bedside, form in chart  Disposition: pending

## 2019-01-07 ENCOUNTER — APPOINTMENT (OUTPATIENT)
Dept: VASCULAR SURGERY | Facility: HOSPITAL | Age: 79
End: 2019-01-07
Payer: MEDICARE

## 2019-01-07 LAB
ANION GAP SERPL CALC-SCNC: 19 MMOL/L — HIGH (ref 7–14)
BUN SERPL-MCNC: 41 MG/DL — HIGH (ref 10–20)
CALCIUM SERPL-MCNC: 9.2 MG/DL — SIGNIFICANT CHANGE UP (ref 8.5–10.1)
CHLORIDE SERPL-SCNC: 98 MMOL/L — SIGNIFICANT CHANGE UP (ref 98–110)
CO2 SERPL-SCNC: 22 MMOL/L — SIGNIFICANT CHANGE UP (ref 17–32)
CREAT SERPL-MCNC: 1.4 MG/DL — SIGNIFICANT CHANGE UP (ref 0.7–1.5)
GLUCOSE BLDC GLUCOMTR-MCNC: 125 MG/DL — HIGH (ref 70–99)
GLUCOSE BLDC GLUCOMTR-MCNC: 82 MG/DL — SIGNIFICANT CHANGE UP (ref 70–99)
GLUCOSE BLDC GLUCOMTR-MCNC: 92 MG/DL — SIGNIFICANT CHANGE UP (ref 70–99)
GLUCOSE SERPL-MCNC: 79 MG/DL — SIGNIFICANT CHANGE UP (ref 70–99)
HCT VFR BLD CALC: 30.9 % — LOW (ref 37–47)
HGB BLD-MCNC: 9.7 G/DL — LOW (ref 12–16)
MAGNESIUM SERPL-MCNC: 2 MG/DL — SIGNIFICANT CHANGE UP (ref 1.8–2.4)
MCHC RBC-ENTMCNC: 28.2 PG — SIGNIFICANT CHANGE UP (ref 27–31)
MCHC RBC-ENTMCNC: 31.4 G/DL — LOW (ref 32–37)
MCV RBC AUTO: 89.8 FL — SIGNIFICANT CHANGE UP (ref 81–99)
NRBC # BLD: 0 /100 WBCS — SIGNIFICANT CHANGE UP (ref 0–0)
PHOSPHATE SERPL-MCNC: 4 MG/DL — SIGNIFICANT CHANGE UP (ref 2.1–4.9)
PLATELET # BLD AUTO: 334 K/UL — SIGNIFICANT CHANGE UP (ref 130–400)
POTASSIUM SERPL-MCNC: 4.2 MMOL/L — SIGNIFICANT CHANGE UP (ref 3.5–5)
POTASSIUM SERPL-SCNC: 4.2 MMOL/L — SIGNIFICANT CHANGE UP (ref 3.5–5)
RBC # BLD: 3.44 M/UL — LOW (ref 4.2–5.4)
RBC # FLD: 16.3 % — HIGH (ref 11.5–14.5)
SODIUM SERPL-SCNC: 139 MMOL/L — SIGNIFICANT CHANGE UP (ref 135–146)
WBC # BLD: 7.17 K/UL — SIGNIFICANT CHANGE UP (ref 4.8–10.8)
WBC # FLD AUTO: 7.17 K/UL — SIGNIFICANT CHANGE UP (ref 4.8–10.8)

## 2019-01-07 PROCEDURE — 36200 PLACE CATHETER IN AORTA: CPT | Mod: 59,RT

## 2019-01-07 PROCEDURE — 36246 INS CATH ABD/L-EXT ART 2ND: CPT | Mod: RT

## 2019-01-07 PROCEDURE — 76937 US GUIDE VASCULAR ACCESS: CPT | Mod: 26

## 2019-01-07 PROCEDURE — 75710 ARTERY X-RAYS ARM/LEG: CPT | Mod: 26

## 2019-01-07 PROCEDURE — 75630 X-RAY AORTA LEG ARTERIES: CPT | Mod: 26

## 2019-01-07 RX ORDER — FOLIC ACID 0.8 MG
1 TABLET ORAL DAILY
Qty: 0 | Refills: 0 | Status: DISCONTINUED | OUTPATIENT
Start: 2019-01-07 | End: 2019-01-10

## 2019-01-07 RX ORDER — ONDANSETRON 8 MG/1
4 TABLET, FILM COATED ORAL ONCE
Qty: 0 | Refills: 0 | Status: DISCONTINUED | OUTPATIENT
Start: 2019-01-07 | End: 2019-01-07

## 2019-01-07 RX ORDER — AZTREONAM 2 G
500 VIAL (EA) INJECTION EVERY 8 HOURS
Qty: 0 | Refills: 0 | Status: DISCONTINUED | OUTPATIENT
Start: 2019-01-07 | End: 2019-01-08

## 2019-01-07 RX ORDER — SENNA PLUS 8.6 MG/1
2 TABLET ORAL AT BEDTIME
Qty: 0 | Refills: 0 | Status: DISCONTINUED | OUTPATIENT
Start: 2019-01-07 | End: 2019-01-10

## 2019-01-07 RX ORDER — CHLORHEXIDINE GLUCONATE 213 G/1000ML
1 SOLUTION TOPICAL
Qty: 0 | Refills: 0 | Status: DISCONTINUED | OUTPATIENT
Start: 2019-01-07 | End: 2019-01-10

## 2019-01-07 RX ORDER — AZTREONAM 2 G
500 VIAL (EA) INJECTION ONCE
Qty: 0 | Refills: 0 | Status: COMPLETED | OUTPATIENT
Start: 2019-01-07 | End: 2019-01-07

## 2019-01-07 RX ORDER — APIXABAN 2.5 MG/1
5 TABLET, FILM COATED ORAL EVERY 12 HOURS
Qty: 0 | Refills: 0 | Status: DISCONTINUED | OUTPATIENT
Start: 2019-01-07 | End: 2019-01-09

## 2019-01-07 RX ORDER — SODIUM CHLORIDE 9 MG/ML
1000 INJECTION, SOLUTION INTRAVENOUS
Qty: 0 | Refills: 0 | Status: DISCONTINUED | OUTPATIENT
Start: 2019-01-07 | End: 2019-01-07

## 2019-01-07 RX ORDER — GABAPENTIN 400 MG/1
300 CAPSULE ORAL EVERY 12 HOURS
Qty: 0 | Refills: 0 | Status: DISCONTINUED | OUTPATIENT
Start: 2019-01-07 | End: 2019-01-10

## 2019-01-07 RX ORDER — DOCUSATE SODIUM 100 MG
100 CAPSULE ORAL
Qty: 0 | Refills: 0 | Status: DISCONTINUED | OUTPATIENT
Start: 2019-01-07 | End: 2019-01-10

## 2019-01-07 RX ORDER — AZTREONAM 2 G
VIAL (EA) INJECTION
Qty: 0 | Refills: 0 | Status: DISCONTINUED | OUTPATIENT
Start: 2019-01-07 | End: 2019-01-08

## 2019-01-07 RX ORDER — OXYCODONE AND ACETAMINOPHEN 5; 325 MG/1; MG/1
2 TABLET ORAL EVERY 4 HOURS
Qty: 0 | Refills: 0 | Status: DISCONTINUED | OUTPATIENT
Start: 2019-01-07 | End: 2019-01-10

## 2019-01-07 RX ORDER — SODIUM CHLORIDE 9 MG/ML
1000 INJECTION INTRAMUSCULAR; INTRAVENOUS; SUBCUTANEOUS
Qty: 0 | Refills: 0 | Status: DISCONTINUED | OUTPATIENT
Start: 2019-01-07 | End: 2019-01-07

## 2019-01-07 RX ORDER — ALLOPURINOL 300 MG
300 TABLET ORAL DAILY
Qty: 0 | Refills: 0 | Status: DISCONTINUED | OUTPATIENT
Start: 2019-01-07 | End: 2019-01-10

## 2019-01-07 RX ORDER — AZTREONAM 2 G
500 VIAL (EA) INJECTION EVERY 8 HOURS
Qty: 0 | Refills: 0 | Status: DISCONTINUED | OUTPATIENT
Start: 2019-01-07 | End: 2019-01-07

## 2019-01-07 RX ORDER — HYDROMORPHONE HYDROCHLORIDE 2 MG/ML
0.5 INJECTION INTRAMUSCULAR; INTRAVENOUS; SUBCUTANEOUS
Qty: 0 | Refills: 0 | Status: DISCONTINUED | OUTPATIENT
Start: 2019-01-07 | End: 2019-01-07

## 2019-01-07 RX ORDER — ACETAMINOPHEN 500 MG
650 TABLET ORAL EVERY 4 HOURS
Qty: 0 | Refills: 0 | Status: DISCONTINUED | OUTPATIENT
Start: 2019-01-07 | End: 2019-01-08

## 2019-01-07 RX ORDER — AZTREONAM 2 G
VIAL (EA) INJECTION
Qty: 0 | Refills: 0 | Status: DISCONTINUED | OUTPATIENT
Start: 2019-01-07 | End: 2019-01-07

## 2019-01-07 RX ORDER — LEUCOVORIN CALCIUM 5 MG
5 TABLET ORAL DAILY
Qty: 0 | Refills: 0 | Status: DISCONTINUED | OUTPATIENT
Start: 2019-01-07 | End: 2019-01-10

## 2019-01-07 RX ORDER — MORPHINE SULFATE 50 MG/1
2 CAPSULE, EXTENDED RELEASE ORAL
Qty: 0 | Refills: 0 | Status: DISCONTINUED | OUTPATIENT
Start: 2019-01-07 | End: 2019-01-07

## 2019-01-07 RX ADMIN — SODIUM CHLORIDE 100 MILLILITER(S): 9 INJECTION INTRAMUSCULAR; INTRAVENOUS; SUBCUTANEOUS at 07:09

## 2019-01-07 RX ADMIN — Medication 50 MILLIGRAM(S): at 22:23

## 2019-01-07 RX ADMIN — Medication 300 MILLIGRAM(S): at 17:09

## 2019-01-07 RX ADMIN — Medication 50 MILLIGRAM(S): at 11:04

## 2019-01-07 RX ADMIN — Medication 100 MILLIGRAM(S): at 17:09

## 2019-01-07 RX ADMIN — Medication 1 MILLIGRAM(S): at 17:09

## 2019-01-07 RX ADMIN — Medication 5 MILLIGRAM(S): at 17:09

## 2019-01-07 RX ADMIN — MORPHINE SULFATE 2 MILLIGRAM(S): 50 CAPSULE, EXTENDED RELEASE ORAL at 14:40

## 2019-01-07 RX ADMIN — MORPHINE SULFATE 2 MILLIGRAM(S): 50 CAPSULE, EXTENDED RELEASE ORAL at 15:32

## 2019-01-07 RX ADMIN — OXYCODONE AND ACETAMINOPHEN 2 TABLET(S): 5; 325 TABLET ORAL at 21:05

## 2019-01-07 RX ADMIN — CHLORHEXIDINE GLUCONATE 1 APPLICATION(S): 213 SOLUTION TOPICAL at 05:18

## 2019-01-07 RX ADMIN — APIXABAN 5 MILLIGRAM(S): 2.5 TABLET, FILM COATED ORAL at 17:09

## 2019-01-07 RX ADMIN — GABAPENTIN 300 MILLIGRAM(S): 400 CAPSULE ORAL at 17:08

## 2019-01-07 RX ADMIN — OXYCODONE AND ACETAMINOPHEN 2 TABLET(S): 5; 325 TABLET ORAL at 05:18

## 2019-01-07 RX ADMIN — Medication 100 MILLIGRAM(S): at 17:08

## 2019-01-07 NOTE — PROGRESS NOTE ADULT - ASSESSMENT
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    #Bilateral Chronic Nonhealing Ulcers and concurrent LE cellulitis   -per podiatry betadine to lesions,s/p debridement by burn. Today angiogram , eliquis held today in morning per vascular.  -Temp on 102.2F 12/21 ; Afebrile since  -New lucency on xray at third metatarsal head. MRI not required for now per podiatry  -doxycycline oral 100mg q 12 for 14 days , growing Pseudomonas (carbapenum resistant) , will require cefepime but pt refusing cefepime.	  -Podiatry: Betadine paint to digit 3R BID; Xeroform to ulcers with loose dressing; Limb elevation.    -Duplex LE venous: Chronic-appearing right common femoral and femoral deep venous thromboses; left popliteal deep venous thrombosis  -Duplex LE arterial: On the right: Mild to moderate disease at the popliteal artery level. On the left: Mild popliteal and posterior tibial artery disease; Remaining tibial arteries not visualized secondary to edema   -Uric acid levels normal. .  -serial xrays    #) Direct antiglobin positive   - Blood was sent for type and cross match, hb holding.   -Might need hem /onc consult     #Chronic Right Hip Pain  -RIght hip xray: Diffuse osteopenia  -C/w Physical therapy. Will check Vit D levels and replete if deficient    #Chronic B/L femoral and popliteal DVT- eliquis , held this morning  #Chronic Normocytic Anemia- Likely 2/2 RA; Hb at baseline  #Rheumatoid Arthritis -Continue home prednisone. talked to Dr Ascencio (Dr Henry's team)- Can decrease prednisone 7.5mg if healing hampered. No other adjustments needed.   #DM II-Controlled; Monitor finger sticks, start insulin sliding scale if fingersticks are greater than 180  #Latent TB- rifampin  #Gout- Normal uric acid levels; c/w Allopurinol    DVT ppx: On Eliquis  GI ppx: Not indicated  Diet: DASH Carb consistent   Activity: AAT  Code Status: DNR/DNI, MOLST form completed with health care proxy as witness by bedside, form in chart  Disposition: pending

## 2019-01-07 NOTE — CHART NOTE - NSCHARTNOTEFT_GEN_A_CORE
PACU ANESTHESIA ADMISSION NOTE      Procedure: Angiogram, extremity  Debridement and irrigation of multiple wounds of extremity: sharp excisional debridement of thickness ulcers both legs to but not including fascia  right 6 x 4 cm ; left 5 x 4 cm    Post op diagnosis:  Toe gangrene  Ulcers of both lower extremities      ____  Intubated  TV:______       Rate: ______      FiO2: ______    _x___  Patent Airway    _x___  Full return of protective reflexes    _x___  Full recovery from anesthesia / back to baseline status    Vitals  SPO2:-98% on 2 l nc  HR:-80  RR:-14  B.P:-142/69  TEMP:-98.5    Mental Status:  _x___ Awake   ___x_ Alert   _____ Drowsy   _____ Sedated    Nausea/Vomiting:  _x___  NO       ______Yes,   See Post - Op Orders         Pain Scale (0-10):  __0___    Treatment: _x___ None    __x__ See Post - Op/PCA Orders    Post - Operative Fluids:   ___ Oral   ____x See Post - Op Orders    Plan: Discharge:   ____Home       ___x__Floor     _____Critical Care    _____  Other:_________________    Comments:  Report endorsed to RN in pacu  Vitals stable  No anesthesia issues or complications noted.  Discharge to patient to floor  when criteria met.

## 2019-01-07 NOTE — PROGRESS NOTE ADULT - SUBJECTIVE AND OBJECTIVE BOX
SUBJECTIVE:    Patient is a 78y old Female who presents with a chief complaint of "I couldn't stand up from the recliner", and worsening pain of bilateral lower extremities (06 Jan 2019 11:27)    Currently admitted to medicine with the primary diagnosis of Wounds, multiple open, lower extremity     Today is hospital day 19d. Pt needs cefepime for carbapenum resistant pseudomonas. Pt refusing cefepime despite being educated by me and RN multiple times, according to her she "BLEW UP" with penicillin.    PAST MEDICAL & SURGICAL HISTORY  Gout  DVT (deep venous thrombosis)  HTN (hypertension)  Rheumatoid arteritis  Other specified diabetes mellitus with other specified complication, unspecified whether long term insulin use  Primary osteoarthritis of right knee: s/p knee repalcement    SOCIAL HISTORY:  Negative for smoking/alcohol/drug use.     ALLERGIES:  clindamycin (Unknown)  erythromycin (Unknown)  penicillin (Unknown)  strawberry (Unknown)    MEDICATIONS:  STANDING MEDICATIONS  allopurinol 300 milliGRAM(s) Oral daily  cefepime   IVPB 1000 milliGRAM(s) IV Intermittent every 12 hours  chlorhexidine 4% Liquid 1 Application(s) Topical <User Schedule>  docusate sodium 100 milliGRAM(s) Oral two times a day  doxycycline hyclate Capsule 100 milliGRAM(s) Oral every 12 hours  folic acid 1 milliGRAM(s) Oral daily  gabapentin 300 milliGRAM(s) Oral every 12 hours  leucovorin 5 milliGRAM(s) Oral daily  predniSONE   Tablet 7.5 milliGRAM(s) Oral daily  rifampin 300 milliGRAM(s) Oral two times a day  senna 2 Tablet(s) Oral at bedtime  sodium chloride 0.9%. 1000 milliLiter(s) IV Continuous <Continuous>    PRN MEDICATIONS  acetaminophen   Tablet .. 650 milliGRAM(s) Oral every 6 hours PRN  oxyCODONE    5 mG/acetaminophen 325 mG 2 Tablet(s) Oral every 4 hours PRN    VITALS:   T(F): 95.4  HR: 76  BP: 135/54  RR: 18  SpO2: 98%    PHYSICAL EXAM:  GEN: No acute distress  LUNGS: Clear to auscultation bilaterally   HEART: S1/S2 present.    ABD: Soft, non-tender, non-distended.    EXT: B/l lower extremity changes  NEURO: AAOX3      LABS:                        9.6    7.57  )-----------( 324      ( 06 Jan 2019 07:10 )             29.9     01-06    137  |  98  |  39<H>  ----------------------------<  86  4.2   |  21  |  1.3    Ca    9.2      06 Jan 2019 07:10      PT/INR - ( 06 Jan 2019 18:58 )   PT: 15.50 sec;   INR: 1.35 ratio         PTT - ( 06 Jan 2019 18:58 )  PTT:39.5 sec                  RADIOLOGY:

## 2019-01-07 NOTE — PROGRESS NOTE ADULT - ATTENDING COMMENTS
Pt was seen and examined independently, she feels OK, underwent CTA by vascular Surgery today, refusing Abx for DFU ( few options were offered to the pt based on Cx and sensitivity. Pt has complicated PMH with small area of dry gangrene in the right 3 rd toe, has high risk for worsening infection and OM in case of noncompliance.   I agree with resident's findings, assessment and plan above.     Vital Signs Last 24 Hrs  T(C): 36.9 (07 Jan 2019 16:30), Max: 37 (07 Jan 2019 15:20)  T(F): 98.4 (07 Jan 2019 16:30), Max: 98.6 (07 Jan 2019 15:20)  HR: 87 (07 Jan 2019 16:30) (74 - 87)  BP: 149/57 (07 Jan 2019 16:30) (126/62 - 159/77)  BP(mean): --  RR: 17 (07 Jan 2019 16:30) (13 - 22)  SpO2: 98% (07 Jan 2019 16:30) (98% - 100%)    PHYSICAL EXAM:  GEN: No acute distress  LUNGS: Clear to auscultation bilaterally   HEART: S1/S2 present.    ABD: Soft, non-tender, non-distended.    EXT: B/l lower extremity changes  NEURO: AAOX3, no focal neuro deficit     LABS:                        9.7    7.17  )-----------( 334      ( 07 Jan 2019 07:47 )             30.9   01-07    139  |  98  |  41<H>  ----------------------------<  79  4.2   |  22  |  1.4    Ca    9.2      07 Jan 2019 07:47  Phos  4.0     01-07  Mg     2.0     01-07    MEDICATIONS  (STANDING):  allopurinol 300 milliGRAM(s) Oral daily  apixaban 5 milliGRAM(s) Oral every 12 hours  aztreonam  IVPB      aztreonam  IVPB 500 milliGRAM(s) IV Intermittent every 8 hours  chlorhexidine 4% Liquid 1 Application(s) Topical <User Schedule>  docusate sodium 100 milliGRAM(s) Oral two times a day  doxycycline hyclate Capsule 100 milliGRAM(s) Oral every 12 hours  folic acid 1 milliGRAM(s) Oral daily  gabapentin 300 milliGRAM(s) Oral every 12 hours  lactated ringers. 1000 milliLiter(s) (100 mL/Hr) IV Continuous <Continuous>  leucovorin 5 milliGRAM(s) Oral daily  predniSONE   Tablet 7.5 milliGRAM(s) Oral daily  rifampin 300 milliGRAM(s) Oral two times a day  senna 2 Tablet(s) Oral at bedtime    MEDICATIONS  (PRN):  acetaminophen   Tablet .. 650 milliGRAM(s) Oral every 4 hours PRN Temp greater or equal to 38.5C (101.3F), Mild Pain (1 - 3)  HYDROmorphone  Injectable 0.5 milliGRAM(s) IV Push every 10 minutes PRN Severe Pain (7 - 10)  morphine  - Injectable 2 milliGRAM(s) IV Push every 10 minutes PRN Moderate Pain (4 - 6)  ondansetron Injectable 4 milliGRAM(s) IV Push once PRN Nausea and/or Vomiting  oxyCODONE    5 mG/acetaminophen 325 mG 2 Tablet(s) Oral every 4 hours PRN Severe Pain (7 - 10)    A/P  # DFU with area of dry necrosis of 3rd toe and PVD  - pt has patent circulation ( as per vascular Sx )  - c/w AC and statin   - Pt is refusing IV ABx ( few choices offered to the pt today), ID follow up   - podiatry f/u for local diabetic foot care and possible debridment    # h/o Chronic DVT  - resume anticoagulation     # Diet controlled DM   - monitor f/s  - c/w carb consistent diet     # RA  - on Prednisone maintenance     # Latent TB  - c/w Rifaximin     # GI prophylaxis  # PT/Rehab   # Dispo: discharge planning after ID and podiatry evaluation.

## 2019-01-08 DIAGNOSIS — M10.9 GOUT, UNSPECIFIED: ICD-10-CM

## 2019-01-08 DIAGNOSIS — E13.69 OTHER SPECIFIED DIABETES MELLITUS WITH OTHER SPECIFIED COMPLICATION: ICD-10-CM

## 2019-01-08 DIAGNOSIS — S81.809A UNSPECIFIED OPEN WOUND, UNSPECIFIED LOWER LEG, INITIAL ENCOUNTER: ICD-10-CM

## 2019-01-08 DIAGNOSIS — I00 RHEUMATIC FEVER WITHOUT HEART INVOLVEMENT: ICD-10-CM

## 2019-01-08 DIAGNOSIS — A15.9 RESPIRATORY TUBERCULOSIS UNSPECIFIED: ICD-10-CM

## 2019-01-08 DIAGNOSIS — I82.409 ACUTE EMBOLISM AND THROMBOSIS OF UNSPECIFIED DEEP VEINS OF UNSPECIFIED LOWER EXTREMITY: ICD-10-CM

## 2019-01-08 LAB
ANION GAP SERPL CALC-SCNC: 20 MMOL/L — HIGH (ref 7–14)
BUN SERPL-MCNC: 40 MG/DL — HIGH (ref 10–20)
CALCIUM SERPL-MCNC: 9 MG/DL — SIGNIFICANT CHANGE UP (ref 8.5–10.1)
CHLORIDE SERPL-SCNC: 99 MMOL/L — SIGNIFICANT CHANGE UP (ref 98–110)
CO2 SERPL-SCNC: 20 MMOL/L — SIGNIFICANT CHANGE UP (ref 17–32)
CREAT SERPL-MCNC: 1.4 MG/DL — SIGNIFICANT CHANGE UP (ref 0.7–1.5)
GLUCOSE BLDC GLUCOMTR-MCNC: 152 MG/DL — HIGH (ref 70–99)
GLUCOSE BLDC GLUCOMTR-MCNC: 183 MG/DL — HIGH (ref 70–99)
GLUCOSE BLDC GLUCOMTR-MCNC: 208 MG/DL — HIGH (ref 70–99)
GLUCOSE BLDC GLUCOMTR-MCNC: 257 MG/DL — HIGH (ref 70–99)
GLUCOSE SERPL-MCNC: 173 MG/DL — HIGH (ref 70–99)
HCT VFR BLD CALC: 34.6 % — LOW (ref 37–47)
HGB BLD-MCNC: 10.8 G/DL — LOW (ref 12–16)
MAGNESIUM SERPL-MCNC: 2 MG/DL — SIGNIFICANT CHANGE UP (ref 1.8–2.4)
MCHC RBC-ENTMCNC: 28 PG — SIGNIFICANT CHANGE UP (ref 27–31)
MCHC RBC-ENTMCNC: 31.2 G/DL — LOW (ref 32–37)
MCV RBC AUTO: 89.6 FL — SIGNIFICANT CHANGE UP (ref 81–99)
NRBC # BLD: 0 /100 WBCS — SIGNIFICANT CHANGE UP (ref 0–0)
PLATELET # BLD AUTO: 354 K/UL — SIGNIFICANT CHANGE UP (ref 130–400)
POTASSIUM SERPL-MCNC: 4.7 MMOL/L — SIGNIFICANT CHANGE UP (ref 3.5–5)
POTASSIUM SERPL-SCNC: 4.7 MMOL/L — SIGNIFICANT CHANGE UP (ref 3.5–5)
RBC # BLD: 3.86 M/UL — LOW (ref 4.2–5.4)
RBC # FLD: 16.7 % — HIGH (ref 11.5–14.5)
SODIUM SERPL-SCNC: 139 MMOL/L — SIGNIFICANT CHANGE UP (ref 135–146)
WBC # BLD: 11.62 K/UL — HIGH (ref 4.8–10.8)
WBC # FLD AUTO: 11.62 K/UL — HIGH (ref 4.8–10.8)

## 2019-01-08 RX ORDER — ACETAMINOPHEN 500 MG
650 TABLET ORAL EVERY 6 HOURS
Qty: 0 | Refills: 0 | Status: DISCONTINUED | OUTPATIENT
Start: 2019-01-08 | End: 2019-01-10

## 2019-01-08 RX ADMIN — OXYCODONE AND ACETAMINOPHEN 2 TABLET(S): 5; 325 TABLET ORAL at 22:23

## 2019-01-08 RX ADMIN — OXYCODONE AND ACETAMINOPHEN 2 TABLET(S): 5; 325 TABLET ORAL at 14:18

## 2019-01-08 RX ADMIN — Medication 300 MILLIGRAM(S): at 12:46

## 2019-01-08 RX ADMIN — OXYCODONE AND ACETAMINOPHEN 2 TABLET(S): 5; 325 TABLET ORAL at 14:48

## 2019-01-08 RX ADMIN — Medication 100 MILLIGRAM(S): at 17:22

## 2019-01-08 RX ADMIN — GABAPENTIN 300 MILLIGRAM(S): 400 CAPSULE ORAL at 17:22

## 2019-01-08 RX ADMIN — OXYCODONE AND ACETAMINOPHEN 2 TABLET(S): 5; 325 TABLET ORAL at 09:45

## 2019-01-08 RX ADMIN — Medication 7.5 MILLIGRAM(S): at 08:17

## 2019-01-08 RX ADMIN — APIXABAN 5 MILLIGRAM(S): 2.5 TABLET, FILM COATED ORAL at 08:17

## 2019-01-08 RX ADMIN — Medication 5 MILLIGRAM(S): at 12:47

## 2019-01-08 RX ADMIN — GABAPENTIN 300 MILLIGRAM(S): 400 CAPSULE ORAL at 08:17

## 2019-01-08 RX ADMIN — Medication 50 MILLIGRAM(S): at 06:09

## 2019-01-08 RX ADMIN — OXYCODONE AND ACETAMINOPHEN 2 TABLET(S): 5; 325 TABLET ORAL at 05:50

## 2019-01-08 RX ADMIN — OXYCODONE AND ACETAMINOPHEN 2 TABLET(S): 5; 325 TABLET ORAL at 22:03

## 2019-01-08 RX ADMIN — APIXABAN 5 MILLIGRAM(S): 2.5 TABLET, FILM COATED ORAL at 17:22

## 2019-01-08 RX ADMIN — OXYCODONE AND ACETAMINOPHEN 2 TABLET(S): 5; 325 TABLET ORAL at 05:20

## 2019-01-08 RX ADMIN — Medication 1 MILLIGRAM(S): at 12:46

## 2019-01-08 RX ADMIN — OXYCODONE AND ACETAMINOPHEN 2 TABLET(S): 5; 325 TABLET ORAL at 10:15

## 2019-01-08 NOTE — PROGRESS NOTE ADULT - SUBJECTIVE AND OBJECTIVE BOX
Pt seen at bedside c/o R lower extremity discomfort.   AF She is s/p angiogram R LE. AT and peroneals  open. PT occluded at knee. + small vessel disease.  Digit 3 R necrotic dorsally over PIPJ with no sign of soft tissue infection.  There is + pain on palpation of the 3rd met head.  Prior xray confirms erosion at lateral aspect of met 3 head. There is no erythema or edema.  WBC  10.08   HH 8.8/ 27.8 Bun/Cre  27/ 1.0  Aztreonam  Rexray right foot in am in anticipation of OR debridement with possible resection 3rd met head  All of above discussed with patient. Explained long term course of antibiotics vs surgical management and reisks and benefits of both, Gretaest surgicasl risk is non healing with further possible amp required including a TMA.   Will again discuss with pt prior to debridement

## 2019-01-08 NOTE — PROGRESS NOTE ADULT - PROBLEM SELECTOR PLAN 1
- s/p CTA w/o intervention ( pt has patent large vessels with small vessel Dz)  -c/w IV Abx   - debridement by podiatry scheduled for Thursday   - c/w local wound care  - c/w anticoagulation

## 2019-01-08 NOTE — PROGRESS NOTE ADULT - SUBJECTIVE AND OBJECTIVE BOX
SUBJECTIVE:    Patient is a 78y old Female who presents with a chief complaint of "I couldn't stand up from the recliner", and worsening pain of bilateral lower extremities (08 Jan 2019 06:28)    Currently admitted to medicine with the primary diagnosis of Wounds, multiple open, lower extremity     Today is hospital day 20d. Still complaining of pain in the b/l lower extremeties  PAST MEDICAL & SURGICAL HISTORY  Gout  DVT (deep venous thrombosis)  HTN (hypertension)  Rheumatoid arteritis  Other specified diabetes mellitus with other specified complication, unspecified whether long term insulin use  Primary osteoarthritis of right knee: s/p knee repalcement    SOCIAL HISTORY:  Negative for smoking/alcohol/drug use.     ALLERGIES:  clindamycin (Unknown)  erythromycin (Unknown)  penicillin (Unknown)  strawberry (Unknown)    MEDICATIONS:  STANDING MEDICATIONS  allopurinol 300 milliGRAM(s) Oral daily  apixaban 5 milliGRAM(s) Oral every 12 hours  aztreonam  IVPB      aztreonam  IVPB 500 milliGRAM(s) IV Intermittent every 8 hours  chlorhexidine 4% Liquid 1 Application(s) Topical <User Schedule>  docusate sodium 100 milliGRAM(s) Oral two times a day  doxycycline hyclate Capsule 100 milliGRAM(s) Oral every 12 hours  folic acid 1 milliGRAM(s) Oral daily  gabapentin 300 milliGRAM(s) Oral every 12 hours  leucovorin 5 milliGRAM(s) Oral daily  predniSONE   Tablet 7.5 milliGRAM(s) Oral daily  rifampin 300 milliGRAM(s) Oral two times a day  senna 2 Tablet(s) Oral at bedtime    PRN MEDICATIONS  acetaminophen   Tablet .. 650 milliGRAM(s) Oral every 4 hours PRN  oxyCODONE    5 mG/acetaminophen 325 mG 2 Tablet(s) Oral every 4 hours PRN    VITALS:   T(F): 98.2  HR: 98  BP: 120/62  RR: 18  SpO2: 98%    PHYSICAL EXAM:   GEN: No acute distress  LUNGS: Clear to auscultation bilaterally   HEART: S1/S2 present.    ABD: Soft, non-tender, non-distended.    EXT: B/l lower extremity changes , R third toe superficially looks gangrenous  NEURO: AAOX3        LABS:                        9.7    7.17  )-----------( 334      ( 07 Jan 2019 07:47 )             30.9     01-07    139  |  98  |  41<H>  ----------------------------<  79  4.2   |  22  |  1.4    Ca    9.2      07 Jan 2019 07:47  Phos  4.0     01-07  Mg     2.0     01-07      PT/INR - ( 06 Jan 2019 18:58 )   PT: 15.50 sec;   INR: 1.35 ratio         PTT - ( 06 Jan 2019 18:58 )  PTT:39.5 sec                  RADIOLOGY:

## 2019-01-08 NOTE — PROGRESS NOTE ADULT - SUBJECTIVE AND OBJECTIVE BOX
SUBJECTIVE:    Patient is a 78y old Female who presents with a chief complaint of "I couldn't stand up from the recliner", and worsening pain of bilateral lower extremities, pt was admitted for multiple LE ulcers.  Pt had a CTA on 1/7/19 w/o any intervention. She has an area or dry necrosis on her right 3rd toe, consulted by podiatry and was scheduled for debridement on 1/10/19.  Today she feels OK, denies any specific complaints.     PAST MEDICAL & SURGICAL HISTORY  Gout  DVT (deep venous thrombosis)  HTN (hypertension)  Rheumatoid arteritis  Other specified diabetes mellitus with other specified complication, unspecified whether long term insulin use  Primary osteoarthritis of right knee: s/p knee repalcement    SOCIAL HISTORY:  Negative for smoking/alcohol/drug use.     ALLERGIES:  clindamycin (Unknown)  erythromycin (Unknown)  penicillin (Unknown)  strawberry (Unknown)    VITALS:   T(F): 98.2  HR: 98  BP: 120/62  RR: 18  SpO2: 98%    PHYSICAL EXAM:   GEN: No acute distress  LUNGS: Clear to auscultation bilaterally   HEART: S1/S2 present.    ABD: Soft, non-tender, non-distended.    EXT: B/l lower extremity changes , R third toe superficially looks gangrenous  NEURO: AAOX3        LABS:                                    10.8   11.62 )-----------( 354      ( 08 Jan 2019 09:05 )             34.6   01-08    139  |  99  |  40<H>  ----------------------------<  173<H>  4.7   |  20  |  1.4    Ca    9.0      08 Jan 2019 09:05  Phos  4.0     01-07  Mg     2.0     01-08    RADIOLOGY:    < from: Xray Foot AP + Lateral + Oblique, Right (01.08.19 @ 09:38) >  Impression:    Findings highly suspicious for progression of septic   arthritis/osteomyelitis of the third metatarsophalangeal joint, and new   osteomyelitis of the second metatarsal head. Recommend MRI of the right   forefoot for further evaluation.    MEDICATIONS  (STANDING):  allopurinol 300 milliGRAM(s) Oral daily  apixaban 5 milliGRAM(s) Oral every 12 hours  chlorhexidine 4% Liquid 1 Application(s) Topical <User Schedule>  docusate sodium 100 milliGRAM(s) Oral two times a day  doxycycline hyclate Capsule 100 milliGRAM(s) Oral every 12 hours  folic acid 1 milliGRAM(s) Oral daily  gabapentin 300 milliGRAM(s) Oral every 12 hours  leucovorin 5 milliGRAM(s) Oral daily  predniSONE   Tablet 7.5 milliGRAM(s) Oral daily  rifampin 300 milliGRAM(s) Oral two times a day  senna 2 Tablet(s) Oral at bedtime    MEDICATIONS  (PRN):  acetaminophen   Tablet .. 650 milliGRAM(s) Oral every 6 hours PRN Moderate Pain (4 - 6)  oxyCODONE    5 mG/acetaminophen 325 mG 2 Tablet(s) Oral every 4 hours PRN Severe Pain (7 - 10)

## 2019-01-08 NOTE — PROGRESS NOTE ADULT - SUBJECTIVE AND OBJECTIVE BOX
Vascular Surgery Post Operative Note  Procedure: Status post right le angiogram  Post Operative day #1    Patient resting comfortably no complaints        pmh  Gout  DVT (deep venous thrombosis)  HTN (hypertension)  Rheumatoid arteritis  Other specified diabetes mellitus with other specified complication, unspecified whether long term insulin use  Toe gangrene  Ulcers of both lower extremities  Toe gangrene  Ulcers of both lower extremities  Wounds, multiple open, lower extremity  Thrombophlebitis of the femoral vein  Angiogram, extremity  Debridement and irrigation of multiple wounds of extremity  Primary osteoarthritis of right knee  Gait abnormality      clindamycin (Unknown)  erythromycin (Unknown)  penicillin (Unknown)  strawberry (Unknown)    acetaminophen   Tablet .. 650 milliGRAM(s) Oral every 4 hours PRN  allopurinol 300 milliGRAM(s) Oral daily  apixaban 5 milliGRAM(s) Oral every 12 hours  aztreonam  IVPB      aztreonam  IVPB 500 milliGRAM(s) IV Intermittent every 8 hours  chlorhexidine 4% Liquid 1 Application(s) Topical <User Schedule>  docusate sodium 100 milliGRAM(s) Oral two times a day  doxycycline hyclate Capsule 100 milliGRAM(s) Oral every 12 hours  folic acid 1 milliGRAM(s) Oral daily  gabapentin 300 milliGRAM(s) Oral every 12 hours  leucovorin 5 milliGRAM(s) Oral daily  oxyCODONE    5 mG/acetaminophen 325 mG 2 Tablet(s) Oral every 4 hours PRN  predniSONE   Tablet 7.5 milliGRAM(s) Oral daily  rifampin 300 milliGRAM(s) Oral two times a day  senna 2 Tablet(s) Oral at bedtime          T(C): 36.4 (19 @ 21:27), Max: 37 (19 @ 15:20)  HR: 97 (19 @ 21:27) (74 - 97)  BP: 150/65 (19 @ 21:27) (126/62 - 159/77)  RR: 18 (19 @ 21:27) (13 - 22)  SpO2: 98% (19 @ 16:30) (98% - 100%)    19 @ 07:01  -  19 @ 00:21  --------------------------------------------------------  IN: 0 mL / OUT: 200 mL / NET: -200 mL        General:Alert and oriented times 3, not in acute distress   Heart: Regular rate and rhythm, no rubs , murmurs or gallops  Lungs: Clear to auscultation bilaterally, no wheezes, rales, rhonci appreciated  Abdomen: Soft , positive bowel sounds, no tenderness, no distention, no peritoneal signs   Exremites: left Groin- incision clean dry and intact,  warm extremities,  good color, no swelling, motor and sensation , pulses                9.7    7.17  )-----------( 334      (  @ 07:47 )             30.9                9.6    7.57  )-----------( 324      (  @ 07:10 )             29.9                    139   |  98    |  41                 Ca: 9.2    BMP:   ----------------------------< 79     M.0   (19 @ 07:47)             4.2    |  22    | 1.4                Ph: 4.0              PT/INR - ( 2019 18:58 )   PT: 15.50 sec;   INR: 1.35 ratio         PTT - ( 2019 18:58 )  PTT:39.5 sec                               Plan and assessment  Pt. 78yFemale status post rle angiogram  -Pain management  - Diet  -continue medical managent  -pulse checks  -Vascular to follow call as needed     JUAN Sullivan   19 @ 00:21  # [id number]/ [id number] Vascular Surgery Post Operative Note  Procedure: Status post right le angiogram  Post Operative day #1    Patient resting comfortably no complaints        pmh  Gout  DVT (deep venous thrombosis)  HTN (hypertension)  Rheumatoid arteritis  Other specified diabetes mellitus with other specified complication, unspecified whether long term insulin use  Toe gangrene  Ulcers of both lower extremities  Toe gangrene  Ulcers of both lower extremities  Wounds, multiple open, lower extremity  Thrombophlebitis of the femoral vein  Angiogram, extremity  Debridement and irrigation of multiple wounds of extremity  Primary osteoarthritis of right knee  Gait abnormality      clindamycin (Unknown)  erythromycin (Unknown)  penicillin (Unknown)  strawberry (Unknown)    acetaminophen   Tablet .. 650 milliGRAM(s) Oral every 4 hours PRN  allopurinol 300 milliGRAM(s) Oral daily  apixaban 5 milliGRAM(s) Oral every 12 hours  aztreonam  IVPB      aztreonam  IVPB 500 milliGRAM(s) IV Intermittent every 8 hours  chlorhexidine 4% Liquid 1 Application(s) Topical <User Schedule>  docusate sodium 100 milliGRAM(s) Oral two times a day  doxycycline hyclate Capsule 100 milliGRAM(s) Oral every 12 hours  folic acid 1 milliGRAM(s) Oral daily  gabapentin 300 milliGRAM(s) Oral every 12 hours  leucovorin 5 milliGRAM(s) Oral daily  oxyCODONE    5 mG/acetaminophen 325 mG 2 Tablet(s) Oral every 4 hours PRN  predniSONE   Tablet 7.5 milliGRAM(s) Oral daily  rifampin 300 milliGRAM(s) Oral two times a day  senna 2 Tablet(s) Oral at bedtime          T(C): 36.4 (19 @ 21:27), Max: 37 (19 @ 15:20)  HR: 97 (19 @ 21:27) (74 - 97)  BP: 150/65 (19 @ 21:27) (126/62 - 159/77)  RR: 18 (19 @ 21:27) (13 - 22)  SpO2: 98% (19 @ 16:30) (98% - 100%)    19 @ 07:01  -  19 @ 00:21  --------------------------------------------------------  IN: 0 mL / OUT: 200 mL / NET: -200 mL        General:Alert and oriented times 3, not in acute distress   Heart: Regular rate and rhythm, no rubs , murmurs or gallops  Lungs: Clear to auscultation bilaterally, no wheezes, rales, rhonci appreciated  Abdomen: Soft , positive bowel sounds, no tenderness, no distention, no peritoneal signs   Exremites: left Groin- incision clean dry and intact,  warm extremities,  good color, no swelling, motor and sensation , pulses                9.7    7.17  )-----------( 334      (  @ 07:47 )             30.9                9.6    7.57  )-----------( 324      (  @ 07:10 )             29.9                    139   |  98    |  41                 Ca: 9.2    BMP:   ----------------------------< 79     M.0   (19 @ 07:47)             4.2    |  22    | 1.4                Ph: 4.0              PT/INR - ( 2019 18:58 )   PT: 15.50 sec;   INR: 1.35 ratio         PTT - ( 2019 18:58 )  PTT:39.5 sec                               Plan and assessment  Pt. 78yFemale status post rle angiogram  -Pain management  - Diet  -continue medical managent  -pulse checks  -Cleared from vascular stand point  - Follow up with Dr. Martínez in 2 weeks after discharge    JUAN Sullivan   19 @ 00:21  # [id number]/ [id number]

## 2019-01-08 NOTE — PROGRESS NOTE ADULT - ASSESSMENT
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    #Bilateral Chronic Nonhealing Ulcers and concurrent LE cellulitis   -per podiatry betadine to lesions,s/p debridement by burn , s/p angiogram. Large vessels patent , small vessel disease. Podiatry discussed option of debridement with pt along with bone biopsy to rule in or out osteomyelitis  -Temp on 102.2F 12/21 ; Afebrile since  -New lucency on xray at third metatarsal head. MRI not required for now per podiatry  -doxycycline oral 100mg q 12 for 14 days , growing Pseudomonas (carbapenum resistant) , will require cefepime but pt refusing cefepime. Changed to Aztreonam	  -Podiatry: Betadine paint to digit 3R BID; Xeroform to ulcers with loose dressing; Limb elevation.    -Duplex LE venous: Chronic-appearing right common femoral and femoral deep venous thromboses; left popliteal deep venous thrombosis  -Duplex LE arterial: On the right: Mild to moderate disease at the popliteal artery level. On the left: Mild popliteal and posterior tibial artery disease; Remaining tibial arteries not visualized secondary to edema   -Uric acid levels normal. .  -serial xrays    #) Direct antiglobin positive   - Blood was sent for type and cross match, hb holding.   -Might need hem /onc consult     #Chronic Right Hip Pain  -RIght hip xray: Diffuse osteopenia  -C/w Physical therapy. Will check Vit D levels and replete if deficient    #Chronic B/L femoral and popliteal DVT- eliquis   #Chronic Normocytic Anemia- Likely 2/2 RA; Hb at baseline  #Rheumatoid Arthritis -Continue home prednisone. talked to Dr Ascencio (Dr Henry's team)- Can decrease prednisone 7.5mg if healing hampered. No other adjustments needed.   #DM II-Controlled; Monitor finger sticks, start insulin sliding scale if fingersticks are greater than 180  #Latent TB- rifampin  #Gout- Normal uric acid levels; c/w Allopurinol    DVT ppx: On Eliquis  GI ppx: Not indicated  Diet: DASH Carb consistent   Activity: AAT  Code Status: DNR/DNI, MOLST form completed with health care proxy as witness by bedside, form in chart  Disposition: pending

## 2019-01-08 NOTE — PROGRESS NOTE ADULT - ASSESSMENT
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 While in the hospital pt was consulted by vascular Sx , underwent CTA  w/o any intervention, she has small vessel Dz .  Podiatry recommended debridement with bone biopsy on Thursday. Otherwise pt is stable and denies any complaint, she was fully functional before this admission and has no family support, considering SNF placement.

## 2019-01-09 LAB
ANION GAP SERPL CALC-SCNC: 20 MMOL/L — HIGH (ref 7–14)
APTT BLD: 36.4 SEC — SIGNIFICANT CHANGE UP (ref 27–39.2)
BLD GP AB SCN SERPL QL: ABNORMAL
BUN SERPL-MCNC: 48 MG/DL — HIGH (ref 10–20)
CALCIUM SERPL-MCNC: 9.3 MG/DL — SIGNIFICANT CHANGE UP (ref 8.5–10.1)
CHLORIDE SERPL-SCNC: 97 MMOL/L — LOW (ref 98–110)
CO2 SERPL-SCNC: 20 MMOL/L — SIGNIFICANT CHANGE UP (ref 17–32)
CREAT SERPL-MCNC: 1.6 MG/DL — HIGH (ref 0.7–1.5)
GLUCOSE BLDC GLUCOMTR-MCNC: 144 MG/DL — HIGH (ref 70–99)
GLUCOSE BLDC GLUCOMTR-MCNC: 164 MG/DL — HIGH (ref 70–99)
GLUCOSE BLDC GLUCOMTR-MCNC: 173 MG/DL — HIGH (ref 70–99)
GLUCOSE BLDC GLUCOMTR-MCNC: 179 MG/DL — HIGH (ref 70–99)
GLUCOSE SERPL-MCNC: 135 MG/DL — HIGH (ref 70–99)
HCT VFR BLD CALC: 31.3 % — LOW (ref 37–47)
HGB BLD-MCNC: 10 G/DL — LOW (ref 12–16)
INR BLD: 1.45 RATIO — HIGH (ref 0.65–1.3)
MCHC RBC-ENTMCNC: 28.5 PG — SIGNIFICANT CHANGE UP (ref 27–31)
MCHC RBC-ENTMCNC: 31.9 G/DL — LOW (ref 32–37)
MCV RBC AUTO: 89.2 FL — SIGNIFICANT CHANGE UP (ref 81–99)
NRBC # BLD: 0 /100 WBCS — SIGNIFICANT CHANGE UP (ref 0–0)
PLATELET # BLD AUTO: 298 K/UL — SIGNIFICANT CHANGE UP (ref 130–400)
POTASSIUM SERPL-MCNC: 4.5 MMOL/L — SIGNIFICANT CHANGE UP (ref 3.5–5)
POTASSIUM SERPL-SCNC: 4.5 MMOL/L — SIGNIFICANT CHANGE UP (ref 3.5–5)
PROTHROM AB SERPL-ACNC: 16.6 SEC — HIGH (ref 9.95–12.87)
RBC # BLD: 3.51 M/UL — LOW (ref 4.2–5.4)
RBC # FLD: 16.5 % — HIGH (ref 11.5–14.5)
SODIUM SERPL-SCNC: 137 MMOL/L — SIGNIFICANT CHANGE UP (ref 135–146)
TYPE + AB SCN PNL BLD: SIGNIFICANT CHANGE UP
WBC # BLD: 6.56 K/UL — SIGNIFICANT CHANGE UP (ref 4.8–10.8)
WBC # FLD AUTO: 6.56 K/UL — SIGNIFICANT CHANGE UP (ref 4.8–10.8)

## 2019-01-09 RX ADMIN — Medication 1 MILLIGRAM(S): at 11:12

## 2019-01-09 RX ADMIN — Medication 100 MILLIGRAM(S): at 08:31

## 2019-01-09 RX ADMIN — Medication 100 MILLIGRAM(S): at 17:17

## 2019-01-09 RX ADMIN — GABAPENTIN 300 MILLIGRAM(S): 400 CAPSULE ORAL at 08:31

## 2019-01-09 RX ADMIN — Medication 7.5 MILLIGRAM(S): at 08:32

## 2019-01-09 RX ADMIN — Medication 300 MILLIGRAM(S): at 11:12

## 2019-01-09 RX ADMIN — OXYCODONE AND ACETAMINOPHEN 2 TABLET(S): 5; 325 TABLET ORAL at 06:05

## 2019-01-09 RX ADMIN — CHLORHEXIDINE GLUCONATE 1 APPLICATION(S): 213 SOLUTION TOPICAL at 07:02

## 2019-01-09 RX ADMIN — GABAPENTIN 300 MILLIGRAM(S): 400 CAPSULE ORAL at 17:17

## 2019-01-09 RX ADMIN — SENNA PLUS 2 TABLET(S): 8.6 TABLET ORAL at 21:24

## 2019-01-09 RX ADMIN — Medication 5 MILLIGRAM(S): at 11:12

## 2019-01-09 RX ADMIN — OXYCODONE AND ACETAMINOPHEN 2 TABLET(S): 5; 325 TABLET ORAL at 23:39

## 2019-01-09 RX ADMIN — OXYCODONE AND ACETAMINOPHEN 2 TABLET(S): 5; 325 TABLET ORAL at 19:22

## 2019-01-09 RX ADMIN — OXYCODONE AND ACETAMINOPHEN 2 TABLET(S): 5; 325 TABLET ORAL at 14:14

## 2019-01-09 RX ADMIN — OXYCODONE AND ACETAMINOPHEN 2 TABLET(S): 5; 325 TABLET ORAL at 05:35

## 2019-01-09 NOTE — PROGRESS NOTE ADULT - ASSESSMENT
FT wounds bilateral legs.   Wet to dry dressing to right medial leg wound bid  Hydrogel and Adaptic to other sites   Pt awaiting decision and timing of probable toe amputation

## 2019-01-09 NOTE — PROGRESS NOTE ADULT - SUBJECTIVE AND OBJECTIVE BOX
Am rounds  Pt ; reports pain " at ball of foot"   VSS. Now off abx     EXAM:   awake alert     Right leg - healing pink small proximal wound                   lateral wound with  yellow black eschar                  medial wound - pink with patchy yellow loosening exudate   left leg - clean pink wounds - mario     necrotic right toe    large dressing  change done - bilateral legs     Proc: debridement of right lateral leg wound - sharp excision including dermis performed. Pt kieran well

## 2019-01-09 NOTE — PROGRESS NOTE ADULT - SUBJECTIVE AND OBJECTIVE BOX
Podiatry Pre-Operative Note   [id number] LINCOLN HEREDIA is a 78y year old Female patient presenting to the operating room for surgical management of the right foot. The patient has failed all conservative measures and requires surgical intervention at this time.  PAST MEDICAL & SURGICAL HISTORY:  Gout  DVT (deep venous thrombosis)  HTN (hypertension)  Rheumatoid arteritis  Other specified diabetes mellitus with other specified complication, unspecified whether long term insulin use  Primary osteoarthritis of right knee: s/p knee repalcement      Medications:   acetaminophen   Tablet .. 650 milliGRAM(s) Oral every 6 hours PRN  allopurinol 300 milliGRAM(s) Oral daily  chlorhexidine 4% Liquid 1 Application(s) Topical <User Schedule>  docusate sodium 100 milliGRAM(s) Oral two times a day  folic acid 1 milliGRAM(s) Oral daily  gabapentin 300 milliGRAM(s) Oral every 12 hours  leucovorin 5 milliGRAM(s) Oral daily  oxyCODONE    5 mG/acetaminophen 325 mG 2 Tablet(s) Oral every 4 hours PRN  predniSONE   Tablet 7.5 milliGRAM(s) Oral daily  rifampin 300 milliGRAM(s) Oral two times a day  senna 2 Tablet(s) Oral at bedtime    Actos 30 mg oral tablet: 1 tab(s) orally once a day  allopurinol 300 mg oral tablet: 1 tab(s) orally once a day  doxycycline monohydrate 100 mg oral capsule: 1 cap(s) orally every 12 hours  Eliquis 5 mg oral tablet:   folic acid 1 mg oral tablet: 1 tab(s) orally once a day  gabapentin 300 mg oral capsule: orally every 12 hours  glimepiride 2 mg oral tablet: 1  orally 3 times a day  leucovorin 5 mg oral tablet: 1 tab(s) orally once a day  OxyCONTIN 20 mg oral tablet, extended release: 1 tab(s) orally every 12 hours, As Needed  predniSONE 10 mg oral tablet: 1 tab(s) orally once a day  rifAMPin 300 mg oral capsule: 1 cap(s) orally 2 times a day    Allergies    clindamycin (Unknown)  erythromycin (Unknown)  penicillin (Unknown)  strawberry (Unknown)    Intolerances        Consent [_x]  H&P [_x]  Medical Clearance [x_]  EKG [_x]  CXR [_x]  UCG [_x]  T&S [_x]                          10.0   6.56  )-----------( 298      ( 09 Jan 2019 07:32 )             31.3     01-09    137  |  97<L>  |  48<H>  ----------------------------<  135<H>  4.5   |  20  |  1.6<H>    Ca    9.3      09 Jan 2019 07:32  Mg     2.0     01-08      PT/INR - ( 09 Jan 2019 07:32 )   PT: 16.60 sec;   INR: 1.45 ratio         PTT - ( 09 Jan 2019 07:32 )  PTT:36.4 sec    CAPILLARY BLOOD GLUCOSE      POCT Blood Glucose.: 179 mg/dL (09 Jan 2019 16:49)  POCT Blood Glucose.: 164 mg/dL (09 Jan 2019 11:17)  POCT Blood Glucose.: 144 mg/dL (09 Jan 2019 07:49)  POCT Blood Glucose.: 183 mg/dL (08 Jan 2019 20:54)        T(C): 35.8 (01-09-19 @ 13:14), Max: 36.1 (01-08-19 @ 21:11)  HR: 85 (01-09-19 @ 13:14) (81 - 85)  BP: 142/66 (01-09-19 @ 13:14) (128/54 - 144/65)  RR: 18 (01-09-19 @ 13:14) (18 - 18)  SpO2: 100% (01-08-19 @ 19:30) (100% - 100%)    Surgeon: Dr. Villasenor DPM  Assistant (s): [facility]  Pre Operative Diagnosis: OM right 3rd digit, right foot  Planned Procedure: excisional and dbx soft tissue and bone right foot.    The patient has been educated on the above procedure, with all risks and benefits described. No guarantees were given or implied for the aforementioned procedure.  The above assistant(s) have introduced themselves to the patient. The patient consents to their participation in the procedure listed above.     NPO after early breakfast tomorrow 8:30am  optimization needed prior to sx  01-09-19 @ 18:19

## 2019-01-09 NOTE — PROGRESS NOTE ADULT - PROBLEM SELECTOR PLAN 1
- s/p CTA w/o intervention ( pt has patent large vessels with small vessel Dz)  -c/w IV Abx   - debridement by podiatry scheduled for Thursday afternoon, keep NPO after early breakfast   - c/w local wound care  - off anticoagulation from this morning

## 2019-01-09 NOTE — PROGRESS NOTE ADULT - SUBJECTIVE AND OBJECTIVE BOX
L foot xray reviewed.  No such erosive changes on L foot so must assume R foot is + for OM.  Scheduled for OR debridement tomorrow afternoon after an appropriate time off Azactam.  Will perform partial 3rd ray amp if bleeding adequate. If blood flow minimal, will needle biopsy met 3 and met 2 head for appropriate cultures.   Pt to be NPO after early breakfast tomorrow. Again discussed risks of surgery with patient and her friend

## 2019-01-09 NOTE — CHART NOTE - NSCHARTNOTEFT_GEN_A_CORE
Registered Dietitian Limited Follow Up:  -Pt laying comfortably in bed at time of assessment. Reports persistent adequate appetite and PO intake ~70-75% DASH/TLC, CHO consistent diet. Pt denies offer of nutrition supplements. Denies GI abnormalities with LBM 1/9. Pt with b/l chronic nonhealing ulcers and concurrent LE cellulitis s/p debridement by burn, s/p angiogram 1/7. Pt intended for OR 1/10 for debridement 3rd foot digit, assess blood flow, bone biopsy a/o removal of infected metatarsal 3 head if appropriate. Labs reviewed. Meds reviewed. No further nutrition intervention at this time.

## 2019-01-09 NOTE — PROGRESS NOTE ADULT - SUBJECTIVE AND OBJECTIVE BOX
F/u note. T 98.2  Pt scheduled for OR for Thursday (1/10/19) afternoon.  All labs and notes reviewed and appreciated  Xray of 1-8-19 shows rapidly progressing erosion of medial and lateral met head 3R with new involvement of lateral aspect met 2 head. Finding suspicious for OM but may also be secondary to RA. For this reason-xrays of contralateral foot to be done today for comparison.  Plan is for debridement eschar dorsum digit 3 R, assess blood flow, bone biopsy a/o removal of infected met 3 head if appropriate.  All antibiosis stopped 1/8/19 anticipating bone cultures  Will need medical clearance   Pt getting Pred 7.5 mg OD  May need temporary increase in steroid secondary to surgical stress-to be evaluated by medical team.   Medical team to evaluate stopping Eliquis this am anticipating surgery tomorrow afternoon.  All of the above explained to the patient and her friend ( Yurizacherymarco a ALMARAZ) who were given the opportunity to ask questions

## 2019-01-09 NOTE — PROGRESS NOTE ADULT - ASSESSMENT
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    #Bilateral Chronic Nonhealing Ulcers and concurrent LE cellulitis   -per podiatry betadine to lesions,s/p debridement by burn , s/p angiogram. Large vessels patent , small vessel disease.OR tommorow for debridement   -Temp on 102.2F 12/21 ; Afebrile since  -New lucency on xray at third metatarsal head. MRI not required for now per podiatry  -doxycycline oral 100mg q 12 for 14 days , growing Pseudomonas (carbapenum resistant) , will require cefepime but pt refusing cefepime. Changed to Aztreonam	  -Podiatry: Betadine paint to digit 3R BID; Xeroform to ulcers with loose dressing; Limb elevation.    -Duplex LE venous: Chronic-appearing right common femoral and femoral deep venous thromboses; left popliteal deep venous thrombosis  -Duplex LE arterial: On the right: Mild to moderate disease at the popliteal artery level. On the left: Mild popliteal and posterior tibial artery disease; Remaining tibial arteries not visualized secondary to edema   -Uric acid levels normal. .  -serial xrays    #) Direct antiglobin positive   - Blood was sent for type and cross match, hb holding.   -Might need hem /onc consult     #Chronic Right Hip Pain  -RIght hip xray: Diffuse osteopenia  -C/w Physical therapy. Will check Vit D levels and replete if deficient    #Chronic B/L femoral and popliteal DVT- eliquis   #Chronic Normocytic Anemia- Likely 2/2 RA; Hb at baseline  #Rheumatoid Arthritis -Continue home prednisone. talked to Dr Ascencio (Dr Henry's team)- Can decrease prednisone 7.5mg if healing hampered. No other adjustments needed.   #DM II-Controlled; Monitor finger sticks, start insulin sliding scale if fingersticks are greater than 180  #Latent TB- rifampin  #Gout- Normal uric acid levels; c/w Allopurinol    DVT ppx: On Eliquis , hold tonight  GI ppx: Not indicated  Diet: DASH Carb consistent  , NPO overnight  Activity: AAT  Code Status: DNR/DNI, MOLST form completed with health care proxy as witness by bedside, form in chart  Disposition: pending

## 2019-01-09 NOTE — PROGRESS NOTE ADULT - SUBJECTIVE AND OBJECTIVE BOX
SUBJECTIVE:    Patient is a 78y old Female who presents with a chief complaint of "I couldn't stand up from the recliner", and worsening pain of bilateral lower extremities, pt was admitted for multiple LE ulcers.  Pt had a CTA on 1/7/19 w/o any intervention. She has an area or dry necrosis on her right 3rd toe, consulted by podiatry and was scheduled for debridement on 1/10/19.  Today it comfortable asking about debridement, denies any specific complaints     PAST MEDICAL & SURGICAL HISTORY  Gout  DVT (deep venous thrombosis)  HTN (hypertension)  Rheumatoid arteritis  Other specified diabetes mellitus with other specified complication, unspecified whether long term insulin use  Primary osteoarthritis of right knee: s/p knee repalcement    SOCIAL HISTORY:  Negative for smoking/alcohol/drug use.     ALLERGIES:  clindamycin (Unknown)  erythromycin (Unknown)  penicillin (Unknown)  strawberry (Unknown)    VITALS:   T(C): 35.8 (09 Jan 2019 13:14), Max: 36.1 (08 Jan 2019 21:11)  T(F): 96.4 (09 Jan 2019 13:14), Max: 97 (08 Jan 2019 21:11)  HR: 85 (09 Jan 2019 13:14) (81 - 85)  BP: 142/66 (09 Jan 2019 13:14) (128/54 - 144/65)  RR: 18 (09 Jan 2019 13:14) (18 - 18)  SpO2: 100% (08 Jan 2019 19:30) (100% - 100%)    PHYSICAL EXAM:   GEN: No acute distress  LUNGS: Clear to auscultation bilaterally   HEART: S1/S2 present.    ABD: Soft, non-tender, non-distended.    EXT: B/l lower extremity changes , R third toe superficially looks gangrenous  NEURO: AAOX3, no focal neuro deficit noted         LABS:                                    10.0   6.56  )-----------( 298      ( 09 Jan 2019 07:32 )             31.3   01-09    137  |  97<L>  |  48<H>  ----------------------------<  135<H>  4.5   |  20  |  1.6<H>    Ca    9.3      09 Jan 2019 07:32  Mg     2.0     01-08        RADIOLOGY:    < from: Xray Foot AP + Lateral + Oblique, Right (01.08.19 @ 09:38) >  Impression:    Findings highly suspicious for progression of septic   arthritis/osteomyelitis of the third metatarsophalangeal joint, and new   osteomyelitis of the second metatarsal head. Recommend MRI of the right   forefoot for further evaluation.    < from: Xray Foot AP + Lateral + Oblique, Left (01.09.19 @ 11:20) >  FINDINGS/  IMPRESSION: No acute fracture.  Second MTP joint incongruency withhammertoe suggests mild subluxation.   Hammertoes also present at the third and fourth PIP joints. 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 Plantar heel spur and insertional   Achilles enthesopathy present. Vascular calcifications are present.        MEDICATIONS  (STANDING):  allopurinol 300 milliGRAM(s) Oral daily  chlorhexidine 4% Liquid 1 Application(s) Topical <User Schedule>  docusate sodium 100 milliGRAM(s) Oral two times a day  folic acid 1 milliGRAM(s) Oral daily  gabapentin 300 milliGRAM(s) Oral every 12 hours  leucovorin 5 milliGRAM(s) Oral daily  predniSONE   Tablet 7.5 milliGRAM(s) Oral daily  rifampin 300 milliGRAM(s) Oral two times a day  senna 2 Tablet(s) Oral at bedtime    MEDICATIONS  (PRN):  acetaminophen   Tablet .. 650 milliGRAM(s) Oral every 6 hours PRN Moderate Pain (4 - 6)  oxyCODONE    5 mG/acetaminophen 325 mG 2 Tablet(s) Oral every 4 hours PRN Severe Pain (7 - 10)

## 2019-01-09 NOTE — PROCEDURE NOTE - PROCEDURE
<<-----Click on this checkbox to enter Procedure Debridement and application of dressing  01/09/2019  sharp excsional debridement to and including subcutaneous tissue right leg  Active  [id number]

## 2019-01-09 NOTE — PROGRESS NOTE ADULT - SUBJECTIVE AND OBJECTIVE BOX
SUBJECTIVE:    Patient is a 78y old Female who presents with a chief complaint of "I couldn't stand up from the recliner", and worsening pain of bilateral lower extremities (09 Jan 2019 11:30)    Currently admitted to medicine with the primary diagnosis of Wounds, multiple open, lower extremity     Today is hospital day 21d.     PAST MEDICAL & SURGICAL HISTORY  Gout  DVT (deep venous thrombosis)  HTN (hypertension)  Rheumatoid arteritis  Other specified diabetes mellitus with other specified complication, unspecified whether long term insulin use  Primary osteoarthritis of right knee: s/p knee repalcement    SOCIAL HISTORY:  Negative for smoking/alcohol/drug use.     ALLERGIES:  clindamycin (Unknown)  erythromycin (Unknown)  penicillin (Unknown)  strawberry (Unknown)    MEDICATIONS:  STANDING MEDICATIONS  allopurinol 300 milliGRAM(s) Oral daily  apixaban 5 milliGRAM(s) Oral every 12 hours  chlorhexidine 4% Liquid 1 Application(s) Topical <User Schedule>  docusate sodium 100 milliGRAM(s) Oral two times a day  doxycycline hyclate Capsule 100 milliGRAM(s) Oral every 12 hours  folic acid 1 milliGRAM(s) Oral daily  gabapentin 300 milliGRAM(s) Oral every 12 hours  leucovorin 5 milliGRAM(s) Oral daily  predniSONE   Tablet 7.5 milliGRAM(s) Oral daily  rifampin 300 milliGRAM(s) Oral two times a day  senna 2 Tablet(s) Oral at bedtime    PRN MEDICATIONS  acetaminophen   Tablet .. 650 milliGRAM(s) Oral every 6 hours PRN  oxyCODONE    5 mG/acetaminophen 325 mG 2 Tablet(s) Oral every 4 hours PRN    VITALS:   T(F): 96.4  HR: 85  BP: 142/66  RR: 18  SpO2: 100%    PHYSICAL EXAM:  GEN: No acute distress  LUNGS: Clear to auscultation bilaterally   HEART: S1/S2 present.    ABD: Soft, non-tender, non-distended.    EXT: LE changes   NEURO: AAOX3      LABS:                        10.0   6.56  )-----------( 298      ( 09 Jan 2019 07:32 )             31.3     01-09    137  |  97<L>  |  48<H>  ----------------------------<  135<H>  4.5   |  20  |  1.6<H>    Ca    9.3      09 Jan 2019 07:32  Mg     2.0     01-08      PT/INR - ( 09 Jan 2019 07:32 )   PT: 16.60 sec;   INR: 1.45 ratio         PTT - ( 09 Jan 2019 07:32 )  PTT:36.4 sec                  RADIOLOGY:

## 2019-01-09 NOTE — PROGRESS NOTE ADULT - ASSESSMENT
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 While in the hospital pt was consulted by vascular Sx , underwent CTA  w/o any intervention, she has small vessel Dz .   Pt is  scheduled for OR debridement tomorrow afternoon after an appropriate time off Azactam.  Podiatry will  perform partial 3rd ray amp if bleeding adequate. If blood flow minimal, will needle biopsy met 3 and met 2 head for appropriate cultures.   Pt to be NPO after early breakfast tomorrow.   She has h/o RA and was on steroid maintenance for years, will give one dose of Hydrocortisone 50 mg IV in AM before OR, pt has moderate risk for surgery.

## 2019-01-10 ENCOUNTER — RESULT REVIEW (OUTPATIENT)
Age: 79
End: 2019-01-10

## 2019-01-10 LAB
GLUCOSE BLDC GLUCOMTR-MCNC: 111 MG/DL — HIGH (ref 70–99)
GLUCOSE BLDC GLUCOMTR-MCNC: 144 MG/DL — HIGH (ref 70–99)
GLUCOSE BLDC GLUCOMTR-MCNC: 150 MG/DL — HIGH (ref 70–99)
GLUCOSE BLDC GLUCOMTR-MCNC: 198 MG/DL — HIGH (ref 70–99)
PHOSPHATE SERPL-MCNC: 3.5 MG/DL — SIGNIFICANT CHANGE UP (ref 2.1–4.9)

## 2019-01-10 RX ORDER — ALLOPURINOL 300 MG
300 TABLET ORAL DAILY
Qty: 0 | Refills: 0 | Status: DISCONTINUED | OUTPATIENT
Start: 2019-01-10 | End: 2019-01-19

## 2019-01-10 RX ORDER — OXYCODONE AND ACETAMINOPHEN 5; 325 MG/1; MG/1
1 TABLET ORAL EVERY 6 HOURS
Qty: 0 | Refills: 0 | Status: DISCONTINUED | OUTPATIENT
Start: 2019-01-10 | End: 2019-01-14

## 2019-01-10 RX ORDER — DOCUSATE SODIUM 100 MG
100 CAPSULE ORAL
Qty: 0 | Refills: 0 | Status: DISCONTINUED | OUTPATIENT
Start: 2019-01-10 | End: 2019-01-19

## 2019-01-10 RX ORDER — OXYCODONE AND ACETAMINOPHEN 5; 325 MG/1; MG/1
1 TABLET ORAL ONCE
Qty: 0 | Refills: 0 | Status: DISCONTINUED | OUTPATIENT
Start: 2019-01-10 | End: 2019-01-10

## 2019-01-10 RX ORDER — HYDROMORPHONE HYDROCHLORIDE 2 MG/ML
0.5 INJECTION INTRAMUSCULAR; INTRAVENOUS; SUBCUTANEOUS ONCE
Qty: 0 | Refills: 0 | Status: DISCONTINUED | OUTPATIENT
Start: 2019-01-10 | End: 2019-01-10

## 2019-01-10 RX ORDER — OXYCODONE AND ACETAMINOPHEN 5; 325 MG/1; MG/1
2 TABLET ORAL EVERY 4 HOURS
Qty: 0 | Refills: 0 | Status: DISCONTINUED | OUTPATIENT
Start: 2019-01-10 | End: 2019-01-17

## 2019-01-10 RX ORDER — MORPHINE SULFATE 50 MG/1
2 CAPSULE, EXTENDED RELEASE ORAL
Qty: 0 | Refills: 0 | Status: DISCONTINUED | OUTPATIENT
Start: 2019-01-10 | End: 2019-01-10

## 2019-01-10 RX ORDER — GABAPENTIN 400 MG/1
300 CAPSULE ORAL EVERY 12 HOURS
Qty: 0 | Refills: 0 | Status: DISCONTINUED | OUTPATIENT
Start: 2019-01-10 | End: 2019-01-19

## 2019-01-10 RX ORDER — HYDROCORTISONE 20 MG
50 TABLET ORAL ONCE
Qty: 0 | Refills: 0 | Status: COMPLETED | OUTPATIENT
Start: 2019-01-10 | End: 2019-01-10

## 2019-01-10 RX ORDER — AZTREONAM 2 G
500 VIAL (EA) INJECTION EVERY 8 HOURS
Qty: 0 | Refills: 0 | Status: DISCONTINUED | OUTPATIENT
Start: 2019-01-10 | End: 2019-01-10

## 2019-01-10 RX ORDER — ACETAMINOPHEN 500 MG
650 TABLET ORAL EVERY 6 HOURS
Qty: 0 | Refills: 0 | Status: DISCONTINUED | OUTPATIENT
Start: 2019-01-10 | End: 2019-01-19

## 2019-01-10 RX ORDER — AZTREONAM 2 G
500 VIAL (EA) INJECTION EVERY 8 HOURS
Qty: 0 | Refills: 0 | Status: DISCONTINUED | OUTPATIENT
Start: 2019-01-11 | End: 2019-01-15

## 2019-01-10 RX ORDER — AZTREONAM 2 G
500 VIAL (EA) INJECTION ONCE
Qty: 0 | Refills: 0 | Status: COMPLETED | OUTPATIENT
Start: 2019-01-10 | End: 2019-01-10

## 2019-01-10 RX ORDER — SENNA PLUS 8.6 MG/1
2 TABLET ORAL AT BEDTIME
Qty: 0 | Refills: 0 | Status: DISCONTINUED | OUTPATIENT
Start: 2019-01-10 | End: 2019-01-19

## 2019-01-10 RX ORDER — AZTREONAM 2 G
VIAL (EA) INJECTION
Qty: 0 | Refills: 0 | Status: DISCONTINUED | OUTPATIENT
Start: 2019-01-10 | End: 2019-01-15

## 2019-01-10 RX ORDER — LEUCOVORIN CALCIUM 5 MG
5 TABLET ORAL DAILY
Qty: 0 | Refills: 0 | Status: DISCONTINUED | OUTPATIENT
Start: 2019-01-10 | End: 2019-01-19

## 2019-01-10 RX ORDER — FOLIC ACID 0.8 MG
1 TABLET ORAL DAILY
Qty: 0 | Refills: 0 | Status: DISCONTINUED | OUTPATIENT
Start: 2019-01-10 | End: 2019-01-19

## 2019-01-10 RX ORDER — SODIUM CHLORIDE 9 MG/ML
1000 INJECTION, SOLUTION INTRAVENOUS
Qty: 0 | Refills: 0 | Status: DISCONTINUED | OUTPATIENT
Start: 2019-01-10 | End: 2019-01-10

## 2019-01-10 RX ORDER — ONDANSETRON 8 MG/1
4 TABLET, FILM COATED ORAL ONCE
Qty: 0 | Refills: 0 | Status: DISCONTINUED | OUTPATIENT
Start: 2019-01-10 | End: 2019-01-10

## 2019-01-10 RX ORDER — MORPHINE SULFATE 50 MG/1
2 CAPSULE, EXTENDED RELEASE ORAL ONCE
Qty: 0 | Refills: 0 | Status: DISCONTINUED | OUTPATIENT
Start: 2019-01-10 | End: 2019-01-10

## 2019-01-10 RX ORDER — CHLORHEXIDINE GLUCONATE 213 G/1000ML
1 SOLUTION TOPICAL
Qty: 0 | Refills: 0 | Status: DISCONTINUED | OUTPATIENT
Start: 2019-01-10 | End: 2019-01-19

## 2019-01-10 RX ADMIN — MORPHINE SULFATE 2 MILLIGRAM(S): 50 CAPSULE, EXTENDED RELEASE ORAL at 19:37

## 2019-01-10 RX ADMIN — CHLORHEXIDINE GLUCONATE 1 APPLICATION(S): 213 SOLUTION TOPICAL at 05:13

## 2019-01-10 RX ADMIN — OXYCODONE AND ACETAMINOPHEN 2 TABLET(S): 5; 325 TABLET ORAL at 11:15

## 2019-01-10 RX ADMIN — HYDROMORPHONE HYDROCHLORIDE 0.5 MILLIGRAM(S): 2 INJECTION INTRAMUSCULAR; INTRAVENOUS; SUBCUTANEOUS at 18:55

## 2019-01-10 RX ADMIN — Medication 5 MILLIGRAM(S): at 11:13

## 2019-01-10 RX ADMIN — MORPHINE SULFATE 2 MILLIGRAM(S): 50 CAPSULE, EXTENDED RELEASE ORAL at 23:47

## 2019-01-10 RX ADMIN — OXYCODONE AND ACETAMINOPHEN 2 TABLET(S): 5; 325 TABLET ORAL at 10:45

## 2019-01-10 RX ADMIN — HYDROMORPHONE HYDROCHLORIDE 0.5 MILLIGRAM(S): 2 INJECTION INTRAMUSCULAR; INTRAVENOUS; SUBCUTANEOUS at 18:45

## 2019-01-10 RX ADMIN — HYDROMORPHONE HYDROCHLORIDE 0.5 MILLIGRAM(S): 2 INJECTION INTRAMUSCULAR; INTRAVENOUS; SUBCUTANEOUS at 19:03

## 2019-01-10 RX ADMIN — OXYCODONE AND ACETAMINOPHEN 2 TABLET(S): 5; 325 TABLET ORAL at 05:16

## 2019-01-10 RX ADMIN — OXYCODONE AND ACETAMINOPHEN 2 TABLET(S): 5; 325 TABLET ORAL at 22:37

## 2019-01-10 RX ADMIN — Medication 50 MILLIGRAM(S): at 20:30

## 2019-01-10 RX ADMIN — Medication 7.5 MILLIGRAM(S): at 05:16

## 2019-01-10 RX ADMIN — Medication 50 MILLIGRAM(S): at 15:59

## 2019-01-10 NOTE — BRIEF OPERATIVE NOTE - POST-OP DX
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
Ulcers of both lower extremities  12/28/2018  full thickness - legs- due to vascular disease  Active  Carisa Thomas

## 2019-01-10 NOTE — BRIEF OPERATIVE NOTE - OPERATION/FINDINGS
All major vessels patent in R LE.  Continuous runoff via patent peroneal and PHIL.  PTA ocluded at origin.  Small vessel disease in foot.
Necrotic Bone, Infected Bone and soft tissue, Pus
left leg -  5 x 4 cm ulcer with devitalized yellow tissue at base ;  proximal smaller, clean pink wound   right leg - 6 x 4 cm area with black soft necrotic eschar and distal undermining

## 2019-01-10 NOTE — PROGRESS NOTE ADULT - ASSESSMENT
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 While in the hospital pt was consulted by vascular Sx , underwent CTA  w/o any intervention, she has small vessel Dz .   Pt is  scheduled for OR debridement today  afternoon at 3 pm.  Podiatry will  perform partial 3rd ray amp if bleeding adequate. If blood flow minimal, will needle biopsy met 3 and met 2 head for appropriate cultures.   She has h/o RA and was on steroid maintenance for years, will give one dose of Hydrocortisone 50 mg IV  before OR, pt has moderate risk for surgery.

## 2019-01-10 NOTE — PROGRESS NOTE ADULT - SUBJECTIVE AND OBJECTIVE BOX
SUBJECTIVE:    Patient is a 78y old Female who presents with a chief complaint of "I couldn't stand up from the recliner", and worsening pain of bilateral lower extremities, pt was admitted for multiple LE ulcers.  Pt had a CTA on 1/7/19 w/o any intervention. She has an area or dry necrosis on her right 3rd toe, consulted by podiatry and was scheduled for debridement on 1/10/19.  Today it comfortable asking about debridement, concerned and anxious, denies any specific omplaints.     PAST MEDICAL & SURGICAL HISTORY  Gout  DVT (deep venous thrombosis)  HTN (hypertension)  Rheumatoid arteritis  Other specified diabetes mellitus with other specified complication, unspecified whether long term insulin use  Primary osteoarthritis of right knee: s/p knee repalcement    SOCIAL HISTORY:  Negative for smoking/alcohol/drug use.     ALLERGIES:  clindamycin (Unknown)  erythromycin (Unknown)  penicillin (Unknown)  strawberry (Unknown)    VITALS:   T(C): 35.9 (10 Delfino 2019 14:23), Max: 36.7 (10 Delfino 2019 05:44)  T(F): 96.7 (10 Delfino 2019 14:23), Max: 98.1 (10 Delfino 2019 05:44)  HR: 76 (10 Delfino 2019 14:23) (74 - 87)  BP: 114/57 (10 Delfino 2019 14:23) (114/57 - 138/81)  BP(mean): --  RR: 16 (10 Delfino 2019 14:23) (16 - 18)  SpO2: 99% (10 Delfino 2019 05:44) (99% - 99%)    PHYSICAL EXAM:   GEN: No acute distress  LUNGS: Clear to auscultation bilaterally   HEART: S1/S2 present.    ABD: Soft, non-tender, non-distended.    EXT: B/l lower extremity changes , R third toe superficially looks gangrenous  NEURO: AAOX3, no focal neuro deficit noted         LABS:    no new labs today                          10.0   6.56  )-----------( 298      ( 09 Jan 2019 07:32 )             31.3   01-09    137  |  97<L>  |  48<H>  ----------------------------<  135<H>  4.5   |  20  |  1.6<H>    Ca    9.3      09 Jan 2019 07:32  Mg     2.0     01-08        RADIOLOGY:    < from: Xray Foot AP + Lateral + Oblique, Right (01.08.19 @ 09:38) >  Impression:    Findings highly suspicious for progression of septic   arthritis/osteomyelitis of the third metatarsophalangeal joint, and new   osteomyelitis of the second metatarsal head. Recommend MRI of the right   forefoot for further evaluation.    < from: Xray Foot AP + Lateral + Oblique, Left (01.09.19 @ 11:20) >  FINDINGS/  IMPRESSION: No acute fracture.  Second MTP joint incongruency withhammertoe suggests mild subluxation.   Hammertoes also present at the third and fourth PIP joints. 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 Plantar heel spur and insertional   Achilles enthesopathy present. Vascular calcifications are present.        MEDICATIONS  (STANDING):  allopurinol 300 milliGRAM(s) Oral daily  chlorhexidine 4% Liquid 1 Application(s) Topical <User Schedule>  docusate sodium 100 milliGRAM(s) Oral two times a day  folic acid 1 milliGRAM(s) Oral daily  gabapentin 300 milliGRAM(s) Oral every 12 hours  leucovorin 5 milliGRAM(s) Oral daily  predniSONE   Tablet 7.5 milliGRAM(s) Oral daily  rifampin 300 milliGRAM(s) Oral two times a day  senna 2 Tablet(s) Oral at bedtime    MEDICATIONS  (PRN):  acetaminophen   Tablet .. 650 milliGRAM(s) Oral every 6 hours PRN Moderate Pain (4 - 6)  oxyCODONE    5 mG/acetaminophen 325 mG 2 Tablet(s) Oral every 4 hours PRN Severe Pain (7 - 10)

## 2019-01-10 NOTE — PROGRESS NOTE ADULT - ASSESSMENT
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    #Bilateral Chronic Nonhealing Ulcers and concurrent LE cellulitis   -per podiatry betadine to lesions,s/p debridement by burn , s/p angiogram. Large vessels patent , small vessel disease. OR today per podiatry  -Temp on 102.2F 12/21 ; Afebrile since  -New lucency on xray at third metatarsal head. MRI not required for now per podiatry  -doxycycline oral 100mg q 12 for 14 days , growing Pseudomonas (carbapenum resistant) , will require cefepime but pt refusing cefepime. Changed to Aztreonam	  -Podiatry: Betadine paint to digit 3R BID; Xeroform to ulcers with loose dressing; Limb elevation.    -Duplex LE venous: Chronic-appearing right common femoral and femoral deep venous thromboses; left popliteal deep venous thrombosis  -Duplex LE arterial: On the right: Mild to moderate disease at the popliteal artery level. On the left: Mild popliteal and posterior tibial artery disease; Remaining tibial arteries not visualized secondary to edema   -Uric acid levels normal. .  -serial xrays    #) Direct antiglobin positive   - Blood was sent for type and cross match, hb holding.   -Might need hem /onc consult     #Chronic Right Hip Pain  -RIght hip xray: Diffuse osteopenia  -C/w Physical therapy. Will check Vit D levels and replete if deficient    #Chronic B/L femoral and popliteal DVT- eliquis  hold for today  #Chronic Normocytic Anemia- Likely 2/2 RA; Hb at baseline  #Rheumatoid Arthritis -Continue home prednisone. talked to Dr Ascencio (Dr Henry's team)- Can decrease prednisone 7.5mg if healing hampered. No other adjustments needed. one dose of solumedrol before going for OR  #DM II-Controlled; Monitor finger sticks, start insulin sliding scale if fingersticks are greater than 180  #Latent TB- rifampin  #Gout- Normal uric acid levels; c/w Allopurinol    DVT ppx: On Eliquis , hold    GI ppx: Not indicated  Diet: DASH Carb consistent  , NPO overnight  Activity: AAT  Code Status: DNR/DNI, MOLST form completed with health care proxy as witness by bedside, form in chart  Disposition: pending

## 2019-01-10 NOTE — PRE-ANESTHESIA EVALUATION ADULT - NSANTHOSAYNRD_GEN_A_CORE
No. MAYRA screening performed.  STOP BANG Legend: 0-2 = LOW Risk; 3-4 = INTERMEDIATE Risk; 5-8 = HIGH Risk

## 2019-01-10 NOTE — PROGRESS NOTE ADULT - SUBJECTIVE AND OBJECTIVE BOX
Pt seen at bedside. C/o pain plantar R foot-worsening    T 96.2  /81  Dressings intact  Digit 3 remains stable and dry with no signs infection. Worsening pain sub 3 R as well as sub 2 ( yesterday and today) and sub 1 (this morning).  Dorsal skin is dry, tight but without visible signs of infection.  Multiple serial xrays show worsening of erosion met 3 head with new erosion lateral aspect met 2 head. The severity of progression seems dependent on xray positioning and for this reason another was requested for the actual radiology area and not with a portable.  WBC 6.56   HH 10/31.3  INR 1.45  Bun Cre  48/1.6  + Bethany test( Ab screen)  on T&C testing   eGFR 31  Extensive discussion with pt this am about risks, benefits and alternatives of surgery. Main discussion centered on slow or no healing of any surgical intervention including digital or partial ray amp. Also discussed were the progressive erosive changes on xray coupled with corresponding increasing pain possibly caused by spreading unchecked infection. Contralateral xrays were done yesterday for comparison with the small chance the R foot erosions could have been rheumatoid in nature. These were nefgative  After a lengthy discourse, the patient was given an opportunity to voice her opinion and ask questions. The decision at this point is to debride the digit if flow permits. If circulation is sluggish or deemed to be completely inadequate intraoperatively, then only small stab incisions will be done for jamshidi needle biopsy of met heads 2 and 3 to guided antibiotic selection for a prolonged course. Pt understands that even such a minor procedure can lead to non healing and further amputation at either the midfoot, hindfoot of leg. She is willing to consent to procedure understanding the above.  Pt is NPO for this afternoon procedure. Consent done

## 2019-01-10 NOTE — CHART NOTE - NSCHARTNOTEFT_GEN_A_CORE
PACU ANESTHESIA ADMISSION NOTE      Procedure:   Surgical debridement of skin and soft tissue and bone right foot partial third ray amputation     Post op diagnosis:  ischemic gangrene third digit right foot      ____  Intubated  TV:______       Rate: ______      FiO2: ______    _x___  Patent Airway    _x___  Full return of protective reflexes    ___  Full recovery from anesthesia / back to baseline status    Vitals:  T-97  HR: 84  BP: 95/54  RR: 17  SpO2: 99    Mental Status:  _x___ Awake   _____ Alert   _____ Drowsy   _____ Sedated    Nausea/Vomiting:  _x___  NO       ______Yes,   See Post - Op Orders         Pain Scale (0-10):  __0___    Treatment: _x___ None    ____ See Post - Op/PCA Orders    Post - Operative Fluids:   __x__ Oral   ____ See Post - Op Orders    Plan: Discharge:   ____Home       ___x__Floor     _____Critical Care    _____  Other:_________________    Comments: Spontaneously breathing VSS  No anesthesia issues or complications noted.  Discharge when criteria met.

## 2019-01-10 NOTE — PROGRESS NOTE ADULT - PROBLEM SELECTOR PLAN 1
- s/p CTA w/o intervention ( pt has patent large vessels with small vessel Dz)  -c/w IV Abx   - debridement by podiatry scheduled for 3 pm today   - c/w local wound care  - off anticoagulation from 1/9/19

## 2019-01-10 NOTE — PROGRESS NOTE ADULT - SUBJECTIVE AND OBJECTIVE BOX
SUBJECTIVE:    Patient is a 78y old Female who presents with a chief complaint of "I couldn't stand up from the recliner", and worsening pain of bilateral lower extremities (10 Delfino 2019 07:38)    Currently admitted to medicine with the primary diagnosis of Wounds, multiple open, lower extremity     Today is hospital day 22d. This morning she is resting comfortably in bed and reports no new issues or overnight events.     PAST MEDICAL & SURGICAL HISTORY  Gout  DVT (deep venous thrombosis)  HTN (hypertension)  Rheumatoid arteritis  Other specified diabetes mellitus with other specified complication, unspecified whether long term insulin use  Primary osteoarthritis of right knee: s/p knee repalcement    SOCIAL HISTORY:  Negative for smoking/alcohol/drug use.     ALLERGIES:  clindamycin (Unknown)  erythromycin (Unknown)  penicillin (Unknown)  strawberry (Unknown)    MEDICATIONS:  STANDING MEDICATIONS  allopurinol 300 milliGRAM(s) Oral daily  chlorhexidine 4% Liquid 1 Application(s) Topical <User Schedule>  docusate sodium 100 milliGRAM(s) Oral two times a day  folic acid 1 milliGRAM(s) Oral daily  gabapentin 300 milliGRAM(s) Oral every 12 hours  hydrocortisone sodium succinate Injectable 50 milliGRAM(s) IV Push once  leucovorin 5 milliGRAM(s) Oral daily  predniSONE   Tablet 7.5 milliGRAM(s) Oral daily  rifampin 300 milliGRAM(s) Oral two times a day  senna 2 Tablet(s) Oral at bedtime    PRN MEDICATIONS  acetaminophen   Tablet .. 650 milliGRAM(s) Oral every 6 hours PRN  oxyCODONE    5 mG/acetaminophen 325 mG 2 Tablet(s) Oral every 4 hours PRN    VITALS:   T(F): 98.1  HR: 74  BP: 135/68  RR: 18  SpO2: 99%    PHYSICAL EXAM:  GEN: No acute distress  LUNGS: Clear to auscultation bilaterally   HEART: S1/S2 present. RRR.   ABD: Soft, non-tender, non-distended.   EXT: NC/NC/NE/2+PP/BOYLE  NEURO: AAOX3      LABS:                        10.0   6.56  )-----------( 298      ( 09 Jan 2019 07:32 )             31.3     01-09    137  |  97<L>  |  48<H>  ----------------------------<  135<H>  4.5   |  20  |  1.6<H>    Ca    9.3      09 Jan 2019 07:32  Mg     2.0     01-08      PT/INR - ( 09 Jan 2019 07:32 )   PT: 16.60 sec;   INR: 1.45 ratio         PTT - ( 09 Jan 2019 07:32 )  PTT:36.4 sec                  RADIOLOGY:

## 2019-01-10 NOTE — BRIEF OPERATIVE NOTE - PROCEDURE
<<-----Click on this checkbox to enter Procedure Amputation toe  01/10/2019  Excision and debridment of soft tissue and bone with partial 3rd ray amputation R foot  Active  GPECO

## 2019-01-10 NOTE — BRIEF OPERATIVE NOTE - COMMENTS
Digit 3 with necrotic exposed friable bone with sero-purulent drainage. All long tendons necrotic on digit. Bleed was better than anticipated and the decision was made to extend the incision proximally by 1.5 cm and resect the 3rd met head with samples sent to path and micro. Proximal wound closed loosely with 3.0 prolene and 1/4 in packing placed in wound . Pt tolerated procedure well

## 2019-01-10 NOTE — BRIEF OPERATIVE NOTE - PRE-OP DX
Toe gangrene  01/07/2019    Active  Richard Martínez  Ulcers of both lower extremities  12/28/2018  full thickness - legs - due to vascular disease  Active  Carisa Thomas
Toe gangrene  01/07/2019  Soft tissue an bone infection ot gangrenous toe  Active  Ricky Estrada  Ulcers of both lower extremities  12/28/2018  full thickness - legs - due to vascular disease  Active  Carisa Thomas
Ulcers of both lower extremities  12/28/2018  full thickness - legs - due to vascular disease  Active  Carisa Thomas

## 2019-01-11 LAB
ANION GAP SERPL CALC-SCNC: 17 MMOL/L — HIGH (ref 7–14)
BASOPHILS # BLD AUTO: 0.04 K/UL — SIGNIFICANT CHANGE UP (ref 0–0.2)
BASOPHILS NFR BLD AUTO: 0.5 % — SIGNIFICANT CHANGE UP (ref 0–1)
BUN SERPL-MCNC: 34 MG/DL — HIGH (ref 10–20)
CALCIUM SERPL-MCNC: 9.5 MG/DL — SIGNIFICANT CHANGE UP (ref 8.5–10.1)
CHLORIDE SERPL-SCNC: 99 MMOL/L — SIGNIFICANT CHANGE UP (ref 98–110)
CO2 SERPL-SCNC: 23 MMOL/L — SIGNIFICANT CHANGE UP (ref 17–32)
CREAT SERPL-MCNC: 1.3 MG/DL — SIGNIFICANT CHANGE UP (ref 0.7–1.5)
EOSINOPHIL # BLD AUTO: 0.31 K/UL — SIGNIFICANT CHANGE UP (ref 0–0.7)
EOSINOPHIL NFR BLD AUTO: 4 % — SIGNIFICANT CHANGE UP (ref 0–8)
GLUCOSE BLDC GLUCOMTR-MCNC: 135 MG/DL — HIGH (ref 70–99)
GLUCOSE BLDC GLUCOMTR-MCNC: 144 MG/DL — HIGH (ref 70–99)
GLUCOSE BLDC GLUCOMTR-MCNC: 184 MG/DL — HIGH (ref 70–99)
GLUCOSE BLDC GLUCOMTR-MCNC: 215 MG/DL — HIGH (ref 70–99)
GLUCOSE SERPL-MCNC: 124 MG/DL — HIGH (ref 70–99)
HCT VFR BLD CALC: 28.8 % — LOW (ref 37–47)
HGB BLD-MCNC: 9.2 G/DL — LOW (ref 12–16)
IMM GRANULOCYTES NFR BLD AUTO: 0.4 % — HIGH (ref 0.1–0.3)
LYMPHOCYTES # BLD AUTO: 1.54 K/UL — SIGNIFICANT CHANGE UP (ref 1.2–3.4)
LYMPHOCYTES # BLD AUTO: 20 % — LOW (ref 20.5–51.1)
MCHC RBC-ENTMCNC: 28.8 PG — SIGNIFICANT CHANGE UP (ref 27–31)
MCHC RBC-ENTMCNC: 31.9 G/DL — LOW (ref 32–37)
MCV RBC AUTO: 90 FL — SIGNIFICANT CHANGE UP (ref 81–99)
MONOCYTES # BLD AUTO: 1.06 K/UL — HIGH (ref 0.1–0.6)
MONOCYTES NFR BLD AUTO: 13.8 % — HIGH (ref 1.7–9.3)
NEUTROPHILS # BLD AUTO: 4.72 K/UL — SIGNIFICANT CHANGE UP (ref 1.4–6.5)
NEUTROPHILS NFR BLD AUTO: 61.3 % — SIGNIFICANT CHANGE UP (ref 42.2–75.2)
NRBC # BLD: 0 /100 WBCS — SIGNIFICANT CHANGE UP (ref 0–0)
PLATELET # BLD AUTO: 316 K/UL — SIGNIFICANT CHANGE UP (ref 130–400)
POTASSIUM SERPL-MCNC: 4.3 MMOL/L — SIGNIFICANT CHANGE UP (ref 3.5–5)
POTASSIUM SERPL-SCNC: 4.3 MMOL/L — SIGNIFICANT CHANGE UP (ref 3.5–5)
RBC # BLD: 3.2 M/UL — LOW (ref 4.2–5.4)
RBC # FLD: 16.5 % — HIGH (ref 11.5–14.5)
SODIUM SERPL-SCNC: 139 MMOL/L — SIGNIFICANT CHANGE UP (ref 135–146)
WBC # BLD: 7.7 K/UL — SIGNIFICANT CHANGE UP (ref 4.8–10.8)
WBC # FLD AUTO: 7.7 K/UL — SIGNIFICANT CHANGE UP (ref 4.8–10.8)

## 2019-01-11 RX ORDER — DEXTROSE 50 % IN WATER 50 %
12.5 SYRINGE (ML) INTRAVENOUS ONCE
Qty: 0 | Refills: 0 | Status: DISCONTINUED | OUTPATIENT
Start: 2019-01-11 | End: 2019-01-19

## 2019-01-11 RX ORDER — GLUCAGON INJECTION, SOLUTION 0.5 MG/.1ML
1 INJECTION, SOLUTION SUBCUTANEOUS ONCE
Qty: 0 | Refills: 0 | Status: DISCONTINUED | OUTPATIENT
Start: 2019-01-11 | End: 2019-01-19

## 2019-01-11 RX ORDER — MORPHINE SULFATE 50 MG/1
2 CAPSULE, EXTENDED RELEASE ORAL DAILY
Qty: 0 | Refills: 0 | Status: DISCONTINUED | OUTPATIENT
Start: 2019-01-11 | End: 2019-01-18

## 2019-01-11 RX ORDER — DEXTROSE 50 % IN WATER 50 %
15 SYRINGE (ML) INTRAVENOUS ONCE
Qty: 0 | Refills: 0 | Status: DISCONTINUED | OUTPATIENT
Start: 2019-01-11 | End: 2019-01-19

## 2019-01-11 RX ORDER — MORPHINE SULFATE 50 MG/1
2 CAPSULE, EXTENDED RELEASE ORAL ONCE
Qty: 0 | Refills: 0 | Status: DISCONTINUED | OUTPATIENT
Start: 2019-01-11 | End: 2019-01-11

## 2019-01-11 RX ORDER — DEXTROSE 50 % IN WATER 50 %
25 SYRINGE (ML) INTRAVENOUS ONCE
Qty: 0 | Refills: 0 | Status: DISCONTINUED | OUTPATIENT
Start: 2019-01-11 | End: 2019-01-19

## 2019-01-11 RX ORDER — APIXABAN 2.5 MG/1
5 TABLET, FILM COATED ORAL EVERY 12 HOURS
Qty: 0 | Refills: 0 | Status: DISCONTINUED | OUTPATIENT
Start: 2019-01-11 | End: 2019-01-19

## 2019-01-11 RX ORDER — MORPHINE SULFATE 50 MG/1
2 CAPSULE, EXTENDED RELEASE ORAL EVERY 4 HOURS
Qty: 0 | Refills: 0 | Status: DISCONTINUED | OUTPATIENT
Start: 2019-01-11 | End: 2019-01-14

## 2019-01-11 RX ORDER — SODIUM CHLORIDE 9 MG/ML
1000 INJECTION, SOLUTION INTRAVENOUS
Qty: 0 | Refills: 0 | Status: DISCONTINUED | OUTPATIENT
Start: 2019-01-11 | End: 2019-01-19

## 2019-01-11 RX ORDER — BENZOCAINE AND MENTHOL 5; 1 G/100ML; G/100ML
1 LIQUID ORAL THREE TIMES A DAY
Qty: 0 | Refills: 0 | Status: DISCONTINUED | OUTPATIENT
Start: 2019-01-11 | End: 2019-01-19

## 2019-01-11 RX ORDER — INSULIN LISPRO 100/ML
VIAL (ML) SUBCUTANEOUS
Qty: 0 | Refills: 0 | Status: DISCONTINUED | OUTPATIENT
Start: 2019-01-11 | End: 2019-01-19

## 2019-01-11 RX ADMIN — Medication 100 MILLIGRAM(S): at 17:36

## 2019-01-11 RX ADMIN — Medication 100 MILLIGRAM(S): at 08:34

## 2019-01-11 RX ADMIN — OXYCODONE AND ACETAMINOPHEN 2 TABLET(S): 5; 325 TABLET ORAL at 12:15

## 2019-01-11 RX ADMIN — MORPHINE SULFATE 2 MILLIGRAM(S): 50 CAPSULE, EXTENDED RELEASE ORAL at 18:21

## 2019-01-11 RX ADMIN — OXYCODONE AND ACETAMINOPHEN 2 TABLET(S): 5; 325 TABLET ORAL at 06:02

## 2019-01-11 RX ADMIN — MORPHINE SULFATE 2 MILLIGRAM(S): 50 CAPSULE, EXTENDED RELEASE ORAL at 08:04

## 2019-01-11 RX ADMIN — MORPHINE SULFATE 2 MILLIGRAM(S): 50 CAPSULE, EXTENDED RELEASE ORAL at 07:59

## 2019-01-11 RX ADMIN — Medication 1 MILLIGRAM(S): at 12:19

## 2019-01-11 RX ADMIN — Medication 5 MILLIGRAM(S): at 12:19

## 2019-01-11 RX ADMIN — BENZOCAINE AND MENTHOL 1 LOZENGE: 5; 1 LIQUID ORAL at 00:54

## 2019-01-11 RX ADMIN — Medication 300 MILLIGRAM(S): at 12:19

## 2019-01-11 RX ADMIN — MORPHINE SULFATE 2 MILLIGRAM(S): 50 CAPSULE, EXTENDED RELEASE ORAL at 17:14

## 2019-01-11 RX ADMIN — Medication 50 MILLIGRAM(S): at 05:41

## 2019-01-11 RX ADMIN — Medication 1: at 17:34

## 2019-01-11 RX ADMIN — GABAPENTIN 300 MILLIGRAM(S): 400 CAPSULE ORAL at 17:36

## 2019-01-11 RX ADMIN — CHLORHEXIDINE GLUCONATE 1 APPLICATION(S): 213 SOLUTION TOPICAL at 05:40

## 2019-01-11 RX ADMIN — APIXABAN 5 MILLIGRAM(S): 2.5 TABLET, FILM COATED ORAL at 17:37

## 2019-01-11 RX ADMIN — Medication 50 MILLIGRAM(S): at 14:35

## 2019-01-11 RX ADMIN — Medication 7.5 MILLIGRAM(S): at 08:32

## 2019-01-11 RX ADMIN — OXYCODONE AND ACETAMINOPHEN 2 TABLET(S): 5; 325 TABLET ORAL at 02:31

## 2019-01-11 RX ADMIN — GABAPENTIN 300 MILLIGRAM(S): 400 CAPSULE ORAL at 08:34

## 2019-01-11 NOTE — PROGRESS NOTE ADULT - ASSESSMENT
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    #Bilateral Chronic Nonhealing Ulcers and concurrent LE cellulitis   -per podiatry betadine to lesions,s/p debridement by burn , s/p angiogram. Large vessels patent , small vessel disease. s/p amputation  -Temp on 102.2F 12/21 ; Afebrile since  -New lucency on xray at third metatarsal head. MRI not required for now per podiatry  -doxycycline oral 100mg q 12 for 14 days , growing Pseudomonas (carbapenum resistant) , will require cefepime but pt refusing cefepime. Changed to Aztreonam	  -Podiatry: Betadine paint to digit 3R BID; Xeroform to ulcers with loose dressing; Limb elevation.    -Duplex LE venous: Chronic-appearing right common femoral and femoral deep venous thromboses; left popliteal deep venous thrombosis  -Duplex LE arterial: On the right: Mild to moderate disease at the popliteal artery level. On the left: Mild popliteal and posterior tibial artery disease; Remaining tibial arteries not visualized secondary to edema   -Uric acid levels normal. .  -serial xrays    #) Direct antiglobin positive   - Blood was sent for type and cross match, hb holding.   -Might need hem /onc consult     #Chronic Right Hip Pain  -RIght hip xray: Diffuse osteopenia  -C/w Physical therapy. Will check Vit D levels and replete if deficient    #Chronic B/L femoral and popliteal DVT- eliquis     #Chronic Normocytic Anemia- Likely 2/2 RA; Hb at baseline  #Rheumatoid Arthritis -Continue home prednisone. talked to Dr Ascencio (Dr Henry's team)- Can decrease prednisone 7.5mg if healing hampered. No other adjustments needed. one dose of solumedrol before going for OR  #DM II-Controlled; Monitor finger sticks, start insulin sliding scale if fingersticks are greater than 180  #Latent TB- rifampin  #Gout- Normal uric acid levels; c/w Allopurinol    DVT ppx: On Eliquis ,   GI ppx: Not indicated  Diet: DASH Carb consistent  ,   Activity: AAT  Code Status: DNR/DNI, MOLST form completed with health care proxy as witness by bedside, form in chart  Disposition: pending

## 2019-01-11 NOTE — PROGRESS NOTE ADULT - ASSESSMENT
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 While in the hospital pt was consulted by vascular Sx , underwent CTA  w/o any intervention, she has small vessel Dz .  She was consulted by podiatry and underwent right 3 rd toe amputation on 1/10/19. Podiatry following for wound care, will f/u recommendations. For pain control c/w Percocet Q 4 hours PRN.  Pt received stress dose of IV steroids before surgery yesterday, her blood glucose was elevated today and she was started on Insulin sliding scale.

## 2019-01-11 NOTE — PROGRESS NOTE ADULT - SUBJECTIVE AND OBJECTIVE BOX
Spoke with pt. via telephone. C/o pain during dressing change but discomfort now controlled with Percocet.  States she viewed wound during am rounds and believes it "looks good". No other issues  AF x 24 h 124/61  Reviewed photos of surgical site. Tissue at this point appears perfused with fair bleeding and no necrosis or progressing ischemia.   There was no drainage or malodor.   Wbc  7.70  HH 9.2/28.8     Glu 215  Bun/Cre34/1.3  eGFR 40  Awaiting bone Cx and path  Azactam for now  Will pre-medicate before am dressing change  Plan is likely return to the OR for planned primary closure if wound demonstrates potential to heal

## 2019-01-11 NOTE — PROGRESS NOTE ADULT - SUBJECTIVE AND OBJECTIVE BOX
SUBJECTIVE:    Patient is a 78y old Female who presents with a chief complaint of "I couldn't stand up from the recliner", and worsening pain of bilateral lower extremities (11 Jan 2019 09:04)    Currently admitted to medicine with the primary diagnosis of Wounds, multiple open, lower extremity     Today is hospital day 23d.s/p debridement and ampuatation.    PAST MEDICAL & SURGICAL HISTORY  Gout  DVT (deep venous thrombosis)  HTN (hypertension)  Rheumatoid arteritis  Other specified diabetes mellitus with other specified complication, unspecified whether long term insulin use  Primary osteoarthritis of right knee: s/p knee repalcement    SOCIAL HISTORY:  Negative for smoking/alcohol/drug use.     ALLERGIES:  clindamycin (Unknown)  erythromycin (Unknown)  penicillin (Unknown)  strawberry (Unknown)    MEDICATIONS:  STANDING MEDICATIONS  allopurinol 300 milliGRAM(s) Oral daily  apixaban 5 milliGRAM(s) Oral every 12 hours  aztreonam  IVPB 500 milliGRAM(s) IV Intermittent every 8 hours  aztreonam  IVPB      chlorhexidine 4% Liquid 1 Application(s) Topical <User Schedule>  docusate sodium 100 milliGRAM(s) Oral two times a day  folic acid 1 milliGRAM(s) Oral daily  gabapentin 300 milliGRAM(s) Oral every 12 hours  leucovorin 5 milliGRAM(s) Oral daily  morphine  - Injectable 2 milliGRAM(s) IV Push daily  predniSONE   Tablet 7.5 milliGRAM(s) Oral daily  rifampin 300 milliGRAM(s) Oral two times a day  senna 2 Tablet(s) Oral at bedtime    PRN MEDICATIONS  acetaminophen   Tablet .. 650 milliGRAM(s) Oral every 6 hours PRN  benzocaine 15 mG/menthol 3.6 mG (Sugar-Free) Lozenge 1 Lozenge Oral three times a day PRN  morphine  - Injectable 2 milliGRAM(s) IV Push every 4 hours PRN  oxyCODONE    5 mG/acetaminophen 325 mG 2 Tablet(s) Oral every 4 hours PRN  oxyCODONE    5 mG/acetaminophen 325 mG 1 Tablet(s) Oral every 6 hours PRN    VITALS:   T(F): 97.9  HR: 94  BP: 124/61  RR: 18  SpO2: 100%    PHYSICAL EXAM:  GEN: No acute distress  LUNGS: Clear to auscultation bilaterally   HEART: S1/S2 present.    ABD: Soft, non-tender, non-distended.    EXT: LE changes   NEURO: AAOX3      LABS:                        9.2    7.70  )-----------( 316      ( 11 Jan 2019 09:07 )             28.8     01-11    139  |  99  |  34<H>  ----------------------------<  124<H>  4.3   |  23  |  1.3    Ca    9.5      11 Jan 2019 09:07  Phos  3.5     01-10                        RADIOLOGY:

## 2019-01-11 NOTE — PROGRESS NOTE ADULT - PROBLEM SELECTOR PLAN 1
- s/p CTA w/o intervention ( pt has patent large vessels with small vessel Dz)  -c/w IV Abx   - s/p right 3rd toe amputation on 1/11/19  - Percocet Q 4h PRN   - c/w local wound care, f/u podiatry recommendations   - off anticoagulation from 1/9/19 - s/p CTA w/o intervention ( pt has patent large vessels with small vessel Dz)  -c/w IV Abx   - s/p right 3rd toe amputation on 1/11/19  - Percocet Q 4h PRN   - c/w local wound care, f/u podiatry recommendations

## 2019-01-11 NOTE — PHARMACOTHERAPY INTERVENTION NOTE - COMMENTS
pt has A-Fib and not on any anticoagilation. I recommended on profile --also needed for dvt prophylaxis. dr holbrook ordered eliquis

## 2019-01-11 NOTE — PROGRESS NOTE ADULT - SUBJECTIVE AND OBJECTIVE BOX
PROGRESS NOTE   Pt seen at bedside during AM rounds. s/p r 3rd toe amp Day 1. Pt complaining about r foot. Dressing clean and intact.       Vital Signs Last 24 Hrs  T(C): 35.6 (11 Jan 2019 05:59), Max: 36.6 (10 Delfino 2019 20:00)  T(F): 96.1 (11 Jan 2019 05:59), Max: 97.8 (10 Delfino 2019 20:00)  HR: 93 (11 Jan 2019 05:59) (74 - 93)  BP: 143/63 (11 Jan 2019 05:59) (95/54 - 158/68)  BP(mean): --  RR: 18 (11 Jan 2019 05:59) (13 - 25)  SpO2: 100% (10 Delfino 2019 20:34) (100% - 100%)                  Phos  3.5     01-10        PHYSICAL EXAM  Surgical Site: Clean with no drainage. Wound Base clean with granular tissue present. 2 proximal sutures intact. Mild Dark discoloration/Erythema of the surgical site. Cap refill < 3 sec of the remaining toes. Very painful on light touch    A:   S/p R 3rd toe amp Day 1  PAD  S/p debridement of LE ulcers b/l  P:  Patient evaluated @ bedside  Wound Flushed with Normal saline, packed with plain packing,  Dressed with xeroform/DSD/Kerlix  Activity - short transfers chair to bed with assistance  WB to the heel only with surgical (Darco) shoe R foot  recommend pain management - pt had an appointment of pain management as o/p, but did not go 2/2 hospitalization. Pt is sever pain b/l lower legs and R foot. Unable to tolerated light touch. Morphine did not help during dressing changes.  f/u bone pathology and culture.  Continue ABx therapy as before surgery. will need adjustment after bone culture comes back.   Recommend topical nitroglycerine to increase perfusion of the R foot.

## 2019-01-11 NOTE — PROGRESS NOTE ADULT - SUBJECTIVE AND OBJECTIVE BOX
SUBJECTIVE:    Patient is a 78y old Female who presents with a chief complaint of "I couldn't stand up from the recliner", and worsening pain of bilateral lower extremities, pt was admitted for multiple LE ulcers.  Pt had a CTA on 1/7/19 w/o any intervention. She has an area or dry necrosis on her right 3rd toe, consulted by podiatry and underwent right 3 rd toe amputation on 1/10/19. Podiatry following for wound care, will f/u recommendations.   Today is c/o right foot pain and expressing her concern regarding her elevated blood glucose.     PAST MEDICAL & SURGICAL HISTORY  Gout  DVT (deep venous thrombosis)  HTN (hypertension)  Rheumatoid arteritis  Other specified diabetes mellitus with other specified complication, unspecified whether long term insulin use  Primary osteoarthritis of right knee: s/p knee repalcement    SOCIAL HISTORY:  Negative for smoking/alcohol/drug use.     ALLERGIES:  clindamycin (Unknown)  erythromycin (Unknown)  penicillin (Unknown)  strawberry (Unknown)    VITALS:   T(C): 36.6 (11 Jan 2019 13:29), Max: 36.6 (10 Delfino 2019 20:00)  T(F): 97.9 (11 Jan 2019 13:29), Max: 97.9 (11 Jan 2019 13:29)  HR: 94 (11 Jan 2019 13:29) (74 - 94)  BP: 124/61 (11 Jan 2019 13:29) (95/54 - 158/68)  RR: 18 (11 Jan 2019 13:29) (13 - 25)  SpO2: 100% (10 Delfino 2019 20:34) (100% - 100%)    PHYSICAL EXAM:   GEN: No acute distress  LUNGS: Clear to auscultation bilaterally   HEART: S1/S2 present.    ABD: Soft, non-tender, non-distended.    EXT: B/l lower extremity changes , dressing on right foot noted   NEURO: AAOX3, no focal neuro deficit noted         LABS:                        9.2    7.70  )-----------( 316      ( 11 Jan 2019 09:07 )             28.8   01-11    139  |  99  |  34<H>  ----------------------------<  124<H>  4.3   |  23  |  1.3    Ca    9.5      11 Jan 2019 09:07  Phos  3.5     01-10            RADIOLOGY:    < from: Xray Foot AP + Lateral + Oblique, Right (01.08.19 @ 09:38) >  Impression:    Findings highly suspicious for progression of septic   arthritis/osteomyelitis of the third metatarsophalangeal joint, and new   osteomyelitis of the second metatarsal head. Recommend MRI of the right   forefoot for further evaluation.    < from: Xray Foot AP + Lateral + Oblique, Left (01.09.19 @ 11:20) >  FINDINGS/  IMPRESSION: No acute fracture.  Second MTP joint incongruency withhammertoe suggests mild subluxation.   Hammertoes also present at the third and fourth PIP joints. 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 Plantar heel spur and insertional   Achilles enthesopathy present. Vascular calcifications are present.        MEDICATIONS  (STANDING):  allopurinol 300 milliGRAM(s) Oral daily  apixaban 5 milliGRAM(s) Oral every 12 hours  aztreonam  IVPB 500 milliGRAM(s) IV Intermittent every 8 hours  aztreonam  IVPB      chlorhexidine 4% Liquid 1 Application(s) Topical <User Schedule>  dextrose 5%. 1000 milliLiter(s) (50 mL/Hr) IV Continuous <Continuous>  dextrose 50% Injectable 12.5 Gram(s) IV Push once  dextrose 50% Injectable 25 Gram(s) IV Push once  dextrose 50% Injectable 25 Gram(s) IV Push once  docusate sodium 100 milliGRAM(s) Oral two times a day  folic acid 1 milliGRAM(s) Oral daily  gabapentin 300 milliGRAM(s) Oral every 12 hours  insulin lispro (HumaLOG) corrective regimen sliding scale   SubCutaneous three times a day before meals  leucovorin 5 milliGRAM(s) Oral daily  morphine  - Injectable 2 milliGRAM(s) IV Push daily  predniSONE   Tablet 7.5 milliGRAM(s) Oral daily  rifampin 300 milliGRAM(s) Oral two times a day  senna 2 Tablet(s) Oral at bedtime    MEDICATIONS  (PRN):  acetaminophen   Tablet .. 650 milliGRAM(s) Oral every 6 hours PRN Moderate Pain (4 - 6)  benzocaine 15 mG/menthol 3.6 mG (Sugar-Free) Lozenge 1 Lozenge Oral three times a day PRN Sore Throat  dextrose 40% Gel 15 Gram(s) Oral once PRN Blood Glucose LESS THAN 70 milliGRAM(s)/deciliter  glucagon  Injectable 1 milliGRAM(s) IntraMuscular once PRN Glucose LESS THAN 70 milligrams/deciliter  morphine  - Injectable 2 milliGRAM(s) IV Push every 4 hours PRN Severe Pain (7 - 10)  oxyCODONE    5 mG/acetaminophen 325 mG 2 Tablet(s) Oral every 4 hours PRN Severe Pain (7 - 10)  oxyCODONE    5 mG/acetaminophen 325 mG 1 Tablet(s) Oral every 6 hours PRN Moderate Pain (4 - 6)

## 2019-01-12 LAB
-  AMIKACIN: SIGNIFICANT CHANGE UP
-  AZTREONAM: SIGNIFICANT CHANGE UP
-  CEFEPIME: SIGNIFICANT CHANGE UP
-  CEFTAZIDIME: SIGNIFICANT CHANGE UP
-  CIPROFLOXACIN: SIGNIFICANT CHANGE UP
-  GENTAMICIN: SIGNIFICANT CHANGE UP
-  IMIPENEM: SIGNIFICANT CHANGE UP
-  LEVOFLOXACIN: SIGNIFICANT CHANGE UP
-  MEROPENEM: SIGNIFICANT CHANGE UP
-  PIPERACILLIN/TAZOBACTAM: SIGNIFICANT CHANGE UP
-  TOBRAMYCIN: SIGNIFICANT CHANGE UP
ANION GAP SERPL CALC-SCNC: 16 MMOL/L — HIGH (ref 7–14)
BUN SERPL-MCNC: 29 MG/DL — HIGH (ref 10–20)
CALCIUM SERPL-MCNC: 9.4 MG/DL — SIGNIFICANT CHANGE UP (ref 8.5–10.1)
CHLORIDE SERPL-SCNC: 103 MMOL/L — SIGNIFICANT CHANGE UP (ref 98–110)
CO2 SERPL-SCNC: 22 MMOL/L — SIGNIFICANT CHANGE UP (ref 17–32)
CREAT SERPL-MCNC: 1.2 MG/DL — SIGNIFICANT CHANGE UP (ref 0.7–1.5)
CULTURE RESULTS: SIGNIFICANT CHANGE UP
ESTIMATED AVERAGE GLUCOSE: 134 MG/DL — HIGH (ref 68–114)
GLUCOSE BLDC GLUCOMTR-MCNC: 121 MG/DL — HIGH (ref 70–99)
GLUCOSE BLDC GLUCOMTR-MCNC: 130 MG/DL — HIGH (ref 70–99)
GLUCOSE BLDC GLUCOMTR-MCNC: 172 MG/DL — HIGH (ref 70–99)
GLUCOSE BLDC GLUCOMTR-MCNC: 232 MG/DL — HIGH (ref 70–99)
GLUCOSE SERPL-MCNC: 114 MG/DL — HIGH (ref 70–99)
HBA1C BLD-MCNC: 6.3 % — HIGH (ref 4–5.6)
HCT VFR BLD CALC: 28.5 % — LOW (ref 37–47)
HGB BLD-MCNC: 9.1 G/DL — LOW (ref 12–16)
MCHC RBC-ENTMCNC: 29 PG — SIGNIFICANT CHANGE UP (ref 27–31)
MCHC RBC-ENTMCNC: 31.9 G/DL — LOW (ref 32–37)
MCV RBC AUTO: 90.8 FL — SIGNIFICANT CHANGE UP (ref 81–99)
METHOD TYPE: SIGNIFICANT CHANGE UP
NRBC # BLD: 0 /100 WBCS — SIGNIFICANT CHANGE UP (ref 0–0)
ORGANISM # SPEC MICROSCOPIC CNT: SIGNIFICANT CHANGE UP
ORGANISM # SPEC MICROSCOPIC CNT: SIGNIFICANT CHANGE UP
PLATELET # BLD AUTO: 277 K/UL — SIGNIFICANT CHANGE UP (ref 130–400)
POTASSIUM SERPL-MCNC: 3.8 MMOL/L — SIGNIFICANT CHANGE UP (ref 3.5–5)
POTASSIUM SERPL-SCNC: 3.8 MMOL/L — SIGNIFICANT CHANGE UP (ref 3.5–5)
RBC # BLD: 3.14 M/UL — LOW (ref 4.2–5.4)
RBC # FLD: 16.9 % — HIGH (ref 11.5–14.5)
SODIUM SERPL-SCNC: 141 MMOL/L — SIGNIFICANT CHANGE UP (ref 135–146)
SPECIMEN SOURCE: SIGNIFICANT CHANGE UP
WBC # BLD: 7.59 K/UL — SIGNIFICANT CHANGE UP (ref 4.8–10.8)
WBC # FLD AUTO: 7.59 K/UL — SIGNIFICANT CHANGE UP (ref 4.8–10.8)

## 2019-01-12 RX ADMIN — Medication 1: at 17:33

## 2019-01-12 RX ADMIN — Medication 5 MILLIGRAM(S): at 11:44

## 2019-01-12 RX ADMIN — OXYCODONE AND ACETAMINOPHEN 2 TABLET(S): 5; 325 TABLET ORAL at 17:43

## 2019-01-12 RX ADMIN — OXYCODONE AND ACETAMINOPHEN 2 TABLET(S): 5; 325 TABLET ORAL at 05:30

## 2019-01-12 RX ADMIN — OXYCODONE AND ACETAMINOPHEN 2 TABLET(S): 5; 325 TABLET ORAL at 04:43

## 2019-01-12 RX ADMIN — Medication 300 MILLIGRAM(S): at 11:44

## 2019-01-12 RX ADMIN — Medication 50 MILLIGRAM(S): at 14:06

## 2019-01-12 RX ADMIN — GABAPENTIN 300 MILLIGRAM(S): 400 CAPSULE ORAL at 17:33

## 2019-01-12 RX ADMIN — GABAPENTIN 300 MILLIGRAM(S): 400 CAPSULE ORAL at 08:29

## 2019-01-12 RX ADMIN — SENNA PLUS 2 TABLET(S): 8.6 TABLET ORAL at 21:45

## 2019-01-12 RX ADMIN — Medication 100 MILLIGRAM(S): at 08:29

## 2019-01-12 RX ADMIN — Medication 7.5 MILLIGRAM(S): at 08:30

## 2019-01-12 RX ADMIN — CHLORHEXIDINE GLUCONATE 1 APPLICATION(S): 213 SOLUTION TOPICAL at 05:30

## 2019-01-12 RX ADMIN — APIXABAN 5 MILLIGRAM(S): 2.5 TABLET, FILM COATED ORAL at 17:33

## 2019-01-12 RX ADMIN — OXYCODONE AND ACETAMINOPHEN 2 TABLET(S): 5; 325 TABLET ORAL at 12:06

## 2019-01-12 RX ADMIN — OXYCODONE AND ACETAMINOPHEN 2 TABLET(S): 5; 325 TABLET ORAL at 14:09

## 2019-01-12 RX ADMIN — Medication 100 MILLIGRAM(S): at 17:33

## 2019-01-12 RX ADMIN — Medication 50 MILLIGRAM(S): at 21:45

## 2019-01-12 RX ADMIN — Medication 50 MILLIGRAM(S): at 08:29

## 2019-01-12 RX ADMIN — Medication 2: at 12:12

## 2019-01-12 RX ADMIN — Medication 1 MILLIGRAM(S): at 11:44

## 2019-01-12 RX ADMIN — APIXABAN 5 MILLIGRAM(S): 2.5 TABLET, FILM COATED ORAL at 08:29

## 2019-01-12 NOTE — PROGRESS NOTE ADULT - PROBLEM SELECTOR PLAN 1
- s/p CTA w/o intervention ( pt has patent large vessels with small vessel Dz)  -c/w IV Abx   - s/p right 3rd toe amputation on 1/11/19  - Percocet Q 4h PRN   - c/w local wound care, f/u podiatry recommendations

## 2019-01-12 NOTE — PROGRESS NOTE ADULT - ASSESSMENT
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 While in the hospital pt was consulted by vascular Sx , underwent CTA  w/o any intervention, she has small vessel Dz .  She was consulted by podiatry and underwent right 3 rd toe amputation on 1/10/19. Podiatry following for wound care, will f/u recommendations. For pain control c/w Percocet Q 4 hours PRN.  Pt received stress dose of IV steroids before surgery , her blood glucose was elevated  and she was started on Insulin sliding scale on 1/11/19.  Today pt feels better, podiatry following for wound care, will taper po Prednisone to 5 mg po Q24 hours today and f/u podiatry recommendations ( possible OR on Monday for wound closure )

## 2019-01-12 NOTE — PROGRESS NOTE ADULT - SUBJECTIVE AND OBJECTIVE BOX
SUBJECTIVE:    Patient is a 78y old Female who presents with a chief complaint of "I couldn't stand up from the recliner", and worsening pain of bilateral lower extremities, pt was admitted for multiple LE ulcers.  Pt had a CTA on 1/7/19 w/o any intervention. She has an area or dry necrosis on her right 3rd toe, consulted by podiatry and underwent right 3 rd toe amputation on 1/10/19. Podiatry following, will f/u recommendations and taper down Prednisone.   Today is c/o right foot pain at times which gets better with pain medications.     PAST MEDICAL & SURGICAL HISTORY  Gout  DVT (deep venous thrombosis)  HTN (hypertension)  Rheumatoid arteritis  Other specified diabetes mellitus with other specified complication, unspecified whether long term insulin use  Primary osteoarthritis of right knee: s/p knee repalcement    SOCIAL HISTORY:  Negative for smoking/alcohol/drug use.     ALLERGIES:  clindamycin (Unknown)  erythromycin (Unknown)  penicillin (Unknown)  strawberry (Unknown)    VITALS:   T(C): 36.1 (12 Jan 2019 05:26), Max: 36.6 (11 Jan 2019 13:29)  T(F): 97 (12 Jan 2019 05:26), Max: 97.9 (11 Jan 2019 13:29)  HR: 87 (12 Jan 2019 05:26) (82 - 94)  BP: 135/62 (12 Jan 2019 05:26) (123/58 - 135/62)  RR: 16 (12 Jan 2019 05:26) (14 - 18)  SpO2: 96% (11 Jan 2019 19:40) (96% - 96%)    PHYSICAL EXAM:   GEN: No acute distress  LUNGS: Clear to auscultation bilaterally   HEART: S1/S2 present.    ABD: Soft, non-tender, non-distended.    EXT: B/l lower extremity changes , dressing on right foot noted   NEURO: AAOX3, no focal neuro deficit noted         LABS:                                          9.1    7.59  )-----------( 277      ( 12 Jan 2019 08:08 )             28.5   01-12    141  |  103  |  29<H>  ----------------------------<  114<H>  3.8   |  22  |  1.2    Ca    9.4      12 Jan 2019 08:08    RADIOLOGY:    < from: Xray Foot AP + Lateral + Oblique, Right (01.08.19 @ 09:38) >  Impression:    Findings highly suspicious for progression of septic   arthritis/osteomyelitis of the third metatarsophalangeal joint, and new   osteomyelitis of the second metatarsal head. Recommend MRI of the right   forefoot for further evaluation.    < from: Xray Foot AP + Lateral + Oblique, Left (01.09.19 @ 11:20) >  FINDINGS/  IMPRESSION: No acute fracture.  Second MTP joint incongruency withhammertoe suggests mild subluxation.   Hammertoes also present at the third and fourth PIP joints. 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 Plantar heel spur and insertional   Achilles enthesopathy present. Vascular calcifications are present.        MEDICATIONS  (STANDING):  allopurinol 300 milliGRAM(s) Oral daily  apixaban 5 milliGRAM(s) Oral every 12 hours  aztreonam  IVPB 500 milliGRAM(s) IV Intermittent every 8 hours  aztreonam  IVPB      chlorhexidine 4% Liquid 1 Application(s) Topical <User Schedule>  dextrose 5%. 1000 milliLiter(s) (50 mL/Hr) IV Continuous <Continuous>  dextrose 50% Injectable 12.5 Gram(s) IV Push once  dextrose 50% Injectable 25 Gram(s) IV Push once  dextrose 50% Injectable 25 Gram(s) IV Push once  docusate sodium 100 milliGRAM(s) Oral two times a day  folic acid 1 milliGRAM(s) Oral daily  gabapentin 300 milliGRAM(s) Oral every 12 hours  insulin lispro (HumaLOG) corrective regimen sliding scale   SubCutaneous three times a day before meals  leucovorin 5 milliGRAM(s) Oral daily  morphine  - Injectable 2 milliGRAM(s) IV Push daily  predniSONE   Tablet 7.5 milliGRAM(s) Oral daily  rifampin 300 milliGRAM(s) Oral two times a day  senna 2 Tablet(s) Oral at bedtime    MEDICATIONS  (PRN):  acetaminophen   Tablet .. 650 milliGRAM(s) Oral every 6 hours PRN Moderate Pain (4 - 6)  benzocaine 15 mG/menthol 3.6 mG (Sugar-Free) Lozenge 1 Lozenge Oral three times a day PRN Sore Throat  dextrose 40% Gel 15 Gram(s) Oral once PRN Blood Glucose LESS THAN 70 milliGRAM(s)/deciliter  glucagon  Injectable 1 milliGRAM(s) IntraMuscular once PRN Glucose LESS THAN 70 milligrams/deciliter  morphine  - Injectable 2 milliGRAM(s) IV Push every 4 hours PRN Severe Pain (7 - 10)  oxyCODONE    5 mG/acetaminophen 325 mG 2 Tablet(s) Oral every 4 hours PRN Severe Pain (7 - 10)  oxyCODONE    5 mG/acetaminophen 325 mG 1 Tablet(s) Oral every 6 hours PRN Moderate Pain (4 - 6)

## 2019-01-12 NOTE — PROGRESS NOTE ADULT - SUBJECTIVE AND OBJECTIVE BOX
s/p 36 hr partial 3rd ray resection. Pain controlled with oxycodone 5mg q 4h.  No heel cushions. AF x 48h  Refused last dose of Azactam because she states it made her dizzy. Also had MS at the time which was the most likely causitive factor.  Dressing intact. Wound clean with no ischemic changes of adjacent digits or periwound tissue. No malodor, no purulence. Fair amount of bleeding with packing removal.   WBC  7.70  HH 9.2/ 28.8  G 144  Bun/Cre  34/ 1.3  eGFR 40  Xrays reviewed  Awaiting bone micro/path- request not noted in [place] system  Requires b/l heel cushions  1/2 packing removed, saline flush, xeroform DSD  Medicine-please evaluate for temporary  reduction in prednisone  Rifampin started by Rheumatology months ago  to prevent any re-activation of latent TB while getting DMARD for RA.  Plan still close observation for progressing infection/ischemia with planned return to OR for primary closure some time next week.  Pain control  Heel cushions  Out of bed to chair daily with assistance  Remove packing tomorrow and re-pack if necessary

## 2019-01-13 LAB
GLUCOSE BLDC GLUCOMTR-MCNC: 113 MG/DL — HIGH (ref 70–99)
GLUCOSE BLDC GLUCOMTR-MCNC: 156 MG/DL — HIGH (ref 70–99)
GLUCOSE BLDC GLUCOMTR-MCNC: 162 MG/DL — HIGH (ref 70–99)
GLUCOSE BLDC GLUCOMTR-MCNC: 216 MG/DL — HIGH (ref 70–99)

## 2019-01-13 RX ORDER — SODIUM HYPOCHLORITE 0.125 %
1 SOLUTION, NON-ORAL MISCELLANEOUS DAILY
Qty: 0 | Refills: 0 | Status: DISCONTINUED | OUTPATIENT
Start: 2019-01-13 | End: 2019-01-19

## 2019-01-13 RX ADMIN — SENNA PLUS 2 TABLET(S): 8.6 TABLET ORAL at 22:12

## 2019-01-13 RX ADMIN — APIXABAN 5 MILLIGRAM(S): 2.5 TABLET, FILM COATED ORAL at 17:21

## 2019-01-13 RX ADMIN — OXYCODONE AND ACETAMINOPHEN 1 TABLET(S): 5; 325 TABLET ORAL at 00:14

## 2019-01-13 RX ADMIN — OXYCODONE AND ACETAMINOPHEN 2 TABLET(S): 5; 325 TABLET ORAL at 11:16

## 2019-01-13 RX ADMIN — GABAPENTIN 300 MILLIGRAM(S): 400 CAPSULE ORAL at 17:21

## 2019-01-13 RX ADMIN — Medication 300 MILLIGRAM(S): at 11:17

## 2019-01-13 RX ADMIN — Medication 50 MILLIGRAM(S): at 07:04

## 2019-01-13 RX ADMIN — OXYCODONE AND ACETAMINOPHEN 2 TABLET(S): 5; 325 TABLET ORAL at 09:53

## 2019-01-13 RX ADMIN — Medication 50 MILLIGRAM(S): at 22:12

## 2019-01-13 RX ADMIN — Medication 100 MILLIGRAM(S): at 08:42

## 2019-01-13 RX ADMIN — Medication 1 MILLIGRAM(S): at 11:17

## 2019-01-13 RX ADMIN — OXYCODONE AND ACETAMINOPHEN 2 TABLET(S): 5; 325 TABLET ORAL at 17:21

## 2019-01-13 RX ADMIN — Medication 5 MILLIGRAM(S): at 08:42

## 2019-01-13 RX ADMIN — Medication 2: at 12:07

## 2019-01-13 RX ADMIN — APIXABAN 5 MILLIGRAM(S): 2.5 TABLET, FILM COATED ORAL at 08:41

## 2019-01-13 RX ADMIN — Medication 5 MILLIGRAM(S): at 11:17

## 2019-01-13 RX ADMIN — OXYCODONE AND ACETAMINOPHEN 2 TABLET(S): 5; 325 TABLET ORAL at 22:12

## 2019-01-13 RX ADMIN — GABAPENTIN 300 MILLIGRAM(S): 400 CAPSULE ORAL at 08:41

## 2019-01-13 RX ADMIN — OXYCODONE AND ACETAMINOPHEN 2 TABLET(S): 5; 325 TABLET ORAL at 22:42

## 2019-01-13 RX ADMIN — Medication 50 MILLIGRAM(S): at 13:37

## 2019-01-13 RX ADMIN — Medication 100 MILLIGRAM(S): at 17:21

## 2019-01-13 NOTE — PROGRESS NOTE ADULT - PROBLEM SELECTOR PLAN 1
- s/p CTA w/o intervention ( pt has patent large vessels with small vessel Dz)  -c/w IV Abx   - s/p right 3rd toe amputation on 1/11/19  - Percocet Q 4h PRN   - c/w local wound care, f/u podiatry recommendations  - f/u bone biopsy

## 2019-01-13 NOTE — PROGRESS NOTE ADULT - SUBJECTIVE AND OBJECTIVE BOX
SUBJECTIVE:    Patient is a 78y old Female who presents with a chief complaint of "I couldn't stand up from the recliner", and worsening pain of bilateral lower extremities (13 Jan 2019 11:43)    Currently admitted to medicine with the primary diagnosis of Wounds, multiple open, lower extremity     Today is hospital day 25d. This morning she is resting comfortably in bed and reports no new issues or overnight events.     PAST MEDICAL & SURGICAL HISTORY  Gout  DVT (deep venous thrombosis)  HTN (hypertension)  Rheumatoid arteritis  Other specified diabetes mellitus with other specified complication, unspecified whether long term insulin use  Primary osteoarthritis of right knee: s/p knee repalcement    SOCIAL HISTORY:  Negative for smoking/alcohol/drug use.     ALLERGIES:  clindamycin (Unknown)  erythromycin (Unknown)  penicillin (Unknown)  strawberry (Unknown)    MEDICATIONS:  STANDING MEDICATIONS  allopurinol 300 milliGRAM(s) Oral daily  apixaban 5 milliGRAM(s) Oral every 12 hours  aztreonam  IVPB 500 milliGRAM(s) IV Intermittent every 8 hours  aztreonam  IVPB      chlorhexidine 4% Liquid 1 Application(s) Topical <User Schedule>  dextrose 5%. 1000 milliLiter(s) IV Continuous <Continuous>  dextrose 50% Injectable 12.5 Gram(s) IV Push once  dextrose 50% Injectable 25 Gram(s) IV Push once  dextrose 50% Injectable 25 Gram(s) IV Push once  docusate sodium 100 milliGRAM(s) Oral two times a day  folic acid 1 milliGRAM(s) Oral daily  gabapentin 300 milliGRAM(s) Oral every 12 hours  insulin lispro (HumaLOG) corrective regimen sliding scale   SubCutaneous three times a day before meals  leucovorin 5 milliGRAM(s) Oral daily  morphine  - Injectable 2 milliGRAM(s) IV Push daily  predniSONE   Tablet 5 milliGRAM(s) Oral daily  rifampin 300 milliGRAM(s) Oral two times a day  senna 2 Tablet(s) Oral at bedtime    PRN MEDICATIONS  acetaminophen   Tablet .. 650 milliGRAM(s) Oral every 6 hours PRN  benzocaine 15 mG/menthol 3.6 mG (Sugar-Free) Lozenge 1 Lozenge Oral three times a day PRN  dextrose 40% Gel 15 Gram(s) Oral once PRN  glucagon  Injectable 1 milliGRAM(s) IntraMuscular once PRN  morphine  - Injectable 2 milliGRAM(s) IV Push every 4 hours PRN  oxyCODONE    5 mG/acetaminophen 325 mG 2 Tablet(s) Oral every 4 hours PRN  oxyCODONE    5 mG/acetaminophen 325 mG 1 Tablet(s) Oral every 6 hours PRN    VITALS:   T(F): 96.4  HR: 80  BP: 137/84  RR: 18  SpO2: --    PHYSICAL EXAM:   GEN: No acute distress  LUNGS: Clear to auscultation bilaterally   HEART: S1/S2 present.    ABD: Soft, non-tender, non-distended.    EXT: LE changes   NEURO: AAOX3        LABS:                        9.1    7.59  )-----------( 277      ( 12 Jan 2019 08:08 )             28.5     01-12    141  |  103  |  29<H>  ----------------------------<  114<H>  3.8   |  22  |  1.2    Ca    9.4      12 Jan 2019 08:08                  Culture - Other (collected 10 Delfino 2019 14:00)  Source: .Other None  Final Report (12 Jan 2019 17:41):    Few Pseudomonas aeruginosa (Carbapenem Resistant)  Organism: Pseudomonas aeruginosa (Carbapenem Resistant) (12 Jan 2019 17:41)  Organism: Pseudomonas aeruginosa (Carbapenem Resistant) (12 Jan 2019 17:41)            RADIOLOGY:

## 2019-01-13 NOTE — PROGRESS NOTE ADULT - SUBJECTIVE AND OBJECTIVE BOX
Pt is s/p 3 days  partial 3rd ray resection. Pain controlled with oxycodone 5mg q 4h.   AF x 48h  Dressing intact. Wound clean with no ischemic changes of adjacent digits or periwound tissue. No malodor, no purulence. Fair amount of bleeding with packing removal.   Photos reviewed  WBC  7.59  HH 9.1/ 28.5  G 114  Bun/Cre 29/ 1.2  HbA1c 6.3  Mean glu 134  Xrays reviewed  Awaiting bone path- request not noted in [place] system  Intra op bone cx  Carbapenem resistant pseudomonas  I- Aztreonam  S  Cefepime, Zosyn, Ceftazadime   Pt Allergic PCN  b/l heel cushions  1/2 packing removed, saline flush, xeroform DSD  Medicine effected  reduction in prednisone to 5 mg q 24h  Rifampin started by Rheumatology months ago  to prevent any re-activation of latent TB while getting DMARD for RA.  Plan still close observation for progressing infection/ischemia with planned return to OR for primary closure some time next week.  Pain control  Out of bed to chair daily with assistance  Remove packing tomorrow and re-pack if necessary  ID to eval bone cultures in am

## 2019-01-13 NOTE — PROGRESS NOTE ADULT - ASSESSMENT
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 While in the hospital pt was consulted by vascular Sx , underwent CTA  w/o any intervention, she has small vessel Dz .  She was consulted by podiatry and underwent right 3 rd toe amputation on 1/10/19. Podiatry following for wound care, will f/u recommendations. For pain control c/w Percocet Q 4 hours PRN.  Pt received stress dose of IV steroids before surgery , her blood glucose was elevated  and she was started on Insulin sliding scale on 1/11/19.   Prednisone was tapered  to 5 mg po Q24 hours today and f/u podiatry recommendations ( possible OR on next week  for wound closure )

## 2019-01-13 NOTE — PROGRESS NOTE ADULT - ASSESSMENT
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    #Bilateral Chronic Nonhealing Ulcers and concurrent LE cellulitis   -per podiatry betadine to lesions,s/p debridement by burn , s/p angiogram. Large vessels patent , small vessel disease. s/p amputation  -Temp on 102.2F 12/21 ; Afebrile since  -New lucency on xray at third metatarsal head. MRI not required for now per podiatry  -doxycycline oral 100mg q 12 for 14 days , growing Pseudomonas (carbapenum resistant) , will require cefepime but pt refusing cefepime. Changed to Aztreonam	  -Podiatry: Betadine paint to digit 3R BID; Xeroform to ulcers with loose dressing; Limb elevation.    -Duplex LE venous: Chronic-appearing right common femoral and femoral deep venous thromboses; left popliteal deep venous thrombosis  -Duplex LE arterial: On the right: Mild to moderate disease at the popliteal artery level. On the left: Mild popliteal and posterior tibial artery disease; Remaining tibial arteries not visualized secondary to edema   -Uric acid levels normal. .  -serial xrays    #) Direct antiglobin positive   - Blood was sent for type and cross match, hb holding.   -Might need hem /onc consult     #Chronic Right Hip Pain  -RIght hip xray: Diffuse osteopenia  -C/w Physical therapy. Will check Vit D levels and replete if deficient    #Chronic B/L femoral and popliteal DVT- eliquis     #Chronic Normocytic Anemia- Likely 2/2 RA; Hb at baseline  #Rheumatoid Arthritis -Continue home prednisone. talked to Dr Ascencio (Dr Henry's team)- Can decrease prednisone 5mg if healing hampered. No other adjustments needed. one dose of solumedrol before going for OR  #DM II-Controlled; Monitor finger sticks, start insulin sliding scale if fingersticks are greater than 180  #Latent TB- rifampin  #Gout- Normal uric acid levels; c/w Allopurinol    DVT ppx: On Eliquis ,   GI ppx: Not indicated  Diet: DASH Carb consistent  ,   Activity: AAT  Code Status: DNR/DNI, MOLST form completed with health care proxy as witness by bedside, form in chart  Disposition: pending

## 2019-01-13 NOTE — PROGRESS NOTE ADULT - SUBJECTIVE AND OBJECTIVE BOX
SUBJECTIVE:    Patient is a 78y old Female who presents with a chief complaint of "I couldn't stand up from the recliner", and worsening pain of bilateral lower extremities, pt was admitted for multiple LE ulcers.  Pt had a CTA on 1/7/19 w/o any intervention. She has an area or dry necrosis on her right 3rd toe, consulted by podiatry and underwent right 3 rd toe amputation on 1/10/19. Podiatry following, will f/u recommendation for another Sx next week.   Today is c/o right foot pain with dressing changes and constipation.     PAST MEDICAL & SURGICAL HISTORY  Gout  DVT (deep venous thrombosis)  HTN (hypertension)  Rheumatoid arteritis  Other specified diabetes mellitus with other specified complication, unspecified whether long term insulin use  Primary osteoarthritis of right knee: s/p knee repalcement    SOCIAL HISTORY:  Negative for smoking/alcohol/drug use.     ALLERGIES:  clindamycin (Unknown)  erythromycin (Unknown)  penicillin (Unknown)  strawberry (Unknown)    VITALS:   T(C): 35.8 (13 Jan 2019 05:55), Max: 36.9 (12 Jan 2019 14:11)  T(F): 96.4 (13 Jan 2019 05:55), Max: 98.4 (12 Jan 2019 14:11)  HR: 80 (13 Jan 2019 05:55) (74 - 90)  BP: 137/84 (13 Jan 2019 05:55) (105/52 - 137/84)  RR: 18 (13 Jan 2019 05:55) (16 - 20)    PHYSICAL EXAM:   GEN: No acute distress  LUNGS: Clear to auscultation bilaterally   HEART: S1/S2 present.    ABD: Soft, non-tender, non-distended.    EXT: B/l lower extremity changes , dressing on right foot noted   NEURO: AAOX3, no focal neuro deficit noted         LABS:       no new labs today                                    9.1    7.59  )-----------( 277      ( 12 Jan 2019 08:08 )             28.5   01-12    141  |  103  |  29<H>  ----------------------------<  114<H>  3.8   |  22  |  1.2    Ca    9.4      12 Jan 2019 08:08    RADIOLOGY:    < from: Xray Foot AP + Lateral + Oblique, Right (01.08.19 @ 09:38) >  Impression:    Findings highly suspicious for progression of septic   arthritis/osteomyelitis of the third metatarsophalangeal joint, and new   osteomyelitis of the second metatarsal head. Recommend MRI of the right   forefoot for further evaluation.    < from: Xray Foot AP + Lateral + Oblique, Left (01.09.19 @ 11:20) >  FINDINGS/  IMPRESSION: No acute fracture.  Second MTP joint incongruency withhammertoe suggests mild subluxation.   Hammertoes also present at the third and fourth PIP joints. 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 Plantar heel spur and insertional   Achilles enthesopathy present. Vascular calcifications are present.        MEDICATIONS  (STANDING):  allopurinol 300 milliGRAM(s) Oral daily  apixaban 5 milliGRAM(s) Oral every 12 hours  aztreonam  IVPB 500 milliGRAM(s) IV Intermittent every 8 hours  aztreonam  IVPB      chlorhexidine 4% Liquid 1 Application(s) Topical <User Schedule>  dextrose 5%. 1000 milliLiter(s) (50 mL/Hr) IV Continuous <Continuous>  dextrose 50% Injectable 12.5 Gram(s) IV Push once  dextrose 50% Injectable 25 Gram(s) IV Push once  dextrose 50% Injectable 25 Gram(s) IV Push once  docusate sodium 100 milliGRAM(s) Oral two times a day  folic acid 1 milliGRAM(s) Oral daily  gabapentin 300 milliGRAM(s) Oral every 12 hours  insulin lispro (HumaLOG) corrective regimen sliding scale   SubCutaneous three times a day before meals  leucovorin 5 milliGRAM(s) Oral daily  morphine  - Injectable 2 milliGRAM(s) IV Push daily  predniSONE   Tablet 5 milliGRAM(s) Oral daily  rifampin 300 milliGRAM(s) Oral two times a day  senna 2 Tablet(s) Oral at bedtime    MEDICATIONS  (PRN):  acetaminophen   Tablet .. 650 milliGRAM(s) Oral every 6 hours PRN Moderate Pain (4 - 6)  benzocaine 15 mG/menthol 3.6 mG (Sugar-Free) Lozenge 1 Lozenge Oral three times a day PRN Sore Throat  dextrose 40% Gel 15 Gram(s) Oral once PRN Blood Glucose LESS THAN 70 milliGRAM(s)/deciliter  glucagon  Injectable 1 milliGRAM(s) IntraMuscular once PRN Glucose LESS THAN 70 milligrams/deciliter  morphine  - Injectable 2 milliGRAM(s) IV Push every 4 hours PRN Severe Pain (7 - 10)  oxyCODONE    5 mG/acetaminophen 325 mG 2 Tablet(s) Oral every 4 hours PRN Severe Pain (7 - 10)  oxyCODONE    5 mG/acetaminophen 325 mG 1 Tablet(s) Oral every 6 hours PRN Moderate Pain (4 - 6)

## 2019-01-14 LAB
ANION GAP SERPL CALC-SCNC: 13 MMOL/L — SIGNIFICANT CHANGE UP (ref 7–14)
BUN SERPL-MCNC: 28 MG/DL — HIGH (ref 10–20)
CALCIUM SERPL-MCNC: 9.1 MG/DL — SIGNIFICANT CHANGE UP (ref 8.5–10.1)
CHLORIDE SERPL-SCNC: 101 MMOL/L — SIGNIFICANT CHANGE UP (ref 98–110)
CO2 SERPL-SCNC: 24 MMOL/L — SIGNIFICANT CHANGE UP (ref 17–32)
CREAT SERPL-MCNC: 1.2 MG/DL — SIGNIFICANT CHANGE UP (ref 0.7–1.5)
GLUCOSE BLDC GLUCOMTR-MCNC: 155 MG/DL — HIGH (ref 70–99)
GLUCOSE BLDC GLUCOMTR-MCNC: 156 MG/DL — HIGH (ref 70–99)
GLUCOSE BLDC GLUCOMTR-MCNC: 208 MG/DL — HIGH (ref 70–99)
GLUCOSE BLDC GLUCOMTR-MCNC: 211 MG/DL — HIGH (ref 70–99)
GLUCOSE SERPL-MCNC: 209 MG/DL — HIGH (ref 70–99)
HCT VFR BLD CALC: 28.6 % — LOW (ref 37–47)
HGB BLD-MCNC: 8.8 G/DL — LOW (ref 12–16)
MCHC RBC-ENTMCNC: 28.5 PG — SIGNIFICANT CHANGE UP (ref 27–31)
MCHC RBC-ENTMCNC: 30.8 G/DL — LOW (ref 32–37)
MCV RBC AUTO: 92.6 FL — SIGNIFICANT CHANGE UP (ref 81–99)
NRBC # BLD: 0 /100 WBCS — SIGNIFICANT CHANGE UP (ref 0–0)
PLATELET # BLD AUTO: 272 K/UL — SIGNIFICANT CHANGE UP (ref 130–400)
POTASSIUM SERPL-MCNC: 3.9 MMOL/L — SIGNIFICANT CHANGE UP (ref 3.5–5)
POTASSIUM SERPL-SCNC: 3.9 MMOL/L — SIGNIFICANT CHANGE UP (ref 3.5–5)
RBC # BLD: 3.09 M/UL — LOW (ref 4.2–5.4)
RBC # FLD: 17.2 % — HIGH (ref 11.5–14.5)
SODIUM SERPL-SCNC: 138 MMOL/L — SIGNIFICANT CHANGE UP (ref 135–146)
WBC # BLD: 5.55 K/UL — SIGNIFICANT CHANGE UP (ref 4.8–10.8)
WBC # FLD AUTO: 5.55 K/UL — SIGNIFICANT CHANGE UP (ref 4.8–10.8)

## 2019-01-14 RX ORDER — LACTULOSE 10 G/15ML
10 SOLUTION ORAL THREE TIMES A DAY
Qty: 0 | Refills: 0 | Status: DISCONTINUED | OUTPATIENT
Start: 2019-01-14 | End: 2019-01-19

## 2019-01-14 RX ADMIN — OXYCODONE AND ACETAMINOPHEN 1 TABLET(S): 5; 325 TABLET ORAL at 01:16

## 2019-01-14 RX ADMIN — Medication 50 MILLIGRAM(S): at 22:05

## 2019-01-14 RX ADMIN — Medication 50 MILLIGRAM(S): at 14:51

## 2019-01-14 RX ADMIN — Medication 1 MILLIGRAM(S): at 12:35

## 2019-01-14 RX ADMIN — Medication 100 MILLIGRAM(S): at 17:24

## 2019-01-14 RX ADMIN — OXYCODONE AND ACETAMINOPHEN 1 TABLET(S): 5; 325 TABLET ORAL at 00:46

## 2019-01-14 RX ADMIN — APIXABAN 5 MILLIGRAM(S): 2.5 TABLET, FILM COATED ORAL at 08:05

## 2019-01-14 RX ADMIN — OXYCODONE AND ACETAMINOPHEN 2 TABLET(S): 5; 325 TABLET ORAL at 04:46

## 2019-01-14 RX ADMIN — GABAPENTIN 300 MILLIGRAM(S): 400 CAPSULE ORAL at 08:05

## 2019-01-14 RX ADMIN — OXYCODONE AND ACETAMINOPHEN 2 TABLET(S): 5; 325 TABLET ORAL at 13:49

## 2019-01-14 RX ADMIN — Medication 50 MILLIGRAM(S): at 07:34

## 2019-01-14 RX ADMIN — Medication 5 MILLIGRAM(S): at 12:34

## 2019-01-14 RX ADMIN — Medication 1 APPLICATION(S): at 12:36

## 2019-01-14 RX ADMIN — Medication 5 MILLIGRAM(S): at 08:05

## 2019-01-14 RX ADMIN — LACTULOSE 10 GRAM(S): 10 SOLUTION ORAL at 22:06

## 2019-01-14 RX ADMIN — Medication 1: at 08:05

## 2019-01-14 RX ADMIN — Medication 100 MILLIGRAM(S): at 08:05

## 2019-01-14 RX ADMIN — GABAPENTIN 300 MILLIGRAM(S): 400 CAPSULE ORAL at 17:23

## 2019-01-14 RX ADMIN — Medication 2: at 12:34

## 2019-01-14 RX ADMIN — Medication 300 MILLIGRAM(S): at 12:35

## 2019-01-14 RX ADMIN — Medication 2: at 17:23

## 2019-01-14 RX ADMIN — APIXABAN 5 MILLIGRAM(S): 2.5 TABLET, FILM COATED ORAL at 17:23

## 2019-01-14 RX ADMIN — MORPHINE SULFATE 2 MILLIGRAM(S): 50 CAPSULE, EXTENDED RELEASE ORAL at 12:39

## 2019-01-14 RX ADMIN — SENNA PLUS 2 TABLET(S): 8.6 TABLET ORAL at 22:05

## 2019-01-14 NOTE — PROGRESS NOTE ADULT - SUBJECTIVE AND OBJECTIVE BOX
SUBJECTIVE:  Patient is a 78y old Female who presents with a chief complaint of "I couldn't stand up from the recliner", and worsening pain of bilateral lower extremities, pt was admitted for multiple LE ulcers.  Pt had a CTA on 1/7/19 w/o any intervention. She has an area or dry necrosis on her right 3rd toe, consulted by podiatry and underwent right 3 rd toe amputation on 1/10/19. Podiatry following, will f/u recommendation for another Sx this  week.   Today is c/o right foot pain with dressing changes and constipation.     PAST MEDICAL & SURGICAL HISTORY  Gout  DVT (deep venous thrombosis)  HTN (hypertension)  Rheumatoid arteritis  Other specified diabetes mellitus with other specified complication, unspecified whether long term insulin use  Primary osteoarthritis of right knee: s/p knee repalcement    SOCIAL HISTORY:  Negative for smoking/alcohol/drug use.     ALLERGIES:  clindamycin (Unknown)  erythromycin (Unknown)  penicillin (Unknown)  strawberry (Unknown)    VITALS:   T(C): 35.7 (14 Jan 2019 14:29), Max: 36.2 (13 Jan 2019 20:05)  T(F): 96.2 (14 Jan 2019 14:29), Max: 97.1 (13 Jan 2019 20:05)  HR: 83 (14 Jan 2019 14:29) (78 - 87)  BP: 125/69 (14 Jan 2019 14:29) (103/58 - 168/68)  BP(mean): --  RR: 18 (14 Jan 2019 14:29) (14 - 18)  SpO2: 98% (14 Jan 2019 08:18) (98% - 98%)    PHYSICAL EXAM:   GEN: No acute distress  LUNGS: Clear to auscultation bilaterally   HEART: S1/S2 present.    ABD: Soft, non-tender, non-distended.    EXT: B/l lower extremity changes , dressing on right foot noted   NEURO: AAOX3, no focal neuro deficit noted         LABS:                        8.8    5.55  )-----------( 272      ( 14 Jan 2019 09:36 )             28.6   01-14    138  |  101  |  28<H>  ----------------------------<  209<H>  3.9   |  24  |  1.2    Ca    9.1      14 Jan 2019 09:36        RADIOLOGY:    < from: Xray Foot AP + Lateral + Oblique, Right (01.08.19 @ 09:38) >  Impression:    Findings highly suspicious for progression of septic   arthritis/osteomyelitis of the third metatarsophalangeal joint, and new   osteomyelitis of the second metatarsal head. Recommend MRI of the right   forefoot for further evaluation.    < from: Xray Foot AP + Lateral + Oblique, Left (01.09.19 @ 11:20) >  FINDINGS/  IMPRESSION: No acute fracture.  Second MTP joint incongruency withhammertoe suggests mild subluxation.   Hammertoes also present at the third and fourth PIP joints. 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 Plantar heel spur and insertional   Achilles enthesopathy present. Vascular calcifications are present.        MEDICATIONS  (STANDING):  allopurinol 300 milliGRAM(s) Oral daily  apixaban 5 milliGRAM(s) Oral every 12 hours  aztreonam  IVPB 500 milliGRAM(s) IV Intermittent every 8 hours  aztreonam  IVPB      chlorhexidine 4% Liquid 1 Application(s) Topical <User Schedule>  Dakins Solution - 1/2 Strength 1 Application(s) Topical daily  dextrose 5%. 1000 milliLiter(s) (50 mL/Hr) IV Continuous <Continuous>  dextrose 50% Injectable 12.5 Gram(s) IV Push once  dextrose 50% Injectable 25 Gram(s) IV Push once  dextrose 50% Injectable 25 Gram(s) IV Push once  docusate sodium 100 milliGRAM(s) Oral two times a day  folic acid 1 milliGRAM(s) Oral daily  gabapentin 300 milliGRAM(s) Oral every 12 hours  insulin lispro (HumaLOG) corrective regimen sliding scale   SubCutaneous three times a day before meals  leucovorin 5 milliGRAM(s) Oral daily  morphine  - Injectable 2 milliGRAM(s) IV Push daily  predniSONE   Tablet 5 milliGRAM(s) Oral daily  rifampin 300 milliGRAM(s) Oral two times a day  senna 2 Tablet(s) Oral at bedtime    MEDICATIONS  (PRN):  acetaminophen   Tablet .. 650 milliGRAM(s) Oral every 6 hours PRN Moderate Pain (4 - 6)  benzocaine 15 mG/menthol 3.6 mG (Sugar-Free) Lozenge 1 Lozenge Oral three times a day PRN Sore Throat  dextrose 40% Gel 15 Gram(s) Oral once PRN Blood Glucose LESS THAN 70 milliGRAM(s)/deciliter  glucagon  Injectable 1 milliGRAM(s) IntraMuscular once PRN Glucose LESS THAN 70 milligrams/deciliter  oxyCODONE    5 mG/acetaminophen 325 mG 2 Tablet(s) Oral every 4 hours PRN Severe Pain (7 - 10)

## 2019-01-14 NOTE — PROGRESS NOTE ADULT - PROBLEM SELECTOR PLAN 1
- s/p CTA w/o intervention ( pt has patent large vessels with small vessel Dz)  -c/w IV Abx , ID follow up for Abx on discharge   - s/p right 3rd toe amputation on 1/11/19  - Percocet Q 4h PRN   - c/w local wound care, f/u podiatry recommendations  - f/u bone biopsy

## 2019-01-14 NOTE — PROGRESS NOTE ADULT - SUBJECTIVE AND OBJECTIVE BOX
Pt is s/p 4 days  partial 3rd ray resection. Pain controlled with oxycodone 5mg q 4h.  AF  Dressing intact. Wound clean with no ischemic changes of adjacent digits or periwound tissue. No malodor, no purulence. Fair amount of bleeding with packing removal.   Photos reviewed  WBC  5.55   HH 8.8/28.6   G 209  Bun/Cre 28/1.2  HbA1c 6.3  Mean glu 134  Awaiting bone path- request not noted in [facility] system- will contact lab to see if the sample was sent in appropriate fashion from the OR  Intra op bone cx  Carbapenem resistant pseudomonas  I- Aztreonam  S  Cefepime, Zosyn, Ceftazadime   Pt Allergic PCN  b/l heel cushions  1/2 packing removed, saline flush, xeroform DSD with Dakins soaked guaze  Medicine effected  reduction in prednisone to 5 mg q 24h  Plan still close observation for progressing infection/ischemia with planned return to OR for primary closure some time this week.  Pain control  Out of bed to chair daily with assistance  Remove packing tomorrow and re-pack if necessary  ID to eval bone cultures

## 2019-01-14 NOTE — PROGRESS NOTE ADULT - ASSESSMENT
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 While in the hospital pt was consulted by vascular Sx , underwent CTA  w/o any intervention, she has small vessel Dz .  She was consulted by podiatry and underwent right 3 rd toe amputation on 1/10/19. Podiatry following for wound care, will f/u recommendations. For pain control c/w Percocet Q 4 hours PRN.  Pt received stress dose of IV steroids before surgery , her blood glucose was elevated  and she was started on Insulin sliding scale on 1/11/19.   Prednisone was tapered  to 5 mg po Q24 hours. She is on IV ABx , will call ID for follow up.  Today I spoke with podiatry attending DR Villasenor office # [phone], he will f/u tomorrow morning and finalize the date for second procedure, will hold po AC 48 hours prior and start Heparin drip.

## 2019-01-14 NOTE — PROGRESS NOTE ADULT - SUBJECTIVE AND OBJECTIVE BOX
SUBJECTIVE:    Patient is a 78y old Female who presents with a chief complaint of "I couldn't stand up from the recliner", and worsening pain of bilateral lower extremities (13 Jan 2019 12:13)    Currently admitted to medicine with the primary diagnosis of Wounds, multiple open, lower extremity     Today is hospital day 26d. complaining of lower extremity pain.    PAST MEDICAL & SURGICAL HISTORY  Gout  DVT (deep venous thrombosis)  HTN (hypertension)  Rheumatoid arteritis  Other specified diabetes mellitus with other specified complication, unspecified whether long term insulin use  Primary osteoarthritis of right knee: s/p knee repalcement    SOCIAL HISTORY:  Negative for smoking/alcohol/drug use.     ALLERGIES:  clindamycin (Unknown)  erythromycin (Unknown)  penicillin (Unknown)  strawberry (Unknown)    MEDICATIONS:  STANDING MEDICATIONS  allopurinol 300 milliGRAM(s) Oral daily  apixaban 5 milliGRAM(s) Oral every 12 hours  aztreonam  IVPB 500 milliGRAM(s) IV Intermittent every 8 hours  aztreonam  IVPB      chlorhexidine 4% Liquid 1 Application(s) Topical <User Schedule>  Dakins Solution - 1/2 Strength 1 Application(s) Topical daily  dextrose 5%. 1000 milliLiter(s) IV Continuous <Continuous>  dextrose 50% Injectable 12.5 Gram(s) IV Push once  dextrose 50% Injectable 25 Gram(s) IV Push once  dextrose 50% Injectable 25 Gram(s) IV Push once  docusate sodium 100 milliGRAM(s) Oral two times a day  folic acid 1 milliGRAM(s) Oral daily  gabapentin 300 milliGRAM(s) Oral every 12 hours  insulin lispro (HumaLOG) corrective regimen sliding scale   SubCutaneous three times a day before meals  leucovorin 5 milliGRAM(s) Oral daily  morphine  - Injectable 2 milliGRAM(s) IV Push daily  predniSONE   Tablet 5 milliGRAM(s) Oral daily  rifampin 300 milliGRAM(s) Oral two times a day  senna 2 Tablet(s) Oral at bedtime    PRN MEDICATIONS  acetaminophen   Tablet .. 650 milliGRAM(s) Oral every 6 hours PRN  benzocaine 15 mG/menthol 3.6 mG (Sugar-Free) Lozenge 1 Lozenge Oral three times a day PRN  dextrose 40% Gel 15 Gram(s) Oral once PRN  glucagon  Injectable 1 milliGRAM(s) IntraMuscular once PRN  morphine  - Injectable 2 milliGRAM(s) IV Push every 4 hours PRN  oxyCODONE    5 mG/acetaminophen 325 mG 2 Tablet(s) Oral every 4 hours PRN  oxyCODONE    5 mG/acetaminophen 325 mG 1 Tablet(s) Oral every 6 hours PRN    VITALS:   T(F): 96.2  HR: 78  BP: 168/68  RR: 18  SpO2: --    PHYSICAL EXAM:   GEN: No acute distress  LUNGS: Clear to auscultation bilaterally   HEART: S1/S2 present.    ABD: Soft, non-tender, non-distended.    EXT: LE changes   NEURO: AAOX3      LABS:                            RADIOLOGY:

## 2019-01-14 NOTE — PROGRESS NOTE ADULT - ASSESSMENT
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    #Bilateral Chronic Nonhealing Ulcers and concurrent LE cellulitis   -per podiatry betadine to lesions,s/p debridement by burn , s/p angiogram. Large vessels patent , small vessel disease. s/p amputation  -Temp on 102.2F 12/21 ; Afebrile since  -New lucency on xray at third metatarsal head. MRI not required for now per podiatry  -doxycycline oral 100mg q 12 for 14 days , growing Pseudomonas (carbapenum resistant) , will require cefepime but pt refusing cefepime. Changed to Aztreona. Pending Bone culture .  -Podiatry: Betadine paint to digit 3R BID; Xeroform to ulcers with loose dressing; Limb elevation.    -Duplex LE venous: Chronic-appearing right common femoral and femoral deep venous thromboses; left popliteal deep venous thrombosis  -Duplex LE arterial: On the right: Mild to moderate disease at the popliteal artery level. On the left: Mild popliteal and posterior tibial artery disease; Remaining tibial arteries not visualized secondary to edema   -Uric acid levels normal. .  -serial xrays    #) Direct antiglobin positive   - Blood was sent for type and cross match, hb holding.   -Might need hem /onc consult     #Chronic Right Hip Pain  -RIght hip xray: Diffuse osteopenia  -C/w Physical therapy. Will check Vit D levels and replete if deficient    #Chronic B/L femoral and popliteal DVT- eliquis     #Chronic Normocytic Anemia- Likely 2/2 RA; Hb at baseline  #Rheumatoid Arthritis -Continue home prednisone. talked to Dr Ascencio (Dr Henry's team)- Can decrease prednisone 5mg if healing hampered. No other adjustments needed. one dose of solumedrol before going for OR  #DM II-Controlled; Monitor finger sticks, start insulin sliding scale if fingersticks are greater than 180  #Latent TB- rifampin  #Gout- Normal uric acid levels; c/w Allopurinol    DVT ppx: On Eliquis ,   GI ppx: Not indicated  Diet: DASH Carb consistent  ,   Activity: AAT  Code Status: DNR/DNI, MOLST form completed with health care proxy as witness by bedside, form in chart  Disposition: pending

## 2019-01-15 LAB
ANION GAP SERPL CALC-SCNC: 17 MMOL/L — HIGH (ref 7–14)
BUN SERPL-MCNC: 24 MG/DL — HIGH (ref 10–20)
CALCIUM SERPL-MCNC: 9 MG/DL — SIGNIFICANT CHANGE UP (ref 8.5–10.1)
CHLORIDE SERPL-SCNC: 100 MMOL/L — SIGNIFICANT CHANGE UP (ref 98–110)
CO2 SERPL-SCNC: 19 MMOL/L — SIGNIFICANT CHANGE UP (ref 17–32)
CREAT SERPL-MCNC: 1.1 MG/DL — SIGNIFICANT CHANGE UP (ref 0.7–1.5)
GLUCOSE BLDC GLUCOMTR-MCNC: 141 MG/DL — HIGH (ref 70–99)
GLUCOSE BLDC GLUCOMTR-MCNC: 152 MG/DL — HIGH (ref 70–99)
GLUCOSE BLDC GLUCOMTR-MCNC: 177 MG/DL — HIGH (ref 70–99)
GLUCOSE BLDC GLUCOMTR-MCNC: 204 MG/DL — HIGH (ref 70–99)
GLUCOSE SERPL-MCNC: 137 MG/DL — HIGH (ref 70–99)
HCT VFR BLD CALC: 28.9 % — LOW (ref 37–47)
HGB BLD-MCNC: 9 G/DL — LOW (ref 12–16)
MCHC RBC-ENTMCNC: 28.7 PG — SIGNIFICANT CHANGE UP (ref 27–31)
MCHC RBC-ENTMCNC: 31.1 G/DL — LOW (ref 32–37)
MCV RBC AUTO: 92 FL — SIGNIFICANT CHANGE UP (ref 81–99)
NRBC # BLD: 0 /100 WBCS — SIGNIFICANT CHANGE UP (ref 0–0)
PLATELET # BLD AUTO: 283 K/UL — SIGNIFICANT CHANGE UP (ref 130–400)
POTASSIUM SERPL-MCNC: 3.9 MMOL/L — SIGNIFICANT CHANGE UP (ref 3.5–5)
POTASSIUM SERPL-SCNC: 3.9 MMOL/L — SIGNIFICANT CHANGE UP (ref 3.5–5)
RBC # BLD: 3.14 M/UL — LOW (ref 4.2–5.4)
RBC # FLD: 16.9 % — HIGH (ref 11.5–14.5)
SODIUM SERPL-SCNC: 136 MMOL/L — SIGNIFICANT CHANGE UP (ref 135–146)
SURGICAL PATHOLOGY STUDY: SIGNIFICANT CHANGE UP
WBC # BLD: 5.51 K/UL — SIGNIFICANT CHANGE UP (ref 4.8–10.8)
WBC # FLD AUTO: 5.51 K/UL — SIGNIFICANT CHANGE UP (ref 4.8–10.8)

## 2019-01-15 RX ORDER — CEFEPIME 1 G/1
2000 INJECTION, POWDER, FOR SOLUTION INTRAMUSCULAR; INTRAVENOUS EVERY 12 HOURS
Qty: 0 | Refills: 0 | Status: DISCONTINUED | OUTPATIENT
Start: 2019-01-15 | End: 2019-01-19

## 2019-01-15 RX ORDER — CEFEPIME 1 G/1
2000 INJECTION, POWDER, FOR SOLUTION INTRAMUSCULAR; INTRAVENOUS ONCE
Qty: 0 | Refills: 0 | Status: COMPLETED | OUTPATIENT
Start: 2019-01-15 | End: 2019-01-15

## 2019-01-15 RX ORDER — CEFEPIME 1 G/1
INJECTION, POWDER, FOR SOLUTION INTRAMUSCULAR; INTRAVENOUS
Qty: 0 | Refills: 0 | Status: DISCONTINUED | OUTPATIENT
Start: 2019-01-15 | End: 2019-01-19

## 2019-01-15 RX ADMIN — GABAPENTIN 300 MILLIGRAM(S): 400 CAPSULE ORAL at 08:28

## 2019-01-15 RX ADMIN — OXYCODONE AND ACETAMINOPHEN 2 TABLET(S): 5; 325 TABLET ORAL at 05:34

## 2019-01-15 RX ADMIN — OXYCODONE AND ACETAMINOPHEN 2 TABLET(S): 5; 325 TABLET ORAL at 05:04

## 2019-01-15 RX ADMIN — OXYCODONE AND ACETAMINOPHEN 2 TABLET(S): 5; 325 TABLET ORAL at 01:24

## 2019-01-15 RX ADMIN — Medication 5 MILLIGRAM(S): at 08:28

## 2019-01-15 RX ADMIN — OXYCODONE AND ACETAMINOPHEN 2 TABLET(S): 5; 325 TABLET ORAL at 00:54

## 2019-01-15 RX ADMIN — OXYCODONE AND ACETAMINOPHEN 2 TABLET(S): 5; 325 TABLET ORAL at 18:30

## 2019-01-15 RX ADMIN — CEFEPIME 100 MILLIGRAM(S): 1 INJECTION, POWDER, FOR SOLUTION INTRAMUSCULAR; INTRAVENOUS at 12:49

## 2019-01-15 RX ADMIN — CEFEPIME 100 MILLIGRAM(S): 1 INJECTION, POWDER, FOR SOLUTION INTRAMUSCULAR; INTRAVENOUS at 17:11

## 2019-01-15 RX ADMIN — Medication 100 MILLIGRAM(S): at 08:28

## 2019-01-15 RX ADMIN — APIXABAN 5 MILLIGRAM(S): 2.5 TABLET, FILM COATED ORAL at 17:11

## 2019-01-15 RX ADMIN — Medication 1: at 17:46

## 2019-01-15 RX ADMIN — LACTULOSE 10 GRAM(S): 10 SOLUTION ORAL at 12:30

## 2019-01-15 RX ADMIN — Medication 100 MILLIGRAM(S): at 17:11

## 2019-01-15 RX ADMIN — Medication 300 MILLIGRAM(S): at 11:01

## 2019-01-15 RX ADMIN — Medication 10 MILLIGRAM(S): at 12:13

## 2019-01-15 RX ADMIN — APIXABAN 5 MILLIGRAM(S): 2.5 TABLET, FILM COATED ORAL at 08:28

## 2019-01-15 RX ADMIN — Medication 1 MILLIGRAM(S): at 11:01

## 2019-01-15 RX ADMIN — Medication 50 MILLIGRAM(S): at 10:48

## 2019-01-15 RX ADMIN — OXYCODONE AND ACETAMINOPHEN 2 TABLET(S): 5; 325 TABLET ORAL at 17:45

## 2019-01-15 RX ADMIN — Medication 2: at 12:48

## 2019-01-15 RX ADMIN — Medication 5 MILLIGRAM(S): at 11:01

## 2019-01-15 RX ADMIN — GABAPENTIN 300 MILLIGRAM(S): 400 CAPSULE ORAL at 17:10

## 2019-01-15 NOTE — PROGRESS NOTE ADULT - SUBJECTIVE AND OBJECTIVE BOX
SUBJECTIVE:    Patient is a 78y old Female who presents with a chief complaint of "I couldn't stand up from the recliner", and worsening pain of bilateral lower extremities (15 Delfino 2019 05:55)    Currently admitted to medicine with the primary diagnosis of Wounds, multiple open, lower extremity     Today is hospital day 27d. This morning she is resting comfortably in bed and reports no new issues or overnight events.     PAST MEDICAL & SURGICAL HISTORY  Gout  DVT (deep venous thrombosis)  HTN (hypertension)  Rheumatoid arteritis  Other specified diabetes mellitus with other specified complication, unspecified whether long term insulin use  Primary osteoarthritis of right knee: s/p knee repalcement    SOCIAL HISTORY:  Negative for smoking/alcohol/drug use.     ALLERGIES:  clindamycin (Unknown)  erythromycin (Unknown)  penicillin (Unknown)  strawberry (Unknown)    MEDICATIONS:  STANDING MEDICATIONS  allopurinol 300 milliGRAM(s) Oral daily  apixaban 5 milliGRAM(s) Oral every 12 hours  aztreonam  IVPB 500 milliGRAM(s) IV Intermittent every 8 hours  aztreonam  IVPB      chlorhexidine 4% Liquid 1 Application(s) Topical <User Schedule>  Dakins Solution - 1/2 Strength 1 Application(s) Topical daily  dextrose 5%. 1000 milliLiter(s) IV Continuous <Continuous>  dextrose 50% Injectable 12.5 Gram(s) IV Push once  dextrose 50% Injectable 25 Gram(s) IV Push once  dextrose 50% Injectable 25 Gram(s) IV Push once  docusate sodium 100 milliGRAM(s) Oral two times a day  folic acid 1 milliGRAM(s) Oral daily  gabapentin 300 milliGRAM(s) Oral every 12 hours  insulin lispro (HumaLOG) corrective regimen sliding scale   SubCutaneous three times a day before meals  leucovorin 5 milliGRAM(s) Oral daily  morphine  - Injectable 2 milliGRAM(s) IV Push daily  predniSONE   Tablet 5 milliGRAM(s) Oral daily  rifampin 300 milliGRAM(s) Oral two times a day  senna 2 Tablet(s) Oral at bedtime    PRN MEDICATIONS  acetaminophen   Tablet .. 650 milliGRAM(s) Oral every 6 hours PRN  benzocaine 15 mG/menthol 3.6 mG (Sugar-Free) Lozenge 1 Lozenge Oral three times a day PRN  dextrose 40% Gel 15 Gram(s) Oral once PRN  glucagon  Injectable 1 milliGRAM(s) IntraMuscular once PRN  lactulose Syrup 10 Gram(s) Oral three times a day PRN  oxyCODONE    5 mG/acetaminophen 325 mG 2 Tablet(s) Oral every 4 hours PRN    VITALS:   T(F): 97.2  HR: 82  BP: 102/72  RR: 18  SpO2: 96%    PHYSICAL EXAM:    GEN: No acute distress  LUNGS: Clear to auscultation bilaterally   HEART: S1/S2 present.    ABD: Soft, non-tender, non-distended.    EXT: LE changes   NEURO: AAOX3          LABS:                        8.8    5.55  )-----------( 272      ( 14 Jan 2019 09:36 )             28.6     01-14    138  |  101  |  28<H>  ----------------------------<  209<H>  3.9   |  24  |  1.2    Ca    9.1      14 Jan 2019 09:36                        RADIOLOGY:

## 2019-01-15 NOTE — PROGRESS NOTE ADULT - SUBJECTIVE AND OBJECTIVE BOX
SUBJECTIVE:  Patient is a 78y old Female who presents with a chief complaint of "I couldn't stand up from the recliner", and worsening pain of bilateral lower extremities, pt was admitted for multiple LE ulcers.  Pt had a CTA on 1/7/19 w/o any intervention. She has an area or dry necrosis on her right 3rd toe, consulted by podiatry and underwent right 3 rd toe amputation on 1/10/19. Podiatry following, no more intervention recommended, will call ID for follow up, arrange PICC for long term IV ABx.  Today is c/o right foot pain, constipation. She has allergy to PCN an expressed her concern regarding Cephalosporins asking to speak with ID attending.     PAST MEDICAL & SURGICAL HISTORY  Gout  DVT (deep venous thrombosis)  HTN (hypertension)  Rheumatoid arteritis  Other specified diabetes mellitus with other specified complication, unspecified whether long term insulin use  Primary osteoarthritis of right knee: s/p knee repalcement    SOCIAL HISTORY:  Negative for smoking/alcohol/drug use.     ALLERGIES:  clindamycin (Unknown)  erythromycin (Unknown)  penicillin (Unknown)  strawberry (Unknown)    VITALS:   T(C): 36.6 (15 Delfino 2019 13:44), Max: 36.6 (15 Delfino 2019 13:44)  T(F): 97.8 (15 Delfino 2019 13:44), Max: 97.8 (15 Delfino 2019 13:44)  HR: 82 (15 Delfino 2019 13:44) (78 - 82)  BP: 134/85 (15 Delfino 2019 13:44) (102/72 - 134/85)  BP(mean): --  RR: 18 (15 Delfino 2019 13:44) (18 - 18)  SpO2: 100% (15 Delfino 2019 08:54) (96% - 100%)    PHYSICAL EXAM:   GEN: No acute distress  LUNGS: Clear to auscultation bilaterally   HEART: S1/S2 present.    ABD: Soft, non-tender, non-distended.    EXT: B/l lower extremity changes , dressing on right foot noted   NEURO: AAOX3, no focal neuro deficit noted         LABS:                         9.0    5.51  )-----------( 283      ( 15 Delfino 2019 07:19 )             28.9   01-15    136  |  100  |  24<H>  ----------------------------<  137<H>  3.9   |  19  |  1.1    Ca    9.0      15 Delfino 2019 07:19          RADIOLOGY:    < from: Xray Foot AP + Lateral + Oblique, Right (01.08.19 @ 09:38) >  Impression:    Findings highly suspicious for progression of septic   arthritis/osteomyelitis of the third metatarsophalangeal joint, and new   osteomyelitis of the second metatarsal head. Recommend MRI of the right   forefoot for further evaluation.    < from: Xray Foot AP + Lateral + Oblique, Left (01.09.19 @ 11:20) >  FINDINGS/  IMPRESSION: No acute fracture.  Second MTP joint incongruency withhammertoe suggests mild subluxation.   Hammertoes also present at the third and fourth PIP joints. 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 Plantar heel spur and insertional   Achilles enthesopathy present. Vascular calcifications are present.        MEDICATIONS  (STANDING):  allopurinol 300 milliGRAM(s) Oral daily  apixaban 5 milliGRAM(s) Oral every 12 hours  cefepime   IVPB      cefepime   IVPB 2000 milliGRAM(s) IV Intermittent every 12 hours  chlorhexidine 4% Liquid 1 Application(s) Topical <User Schedule>  Dakins Solution - 1/2 Strength 1 Application(s) Topical daily  dextrose 5%. 1000 milliLiter(s) (50 mL/Hr) IV Continuous <Continuous>  dextrose 50% Injectable 12.5 Gram(s) IV Push once  dextrose 50% Injectable 25 Gram(s) IV Push once  dextrose 50% Injectable 25 Gram(s) IV Push once  docusate sodium 100 milliGRAM(s) Oral two times a day  folic acid 1 milliGRAM(s) Oral daily  gabapentin 300 milliGRAM(s) Oral every 12 hours  insulin lispro (HumaLOG) corrective regimen sliding scale   SubCutaneous three times a day before meals  leucovorin 5 milliGRAM(s) Oral daily  morphine  - Injectable 2 milliGRAM(s) IV Push daily  predniSONE   Tablet 5 milliGRAM(s) Oral daily  rifampin 300 milliGRAM(s) Oral two times a day  senna 2 Tablet(s) Oral at bedtime    MEDICATIONS  (PRN):  acetaminophen   Tablet .. 650 milliGRAM(s) Oral every 6 hours PRN Moderate Pain (4 - 6)  benzocaine 15 mG/menthol 3.6 mG (Sugar-Free) Lozenge 1 Lozenge Oral three times a day PRN Sore Throat  dextrose 40% Gel 15 Gram(s) Oral once PRN Blood Glucose LESS THAN 70 milliGRAM(s)/deciliter  glucagon  Injectable 1 milliGRAM(s) IntraMuscular once PRN Glucose LESS THAN 70 milligrams/deciliter  lactulose Syrup 10 Gram(s) Oral three times a day PRN constipation  oxyCODONE    5 mG/acetaminophen 325 mG 2 Tablet(s) Oral every 4 hours PRN Severe Pain (7 - 10)

## 2019-01-15 NOTE — CONSULT NOTE ADULT - SUBJECTIVE AND OBJECTIVE BOX
LINCOLN HEREDIA  78y, Female  Allergy: clindamycin (Unknown)  erythromycin (Unknown)  penicillin (Unknown)  strawberry (Unknown)      HPI:  77 yo F with PMHx of DM II, RA on Rituximab (s/p 2 infusions, last dose received last Monday , next dose in 6 months), Chronic bilateral femoral and popliteal DVT on Eliquis, Gout, latent TB on Rifampin and chronic bilateral lower extremity ulcers presents with complaint of inability to stand up from her recliner at 12:55PM on afternoon of presentation secondary to worsening bilateral lower extremity pain for 3 days duration. Patient describes sharp, "jabbing" persistent pain as well as aching intermittent pain of her right hip for several years duration. Patient endorses subjective fevers, denies chills, nausea, vomiting, chest pain, palpitations, lightheadedness, headaches, sick contacts or recent travel. Patient was recently admitted on 10/24-10/29 for UTI was discharged to [facility], from where she was recently discharged 2 weeks.  Amputation toe  01/10/2019  Excision and debridment of soft tissue and bone with partial 3rd ray amputation R foot     FAMILY HISTORY:  Family history of early CAD (Father)    PAST MEDICAL & SURGICAL HISTORY:  Gout  DVT (deep venous thrombosis)  HTN (hypertension)  Rheumatoid arteritis  Other specified diabetes mellitus with other specified complication, unspecified whether long term insulin use  Primary osteoarthritis of right knee: s/p knee repalcement        VITALS:  T(F): 97.2, Max: 97.2 (01-15-19 @ 06:27)  HR: 82  BP: 102/72  RR: 18Vital Signs Last 24 Hrs  T(C): 36.2 (15 Delfino 2019 06:27), Max: 36.2 (15 Delfino 2019 06:27)  T(F): 97.2 (15 Delfino 2019 06:27), Max: 97.2 (15 Delfino 2019 06:27)  HR: 82 (15 Delfino 2019 06:27) (78 - 83)  BP: 102/72 (15 Delfino 2019 06:27) (102/72 - 134/73)  BP(mean): --  RR: 18 (15 Delfino 2019 06:27) (18 - 18)  SpO2: 100% (15 Delfino 2019 08:54) (96% - 100%)    TESTS & MEASUREMENTS:                        9.0    5.51  )-----------( 283      ( 15 Delfino 2019 07:19 )             28.9     01-15    136  |  100  |  24<H>  ----------------------------<  137<H>  3.9   |  19  |  1.1    Ca    9.0      15 Delfino 2019 07:19          Culture - Other (collected 01-10-19 @ 14:00)  Source: .Other None  Final Report (01-12-19 @ 17:41):    Few Pseudomonas aeruginosa (Carbapenem Resistant)  Organism: Pseudomonas aeruginosa (Carbapenem Resistant) (01-12-19 @ 17:41)  Organism: Pseudomonas aeruginosa (Carbapenem Resistant) (01-12-19 @ 17:41)      -  Amikacin: S 16      -  Aztreonam: I 16      -  Cefepime: S 8      -  Ceftazidime: S 4      -  Ciprofloxacin: R >2      -  Gentamicin: R >8      -  Imipenem: R >8      -  Levofloxacin: R >4      -  Meropenem: R >8      -  Piperacillin/Tazobactam: S 16      -  Tobramycin: R >8      Method Type: SHANNAN            RADIOLOGY & ADDITIONAL TESTS:    ANTIBIOTICS:  aztreonam  IVPB 500 milliGRAM(s) IV Intermittent every 8 hours  aztreonam  IVPB      rifampin 300 milliGRAM(s) Oral two times a day

## 2019-01-15 NOTE — PROGRESS NOTE ADULT - PROBLEM SELECTOR PLAN 3
- stable on low dose of Prednisone, will give one dose of IV Hydrocortisone 50 mg in AM before OR  - f/u with rheumatology Dr Henry after discharge
- on  Prednisone to 5 mg Q 24 hours, received  one dose of IV Hydrocortisone 50 mg  before OR on 1/10/19  - f/u with rheumatology Dr Henry after discharge
- stable on low dose of Prednisone  - f/u with rheumatology Dr Henry after discharge
- stable on low dose of Prednisone, received  one dose of IV Hydrocortisone 50 mg  before OR on 1/10/19  - f/u with rheumatology Dr Henry after discharge
- stable on low dose of Prednisone, will give one dose of IV Hydrocortisone 50 mg  before OR  - f/u with rheumatology Dr Henry after discharge
- taper  Prednisone to 5 mg Q 24 hours, received  one dose of IV Hydrocortisone 50 mg  before OR on 1/10/19  - f/u with rheumatology Dr Henry after discharge

## 2019-01-15 NOTE — PROGRESS NOTE ADULT - PROBLEM SELECTOR PROBLEM 3
Rheumatoid arteritis

## 2019-01-15 NOTE — PROGRESS NOTE ADULT - PROBLEM SELECTOR PLAN 5
- latent TB  - on Rifaximin

## 2019-01-15 NOTE — CONSULT NOTE ADULT - ASSESSMENT
Assessment  - cellulitic changes b/l LE.   - right 3rd digit chronic ulceration  - multiple ulcerations b/l LE. + edema, + erythema, + serous drainage, - malodor, - purulence, - CSOI @ this time   - ability of wounds to heal is 2/2 prolonged Prednisone use    Plan:   - pt seen/evaluated @ bedside   - B/L LE focused exam performed  - arterial/venous duplex  - dressed w/ xeroform/DD.  - consult ID  - ED resident stated that pt may be admitted due to social issues.   - podiatry will follow while pt is in house   - plan will be discussed w/ attending
Assessment  - cellulitic changes b/l LE.   - right 3rd digit chronic ulceration  - multiple ulcerations b/l LE. + edema, + erythema, + serous drainage, - malodor, - purulence, - CSOI @ this time   - inability of wounds to heal complicated by long term use of Prednisone     Plan:   - pt seen/evaluated @ bedside   - B/L LE focused exam performed  - arterial/venous duplex  - dressed w/ xeroform/DD.  - consult ID  - ED resident stated that pt may be admitted due to social issues.   - podiatry will follow while pt is in house   - plan will be discussed w/ attending
Assessment  - cellulitic changes b/l LE.   - right 3rd digit chronic ulceration  - multiple ulcerations b/l LE. + edema, + erythema, + serous drainage, - malodor, - purulence, - CSOI @ this time   - inability of wounds to heal complicated by long term use of Prednisone     Plan:   - pt seen/evaluated @ bedside   - B/L LE focused exam performed  - arterial/venous duplex  - dressed w/ xeroform/DD.  - consult ID  - ED resident stated that pt may be admitted due to social issues.   - podiatry will follow while pt is in house   - plan will be discussed w/ attending
FT necrotic wound of right leg   Pt refuses debridement of leg wound tomorrow if toe is not being debrided at same time.  Podiatry service to confer further with vascular surgery   Rec : Santyl and moist dressing bid
IMPRESSION: Rehab of gait dysfunction      PRECAUTIONS: [  ] Cardiac  [  ] Respiratory  [  ] Seizures [  ] Contact Isolation  [  ] Droplet Isolation  [  ] Other    Weight Bearing Status:     RECOMMENDATION:    Out of Bed to Chair     DVT/Decubiti Prophylaxis    REHAB PLAN:     [ x  ] Bedside P/T 3-5 times a week   [   ]   Bedside O/T  2-3 times a week             [   ] No Rehab Therapy Indicated                   [   ]  Speech Therapy   Conditioning/ROM                                    ADL  Bed Mobility                                               Conditioning/ROM  Transfers                                                     Bed Mobility  Sitting /Standing Balance                         Transfers                                        Gait Training                                               Sitting/Standing Balance  Stair Training [   ]Applicable                    Home equipment Eval                                                                        Splinting  [   ] Only      GOALS:   ADL   [   ]   Independent                    Transfers  [x   ] Independent                          Ambulation  [x   ] Independent     [  x  ] With device                            [   ]  CG                                                         [   ]  CG                                                                  [   ] CG                            [    ] Min A                                                   [   ] Min A                                                              [   ] Min  A          DISCHARGE PLAN:   [   ]  Good candidate for Intensive Rehabilitation/Hospital based                                             Will tolerate 3hrs Intensive Rehab Daily                                       [ xx   ]  Short Term Rehab in Skilled Nursing Facility                           vs            [ x   ]  Home with Outpatient or VN services                                         [    ]  Possible Candidate for Intensive Hospital based Rehab
# LE cellulitis with ulceration  # Chronic prednisone use  # PCN, CLINDAMYCIN allergy      Would recommend:    1. Discontinue Rifampin  2. Change Vancomycin to Linezolid 600 mg q 12hours  3. Continue Cefepime and adjust doses renally  4. Keep LE elevated  5. Wound care as per Podiatry    will follow the patient with you and make further recommendation based on the clinical course and Lab results  Thank you for the opportunity to participate in Ms. HEREDIA's care
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    RECOMMENDATIONS:  Cefepime 2 gm iv q12h  Will need a PICC for 6 weeks of iv ABx

## 2019-01-15 NOTE — PROGRESS NOTE ADULT - ASSESSMENT
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 While in the hospital pt was consulted by vascular Sx , underwent CTA  w/o any intervention, she has small vessel Dz .  She was consulted by podiatry and underwent right 3 rd toe amputation on 1/10/19. Podiatry following for wound care, will f/u recommendations. For pain control c/w Percocet Q 4 hours PRN.  Pt received stress dose of IV steroids before surgery , her blood glucose was elevated  and she was started on Insulin sliding scale on 1/11/19.   Prednisone was tapered  to 5 mg po Q24 hours. She is on IV ABx , will call ID for follow up.  Podiatry decided against surgical intervention, long term course of IV ABx recommended, will get ID follow up ( please, speak with the patient ) and make arrangements for PICC line.  Dispo: anticipate discharge on Thursday

## 2019-01-15 NOTE — PROGRESS NOTE ADULT - ASSESSMENT
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    #Bilateral Chronic Nonhealing Ulcers and concurrent LE cellulitis   -per podiatry betadine to lesions,s/p debridement by burn , s/p angiogram. Large vessels patent , small vessel disease. s/p amputation  -Temp on 102.2F 12/21 ; Afebrile since  -New lucency on xray at third metatarsal head. MRI not required for now per podiatry  -doxycycline oral 100mg q 12 for 14 days , growing Pseudomonas (carbapenum resistant) , will require cefepime but pt refusing cefepime. Changed to Aztreonam. Pending Bone culture . recall ID  -Podiatry: Betadine paint to digit 3R BID; Xeroform to ulcers with loose dressing; Limb elevation.    -Duplex LE venous: Chronic-appearing right common femoral and femoral deep venous thromboses; left popliteal deep venous thrombosis  -Duplex LE arterial: On the right: Mild to moderate disease at the popliteal artery level. On the left: Mild popliteal and posterior tibial artery disease; Remaining tibial arteries not visualized secondary to edema   -Uric acid levels normal. .  -serial xrays    #) Direct antiglobin positive   - Blood was sent for type and cross match, hb holding.   -Might need hem /onc consult     #Chronic Right Hip Pain  -RIght hip xray: Diffuse osteopenia  -C/w Physical therapy. Will check Vit D levels and replete if deficient    #Chronic B/L femoral and popliteal DVT- eliquis     #Chronic Normocytic Anemia- Likely 2/2 RA; Hb at baseline  #Rheumatoid Arthritis -Continue home prednisone. talked to Dr Ascencio (Dr Henry's team)- Can decrease prednisone 5mg if healing hampered. No other adjustments needed. one dose of solumedrol before going for OR  #DM II-Controlled; Monitor finger sticks, start insulin sliding scale if fingersticks are greater than 180  #Latent TB- rifampin  #Gout- Normal uric acid levels; c/w Allopurinol    DVT ppx: On Eliquis ,   GI ppx: Not indicated  Diet: DASH Carb consistent  ,   Activity: AAT  Code Status: DNR/DNI, MOLST form completed with health care proxy as witness by bedside, form in chart  Disposition: pending

## 2019-01-15 NOTE — PROGRESS NOTE ADULT - PROBLEM SELECTOR PLAN 1
- s/p CTA w/o intervention ( pt has patent large vessels with small vessel Dz)  -c/w IV Abx , ID follow up for Abx on discharge, pt needs long course of IV Abx and PICC line   - s/p right 3rd toe amputation on 1/11/19  - Percocet Q 4h PRN   - c/w local wound care as per  podiatry   - f/u bone biopsy

## 2019-01-15 NOTE — CONSULT NOTE ADULT - REASON FOR ADMISSION
"I couldn't stand up from the recliner", and worsening pain of bilateral lower extremities

## 2019-01-15 NOTE — PROGRESS NOTE ADULT - SUBJECTIVE AND OBJECTIVE BOX
s/p 5 days partial 3rd ray amp R. Pain improving C/.o constipation. Denies SOB, Chest/calf pain  AF   Dressings intact. Wound R forefoot is open with 3 inches packing. Edges viable with no indication of cyanosis of adjacent digits. There is no malodor or purulence. No cellulitius Site measures 1.75 cm x 0.75 cm. Base of wound filling in with pink granulating tissue.  WBC 5.5   HH  8.8/28.6   Bun/Cre  28/1.2    Pt still on Aztreonam. Her carbapenem resistant pseudomonas shows only intermediate sensitivity to it.  Please reconsult ID for evaluation for change in IVAB- Fortaz? And need for PICC. Most likely there is residual OM in the 3rd met  At the risk of putting too much tension on wound edges with primary closure, the wound will be allowed to fill in by second intention.  She will require a wound care nurse at [facility] on her discharge as well as a Social Work consult today.    May DC wound packing but maintain Dakins soln for wound care. Discussed with resident today who is aware of plan.   Discussed case with hospitalist yesterday. Will follow until discharge. Tentative DC Thursday depending if PICC necessary and pt has no reaction to antibiotic selection

## 2019-01-15 NOTE — PROGRESS NOTE ADULT - PROBLEM SELECTOR PLAN 6
- c/w Allopurinol

## 2019-01-15 NOTE — PROGRESS NOTE ADULT - PROBLEM SELECTOR PROBLEM 1
Wounds, multiple open, lower extremity

## 2019-01-15 NOTE — PROGRESS NOTE ADULT - PROBLEM SELECTOR PLAN 2
- chronic LE DVT  -  Apixaban held today in the morning
- chronic LE DVT  -  Apixaban held on 1/9/19 in AM
- chronic LE DVT  -  on Apixaban

## 2019-01-15 NOTE — CONSULT NOTE ADULT - CONSULT REASON
B/L chronic ulcerations
b/l LE wounds/pain
gait dysfunction
right leg wound
venous stasis wounds and b/l le pain
fever/arterial ulcer
DFU

## 2019-01-15 NOTE — CONSULT NOTE ADULT - CONSULT REQUESTED DATE/TIME
19-Dec-2018 11:35
19-Dec-2018 17:47
20-Dec-2018 07:44
20-Dec-2018 16:59
27-Dec-2018 14:54
22-Dec-2018 17:32
15-Delfino-2019 11:00

## 2019-01-15 NOTE — PROGRESS NOTE ADULT - PROBLEM SELECTOR PROBLEM 5
Tuberculosis, treated

## 2019-01-16 LAB
ANION GAP SERPL CALC-SCNC: 17 MMOL/L — HIGH (ref 7–14)
APTT BLD: 36.1 SEC — SIGNIFICANT CHANGE UP (ref 27–39.2)
BUN SERPL-MCNC: 22 MG/DL — HIGH (ref 10–20)
CALCIUM SERPL-MCNC: 9.3 MG/DL — SIGNIFICANT CHANGE UP (ref 8.5–10.1)
CHLORIDE SERPL-SCNC: 100 MMOL/L — SIGNIFICANT CHANGE UP (ref 98–110)
CO2 SERPL-SCNC: 21 MMOL/L — SIGNIFICANT CHANGE UP (ref 17–32)
CREAT SERPL-MCNC: 1.2 MG/DL — SIGNIFICANT CHANGE UP (ref 0.7–1.5)
GLUCOSE BLDC GLUCOMTR-MCNC: 117 MG/DL — HIGH (ref 70–99)
GLUCOSE BLDC GLUCOMTR-MCNC: 127 MG/DL — HIGH (ref 70–99)
GLUCOSE BLDC GLUCOMTR-MCNC: 135 MG/DL — HIGH (ref 70–99)
GLUCOSE BLDC GLUCOMTR-MCNC: 201 MG/DL — HIGH (ref 70–99)
GLUCOSE BLDC GLUCOMTR-MCNC: 241 MG/DL — HIGH (ref 70–99)
GLUCOSE SERPL-MCNC: 179 MG/DL — HIGH (ref 70–99)
HCT VFR BLD CALC: 29.9 % — LOW (ref 37–47)
HGB BLD-MCNC: 9.4 G/DL — LOW (ref 12–16)
INR BLD: 1.08 RATIO — SIGNIFICANT CHANGE UP (ref 0.65–1.3)
MCHC RBC-ENTMCNC: 29.1 PG — SIGNIFICANT CHANGE UP (ref 27–31)
MCHC RBC-ENTMCNC: 31.4 G/DL — LOW (ref 32–37)
MCV RBC AUTO: 92.6 FL — SIGNIFICANT CHANGE UP (ref 81–99)
NRBC # BLD: 0 /100 WBCS — SIGNIFICANT CHANGE UP (ref 0–0)
PLATELET # BLD AUTO: 291 K/UL — SIGNIFICANT CHANGE UP (ref 130–400)
POTASSIUM SERPL-MCNC: 4.2 MMOL/L — SIGNIFICANT CHANGE UP (ref 3.5–5)
POTASSIUM SERPL-SCNC: 4.2 MMOL/L — SIGNIFICANT CHANGE UP (ref 3.5–5)
PROTHROM AB SERPL-ACNC: 12.4 SEC — SIGNIFICANT CHANGE UP (ref 9.95–12.87)
RBC # BLD: 3.23 M/UL — LOW (ref 4.2–5.4)
RBC # FLD: 16.7 % — HIGH (ref 11.5–14.5)
SODIUM SERPL-SCNC: 138 MMOL/L — SIGNIFICANT CHANGE UP (ref 135–146)
WBC # BLD: 9.38 K/UL — SIGNIFICANT CHANGE UP (ref 4.8–10.8)
WBC # FLD AUTO: 9.38 K/UL — SIGNIFICANT CHANGE UP (ref 4.8–10.8)

## 2019-01-16 RX ORDER — SENNA PLUS 8.6 MG/1
2 TABLET ORAL
Qty: 0 | Refills: 0 | COMMUNITY
Start: 2019-01-16

## 2019-01-16 RX ORDER — CEFEPIME 1 G/1
2 INJECTION, POWDER, FOR SOLUTION INTRAMUSCULAR; INTRAVENOUS
Qty: 0 | Refills: 0 | COMMUNITY
Start: 2019-01-16 | End: 2019-02-27

## 2019-01-16 RX ADMIN — OXYCODONE AND ACETAMINOPHEN 2 TABLET(S): 5; 325 TABLET ORAL at 17:09

## 2019-01-16 RX ADMIN — Medication 2: at 12:31

## 2019-01-16 RX ADMIN — CEFEPIME 100 MILLIGRAM(S): 1 INJECTION, POWDER, FOR SOLUTION INTRAMUSCULAR; INTRAVENOUS at 17:08

## 2019-01-16 RX ADMIN — Medication 300 MILLIGRAM(S): at 11:09

## 2019-01-16 RX ADMIN — OXYCODONE AND ACETAMINOPHEN 2 TABLET(S): 5; 325 TABLET ORAL at 22:44

## 2019-01-16 RX ADMIN — Medication 100 MILLIGRAM(S): at 17:04

## 2019-01-16 RX ADMIN — OXYCODONE AND ACETAMINOPHEN 2 TABLET(S): 5; 325 TABLET ORAL at 22:14

## 2019-01-16 RX ADMIN — APIXABAN 5 MILLIGRAM(S): 2.5 TABLET, FILM COATED ORAL at 17:03

## 2019-01-16 RX ADMIN — Medication 5 MILLIGRAM(S): at 05:28

## 2019-01-16 RX ADMIN — Medication 1 MILLIGRAM(S): at 11:09

## 2019-01-16 RX ADMIN — GABAPENTIN 300 MILLIGRAM(S): 400 CAPSULE ORAL at 05:28

## 2019-01-16 RX ADMIN — OXYCODONE AND ACETAMINOPHEN 2 TABLET(S): 5; 325 TABLET ORAL at 16:55

## 2019-01-16 RX ADMIN — OXYCODONE AND ACETAMINOPHEN 2 TABLET(S): 5; 325 TABLET ORAL at 05:25

## 2019-01-16 RX ADMIN — GABAPENTIN 300 MILLIGRAM(S): 400 CAPSULE ORAL at 17:03

## 2019-01-16 RX ADMIN — OXYCODONE AND ACETAMINOPHEN 2 TABLET(S): 5; 325 TABLET ORAL at 05:55

## 2019-01-16 RX ADMIN — Medication 5 MILLIGRAM(S): at 11:09

## 2019-01-16 RX ADMIN — CHLORHEXIDINE GLUCONATE 1 APPLICATION(S): 213 SOLUTION TOPICAL at 05:23

## 2019-01-16 RX ADMIN — CEFEPIME 100 MILLIGRAM(S): 1 INJECTION, POWDER, FOR SOLUTION INTRAMUSCULAR; INTRAVENOUS at 05:30

## 2019-01-16 RX ADMIN — SENNA PLUS 2 TABLET(S): 8.6 TABLET ORAL at 21:29

## 2019-01-16 NOTE — PROGRESS NOTE ADULT - ASSESSMENT
Imp : debilitation ; OM  on long term IV abx  Plan : continue bedside PT           d/c to subacute / snf when medically stable

## 2019-01-16 NOTE — PROCEDURE NOTE - NSPOSTPRCRAD_GEN_A_CORE
fluoroscopy/chest radiograph/line adjusted to depth of insertion/postion of catheter/line in appropriate postion

## 2019-01-16 NOTE — PROGRESS NOTE ADULT - SUBJECTIVE AND OBJECTIVE BOX
SUBJECTIVE:    Patient is a 78y old Female who presents with a chief complaint of "I couldn't stand up from the recliner", and worsening pain of bilateral lower extremities (16 Jan 2019 10:41)    Currently admitted to medicine with the primary diagnosis of Wounds, multiple open, lower extremity     Today is hospital day 28d. This morning she is resting comfortably in bed and reports no new issues or overnight events.     PAST MEDICAL & SURGICAL HISTORY  Gout  DVT (deep venous thrombosis)  HTN (hypertension)  Rheumatoid arteritis  Other specified diabetes mellitus with other specified complication, unspecified whether long term insulin use  Primary osteoarthritis of right knee: s/p knee repalcement    SOCIAL HISTORY:  Negative for smoking/alcohol/drug use.     ALLERGIES:  clindamycin (Unknown)  erythromycin (Unknown)  penicillin (Unknown)  strawberry (Unknown)    MEDICATIONS:  STANDING MEDICATIONS  allopurinol 300 milliGRAM(s) Oral daily  apixaban 5 milliGRAM(s) Oral every 12 hours  cefepime   IVPB      cefepime   IVPB 2000 milliGRAM(s) IV Intermittent every 12 hours  chlorhexidine 4% Liquid 1 Application(s) Topical <User Schedule>  Dakins Solution - 1/2 Strength 1 Application(s) Topical daily  dextrose 5%. 1000 milliLiter(s) IV Continuous <Continuous>  dextrose 50% Injectable 12.5 Gram(s) IV Push once  dextrose 50% Injectable 25 Gram(s) IV Push once  dextrose 50% Injectable 25 Gram(s) IV Push once  docusate sodium 100 milliGRAM(s) Oral two times a day  folic acid 1 milliGRAM(s) Oral daily  gabapentin 300 milliGRAM(s) Oral every 12 hours  insulin lispro (HumaLOG) corrective regimen sliding scale   SubCutaneous three times a day before meals  leucovorin 5 milliGRAM(s) Oral daily  morphine  - Injectable 2 milliGRAM(s) IV Push daily  predniSONE   Tablet 5 milliGRAM(s) Oral daily  rifampin 300 milliGRAM(s) Oral two times a day  senna 2 Tablet(s) Oral at bedtime    PRN MEDICATIONS  acetaminophen   Tablet .. 650 milliGRAM(s) Oral every 6 hours PRN  benzocaine 15 mG/menthol 3.6 mG (Sugar-Free) Lozenge 1 Lozenge Oral three times a day PRN  dextrose 40% Gel 15 Gram(s) Oral once PRN  glucagon  Injectable 1 milliGRAM(s) IntraMuscular once PRN  lactulose Syrup 10 Gram(s) Oral three times a day PRN  oxyCODONE    5 mG/acetaminophen 325 mG 2 Tablet(s) Oral every 4 hours PRN    VITALS:   T(F): 98.1  HR: 80  BP: 153/69  RR: 18  SpO2: 97%    PHYSICAL EXAM:  GEN: No acute distress  LUNGS: Clear to auscultation bilaterally   HEART: S1/S2 present.    ABD: Soft, non-tender, non-distended.    EXT: b/l lower extremity lesion  NEURO: AAOX3      LABS:                        9.0    5.51  )-----------( 283      ( 15 Delfino 2019 07:19 )             28.9     01-15    136  |  100  |  24<H>  ----------------------------<  137<H>  3.9   |  19  |  1.1    Ca    9.0      15 Delfino 2019 07:19                        RADIOLOGY:

## 2019-01-16 NOTE — PROGRESS NOTE ADULT - SUBJECTIVE AND OBJECTIVE BOX
Patient seen / chart reviewed          afebrile           vss     lung ; clear     function : transfer with assist

## 2019-01-16 NOTE — PROGRESS NOTE ADULT - ATTENDING COMMENTS
patient seen and examined independently on morning rounds, chart reviewed and discussed with the medicine resident and on interdisciplinary rounds and agree with the above resident progress note with the following addendum:    s/p picc insertion this morning LUE- site c/d/i and functioning well- anticipate likely dc to SNF ([facility]) on 6 weeks iv abx for OM (patient also requesting to check with CM if she has appropriate days for SNF.    PE:  GEN-NAD, oob to chair, wearing sunglasses,  AAOx3  PULM- Clear to auscultation bilaterally, fair air entry  CVS- +s1/s2 RRR no murmurs  GI- soft NT ND +bs, no rebound, no guarding  EXT- bilateral LE dressing c/d/i- limited exam as per patient 2/2 pain    a/p:  #bilateral nonhealing ulcers/cellulitis and OM (confirmed by bone biopsy)  -cont iv abx x 6 weeks  -has picc lue  -local wound care and f/u with burn/ podiatry (s/p debridement and amputation 2/2   - cx with pseudomonas pan resistatnt--sensitive to cefepime  -leg elevateion    DNR/DNI  antiiciapte likely dc to snf in next 24 hrs     continue current mangment of chronic medical issues    pcp- Dr. Stephen

## 2019-01-16 NOTE — CHART NOTE - NSCHARTNOTEFT_GEN_A_CORE
Registered Dietitian Limited Follow Up:  -Pt OOB in chair at time of assessment. Reports persistent adequate appetite and PO intake 75% or greater of DASH/TLC, CHO Consistent diet order. B/l Chronic Nonhealing Ulcers and concurrent LE cellulitis s/p toe amputation with debridement 1/10. S/p PICC line placement 1/16. Denies GI abnormalities with LBM 1/15. Labs reviewed. Meds reviewed. no further nutrition intervention at this time. Reassess in 7 days.

## 2019-01-16 NOTE — PROGRESS NOTE ADULT - SUBJECTIVE AND OBJECTIVE BOX
YANLINCOLN FREEMAN  78y, Female      OVERNIGHT EVENTS:    PICC in    VITALS:  T(F): 98.1, Max: 98.1 (01-16-19 @ 05:19)  HR: 80  BP: 153/69  RR: 18Vital Signs Last 24 Hrs  T(C): 36.7 (16 Jan 2019 05:19), Max: 36.7 (16 Jan 2019 05:19)  T(F): 98.1 (16 Jan 2019 05:19), Max: 98.1 (16 Jan 2019 05:19)  HR: 80 (16 Jan 2019 05:19) (80 - 82)  BP: 153/69 (16 Jan 2019 05:19) (108/60 - 153/69)  BP(mean): --  RR: 18 (16 Jan 2019 05:19) (18 - 18)  SpO2: 97% (15 Delfino 2019 22:11) (97% - 97%)    TESTS & MEASUREMENTS:                        9.0    5.51  )-----------( 283      ( 15 Delfino 2019 07:19 )             28.9     01-15    136  |  100  |  24<H>  ----------------------------<  137<H>  3.9   |  19  |  1.1    Ca    9.0      15 Delfino 2019 07:19          Culture - Other (collected 01-10-19 @ 14:00)  Source: .Other None  Final Report (01-12-19 @ 17:41):    Few Pseudomonas aeruginosa (Carbapenem Resistant)  Organism: Pseudomonas aeruginosa (Carbapenem Resistant) (01-12-19 @ 17:41)  Organism: Pseudomonas aeruginosa (Carbapenem Resistant) (01-12-19 @ 17:41)      -  Amikacin: S 16      -  Aztreonam: I 16      -  Cefepime: S 8      -  Ceftazidime: S 4      -  Ciprofloxacin: R >2      -  Gentamicin: R >8      -  Imipenem: R >8      -  Levofloxacin: R >4      -  Meropenem: R >8      -  Piperacillin/Tazobactam: S 16      -  Tobramycin: R >8      Method Type: SHANNAN            RADIOLOGY & ADDITIONAL TESTS:    ANTIBIOTICS:  cefepime   IVPB      cefepime   IVPB 2000 milliGRAM(s) IV Intermittent every 12 hours  rifampin 300 milliGRAM(s) Oral two times a day

## 2019-01-16 NOTE — PROGRESS NOTE ADULT - SUBJECTIVE AND OBJECTIVE BOX
Pt chart review. Spoke with pt last night who understands and agrees with plan below.  T 97.1 Only complaint is constipation and some pedal discomfort.  WBC 5.51  HH 9.0/28.1    BUN/CRE 24/1.1  Glu 137  eGFR 48  Surgical pathology reviewed + OM digit and Met 3 head right with likely residual infection.  Aztreonam DC'd, started Cefepime 2 g  12h.  Pt for PICC line today for 6 weeks IVABs  All notes and labs reviewed.   No further surgery at this time because of risk of strangulating  wound edges in attempts to primarily close.   Would not DC pt to SNF until she has had appropriate number of doses of Cefepime to assess for anaphylaxis or other reaction.  Stable for DC to er if above criteria met. Will need wound care nurse at SNF.  Continue Dakins solution at dressing change.  All of above discussed with patient who understands and agrees

## 2019-01-16 NOTE — PROGRESS NOTE ADULT - ASSESSMENT
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

## 2019-01-16 NOTE — PROCEDURE NOTE - NSPROCDETAILS_GEN_ALL_CORE
sterile technique, catheter placed/sterile dressing applied/supine position/ultrasound guidance/location identified, draped/prepped, sterile technique used

## 2019-01-16 NOTE — PROCEDURE NOTE - NSINFORMCONSENT_GEN_A_CORE
Benefits, risks, and possible complications of procedure explained to patient/caregiver who verbalized understanding and gave written consent./patient
Benefits, risks, and possible complications of procedure explained to patient/caregiver who verbalized understanding and gave verbal consent.

## 2019-01-16 NOTE — PROGRESS NOTE ADULT - ASSESSMENT
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    #Bilateral Chronic Nonhealing Ulcers and concurrent LE cellulitis   -per podiatry betadine to lesions,s/p debridement by burn , s/p angiogram. Large vessels patent , small vessel disease. s/p amputation  -Temp on 102.2F 12/21 ; Afebrile since  -New lucency on xray at third metatarsal head. MRI not required for now per podiatry  -doxycycline oral 100mg q 12 for 14 days , growing Pseudomonas (carbapenum resistant) , will require cefepime but pt refusing cefepime. Called ID , cefepime is only option for now , PICC line and abx x 6 weeks. bone biopsy consistent with OM-Podiatry: Betadine paint to digit 3R BID; Xeroform to ulcers with loose dressing; Limb elevation.    -Duplex LE venous: Chronic-appearing right common femoral and femoral deep venous thromboses; left popliteal deep venous thrombosis  -Duplex LE arterial: On the right: Mild to moderate disease at the popliteal artery level. On the left: Mild popliteal and posterior tibial artery disease; Remaining tibial arteries not visualized secondary to edema   -Uric acid levels normal. .  -serial xrays    #) Direct antiglobin positive   - Blood was sent for type and cross match, hb holding.   -Might need hem /onc consult     #Chronic Right Hip Pain  -RIght hip xray: Diffuse osteopenia  -C/w Physical therapy. Will check Vit D levels and replete if deficient    #Chronic B/L femoral and popliteal DVT- eliquis     #Chronic Normocytic Anemia- Likely 2/2 RA; Hb at baseline  #Rheumatoid Arthritis -Continue home prednisone. talked to Dr Ascencio (Dr Henry's team)- Can decrease prednisone 5mg if healing hampered. No other adjustments needed. one dose of solumedrol before going for OR  #DM II-Controlled; Monitor finger sticks, start insulin sliding scale if fingersticks are greater than 180  #Latent TB- rifampin  #Gout- Normal uric acid levels; c/w Allopurinol    DVT ppx: On Eliquis ,   GI ppx: Not indicated  Diet: DASH Carb consistent  ,   Activity: AAT  Code Status: DNR/DNI, MOLST form completed with health care proxy as witness by bedside, form in chart  Disposition: pending

## 2019-01-17 LAB
GLUCOSE BLDC GLUCOMTR-MCNC: 104 MG/DL — HIGH (ref 70–99)
GLUCOSE BLDC GLUCOMTR-MCNC: 105 MG/DL — HIGH (ref 70–99)
GLUCOSE BLDC GLUCOMTR-MCNC: 137 MG/DL — HIGH (ref 70–99)
GLUCOSE BLDC GLUCOMTR-MCNC: 177 MG/DL — HIGH (ref 70–99)
GLUCOSE BLDC GLUCOMTR-MCNC: 247 MG/DL — HIGH (ref 70–99)

## 2019-01-17 RX ORDER — COLLAGENASE CLOSTRIDIUM HIST. 250 UNIT/G
1 OINTMENT (GRAM) TOPICAL DAILY
Qty: 0 | Refills: 0 | Status: DISCONTINUED | OUTPATIENT
Start: 2019-01-17 | End: 2019-01-19

## 2019-01-17 RX ADMIN — GABAPENTIN 300 MILLIGRAM(S): 400 CAPSULE ORAL at 07:55

## 2019-01-17 RX ADMIN — Medication 1: at 11:45

## 2019-01-17 RX ADMIN — APIXABAN 5 MILLIGRAM(S): 2.5 TABLET, FILM COATED ORAL at 07:56

## 2019-01-17 RX ADMIN — Medication 100 MILLIGRAM(S): at 17:03

## 2019-01-17 RX ADMIN — GABAPENTIN 300 MILLIGRAM(S): 400 CAPSULE ORAL at 17:03

## 2019-01-17 RX ADMIN — OXYCODONE AND ACETAMINOPHEN 2 TABLET(S): 5; 325 TABLET ORAL at 05:36

## 2019-01-17 RX ADMIN — Medication 5 MILLIGRAM(S): at 11:20

## 2019-01-17 RX ADMIN — APIXABAN 5 MILLIGRAM(S): 2.5 TABLET, FILM COATED ORAL at 17:03

## 2019-01-17 RX ADMIN — OXYCODONE AND ACETAMINOPHEN 2 TABLET(S): 5; 325 TABLET ORAL at 21:17

## 2019-01-17 RX ADMIN — Medication 1 MILLIGRAM(S): at 11:20

## 2019-01-17 RX ADMIN — OXYCODONE AND ACETAMINOPHEN 2 TABLET(S): 5; 325 TABLET ORAL at 06:06

## 2019-01-17 RX ADMIN — CEFEPIME 100 MILLIGRAM(S): 1 INJECTION, POWDER, FOR SOLUTION INTRAMUSCULAR; INTRAVENOUS at 17:04

## 2019-01-17 RX ADMIN — Medication 300 MILLIGRAM(S): at 11:20

## 2019-01-17 RX ADMIN — OXYCODONE AND ACETAMINOPHEN 2 TABLET(S): 5; 325 TABLET ORAL at 17:03

## 2019-01-17 RX ADMIN — OXYCODONE AND ACETAMINOPHEN 2 TABLET(S): 5; 325 TABLET ORAL at 21:47

## 2019-01-17 RX ADMIN — Medication 100 MILLIGRAM(S): at 07:55

## 2019-01-17 RX ADMIN — CEFEPIME 100 MILLIGRAM(S): 1 INJECTION, POWDER, FOR SOLUTION INTRAMUSCULAR; INTRAVENOUS at 05:38

## 2019-01-17 RX ADMIN — OXYCODONE AND ACETAMINOPHEN 2 TABLET(S): 5; 325 TABLET ORAL at 17:33

## 2019-01-17 RX ADMIN — Medication 5 MILLIGRAM(S): at 07:56

## 2019-01-17 NOTE — PROGRESS NOTE ADULT - ASSESSMENT
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    #Bilateral Chronic Nonhealing Ulcers and concurrent LE cellulitis   -per podiatry betadine to lesions,s/p debridement by burn , s/p angiogram. Large vessels patent , small vessel disease. s/p amputation  -Temp on 102.2F 12/21 ; Afebrile since  -New lucency on xray at third metatarsal head. MRI not required for now per podiatry  -doxycycline oral 100mg q 12 for 14 days , growing Pseudomonas (carbapenum resistant) , will require cefepime but pt refusing cefepime. Called ID , cefepime is only option for now , PICC line and abx x 6 weeks. 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    -Duplex LE venous: Chronic-appearing right common femoral and femoral deep venous thromboses; left popliteal deep venous thrombosis  -Duplex LE arterial: On the right: Mild to moderate disease at the popliteal artery level. On the left: Mild popliteal and posterior tibial artery disease; Remaining tibial arteries not visualized secondary to edema   -Uric acid levels normal. .  -serial xrays    #) Direct antiglobin positive   - Blood was sent for type and cross match, hb holding.   -Might need hem /onc consult     #Chronic Right Hip Pain  -RIght hip xray: Diffuse osteopenia  -C/w Physical therapy. Will check Vit D levels and replete if deficient    #Chronic B/L femoral and popliteal DVT- eliquis     #Chronic Normocytic Anemia- Likely 2/2 RA; Hb at baseline  #Rheumatoid Arthritis -Continue home prednisone. talked to Dr Ascencio (Dr Henry's team)- Can decrease prednisone 5mg if healing hampered. No other adjustments needed. one dose of solumedrol before going for OR  #DM II-Controlled; Monitor finger sticks, start insulin sliding scale if fingersticks are greater than 180  #Latent TB- rifampin  #Gout- Normal uric acid levels; c/w Allopurinol    DVT ppx: On Eliquis ,   GI ppx: Not indicated  Diet: DASH Carb consistent  ,   Activity: AAT  Code Status: DNR/DNI, MOLST form completed with health care proxy as witness by bedside, form in chart  Disposition: pending

## 2019-01-17 NOTE — PROGRESS NOTE ADULT - SUBJECTIVE AND OBJECTIVE BOX
PODIATRY PROGRESS NOTE   [id number] LINCOLN HEREDIA is a pleasant well-nourished, well developed 78y Female in no acute distress, alert awake, and oriented to person, place and time.   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 Patient describes sharp, "jabbing" persistent pain as well as aching intermittent pain of her right hip for several years duration. Patient endorses subjective fevers, denies chills, nausea, vomiting, chest pain, palpitations, lightheadedness, headaches, sick contacts or recent travel. Patient was recently admitted on 10/24-10/29 for UTI was discharged to [facility], from where she was recently discharged 2 weeks ago. (19 Dec 2018 13:15)    [t was seen and assessed at bedside am rounds.  pt denies any n/v/f/c/sob   pt states that she is getting d/c to [facility] today as per patient.  dressing clean dry intact.    Vital Signs Last 24 Hrs  T(C): 36.3 (17 Jan 2019 05:25), Max: 36.9 (16 Jan 2019 21:05)  T(F): 97.4 (17 Jan 2019 05:25), Max: 98.5 (16 Jan 2019 21:05)  HR: 78 (17 Jan 2019 05:25) (78 - 84)  BP: 132/68 (17 Jan 2019 05:25) (116/58 - 146/67)  BP(mean): --  RR: 18 (17 Jan 2019 05:25) (18 - 20)  SpO2: 98% (16 Jan 2019 19:48) (98% - 98%)                        9.4    9.38  )-----------( 291      ( 16 Jan 2019 12:01 )             29.9                 01-16    138  |  100  |  22<H>  ----------------------------<  179<H>  4.2   |  21  |  1.2    Ca    9.3      16 Jan 2019 12:01        Derm: superficial dried blood vs necrotic changes noted distal aspect of the wound at the surgical site.  suture still intact proximal surgical site, wound base fibrogranular measures about 1cm x 0.4cm x 0.7 cm, mild edema.    A:  b/l venous statsis ulceration LE  R 3rd digit ulceration.  s/p ex dbx st b/l LE 12/28 (burn)  s/p angio RLE 1/7  s/p ex dbx st/b w 3rd toe amp open w/ proximal sutures intact 1/10    P:  pt evaluate and treated  pt good to go from podiatry standpoint  flushed with dakins wet to dry /adaptic/dsd/kerlix  ordered santyl  Wound Care Orders: flush with Dakins and apply santyl/Dakins wet to dry adaptic dsd kerlix daily.   needs heel off  bl feet when bedbound to prevent any other pressure ulcers to occur.  f/u with Burn for dressing changes for b/l LE  d/c to [facility]  pt can be weigh bearing as tolerated with DARCO shoe on  picc line place 1/16  ID cefepime 2g iv q12h as per ID for 6 weeks  f/u as an outpatient with Dr. Villasenor in 1 week  Attending updated and added to note for review.   01-17-19 @ 08:52

## 2019-01-17 NOTE — PROGRESS NOTE ADULT - SUBJECTIVE AND OBJECTIVE BOX
Am chart review and conversation with patient  AF. DC pending for today  Wound edges appear clean and viable. No signs infection.  WBC 9.38  HH 9.4/29.9  INR 1.08  Bun/Cre 22/1.2   Glu 179   eGFR  44  Dakins soln to wound interior followed by Santyl and moist dressing once daily.   Heel cups while in bed  Will need 6 weeks IVAB Cefepime 2g BID  Requires piero wound care nurse at [facility]  All of above discussed with patient.  Prognosis for foot salvage-fair.  Should have vascular, podiatry and ID follow up

## 2019-01-17 NOTE — PROGRESS NOTE ADULT - ATTENDING COMMENTS
patient seen and examined independently on morning rounds, chart reviewed and discussed with the medicine resident and on interdisciplinary rounds and agree with the above resident progress note with the following addendum:    s/p picc insertion yesterday---awaiting discharge to [facility] once bed available---likely in am- will need daily wound care and 6 weeks of intravenous antibiotics for OM    PE:  GEN-NAD, oob to chair, AAOx3  PULM- Clear to auscultation bilaterally, fair air entry  CVS- +s1/s2 RRR no murmurs  GI- soft NT ND +bs, no rebound, no guarding  EXT- bilateral LE dressing c/d/i- limited exam as per patient 2/2 pain    a/p:  #bilateral nonhealing ulcers/cellulitis and OM (confirmed by bone biopsy)  -cont iv abx x 6 weeks  -has picc lue- site c/d/i  -local wound care and f/u with burn/ podiatry (s/p debridement and amputation)  - cx with pseudomonas pan resistatnt--sensitive to cefepime  -leg elevateion    DNR/DNI  antiiciapte dc to [facility] tomorrow when bed available     continue current mangment of chronic medical issues    pcp- Dr. Stephen

## 2019-01-18 LAB
GLUCOSE BLDC GLUCOMTR-MCNC: 119 MG/DL — HIGH (ref 70–99)
GLUCOSE BLDC GLUCOMTR-MCNC: 134 MG/DL — HIGH (ref 70–99)
GLUCOSE BLDC GLUCOMTR-MCNC: 144 MG/DL — HIGH (ref 70–99)
GLUCOSE BLDC GLUCOMTR-MCNC: 169 MG/DL — HIGH (ref 70–99)

## 2019-01-18 RX ORDER — OXYCODONE AND ACETAMINOPHEN 5; 325 MG/1; MG/1
1 TABLET ORAL ONCE
Qty: 0 | Refills: 0 | Status: DISCONTINUED | OUTPATIENT
Start: 2019-01-18 | End: 2019-01-18

## 2019-01-18 RX ORDER — OXYCODONE AND ACETAMINOPHEN 5; 325 MG/1; MG/1
2 TABLET ORAL EVERY 4 HOURS
Qty: 0 | Refills: 0 | Status: DISCONTINUED | OUTPATIENT
Start: 2019-01-18 | End: 2019-01-19

## 2019-01-18 RX ADMIN — CEFEPIME 100 MILLIGRAM(S): 1 INJECTION, POWDER, FOR SOLUTION INTRAMUSCULAR; INTRAVENOUS at 06:21

## 2019-01-18 RX ADMIN — Medication 5 MILLIGRAM(S): at 14:16

## 2019-01-18 RX ADMIN — Medication 100 MILLIGRAM(S): at 17:10

## 2019-01-18 RX ADMIN — Medication 5 MILLIGRAM(S): at 08:14

## 2019-01-18 RX ADMIN — SENNA PLUS 2 TABLET(S): 8.6 TABLET ORAL at 21:17

## 2019-01-18 RX ADMIN — Medication 1: at 14:11

## 2019-01-18 RX ADMIN — APIXABAN 5 MILLIGRAM(S): 2.5 TABLET, FILM COATED ORAL at 17:10

## 2019-01-18 RX ADMIN — APIXABAN 5 MILLIGRAM(S): 2.5 TABLET, FILM COATED ORAL at 08:14

## 2019-01-18 RX ADMIN — OXYCODONE AND ACETAMINOPHEN 2 TABLET(S): 5; 325 TABLET ORAL at 23:06

## 2019-01-18 RX ADMIN — Medication 1 MILLIGRAM(S): at 14:14

## 2019-01-18 RX ADMIN — CHLORHEXIDINE GLUCONATE 1 APPLICATION(S): 213 SOLUTION TOPICAL at 07:58

## 2019-01-18 RX ADMIN — OXYCODONE AND ACETAMINOPHEN 2 TABLET(S): 5; 325 TABLET ORAL at 06:20

## 2019-01-18 RX ADMIN — OXYCODONE AND ACETAMINOPHEN 2 TABLET(S): 5; 325 TABLET ORAL at 06:50

## 2019-01-18 RX ADMIN — GABAPENTIN 300 MILLIGRAM(S): 400 CAPSULE ORAL at 08:14

## 2019-01-18 RX ADMIN — CEFEPIME 100 MILLIGRAM(S): 1 INJECTION, POWDER, FOR SOLUTION INTRAMUSCULAR; INTRAVENOUS at 17:11

## 2019-01-18 RX ADMIN — Medication 300 MILLIGRAM(S): at 14:15

## 2019-01-18 RX ADMIN — GABAPENTIN 300 MILLIGRAM(S): 400 CAPSULE ORAL at 17:10

## 2019-01-18 NOTE — PROGRESS NOTE ADULT - ASSESSMENT
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    #Bilateral Chronic Nonhealing Ulcers and concurrent LE cellulitis   -per podiatry betadine to lesions,s/p debridement by burn , s/p angiogram. Large vessels patent , small vessel disease. s/p amputation  -Temp on 102.2F 12/21 ; Afebrile since  -New lucency on xray at third metatarsal head. MRI not required for now per podiatry  -doxycycline oral 100mg q 12 for 14 days , growing Pseudomonas (carbapenum resistant) , will require cefepime but pt refusing cefepime. Called ID , cefepime is only option for now , PICC line and abx x 6 weeks. 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    -Duplex LE venous: Chronic-appearing right common femoral and femoral deep venous thromboses; left popliteal deep venous thrombosis  -Duplex LE arterial: On the right: Mild to moderate disease at the popliteal artery level. On the left: Mild popliteal and posterior tibial artery disease; Remaining tibial arteries not visualized secondary to edema   -Uric acid levels normal. .  -serial xrays    #) Direct antiglobin positive   - Blood was sent for type and cross match, hb holding.   -Might need hem /onc consult     #Chronic Right Hip Pain  -RIght hip xray: Diffuse osteopenia  -C/w Physical therapy. Will check Vit D levels and replete if deficient    #Chronic B/L femoral and popliteal DVT- eliquis     #Chronic Normocytic Anemia- Likely 2/2 RA; Hb at baseline  #Rheumatoid Arthritis -Continue home prednisone. talked to Dr Ascencio (Dr Henry's team)- Can decrease prednisone 5mg if healing hampered. No other adjustments needed. one dose of solumedrol before going for OR  #DM II-Controlled; Monitor finger sticks, start insulin sliding scale if fingersticks are greater than 180  #Latent TB- rifampin  #Gout- Normal uric acid levels; c/w Allopurinol    DVT ppx: On Eliquis ,   GI ppx: Not indicated  Diet: DASH Carb consistent  ,   Activity: AAT  Code Status: DNR/DNI, MOLST form completed with health care proxy as witness by bedside, form in chart  Disposition: pending

## 2019-01-18 NOTE — PROGRESS NOTE ADULT - ATTENDING COMMENTS
patient seen and examined independently on morning rounds, chart reviewed and discussed with the medicine resident and on interdisciplinary rounds and agree with the above resident progress note with the following addendum:    s/p picc insertion--awaiting discharge to [facility] once bed available---likely in am saturday- will need daily wound care and 6 weeks of intravenous antibiotics for OM    PE:  GEN-NAD, oob to chair, AAOx3  PULM- Clear to auscultation bilaterally, fair air entry  CVS- +s1/s2 RRR no murmurs  GI- soft NT ND +bs, no rebound, no guarding  EXT- bilateral LE dressing c/d/i- limited exam as per patient 2/2 pain    a/p:  #bilateral nonhealing ulcers/cellulitis and OM (confirmed by bone biopsy)  -cont iv abx x 6 weeks  -has picc lue- site c/d/i  -local wound care and f/u with burn/ podiatry (s/p debridement and amputation)  - cx with pseudomonas pan resistatnt--sensitive to cefepime  -leg elevation    DNR/DNI  antiiciapte [facility] when bed available     continue current mangment of chronic medical issues    pcp- Dr. Stephen .

## 2019-01-18 NOTE — PROGRESS NOTE ADULT - SUBJECTIVE AND OBJECTIVE BOX
SUBJECTIVE:    Patient is a 78y old Female who presents with a chief complaint of "I couldn't stand up from the recliner", and worsening pain of bilateral lower extremities (17 Jan 2019 09:15)    Currently admitted to medicine with the primary diagnosis of Wounds, multiple open, lower extremity     Today is hospital day 30d. This morning she is resting comfortably in bed and reports no new issues or overnight events.     PAST MEDICAL & SURGICAL HISTORY  Gout  DVT (deep venous thrombosis)  HTN (hypertension)  Rheumatoid arteritis  Other specified diabetes mellitus with other specified complication, unspecified whether long term insulin use  Primary osteoarthritis of right knee: s/p knee repalcement    SOCIAL HISTORY:  Negative for smoking/alcohol/drug use.     ALLERGIES:  clindamycin (Unknown)  erythromycin (Unknown)  penicillin (Unknown)  strawberry (Unknown)    MEDICATIONS:  STANDING MEDICATIONS  allopurinol 300 milliGRAM(s) Oral daily  apixaban 5 milliGRAM(s) Oral every 12 hours  cefepime   IVPB      cefepime   IVPB 2000 milliGRAM(s) IV Intermittent every 12 hours  chlorhexidine 4% Liquid 1 Application(s) Topical <User Schedule>  collagenase Ointment 1 Application(s) Topical daily  Dakins Solution - 1/2 Strength 1 Application(s) Topical daily  dextrose 5%. 1000 milliLiter(s) IV Continuous <Continuous>  dextrose 50% Injectable 12.5 Gram(s) IV Push once  dextrose 50% Injectable 25 Gram(s) IV Push once  dextrose 50% Injectable 25 Gram(s) IV Push once  docusate sodium 100 milliGRAM(s) Oral two times a day  folic acid 1 milliGRAM(s) Oral daily  gabapentin 300 milliGRAM(s) Oral every 12 hours  insulin lispro (HumaLOG) corrective regimen sliding scale   SubCutaneous three times a day before meals  leucovorin 5 milliGRAM(s) Oral daily  morphine  - Injectable 2 milliGRAM(s) IV Push daily  predniSONE   Tablet 5 milliGRAM(s) Oral daily  rifampin 300 milliGRAM(s) Oral two times a day  senna 2 Tablet(s) Oral at bedtime    PRN MEDICATIONS  acetaminophen   Tablet .. 650 milliGRAM(s) Oral every 6 hours PRN  benzocaine 15 mG/menthol 3.6 mG (Sugar-Free) Lozenge 1 Lozenge Oral three times a day PRN  dextrose 40% Gel 15 Gram(s) Oral once PRN  glucagon  Injectable 1 milliGRAM(s) IntraMuscular once PRN  lactulose Syrup 10 Gram(s) Oral three times a day PRN  oxyCODONE    5 mG/acetaminophen 325 mG 2 Tablet(s) Oral every 4 hours PRN    VITALS:   T(F): 97.9  HR: 73  BP: 129/61  RR: 18  SpO2: 97%    PHYSICAL EXAM:  GEN: No acute distress  LUNGS: Clear to auscultation bilaterally   HEART: S1/S2 present.    ABD: Soft, non-tender, non-distended.    EXT: b/l lower extremity lesion  NEURO: AAOX3        LABS:                        9.4    9.38  )-----------( 291      ( 16 Jan 2019 12:01 )             29.9     01-16    138  |  100  |  22<H>  ----------------------------<  179<H>  4.2   |  21  |  1.2    Ca    9.3      16 Jan 2019 12:01      PT/INR - ( 16 Jan 2019 12:01 )   PT: 12.40 sec;   INR: 1.08 ratio         PTT - ( 16 Jan 2019 12:01 )  PTT:36.1 sec                  RADIOLOGY:

## 2019-01-19 VITALS
DIASTOLIC BLOOD PRESSURE: 79 MMHG | TEMPERATURE: 97 F | HEART RATE: 82 BPM | RESPIRATION RATE: 18 BRPM | SYSTOLIC BLOOD PRESSURE: 143 MMHG

## 2019-01-19 LAB
GLUCOSE BLDC GLUCOMTR-MCNC: 103 MG/DL — HIGH (ref 70–99)
GLUCOSE BLDC GLUCOMTR-MCNC: 169 MG/DL — HIGH (ref 70–99)

## 2019-01-19 RX ORDER — COLLAGENASE CLOSTRIDIUM HIST. 250 UNIT/G
1 OINTMENT (GRAM) TOPICAL
Qty: 0 | Refills: 0 | COMMUNITY
Start: 2019-01-19

## 2019-01-19 RX ADMIN — CEFEPIME 100 MILLIGRAM(S): 1 INJECTION, POWDER, FOR SOLUTION INTRAMUSCULAR; INTRAVENOUS at 05:14

## 2019-01-19 RX ADMIN — OXYCODONE AND ACETAMINOPHEN 2 TABLET(S): 5; 325 TABLET ORAL at 05:18

## 2019-01-19 RX ADMIN — GABAPENTIN 300 MILLIGRAM(S): 400 CAPSULE ORAL at 05:17

## 2019-01-19 RX ADMIN — Medication 100 MILLIGRAM(S): at 05:17

## 2019-01-19 RX ADMIN — OXYCODONE AND ACETAMINOPHEN 2 TABLET(S): 5; 325 TABLET ORAL at 06:59

## 2019-01-19 RX ADMIN — Medication 5 MILLIGRAM(S): at 05:17

## 2019-01-19 RX ADMIN — APIXABAN 5 MILLIGRAM(S): 2.5 TABLET, FILM COATED ORAL at 05:17

## 2019-01-19 NOTE — PROGRESS NOTE ADULT - SUBJECTIVE AND OBJECTIVE BOX
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 Patient describes sharp, "jabbing" persistent pain as well as aching intermittent pain of her right hip for several years duration. Patient endorses subjective fevers, denies chills, nausea, vomiting, chest pain, palpitations, lightheadedness, headaches, sick contacts or recent travel. Patient was recently admitted on 10/24-10/29 for UTI was discharged to [facility], from where she was recently discharged 2 weeks ago. (19 Dec 2018 13:15)    PAST MEDICAL & SURGICAL HISTORY:  Gout  DVT (deep venous thrombosis)  HTN (hypertension)  Rheumatoid arteritis  Other specified diabetes mellitus with other specified complication, unspecified whether long term insulin use  Primary osteoarthritis of right knee: s/p knee repalcement        patient seen and examined independently on morning rounds, chart reviewed and discussed with medicine resident on call    no overnight events---bed available today at [facility] for STR- awaiting transport to [facility]    Vital Signs Last 24 Hrs  T(C): 36.2 (19 Jan 2019 04:39), Max: 36.8 (18 Jan 2019 20:42)  T(F): 97.1 (19 Jan 2019 04:39), Max: 98.3 (18 Jan 2019 20:42)  HR: 82 (19 Jan 2019 04:39) (81 - 82)  BP: 143/79 (19 Jan 2019 04:39) (134/62 - 143/79)  BP(mean): --  RR: 18 (19 Jan 2019 04:39) (18 - 18)  SpO2: --      GEN-NAD, oob to chair, AAOx3  PULM- Clear to auscultation bilaterally, fair air entry  CVS- +s1/s2 RRR no murmurs  GI- soft NT ND +bs, no rebound, no guarding  EXT- bilateral LE dressing c/d/i-improved edema          MEDICATIONS  (STANDING):  allopurinol 300 milliGRAM(s) Oral daily  apixaban 5 milliGRAM(s) Oral every 12 hours  cefepime   IVPB      cefepime   IVPB 2000 milliGRAM(s) IV Intermittent every 12 hours  chlorhexidine 4% Liquid 1 Application(s) Topical <User Schedule>  collagenase Ointment 1 Application(s) Topical daily  Dakins Solution - 1/2 Strength 1 Application(s) Topical daily  dextrose 5%. 1000 milliLiter(s) (50 mL/Hr) IV Continuous <Continuous>  dextrose 50% Injectable 12.5 Gram(s) IV Push once  dextrose 50% Injectable 25 Gram(s) IV Push once  dextrose 50% Injectable 25 Gram(s) IV Push once  docusate sodium 100 milliGRAM(s) Oral two times a day  folic acid 1 milliGRAM(s) Oral daily  gabapentin 300 milliGRAM(s) Oral every 12 hours  insulin lispro (HumaLOG) corrective regimen sliding scale   SubCutaneous three times a day before meals  leucovorin 5 milliGRAM(s) Oral daily  predniSONE   Tablet 5 milliGRAM(s) Oral daily  rifampin 300 milliGRAM(s) Oral two times a day  senna 2 Tablet(s) Oral at bedtime

## 2019-01-19 NOTE — PROGRESS NOTE ADULT - REASON FOR ADMISSION
"I couldn't stand up from the recliner", and worsening pain of bilateral lower extremities

## 2019-01-19 NOTE — PROGRESS NOTE ADULT - PROVIDER SPECIALTY LIST ADULT
Burn
Hospitalist
Infectious Disease
Infectious Disease
Internal Medicine
Physiatry
Podiatry
Vascular Surgery
Vascular Surgery
Hospitalist
Podiatry
Internal Medicine
Hospitalist
Internal Medicine
Hospitalist

## 2019-01-19 NOTE — PROGRESS NOTE ADULT - ASSESSMENT
time spendt on discharge >30 minutes including coordination of discharge care    discharge to [facility] ([facility]) today- bed available and awaiting transport    a/p:  #bilateral nonhealing ulcers/cellulitis and OM (confirmed by bone biopsy)  -cont iv abx x 6 weeks  -has picc lue- site c/d/i  -local wound care and f/u with burn/ podiatry (s/p debridement and amputation)  - cx with pseudomonas pan resistatnt--sensitive to cefepime  -leg elevation    DNR/DNI    continue current managment of chronic medical issues    pcp- Dr. Stephen  DISCHARGE to STR today

## 2019-01-21 ENCOUNTER — OUTPATIENT (OUTPATIENT)
Dept: OUTPATIENT SERVICES | Facility: HOSPITAL | Age: 79
LOS: 1 days | Discharge: HOME | End: 2019-01-21

## 2019-01-21 DIAGNOSIS — R79.9 ABNORMAL FINDING OF BLOOD CHEMISTRY, UNSPECIFIED: ICD-10-CM

## 2019-01-21 DIAGNOSIS — M17.11 UNILATERAL PRIMARY OSTEOARTHRITIS, RIGHT KNEE: Chronic | ICD-10-CM

## 2019-01-21 DIAGNOSIS — D64.9 ANEMIA, UNSPECIFIED: ICD-10-CM

## 2019-01-22 ENCOUNTER — INBOUND DOCUMENT (OUTPATIENT)
Age: 79
End: 2019-01-22

## 2019-01-22 DIAGNOSIS — E11.69 TYPE 2 DIABETES MELLITUS WITH OTHER SPECIFIED COMPLICATION: ICD-10-CM

## 2019-01-22 DIAGNOSIS — M06.9 RHEUMATOID ARTHRITIS, UNSPECIFIED: ICD-10-CM

## 2019-01-22 DIAGNOSIS — L03.115 CELLULITIS OF RIGHT LOWER LIMB: ICD-10-CM

## 2019-01-22 DIAGNOSIS — Z79.01 LONG TERM (CURRENT) USE OF ANTICOAGULANTS: ICD-10-CM

## 2019-01-22 DIAGNOSIS — M86.9 OSTEOMYELITIS, UNSPECIFIED: ICD-10-CM

## 2019-01-22 DIAGNOSIS — Z96.651 PRESENCE OF RIGHT ARTIFICIAL KNEE JOINT: ICD-10-CM

## 2019-01-22 DIAGNOSIS — Z66 DO NOT RESUSCITATE: ICD-10-CM

## 2019-01-22 DIAGNOSIS — Z86.11 PERSONAL HISTORY OF TUBERCULOSIS: ICD-10-CM

## 2019-01-22 DIAGNOSIS — L97.829 NON-PRESSURE CHRONIC ULCER OF OTHER PART OF LEFT LOWER LEG WITH UNSPECIFIED SEVERITY: ICD-10-CM

## 2019-01-22 DIAGNOSIS — L03.116 CELLULITIS OF LEFT LOWER LIMB: ICD-10-CM

## 2019-01-22 DIAGNOSIS — L97.819 NON-PRESSURE CHRONIC ULCER OF OTHER PART OF RIGHT LOWER LEG WITH UNSPECIFIED SEVERITY: ICD-10-CM

## 2019-01-22 DIAGNOSIS — Z88.0 ALLERGY STATUS TO PENICILLIN: ICD-10-CM

## 2019-01-22 DIAGNOSIS — D64.9 ANEMIA, UNSPECIFIED: ICD-10-CM

## 2019-01-22 DIAGNOSIS — M85.80 OTHER SPECIFIED DISORDERS OF BONE DENSITY AND STRUCTURE, UNSPECIFIED SITE: ICD-10-CM

## 2019-01-22 DIAGNOSIS — E11.52 TYPE 2 DIABETES MELLITUS WITH DIABETIC PERIPHERAL ANGIOPATHY WITH GANGRENE: ICD-10-CM

## 2019-01-22 DIAGNOSIS — I82.533 CHRONIC EMBOLISM AND THROMBOSIS OF POPLITEAL VEIN, BILATERAL: ICD-10-CM

## 2019-01-22 DIAGNOSIS — M10.9 GOUT, UNSPECIFIED: ICD-10-CM

## 2019-01-23 ENCOUNTER — OUTPATIENT (OUTPATIENT)
Dept: OUTPATIENT SERVICES | Facility: HOSPITAL | Age: 79
LOS: 1 days | Discharge: HOME | End: 2019-01-23

## 2019-01-23 DIAGNOSIS — M17.11 UNILATERAL PRIMARY OSTEOARTHRITIS, RIGHT KNEE: Chronic | ICD-10-CM

## 2019-01-24 ENCOUNTER — OUTPATIENT (OUTPATIENT)
Dept: OUTPATIENT SERVICES | Facility: HOSPITAL | Age: 79
LOS: 1 days | Discharge: HOME | End: 2019-01-24

## 2019-01-24 DIAGNOSIS — M17.11 UNILATERAL PRIMARY OSTEOARTHRITIS, RIGHT KNEE: Chronic | ICD-10-CM

## 2019-01-25 ENCOUNTER — OUTPATIENT (OUTPATIENT)
Dept: OUTPATIENT SERVICES | Facility: HOSPITAL | Age: 79
LOS: 1 days | Discharge: HOME | End: 2019-01-25

## 2019-01-25 DIAGNOSIS — M17.11 UNILATERAL PRIMARY OSTEOARTHRITIS, RIGHT KNEE: Chronic | ICD-10-CM

## 2019-01-25 DIAGNOSIS — D64.9 ANEMIA, UNSPECIFIED: ICD-10-CM

## 2019-01-26 ENCOUNTER — OUTPATIENT (OUTPATIENT)
Dept: OUTPATIENT SERVICES | Facility: HOSPITAL | Age: 79
LOS: 1 days | Discharge: HOME | End: 2019-01-26

## 2019-01-26 DIAGNOSIS — M17.11 UNILATERAL PRIMARY OSTEOARTHRITIS, RIGHT KNEE: Chronic | ICD-10-CM

## 2019-01-26 DIAGNOSIS — R53.83 OTHER FATIGUE: ICD-10-CM

## 2019-01-28 ENCOUNTER — INPATIENT (INPATIENT)
Facility: HOSPITAL | Age: 79
LOS: 21 days | Discharge: NURSING, REHAB OR LONG-TERM CARE | End: 2019-02-19
Attending: HOSPITALIST | Admitting: HOSPITALIST

## 2019-01-28 ENCOUNTER — OUTPATIENT (OUTPATIENT)
Dept: OUTPATIENT SERVICES | Facility: HOSPITAL | Age: 79
LOS: 1 days | Discharge: HOME | End: 2019-01-28

## 2019-01-28 VITALS
SYSTOLIC BLOOD PRESSURE: 178 MMHG | HEART RATE: 59 BPM | OXYGEN SATURATION: 92 % | RESPIRATION RATE: 18 BRPM | DIASTOLIC BLOOD PRESSURE: 91 MMHG | TEMPERATURE: 99 F

## 2019-01-28 DIAGNOSIS — M17.11 UNILATERAL PRIMARY OSTEOARTHRITIS, RIGHT KNEE: Chronic | ICD-10-CM

## 2019-01-28 DIAGNOSIS — R41.82 ALTERED MENTAL STATUS, UNSPECIFIED: ICD-10-CM

## 2019-01-28 DIAGNOSIS — I10 ESSENTIAL (PRIMARY) HYPERTENSION: ICD-10-CM

## 2019-01-28 DIAGNOSIS — I00 RHEUMATIC FEVER WITHOUT HEART INVOLVEMENT: ICD-10-CM

## 2019-01-28 DIAGNOSIS — I82.409 ACUTE EMBOLISM AND THROMBOSIS OF UNSPECIFIED DEEP VEINS OF UNSPECIFIED LOWER EXTREMITY: ICD-10-CM

## 2019-01-28 DIAGNOSIS — A41.9 SEPSIS, UNSPECIFIED ORGANISM: ICD-10-CM

## 2019-01-28 DIAGNOSIS — M10.9 GOUT, UNSPECIFIED: ICD-10-CM

## 2019-01-28 DIAGNOSIS — M06.9 RHEUMATOID ARTHRITIS, UNSPECIFIED: ICD-10-CM

## 2019-01-28 DIAGNOSIS — E13.69 OTHER SPECIFIED DIABETES MELLITUS WITH OTHER SPECIFIED COMPLICATION: ICD-10-CM

## 2019-01-28 LAB
ALBUMIN SERPL ELPH-MCNC: 3.6 G/DL — SIGNIFICANT CHANGE UP (ref 3.5–5.2)
ALP SERPL-CCNC: 54 U/L — SIGNIFICANT CHANGE UP (ref 30–115)
ALT FLD-CCNC: 7 U/L — SIGNIFICANT CHANGE UP (ref 0–41)
ANION GAP SERPL CALC-SCNC: 17 MMOL/L — HIGH (ref 7–14)
APPEARANCE UR: ABNORMAL
APTT BLD: 21.7 SEC — CRITICAL LOW (ref 27–39.2)
AST SERPL-CCNC: 15 U/L — SIGNIFICANT CHANGE UP (ref 0–41)
BASOPHILS # BLD AUTO: 0.05 K/UL — SIGNIFICANT CHANGE UP (ref 0–0.2)
BASOPHILS NFR BLD AUTO: 0.5 % — SIGNIFICANT CHANGE UP (ref 0–1)
BILIRUB SERPL-MCNC: 0.3 MG/DL — SIGNIFICANT CHANGE UP (ref 0.2–1.2)
BILIRUB UR-MCNC: NEGATIVE — SIGNIFICANT CHANGE UP
BUN SERPL-MCNC: 48 MG/DL — HIGH (ref 10–20)
CALCIUM SERPL-MCNC: 12.1 MG/DL — HIGH (ref 8.5–10.1)
CHLORIDE SERPL-SCNC: 101 MMOL/L — SIGNIFICANT CHANGE UP (ref 98–110)
CO2 SERPL-SCNC: 21 MMOL/L — SIGNIFICANT CHANGE UP (ref 17–32)
COLOR SPEC: YELLOW — SIGNIFICANT CHANGE UP
CREAT SERPL-MCNC: 2.5 MG/DL — HIGH (ref 0.7–1.5)
DIFF PNL FLD: ABNORMAL
EOSINOPHIL # BLD AUTO: 0.74 K/UL — HIGH (ref 0–0.7)
EOSINOPHIL NFR BLD AUTO: 7.6 % — SIGNIFICANT CHANGE UP (ref 0–8)
FLU A RESULT: NEGATIVE — SIGNIFICANT CHANGE UP
FLU A RESULT: NEGATIVE — SIGNIFICANT CHANGE UP
FLUAV AG NPH QL: NEGATIVE — SIGNIFICANT CHANGE UP
FLUBV AG NPH QL: NEGATIVE — SIGNIFICANT CHANGE UP
GAS PNL BLDV: SIGNIFICANT CHANGE UP
GLUCOSE SERPL-MCNC: 90 MG/DL — SIGNIFICANT CHANGE UP (ref 70–99)
GLUCOSE UR QL: NEGATIVE MG/DL — SIGNIFICANT CHANGE UP
HCT VFR BLD CALC: 31.1 % — LOW (ref 37–47)
HGB BLD-MCNC: 9.9 G/DL — LOW (ref 12–16)
IMM GRANULOCYTES NFR BLD AUTO: 0.3 % — SIGNIFICANT CHANGE UP (ref 0.1–0.3)
INR BLD: 1.25 RATIO — SIGNIFICANT CHANGE UP (ref 0.65–1.3)
KETONES UR-MCNC: ABNORMAL
LEUKOCYTE ESTERASE UR-ACNC: ABNORMAL
LYMPHOCYTES # BLD AUTO: 2.24 K/UL — SIGNIFICANT CHANGE UP (ref 1.2–3.4)
LYMPHOCYTES # BLD AUTO: 22.9 % — SIGNIFICANT CHANGE UP (ref 20.5–51.1)
MCHC RBC-ENTMCNC: 29.6 PG — SIGNIFICANT CHANGE UP (ref 27–31)
MCHC RBC-ENTMCNC: 31.8 G/DL — LOW (ref 32–37)
MCV RBC AUTO: 92.8 FL — SIGNIFICANT CHANGE UP (ref 81–99)
MONOCYTES # BLD AUTO: 1.14 K/UL — HIGH (ref 0.1–0.6)
MONOCYTES NFR BLD AUTO: 11.6 % — HIGH (ref 1.7–9.3)
NEUTROPHILS # BLD AUTO: 5.59 K/UL — SIGNIFICANT CHANGE UP (ref 1.4–6.5)
NEUTROPHILS NFR BLD AUTO: 57.1 % — SIGNIFICANT CHANGE UP (ref 42.2–75.2)
NITRITE UR-MCNC: NEGATIVE — SIGNIFICANT CHANGE UP
PH UR: 6 — SIGNIFICANT CHANGE UP (ref 5–8)
PLATELET # BLD AUTO: 325 K/UL — SIGNIFICANT CHANGE UP (ref 130–400)
POTASSIUM SERPL-MCNC: 5 MMOL/L — SIGNIFICANT CHANGE UP (ref 3.5–5)
POTASSIUM SERPL-SCNC: 5 MMOL/L — SIGNIFICANT CHANGE UP (ref 3.5–5)
PROT SERPL-MCNC: 6.5 G/DL — SIGNIFICANT CHANGE UP (ref 6–8)
PROT UR-MCNC: 100 MG/DL
PROTHROM AB SERPL-ACNC: 13.5 SEC — HIGH (ref 9.95–12.87)
RBC # BLD: 3.35 M/UL — LOW (ref 4.2–5.4)
RBC # FLD: 16.1 % — HIGH (ref 11.5–14.5)
RSV RESULT: NEGATIVE — SIGNIFICANT CHANGE UP
RSV RNA RESP QL NAA+PROBE: NEGATIVE — SIGNIFICANT CHANGE UP
SODIUM SERPL-SCNC: 139 MMOL/L — SIGNIFICANT CHANGE UP (ref 135–146)
SP GR SPEC: 1.02 — SIGNIFICANT CHANGE UP (ref 1.01–1.03)
UROBILINOGEN FLD QL: 0.2 MG/DL — SIGNIFICANT CHANGE UP (ref 0.2–0.2)
WBC # BLD: 9.79 K/UL — SIGNIFICANT CHANGE UP (ref 4.8–10.8)
WBC # FLD AUTO: 9.79 K/UL — SIGNIFICANT CHANGE UP (ref 4.8–10.8)

## 2019-01-28 RX ORDER — SENNA PLUS 8.6 MG/1
2 TABLET ORAL AT BEDTIME
Qty: 0 | Refills: 0 | Status: DISCONTINUED | OUTPATIENT
Start: 2019-01-28 | End: 2019-02-18

## 2019-01-28 RX ORDER — APIXABAN 2.5 MG/1
2.5 TABLET, FILM COATED ORAL EVERY 12 HOURS
Qty: 0 | Refills: 0 | Status: DISCONTINUED | OUTPATIENT
Start: 2019-01-28 | End: 2019-01-30

## 2019-01-28 RX ORDER — METRONIDAZOLE 500 MG
500 TABLET ORAL ONCE
Qty: 0 | Refills: 0 | Status: COMPLETED | OUTPATIENT
Start: 2019-01-28 | End: 2019-01-28

## 2019-01-28 RX ORDER — SODIUM CHLORIDE 9 MG/ML
2800 INJECTION, SOLUTION INTRAVENOUS ONCE
Qty: 0 | Refills: 0 | Status: COMPLETED | OUTPATIENT
Start: 2019-01-28 | End: 2019-01-28

## 2019-01-28 RX ORDER — OXYCODONE HYDROCHLORIDE 5 MG/1
20 TABLET ORAL EVERY 12 HOURS
Qty: 0 | Refills: 0 | Status: DISCONTINUED | OUTPATIENT
Start: 2019-01-28 | End: 2019-01-30

## 2019-01-28 RX ORDER — VANCOMYCIN HCL 1 G
2000 VIAL (EA) INTRAVENOUS ONCE
Qty: 0 | Refills: 0 | Status: COMPLETED | OUTPATIENT
Start: 2019-01-28 | End: 2019-01-28

## 2019-01-28 RX ORDER — APIXABAN 2.5 MG/1
5 TABLET, FILM COATED ORAL EVERY 12 HOURS
Qty: 0 | Refills: 0 | Status: DISCONTINUED | OUTPATIENT
Start: 2019-01-28 | End: 2019-01-28

## 2019-01-28 RX ORDER — AZTREONAM 2 G
2000 VIAL (EA) INJECTION ONCE
Qty: 0 | Refills: 0 | Status: COMPLETED | OUTPATIENT
Start: 2019-01-28 | End: 2019-01-28

## 2019-01-28 RX ORDER — FOLIC ACID 0.8 MG
1 TABLET ORAL DAILY
Qty: 0 | Refills: 0 | Status: DISCONTINUED | OUTPATIENT
Start: 2019-01-28 | End: 2019-02-19

## 2019-01-28 RX ORDER — GABAPENTIN 400 MG/1
100 CAPSULE ORAL EVERY 12 HOURS
Qty: 0 | Refills: 0 | Status: DISCONTINUED | OUTPATIENT
Start: 2019-01-28 | End: 2019-02-02

## 2019-01-28 RX ORDER — COLLAGENASE CLOSTRIDIUM HIST. 250 UNIT/G
1 OINTMENT (GRAM) TOPICAL DAILY
Qty: 0 | Refills: 0 | Status: DISCONTINUED | OUTPATIENT
Start: 2019-01-28 | End: 2019-02-19

## 2019-01-28 RX ORDER — ALLOPURINOL 300 MG
100 TABLET ORAL DAILY
Qty: 0 | Refills: 0 | Status: DISCONTINUED | OUTPATIENT
Start: 2019-01-28 | End: 2019-02-19

## 2019-01-28 RX ORDER — LEUCOVORIN CALCIUM 5 MG
5 TABLET ORAL DAILY
Qty: 0 | Refills: 0 | Status: DISCONTINUED | OUTPATIENT
Start: 2019-01-28 | End: 2019-02-19

## 2019-01-28 RX ORDER — ALLOPURINOL 300 MG
300 TABLET ORAL DAILY
Qty: 0 | Refills: 0 | Status: DISCONTINUED | OUTPATIENT
Start: 2019-01-28 | End: 2019-01-28

## 2019-01-28 RX ORDER — VANCOMYCIN HCL 1 G
1000 VIAL (EA) INTRAVENOUS EVERY 12 HOURS
Qty: 0 | Refills: 0 | Status: DISCONTINUED | OUTPATIENT
Start: 2019-01-28 | End: 2019-01-29

## 2019-01-28 RX ADMIN — Medication 100 MILLIGRAM(S): at 14:51

## 2019-01-28 RX ADMIN — SODIUM CHLORIDE 2800 MILLILITER(S): 9 INJECTION, SOLUTION INTRAVENOUS at 14:51

## 2019-01-28 RX ADMIN — Medication 100 MILLIGRAM(S): at 16:07

## 2019-01-28 RX ADMIN — Medication 250 MILLIGRAM(S): at 16:25

## 2019-01-28 NOTE — ED PROVIDER NOTE - OBJECTIVE STATEMENT
79 yo F with a DVT, HTN, DM, s/p 3rd right toe last month due to osteo, picc line with cefepime, presents with AMS. Sent in from NH. According to friend at bedside patient progressively becoming more agitated and altered over the last few days. Talking to self, shouting at people. Associated with cough. Denies fevers, chills, abd pain, NVCD, CP, SOB, HA.

## 2019-01-28 NOTE — ED PROVIDER NOTE - MEDICAL DECISION MAKING DETAILS
In my opinion, in patient treatment is medically justifiable and appropriate. I personally reviewed the radiographs performed on this patient and used my review in my medical decision making.

## 2019-01-28 NOTE — H&P ADULT - ASSESSMENT
Patient is a 78y old  Female who presents with a chief complaint of                                                                                                                                                                                                                                                                                      HEALTH ISSUES - PROBLEM Dx:

## 2019-01-28 NOTE — PATIENT PROFILE ADULT - PATIENT REPRESENTATIVE: ( YOU CAN CHOOSE ANY PERSON THAT CAN ASSIST YOU WITH YOUR HEALTH CARE PREFERENCES, DOES NOT HAVE TO BE A SPOUSE, IMMEDIATE FAMILY OR SIGNIFICANT OTHER/PARTNER)
The following findings require follow up   Finding: none   Follow up required: none   Follow up should be done within 1week week(s)    Please notify the following clinician to assist with the follow up:   Dr Rhett Travis
yes

## 2019-01-28 NOTE — ED PROVIDER NOTE - PROGRESS NOTE DETAILS
I personally evaluated the patient. I reviewed the Resident’s or Physician Assistant’s note (as assigned above), and agree with the findings and plan except as documented in my note. Sent from NH for AMS. Pt on IV ABX for osteomyelitis. Pt admits to cough, VS noted. Heart rate on my exam about 110. Chest (+) rhonchi, heart RR, abd NT, LE healing wounds, no purulence noted. CXR may be consistent with infiltrate. Other diagnostic testing reviewed.

## 2019-01-28 NOTE — ED PROVIDER NOTE - NS ED ROS FT
Constitutional: See HPI.  Eyes: No visual changes, eye pain or discharge. No Photophobia  ENMT: No neck pain or stiffness. No limited ROM  Cardiac: No SOB or edema. No chest pain with exertion.  Respiratory: No cough or respiratory distress. No hemoptysis.  GI: No nausea, vomiting, diarrhea or abdominal pain.  : No dysuria, frequency or burning. No Discharge  MS: No myalgia, muscle weakness, joint pain or back pain.  Neuro: No headache. No LOC.  Skin: No skin rash.  Except as documented in the HPI, all other systems are negative.

## 2019-01-28 NOTE — H&P ADULT - HISTORY OF PRESENT ILLNESS
· Chief Complaint: The patient is a 78y Female complaining of confusion.	  · HPI Objective Statement: 77 yo F with a DVT, HTN, DM, s/p 3rd right toe last month due to osteo, picc line with cefepime, presents with AMS. Sent in from NH. According to friend at bedside patient progressively becoming more agitated and altered over the last few days. Talking to self, shouting at people. Associated with cough. Denies fevers, chills, abd pain, NVCD, CP, SOB, HA.

## 2019-01-28 NOTE — ED ADULT NURSE NOTE - NSIMPLEMENTINTERV_GEN_ALL_ED
Implemented All Universal Safety Interventions:  [place] to call system. Call bell, personal items and telephone within reach. Instruct patient to call for assistance. Room bathroom lighting operational. Non-slip footwear when patient is off stretcher. Physically safe environment: no spills, clutter or unnecessary equipment. Stretcher in lowest position, wheels locked, appropriate side rails in place.

## 2019-01-28 NOTE — H&P ADULT - NSHPREVIEWOFSYSTEMS_GEN_ALL_CORE
eview of Systems:  · Review of Systems: Constitutional: See HPI.  	Eyes: No visual changes, eye pain or discharge. No Photophobia  	ENMT: No neck pain or stiffness. No limited ROM  	Cardiac: No SOB or edema. No chest pain with exertion.  	Respiratory: No cough or respiratory distress. No hemoptysis.  	GI: No nausea, vomiting, diarrhea or abdominal pain.  	: No dysuria, frequency or burning. No Discharge  	MS: No myalgia, muscle weakness, joint pain or back pain.  	Neuro: No headache. No LOC.  	Skin: No skin rash.  Except as documented in the HPI, all other systems are negative.

## 2019-01-28 NOTE — PATIENT PROFILE ADULT - NSPROPOAURINARYCATHETER_GEN_A_NUR
Normal shape and contour; nares, nostrils and choana patent; no nasal flaring; mucosa pink and moist.
no

## 2019-01-28 NOTE — H&P ADULT - NSHPLABSRESULTS_GEN_ALL_CORE
9.9    9.79  )-----------( 325      ( 2019 14:15 )             31.1         139  |  101  |  48<H>  ----------------------------<  90  5.0   |  21  |  2.5<H>    Ca    12.1<H>      2019 14:15    TPro  6.5  /  Alb  3.6  /  TBili  0.3  /  DBili  x   /  AST  15  /  ALT  7   /  AlkPhos  54            Urinalysis Basic - ( 2019 17:20 )    Color: Yellow / Appearance: Slightly Cloudy / S.025 / pH: x  Gluc: x / Ketone: Trace  / Bili: Negative / Urobili: 0.2 mg/dL   Blood: x / Protein: 100 mg/dL / Nitrite: Negative   Leuk Esterase: Trace / RBC: 11-25 /HPF / WBC 1-2 /HPF   Sq Epi: x / Non Sq Epi: Occasional /HPF / Bacteria: Many      PT/INR - ( 2019 14:15 )   PT: 13.50 sec;   INR: 1.25 ratio         PTT - ( 2019 14:15 )  PTT:21.7 sec  Lactate Trend        CAPILLARY BLOOD GLUCOSE      POCT Blood Glucose.: 75 mg/dL (2019 12:58)    Culture Results:   Few Pseudomonas aeruginosa (Carbapenem Resistant) (01-10 @ 14:00)

## 2019-01-28 NOTE — ED PROVIDER NOTE - PHYSICAL EXAMINATION
AOx2, mildly agitated, NAD, speaking in full sentences.   Skin - dry, no acute rash.   Head - normocephalic, atraumatic.   Eyes - PERRLA/EOMI, conjunctiva and sclera clear.   ENT- MM moist, no nasal discharge.  Pharynx unremarkable. .   Neck - supple nt, no meningeal signs.   Heart - tachy, s1s2 nl, no rub/murmur.   Lungs- No retractions, BS equal, CTAB.   Abdomen - soft ntnd no r/g.   Extremities- moves all, +equal distal pulses, brisk cap refill, sensation wnl, normal ROM. No LE edema, calves nttp b/l.

## 2019-01-29 DIAGNOSIS — Z02.9 ENCOUNTER FOR ADMINISTRATIVE EXAMINATIONS, UNSPECIFIED: ICD-10-CM

## 2019-01-29 LAB
CHLORIDE UR-SCNC: 65 — SIGNIFICANT CHANGE UP
CULTURE RESULTS: NO GROWTH — SIGNIFICANT CHANGE UP
GLUCOSE BLDC GLUCOMTR-MCNC: 103 MG/DL — HIGH (ref 70–99)
GLUCOSE BLDC GLUCOMTR-MCNC: 104 MG/DL — HIGH (ref 70–99)
GLUCOSE BLDC GLUCOMTR-MCNC: 106 MG/DL — HIGH (ref 70–99)
GLUCOSE BLDC GLUCOMTR-MCNC: 123 MG/DL — HIGH (ref 70–99)
GLUCOSE BLDC GLUCOMTR-MCNC: 144 MG/DL — HIGH (ref 70–99)
GLUCOSE BLDC GLUCOMTR-MCNC: 314 MG/DL — HIGH (ref 70–99)
SODIUM UR-SCNC: 68 MMOL/L — SIGNIFICANT CHANGE UP
SPECIMEN SOURCE: SIGNIFICANT CHANGE UP
UUN UR-MCNC: 310 MG/DL — SIGNIFICANT CHANGE UP

## 2019-01-29 RX ORDER — INSULIN HUMAN 100 [IU]/ML
4 INJECTION, SOLUTION SUBCUTANEOUS ONCE
Qty: 0 | Refills: 0 | Status: COMPLETED | OUTPATIENT
Start: 2019-01-29 | End: 2019-01-29

## 2019-01-29 RX ORDER — CEFEPIME 1 G/1
INJECTION, POWDER, FOR SOLUTION INTRAMUSCULAR; INTRAVENOUS
Qty: 0 | Refills: 0 | Status: DISCONTINUED | OUTPATIENT
Start: 2019-01-29 | End: 2019-01-30

## 2019-01-29 RX ORDER — SODIUM CHLORIDE 9 MG/ML
1000 INJECTION INTRAMUSCULAR; INTRAVENOUS; SUBCUTANEOUS
Qty: 0 | Refills: 0 | Status: DISCONTINUED | OUTPATIENT
Start: 2019-01-29 | End: 2019-01-30

## 2019-01-29 RX ORDER — CEFEPIME 1 G/1
500 INJECTION, POWDER, FOR SOLUTION INTRAMUSCULAR; INTRAVENOUS EVERY 12 HOURS
Qty: 0 | Refills: 0 | Status: DISCONTINUED | OUTPATIENT
Start: 2019-01-30 | End: 2019-01-30

## 2019-01-29 RX ORDER — CEFEPIME 1 G/1
500 INJECTION, POWDER, FOR SOLUTION INTRAMUSCULAR; INTRAVENOUS ONCE
Qty: 0 | Refills: 0 | Status: COMPLETED | OUTPATIENT
Start: 2019-01-29 | End: 2019-01-29

## 2019-01-29 RX ADMIN — CEFEPIME 100 MILLIGRAM(S): 1 INJECTION, POWDER, FOR SOLUTION INTRAMUSCULAR; INTRAVENOUS at 15:11

## 2019-01-29 RX ADMIN — Medication 1 MILLIGRAM(S): at 12:12

## 2019-01-29 RX ADMIN — GABAPENTIN 100 MILLIGRAM(S): 400 CAPSULE ORAL at 06:29

## 2019-01-29 RX ADMIN — OXYCODONE HYDROCHLORIDE 20 MILLIGRAM(S): 5 TABLET ORAL at 06:29

## 2019-01-29 RX ADMIN — Medication 5 MILLIGRAM(S): at 06:29

## 2019-01-29 RX ADMIN — APIXABAN 2.5 MILLIGRAM(S): 2.5 TABLET, FILM COATED ORAL at 06:29

## 2019-01-29 RX ADMIN — APIXABAN 2.5 MILLIGRAM(S): 2.5 TABLET, FILM COATED ORAL at 18:05

## 2019-01-29 RX ADMIN — SODIUM CHLORIDE 60 MILLILITER(S): 9 INJECTION INTRAMUSCULAR; INTRAVENOUS; SUBCUTANEOUS at 15:11

## 2019-01-29 RX ADMIN — SENNA PLUS 2 TABLET(S): 8.6 TABLET ORAL at 21:29

## 2019-01-29 RX ADMIN — OXYCODONE HYDROCHLORIDE 20 MILLIGRAM(S): 5 TABLET ORAL at 18:49

## 2019-01-29 RX ADMIN — Medication 5 MILLIGRAM(S): at 12:12

## 2019-01-29 RX ADMIN — Medication 100 MILLIGRAM(S): at 12:12

## 2019-01-29 RX ADMIN — Medication 1 APPLICATION(S): at 12:12

## 2019-01-29 RX ADMIN — GABAPENTIN 100 MILLIGRAM(S): 400 CAPSULE ORAL at 18:05

## 2019-01-29 RX ADMIN — INSULIN HUMAN 4 UNIT(S): 100 INJECTION, SOLUTION SUBCUTANEOUS at 22:55

## 2019-01-29 RX ADMIN — OXYCODONE HYDROCHLORIDE 20 MILLIGRAM(S): 5 TABLET ORAL at 18:13

## 2019-01-29 NOTE — CHART NOTE - NSCHARTNOTEFT_GEN_A_CORE
Discussed patient with NH RN who reported that pt has been having intermittent episodes of confusions since return after her amputation. Reported that there are times where pt is AAOx3 and other times where she only repeats her name. Patients condition worsened in the past few days when she was told that she may not be able to remain in the NH due to insurance issues. Since then pts mental status worsened. RN was also told by a friend of patient that she has always had anxiety. Due to agitation, labs were performed at NH which were normal. Pt was seen by psych and prescribed Abilify but only received a few days. Pt w/ SHONNA here, will start gentle hydration as cardiac function unknown w/ NS @ 60cc/hr and order urine lytes. Per RN at NH pt Feb 28th. Was on cefepime 2mg q12h, due to SHONNA will continue on smaller dose. ID consult pending. Unclear is pt has infectious encephalopathy or worsening psych condition. Will consult psych.

## 2019-01-29 NOTE — PROVIDER CONTACT NOTE (OTHER) - ACTION/TREATMENT ORDERED:
Dr. Carr notified; will order 4 units of Regular Insulin and requested FS recheck in 1 hour. Dr. Carr notified; will order 4 units of Regular Insulin and requested FS recheck in 1-2 hour.

## 2019-01-30 LAB
ANION GAP SERPL CALC-SCNC: 11 MMOL/L — SIGNIFICANT CHANGE UP (ref 7–14)
BUN SERPL-MCNC: 49 MG/DL — HIGH (ref 10–20)
CALCIUM SERPL-MCNC: 11.1 MG/DL — HIGH (ref 8.5–10.1)
CHLORIDE SERPL-SCNC: 109 MMOL/L — SIGNIFICANT CHANGE UP (ref 98–110)
CO2 SERPL-SCNC: 22 MMOL/L — SIGNIFICANT CHANGE UP (ref 17–32)
CREAT ?TM UR-MCNC: 71 MG/DL — SIGNIFICANT CHANGE UP
CREAT SERPL-MCNC: 2.9 MG/DL — HIGH (ref 0.7–1.5)
GLUCOSE BLDC GLUCOMTR-MCNC: 106 MG/DL — HIGH (ref 70–99)
GLUCOSE BLDC GLUCOMTR-MCNC: 113 MG/DL — HIGH (ref 70–99)
GLUCOSE BLDC GLUCOMTR-MCNC: 131 MG/DL — HIGH (ref 70–99)
GLUCOSE BLDC GLUCOMTR-MCNC: 77 MG/DL — SIGNIFICANT CHANGE UP (ref 70–99)
GLUCOSE BLDC GLUCOMTR-MCNC: 82 MG/DL — SIGNIFICANT CHANGE UP (ref 70–99)
GLUCOSE BLDC GLUCOMTR-MCNC: 87 MG/DL — SIGNIFICANT CHANGE UP (ref 70–99)
GLUCOSE BLDC GLUCOMTR-MCNC: 96 MG/DL — SIGNIFICANT CHANGE UP (ref 70–99)
GLUCOSE SERPL-MCNC: 84 MG/DL — SIGNIFICANT CHANGE UP (ref 70–99)
HCT VFR BLD CALC: 26.9 % — LOW (ref 37–47)
HGB BLD-MCNC: 8.5 G/DL — LOW (ref 12–16)
MCHC RBC-ENTMCNC: 29.4 PG — SIGNIFICANT CHANGE UP (ref 27–31)
MCHC RBC-ENTMCNC: 31.6 G/DL — LOW (ref 32–37)
MCV RBC AUTO: 93.1 FL — SIGNIFICANT CHANGE UP (ref 81–99)
NRBC # BLD: 0 /100 WBCS — SIGNIFICANT CHANGE UP (ref 0–0)
PLATELET # BLD AUTO: 295 K/UL — SIGNIFICANT CHANGE UP (ref 130–400)
POTASSIUM SERPL-MCNC: 3.9 MMOL/L — SIGNIFICANT CHANGE UP (ref 3.5–5)
POTASSIUM SERPL-SCNC: 3.9 MMOL/L — SIGNIFICANT CHANGE UP (ref 3.5–5)
PROT ?TM UR-MCNC: 83 MG/DLG/24H — SIGNIFICANT CHANGE UP
PROT/CREAT UR-RTO: 1.2 RATIO — HIGH (ref 0–0.2)
RBC # BLD: 2.89 M/UL — LOW (ref 4.2–5.4)
RBC # FLD: 16.2 % — HIGH (ref 11.5–14.5)
SODIUM SERPL-SCNC: 142 MMOL/L — SIGNIFICANT CHANGE UP (ref 135–146)
SODIUM UR-SCNC: 64 MMOL/L — SIGNIFICANT CHANGE UP
WBC # BLD: 7 K/UL — SIGNIFICANT CHANGE UP (ref 4.8–10.8)
WBC # FLD AUTO: 7 K/UL — SIGNIFICANT CHANGE UP (ref 4.8–10.8)

## 2019-01-30 RX ORDER — SODIUM CHLORIDE 9 MG/ML
1000 INJECTION INTRAMUSCULAR; INTRAVENOUS; SUBCUTANEOUS
Qty: 0 | Refills: 0 | Status: COMPLETED | OUTPATIENT
Start: 2019-01-30 | End: 2019-01-31

## 2019-01-30 RX ORDER — SODIUM CHLORIDE 9 MG/ML
1000 INJECTION, SOLUTION INTRAVENOUS
Qty: 0 | Refills: 0 | Status: DISCONTINUED | OUTPATIENT
Start: 2019-01-30 | End: 2019-01-30

## 2019-01-30 RX ADMIN — OXYCODONE HYDROCHLORIDE 20 MILLIGRAM(S): 5 TABLET ORAL at 06:30

## 2019-01-30 RX ADMIN — SODIUM CHLORIDE 60 MILLILITER(S): 9 INJECTION, SOLUTION INTRAVENOUS at 01:30

## 2019-01-30 RX ADMIN — CEFEPIME 100 MILLIGRAM(S): 1 INJECTION, POWDER, FOR SOLUTION INTRAMUSCULAR; INTRAVENOUS at 07:54

## 2019-01-30 RX ADMIN — GABAPENTIN 100 MILLIGRAM(S): 400 CAPSULE ORAL at 17:19

## 2019-01-30 RX ADMIN — Medication 5 MILLIGRAM(S): at 06:12

## 2019-01-30 RX ADMIN — Medication 5 MILLIGRAM(S): at 12:52

## 2019-01-30 RX ADMIN — Medication 1 MILLIGRAM(S): at 12:52

## 2019-01-30 RX ADMIN — SODIUM CHLORIDE 100 MILLILITER(S): 9 INJECTION INTRAMUSCULAR; INTRAVENOUS; SUBCUTANEOUS at 12:50

## 2019-01-30 RX ADMIN — SENNA PLUS 2 TABLET(S): 8.6 TABLET ORAL at 21:03

## 2019-01-30 RX ADMIN — GABAPENTIN 100 MILLIGRAM(S): 400 CAPSULE ORAL at 06:09

## 2019-01-30 RX ADMIN — APIXABAN 2.5 MILLIGRAM(S): 2.5 TABLET, FILM COATED ORAL at 06:12

## 2019-01-30 RX ADMIN — Medication 100 MILLIGRAM(S): at 12:52

## 2019-01-30 RX ADMIN — Medication 1 APPLICATION(S): at 12:52

## 2019-01-30 RX ADMIN — OXYCODONE HYDROCHLORIDE 20 MILLIGRAM(S): 5 TABLET ORAL at 06:29

## 2019-01-30 NOTE — CONSULT NOTE ADULT - ASSESSMENT
s/p Right 3rd toe amputation site probing to ST without purulence and suture proximal intact   B/L medial calf superficial ulcerations

## 2019-01-30 NOTE — CONSULT NOTE ADULT - ASSESSMENT
# Encephalopathy  # Right DFU    Would recommend;          will follow the patient with you and make further recommendation based on the clinical course and Lab results  Thank you for the opportunity to participate in MsChinmay YAN's care # Encephalopathy  # Right DFU    Would recommend:          will follow the patient with you and make further recommendation based on the clinical course and Lab results  Thank you for the opportunity to participate in MsChinmay YAN's care # Encephalopathy  - UA is negative  # Right DFU  - Pseudomonas aeruginosa (Carbapenem Resistant)    Would recommend:    1. Continue Avycaz and Rifampin   2. wound care as per Podiatry  3. Monitor kidney function and adjust Abx doses accordingly    d/w Nursing staff    will follow the patient with you and make further recommendation based on the clinical course and Lab results  Thank you for the opportunity to participate in Ms. HEREDIA's care

## 2019-01-30 NOTE — CONSULT NOTE ADULT - SUBJECTIVE AND OBJECTIVE BOX
Patient is a 78y old  Female who presents with a chief complaint of Encephalopathy (30 Jan 2019 11:26)        REVIEW OF SYSTEMS: Total of twelve systems have been reviewed with patient and found to be negative unless mentioned in HPI        PAST MEDICAL & SURGICAL HISTORY:  Gout  DVT (deep venous thrombosis)  HTN (hypertension)  Rheumatoid arteritis  Other specified diabetes mellitus with other specified complication, unspecified whether long term insulin use  Primary osteoarthritis of right knee: s/p knee replacement         SOCIAL HISTORY  Alcohol: Does not drink  Tobacco: does not smoke  Illicit substance use: None        FAMILY HISTORY: Non contributory to the present illness        ALLERGIES: NKDA          T(C): 36.3 (01-30-19 @ 14:07), Max: 37.7 (01-29-19 @ 22:33)  HR: 89 (01-30-19 @ 14:07) (52 - 89)  BP: 134/60 (01-30-19 @ 14:07) (109/55 - 134/60)  RR: 18 (01-30-19 @ 14:07) (18 - 18)  SpO2: 98% (01-30-19 @ 09:00) (97% - 98%)  Wt(kg): --  I&O's Summary      PHYSICAL EXAM:  GENERAL: Not in distress  CHEST/LUNG:  entry  bilaterally  HEART: s1 and s2 present  ABDOMEN:  Nontender, Nondistended  EXTREMITIES: No pedal  edema  CNS: Awake and alert          LABS:                        8.5    7.00  )-----------( 295      ( 30 Jan 2019 07:12 )             26.9     01-30    142  |  109  |  49<H>  ----------------------------<  84  3.9   |  22  |  2.9<H>    Ca    11.1<H>      30 Jan 2019 07:12          CAPILLARY BLOOD GLUCOSE      POCT Blood Glucose.: 106 mg/dL (30 Jan 2019 16:12)  POCT Blood Glucose.: 131 mg/dL (30 Jan 2019 11:05)  POCT Blood Glucose.: 96 mg/dL (30 Jan 2019 07:44)  POCT Blood Glucose.: 77 mg/dL (30 Jan 2019 04:39)  POCT Blood Glucose.: 87 mg/dL (30 Jan 2019 02:29)  POCT Blood Glucose.: 82 mg/dL (30 Jan 2019 01:00)  POCT Blood Glucose.: 314 mg/dL (29 Jan 2019 22:15)            MEDICATIONS  (STANDING):  allopurinol 100 milliGRAM(s) Oral daily  cefTAZidime/avibactam IVPB 0.94 Gram(s) IV Intermittent every 24 hours  collagenase Ointment 1 Application(s) Topical daily  folic acid 1 milliGRAM(s) Oral daily  gabapentin Oral Tab/Cap - Peds 100 milliGRAM(s) Oral every 12 hours  leucovorin 5 milliGRAM(s) Oral daily  predniSONE   Tablet 5 milliGRAM(s) Oral daily  rifampin 300 milliGRAM(s) Oral two times a day  senna 2 Tablet(s) Oral at bedtime  sodium chloride 0.9%. 1000 milliLiter(s) (100 mL/Hr) IV Continuous <Continuous>    MEDICATIONS  (PRN):          RADIOLOGY & ADDITIONAL TESTS:    < from: US Retroperitoneal Complete (01.30.19 @ 13:30) >  No hydronephrosis.    < end of copied text >    < from: CT Head No Cont (01.28.19 @ 15:54) >  1.  The study is limited by motion artifact.    2.  Cerebral atrophy.    3.  Periventricular white matter hypodensities, nonspecific, differential   diagnostic possibilities include ischemic change, gliosis or   demyelination.    < end of copied text >        MICROBIOLOGY DATA:    FLU A B RSV Detection by PCR (01.28.19 @ 18:00)    Flu A Result: Negative: Negative results do not preclude influenza infection and  should not be used as the sole basis for treatment or  other patient management decisions.  A positive result may occur in the absence of viable virus.  By: [employer] Xpert Flu viral assay by Reverse Transcriptase  Polymerase Chain Reaction (RT-PCR).    Flu B Result: Negative    RSV Result: Negative      Culture - Urine (01.28.19 @ 17:20)    Specimen Source: .Urine Clean Catch (Midstream)    Culture Results:   No growth      Culture - Blood (01.28.19 @ 14:16)    Specimen Source: .Blood Blood-Peripheral    Culture Results:   No growth to date. Patient is a 78y old  Female who presents with a chief complaint of Encephalopathy (30 Jan 2019 11:26)        REVIEW OF SYSTEMS: Total of twelve systems have been reviewed with patient and found to be negative unless mentioned in HPI        PAST MEDICAL & SURGICAL HISTORY:  Gout  DVT (deep venous thrombosis)  HTN (hypertension)  Rheumatoid arteritis  Other specified diabetes mellitus with other specified complication, unspecified whether long term insulin use  Primary osteoarthritis of right knee: s/p knee replacement         SOCIAL HISTORY  Alcohol: Does not drink  Tobacco: does not smoke  Illicit substance use: None        FAMILY HISTORY: Non contributory to the present illness        ALLERGIES: PCN< Clindamycin  and erythromycin        T(C): 36.3 (01-30-19 @ 14:07), Max: 37.7 (01-29-19 @ 22:33)  HR: 89 (01-30-19 @ 14:07) (52 - 89)  BP: 134/60 (01-30-19 @ 14:07) (109/55 - 134/60)  RR: 18 (01-30-19 @ 14:07) (18 - 18)  SpO2: 98% (01-30-19 @ 09:00) (97% - 98%)  Wt(kg): --  I&O's Summary      PHYSICAL EXAM:  GENERAL: Not in distress  CHEST/LUNG:  entry  bilaterally  HEART: s1 and s2 present  ABDOMEN:  Nontender, Nondistended  EXTREMITIES: No pedal  edema  CNS: Awake and alert          LABS:                        8.5    7.00  )-----------( 295      ( 30 Jan 2019 07:12 )             26.9     01-30    142  |  109  |  49<H>  ----------------------------<  84  3.9   |  22  |  2.9<H>    Ca    11.1<H>      30 Jan 2019 07:12          CAPILLARY BLOOD GLUCOSE      POCT Blood Glucose.: 106 mg/dL (30 Jan 2019 16:12)  POCT Blood Glucose.: 131 mg/dL (30 Jan 2019 11:05)  POCT Blood Glucose.: 96 mg/dL (30 Jan 2019 07:44)  POCT Blood Glucose.: 77 mg/dL (30 Jan 2019 04:39)  POCT Blood Glucose.: 87 mg/dL (30 Jan 2019 02:29)  POCT Blood Glucose.: 82 mg/dL (30 Jan 2019 01:00)  POCT Blood Glucose.: 314 mg/dL (29 Jan 2019 22:15)            MEDICATIONS  (STANDING):  allopurinol 100 milliGRAM(s) Oral daily  cefTAZidime/avibactam IVPB 0.94 Gram(s) IV Intermittent every 24 hours  collagenase Ointment 1 Application(s) Topical daily  folic acid 1 milliGRAM(s) Oral daily  gabapentin Oral Tab/Cap - Peds 100 milliGRAM(s) Oral every 12 hours  leucovorin 5 milliGRAM(s) Oral daily  predniSONE   Tablet 5 milliGRAM(s) Oral daily  rifampin 300 milliGRAM(s) Oral two times a day  senna 2 Tablet(s) Oral at bedtime  sodium chloride 0.9%. 1000 milliLiter(s) (100 mL/Hr) IV Continuous <Continuous>    MEDICATIONS  (PRN):          RADIOLOGY & ADDITIONAL TESTS:    < from: US Retroperitoneal Complete (01.30.19 @ 13:30) >  No hydronephrosis.    < end of copied text >    < from: CT Head No Cont (01.28.19 @ 15:54) >  1.  The study is limited by motion artifact.    2.  Cerebral atrophy.    3.  Periventricular white matter hypodensities, nonspecific, differential   diagnostic possibilities include ischemic change, gliosis or   demyelination.    < end of copied text >        MICROBIOLOGY DATA:    FLU A B RSV Detection by PCR (01.28.19 @ 18:00)    Flu A Result: Negative: Negative results do not preclude influenza infection and  should not be used as the sole basis for treatment or  other patient management decisions.  A positive result may occur in the absence of viable virus.  By: [employer] Xpert Flu viral assay by Reverse Transcriptase  Polymerase Chain Reaction (RT-PCR).    Flu B Result: Negative    RSV Result: Negative      Culture - Urine (01.28.19 @ 17:20)    Specimen Source: .Urine Clean Catch (Midstream)    Culture Results:   No growth      Culture - Blood (01.28.19 @ 14:16)    Specimen Source: .Blood Blood-Peripheral    Culture Results:   No growth to date. Patient is a 78y old  Female who presents with a chief complaint of Encephalopathy (30 Jan 2019 11:26)        REVIEW OF SYSTEMS: Total of twelve systems have been reviewed with patient and found to be negative unless mentioned in HPI        PAST MEDICAL & SURGICAL HISTORY:  Gout  DVT (deep venous thrombosis)  HTN (hypertension)  Rheumatoid arteritis  Other specified diabetes mellitus with other specified complication, unspecified whether long term insulin use  Primary osteoarthritis of right knee: s/p knee replacement         SOCIAL HISTORY  Alcohol: Does not drink  Tobacco: does not smoke  Illicit substance use: None        FAMILY HISTORY: Non contributory to the present illness        ALLERGIES: PCN, Clindamycin  and erythromycin        T(C): 36.3 (01-30-19 @ 14:07), Max: 37.7 (01-29-19 @ 22:33)  HR: 89 (01-30-19 @ 14:07) (52 - 89)  BP: 134/60 (01-30-19 @ 14:07) (109/55 - 134/60)  RR: 18 (01-30-19 @ 14:07) (18 - 18)  SpO2: 98% (01-30-19 @ 09:00) (97% - 98%)  Wt(kg): --  I&O's Summary        PHYSICAL EXAM:  GENERAL: Not in distress  CHEST/LUNG:  entry  bilaterally  HEART: s1 and s2 present  ABDOMEN:  Nontender, Nondistended  EXTREMITIES: No pedal  edema, Right foot bandage  in placed  CNS: Awake and alert          LABS:                        8.5    7.00  )-----------( 295      ( 30 Jan 2019 07:12 )             26.9         01-30    142  |  109  |  49<H>  ----------------------------<  84  3.9   |  22  |  2.9<H>    Ca    11.1<H>      30 Jan 2019 07:12          CAPILLARY BLOOD GLUCOSE  POCT Blood Glucose.: 106 mg/dL (30 Jan 2019 16:12)  POCT Blood Glucose.: 131 mg/dL (30 Jan 2019 11:05)  POCT Blood Glucose.: 96 mg/dL (30 Jan 2019 07:44)  POCT Blood Glucose.: 77 mg/dL (30 Jan 2019 04:39)  POCT Blood Glucose.: 87 mg/dL (30 Jan 2019 02:29)  POCT Blood Glucose.: 82 mg/dL (30 Jan 2019 01:00)  POCT Blood Glucose.: 314 mg/dL (29 Jan 2019 22:15)          MEDICATIONS  (STANDING):  allopurinol 100 milliGRAM(s) Oral daily  cefTAZidime/avibactam IVPB 0.94 Gram(s) IV Intermittent every 24 hours  collagenase Ointment 1 Application(s) Topical daily  folic acid 1 milliGRAM(s) Oral daily  gabapentin Oral Tab/Cap - Peds 100 milliGRAM(s) Oral every 12 hours  leucovorin 5 milliGRAM(s) Oral daily  predniSONE   Tablet 5 milliGRAM(s) Oral daily  rifampin 300 milliGRAM(s) Oral two times a day  senna 2 Tablet(s) Oral at bedtime  sodium chloride 0.9%. 1000 milliLiter(s) (100 mL/Hr) IV Continuous <Continuous>    MEDICATIONS  (PRN):          RADIOLOGY & ADDITIONAL TESTS:    < from: US Retroperitoneal Complete (01.30.19 @ 13:30) >  No hydronephrosis.    < end of copied text >    < from: CT Head No Cont (01.28.19 @ 15:54) >  1.  The study is limited by motion artifact.    2.  Cerebral atrophy.    3.  Periventricular white matter hypodensities, nonspecific, differential   diagnostic possibilities include ischemic change, gliosis or   demyelination.    < end of copied text >        MICROBIOLOGY DATA:    FLU A B RSV Detection by PCR (01.28.19 @ 18:00)    Flu A Result: Negative: Negative results do not preclude influenza infection and  should not be used as the sole basis for treatment or  other patient management decisions.  A positive result may occur in the absence of viable virus.  By: [employer] Xpert Flu viral assay by Reverse Transcriptase  Polymerase Chain Reaction (RT-PCR).    Flu B Result: Negative    RSV Result: Negative      Culture - Urine (01.28.19 @ 17:20)    Specimen Source: .Urine Clean Catch (Midstream)    Culture Results:   No growth      Culture - Blood (01.28.19 @ 14:16)    Specimen Source: .Blood Blood-Peripheral    Culture Results:   No growth to date.          Culture - Other (01.10.19 @ 14:00)    -  Levofloxacin: R >4    -  Meropenem: R >8    -  Piperacillin/Tazobactam: S 16    -  Tobramycin: R >8    -  Gentamicin: R >8    -  Imipenem: R >8    -  Ciprofloxacin: R >2    -  Amikacin: S 16    -  Aztreonam: I 16    -  Cefepime: S 8    -  Ceftazidime: S 4    Specimen Source: .Other None    Culture Results:   Few Pseudomonas aeruginosa (Carbapenem Resistant)    Organism Identification: Pseudomonas aeruginosa (Carbapenem Resistant)    Organism: Pseudomonas aeruginosa (Carbapenem Resistant)    Method Type: SHANNAN

## 2019-01-30 NOTE — CONSULT NOTE ADULT - SUBJECTIVE AND OBJECTIVE BOX
PODIATRY CONSULT   LINCOLN HEREDIA is a pleasant well-nourished, well developed 78y Female in no acute distress, alert awake, and oriented to person, place and time.   Patient is a 78y old  Female who presents with a chief complaint of Encephalopathy (2019 11:26)    HPI:  · Chief Complaint: The patient is a 78y Female complaining of confusion.	  · HPI Objective Statement: 79 yo F with a DVT, HTN, DM, s/p 3rd right toe last month due to osteo, picc line with cefepime, presents with AMS. Sent in from NH. According to friend at bedside patient progressively becoming more agitated and altered over the last few days. Talking to self, shouting at people. Associated with cough. Denies fevers, chills, abd pain, NVCD, CP, SOB, HA. (2019 19:56)    Podiatry consulted for the Right 3rd toe amputation site.     PAST MEDICAL & SURGICAL HISTORY:  Gout  DVT (deep venous thrombosis)  HTN (hypertension)  Rheumatoid arteritis  Other specified diabetes mellitus with other specified complication, unspecified whether long term insulin use  Primary osteoarthritis of right knee: s/p knee repalcement    MEDICATIONS  (STANDING):  allopurinol 100 milliGRAM(s) Oral daily  cefTAZidime/avibactam IVPB 0.94 Gram(s) IV Intermittent every 24 hours  collagenase Ointment 1 Application(s) Topical daily  folic acid 1 milliGRAM(s) Oral daily  gabapentin Oral Tab/Cap - Peds 100 milliGRAM(s) Oral every 12 hours  leucovorin 5 milliGRAM(s) Oral daily  predniSONE   Tablet 5 milliGRAM(s) Oral daily  rifampin 300 milliGRAM(s) Oral two times a day  senna 2 Tablet(s) Oral at bedtime  sodium chloride 0.9%. 1000 milliLiter(s) (100 mL/Hr) IV Continuous <Continuous>    FAMILY HISTORY:  Family history of early CAD (Father)    Vital Signs Last 24 Hrs  T(C): 36.3 (2019 14:07), Max: 37.7 (2019 22:33)  T(F): 97.3 (2019 14:07), Max: 99.9 (2019 22:33)  HR: 89 (2019 14:07) (52 - 89)  BP: 134/60 (2019 14:07) (109/55 - 134/60)  BP(mean): --  RR: 18 (2019 14:07) (18 - 18)  SpO2: 98% (2019 09:00) (97% - 98%)                          8.5    7.00  )-----------( 295      ( 2019 07:12 )             26.9     01-30  142  |  109  |  49<H>  ----------------------------<  84  3.9   |  22  |  2.9<H>  Ca    11.1<H>      2019 07:12    Urinalysis Basic - ( 2019 17:20 )    Color: Yellow / Appearance: Slightly Cloudy / S.025 / pH: x  Gluc: x / Ketone: Trace  / Bili: Negative / Urobili: 0.2 mg/dL   Blood: x / Protein: 100 mg/dL / Nitrite: Negative   Leuk Esterase: Trace / RBC: 11-25 /HPF / WBC 1-2 /HPF   Sq Epi: x / Non Sq Epi: Occasional /HPF / Bacteria: Many    CAPILLARY BLOOD GLUCOSE  POCT Blood Glucose.: 106 mg/dL (2019 16:12)  POCT Blood Glucose.: 131 mg/dL (2019 11:05)  POCT Blood Glucose.: 96 mg/dL (2019 07:44)  POCT Blood Glucose.: 77 mg/dL (2019 04:39)  POCT Blood Glucose.: 87 mg/dL (2019 02:29)  POCT Blood Glucose.: 82 mg/dL (2019 01:00)  POCT Blood Glucose.: 314 mg/dL (2019 22:15)  POCT Blood Glucose.: 104 mg/dL (2019 16:48)    PHYSICAL EXAM  LE Focused examination conducted:    DERM:    s/p Right 3rd toe amputation site probing to ST without purulence and suture proximal intact   B/L medial calf superficial ulcerations     VASC:   Dorsalis Pedis nonpalpable   Posterior Tibial nonpalpable   Capillary re-fill time less than 3 seconds digits 1-5 bilateral    Temperature gradient: Right Warm to cool. ; Left: warm to cool.  Edema: Right & Left none     NEURO: Protective sensation unable to assess at this time     ORTHO: Muscle strength unable to assess at this time    A:  s/p Right 3rd toe amputation site probing to ST without purulence   B/L medial calf superficial ulcerations     P:  Maintain dressings.  Continue with bilateral heel off .   s/p Right 3rd toe amputation site probing to ST without purulence : Santyl MTD q24h   B/L medial calf superficial ulcerations: xeroform dsd kelrix q24h   Attending updated and added to note as cosigner.   Podiatry to follow up.     19 @ 16:39

## 2019-01-30 NOTE — PROGRESS NOTE ADULT - SUBJECTIVE AND OBJECTIVE BOX
YANLINCOLN  78y  Female    Patient is a 78y old  Female who presents with a chief complaint of Change in mental status.      INTERVAL HPI/OVERNIGHT EVENTS:  No interval events.  Patient still confused.      REVIEW OF SYSTEMS: UTO due to encephalopathy.      VITALS:  T(F): 99.1 (19 @ 05:57), Max: 99.9 (19 @ 22:33)  HR: 85 (19 @ 05:57) (52 - 95)  BP: 109/55 (19 @ 05:57) (109/55 - 116/58)  RR: 18 (19 @ 05:57) (18 - 20)  SpO2: 97% (19 @ 21:33) (97% - 97%)      PHYSICAL EXAM:  GENERAL: NAD  HEAD:  Atraumatic, Normocephalic  EYES: conjunctiva and sclera clear  ENMT: Moist mucous membranes  NECK: Supple, Normal thyroid  NERVOUS SYSTEM:  Awake & Alert, Moves all extremities.   CHEST/LUNG: Clear to auscultation bilaterally; No rales, rhonchi, wheezing, or rubs  HEART: Regular rate and rhythm; No murmurs, rubs, or gallops  ABDOMEN: Soft, Nontender, Nondistended; Bowel sounds present  EXTREMITIES:  2+ Peripheral Pulses, No clubbing, cyanosis, or edema  LYMPH: No lymphadenopathy noted  SKIN: Right 3rd toe amputation with purulent drainage. Left leg ulcer: dressing in place.      Consultant(s) Notes Reviewed:  [x ] YES  [ ] NO  Care Discussed with Consultants/Other Providers [ x] YES  [ ] NO    LABS:                        8.5    7.00  )-----------( 295      ( 2019 07:12 )             26.9           142  |  109  |  49<H>  ----------------------------<  84  3.9   |  22  |  2.9<H>    Ca    11.1<H>      2019 07:12    TPro  6.5  /  Alb  3.6  /  TBili  0.3  /  DBili  x   /  AST  15  /  ALT  7   /  AlkPhos  54        Urinalysis Basic - ( 2019 17:20 )    Color: Yellow / Appearance: Slightly Cloudy / S.025 / pH: x  Gluc: x / Ketone: Trace  / Bili: Negative / Urobili: 0.2 mg/dL   Blood: x / Protein: 100 mg/dL / Nitrite: Negative   Leuk Esterase: Trace / RBC: 11-25 /HPF / WBC 1-2 /HPF   Sq Epi: x / Non Sq Epi: Occasional /HPF / Bacteria: Many      MICROBIOLOGY:   Culture - Urine (19 @ 17:20)    Specimen Source: .Urine Clean Catch (Midstream)    Culture Results: No growth      Culture - Blood (19 @ 14:16)    Specimen Source: .Blood Blood-Peripheral    Culture Results:   No growth to date.      Culture - Blood (19 @ 14:14)    Specimen Source: .Blood Blood-Peripheral    Culture Results:   No growth to date.      Culture - Other (01.10.19 @ 14:00)    -  Ciprofloxacin: R >2    -  Gentamicin: R >8    -  Imipenem: R >8    -  Levofloxacin: R >4    -  Meropenem: R >8    -  Piperacillin/Tazobactam: S 16    -  Tobramycin: R >8    -  Aztreonam: I 16    -  Ceftazidime: S 4    -  Cefepime: S 8    -  Amikacin: S 16    Specimen Source: .Other None    Culture Results:   Few Pseudomonas aeruginosa (Carbapenem Resistant)    Organism Identification: Pseudomonas aeruginosa (Carbapenem Resistant)    Organism: Pseudomonas aeruginosa (Carbapenem Resistant)    Method Type: SHANNAN      RADIOLOGY & ADDITIONAL TESTS:  CT Head No Cont (19 @ 15:54)   1.  The study is limited by motion artifact.    2.  Cerebral atrophy.    3.  Periventricular white matter hypodensities, nonspecific, differential   diagnostic possibilities include ischemic change, gliosis or  demyelination.      Xray Chest 1 View-PORTABLE IMMEDIATE (19 @ 13:42)   Support devices: Left-sided PICC with the tip in the distal SVC.    Cardiac/mediastinum/hilum: Grossly stable.    Lung parenchyma/Pleura: Low lung volumes limiting evaluation. No definite   airspace opacity or effusion. Left lower lobe scar/atelectasis.    Skeleton/soft tissues: Stable.    Impression:      Low lung volumes limiting evaluation. No definite airspace opacity or   effusion. Left lower lobe scar/atelectasis.      Imaging Personally Reviewed:  [x] YES  [ ] NO    MEDICATIONS  (STANDING):  allopurinol 100 milliGRAM(s) Oral daily  apixaban 2.5 milliGRAM(s) Oral every 12 hours  cefepime   IVPB 500 milliGRAM(s) IV Intermittent every 12 hours  collagenase Ointment 1 Application(s) Topical daily  dextrose 5% + sodium chloride 0.9%. 1000 milliLiter(s) (60 mL/Hr) IV Continuous <Continuous>  folic acid 1 milliGRAM(s) Oral daily  gabapentin Oral Tab/Cap - Peds 100 milliGRAM(s) Oral every 12 hours  leucovorin 5 milliGRAM(s) Oral daily  oxyCODONE  ER Tablet 20 milliGRAM(s) Oral every 12 hours  predniSONE   Tablet 5 milliGRAM(s) Oral daily  rifampin 300 milliGRAM(s) Oral two times a day  senna 2 Tablet(s) Oral at bedtime    MEDICATIONS  (PRN): None        HEALTH ISSUES - PROBLEM Dx:  Other specified diabetes mellitus with other specified complication, unspecified whether long term insulin use  Rheumatoid arteritis  DVT (deep venous thrombosis)  HTN (hypertension)  Gout  Altered mental status  Osteomyelitis right foot.

## 2019-01-30 NOTE — CONSULT NOTE ADULT - SUBJECTIVE AND OBJECTIVE BOX
History of Present Illness: 	  · Chief Complaint: The patient is a 78y Female complaining of confusion.	  · HPI Objective Statement: 77 yo F with a DVT, HTN, DM, s/p 3rd right toe last month due to osteo, picc line with cefepime, presents with AMS. Sent in from NH. According to friend at bedside patient progressively becoming more agitated and altered over the last few days. Talking to self, shouting at people. Associated with cough. Denies fevers, chills, abd pain, NVCD, CP, SOB, HA.	    NO prior psych hx.    pt is alert, confused, not oriented to place, person and times. labile affect and agitation noted.

## 2019-01-30 NOTE — PROGRESS NOTE ADULT - ASSESSMENT
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 She was admitted on 12/19/18 to the [facility] and managed for OM of the Right 3rd digit OM (cultures positive for Carbapenem Resistant Pseudomonas) and discharged on Cefepime for a total of 6 weeks.     Per Dr. Mnoge's note:   Patient has been having intermittent episodes of confusions since discharge from the hospital on 1/19. Reported that there are times where pt is AAOx3 and other times where she only repeats her name. Patients condition worsened in the past few days when she was told that she may not be able to remain in the NH due to insurance issues. Patient was seen by psych and prescribed Abilify.       Assessment and Plan:    1. Encephalopathy:  ? Infectious etiology vs. Medication vs. SHONNA vs. Dehydration with Hypercalcemia:  Discussed with ID (Dr. Quintero): Stop Cefepime and start Avycaz 0.94 g q24hr. Official consult pending.  Follow up Pan cultures.  IV Fluids, monitor, electrolytes      2. Bilateral Chronic Nonhealing Ulcers:  Full thickness wounds s/p debridement by burn 1/9/19.  Continue local wound care: Wet to dry dressing to right medial leg wound bid.  Xeroform to ulcers with loose dressing; Limb elevation.  Will discuss follow up wound care with them.      2. RT third metatarsal OM:  s/p Amputation.  Betadine to digit 3R BID.   Consult for follow up pending.  Continue IV Cefepime for now. STOP date 2/27/19.        3. PAD:  s/p Angiogram 1/9/19.  Vascular recommended follow up in 2 weeks.        4. Chronic DVT:  Duplex LE venous: Chronic-appearing right common femoral and femoral deep venous thromboses; left popliteal deep venous thrombosis  Eliquis discontinued due to worsening renal function.  Heparin drip in the AM.      5. Chronic Normocytic Anemia: ? ACD  Follow up repeat Anemia work up.  Goal hgb >7 g/dl.        6. Hypercalcemia:  Follow up Vitamin D and PTH levels.  Continue IV hydration. Monitor levels.        7. Rheumatoid Arthritis:  Continue home Prednisone 5mg Daily.      8. DM II:  Hemoglobin A1C, Whole Blood: 6.3% (01.12.19 @ 08:08)  Monitor finger sticks. Insulin coverage if fingersticks are greater than 180.      9. Latent TB:  Continue Rifampin.      10. Gout:  Continue Allopurinol.        DVT ppx: Heparin.   Code Status: DNR/DNI.

## 2019-01-31 DIAGNOSIS — G93.40 ENCEPHALOPATHY, UNSPECIFIED: ICD-10-CM

## 2019-01-31 DIAGNOSIS — M86.271 SUBACUTE OSTEOMYELITIS, RIGHT ANKLE AND FOOT: ICD-10-CM

## 2019-01-31 LAB
24R-OH-CALCIDIOL SERPL-MCNC: 20 NG/ML — LOW (ref 30–80)
ALBUMIN SERPL ELPH-MCNC: 2.9 G/DL — LOW (ref 3.5–5.2)
ALP SERPL-CCNC: 46 U/L — SIGNIFICANT CHANGE UP (ref 30–115)
ALT FLD-CCNC: 6 U/L — SIGNIFICANT CHANGE UP (ref 0–41)
ANION GAP SERPL CALC-SCNC: 14 MMOL/L — SIGNIFICANT CHANGE UP (ref 7–14)
APPEARANCE UR: CLEAR — SIGNIFICANT CHANGE UP
APTT BLD: 25.5 SEC — LOW (ref 27–39.2)
APTT BLD: 37.8 SEC — SIGNIFICANT CHANGE UP (ref 27–39.2)
AST SERPL-CCNC: 10 U/L — SIGNIFICANT CHANGE UP (ref 0–41)
BILIRUB SERPL-MCNC: <0.2 MG/DL — SIGNIFICANT CHANGE UP (ref 0.2–1.2)
BILIRUB UR-MCNC: NEGATIVE — SIGNIFICANT CHANGE UP
BUN SERPL-MCNC: 49 MG/DL — HIGH (ref 10–20)
CALCIUM SERPL-MCNC: 10.5 MG/DL — SIGNIFICANT CHANGE UP (ref 8.4–10.5)
CALCIUM SERPL-MCNC: 11.2 MG/DL — HIGH (ref 8.5–10.1)
CHLORIDE SERPL-SCNC: 112 MMOL/L — HIGH (ref 98–110)
CO2 SERPL-SCNC: 18 MMOL/L — SIGNIFICANT CHANGE UP (ref 17–32)
COLOR SPEC: YELLOW — SIGNIFICANT CHANGE UP
CREAT SERPL-MCNC: 3.1 MG/DL — HIGH (ref 0.7–1.5)
DIFF PNL FLD: ABNORMAL
ERYTHROCYTE [SEDIMENTATION RATE] IN BLOOD: 127 MM/HR — HIGH (ref 0–20)
GLUCOSE BLDC GLUCOMTR-MCNC: 105 MG/DL — HIGH (ref 70–99)
GLUCOSE BLDC GLUCOMTR-MCNC: 124 MG/DL — HIGH (ref 70–99)
GLUCOSE BLDC GLUCOMTR-MCNC: 124 MG/DL — HIGH (ref 70–99)
GLUCOSE BLDC GLUCOMTR-MCNC: 136 MG/DL — HIGH (ref 70–99)
GLUCOSE BLDC GLUCOMTR-MCNC: 139 MG/DL — HIGH (ref 70–99)
GLUCOSE SERPL-MCNC: 89 MG/DL — SIGNIFICANT CHANGE UP (ref 70–99)
GLUCOSE UR QL: NEGATIVE MG/DL — SIGNIFICANT CHANGE UP
GRAN CASTS # UR COMP ASSIST: ABNORMAL /LPF
HCT VFR BLD CALC: 27.4 % — LOW (ref 37–47)
HGB BLD-MCNC: 8.6 G/DL — LOW (ref 12–16)
INR BLD: 1.2 RATIO — SIGNIFICANT CHANGE UP (ref 0.65–1.3)
IRON SATN MFR SERPL: 22 % — SIGNIFICANT CHANGE UP (ref 15–50)
IRON SATN MFR SERPL: 26 UG/DL — LOW (ref 35–150)
KETONES UR-MCNC: NEGATIVE — SIGNIFICANT CHANGE UP
LEUKOCYTE ESTERASE UR-ACNC: ABNORMAL
MAGNESIUM SERPL-MCNC: 2 MG/DL — SIGNIFICANT CHANGE UP (ref 1.8–2.4)
MCHC RBC-ENTMCNC: 30 PG — SIGNIFICANT CHANGE UP (ref 27–31)
MCHC RBC-ENTMCNC: 31.4 G/DL — LOW (ref 32–37)
MCV RBC AUTO: 95.5 FL — SIGNIFICANT CHANGE UP (ref 81–99)
NITRITE UR-MCNC: NEGATIVE — SIGNIFICANT CHANGE UP
NRBC # BLD: 0 /100 WBCS — SIGNIFICANT CHANGE UP (ref 0–0)
PH UR: 6 — SIGNIFICANT CHANGE UP (ref 5–8)
PHOSPHATE SERPL-MCNC: 4.7 MG/DL — SIGNIFICANT CHANGE UP (ref 2.1–4.9)
PLATELET # BLD AUTO: 313 K/UL — SIGNIFICANT CHANGE UP (ref 130–400)
POTASSIUM SERPL-MCNC: 4 MMOL/L — SIGNIFICANT CHANGE UP (ref 3.5–5)
POTASSIUM SERPL-SCNC: 4 MMOL/L — SIGNIFICANT CHANGE UP (ref 3.5–5)
PROT SERPL-MCNC: 5 G/DL — LOW (ref 6–8)
PROT UR-MCNC: 100 MG/DL
PROTHROM AB SERPL-ACNC: 12.9 SEC — HIGH (ref 9.95–12.87)
PTH-INTACT FLD-MCNC: 8 PG/ML — LOW (ref 15–65)
RBC # BLD: 2.87 M/UL — LOW (ref 4.2–5.4)
RBC # BLD: 2.87 M/UL — LOW (ref 4.2–5.4)
RBC # FLD: 16.3 % — HIGH (ref 11.5–14.5)
RBC CASTS # UR COMP ASSIST: ABNORMAL /HPF
RETICS #: 32.1 K/UL — SIGNIFICANT CHANGE UP (ref 25–125)
RETICS/RBC NFR: 1.1 % — SIGNIFICANT CHANGE UP (ref 0.5–1.5)
SODIUM SERPL-SCNC: 144 MMOL/L — SIGNIFICANT CHANGE UP (ref 135–146)
SP GR SPEC: >=1.03 (ref 1.01–1.03)
TIBC SERPL-MCNC: 120 UG/DL — LOW (ref 220–430)
UIBC SERPL-MCNC: 94 UG/DL — LOW (ref 110–370)
UROBILINOGEN FLD QL: 0.2 MG/DL — SIGNIFICANT CHANGE UP (ref 0.2–0.2)
WBC # BLD: 8.46 K/UL — SIGNIFICANT CHANGE UP (ref 4.8–10.8)
WBC # FLD AUTO: 8.46 K/UL — SIGNIFICANT CHANGE UP (ref 4.8–10.8)
WBC UR QL: ABNORMAL /HPF

## 2019-01-31 RX ORDER — CEFEPIME 1 G/1
1000 INJECTION, POWDER, FOR SOLUTION INTRAMUSCULAR; INTRAVENOUS EVERY 12 HOURS
Qty: 0 | Refills: 0 | Status: DISCONTINUED | OUTPATIENT
Start: 2019-01-31 | End: 2019-01-31

## 2019-01-31 RX ORDER — HEPARIN SODIUM 5000 [USP'U]/ML
6500 INJECTION INTRAVENOUS; SUBCUTANEOUS ONCE
Qty: 0 | Refills: 0 | Status: COMPLETED | OUTPATIENT
Start: 2019-01-31 | End: 2019-02-01

## 2019-01-31 RX ORDER — HEPARIN SODIUM 5000 [USP'U]/ML
1100 INJECTION INTRAVENOUS; SUBCUTANEOUS
Qty: 25000 | Refills: 0 | Status: DISCONTINUED | OUTPATIENT
Start: 2019-01-31 | End: 2019-02-01

## 2019-01-31 RX ORDER — HEPARIN SODIUM 5000 [USP'U]/ML
800 INJECTION INTRAVENOUS; SUBCUTANEOUS
Qty: 25000 | Refills: 0 | Status: DISCONTINUED | OUTPATIENT
Start: 2019-01-31 | End: 2019-01-31

## 2019-01-31 RX ORDER — SODIUM CHLORIDE 9 MG/ML
1000 INJECTION INTRAMUSCULAR; INTRAVENOUS; SUBCUTANEOUS
Qty: 0 | Refills: 0 | Status: DISCONTINUED | OUTPATIENT
Start: 2019-01-31 | End: 2019-02-01

## 2019-01-31 RX ORDER — CEFTAZIDIME 6 G/30ML
500 INJECTION, POWDER, FOR SOLUTION INTRAVENOUS EVERY 24 HOURS
Qty: 0 | Refills: 0 | Status: DISCONTINUED | OUTPATIENT
Start: 2019-01-31 | End: 2019-02-19

## 2019-01-31 RX ADMIN — SODIUM CHLORIDE 100 MILLILITER(S): 9 INJECTION INTRAMUSCULAR; INTRAVENOUS; SUBCUTANEOUS at 02:28

## 2019-01-31 RX ADMIN — Medication 1 MILLIGRAM(S): at 12:51

## 2019-01-31 RX ADMIN — GABAPENTIN 100 MILLIGRAM(S): 400 CAPSULE ORAL at 18:13

## 2019-01-31 RX ADMIN — GABAPENTIN 100 MILLIGRAM(S): 400 CAPSULE ORAL at 06:50

## 2019-01-31 RX ADMIN — SODIUM CHLORIDE 50 MILLILITER(S): 9 INJECTION INTRAMUSCULAR; INTRAVENOUS; SUBCUTANEOUS at 12:51

## 2019-01-31 RX ADMIN — Medication 100 MILLIGRAM(S): at 12:51

## 2019-01-31 RX ADMIN — Medication 1 APPLICATION(S): at 12:51

## 2019-01-31 RX ADMIN — CEFTAZIDIME 100 MILLIGRAM(S): 6 INJECTION, POWDER, FOR SOLUTION INTRAVENOUS at 18:13

## 2019-01-31 RX ADMIN — SENNA PLUS 2 TABLET(S): 8.6 TABLET ORAL at 21:29

## 2019-01-31 RX ADMIN — HEPARIN SODIUM 800 UNIT(S)/HR: 5000 INJECTION INTRAVENOUS; SUBCUTANEOUS at 15:42

## 2019-01-31 RX ADMIN — Medication 5 MILLIGRAM(S): at 12:51

## 2019-01-31 RX ADMIN — Medication 5 MILLIGRAM(S): at 06:50

## 2019-01-31 NOTE — CONSULT NOTE ADULT - SUBJECTIVE AND OBJECTIVE BOX
[facility] NEPHROLOGY INITIAL CONSULT NOTE  --------------------------------------------------------------------------------  HPI:  77 yo F with a DVT, HTN, DM, s/p 3rd right toe last month due to osteo, picc line with cefepime, presents with AMS. Sent in from NH. At NH patient progressively became more agitated and altered over the last few days. Talking to self, shouting at people. Baseline creatinine appears to be in 1s and on admission    PAST HISTORY  --------------------------------------------------------------------------------  PAST MEDICAL & SURGICAL HISTORY:  Gout  DVT (deep venous thrombosis)  HTN (hypertension)  Rheumatoid arteritis  Other specified diabetes mellitus with other specified complication, unspecified whether long term insulin use  Primary osteoarthritis of right knee: s/p knee repalcement    FAMILY HISTORY:  Family history of early CAD (Father)    SOCIAL HISTORY:  No current ETOH, tobacco, or illicit drug use    ALLERGIES & MEDICATIONS  --------------------------------------------------------------------------------  Allergies    clindamycin (Unknown)  erythromycin (Unknown)  penicillin (Unknown)  strawberry (Unknown)    Standing Inpatient Medications  allopurinol 100 milliGRAM(s) Oral daily  cefepime   IVPB 1000 milliGRAM(s) IV Intermittent every 12 hours  collagenase Ointment 1 Application(s) Topical daily  folic acid 1 milliGRAM(s) Oral daily  gabapentin Oral Tab/Cap - Peds 100 milliGRAM(s) Oral every 12 hours  leucovorin 5 milliGRAM(s) Oral daily  predniSONE   Tablet 5 milliGRAM(s) Oral daily  senna 2 Tablet(s) Oral at bedtime    REVIEW OF SYSTEMS  --------------------------------------------------------------------------------  Gen: No fevers  Head/Eyes/Ears/Mouth: No headache; No sinus pain/discomfort, sore throat  Respiratory: No dyspnea, cough, wheezing, hemoptysis  CV: No chest pain, PND, orthopnea  GI: No abdominal pain, diarrhea, constipation, nausea, vomiting, melena, hematochezia  : No increased frequency, dysuria, hematuria, nocturia  All other systems were reviewed and are negative, except as noted.    VITALS/PHYSICAL EXAM  --------------------------------------------------------------------------------  T(C): 36.6 (01-31-19 @ 05:30), Max: 37.4 (01-30-19 @ 22:05)  HR: 90 (01-31-19 @ 05:30) (89 - 92)  BP: 133/65 (01-31-19 @ 05:30) (129/64 - 134/60)  RR: 16 (01-31-19 @ 05:30) (16 - 18)  SpO2: 95% (01-31-19 @ 09:18) (95% - 95%)  Height (cm): 152.4 (01-29-19 @ 14:33)  Weight (kg): 79.4 (01-29-19 @ 14:33)  BMI (kg/m2): 34.2 (01-29-19 @ 14:33)  BSA (m2): 1.76 (01-29-19 @ 14:33)    01-30-19 @ 07:01 - 01-31-19 @ 07:00  --------------------------------------------------------  IN: 1000 mL / OUT: 600 mL / NET: 400 mL    Physical Exam:  	Gen: NAD  	HEENT: Supple neck, clear oropharynx  	Pulm: CTA B/L  	CV: RRR, S1S2  	Back: No CVA tenderness; no sacral edema  	Abd: +BS, soft, nontender/nondistended  	: Mild suprapubic tenderness  	LE: Warm, no edema  	Neuro: AAO x1    LABS/STUDIES  --------------------------------------------------------------------------------              8.6    8.46  >-----------<  313      [01-31-19 @ 09:22]              27.4     144  |  112  |  49  ----------------------------<  89      [01-31-19 @ 09:22]  4.0   |  18  |  3.1        Ca     11.2     [01-31-19 @ 09:22]      Mg     2.0     [01-31-19 @ 09:22]      Phos  4.7     [01-31-19 @ 09:22]    TPro  5.0  /  Alb  2.9  /  TBili  <0.2  /  DBili  x   /  AST  10  /  ALT  6   /  AlkPhos  46  [01-31-19 @ 09:22]    PT/INR: PT 12.90, INR 1.20       [01-31-19 @ 09:22]  PTT: 25.5       [01-31-19 @ 09:22]    Creatinine Trend:  SCr 3.1 [01-31 @ 09:22]  SCr 2.9 [01-30 @ 07:12]  SCr 2.5 [01-28 @ 14:15]  SCr 1.2 [01-16 @ 12:01]  SCr 1.1 [01-15 @ 07:19]    Urinalysis - [01-28-19 @ 17:20]      Color Yellow / Appearance Slightly Cloudy / SG 1.025 / pH 6.0      Gluc Negative / Ketone Trace  / Bili Negative / Urobili 0.2       Blood Moderate / Protein 100 / Leuk Est Trace / Nitrite Negative      RBC 11-25 / WBC 1-2 / Hyaline  / Gran  / Sq Epi  / Non Sq Epi Occasional / Bacteria Many    Urine Creatinine 71      [01-30-19 @ 14:22]  Urine Protein 83      [01-30-19 @ 14:22]  Urine Sodium 64.0      [01-30-19 @ 14:22]  Urine Urea Nitrogen 310      [01-29-19 @ 17:02]  Urine Chloride 65      [01-29-19 @ 17:02]    Iron 26, TIBC 250, %sat 10      [06-08-18 @ 20:31]  PTH -- (Ca --)      [01-30-19 @ 06:30]   8  Vitamin D (25OH) 20      [01-31-19 @ 09:22]  HbA1c 6.3      [01-12-19 @ 08:08]  Lipid: chol 157, , HDL 46, LDL 77      [06-09-18 @ 09:46]    HIV Nonreact      [06-09-18 @ 09:46]    Immunofixation Serum:   No Monoclonal Band Identified      [06-09-18 @ 09:46]  SPEP Interpretation: Pattern Consistent With Acute Inflammation Or Stress      [06-09-18 @ 09:46]

## 2019-01-31 NOTE — PHYSICAL THERAPY INITIAL EVALUATION ADULT - ACTIVE RANGE OF MOTION EXAMINATION, REHAB EVAL
Both upper extremities/Both lower extremities joints within functional limits and painfree required AAROM with c/o discomfort with movement

## 2019-01-31 NOTE — PROGRESS NOTE ADULT - SUBJECTIVE AND OBJECTIVE BOX
LINCOLN HEREDIA  78y  Female    Patient is a 78y old  Female who presents with a chief complaint of Change in mental status.      INTERVAL HPI/OVERNIGHT EVENTS:  No interval events.  Patient still confused.      REVIEW OF SYSTEMS: UTO due to encephalopathy.      VITALS:  T(C): 36.6 (2019 05:30), Max: 37.4 (2019 22:05)  T(F): 97.8 (2019 05:30), Max: 99.4 (2019 22:05)  HR: 90 (2019 05:30) (89 - 92)  BP: 133/65 (2019 05:30) (129/64 - 134/60)  BP(mean): --  RR: 16 (2019 05:30) (16 - 18)  SpO2: 95% (2019 09:18) (95% - 95%)      I&O's Summary    2019 07:01  -  2019 07:00  --------------------------------------------------------  IN: 1000 mL / OUT: 600 mL / NET: 400 mL    2019 07:01  -  2019 13:06  --------------------------------------------------------  IN: 150 mL / OUT: 0 mL / NET: 150 mL      CAPILLARY BLOOD GLUCOSE  POCT Blood Glucose.: 124 mg/dL (2019 11:38)  POCT Blood Glucose.: 124 mg/dL (2019 07:17)  POCT Blood Glucose.: 113 mg/dL (2019 20:46)  POCT Blood Glucose.: 106 mg/dL (2019 16:12)      PHYSICAL EXAM:  GENERAL: NAD  HEAD:  Atraumatic, Normocephalic  EYES: conjunctiva and sclera clear  ENMT: Moist mucous membranes  NECK: Supple, Normal thyroid  NERVOUS SYSTEM:  Awake & Alert, Moves all extremities.   CHEST/LUNG: Clear to auscultation bilaterally; No rales, rhonchi, wheezing, or rubs  HEART: Regular rate and rhythm; No murmurs, rubs, or gallops  ABDOMEN: Soft, Nontender, Nondistended; Bowel sounds present  EXTREMITIES:  2+ Peripheral Pulses, No clubbing, cyanosis, or edema  LYMPH: No lymphadenopathy noted  SKIN: Right 3rd toe amputation with purulent drainage. Left leg ulcer: dressing in place.    Hooper draining clear urine.    Consultant(s) Notes Reviewed:  [x ] YES  [ ] NO  Care Discussed with Consultants/Other Providers [ x] YES  [ ] NO    LABS:                                   8.6    8.46  )-----------( 313      ( 2019 09:22 )             27.4         144  |  112<H>  |  49<H>  ----------------------------<  89  4.0   |  18  |  3.1<H>    Ca    11.2<H>      2019 09:22  Phos  4.7       Mg     2.0         TPro  5.0<L>  /  Alb  2.9<L>  /  TBili  <0.2  /  DBili  x   /  AST  10  /  ALT  6   /  AlkPhos  46        PT/INR - ( 2019 09:22 )   PT: 12.90 sec;   INR: 1.20 ratio    PTT - ( 2019 09:22 )  PTT:25.5 sec      Urinalysis Basic - ( 2019 17:20 )    Color: Yellow / Appearance: Slightly Cloudy / S.025 / pH: x  Gluc: x / Ketone: Trace  / Bili: Negative / Urobili: 0.2 mg/dL   Blood: x / Protein: 100 mg/dL / Nitrite: Negative   Leuk Esterase: Trace / RBC: 11-25 /HPF / WBC 1-2 /HPF   Sq Epi: x / Non Sq Epi: Occasional /HPF / Bacteria: Many      MICROBIOLOGY:   Culture - Urine (19 @ 17:20)    Specimen Source: .Urine Clean Catch (Midstream)    Culture Results: No growth      Culture - Blood (19 @ 14:16)    Specimen Source: .Blood Blood-Peripheral    Culture Results:   No growth to date.      Culture - Blood (19 @ 14:14)    Specimen Source: .Blood Blood-Peripheral    Culture Results:   No growth to date.      Culture - Other (01.10.19 @ 14:00)    -  Ciprofloxacin: R >2    -  Gentamicin: R >8    -  Imipenem: R >8    -  Levofloxacin: R >4    -  Meropenem: R >8    -  Piperacillin/Tazobactam: S 16    -  Tobramycin: R >8    -  Aztreonam: I 16    -  Ceftazidime: S 4    -  Cefepime: S 8    -  Amikacin: S 16    Specimen Source: .Other None    Culture Results:   Few Pseudomonas aeruginosa (Carbapenem Resistant)    Organism Identification: Pseudomonas aeruginosa (Carbapenem Resistant)    Organism: Pseudomonas aeruginosa (Carbapenem Resistant)    Method Type: SHANNAN      RADIOLOGY & ADDITIONAL TESTS:  CT Head No Cont (19 @ 15:54)   1.  The study is limited by motion artifact.    2.  Cerebral atrophy.    3.  Periventricular white matter hypodensities, nonspecific, differential   diagnostic possibilities include ischemic change, gliosis or  demyelination.      X-ray Chest 1 View-PORTABLE IMMEDIATE (19 @ 13:42)   Support devices: Left-sided PICC with the tip in the distal SVC.    Cardiac/mediastinum/hilum: Grossly stable.    Lung parenchyma/Pleura: Low lung volumes limiting evaluation. No definite   airspace opacity or effusion. Left lower lobe scar/atelectasis.    Skeleton/soft tissues: Stable.    Impression:      Low lung volumes limiting evaluation. No definite airspace opacity or   effusion. Left lower lobe scar/atelectasis.      Imaging Personally Reviewed:  [x] YES  [ ] NO    MEDICATIONS  (STANDING):  allopurinol 100 milliGRAM(s) Oral daily  cefepime   IVPB 1000 milliGRAM(s) IV Intermittent every 12 hours  cefTAZidime/avibactam IVPB 0.94 Gram(s) IV Intermittent every 24 hours  collagenase Ointment 1 Application(s) Topical daily  folic acid 1 milliGRAM(s) Oral daily  gabapentin Oral Tab/Cap - Peds 100 milliGRAM(s) Oral every 12 hours  leucovorin 5 milliGRAM(s) Oral daily  predniSONE   Tablet 5 milliGRAM(s) Oral daily  senna 2 Tablet(s) Oral at bedtime  sodium chloride 0.9%. 1000 milliLiter(s) (50 mL/Hr) IV Continuous <Continuous>    MEDICATIONS  (PRN): None          HEALTH ISSUES - PROBLEM Dx:  Other specified diabetes mellitus with other specified complication, unspecified whether long term insulin use  Rheumatoid arteritis  DVT (deep venous thrombosis)  HTN (hypertension)  Gout  Altered mental status  Osteomyelitis right foot.

## 2019-01-31 NOTE — CONSULT NOTE ADULT - ASSESSMENT
IMPRESSION: Rehab of 77 y/o  f rehab  for  GD / RA    PRECAUTIONS: [  ] Cardiac  [  ] Respiratory  [  ] Seizures [  ] Contact Isolation  [  ] Droplet Isolation  [ FALL ] Other    Weight Bearing Status:     RECOMMENDATION:    Out of Bed to Chair     DVT/Decubiti Prophylaxis    REHAB PLAN:     [  x ] Bedside P/T 3-5 times a week   [   ]   Bedside O/T  2-3 times a week             [   ] No Rehab Therapy Indicated                   [   ]  Speech Therapy   Conditioning/ROM                                    ADL  Bed Mobility                                               Conditioning/ROM  Transfers                                                     Bed Mobility  Sitting /Standing Balance                         Transfers                                        Gait Training                                               Sitting/Standing Balance  Stair Training [   ]Applicable                    Home equipment Eval                                                                        Splinting  [   ] Only      GOALS:   ADL   [  x ]   Independent                    Transfers  [   ] Independent                          Ambulation  [   ] Independent     [  x  ] With device                            [ x  ]  CG                                                         [  x ]  CG                                                                  [   ] CG                            [    ] Min A                                                   [   ] Min A                                                              [   ] Min  A          DISCHARGE PLAN:   [   ]  Good candidate for Intensive Rehabilitation/Hospital based-4A [facility]                                             Will tolerate 3hrs Intensive Rehab Daily                                       [ xx   ]  Short Term Rehab in Skilled Nursing Facility  cont  current care                                       [    ]  Home with Outpatient or VN services                                         [    ]  Possible Candidate for Intensive Hospital based Rehab

## 2019-01-31 NOTE — PHYSICAL THERAPY INITIAL EVALUATION ADULT - GENERAL OBSERVATIONS, REHAB EVAL
08:30-08:58 Chart reviewed. Pt encountered semireclined in bed, may be seen by Physical Therapist as confirmed with Nurse. Patient appeared in no pain or discomfort and wanted to get up but "cannot walk"; +IV LUE; +dressing (B) feet; bowman

## 2019-01-31 NOTE — CONSULT NOTE ADULT - SUBJECTIVE AND OBJECTIVE BOX
HPI:: 77 yo F with a DVT, HTN, DM, s/p 3rd right toe  amputation  last month due to osteo, picc line with cefepime, presents with AMS. Sent in from NH.  progressively becoming more agitated and altered over the last few days. Talking to self, shouting at people. Associated with cough. Denies fevers, chills, abd pain, NVCD, CP, SOB, HA.   PTN  REFERRED TO ACUTE  REHAB  FOR  EVAL AND  TX   PAST MEDICAL & SURGICAL HISTORY:  Gout  DVT (deep venous thrombosis)  HTN (hypertension)  Rheumatoid arteritis  Other specified diabetes mellitus with other specified complication, unspecified whether long term insulin use  Primary osteoarthritis of right knee: s/p knee repalcement      Hospital Course:    TODAY'S SUBJECTIVE & REVIEW OF SYMPTOMS:     Constitutional WNL   Cardio WNL   Resp WNL   GI WNL  Heme WNL  Endo WNL  Skin WNL  MSK WNL  Neuro WNL  Cognitive WNL  Psych WNL      MEDICATIONS  (STANDING):  allopurinol 100 milliGRAM(s) Oral daily  cefepime   IVPB 1000 milliGRAM(s) IV Intermittent every 12 hours  collagenase Ointment 1 Application(s) Topical daily  folic acid 1 milliGRAM(s) Oral daily  gabapentin Oral Tab/Cap - Peds 100 milliGRAM(s) Oral every 12 hours  leucovorin 5 milliGRAM(s) Oral daily  predniSONE   Tablet 5 milliGRAM(s) Oral daily  senna 2 Tablet(s) Oral at bedtime    MEDICATIONS  (PRN):      FAMILY HISTORY:  Family history of early CAD (Father)      Allergies    clindamycin (Unknown)  erythromycin (Unknown)  penicillin (Unknown)  strawberry (Unknown)    Intolerances        SOCIAL HISTORY:    [  ] Etoh  [  ] Smoking  [  ] Substance abuse     Home Environment:  [ x ] Home Alone  [  ] Lives with Family  [  ] Home Health Aid    Dwelling:  [ x ] Apartment  [  ] Private House  [  ] Adult Home  [  ] Skilled Nursing Facility      [  ] Short Term  [  ] Long Term  [  ] Stairs       Elevator [  ]    FUNCTIONAL STATUS PTA: (Check all that apply)  Ambulation: [  x ]Independent    [  ] Dependent     [  ] Non-Ambulatory  Assistive Device: [  ] SA Cane  [  ]  Q Cane  [ xx ] Walker  [  ]  Wheelchair  ADL : [ x ] Independent  [  ]  Dependent       Vital Signs Last 24 Hrs  T(C): 36.6 (31 Jan 2019 05:30), Max: 37.4 (30 Jan 2019 22:05)  T(F): 97.8 (31 Jan 2019 05:30), Max: 99.4 (30 Jan 2019 22:05)  HR: 90 (31 Jan 2019 05:30) (89 - 92)  BP: 133/65 (31 Jan 2019 05:30) (129/64 - 134/60)  BP(mean): --  RR: 16 (31 Jan 2019 05:30) (16 - 18)  SpO2: --      PHYSICAL EXAM: Alert & Oriented X 3  GENERAL: NAD, well-groomed, well-developed  HEAD:  Atraumatic, Normocephalic  EYES: EOMI, PERRLA, conjunctiva and sclera clear  NECK: Supple, No JVD, Normal thyroid  CHEST/LUNG: Clear to percussion bilaterally; No rales, rhonchi, wheezing, or rubs  HEART: Regular rate and rhythm; No murmurs, rubs, or gallops  ABDOMEN: Soft, Nontender, Nondistended; Bowel sounds present  EXTREMITIES:  2+ Peripheral Pulses, No clubbing, cyanosis, or edema    NERVOUS SYSTEM:  Cranial Nerves 2-12 intact [x  ] Abnormal  [  ]  ROM: WFL all extremities [  ]  Abnormal [x ]  Motor Strength: WFL all extremities  [  ]  Abnormal [ x ]  Sensation: intact to light touch [  ] Abnormal [x  ]  Reflexes: Symmetric [  ]  Abnormal [x]    FUNCTIONAL STATUS:  Bed Mobility: Independent [  ]  Supervision [  ]  Needs Assistance [ x ]  N/A [  ]  Transfers: Independent [  ]  Supervision [  ]  Needs Assistance [ x ]  N/A [  ]   Ambulation: Independent [  ]  Supervision [  ]  Needs Assistance [x  ]  N/A [  ]  ADL: Independent [  ] Requires Assistance [  ] N/A [x  ]  SEE PT/OT IE NOTES    LABS:                        8.5    7.00  )-----------( 295      ( 30 Jan 2019 07:12 )             26.9     01-30    142  |  109  |  49<H>  ----------------------------<  84  3.9   |  22  |  2.9<H>    Ca    11.1<H>      30 Jan 2019 07:12            RADIOLOGY & ADDITIONAL STUDIES:    Assesment:

## 2019-01-31 NOTE — PROGRESS NOTE ADULT - ASSESSMENT
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 She was admitted on 12/19/18 to the [facility] and managed for OM of the Right 3rd digit OM (cultures positive for Carbapenem Resistant Pseudomonas) and discharged on Cefepime for a total of 6 weeks.     Per Dr. Monge's note:   Patient has been having intermittent episodes of confusions since discharge from the hospital on 1/19. Reported that there are times where pt is AAOx3 and other times where she only repeats her name. Patients condition worsened in the past few days when she was told that she may not be able to remain in the NH due to insurance issues. Patient was seen by psych and prescribed Abilify.       Assessment and Plan:    1. Encephalopathy:  ? Infectious etiology vs. Medication vs. SHONNA vs. Dehydration with Hypercalcemia:  ID following Risk of encephalopathy similar with all Cephalosporins. Resumed Cefepime but Dr. Lau agreed to a trial of Ceftazidime.   Pan cultures negative.        2. SHONNA on CKD 3:  ? SHONNA, ATN vs obstruction vs AIN vs prerenal  Continue IV fluids for now.  Monitor ins and out & BMP.      2. RT third metatarsal OM:  s/p Amputation.  Podiatry following: Local wound care with Santyl and moist dressing.  Continue IV Cefepime for now. STOP date 2/27/19.        3. PAD:  s/p Angiogram 1/9/19.  Vascular recommended follow up in 2 weeks.        4. Chronic DVT:  Duplex LE venous: Chronic-appearing right common femoral and femoral deep venous thromboses; left popliteal deep venous thrombosis  Eliquis discontinued due to worsening renal function.  Heparin drip with Coumadin until Creatinine improves.         5. Chronic Normocytic Anemia: ? ACD  Follow up repeat Anemia work up.  Goal hgb >7 g/dl.        6. Hypercalcemia:  Still elevated with IV hydration.  Vitamin D and PTH levels low. Follow up Vitamin D1, 25 and PTHrP.  Continue IV hydration. Monitor levels.        7. Rheumatoid Arthritis:  Continue home Prednisone 5mg Daily.      8. DM II:  Hemoglobin A1C, Whole Blood: 6.3% (01.12.19 @ 08:08)  Monitor finger sticks. Insulin coverage if fingersticks are greater than 180.      9. Latent TB:  Continue Rifampin.      10. Gout:  Continue Allopurinol.      11.  Bilateral Chronic Nonhealing Ulcers:  Full thickness wounds s/p debridement by burn 1/9/19.  Continue local wound care: Xeroform dsd kelrix q24h.        DVT ppx: Heparin.   Code Status: DNR/DNI.  Disposition: NH when stable.

## 2019-01-31 NOTE — PHYSICAL THERAPY INITIAL EVALUATION ADULT - LEVEL OF INDEPENDENCE: SIT/STAND, REHAB EVAL
unable to perform/secondary to patient unable to balance even sitting at edge of bed with (B) hands/feet sliding forward

## 2019-01-31 NOTE — CHART NOTE - NSCHARTNOTEFT_GEN_A_CORE
Patient not urinating, bowman placement reordered. Also PTT is 37.8, heparin infusion increased based on nomogram

## 2019-01-31 NOTE — PROGRESS NOTE ADULT - SUBJECTIVE AND OBJECTIVE BOX
infectious diseases progress note:  LINCOLN HEREDIA is a 78yFemale patient    ALTERED MENTAL STATUS;SEPSIS    Other specified diabetes mellitus with other specified complication, unspecified whether long term insulin use  Rheumatoid arteritis  DVT (deep venous thrombosis)  HTN (hypertension)  Sepsis  Gout  Altered mental status      ROS:  not relevant     Allergies    clindamycin (Unknown)  erythromycin (Unknown)  penicillin (Unknown)  strawberry (Unknown)    Intolerances        ANTIBIOTICS/RELEVANT:  antimicrobials  cefepime   IVPB 1000 milliGRAM(s) IV Intermittent every 12 hours    immunologic:    OTHER:  allopurinol 100 milliGRAM(s) Oral daily  collagenase Ointment 1 Application(s) Topical daily  folic acid 1 milliGRAM(s) Oral daily  gabapentin Oral Tab/Cap - Peds 100 milliGRAM(s) Oral every 12 hours  leucovorin 5 milliGRAM(s) Oral daily  predniSONE   Tablet 5 milliGRAM(s) Oral daily  senna 2 Tablet(s) Oral at bedtime      Objective:  T(F): 97.8 (01-31-19 @ 05:30), Max: 99.4 (01-30-19 @ 22:05)  HR: 90 (01-31-19 @ 05:30) (89 - 92)  BP: 133/65 (01-31-19 @ 05:30) (129/64 - 134/60)  RR: 16 (01-31-19 @ 05:30) (16 - 18)  SpO2: 98% (01-30-19 @ 09:00) (98% - 98%)    PHYSICAL EXAM:  Constitutional:Well-developed, well nourished. confused 	  Neck:no JVD, no lymphadenopathy, supple  Respiratory: CTA lico  Cardiovascular: S1S2 RRR, no murmurs  Gastrointestinal:soft, (+) BS, no HSM  Extremities: Right foot wound - dressed - healing         LABS:                        8.5    7.00  )-----------( 295      ( 30 Jan 2019 07:12 )             26.9     01-30    142  |  109  |  49<H>  ----------------------------<  84  3.9   |  22  |  2.9<H>    Ca    11.1<H>      30 Jan 2019 07:12              MICROBIOLOGY:    Culture - Urine (collected 01-28-19 @ 17:20)  Source: .Urine Clean Catch (Midstream)  Final Report (01-29-19 @ 20:14):    No growth    Culture - Blood (collected 01-28-19 @ 14:16)  Source: .Blood Blood-Peripheral  Preliminary Report (01-29-19 @ 23:01):    No growth to date.    Culture - Blood (collected 01-28-19 @ 14:14)  Source: .Blood Blood-Peripheral  Preliminary Report (01-29-19 @ 23:01):    No growth to date.        Culture - Urine (collected 28 Jan 2019 17:20)  Source: .Urine Clean Catch (Midstream)  Final Report (29 Jan 2019 20:14):    No growth    Culture - Blood (collected 28 Jan 2019 14:16)  Source: .Blood Blood-Peripheral  Preliminary Report (29 Jan 2019 23:01):    No growth to date.    Culture - Blood (collected 28 Jan 2019 14:14)  Source: .Blood Blood-Peripheral  Preliminary Report (29 Jan 2019 23:01):    No growth to date.      Culture Results:   No growth (01-28 @ 17:20)  Culture Results:   No growth to date. (01-28 @ 14:16)  Culture Results:   No growth to date. (01-28 @ 14:14)        RADIOLOGY & ADDITIONAL STUDIES:

## 2019-01-31 NOTE — CONSULT NOTE ADULT - ASSESSMENT
1. SHONNA, ATN vs obstruction vs AIN vs prerenal  2. CKD III  3. Hypercalcemia, PTH suppressed  4. R foot OM    Plan:  Start IVF NS at 50 mL/hr  Check UA  Check urine culture  Check urine eosinophils  Check vit D 1, 25  Check PTHrp  Daily BMP  No NSAIDs  No IV contrast  Keep Hooper for now

## 2019-02-01 DIAGNOSIS — G93.41 METABOLIC ENCEPHALOPATHY: ICD-10-CM

## 2019-02-01 DIAGNOSIS — N17.9 ACUTE KIDNEY FAILURE, UNSPECIFIED: ICD-10-CM

## 2019-02-01 DIAGNOSIS — I82.513 CHRONIC EMBOLISM AND THROMBOSIS OF FEMORAL VEIN, BILATERAL: ICD-10-CM

## 2019-02-01 DIAGNOSIS — A41.52 SEPSIS DUE TO PSEUDOMONAS: ICD-10-CM

## 2019-02-01 LAB
ANION GAP SERPL CALC-SCNC: 16 MMOL/L — HIGH (ref 7–14)
APTT BLD: 39.7 SEC — HIGH (ref 27–39.2)
APTT BLD: 46.9 SEC — HIGH (ref 27–39.2)
APTT BLD: 52.3 SEC — HIGH (ref 27–39.2)
APTT BLD: 98.3 SEC — CRITICAL HIGH (ref 27–39.2)
BUN SERPL-MCNC: 49 MG/DL — HIGH (ref 10–20)
CALCIUM SERPL-MCNC: 11.1 MG/DL — HIGH (ref 8.5–10.1)
CHLORIDE SERPL-SCNC: 110 MMOL/L — SIGNIFICANT CHANGE UP (ref 98–110)
CO2 SERPL-SCNC: 17 MMOL/L — SIGNIFICANT CHANGE UP (ref 17–32)
CREAT SERPL-MCNC: 3 MG/DL — HIGH (ref 0.7–1.5)
CULTURE RESULTS: SIGNIFICANT CHANGE UP
EOSINOPHIL NFR URNS MANUAL: POSITIVE
FERRITIN SERPL-MCNC: 428 NG/ML — HIGH (ref 15–150)
FOLATE SERPL-MCNC: >20 NG/ML — SIGNIFICANT CHANGE UP
GLUCOSE BLDC GLUCOMTR-MCNC: 128 MG/DL — HIGH (ref 70–99)
GLUCOSE BLDC GLUCOMTR-MCNC: 134 MG/DL — HIGH (ref 70–99)
GLUCOSE BLDC GLUCOMTR-MCNC: 151 MG/DL — HIGH (ref 70–99)
GLUCOSE BLDC GLUCOMTR-MCNC: 168 MG/DL — HIGH (ref 70–99)
GLUCOSE BLDC GLUCOMTR-MCNC: 73 MG/DL — SIGNIFICANT CHANGE UP (ref 70–99)
GLUCOSE SERPL-MCNC: 132 MG/DL — HIGH (ref 70–99)
HCT VFR BLD CALC: 27.1 % — LOW (ref 37–47)
HCT VFR BLD CALC: 28.5 % — LOW (ref 37–47)
HGB BLD-MCNC: 8.6 G/DL — LOW (ref 12–16)
HGB BLD-MCNC: 9 G/DL — LOW (ref 12–16)
MCHC RBC-ENTMCNC: 29.5 PG — SIGNIFICANT CHANGE UP (ref 27–31)
MCHC RBC-ENTMCNC: 29.7 PG — SIGNIFICANT CHANGE UP (ref 27–31)
MCHC RBC-ENTMCNC: 31.6 G/DL — LOW (ref 32–37)
MCHC RBC-ENTMCNC: 31.7 G/DL — LOW (ref 32–37)
MCV RBC AUTO: 93.4 FL — SIGNIFICANT CHANGE UP (ref 81–99)
MCV RBC AUTO: 93.4 FL — SIGNIFICANT CHANGE UP (ref 81–99)
NRBC # BLD: 0 /100 WBCS — SIGNIFICANT CHANGE UP (ref 0–0)
NRBC # BLD: 0 /100 WBCS — SIGNIFICANT CHANGE UP (ref 0–0)
OB PNL STL: NEGATIVE — SIGNIFICANT CHANGE UP
PLATELET # BLD AUTO: 323 K/UL — SIGNIFICANT CHANGE UP (ref 130–400)
PLATELET # BLD AUTO: 325 K/UL — SIGNIFICANT CHANGE UP (ref 130–400)
POTASSIUM SERPL-MCNC: 4 MMOL/L — SIGNIFICANT CHANGE UP (ref 3.5–5)
POTASSIUM SERPL-SCNC: 4 MMOL/L — SIGNIFICANT CHANGE UP (ref 3.5–5)
RBC # BLD: 2.9 M/UL — LOW (ref 4.2–5.4)
RBC # BLD: 3.05 M/UL — LOW (ref 4.2–5.4)
RBC # FLD: 15.9 % — HIGH (ref 11.5–14.5)
RBC # FLD: 15.9 % — HIGH (ref 11.5–14.5)
SODIUM SERPL-SCNC: 143 MMOL/L — SIGNIFICANT CHANGE UP (ref 135–146)
SPECIMEN SOURCE: SIGNIFICANT CHANGE UP
VIT B12 SERPL-MCNC: 630 PG/ML — SIGNIFICANT CHANGE UP (ref 232–1245)
VIT D25+D1,25 OH+D1,25 PNL SERPL-MCNC: 12.4 PG/ML — LOW (ref 19.9–79.3)
WBC # BLD: 8.71 K/UL — SIGNIFICANT CHANGE UP (ref 4.8–10.8)
WBC # BLD: 9.18 K/UL — SIGNIFICANT CHANGE UP (ref 4.8–10.8)
WBC # FLD AUTO: 8.71 K/UL — SIGNIFICANT CHANGE UP (ref 4.8–10.8)
WBC # FLD AUTO: 9.18 K/UL — SIGNIFICANT CHANGE UP (ref 4.8–10.8)

## 2019-02-01 RX ORDER — ACETAMINOPHEN 500 MG
650 TABLET ORAL EVERY 6 HOURS
Qty: 0 | Refills: 0 | Status: DISCONTINUED | OUTPATIENT
Start: 2019-02-01 | End: 2019-02-02

## 2019-02-01 RX ORDER — CHLORHEXIDINE GLUCONATE 213 G/1000ML
1 SOLUTION TOPICAL
Qty: 0 | Refills: 0 | Status: DISCONTINUED | OUTPATIENT
Start: 2019-02-01 | End: 2019-02-19

## 2019-02-01 RX ORDER — POLYETHYLENE GLYCOL 3350 17 G/17G
17 POWDER, FOR SOLUTION ORAL DAILY
Qty: 0 | Refills: 0 | Status: DISCONTINUED | OUTPATIENT
Start: 2019-02-01 | End: 2019-02-18

## 2019-02-01 RX ORDER — OXYCODONE HYDROCHLORIDE 5 MG/1
10 TABLET ORAL EVERY 12 HOURS
Qty: 0 | Refills: 0 | Status: DISCONTINUED | OUTPATIENT
Start: 2019-02-01 | End: 2019-02-02

## 2019-02-01 RX ORDER — DOCUSATE SODIUM 100 MG
100 CAPSULE ORAL
Qty: 0 | Refills: 0 | Status: DISCONTINUED | OUTPATIENT
Start: 2019-02-01 | End: 2019-02-18

## 2019-02-01 RX ORDER — HEPARIN SODIUM 5000 [USP'U]/ML
900 INJECTION INTRAVENOUS; SUBCUTANEOUS
Qty: 25000 | Refills: 0 | Status: DISCONTINUED | OUTPATIENT
Start: 2019-02-01 | End: 2019-02-01

## 2019-02-01 RX ORDER — HEPARIN SODIUM 5000 [USP'U]/ML
1000 INJECTION INTRAVENOUS; SUBCUTANEOUS
Qty: 25000 | Refills: 0 | Status: DISCONTINUED | OUTPATIENT
Start: 2019-02-01 | End: 2019-02-04

## 2019-02-01 RX ADMIN — Medication 100 MILLIGRAM(S): at 18:01

## 2019-02-01 RX ADMIN — Medication 100 MILLIGRAM(S): at 11:34

## 2019-02-01 RX ADMIN — SENNA PLUS 2 TABLET(S): 8.6 TABLET ORAL at 21:43

## 2019-02-01 RX ADMIN — HEPARIN SODIUM 6500 UNIT(S): 5000 INJECTION INTRAVENOUS; SUBCUTANEOUS at 00:39

## 2019-02-01 RX ADMIN — HEPARIN SODIUM 1100 UNIT(S)/HR: 5000 INJECTION INTRAVENOUS; SUBCUTANEOUS at 05:39

## 2019-02-01 RX ADMIN — Medication 10 MILLIGRAM(S): at 10:00

## 2019-02-01 RX ADMIN — Medication 5 MILLIGRAM(S): at 05:33

## 2019-02-01 RX ADMIN — GABAPENTIN 100 MILLIGRAM(S): 400 CAPSULE ORAL at 18:01

## 2019-02-01 RX ADMIN — Medication 1 MILLIGRAM(S): at 11:34

## 2019-02-01 RX ADMIN — POLYETHYLENE GLYCOL 3350 17 GRAM(S): 17 POWDER, FOR SOLUTION ORAL at 11:34

## 2019-02-01 RX ADMIN — HEPARIN SODIUM 1100 UNIT(S)/HR: 5000 INJECTION INTRAVENOUS; SUBCUTANEOUS at 09:34

## 2019-02-01 RX ADMIN — Medication 5 MILLIGRAM(S): at 11:34

## 2019-02-01 RX ADMIN — HEPARIN SODIUM 900 UNIT(S)/HR: 5000 INJECTION INTRAVENOUS; SUBCUTANEOUS at 09:55

## 2019-02-01 RX ADMIN — OXYCODONE HYDROCHLORIDE 10 MILLIGRAM(S): 5 TABLET ORAL at 18:18

## 2019-02-01 RX ADMIN — CEFTAZIDIME 100 MILLIGRAM(S): 6 INJECTION, POWDER, FOR SOLUTION INTRAVENOUS at 18:42

## 2019-02-01 RX ADMIN — OXYCODONE HYDROCHLORIDE 10 MILLIGRAM(S): 5 TABLET ORAL at 18:01

## 2019-02-01 RX ADMIN — Medication 1 APPLICATION(S): at 11:35

## 2019-02-01 RX ADMIN — HEPARIN SODIUM 1000 UNIT(S)/HR: 5000 INJECTION INTRAVENOUS; SUBCUTANEOUS at 18:01

## 2019-02-01 RX ADMIN — Medication 650 MILLIGRAM(S): at 22:00

## 2019-02-01 RX ADMIN — GABAPENTIN 100 MILLIGRAM(S): 400 CAPSULE ORAL at 05:36

## 2019-02-01 NOTE — PROGRESS NOTE ADULT - SUBJECTIVE AND OBJECTIVE BOX
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    At the SNF she became more confused and agitated; she was started on abilify but sent to ED at [facility] with worsening lability of mood with change of mental status.    During hospitalization she has developed SHONNA with creatinine up to 3.1 and also noted hypercalcemia; she has been seen by psych, ID, podiatry and renal services.    Today on PE  afebrile at 98 F  ; 129/54  eczema of scalp  dry oral membranes  no JVD; no goiter  symm BS clear  RR: Nl S1S2  abdomen soft, NT/ND; no guarding  Mental Status: during interview at times with garbled speech, at times clear (primarily when asking for something), waxing/waning level of alertness (IE: when she asked for water and I gave it to her with a straw she seemed to be falling asleep while trying to suck, and this occurred repeatedly) - all c/w DELIRIUM

## 2019-02-01 NOTE — PROGRESS NOTE ADULT - ASSESSMENT
1. SHONNA, ATN vs obstruction vs AIN vs prerenal  2. CKD III  3. Hypercalcemia, PTH suppressed  4. R foot OM    Plan:  IVF NS at 50 mL/hr  Eosinophiluria is suspicious for AIN  Monitor renal function over the weekend  If no improvement, she will need kidney biopsy for definitive diagnosis  Need ID followup for alternatives for rifampin and ceftazidime if kidney biopsy shows AIN  F/U vit D 1, 25  F/U PTHrp  Daily BMP  No NSAIDs  No IV contrast  Keep Hooper for now

## 2019-02-01 NOTE — PROGRESS NOTE ADULT - ASSESSMENT
elderly patient admitted with delirium, acutely so now.  With no significant psychiatric history, albeit history of being frequently anxious, I doubt this is a problem related to progressive psych disorder or adverse effect to a few days of Abilify; Cefepime may be a contributor although I have never seen this conglomeration of symptoms from cefepime before.  She has SHONNA, all recent cultures from urine and blood are negative.  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     Will review SNF record to see if there is further insight there which may be helpful to this case and if so will make staff aware.

## 2019-02-01 NOTE — PROGRESS NOTE ADULT - SUBJECTIVE AND OBJECTIVE BOX
[place] [facility] FOLLOW UP NOTE  --------------------------------------------------------------------------------  24 hour events/subjective: Patient examined. Appears comfortable.    PAST HISTORY  --------------------------------------------------------------------------------  No significant changes to PMH, PSH, FHx, SHx, unless otherwise noted    ALLERGIES & MEDICATIONS  --------------------------------------------------------------------------------  Allergies    clindamycin (Unknown)  erythromycin (Unknown)  penicillin (Unknown)  strawberry (Unknown)    Standing Inpatient Medications  allopurinol 100 milliGRAM(s) Oral daily  cefTAZidime  IVPB 500 milliGRAM(s) IV Intermittent every 24 hours  collagenase Ointment 1 Application(s) Topical daily  docusate sodium 100 milliGRAM(s) Oral two times a day  folic acid 1 milliGRAM(s) Oral daily  gabapentin Oral Tab/Cap - Peds 100 milliGRAM(s) Oral every 12 hours  heparin  Infusion. 900 Unit(s)/Hr IV Continuous <Continuous>  leucovorin 5 milliGRAM(s) Oral daily  oxyCODONE  ER Tablet 10 milliGRAM(s) Oral every 12 hours  polyethylene glycol 3350 17 Gram(s) Oral daily  predniSONE   Tablet 5 milliGRAM(s) Oral daily  senna 2 Tablet(s) Oral at bedtime    PRN Inpatient Medications  acetaminophen   Tablet .. 650 milliGRAM(s) Oral every 6 hours PRN  bisacodyl Suppository 10 milliGRAM(s) Rectal daily PRN    VITALS/PHYSICAL EXAM  --------------------------------------------------------------------------------  T(C): 36.7 (02-01-19 @ 05:14), Max: 37.2 (01-31-19 @ 22:03)  HR: 104 (02-01-19 @ 05:14) (51 - 104)  BP: 129/54 (02-01-19 @ 05:14) (121/63 - 168/78)  RR: 16 (02-01-19 @ 05:14) (16 - 16)    01-31-19 @ 07:01  -  02-01-19 @ 07:00  --------------------------------------------------------  IN: 270 mL / OUT: 900 mL / NET: -630 mL    02-01-19 @ 07:01  -  02-01-19 @ 11:17  --------------------------------------------------------  IN: 0 mL / OUT: 600 mL / NET: -600 mL    Physical Exam:  	Gen: NAD  	Pulm: CTA B/L  	CV: RRR, S1S2  	Abd: +BS, soft, nontender/nondistended  	: No suprapubic tenderness  	LE: Warm,  no edema    LABS/STUDIES  --------------------------------------------------------------------------------              9.0    8.71  >-----------<  325      [02-01-19 @ 07:32]              28.5     143  |  110  |  49  ----------------------------<  132      [02-01-19 @ 07:32]  4.0   |  17  |  3.0        Ca     11.1     [02-01-19 @ 07:32]      Mg     2.0     [01-31-19 @ 09:22]      Phos  4.7     [01-31-19 @ 09:22]    TPro  5.0  /  Alb  2.9  /  TBili  <0.2  /  DBili  x   /  AST  10  /  ALT  6   /  AlkPhos  46  [01-31-19 @ 09:22]    PT/INR: PT 12.90, INR 1.20       [01-31-19 @ 09:22]  PTT: 98.3       [02-01-19 @ 07:32]    Creatinine Trend:  SCr 3.0 [02-01 @ 07:32]  SCr 3.1 [01-31 @ 09:22]  SCr 2.9 [01-30 @ 07:12]  SCr 2.5 [01-28 @ 14:15]  SCr 1.2 [01-16 @ 12:01]    Urinalysis - [01-31-19 @ 13:57]      Color Yellow / Appearance Clear / SG >=1.030 / pH 6.0      Gluc Negative / Ketone Negative  / Bili Negative / Urobili 0.2       Blood Moderate / Protein 100 / Leuk Est Moderate / Nitrite Negative      RBC 26-50 / WBC 26-50 / Hyaline  / Gran 3-5 / Sq Epi  / Non Sq Epi  / Bacteria     Urine Creatinine 71      [01-30-19 @ 14:22]  Urine Protein 83      [01-30-19 @ 14:22]  Urine Sodium 64.0      [01-30-19 @ 14:22]  Urine Urea Nitrogen 310      [01-29-19 @ 17:02]  Urine Chloride 65      [01-29-19 @ 17:02]    Iron 26, TIBC 120, %sat 22      [01-31-19 @ 09:22]  Ferritin 428      [01-31-19 @ 09:22]  PTH -- (Ca 10.5)      [01-30-19 @ 06:30]   8  Vitamin D (25OH) 20      [01-31-19 @ 09:22]  HbA1c 6.3      [01-12-19 @ 08:08]  Lipid: chol 157, , HDL 46, LDL 77      [06-09-18 @ 09:46]

## 2019-02-01 NOTE — PROGRESS NOTE ADULT - SUBJECTIVE AND OBJECTIVE BOX
LINCOLN HEREDIA  78y  Female    Patient is a 78y old  Female who presents with a chief complaint of Change in mental status.      INTERVAL HPI/OVERNIGHT EVENTS:  Patient retaining urine after bowman was pulled.        REVIEW OF SYSTEMS: UTO due to encephalopathy.      VITALS:  T(C): 36.7 (2019 05:14), Max: 37.2 (2019 22:03)  T(F): 98 (2019 05:14), Max: 98.9 (2019 22:03)  HR: 104 (2019 05:14) (51 - 104)  BP: 129/54 (2019 05:14) (121/63 - 168/78)  BP(mean): --  RR: 16 (2019 05:14) (16 - 16)  SpO2: 95% (2019 09:18) (95% - 95%)      I&O's Summary    2019 07:01  -  2019 07:00  --------------------------------------------------------  IN: 270 mL / OUT: 900 mL / NET: -630 mL          CAPILLARY BLOOD GLUCOSE  POCT Blood Glucose.: 128 mg/dL (2019 07:16)  POCT Blood Glucose.: 139 mg/dL (2019 21:28)  POCT Blood Glucose.: 136 mg/dL (2019 16:22)  POCT Blood Glucose.: 124 mg/dL (2019 11:38)        PHYSICAL EXAM:  GENERAL: NAD  HEAD:  Atraumatic, Normocephalic  EYES: conjunctiva and sclera clear  ENMT: Moist mucous membranes  NECK: Supple, Normal thyroid  NERVOUS SYSTEM:  Awake & Alert, Moves all extremities.   CHEST/LUNG: Clear to auscultation bilaterally; No rales, rhonchi, wheezing, or rubs  HEART: Regular rate and rhythm; No murmurs, rubs, or gallops  ABDOMEN: Soft, Nontender, Nondistended; Bowel sounds present  EXTREMITIES:  2+ Peripheral Pulses, No clubbing, cyanosis, or edema  LYMPH: No lymphadenopathy noted  SKIN: Right 3rd toe amputation with purulent drainage. Left leg ulcer: dressing in place.    Bowman draining clear urine.    Consultant(s) Notes Reviewed:  [x ] YES  [ ] NO  Care Discussed with Consultants/Other Providers [ x] YES  [ ] NO    LABS:                                              9.0    8.71  )-----------( 325      ( 2019 07:32 )             28.5     02-    143  |  110  |  49<H>  ----------------------------<  132<H>  4.0   |  17  |  3.0<H>    Ca    11.1<H>      2019 07:32  Phos  4.7       Mg     2.0         TPro  5.0<L>  /  Alb  2.9<L>  /  TBili  <0.2  /  DBili  x   /  AST  10  /  ALT  6   /  AlkPhos  46      Vitamin D, 1, 25-Dihydroxy: 12.4 pg/mL (19 @ 14:01)  Vitamin D, 25-Hydroxy: 20 ng/mL (19 @ 09:22)  Intact PTH: 8 pg/mL (19 @ 06:30)      PT/INR - ( 2019 09:22 )   PT: 12.90 sec;   INR: 1.20 ratio    PTT - ( 2019 21:45 )  PTT:37.8 sec      Urinalysis Basic - ( 2019 17:20 )    Color: Yellow / Appearance: Slightly Cloudy / S.025 / pH: x  Gluc: x / Ketone: Trace  / Bili: Negative / Urobili: 0.2 mg/dL   Blood: x / Protein: 100 mg/dL / Nitrite: Negative   Leuk Esterase: Trace / RBC: 11-25 /HPF / WBC 1-2 /HPF   Sq Epi: x / Non Sq Epi: Occasional /HPF / Bacteria: Many      MICROBIOLOGY:   Culture - Urine (19 @ 17:20)    Specimen Source: .Urine Clean Catch (Midstream)    Culture Results: No growth      Culture - Blood (19 @ 14:16)    Specimen Source: .Blood Blood-Peripheral    Culture Results:   No growth to date.      Culture - Blood (19 @ 14:14)    Specimen Source: .Blood Blood-Peripheral    Culture Results:   No growth to date.      Culture - Other (01.10.19 @ 14:00)    -  Ciprofloxacin: R >2    -  Gentamicin: R >8    -  Imipenem: R >8    -  Levofloxacin: R >4    -  Meropenem: R >8    -  Piperacillin/Tazobactam: S 16    -  Tobramycin: R >8    -  Aztreonam: I 16    -  Ceftazidime: S 4    -  Cefepime: S 8    -  Amikacin: S 16    Specimen Source: .Other None    Culture Results:   Few Pseudomonas aeruginosa (Carbapenem Resistant)    Organism Identification: Pseudomonas aeruginosa (Carbapenem Resistant)    Organism: Pseudomonas aeruginosa (Carbapenem Resistant)    Method Type: SHANNAN      RADIOLOGY & ADDITIONAL TESTS:  CT Head No Cont (19 @ 15:54)   1.  The study is limited by motion artifact.    2.  Cerebral atrophy.    3.  Periventricular white matter hypodensities, nonspecific, differential   diagnostic possibilities include ischemic change, gliosis or  demyelination.      X-ray Chest 1 View-PORTABLE IMMEDIATE (19 @ 13:42)   Support devices: Left-sided PICC with the tip in the distal SVC.    Cardiac/mediastinum/hilum: Grossly stable.    Lung parenchyma/Pleura: Low lung volumes limiting evaluation. No definite   airspace opacity or effusion. Left lower lobe scar/atelectasis.    Skeleton/soft tissues: Stable.    Impression:      Low lung volumes limiting evaluation. No definite airspace opacity or   effusion. Left lower lobe scar/atelectasis.      Imaging Personally Reviewed:  [x] YES  [ ] NO    MEDICATIONS  (STANDING):  allopurinol 100 milliGRAM(s) Oral daily  cefTAZidime  IVPB 500 milliGRAM(s) IV Intermittent every 24 hours  collagenase Ointment 1 Application(s) Topical daily  docusate sodium 100 milliGRAM(s) Oral two times a day  folic acid 1 milliGRAM(s) Oral daily  gabapentin Oral Tab/Cap - Peds 100 milliGRAM(s) Oral every 12 hours  heparin  Infusion. 1100 Unit(s)/Hr (11 mL/Hr) IV Continuous <Continuous>  leucovorin 5 milliGRAM(s) Oral daily  polyethylene glycol 3350 17 Gram(s) Oral daily  predniSONE   Tablet 5 milliGRAM(s) Oral daily  senna 2 Tablet(s) Oral at bedtime  sodium chloride 0.9%. 1000 milliLiter(s) (50 mL/Hr) IV Continuous <Continuous>    MEDICATIONS  (PRN): None        HEALTH ISSUES - PROBLEM Dx:  Other specified diabetes mellitus with other specified complication, unspecified whether long term insulin use  Rheumatoid arteritis  DVT (deep venous thrombosis)  HTN (hypertension)  Gout  Altered mental status  Osteomyelitis right foot.

## 2019-02-01 NOTE — PROGRESS NOTE ADULT - ASSESSMENT
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 She was admitted on 12/19/18 to the [facility] and managed for OM of the Right 3rd digit OM (cultures positive for Carbapenem Resistant Pseudomonas) and discharged on Cefepime for a total of 6 weeks.     Per Dr. Monge's note:   Patient has been having intermittent episodes of confusions since discharge from the hospital on 1/19. Reported that there are times where pt is AAOx3 and other times where she only repeats her name. Patients condition worsened in the past few days when she was told that she may not be able to remain in the NH due to insurance issues. Patient was seen by psych and prescribed Abilify.       Assessment and Plan:    1. Encephalopathy: Mildly improved.  ? Infectious etiology vs. Medication vs. SHONNA vs. Dehydration with Hypercalcemia:  ID following: Risk of encephalopathy similar with all Cephalosporins. Resumed Cefepime but Dr. Lau agreed to a trial of Ceftazidime.   Pan cultures negative.        2. SHONNA on CKD 3:  ? SHONNA, ATN vs obstruction vs AIN vs prerenal  Continue IV fluids for now: 50cc/hr.  Monitor ins and out & BMP.  Continue Hooper due to urinary retention.      2. RT third metatarsal OM:  s/p Amputation.  Podiatry following: Local wound care with Santyl and moist dressing.  Continue IV Cefepime for now. STOP date 2/27/19.        3. PAD:  s/p Angiogram 1/9/19.  Vascular recommended follow up in 2 weeks.        4. Chronic DVT:  Duplex LE venous: Chronic-appearing right common femoral and femoral deep venous thromboses; left popliteal deep venous thrombosis  Eliquis discontinued due to worsening renal function.  Heparin drip. Hold off Coumadin in anticipation of Renal Biopsy.          5. Chronic Normocytic Anemia: ? ACD  Follow up repeat Anemia work up.  Goal hgb >7 g/dl.        6. Hypercalcemia:  Still elevated with IV hydration.  Vitamin D and PTH levels low. Follow up Vitamin D1, 25 and PTHrP.  Continue IV hydration. Monitor levels.        7. Rheumatoid Arthritis:  Continue home Prednisone 5mg Daily.      8. DM II:  Hemoglobin A1C, Whole Blood: 6.3% (01.12.19 @ 08:08)  Monitor finger sticks. Insulin coverage if fingersticks are greater than 180.      9. Latent TB:  ID discontinued Rifampin.       10. Gout:  Continue Allopurinol.      11.  Bilateral Chronic Nonhealing Ulcers:  Full thickness wounds s/p debridement by burn 1/9/19.  Continue local wound care: Xeroform dsd kelrix q24h.        DVT ppx: Heparin.   Code Status: DNR/DNI.  Disposition: NH when stable.

## 2019-02-02 LAB
ALBUMIN SERPL ELPH-MCNC: 2.7 G/DL — LOW (ref 3.5–5.2)
ALP SERPL-CCNC: 41 U/L — SIGNIFICANT CHANGE UP (ref 30–115)
ALT FLD-CCNC: <5 U/L — SIGNIFICANT CHANGE UP (ref 0–41)
ANION GAP SERPL CALC-SCNC: 10 MMOL/L — SIGNIFICANT CHANGE UP (ref 7–14)
APTT BLD: 50 SEC — HIGH (ref 27–39.2)
AST SERPL-CCNC: 8 U/L — SIGNIFICANT CHANGE UP (ref 0–41)
BILIRUB SERPL-MCNC: <0.2 MG/DL — SIGNIFICANT CHANGE UP (ref 0.2–1.2)
BUN SERPL-MCNC: 51 MG/DL — HIGH (ref 10–20)
CALCIUM SERPL-MCNC: 11.3 MG/DL — HIGH (ref 8.5–10.1)
CHLORIDE SERPL-SCNC: 112 MMOL/L — HIGH (ref 98–110)
CO2 SERPL-SCNC: 23 MMOL/L — SIGNIFICANT CHANGE UP (ref 17–32)
CREAT SERPL-MCNC: 3 MG/DL — HIGH (ref 0.7–1.5)
CULTURE RESULTS: SIGNIFICANT CHANGE UP
CULTURE RESULTS: SIGNIFICANT CHANGE UP
GLUCOSE BLDC GLUCOMTR-MCNC: 144 MG/DL — HIGH (ref 70–99)
GLUCOSE BLDC GLUCOMTR-MCNC: 152 MG/DL — HIGH (ref 70–99)
GLUCOSE BLDC GLUCOMTR-MCNC: 202 MG/DL — HIGH (ref 70–99)
GLUCOSE SERPL-MCNC: 155 MG/DL — HIGH (ref 70–99)
HCT VFR BLD CALC: 25.3 % — LOW (ref 37–47)
HCT VFR BLD CALC: 25.8 % — LOW (ref 37–47)
HGB BLD-MCNC: 7.9 G/DL — LOW (ref 12–16)
HGB BLD-MCNC: 8 G/DL — LOW (ref 12–16)
MCHC RBC-ENTMCNC: 29.2 PG — SIGNIFICANT CHANGE UP (ref 27–31)
MCHC RBC-ENTMCNC: 31 G/DL — LOW (ref 32–37)
MCV RBC AUTO: 94.2 FL — SIGNIFICANT CHANGE UP (ref 81–99)
NRBC # BLD: 0 /100 WBCS — SIGNIFICANT CHANGE UP (ref 0–0)
PLATELET # BLD AUTO: 307 K/UL — SIGNIFICANT CHANGE UP (ref 130–400)
POTASSIUM SERPL-MCNC: 3.8 MMOL/L — SIGNIFICANT CHANGE UP (ref 3.5–5)
POTASSIUM SERPL-SCNC: 3.8 MMOL/L — SIGNIFICANT CHANGE UP (ref 3.5–5)
PROT SERPL-MCNC: 4.7 G/DL — LOW (ref 6–8)
RBC # BLD: 2.74 M/UL — LOW (ref 4.2–5.4)
RBC # FLD: 16.5 % — HIGH (ref 11.5–14.5)
SODIUM SERPL-SCNC: 145 MMOL/L — SIGNIFICANT CHANGE UP (ref 135–146)
SPECIMEN SOURCE: SIGNIFICANT CHANGE UP
SPECIMEN SOURCE: SIGNIFICANT CHANGE UP
TSH SERPL-MCNC: 5.03 UIU/ML — HIGH (ref 0.27–4.2)
WBC # BLD: 9.26 K/UL — SIGNIFICANT CHANGE UP (ref 4.8–10.8)
WBC # FLD AUTO: 9.26 K/UL — SIGNIFICANT CHANGE UP (ref 4.8–10.8)

## 2019-02-02 RX ORDER — ACETAMINOPHEN 500 MG
975 TABLET ORAL EVERY 6 HOURS
Qty: 0 | Refills: 0 | Status: DISCONTINUED | OUTPATIENT
Start: 2019-02-02 | End: 2019-02-12

## 2019-02-02 RX ORDER — OXYCODONE HYDROCHLORIDE 5 MG/1
20 TABLET ORAL EVERY 12 HOURS
Qty: 0 | Refills: 0 | Status: DISCONTINUED | OUTPATIENT
Start: 2019-02-02 | End: 2019-02-03

## 2019-02-02 RX ORDER — OXYCODONE HYDROCHLORIDE 5 MG/1
5 TABLET ORAL EVERY 6 HOURS
Qty: 0 | Refills: 0 | Status: DISCONTINUED | OUTPATIENT
Start: 2019-02-02 | End: 2019-02-08

## 2019-02-02 RX ORDER — OXYCODONE HYDROCHLORIDE 5 MG/1
15 TABLET ORAL EVERY 12 HOURS
Qty: 0 | Refills: 0 | Status: DISCONTINUED | OUTPATIENT
Start: 2019-02-02 | End: 2019-02-02

## 2019-02-02 RX ADMIN — Medication 1 APPLICATION(S): at 11:31

## 2019-02-02 RX ADMIN — Medication 100 MILLIGRAM(S): at 17:55

## 2019-02-02 RX ADMIN — Medication 100 MILLIGRAM(S): at 05:36

## 2019-02-02 RX ADMIN — OXYCODONE HYDROCHLORIDE 5 MILLIGRAM(S): 5 TABLET ORAL at 14:11

## 2019-02-02 RX ADMIN — OXYCODONE HYDROCHLORIDE 10 MILLIGRAM(S): 5 TABLET ORAL at 06:02

## 2019-02-02 RX ADMIN — SENNA PLUS 2 TABLET(S): 8.6 TABLET ORAL at 22:12

## 2019-02-02 RX ADMIN — HEPARIN SODIUM 1200 UNIT(S)/HR: 5000 INJECTION INTRAVENOUS; SUBCUTANEOUS at 17:55

## 2019-02-02 RX ADMIN — Medication 1 MILLIGRAM(S): at 11:31

## 2019-02-02 RX ADMIN — Medication 650 MILLIGRAM(S): at 00:02

## 2019-02-02 RX ADMIN — POLYETHYLENE GLYCOL 3350 17 GRAM(S): 17 POWDER, FOR SOLUTION ORAL at 11:32

## 2019-02-02 RX ADMIN — CHLORHEXIDINE GLUCONATE 1 APPLICATION(S): 213 SOLUTION TOPICAL at 05:36

## 2019-02-02 RX ADMIN — GABAPENTIN 100 MILLIGRAM(S): 400 CAPSULE ORAL at 05:36

## 2019-02-02 RX ADMIN — CEFTAZIDIME 100 MILLIGRAM(S): 6 INJECTION, POWDER, FOR SOLUTION INTRAVENOUS at 17:55

## 2019-02-02 RX ADMIN — Medication 100 MILLIGRAM(S): at 11:32

## 2019-02-02 RX ADMIN — Medication 5 MILLIGRAM(S): at 05:36

## 2019-02-02 RX ADMIN — Medication 5 MILLIGRAM(S): at 11:32

## 2019-02-02 RX ADMIN — OXYCODONE HYDROCHLORIDE 10 MILLIGRAM(S): 5 TABLET ORAL at 05:36

## 2019-02-02 NOTE — PROGRESS NOTE ADULT - ASSESSMENT
1. SHONNA, ATN vs obstruction vs AIN   2. CKD III  3. Hypercalcemia, PTH suppressed  4. R foot OM    Plan:  Stop IVF if adequate oral intake  Eosinophiluria is suspicious for AIN  Monitor renal function over the weekend  If no improvement, she will need kidney biopsy for definitive diagnosis AIN  Need ID followup for alternatives for rifampin and ceftazidime if kidney biopsy shows AIN  F/U PTHrp  Daily BMP  No NSAIDs  No IV contrast  Keep Hooper for now

## 2019-02-02 NOTE — PROGRESS NOTE ADULT - SUBJECTIVE AND OBJECTIVE BOX
LINCOLN HEREDIA  78y  Female    Patient is a 78y old  Female who presents with a chief complaint of Change in mental status.      INTERVAL HPI/OVERNIGHT EVENTS:  No interval events. Patient still confused with labile mood.        REVIEW OF SYSTEMS: UTO due to encephalopathy.      VITALS:  T(C): 36.7 (2019 05:28), Max: 37.9 (2019 22:04)  T(F): 98 (2019 05:28), Max: 100.3 (2019 22:04)  HR: 92 (2019 05:28) (52 - 92)  BP: 124/59 (2019 05:28) (124/59 - 147/71)  BP(mean): --  RR: 18 (2019 05:28) (18 - 18)  SpO2: --      I&O's Summary    2019 07:01  -  2019 07:00  --------------------------------------------------------  IN: 0 mL / OUT: 1175 mL / NET: -1175 mL        CAPILLARY BLOOD GLUCOSE  POCT Blood Glucose.: 152 mg/dL (2019 07:21)  POCT Blood Glucose.: 134 mg/dL (2019 21:44)  POCT Blood Glucose.: 151 mg/dL (2019 16:21)      PHYSICAL EXAM:  GENERAL: NAD  HEAD:  Atraumatic, Normocephalic  EYES: conjunctiva and sclera clear  ENMT: Moist mucous membranes  NECK: Supple, Normal thyroid  NERVOUS SYSTEM:  Awake & Alert but not oriented, Moves all extremities.   CHEST/LUNG: Clear to auscultation bilaterally; No rales, rhonchi, wheezing, or rubs  HEART: Regular rate and rhythm; No murmurs, rubs, or gallops  ABDOMEN: Soft, Nontender, Nondistended; Bowel sounds present  EXTREMITIES:  2+ Peripheral Pulses, No clubbing, cyanosis, or edema  LYMPH: No lymphadenopathy noted  SKIN: Right 3rd toe amputation with purulent drainage. Left leg ulcer: dressing in place.    Hooper draining clear urine.    Consultant(s) Notes Reviewed:  [x ] YES  [ ] NO  Care Discussed with Consultants/Other Providers [ x] YES  [ ] NO    LABS:                                         8.0    9.26  )-----------( 307      ( 2019 08:57 )             25.8       02-    145  |  112<H>  |  51<H>  ----------------------------<  155<H>  3.8   |  23  |  3.0<H>    Ca    11.3<H>      2019 08:57    TPro  4.7<L>  /  Alb  2.7<L>  /  TBili  <0.2  /  DBili  x   /  AST  8   /  ALT  <5  /  AlkPhos  41      Vitamin D, 1, 25-Dihydroxy: 12.4 pg/mL (19 @ 14:01)  Vitamin D, 25-Hydroxy: 20 ng/mL (19 @ 09:22)  Intact PTH: 8 pg/mL (19 @ 06:30)      PTT - ( 2019 22:22 )  PTT:52.3 sec      Urinalysis Basic - ( 2019 17:20 )    Color: Yellow / Appearance: Slightly Cloudy / S.025 / pH: x  Gluc: x / Ketone: Trace  / Bili: Negative / Urobili: 0.2 mg/dL   Blood: x / Protein: 100 mg/dL / Nitrite: Negative   Leuk Esterase: Trace / RBC: 11-25 /HPF / WBC 1-2 /HPF   Sq Epi: x / Non Sq Epi: Occasional /HPF / Bacteria: Many    Urinalysis Basic - ( 2019 13:57 )    Color: Yellow / Appearance: Clear / SG: >=1.030 / pH: x  Gluc: x / Ketone: Negative  / Bili: Negative / Urobili: 0.2 mg/dL   Blood: x / Protein: 100 mg/dL / Nitrite: Negative   Leuk Esterase: Moderate / RBC: 26-50 /HPF / WBC 26-50 /HPF   Sq Epi: x / Non Sq Epi: x / Bacteria: x      MICROBIOLOGY:   Culture - Urine (19 @ 13:57)    Specimen Source: .Urine Clean Catch (Midstream)    Culture Results:   50,000 - 99,000 CFU/mL Yeast-like cells, presumptively not Candida  albicans "Susceptibilities not performed"      Culture - Urine (19 @ 17:20)    Specimen Source: .Urine Clean Catch (Midstream)    Culture Results: No growth      Culture - Blood (19 @ 14:16)    Specimen Source: .Blood Blood-Peripheral    Culture Results:   No growth to date.      Culture - Blood (19 @ 14:14)    Specimen Source: .Blood Blood-Peripheral    Culture Results:   No growth to date.      Culture - Other (01.10.19 @ 14:00)    -  Ciprofloxacin: R >2    -  Gentamicin: R >8    -  Imipenem: R >8    -  Levofloxacin: R >4    -  Meropenem: R >8    -  Piperacillin/Tazobactam: S 16    -  Tobramycin: R >8    -  Aztreonam: I 16    -  Ceftazidime: S 4    -  Cefepime: S 8    -  Amikacin: S 16    Specimen Source: .Other None    Culture Results:   Few Pseudomonas aeruginosa (Carbapenem Resistant)    Organism Identification: Pseudomonas aeruginosa (Carbapenem Resistant)    Organism: Pseudomonas aeruginosa (Carbapenem Resistant)    Method Type: SHANNAN      RADIOLOGY & ADDITIONAL TESTS:  CT Head No Cont (19 @ 15:54)   1.  The study is limited by motion artifact.    2.  Cerebral atrophy.    3.  Periventricular white matter hypodensities, nonspecific, differential   diagnostic possibilities include ischemic change, gliosis or  demyelination.      X-ray Chest 1 View-PORTABLE IMMEDIATE (19 @ 13:42)   Support devices: Left-sided PICC with the tip in the distal SVC.    Cardiac/mediastinum/hilum: Grossly stable.    Lung parenchyma/Pleura: Low lung volumes limiting evaluation. No definite   airspace opacity or effusion. Left lower lobe scar/atelectasis.    Skeleton/soft tissues: Stable.    Impression:      Low lung volumes limiting evaluation. No definite airspace opacity or   effusion. Left lower lobe scar/atelectasis.      Imaging Personally Reviewed:  [x] YES  [ ] NO    MEDICATIONS  (STANDING):  allopurinol 100 milliGRAM(s) Oral daily  cefTAZidime  IVPB 500 milliGRAM(s) IV Intermittent every 24 hours  chlorhexidine 4% Liquid 1 Application(s) Topical <User Schedule>  collagenase Ointment 1 Application(s) Topical daily  docusate sodium 100 milliGRAM(s) Oral two times a day  folic acid 1 milliGRAM(s) Oral daily  gabapentin Oral Tab/Cap - Peds 100 milliGRAM(s) Oral every 12 hours  heparin  Infusion. 1000 Unit(s)/Hr (10 mL/Hr) IV Continuous <Continuous>  leucovorin 5 milliGRAM(s) Oral daily  oxyCODONE  ER Tablet 10 milliGRAM(s) Oral every 12 hours  polyethylene glycol 3350 17 Gram(s) Oral daily  predniSONE   Tablet 5 milliGRAM(s) Oral daily  senna 2 Tablet(s) Oral at bedtime    MEDICATIONS  (PRN):  acetaminophen   Tablet .. 650 milliGRAM(s) Oral every 6 hours PRN Temp greater or equal to 38C (100.4F), Moderate Pain (4 - 6)  bisacodyl Suppository 10 milliGRAM(s) Rectal daily PRN Constipation          HEALTH ISSUES - PROBLEM Dx:  Other specified diabetes mellitus with other specified complication, unspecified whether long term insulin use  Rheumatoid arteritis  DVT (deep venous thrombosis)  HTN (hypertension)  Gout  Altered mental status  Osteomyelitis right foot.

## 2019-02-02 NOTE — PROGRESS NOTE ADULT - ASSESSMENT
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 She was admitted on 12/19/18 to the [facility] and managed for OM of the Right 3rd digit OM (cultures positive for Carbapenem Resistant Pseudomonas) and discharged on Cefepime for a total of 6 weeks.     Per Dr. Monge's note:   Patient has been having intermittent episodes of confusions since discharge from the hospital on 1/19. Reported that there are times where pt is AAOx3 and other times where she only repeats her name. Patients condition worsened in the past few days when she was told that she may not be able to remain in the NH due to insurance issues. Patient was seen by psych and prescribed Abilify.       Assessment and Plan:    1. Encephalopathy: Mildly improved.  ? Infectious etiology vs. Medication vs. SHONNA vs. Dehydration with Hypercalcemia:  ID following: Risk of encephalopathy similar with all Cephalosporins. Dr. Lau agreed to a trial of Ceftazidime.   Pan cultures: UA & Culture positive for yeast. No need to treat.         2. SHONNA on CKD 3:  Nephrology following: appreciate input. Recommendations noted. ? SHONNA, ATN vs obstruction vs AIN vs prerenal  Off IV fluid.   Monitor ins and out & BMP.  Continue Hooper due to urinary retention.      2. RT third metatarsal OM:  s/p Amputation.  Podiatry following: Local wound care with Santyl and moist dressing.  Continue IV Ceftazidime for now. STOP date 2/27/19.        3. PAD:  s/p Angiogram 1/9/19.  Vascular recommended follow up in 2 weeks.        4. Chronic DVT:  Duplex LE venous: Chronic-appearing right common femoral and femoral deep venous thromboses; left popliteal deep venous thrombosis  Eliquis discontinued due to worsening renal function.  Heparin drip. Hold off Coumadin in anticipation of Renal Biopsy.          5. Chronic Normocytic Anemia: ? ACD  Goal hgb >7 g/dl.  Hgb stable.      6. Hypercalcemia:  Still elevated following IV hydration.  Vitamin D and PTH levels low. Follow up Vitamin D1, 25 and PTHrP.  Monitor levels.        7. Rheumatoid Arthritis:  Continue home Prednisone 5mg Daily.      8. DM II:  Hemoglobin A1C, Whole Blood: 6.3% (01.12.19 @ 08:08)  Monitor finger sticks. Insulin coverage if fingersticks are greater than 180.      9. Latent TB:  ID discontinued Rifampin.   Per HCP this was started 2 months ago.      10. Gout:  Continue Allopurinol.      11.  Bilateral Chronic Nonhealing Ulcers:  Full thickness wounds s/p debridement by burn 1/9/19.  Continue local wound care: Xeroform dsd kelrix q24h.        DVT ppx: Heparin drip.   Code Status: DNR/DNI.  Disposition: NH when stable.

## 2019-02-02 NOTE — PROGRESS NOTE ADULT - SUBJECTIVE AND OBJECTIVE BOX
[place] NE[facility]ROLOGY FOLLOW UP NOTE/COVERING DR POLANCO  --------------------------------------------------------------------------------  24 hour events/subjective: Patient examined. Appears comfortable.    PAST HISTORY  --------------------------------------------------------------------------------  No significant changes to PMH, PSH, FHx, SHx, unless otherwise noted    ALLERGIES & MEDICATIONS  --------------------------------------------------------------------------------  Allergies    clindamycin (Unknown)  erythromycin (Unknown)  penicillin (Unknown)  strawberry (Unknown)    Standing Inpatient Medications  allopurinol 100 milliGRAM(s) Oral daily  cefTAZidime  IVPB 500 milliGRAM(s) IV Intermittent every 24 hours  chlorhexidine 4% Liquid 1 Application(s) Topical <User Schedule>  collagenase Ointment 1 Application(s) Topical daily  docusate sodium 100 milliGRAM(s) Oral two times a day  folic acid 1 milliGRAM(s) Oral daily  heparin  Infusion. 1000 Unit(s)/Hr IV Continuous <Continuous>  leucovorin 5 milliGRAM(s) Oral daily  oxyCODONE  ER Tablet 20 milliGRAM(s) Oral every 12 hours  polyethylene glycol 3350 17 Gram(s) Oral daily  predniSONE   Tablet 5 milliGRAM(s) Oral daily  senna 2 Tablet(s) Oral at bedtime    PRN Inpatient Medications  acetaminophen   Tablet .. 975 milliGRAM(s) Oral every 6 hours PRN  bisacodyl Suppository 10 milliGRAM(s) Rectal daily PRN  oxyCODONE    IR 5 milliGRAM(s) Oral every 6 hours PRN    VITALS/PHYSICAL EXAM  --------------------------------------------------------------------------------  T(C): 36.7 (02-02-19 @ 13:40), Max: 37.9 (02-01-19 @ 22:04)  HR: 88 (02-02-19 @ 13:40) (71 - 92)  BP: 159/86 (02-02-19 @ 13:40) (124/59 - 159/86)  RR: 18 (02-02-19 @ 05:28) (18 - 18)    02-01-19 @ 07:01  -  02-02-19 @ 07:00  --------------------------------------------------------  IN: 0 mL / OUT: 1175 mL / NET: -1175 mL    02-02-19 @ 07:01  -  02-02-19 @ 14:15  --------------------------------------------------------  IN: 0 mL / OUT: 500 mL / NET: -500 mL    Physical Exam:  	Gen: NAD  	Pulm: CTA B/L  	CV: RRR, S1S2  	Abd: +BS, soft, nontender/nondistended  	: No suprapubic tenderness  	LE: Warm, no edema    LABS/STUDIES  --------------------------------------------------------------------------------              8.0    9.26  >-----------<  307      [02-02-19 @ 08:57]              25.8     145  |  112  |  51  ----------------------------<  155      [02-02-19 @ 08:57]  3.8   |  23  |  3.0        Ca     11.3     [02-02-19 @ 08:57]    TPro  4.7  /  Alb  2.7  /  TBili  <0.2  /  DBili  x   /  AST  8   /  ALT  <5  /  AlkPhos  41  [02-02-19 @ 08:57]    PTT: 50.0       [02-02-19 @ 12:00]    Creatinine Trend:  SCr 3.0 [02-02 @ 08:57]  SCr 3.0 [02-01 @ 07:32]  SCr 3.1 [01-31 @ 09:22]  SCr 2.9 [01-30 @ 07:12]  SCr 2.5 [01-28 @ 14:15]    Urinalysis - [01-31-19 @ 13:57]      Color Yellow / Appearance Clear / SG >=1.030 / pH 6.0      Gluc Negative / Ketone Negative  / Bili Negative / Urobili 0.2       Blood Moderate / Protein 100 / Leuk Est Moderate / Nitrite Negative      RBC 26-50 / WBC 26-50 / Hyaline  / Gran 3-5 / Sq Epi  / Non Sq Epi  / Bacteria     Urine Creatinine 71      [01-30-19 @ 14:22]  Urine Protein 83      [01-30-19 @ 14:22]  Urine Sodium 64.0      [01-30-19 @ 14:22]  Urine Urea Nitrogen 310      [01-29-19 @ 17:02]  Urine Chloride 65      [01-29-19 @ 17:02]    Iron 26, TIBC 120, %sat 22      [01-31-19 @ 09:22]  Ferritin 428      [01-31-19 @ 09:22]  PTH -- (Ca 10.5)      [01-30-19 @ 06:30]   8  Vitamin D (25OH) 20      [01-31-19 @ 09:22]  HbA1c 6.3      [01-12-19 @ 08:08]  Lipid: chol 157, , HDL 46, LDL 77      [06-09-18 @ 09:46]

## 2019-02-03 LAB
ALLERGY+IMMUNOLOGY DIAG STUDY NOTE: ABNORMAL
ALLERGY+IMMUNOLOGY DIAG STUDY NOTE: SIGNIFICANT CHANGE UP
ANION GAP SERPL CALC-SCNC: 12 MMOL/L — SIGNIFICANT CHANGE UP (ref 7–14)
APTT BLD: 40.5 SEC — HIGH (ref 27–39.2)
BUN SERPL-MCNC: 50 MG/DL — HIGH (ref 10–20)
CALCIUM SERPL-MCNC: 10.7 MG/DL — HIGH (ref 8.5–10.1)
CHLORIDE SERPL-SCNC: 108 MMOL/L — SIGNIFICANT CHANGE UP (ref 98–110)
CO2 SERPL-SCNC: 22 MMOL/L — SIGNIFICANT CHANGE UP (ref 17–32)
CREAT SERPL-MCNC: 3 MG/DL — HIGH (ref 0.7–1.5)
DIR ANTIGLOB POLYSPECIFIC INTERPRETATION: SIGNIFICANT CHANGE UP
GLUCOSE BLDC GLUCOMTR-MCNC: 105 MG/DL — HIGH (ref 70–99)
GLUCOSE BLDC GLUCOMTR-MCNC: 111 MG/DL — HIGH (ref 70–99)
GLUCOSE BLDC GLUCOMTR-MCNC: 113 MG/DL — HIGH (ref 70–99)
GLUCOSE BLDC GLUCOMTR-MCNC: 122 MG/DL — HIGH (ref 70–99)
GLUCOSE SERPL-MCNC: 104 MG/DL — HIGH (ref 70–99)
HCT VFR BLD CALC: 24.1 % — LOW (ref 37–47)
HGB BLD-MCNC: 7.6 G/DL — LOW (ref 12–16)
MCHC RBC-ENTMCNC: 29.2 PG — SIGNIFICANT CHANGE UP (ref 27–31)
MCHC RBC-ENTMCNC: 31.5 G/DL — LOW (ref 32–37)
MCV RBC AUTO: 92.7 FL — SIGNIFICANT CHANGE UP (ref 81–99)
NRBC # BLD: 0 /100 WBCS — SIGNIFICANT CHANGE UP (ref 0–0)
PLATELET # BLD AUTO: 292 K/UL — SIGNIFICANT CHANGE UP (ref 130–400)
POTASSIUM SERPL-MCNC: 3.5 MMOL/L — SIGNIFICANT CHANGE UP (ref 3.5–5)
POTASSIUM SERPL-SCNC: 3.5 MMOL/L — SIGNIFICANT CHANGE UP (ref 3.5–5)
RBC # BLD: 2.6 M/UL — LOW (ref 4.2–5.4)
RBC # FLD: 16.1 % — HIGH (ref 11.5–14.5)
SODIUM SERPL-SCNC: 142 MMOL/L — SIGNIFICANT CHANGE UP (ref 135–146)
TYPE + AB SCN PNL BLD: SIGNIFICANT CHANGE UP
WBC # BLD: 7.2 K/UL — SIGNIFICANT CHANGE UP (ref 4.8–10.8)
WBC # FLD AUTO: 7.2 K/UL — SIGNIFICANT CHANGE UP (ref 4.8–10.8)

## 2019-02-03 RX ORDER — DULOXETINE HYDROCHLORIDE 30 MG/1
30 CAPSULE, DELAYED RELEASE ORAL DAILY
Qty: 0 | Refills: 0 | Status: DISCONTINUED | OUTPATIENT
Start: 2019-02-03 | End: 2019-02-19

## 2019-02-03 RX ORDER — SODIUM CHLORIDE 9 MG/ML
1000 INJECTION INTRAMUSCULAR; INTRAVENOUS; SUBCUTANEOUS
Qty: 0 | Refills: 0 | Status: DISCONTINUED | OUTPATIENT
Start: 2019-02-03 | End: 2019-02-05

## 2019-02-03 RX ORDER — HEPARIN SODIUM 5000 [USP'U]/ML
1100 INJECTION INTRAVENOUS; SUBCUTANEOUS
Qty: 25000 | Refills: 0 | Status: DISCONTINUED | OUTPATIENT
Start: 2019-02-03 | End: 2019-02-05

## 2019-02-03 RX ORDER — GABAPENTIN 400 MG/1
100 CAPSULE ORAL EVERY 8 HOURS
Qty: 0 | Refills: 0 | Status: DISCONTINUED | OUTPATIENT
Start: 2019-02-03 | End: 2019-02-19

## 2019-02-03 RX ADMIN — HEPARIN SODIUM 1100 UNIT(S)/HR: 5000 INJECTION INTRAVENOUS; SUBCUTANEOUS at 18:22

## 2019-02-03 RX ADMIN — GABAPENTIN 100 MILLIGRAM(S): 400 CAPSULE ORAL at 21:53

## 2019-02-03 RX ADMIN — OXYCODONE HYDROCHLORIDE 5 MILLIGRAM(S): 5 TABLET ORAL at 18:35

## 2019-02-03 RX ADMIN — Medication 1 MILLIGRAM(S): at 13:03

## 2019-02-03 RX ADMIN — DULOXETINE HYDROCHLORIDE 30 MILLIGRAM(S): 30 CAPSULE, DELAYED RELEASE ORAL at 21:54

## 2019-02-03 RX ADMIN — OXYCODONE HYDROCHLORIDE 20 MILLIGRAM(S): 5 TABLET ORAL at 06:24

## 2019-02-03 RX ADMIN — Medication 100 MILLIGRAM(S): at 18:22

## 2019-02-03 RX ADMIN — Medication 5 MILLIGRAM(S): at 13:03

## 2019-02-03 RX ADMIN — OXYCODONE HYDROCHLORIDE 20 MILLIGRAM(S): 5 TABLET ORAL at 05:36

## 2019-02-03 RX ADMIN — OXYCODONE HYDROCHLORIDE 5 MILLIGRAM(S): 5 TABLET ORAL at 20:36

## 2019-02-03 RX ADMIN — Medication 100 MILLIGRAM(S): at 13:03

## 2019-02-03 RX ADMIN — CEFTAZIDIME 100 MILLIGRAM(S): 6 INJECTION, POWDER, FOR SOLUTION INTRAVENOUS at 18:22

## 2019-02-03 RX ADMIN — SENNA PLUS 2 TABLET(S): 8.6 TABLET ORAL at 21:53

## 2019-02-03 RX ADMIN — Medication 100 MILLIGRAM(S): at 05:37

## 2019-02-03 RX ADMIN — Medication 1 APPLICATION(S): at 13:03

## 2019-02-03 RX ADMIN — Medication 5 MILLIGRAM(S): at 05:37

## 2019-02-03 NOTE — PROGRESS NOTE ADULT - ASSESSMENT
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 She was admitted on 12/19/18 to the [facility] and managed for OM of the Right 3rd digit OM (cultures positive for Carbapenem Resistant Pseudomonas) and discharged on Cefepime for a total of 6 weeks.     Per Dr. Monge's note:   Patient has been having intermittent episodes of confusions since discharge from the hospital on 1/19. Reported that there are times where pt is AAOx3 and other times where she only repeats her name. Patients condition worsened in the past few days when she was told that she may not be able to remain in the NH due to insurance issues. Patient was seen by psych and prescribed Abilify.       Assessment and Plan:    1. Encephalopathy: Improved.  ? Infectious etiology vs. Medication vs. SHONNA vs. Dehydration with Hypercalcemia:  ID following: Risk of encephalopathy similar with all Cephalosporins. Dr. Lau agreed to a trial of Ceftazidime.   Pan cultures: UA & Culture positive for yeast. No need to treat.         2. SHONNA on CKD 3:  Nephrology following: appreciate input. Recommendations noted. ? SHONNA, ATN vs obstruction vs AIN vs prerenal  Off IV fluid. Creatinine stable at 3.0.  Monitor ins and out & BMP.  Continue Hooper due to urinary retention.      2. RT third metatarsal OM:  s/p Amputation.  Podiatry following: Local wound care with Santyl and moist dressing.  Continue IV Ceftazidime for now. STOP date 2/27/19.        3. PAD:  s/p Angiogram 1/9/19.  Vascular recommended follow up in 2 weeks.        4. Chronic DVT:  Duplex LE venous: Chronic-appearing right common femoral and femoral deep venous thromboses; left popliteal deep venous thrombosis  Eliquis discontinued due to worsening renal function.  Heparin drip. Hold off Coumadin in anticipation of Renal Biopsy.          5. Chronic Normocytic Anemia: ? ACD  Goal hgb >7 g/dl.  Hgb stable.      6. Hypercalcemia:  Still elevated following IV hydration.  Vitamin D and PTH levels low. Follow up PTHrP.  Monitor levels.        7. Rheumatoid Arthritis:  Continue home Prednisone 5mg Daily.  Pain control.      8. DM II:  Hemoglobin A1C, Whole Blood: 6.3% (01.12.19 @ 08:08)  Monitor finger sticks. Insulin coverage if fingersticks are greater than 180.      9. Latent TB:  ID discontinued Rifampin.   Per HCP this was started 2 months ago.      10. Gout:  Continue Allopurinol.      11.  Bilateral Chronic Nonhealing Ulcers:  Full thickness wounds s/p debridement by burn 1/9/19.  Continue local wound care: Xeroform dsd kelrix q24h.      12. Chronic Pain:  Continue home dose of Pain medications.      DVT ppx: Heparin drip.   Code Status: DNR/DNI.  Disposition: NH when stable.

## 2019-02-03 NOTE — PROGRESS NOTE ADULT - SUBJECTIVE AND OBJECTIVE BOX
LINCOLN HEREDIA  78y  Female    Patient is a 78y old  Female who presents with a chief complaint of Change in mental status.      INTERVAL HPI/OVERNIGHT EVENTS:  No interval events.   Patient more alert but extremely anxious with a labile mood.        REVIEW OF SYSTEMS: UTO due to encephalopathy.  CONSTITUTIONAL: + fatigue  RESPIRATORY: No cough, No shortness of breath  CARDIOVASCULAR: No chest pain  GASTROINTESTINAL: No abdominal pain. No nausea, vomiting, No diarrhea or constipation.   GENITOURINARY: No dysuria  NEUROLOGICAL: No headaches  MUSCULOSKELETAL: Back and bilateral LE pain.         VITALS:  T(C): 37.3 (2019 05:23), Max: 37.3 (2019 05:23)  T(F): 99.1 (2019 05:23), Max: 99.1 (2019 05:23)  HR: 45 (2019 05:23) (45 - 88)  BP: 130/60 (2019 05:23) (121/59 - 159/86)  BP(mean): --  RR: 16 (2019 05:23) (16 - 16)  SpO2: --      I&O's Summary    2019 07:01  -  2019 07:00  --------------------------------------------------------  IN: 0 mL / OUT: 1175 mL / NET: -1175 mL        CAPILLARY BLOOD GLUCOSE  POCT Blood Glucose.: 111 mg/dL (2019 07:16)  POCT Blood Glucose.: 144 mg/dL (2019 16:08)  POCT Blood Glucose.: 202 mg/dL (2019 11:29)      PHYSICAL EXAM:  GENERAL: NAD  HEAD:  Atraumatic, Normocephalic  EYES: conjunctiva and sclera clear  ENMT: Moist mucous membranes  NECK: Supple, Normal thyroid  NERVOUS SYSTEM:  Awake & Alert, oriented x 3, Moves all extremities.   CHEST/LUNG: Clear to auscultation bilaterally; No rales, rhonchi, wheezing, or rubs  HEART: Regular rate and rhythm; No murmurs, rubs, or gallops  ABDOMEN: Soft, Nontender, Nondistended; Bowel sounds present  EXTREMITIES:  2+ Peripheral Pulses, No clubbing, cyanosis, or edema  LYMPH: No lymphadenopathy noted  SKIN: Right 3rd toe amputation with purulent drainage. Left leg ulcer: dressing in place.    Hooper draining clear urine.    Consultant(s) Notes Reviewed:  [x ] YES  [ ] NO  Care Discussed with Consultants/Other Providers [ x] YES  [ ] NO    LABS:                                         7.6    7.20  )-----------( 292      ( 2019 09:18 )             24.1         145  |  112<H>  |  51<H>  ----------------------------<  155<H>  3.8   |  23  |  3.0<H>    Ca    11.3<H>      2019 08:57    TPro  4.7<L>  /  Alb  2.7<L>  /  TBili  <0.2  /  DBili  x   /  AST  8   /  ALT  <5  /  AlkPhos  41        Vitamin D, 1, 25-Dihydroxy: 12.4 pg/mL (19 @ 14:01)  Vitamin D, 25-Hydroxy: 20 ng/mL (19 @ 09:22)  Intact PTH: 8 pg/mL (19 @ 06:30)      PTT - ( 2019 22:22 )  PTT:52.3 sec      Urinalysis Basic - ( 2019 17:20 )    Color: Yellow / Appearance: Slightly Cloudy / S.025 / pH: x  Gluc: x / Ketone: Trace  / Bili: Negative / Urobili: 0.2 mg/dL   Blood: x / Protein: 100 mg/dL / Nitrite: Negative   Leuk Esterase: Trace / RBC: 11-25 /HPF / WBC 1-2 /HPF   Sq Epi: x / Non Sq Epi: Occasional /HPF / Bacteria: Many    Urinalysis Basic - ( 2019 13:57 )    Color: Yellow / Appearance: Clear / SG: >=1.030 / pH: x  Gluc: x / Ketone: Negative  / Bili: Negative / Urobili: 0.2 mg/dL   Blood: x / Protein: 100 mg/dL / Nitrite: Negative   Leuk Esterase: Moderate / RBC: 26-50 /HPF / WBC 26-50 /HPF   Sq Epi: x / Non Sq Epi: x / Bacteria: x      MICROBIOLOGY:   Culture - Urine (19 @ 13:57)    Specimen Source: .Urine Clean Catch (Midstream)    Culture Results:   50,000 - 99,000 CFU/mL Yeast-like cells, presumptively not Candida  albicans "Susceptibilities not performed"      Culture - Urine (19 @ 17:20)    Specimen Source: .Urine Clean Catch (Midstream)    Culture Results: No growth      Culture - Blood (19 @ 14:16)    Specimen Source: .Blood Blood-Peripheral    Culture Results:   No growth to date.      Culture - Blood (19 @ 14:14)    Specimen Source: .Blood Blood-Peripheral    Culture Results:   No growth to date.      Culture - Other (01.10.19 @ 14:00)    -  Ciprofloxacin: R >2    -  Gentamicin: R >8    -  Imipenem: R >8    -  Levofloxacin: R >4    -  Meropenem: R >8    -  Piperacillin/Tazobactam: S 16    -  Tobramycin: R >8    -  Aztreonam: I 16    -  Ceftazidime: S 4    -  Cefepime: S 8    -  Amikacin: S 16    Specimen Source: .Other None    Culture Results:   Few Pseudomonas aeruginosa (Carbapenem Resistant)    Organism Identification: Pseudomonas aeruginosa (Carbapenem Resistant)    Organism: Pseudomonas aeruginosa (Carbapenem Resistant)    Method Type: SHANNAN      RADIOLOGY & ADDITIONAL TESTS:  CT Head No Cont (19 @ 15:54)   1.  The study is limited by motion artifact.    2.  Cerebral atrophy.    3.  Periventricular white matter hypodensities, nonspecific, differential   diagnostic possibilities include ischemic change, gliosis or  demyelination.      X-ray Chest 1 View-PORTABLE IMMEDIATE (19 @ 13:42)   Support devices: Left-sided PICC with the tip in the distal SVC.    Cardiac/mediastinum/hilum: Grossly stable.    Lung parenchyma/Pleura: Low lung volumes limiting evaluation. No definite   airspace opacity or effusion. Left lower lobe scar/atelectasis.    Skeleton/soft tissues: Stable.    Impression:      Low lung volumes limiting evaluation. No definite airspace opacity or   effusion. Left lower lobe scar/atelectasis.      Imaging Personally Reviewed:  [x] YES  [ ] NO    MEDICATIONS  (STANDING):  allopurinol 100 milliGRAM(s) Oral daily  cefTAZidime  IVPB 500 milliGRAM(s) IV Intermittent every 24 hours  chlorhexidine 4% Liquid 1 Application(s) Topical <User Schedule>  collagenase Ointment 1 Application(s) Topical daily  docusate sodium 100 milliGRAM(s) Oral two times a day  folic acid 1 milliGRAM(s) Oral daily  heparin  Infusion. 1000 Unit(s)/Hr (10 mL/Hr) IV Continuous <Continuous>  leucovorin 5 milliGRAM(s) Oral daily  oxyCODONE  ER Tablet 20 milliGRAM(s) Oral every 12 hours  polyethylene glycol 3350 17 Gram(s) Oral daily  predniSONE   Tablet 5 milliGRAM(s) Oral daily  senna 2 Tablet(s) Oral at bedtime    MEDICATIONS  (PRN):  acetaminophen   Tablet .. 975 milliGRAM(s) Oral every 6 hours PRN Moderate Pain (4 - 6)  bisacodyl Suppository 10 milliGRAM(s) Rectal daily PRN Constipation  oxyCODONE    IR 5 milliGRAM(s) Oral every 6 hours PRN Severe Pain (7 - 10)          HEALTH ISSUES - PROBLEM Dx:  Other specified diabetes mellitus with other specified complication, unspecified whether long term insulin use  Rheumatoid arteritis  DVT (deep venous thrombosis)  HTN (hypertension)  Gout  Altered mental status  Osteomyelitis right foot.

## 2019-02-03 NOTE — DIETITIAN INITIAL EVALUATION ADULT. - DIET TYPE
Glucerna q12/DASH/TLC (sodium and cholesterol restricted diet)/consistent carbohydrate (evening snack)

## 2019-02-03 NOTE — PROGRESS NOTE ADULT - ASSESSMENT
1. SHONNA, ATN vs obstruction vs AIN   2. CKD III  3. Hypercalcemia, PTH suppressed  4. R foot OM    Plan:  Eosinophiluria is suspicious for AIN  Monitor renal function   If no improvement, she will need kidney biopsy for definitive diagnosis AIN  Need ID followup for alternatives for rifampin and ceftazidime if kidney biopsy shows AIN  F/U PTHrp  Daily BMP  No NSAIDs  No IV contrast

## 2019-02-03 NOTE — PROGRESS NOTE ADULT - SUBJECTIVE AND OBJECTIVE BOX
[place] NEPHROLOGY FOLLOW UP NOTE  --------------------------------------------------------------------------------  24 hour events/subjective: Patient examined. Appears comfortable.    PAST HISTORY  --------------------------------------------------------------------------------  No significant changes to PMH, PSH, FHx, SHx, unless otherwise noted    ALLERGIES & MEDICATIONS  --------------------------------------------------------------------------------  Allergies    clindamycin (Unknown)  erythromycin (Unknown)  penicillin (Unknown)  strawberry (Unknown)    Standing Inpatient Medications  allopurinol 100 milliGRAM(s) Oral daily  cefTAZidime  IVPB 500 milliGRAM(s) IV Intermittent every 24 hours  chlorhexidine 4% Liquid 1 Application(s) Topical <User Schedule>  collagenase Ointment 1 Application(s) Topical daily  docusate sodium 100 milliGRAM(s) Oral two times a day  DULoxetine 30 milliGRAM(s) Oral daily  folic acid 1 milliGRAM(s) Oral daily  gabapentin 100 milliGRAM(s) Oral every 8 hours  heparin  Infusion. 1000 Unit(s)/Hr IV Continuous <Continuous>  leucovorin 5 milliGRAM(s) Oral daily  oxyCODONE  ER Tablet 20 milliGRAM(s) Oral every 12 hours  polyethylene glycol 3350 17 Gram(s) Oral daily  predniSONE   Tablet 5 milliGRAM(s) Oral daily  senna 2 Tablet(s) Oral at bedtime    PRN Inpatient Medications  acetaminophen   Tablet .. 975 milliGRAM(s) Oral every 6 hours PRN  bisacodyl Suppository 10 milliGRAM(s) Rectal daily PRN  oxyCODONE    IR 5 milliGRAM(s) Oral every 6 hours PRN    VITALS/PHYSICAL EXAM  --------------------------------------------------------------------------------  T(C): 37 (02-03-19 @ 13:22), Max: 37.3 (02-03-19 @ 05:23)  HR: 80 (02-03-19 @ 13:22) (45 - 83)  BP: 170/77 (02-03-19 @ 13:22) (121/59 - 170/77)  RR: 16 (02-03-19 @ 05:23) (16 - 16)    02-02-19 @ 07:01  -  02-03-19 @ 07:00  --------------------------------------------------------  IN: 0 mL / OUT: 850 mL / NET: -850 mL    02-03-19 @ 07:01  -  02-03-19 @ 16:16  --------------------------------------------------------  IN: 0 mL / OUT: 600 mL / NET: -600 mL    Physical Exam:  	Gen: NAD  	Pulm: CTA B/L  	CV: RRR, S1S2  	Abd: +BS, soft, nontender/nondistended  	: No suprapubic tenderness    LABS/STUDIES  --------------------------------------------------------------------------------              7.6    7.20  >-----------<  292      [02-03-19 @ 09:18]              24.1     142  |  108  |  50  ----------------------------<  104      [02-03-19 @ 09:18]  3.5   |  22  |  3.0        Ca     10.7     [02-03-19 @ 09:18]    TPro  4.7  /  Alb  2.7  /  TBili  <0.2  /  DBili  x   /  AST  8   /  ALT  <5  /  AlkPhos  41  [02-02-19 @ 08:57]      PTT: 40.5       [02-03-19 @ 09:18]      Creatinine Trend:  SCr 3.0 [02-03 @ 09:18]  SCr 3.0 [02-02 @ 08:57]  SCr 3.0 [02-01 @ 07:32]  SCr 3.1 [01-31 @ 09:22]  SCr 2.9 [01-30 @ 07:12]    Urinalysis - [01-31-19 @ 13:57]      Color Yellow / Appearance Clear / SG >=1.030 / pH 6.0      Gluc Negative / Ketone Negative  / Bili Negative / Urobili 0.2       Blood Moderate / Protein 100 / Leuk Est Moderate / Nitrite Negative      RBC 26-50 / WBC 26-50 / Hyaline  / Gran 3-5 / Sq Epi  / Non Sq Epi  / Bacteria     Urine Creatinine 71      [01-30-19 @ 14:22]  Urine Protein 83      [01-30-19 @ 14:22]  Urine Sodium 64.0      [01-30-19 @ 14:22]  Urine Urea Nitrogen 310      [01-29-19 @ 17:02]  Urine Chloride 65      [01-29-19 @ 17:02]    Iron 26, TIBC 120, %sat 22      [01-31-19 @ 09:22]  Ferritin 428      [01-31-19 @ 09:22]  PTH -- (Ca 10.5)      [01-30-19 @ 06:30]   8  Vitamin D (25OH) 20      [01-31-19 @ 09:22]  HbA1c 6.3      [01-12-19 @ 08:08]  TSH 5.03      [02-02-19 @ 08:57]  Lipid: chol 157, , HDL 46, LDL 77      [06-09-18 @ 09:46]

## 2019-02-03 NOTE — DIETITIAN INITIAL EVALUATION ADULT. - NS AS NUTRI INTERV MEDICAL AND FOOD SUPPLEMENTS3
Pt is refusing glucerna supplements- will remove from diet order and replace with Beneprotein (1 packet q8). Please mix in with patient's food.

## 2019-02-03 NOTE — DIETITIAN INITIAL EVALUATION ADULT. - PERTINENT LABORATORY DATA
2/3- h/h 7.6/24.1L, BUN 50H, Cr 3.0H, Gluc 104H, GFR 14L, blood sugars over past 24hrs- 113-202 mg/dL

## 2019-02-03 NOTE — DIETITIAN INITIAL EVALUATION ADULT. - ORAL INTAKE PTA
Pt unsure of oral intake PTA. Pt was very agitated regarding Glucerna supplement and told RD, "I will throw this at your head if you send me another one of those drinks." Intakes 0-75%, average: 40%

## 2019-02-04 LAB
ANION GAP SERPL CALC-SCNC: 14 MMOL/L — SIGNIFICANT CHANGE UP (ref 7–14)
APTT BLD: 51 SEC — HIGH (ref 27–39.2)
BUN SERPL-MCNC: 48 MG/DL — HIGH (ref 10–20)
CALCIUM SERPL-MCNC: 11 MG/DL — HIGH (ref 8.5–10.1)
CHLORIDE SERPL-SCNC: 108 MMOL/L — SIGNIFICANT CHANGE UP (ref 98–110)
CO2 SERPL-SCNC: 22 MMOL/L — SIGNIFICANT CHANGE UP (ref 17–32)
CREAT SERPL-MCNC: 2.9 MG/DL — HIGH (ref 0.7–1.5)
GLUCOSE BLDC GLUCOMTR-MCNC: 119 MG/DL — HIGH (ref 70–99)
GLUCOSE BLDC GLUCOMTR-MCNC: 131 MG/DL — HIGH (ref 70–99)
GLUCOSE BLDC GLUCOMTR-MCNC: 140 MG/DL — HIGH (ref 70–99)
GLUCOSE BLDC GLUCOMTR-MCNC: 169 MG/DL — HIGH (ref 70–99)
GLUCOSE SERPL-MCNC: 142 MG/DL — HIGH (ref 70–99)
HCT VFR BLD CALC: 26.1 % — LOW (ref 37–47)
HGB BLD-MCNC: 8.2 G/DL — LOW (ref 12–16)
MCHC RBC-ENTMCNC: 29.4 PG — SIGNIFICANT CHANGE UP (ref 27–31)
MCHC RBC-ENTMCNC: 31.4 G/DL — LOW (ref 32–37)
MCV RBC AUTO: 93.5 FL — SIGNIFICANT CHANGE UP (ref 81–99)
NRBC # BLD: 0 /100 WBCS — SIGNIFICANT CHANGE UP (ref 0–0)
PLATELET # BLD AUTO: 301 K/UL — SIGNIFICANT CHANGE UP (ref 130–400)
POTASSIUM SERPL-MCNC: 3.6 MMOL/L — SIGNIFICANT CHANGE UP (ref 3.5–5)
POTASSIUM SERPL-SCNC: 3.6 MMOL/L — SIGNIFICANT CHANGE UP (ref 3.5–5)
RBC # BLD: 2.79 M/UL — LOW (ref 4.2–5.4)
RBC # FLD: 15.9 % — HIGH (ref 11.5–14.5)
SODIUM SERPL-SCNC: 144 MMOL/L — SIGNIFICANT CHANGE UP (ref 135–146)
WBC # BLD: 7.01 K/UL — SIGNIFICANT CHANGE UP (ref 4.8–10.8)
WBC # FLD AUTO: 7.01 K/UL — SIGNIFICANT CHANGE UP (ref 4.8–10.8)

## 2019-02-04 RX ORDER — QUETIAPINE FUMARATE 200 MG/1
25 TABLET, FILM COATED ORAL EVERY 12 HOURS
Qty: 0 | Refills: 0 | Status: DISCONTINUED | OUTPATIENT
Start: 2019-02-04 | End: 2019-02-19

## 2019-02-04 RX ORDER — HYDROXYZINE HCL 10 MG
25 TABLET ORAL ONCE
Qty: 0 | Refills: 0 | Status: DISCONTINUED | OUTPATIENT
Start: 2019-02-04 | End: 2019-02-04

## 2019-02-04 RX ORDER — TAMSULOSIN HYDROCHLORIDE 0.4 MG/1
0.4 CAPSULE ORAL AT BEDTIME
Qty: 0 | Refills: 0 | Status: DISCONTINUED | OUTPATIENT
Start: 2019-02-04 | End: 2019-02-19

## 2019-02-04 RX ADMIN — GABAPENTIN 100 MILLIGRAM(S): 400 CAPSULE ORAL at 14:56

## 2019-02-04 RX ADMIN — DULOXETINE HYDROCHLORIDE 30 MILLIGRAM(S): 30 CAPSULE, DELAYED RELEASE ORAL at 12:53

## 2019-02-04 RX ADMIN — Medication 100 MILLIGRAM(S): at 12:53

## 2019-02-04 RX ADMIN — Medication 1 MILLIGRAM(S): at 12:53

## 2019-02-04 RX ADMIN — OXYCODONE HYDROCHLORIDE 5 MILLIGRAM(S): 5 TABLET ORAL at 15:30

## 2019-02-04 RX ADMIN — SODIUM CHLORIDE 100 MILLILITER(S): 9 INJECTION INTRAMUSCULAR; INTRAVENOUS; SUBCUTANEOUS at 18:18

## 2019-02-04 RX ADMIN — Medication 5 MILLIGRAM(S): at 06:02

## 2019-02-04 RX ADMIN — OXYCODONE HYDROCHLORIDE 5 MILLIGRAM(S): 5 TABLET ORAL at 14:55

## 2019-02-04 RX ADMIN — SODIUM CHLORIDE 100 MILLILITER(S): 9 INJECTION INTRAMUSCULAR; INTRAVENOUS; SUBCUTANEOUS at 08:40

## 2019-02-04 RX ADMIN — Medication 1 APPLICATION(S): at 12:52

## 2019-02-04 RX ADMIN — GABAPENTIN 100 MILLIGRAM(S): 400 CAPSULE ORAL at 21:58

## 2019-02-04 RX ADMIN — Medication 5 MILLIGRAM(S): at 12:53

## 2019-02-04 RX ADMIN — Medication 100 MILLIGRAM(S): at 18:19

## 2019-02-04 RX ADMIN — QUETIAPINE FUMARATE 25 MILLIGRAM(S): 200 TABLET, FILM COATED ORAL at 21:58

## 2019-02-04 RX ADMIN — CEFTAZIDIME 100 MILLIGRAM(S): 6 INJECTION, POWDER, FOR SOLUTION INTRAVENOUS at 18:18

## 2019-02-04 RX ADMIN — TAMSULOSIN HYDROCHLORIDE 0.4 MILLIGRAM(S): 0.4 CAPSULE ORAL at 21:58

## 2019-02-04 RX ADMIN — HEPARIN SODIUM 1300 UNIT(S)/HR: 5000 INJECTION INTRAVENOUS; SUBCUTANEOUS at 18:13

## 2019-02-04 RX ADMIN — Medication 100 MILLIGRAM(S): at 06:02

## 2019-02-04 RX ADMIN — GABAPENTIN 100 MILLIGRAM(S): 400 CAPSULE ORAL at 06:02

## 2019-02-04 NOTE — PROGRESS NOTE ADULT - ASSESSMENT
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 She was admitted on 12/19/18 to the [facility] and managed for OM of the Right 3rd digit OM (cultures positive for Carbapenem Resistant Pseudomonas) and discharged on Cefepime for a total of 6 weeks.     Per Dr. Monge's note:   Patient has been having intermittent episodes of confusions since discharge from the hospital on 1/19. Reported that there are times where pt is AAOx3 and other times where she only repeats her name. Patients condition worsened in the past few days when she was told that she may not be able to remain in the NH due to insurance issues. Patient was seen by psych and prescribed Abilify.       Assessment and Plan:    1. Encephalopathy: Improved.  ? Underlying Dementia with Medication side effect vs. SHONNA vs. Dehydration.  ID following: Risk of encephalopathy similar with all Cephalosporins. Dr. Lau agreed to a trial of Ceftazidime.   Pan cultures: UA & Culture positive for yeast. No need to treat.   Psych following: delirium appears to have resolved. Neurocognitive disorder severe.  Seroquel 25 mg po q 12 prn for anxiety and agitation.      2. SHONNA on CKD 3:   ? SHONNA, ATN vs obstruction vs AIN ( Urine eosinophils suggestive) vs prerenal.   Nephrology following: appreciate input. Recommendations noted.  Continue IV fluid. Creatinine stable at 2.9.  Monitor ins and out & BMP.  Continue Hooper due to urinary retention. Voiding trial today.      2. RT third metatarsal OM:  s/p Amputation.  Podiatry following: Local wound care with Santyl and moist dressing.  Continue IV Ceftazidime for now. STOP date 2/27/19.        3. PAD:  s/p Angiogram 1/9/19.  Vascular recommended follow up in 2 weeks.        4. Chronic DVT:  Duplex LE venous: Chronic-appearing right common femoral and femoral deep venous thromboses; left popliteal deep venous thrombosis  Eliquis discontinued due to worsening renal function and possibility of having a Renal Biopsy.    Monitor PTT.      5. Chronic Normocytic Anemia: due to ACD  Goal hgb >7 g/dl.  Hgb stable.      6. Hypercalcemia:  Still elevated following IV hydration.  Vitamin D and PTH levels low. Follow up PTHrP.  Monitor levels.        7. Rheumatoid Arthritis:  Continue home Prednisone 5mg Daily.  Pain control.      8. DM II:  Hemoglobin A1C, Whole Blood: 6.3% (01.12.19 @ 08:08)  Monitor finger sticks. Insulin coverage if fingersticks are greater than 180.      9. Latent TB:  ID discontinued Rifampin.   Per HCP this was started 2 months ago.      10. Gout:  Continue Allopurinol.      11.  Bilateral Chronic Nonhealing Ulcers:  Full thickness wounds s/p debridement by burn 1/9/19.  Continue local wound care: Xeroform dsd kelrix q24h.      12. Chronic Pain:  Continue Neurontin / Cymbalta and oxycodone prn.      DVT ppx: Heparin drip.   Code Status: DNR/DNI.  Disposition: NH when stable.

## 2019-02-04 NOTE — PROGRESS NOTE ADULT - SUBJECTIVE AND OBJECTIVE BOX
F/u note s/p partial 3rd ray resection for OM right foot.. AF  Wound stable with no signs infection.  Xrays stable with no progression.  WBC  7.01  HH  8.2/26.1  BUN/CRE  48/ 2.9  Glu 142  Ca++ 11.0   eGFR 15  Santyl with moist dressing to right foot wound.   Will follow

## 2019-02-04 NOTE — PROGRESS NOTE ADULT - ASSESSMENT
delirium appears to have resolved  neurocognitive disorder severe    seroquel 25 mg po q 12 prn for anxiety and agitation.

## 2019-02-04 NOTE — PROGRESS NOTE ADULT - SUBJECTIVE AND OBJECTIVE BOX
[facility] NEPHROLOGY FOLLOW UP NOTE/COVERING DR POLANCO  --------------------------------------------------------------------------------  24 hour events/subjective: Patient examined. Appears comfortable.    PAST HISTORY  --------------------------------------------------------------------------------  No significant changes to PMH, PSH, FHx, SHx, unless otherwise noted    ALLERGIES & MEDICATIONS  --------------------------------------------------------------------------------  Allergies    clindamycin (Unknown)  erythromycin (Unknown)  penicillin (Unknown)  strawberry (Unknown)    Standing Inpatient Medications  allopurinol 100 milliGRAM(s) Oral daily  cefTAZidime  IVPB 500 milliGRAM(s) IV Intermittent every 24 hours  chlorhexidine 4% Liquid 1 Application(s) Topical <User Schedule>  collagenase Ointment 1 Application(s) Topical daily  docusate sodium 100 milliGRAM(s) Oral two times a day  DULoxetine 30 milliGRAM(s) Oral daily  folic acid 1 milliGRAM(s) Oral daily  gabapentin 100 milliGRAM(s) Oral every 8 hours  heparin  Infusion. 1100 Unit(s)/Hr IV Continuous <Continuous>  leucovorin 5 milliGRAM(s) Oral daily  polyethylene glycol 3350 17 Gram(s) Oral daily  predniSONE   Tablet 5 milliGRAM(s) Oral daily  senna 2 Tablet(s) Oral at bedtime  sodium chloride 0.9%. 1000 milliLiter(s) IV Continuous <Continuous>  tamsulosin 0.4 milliGRAM(s) Oral at bedtime    PRN Inpatient Medications  acetaminophen   Tablet .. 975 milliGRAM(s) Oral every 6 hours PRN  bisacodyl Suppository 10 milliGRAM(s) Rectal daily PRN  oxyCODONE    IR 5 milliGRAM(s) Oral every 6 hours PRN  QUEtiapine 25 milliGRAM(s) Oral every 12 hours PRN      VITALS/PHYSICAL EXAM  --------------------------------------------------------------------------------  T(C): 36.3 (02-04-19 @ 14:15), Max: 36.9 (02-03-19 @ 21:32)  HR: 49 (02-04-19 @ 14:15) (49 - 88)  BP: 167/72 (02-04-19 @ 14:15) (130/63 - 167/72)  RR: 16 (02-04-19 @ 14:15) (16 - 17)  SpO2: --  Wt(kg): --        02-03-19 @ 07:01  -  02-04-19 @ 07:00  --------------------------------------------------------  IN: 0 mL / OUT: 925 mL / NET: -925 mL    02-04-19 @ 07:01  -  02-04-19 @ 18:54  --------------------------------------------------------  IN: 1000 mL / OUT: 500 mL / NET: 500 mL      Physical Exam:  	Gen: NAD  	Pulm: CTA B/L  	CV: RRR, S1S2  	Abd: +BS, soft, nontender/nondistended  	: No suprapubic tenderness  	LE: Warm, FROM, no clubbing, intact strength; no edema  	Vascular access:    LABS/STUDIES  --------------------------------------------------------------------------------              8.2    7.01  >-----------<  301      [02-04-19 @ 10:20]              26.1     144  |  108  |  48  ----------------------------<  142      [02-04-19 @ 10:20]  3.6   |  22  |  2.9        Ca     11.0     [02-04-19 @ 10:20]        PTT: 51.0       [02-04-19 @ 10:20]      Creatinine Trend:  SCr 2.9 [02-04 @ 10:20]  SCr 3.0 [02-03 @ 09:18]  SCr 3.0 [02-02 @ 08:57]  SCr 3.0 [02-01 @ 07:32]  SCr 3.1 [01-31 @ 09:22]    Urinalysis - [01-31-19 @ 13:57]      Color Yellow / Appearance Clear / SG >=1.030 / pH 6.0      Gluc Negative / Ketone Negative  / Bili Negative / Urobili 0.2       Blood Moderate / Protein 100 / Leuk Est Moderate / Nitrite Negative      RBC 26-50 / WBC 26-50 / Hyaline  / Gran 3-5 / Sq Epi  / Non Sq Epi  / Bacteria     Urine Creatinine 71      [01-30-19 @ 14:22]  Urine Protein 83      [01-30-19 @ 14:22]  Urine Sodium 64.0      [01-30-19 @ 14:22]  Urine Urea Nitrogen 310      [01-29-19 @ 17:02]  Urine Chloride 65      [01-29-19 @ 17:02]    Iron 26, TIBC 120, %sat 22      [01-31-19 @ 09:22]  Ferritin 428      [01-31-19 @ 09:22]  PTH -- (Ca 10.5)      [01-30-19 @ 06:30]   8  Vitamin D (25OH) 20      [01-31-19 @ 09:22]  HbA1c 6.3      [01-12-19 @ 08:08]  TSH 5.03      [02-02-19 @ 08:57]  Lipid: chol 157, , HDL 46, LDL 77      [06-09-18 @ 09:46]

## 2019-02-04 NOTE — PROGRESS NOTE ADULT - SUBJECTIVE AND OBJECTIVE BOX
LINCOLN HEREDIA  78y  Female    Patient is a 78y old  Female who presents with a chief complaint of Change in mental status.      INTERVAL HPI/OVERNIGHT EVENTS:  No interval events.   Patient awake & alert but extremely anxious with a labile mood.  Still with periods of confusion.      REVIEW OF SYSTEMS: UTO due to consfusion.  CONSTITUTIONAL: + fatigue  RESPIRATORY: No cough, No shortness of breath  CARDIOVASCULAR: No chest pain  GASTROINTESTINAL: No abdominal pain. No nausea, vomiting, No diarrhea or constipation.   GENITOURINARY: No dysuria  NEUROLOGICAL: No headaches  MUSCULOSKELETAL: Back and bilateral LE pain.         VITALS:  T(C): 36.1 (2019 05:50), Max: 37 (2019 13:22)  T(F): 96.9 (2019 05:50), Max: 98.6 (2019 13:22)  HR: 88 (2019 05:50) (80 - 88)  BP: 130/63 (2019 05:50) (130/63 - 170/77)  BP(mean): --  RR: 17 (2019 05:50) (17 - 17)  SpO2: --      I&O's Summary    2019 07:01  -  2019 07:00  --------------------------------------------------------  IN: 0 mL / OUT: 925 mL / NET: -925 mL      CAPILLARY BLOOD GLUCOSE  POCT Blood Glucose.: 169 mg/dL (2019 11:29)  POCT Blood Glucose.: 140 mg/dL (2019 07:28)  POCT Blood Glucose.: 122 mg/dL (2019 20:41)  POCT Blood Glucose.: 105 mg/dL (2019 16:43)        PHYSICAL EXAM:  GENERAL: NAD  HEAD:  Atraumatic, Normocephalic  EYES: conjunctiva and sclera clear  ENMT: Moist mucous membranes  NECK: Supple, Normal thyroid  NERVOUS SYSTEM:  Awake & Alert, oriented x 3, Moves all extremities.   CHEST/LUNG: Clear to auscultation bilaterally; No rales, rhonchi, wheezing, or rubs  HEART: Regular rate and rhythm; No murmurs, rubs, or gallops  ABDOMEN: Soft, Nontender, Nondistended; Bowel sounds present  EXTREMITIES:  2+ Peripheral Pulses, No clubbing, cyanosis, or edema  LYMPH: No lymphadenopathy noted  SKIN: Right 3rd toe amputation with purulent drainage. Left leg ulcer: dressing in place.    Hooper draining clear urine.    Consultant(s) Notes Reviewed:  [x ] YES  [ ] NO  Care Discussed with Consultants/Other Providers [ x] YES  [ ] NO    LABS:                              8.2    7.01  )-----------( 301      ( 2019 10:20 )             26.1         02-    144  |  108  |  48<H>  ----------------------------<  142<H>  3.6   |  22  |  2.9<H>    Ca    11.0<H>      2019 10:20      Vitamin D, 1, 25-Dihydroxy: 12.4 pg/mL (19 @ 14:01)  Vitamin D, 25-Hydroxy: 20 ng/mL (19 @ 09:22)  Intact PTH: 8 pg/mL (19 @ 06:30)      PTT - ( 2019 10:20 )  PTT:51.0 sec      Urinalysis Basic - ( 2019 17:20 )    Color: Yellow / Appearance: Slightly Cloudy / S.025 / pH: x  Gluc: x / Ketone: Trace  / Bili: Negative / Urobili: 0.2 mg/dL   Blood: x / Protein: 100 mg/dL / Nitrite: Negative   Leuk Esterase: Trace / RBC: 11-25 /HPF / WBC 1-2 /HPF   Sq Epi: x / Non Sq Epi: Occasional /HPF / Bacteria: Many    Urinalysis Basic - ( 2019 13:57 )    Color: Yellow / Appearance: Clear / SG: >=1.030 / pH: x  Gluc: x / Ketone: Negative  / Bili: Negative / Urobili: 0.2 mg/dL   Blood: x / Protein: 100 mg/dL / Nitrite: Negative   Leuk Esterase: Moderate / RBC: 26-50 /HPF / WBC 26-50 /HPF   Sq Epi: x / Non Sq Epi: x / Bacteria: x      MICROBIOLOGY:   Culture - Urine (19 @ 13:57)    Specimen Source: .Urine Clean Catch (Midstream)    Culture Results:   50,000 - 99,000 CFU/mL Yeast-like cells, presumptively not Candida  albicans "Susceptibilities not performed"      Culture - Urine (19 @ 17:20)    Specimen Source: .Urine Clean Catch (Midstream)    Culture Results: No growth      Culture - Blood (19 @ 14:16)    Specimen Source: .Blood Blood-Peripheral    Culture Results:   No growth to date.      Culture - Blood (19 @ 14:14)    Specimen Source: .Blood Blood-Peripheral    Culture Results:   No growth to date.      Culture - Other (01.10.19 @ 14:00)    -  Ciprofloxacin: R >2    -  Gentamicin: R >8    -  Imipenem: R >8    -  Levofloxacin: R >4    -  Meropenem: R >8    -  Piperacillin/Tazobactam: S 16    -  Tobramycin: R >8    -  Aztreonam: I 16    -  Ceftazidime: S 4    -  Cefepime: S 8    -  Amikacin: S 16    Specimen Source: .Other None    Culture Results:   Few Pseudomonas aeruginosa (Carbapenem Resistant)    Organism Identification: Pseudomonas aeruginosa (Carbapenem Resistant)    Organism: Pseudomonas aeruginosa (Carbapenem Resistant)    Method Type: SHANNAN      RADIOLOGY & ADDITIONAL TESTS:  CT Head No Cont (19 @ 15:54)   1.  The study is limited by motion artifact.    2.  Cerebral atrophy.    3.  Periventricular white matter hypodensities, nonspecific, differential   diagnostic possibilities include ischemic change, gliosis or  demyelination.      X-ray Chest 1 View-PORTABLE IMMEDIATE (19 @ 13:42)   Support devices: Left-sided PICC with the tip in the distal SVC.    Cardiac/mediastinum/hilum: Grossly stable.    Lung parenchyma/Pleura: Low lung volumes limiting evaluation. No definite   airspace opacity or effusion. Left lower lobe scar/atelectasis.    Skeleton/soft tissues: Stable.    Impression:      Low lung volumes limiting evaluation. No definite airspace opacity or   effusion. Left lower lobe scar/atelectasis.      Imaging Personally Reviewed:  [x] YES  [ ] NO    MEDICATIONS  (STANDING):  allopurinol 100 milliGRAM(s) Oral daily  cefTAZidime  IVPB 500 milliGRAM(s) IV Intermittent every 24 hours  chlorhexidine 4% Liquid 1 Application(s) Topical <User Schedule>  collagenase Ointment 1 Application(s) Topical daily  docusate sodium 100 milliGRAM(s) Oral two times a day  DULoxetine 30 milliGRAM(s) Oral daily  folic acid 1 milliGRAM(s) Oral daily  gabapentin 100 milliGRAM(s) Oral every 8 hours  heparin  Infusion. 1100 Unit(s)/Hr (11 mL/Hr) IV Continuous <Continuous>  leucovorin 5 milliGRAM(s) Oral daily  polyethylene glycol 3350 17 Gram(s) Oral daily  predniSONE   Tablet 5 milliGRAM(s) Oral daily  senna 2 Tablet(s) Oral at bedtime  sodium chloride 0.9%. 1000 milliLiter(s) (100 mL/Hr) IV Continuous <Continuous>    MEDICATIONS  (PRN):  acetaminophen   Tablet .. 975 milliGRAM(s) Oral every 6 hours PRN Moderate Pain (4 - 6)  bisacodyl Suppository 10 milliGRAM(s) Rectal daily PRN Constipation  oxyCODONE    IR 5 milliGRAM(s) Oral every 6 hours PRN Severe Pain (7 - 10)        HEALTH ISSUES - PROBLEM Dx:  Other specified diabetes mellitus with other specified complication, unspecified whether long term insulin use  Rheumatoid arteritis  DVT (deep venous thrombosis)  HTN (hypertension)  Gout  Altered mental status  Osteomyelitis right foot.

## 2019-02-05 LAB
ANION GAP SERPL CALC-SCNC: 15 MMOL/L — HIGH (ref 7–14)
APTT BLD: 50.6 SEC — HIGH (ref 27–39.2)
APTT BLD: 58.2 SEC — HIGH (ref 27–39.2)
APTT BLD: 59 SEC — HIGH (ref 27–39.2)
BUN SERPL-MCNC: 47 MG/DL — HIGH (ref 10–20)
CALCIUM SERPL-MCNC: 10.8 MG/DL — HIGH (ref 8.5–10.1)
CHLORIDE SERPL-SCNC: 106 MMOL/L — SIGNIFICANT CHANGE UP (ref 98–110)
CO2 SERPL-SCNC: 20 MMOL/L — SIGNIFICANT CHANGE UP (ref 17–32)
CREAT SERPL-MCNC: 2.5 MG/DL — HIGH (ref 0.7–1.5)
CULTURE RESULTS: SIGNIFICANT CHANGE UP
GLUCOSE BLDC GLUCOMTR-MCNC: 126 MG/DL — HIGH (ref 70–99)
GLUCOSE BLDC GLUCOMTR-MCNC: 135 MG/DL — HIGH (ref 70–99)
GLUCOSE BLDC GLUCOMTR-MCNC: 152 MG/DL — HIGH (ref 70–99)
GLUCOSE BLDC GLUCOMTR-MCNC: 181 MG/DL — HIGH (ref 70–99)
GLUCOSE SERPL-MCNC: 129 MG/DL — HIGH (ref 70–99)
HCT VFR BLD CALC: 26.4 % — LOW (ref 37–47)
HGB BLD-MCNC: 8.3 G/DL — LOW (ref 12–16)
MCHC RBC-ENTMCNC: 29.1 PG — SIGNIFICANT CHANGE UP (ref 27–31)
MCHC RBC-ENTMCNC: 31.4 G/DL — LOW (ref 32–37)
MCV RBC AUTO: 92.6 FL — SIGNIFICANT CHANGE UP (ref 81–99)
NRBC # BLD: 0 /100 WBCS — SIGNIFICANT CHANGE UP (ref 0–0)
PLATELET # BLD AUTO: 298 K/UL — SIGNIFICANT CHANGE UP (ref 130–400)
POTASSIUM SERPL-MCNC: 3.5 MMOL/L — SIGNIFICANT CHANGE UP (ref 3.5–5)
POTASSIUM SERPL-SCNC: 3.5 MMOL/L — SIGNIFICANT CHANGE UP (ref 3.5–5)
RBC # BLD: 2.85 M/UL — LOW (ref 4.2–5.4)
RBC # FLD: 15.9 % — HIGH (ref 11.5–14.5)
SODIUM SERPL-SCNC: 141 MMOL/L — SIGNIFICANT CHANGE UP (ref 135–146)
SPECIMEN SOURCE: SIGNIFICANT CHANGE UP
WBC # BLD: 7.27 K/UL — SIGNIFICANT CHANGE UP (ref 4.8–10.8)
WBC # FLD AUTO: 7.27 K/UL — SIGNIFICANT CHANGE UP (ref 4.8–10.8)

## 2019-02-05 RX ORDER — HEPARIN SODIUM 5000 [USP'U]/ML
1200 INJECTION INTRAVENOUS; SUBCUTANEOUS
Qty: 25000 | Refills: 0 | Status: DISCONTINUED | OUTPATIENT
Start: 2019-02-05 | End: 2019-02-06

## 2019-02-05 RX ORDER — SODIUM CHLORIDE 9 MG/ML
1000 INJECTION INTRAMUSCULAR; INTRAVENOUS; SUBCUTANEOUS
Qty: 0 | Refills: 0 | Status: DISCONTINUED | OUTPATIENT
Start: 2019-02-05 | End: 2019-02-06

## 2019-02-05 RX ORDER — POTASSIUM CHLORIDE 20 MEQ
40 PACKET (EA) ORAL ONCE
Qty: 0 | Refills: 0 | Status: COMPLETED | OUTPATIENT
Start: 2019-02-05 | End: 2019-02-05

## 2019-02-05 RX ADMIN — Medication 5 MILLIGRAM(S): at 11:15

## 2019-02-05 RX ADMIN — Medication 1 MILLIGRAM(S): at 11:15

## 2019-02-05 RX ADMIN — GABAPENTIN 100 MILLIGRAM(S): 400 CAPSULE ORAL at 05:33

## 2019-02-05 RX ADMIN — CEFTAZIDIME 100 MILLIGRAM(S): 6 INJECTION, POWDER, FOR SOLUTION INTRAVENOUS at 17:38

## 2019-02-05 RX ADMIN — GABAPENTIN 100 MILLIGRAM(S): 400 CAPSULE ORAL at 13:41

## 2019-02-05 RX ADMIN — Medication 100 MILLIGRAM(S): at 11:15

## 2019-02-05 RX ADMIN — GABAPENTIN 100 MILLIGRAM(S): 400 CAPSULE ORAL at 22:23

## 2019-02-05 RX ADMIN — Medication 1 APPLICATION(S): at 11:15

## 2019-02-05 RX ADMIN — HEPARIN SODIUM 1200 UNIT(S)/HR: 5000 INJECTION INTRAVENOUS; SUBCUTANEOUS at 13:42

## 2019-02-05 RX ADMIN — TAMSULOSIN HYDROCHLORIDE 0.4 MILLIGRAM(S): 0.4 CAPSULE ORAL at 22:23

## 2019-02-05 RX ADMIN — SENNA PLUS 2 TABLET(S): 8.6 TABLET ORAL at 22:23

## 2019-02-05 RX ADMIN — Medication 40 MILLIEQUIVALENT(S): at 17:38

## 2019-02-05 RX ADMIN — Medication 5 MILLIGRAM(S): at 05:33

## 2019-02-05 RX ADMIN — SODIUM CHLORIDE 100 MILLILITER(S): 9 INJECTION INTRAMUSCULAR; INTRAVENOUS; SUBCUTANEOUS at 13:41

## 2019-02-05 RX ADMIN — DULOXETINE HYDROCHLORIDE 30 MILLIGRAM(S): 30 CAPSULE, DELAYED RELEASE ORAL at 11:15

## 2019-02-05 NOTE — PROGRESS NOTE ADULT - SUBJECTIVE AND OBJECTIVE BOX
LINCOLN HEREDIA  78y  Female    Patient is a 78y old  Female who presents with a chief complaint of Change in mental status.      INTERVAL HPI/OVERNIGHT EVENTS:  No interval events.   Patient awake & alert but extremely anxious with a labile mood.  Still with periods of confusion.      REVIEW OF SYSTEMS: UTO fully due to confusion.  CONSTITUTIONAL: No fever, + fatigue  ENMT:  No throat pain  RESPIRATORY: No cough, No shortness of breath  CARDIOVASCULAR: No chest pain  GASTROINTESTINAL: No abdominal pain. No nausea, vomiting, No diarrhea or constipation.   GENITOURINARY: No dysuria  NEUROLOGICAL: No headaches, generalized weakness  MUSCULOSKELETAL: Back and bilateral LE pain.       VITALS:  T(C): 36.5 (2019 05:27), Max: 36.5 (2019 05:27)  T(F): 97.7 (2019 05:27), Max: 97.7 (2019 05:27)  HR: 84 (2019 05:27) (49 - 86)  BP: 170/69 (2019 05:27) (163/75 - 170/69)  BP(mean): --  RR: 16 (2019 05:27) (16 - 16)  SpO2: --      I&O's Summary  2019 07:01  -  2019 07:00  --------------------------------------------------------  IN: 1000 mL / OUT: 975 mL / NET: 25 mL        CAPILLARY BLOOD GLUCOSE  POCT Blood Glucose.: 181 mg/dL (2019 11:19)  POCT Blood Glucose.: 126 mg/dL (2019 07:49)  POCT Blood Glucose.: 119 mg/dL (2019 21:05)  POCT Blood Glucose.: 131 mg/dL (2019 16:35)        PHYSICAL EXAM:  GENERAL: NAD  HEAD:  Atraumatic, Normocephalic  EYES: conjunctiva and sclera clear  ENMT: Moist mucous membranes  NECK: Supple, Normal thyroid  NERVOUS SYSTEM:  Awake & Alert, oriented x 2-3, Moves all extremities.   CHEST/LUNG: Clear to auscultation bilaterally; No rales, rhonchi, wheezing, or rubs  HEART: Regular rate and rhythm; No murmurs, rubs, or gallops  ABDOMEN: Soft, Nontender, Nondistended; Bowel sounds present  EXTREMITIES:  2+ Peripheral Pulses, No clubbing, cyanosis, or edema  LYMPH: No lymphadenopathy noted  SKIN: Right 3rd toe amputation with purulent drainage. Left leg ulcer: dressing in place.    Hooper draining clear urine.    Consultant(s) Notes Reviewed:  [x ] YES  [ ] NO  Care Discussed with Consultants/Other Providers [ x] YES  [ ] NO    LABS:                                         8.3    7.27  )-----------( 298      ( 2019 09:30 )             26.4     02-    141  |  106  |  47<H>  ----------------------------<  129<H>  3.5   |  20  |  2.5<H>    Ca    10.8<H>      2019 09:30    Vitamin D, 1, 25-Dihydroxy: 12.4 pg/mL (19 @ 14:01)  Vitamin D, 25-Hydroxy: 20 ng/mL (19 @ 09:22)  Intact PTH: 8 pg/mL (19 @ 06:30)      PTT - ( 2019 09:30 )  PTT:50.6 sec      Urinalysis Basic - ( 2019 17:20 )    Color: Yellow / Appearance: Slightly Cloudy / S.025 / pH: x  Gluc: x / Ketone: Trace  / Bili: Negative / Urobili: 0.2 mg/dL   Blood: x / Protein: 100 mg/dL / Nitrite: Negative   Leuk Esterase: Trace / RBC: 11-25 /HPF / WBC 1-2 /HPF   Sq Epi: x / Non Sq Epi: Occasional /HPF / Bacteria: Many    Urinalysis Basic - ( 2019 13:57 )    Color: Yellow / Appearance: Clear / SG: >=1.030 / pH: x  Gluc: x / Ketone: Negative  / Bili: Negative / Urobili: 0.2 mg/dL   Blood: x / Protein: 100 mg/dL / Nitrite: Negative   Leuk Esterase: Moderate / RBC: 26-50 /HPF / WBC 26-50 /HPF   Sq Epi: x / Non Sq Epi: x / Bacteria: x      MICROBIOLOGY:   Culture - Urine (19 @ 13:57)    Specimen Source: .Urine Clean Catch (Midstream)    Culture Results:   50,000 - 99,000 CFU/mL Yeast-like cells, presumptively not Candida  albicans "Susceptibilities not performed"      Culture - Urine (19 @ 17:20)    Specimen Source: .Urine Clean Catch (Midstream)    Culture Results: No growth      Culture - Blood (19 @ 14:16)    Specimen Source: .Blood Blood-Peripheral    Culture Results:   No growth to date.      Culture - Blood (19 @ 14:14)    Specimen Source: .Blood Blood-Peripheral    Culture Results:   No growth to date.      Culture - Other (01.10.19 @ 14:00)    -  Ciprofloxacin: R >2    -  Gentamicin: R >8    -  Imipenem: R >8    -  Levofloxacin: R >4    -  Meropenem: R >8    -  Piperacillin/Tazobactam: S 16    -  Tobramycin: R >8    -  Aztreonam: I 16    -  Ceftazidime: S 4    -  Cefepime: S 8    -  Amikacin: S 16    Specimen Source: .Other None    Culture Results:   Few Pseudomonas aeruginosa (Carbapenem Resistant)    Organism Identification: Pseudomonas aeruginosa (Carbapenem Resistant)    Organism: Pseudomonas aeruginosa (Carbapenem Resistant)    Method Type: SHANNAN      RADIOLOGY & ADDITIONAL TESTS:  CT Head No Cont (19 @ 15:54)   1.  The study is limited by motion artifact.    2.  Cerebral atrophy.    3.  Periventricular white matter hypodensities, nonspecific, differential   diagnostic possibilities include ischemic change, gliosis or  demyelination.      X-ray Chest 1 View-PORTABLE IMMEDIATE (19 @ 13:42)   Support devices: Left-sided PICC with the tip in the distal SVC.    Cardiac/mediastinum/hilum: Grossly stable.    Lung parenchyma/Pleura: Low lung volumes limiting evaluation. No definite   airspace opacity or effusion. Left lower lobe scar/atelectasis.    Skeleton/soft tissues: Stable.    Impression:      Low lung volumes limiting evaluation. No definite airspace opacity or   effusion. Left lower lobe scar/atelectasis.      Imaging Personally Reviewed:  [x] YES  [ ] NO    MEDICATIONS  (STANDING):  allopurinol 100 milliGRAM(s) Oral daily  cefTAZidime  IVPB 500 milliGRAM(s) IV Intermittent every 24 hours  chlorhexidine 4% Liquid 1 Application(s) Topical <User Schedule>  collagenase Ointment 1 Application(s) Topical daily  docusate sodium 100 milliGRAM(s) Oral two times a day  DULoxetine 30 milliGRAM(s) Oral daily  folic acid 1 milliGRAM(s) Oral daily  gabapentin 100 milliGRAM(s) Oral every 8 hours  leucovorin 5 milliGRAM(s) Oral daily  polyethylene glycol 3350 17 Gram(s) Oral daily  predniSONE   Tablet 5 milliGRAM(s) Oral daily  senna 2 Tablet(s) Oral at bedtime  sodium chloride 0.9%. 1000 milliLiter(s) (100 mL/Hr) IV Continuous <Continuous>  tamsulosin 0.4 milliGRAM(s) Oral at bedtime    MEDICATIONS  (PRN):  acetaminophen   Tablet .. 975 milliGRAM(s) Oral every 6 hours PRN Moderate Pain (4 - 6)  bisacodyl Suppository 10 milliGRAM(s) Rectal daily PRN Constipation  oxyCODONE    IR 5 milliGRAM(s) Oral every 6 hours PRN Severe Pain (7 - 10)  QUEtiapine 25 milliGRAM(s) Oral every 12 hours PRN Anxiety/Agitation          HEALTH ISSUES - PROBLEM Dx:  Other specified diabetes mellitus with other specified complication, unspecified whether long term insulin use  Rheumatoid arteritis  DVT (deep venous thrombosis)  HTN (hypertension)  Gout  Altered mental status  Osteomyelitis right foot.

## 2019-02-05 NOTE — PROGRESS NOTE ADULT - SUBJECTIVE AND OBJECTIVE BOX
Podiatry Follow Up Note     Pt seen bedside with NAD at this time.   Patient is s/p partial 3rd ray resection for OM right foot.  Surgical site at this time wound appears stable at this time with no signs infection.  A:  s/p partial 3rd ray resection partially closed with no acute signs of infection   P:  Santyl with moist dressing to right foot wound as outpatient    Podiatry will follow q72h if patient in house

## 2019-02-05 NOTE — PROGRESS NOTE ADULT - ASSESSMENT
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 She was admitted on 12/19/18 to the [facility] and managed for OM of the Right 3rd digit OM (cultures positive for Carbapenem Resistant Pseudomonas) and discharged on Cefepime for a total of 6 weeks.     Per Dr. Monge's note:   Patient has been having intermittent episodes of confusions since discharge from the hospital on 1/19. Reported that there are times where pt is AAOx3 and other times where she only repeats her name. Patients condition worsened in the past few days when she was told that she may not be able to remain in the NH due to insurance issues. Patient was seen by psych and prescribed Abilify.       Assessment and Plan:    1. Toxic metabolic Encephalopathy: Improved.  ? Underlying Dementia with Medication side effect vs. SHONNA vs. Dehydration.  ID following: Risk of encephalopathy similar with all Cephalosporins. Dr. Lau agreed to a trial of Ceftazidime.   Pan cultures: UA & Culture positive for yeast. No need to treat.   Psych following: delirium appears to have resolved with underlying severe Neurocognitive disorder.   Seroquel 25 mg po q 12 prn for anxiety and agitation.      2. SHONNA on CKD 3:   ? SHONNA, ATN vs obstruction vs AIN ( Urine eosinophils suggestive) vs prerenal.   Nephrology following: appreciate input. Recommendations noted.  Continue IV fluid. Creatinine trending down 3.1-->2.5.  Monitor ins and out & BMP.  Continue Hooper due to urinary retention. Voiding trial today.      2. RT third metatarsal OM:  s/p Amputation.  Podiatry following: Local wound care with Santyl and moist dressing.  Continue IV Ceftazidime for now. STOP date 2/27/19.        3. PAD:  s/p Angiogram 1/9/19.  Vascular recommended follow up in 2 weeks.        4. Chronic DVT:  Duplex LE venous: Chronic-appearing right common femoral and femoral deep venous thromboses; left popliteal deep venous thrombosis  Eliquis discontinued due to worsening renal function and possibility of having a Renal Biopsy.    Monitor PTT and adjust rate.      5. Chronic Normocytic Anemia: due to ACD  Goal hgb >7 g/dl.  Hgb stable.      6. Hypercalcemia:  Improved with IV hydration.  Vitamin D and PTH levels low. Follow up PTHrP.  Monitor levels.        7. Rheumatoid Arthritis:  Continue home Prednisone 5mg Daily.  Pain control.      8. DM II:  Hemoglobin A1C, Whole Blood: 6.3% (01.12.19 @ 08:08)  Monitor finger sticks. Insulin coverage if fingersticks are greater than 180.      9. Latent TB:  Rifampin discontinued by ID.  Per HCP this was started 2 months ago in anticipation of starting ? Biologic DMARD.      10. Gout:  Continue Allopurinol.      11.  Bilateral Chronic Nonhealing Ulcers:  Full thickness wounds s/p debridement by burn 1/9/19.  Continue local wound care: Xeroform dsd kelrix q24h.      12. Chronic Pain:  Continue Neurontin / Cymbalta and oxycodone prn.  Hold Off Oxycontin for now.      DVT ppx: Heparin drip.   Code Status: DNR/DNI.  Disposition: NH when stable.

## 2019-02-05 NOTE — PROGRESS NOTE ADULT - ASSESSMENT
1. SHONNA, ATN vs obstruction vs AIN   2. CKD III  3. Hypercalcemia, PTH suppressed  4. R foot OM    Plan:  Eosinophiluria is suspicious for AIN  Monitor renal function   Hold off on kidney biopsy as renal function is improving  Daily BMP  No NSAIDs  No IV contrast

## 2019-02-05 NOTE — PROGRESS NOTE ADULT - SUBJECTIVE AND OBJECTIVE BOX
[place] NEPHROLOGY FOLLOW UP NOTE  --------------------------------------------------------------------------------  24 hour events/subjective: Patient examined. Appears comfortable.    PAST HISTORY  --------------------------------------------------------------------------------  No significant changes to PMH, PSH, FHx, SHx, unless otherwise noted    ALLERGIES & MEDICATIONS  --------------------------------------------------------------------------------  Allergies    clindamycin (Unknown)  erythromycin (Unknown)  penicillin (Unknown)  strawberry (Unknown)    Standing Inpatient Medications  allopurinol 100 milliGRAM(s) Oral daily  cefTAZidime  IVPB 500 milliGRAM(s) IV Intermittent every 24 hours  chlorhexidine 4% Liquid 1 Application(s) Topical <User Schedule>  collagenase Ointment 1 Application(s) Topical daily  docusate sodium 100 milliGRAM(s) Oral two times a day  DULoxetine 30 milliGRAM(s) Oral daily  folic acid 1 milliGRAM(s) Oral daily  gabapentin 100 milliGRAM(s) Oral every 8 hours  heparin  Infusion 1200 Unit(s)/Hr IV Continuous <Continuous>  leucovorin 5 milliGRAM(s) Oral daily  polyethylene glycol 3350 17 Gram(s) Oral daily  predniSONE   Tablet 5 milliGRAM(s) Oral daily  senna 2 Tablet(s) Oral at bedtime  sodium chloride 0.9%. 1000 milliLiter(s) IV Continuous <Continuous>  tamsulosin 0.4 milliGRAM(s) Oral at bedtime    PRN Inpatient Medications  acetaminophen   Tablet .. 975 milliGRAM(s) Oral every 6 hours PRN  bisacodyl Suppository 10 milliGRAM(s) Rectal daily PRN  oxyCODONE    IR 5 milliGRAM(s) Oral every 6 hours PRN  QUEtiapine 25 milliGRAM(s) Oral every 12 hours PRN    VITALS/PHYSICAL EXAM  --------------------------------------------------------------------------------  T(C): 36.1 (02-05-19 @ 14:07), Max: 36.5 (02-05-19 @ 05:27)  HR: 88 (02-05-19 @ 14:07) (84 - 88)  BP: 135/79 (02-05-19 @ 14:07) (135/79 - 170/69)  RR: 16 (02-05-19 @ 14:07) (16 - 16)    02-04-19 @ 07:01  -  02-05-19 @ 07:00  --------------------------------------------------------  IN: 1000 mL / OUT: 975 mL / NET: 25 mL    02-05-19 @ 07:01  -  02-05-19 @ 18:49  --------------------------------------------------------  IN: 240 mL / OUT: 400 mL / NET: -160 mL    Physical Exam:  	Gen: NAD  	Pulm: CTA B/L  	CV: RRR, S1S2  	Abd: +BS, soft, nontender/nondistended  	: No suprapubic tenderness  	LE: Warm, FROM, no clubbing, intact strength; no edema  	Vascular access:    LABS/STUDIES  --------------------------------------------------------------------------------              8.3    7.27  >-----------<  298      [02-05-19 @ 09:30]              26.4     141  |  106  |  47  ----------------------------<  129      [02-05-19 @ 09:30]  3.5   |  20  |  2.5        Ca     10.8     [02-05-19 @ 09:30]    PTT: 50.6       [02-05-19 @ 09:30    Creatinine Trend:  SCr 2.5 [02-05 @ 09:30]  SCr 2.9 [02-04 @ 10:20]  SCr 3.0 [02-03 @ 09:18]  SCr 3.0 [02-02 @ 08:57]  SCr 3.0 [02-01 @ 07:32]    Urinalysis - [01-31-19 @ 13:57]      Color Yellow / Appearance Clear / SG >=1.030 / pH 6.0      Gluc Negative / Ketone Negative  / Bili Negative / Urobili 0.2       Blood Moderate / Protein 100 / Leuk Est Moderate / Nitrite Negative      RBC 26-50 / WBC 26-50 / Hyaline  / Gran 3-5 / Sq Epi  / Non Sq Epi  / Bacteria     Urine Creatinine 71      [01-30-19 @ 14:22]  Urine Protein 83      [01-30-19 @ 14:22]  Urine Sodium 64.0      [01-30-19 @ 14:22]    Iron 26, TIBC 120, %sat 22      [01-31-19 @ 09:22]  Ferritin 428      [01-31-19 @ 09:22]  PTH -- (Ca 10.5)      [01-30-19 @ 06:30]   8  Vitamin D (25OH) 20      [01-31-19 @ 09:22]  HbA1c 6.3      [01-12-19 @ 08:08]  TSH 5.03      [02-02-19 @ 08:57]  Lipid: chol 157, , HDL 46, LDL 77      [06-09-18 @ 09:46]

## 2019-02-06 LAB
ANION GAP SERPL CALC-SCNC: 11 MMOL/L — SIGNIFICANT CHANGE UP (ref 7–14)
APTT BLD: 102.6 SEC — CRITICAL HIGH (ref 27–39.2)
APTT BLD: 28.2 SEC — SIGNIFICANT CHANGE UP (ref 27–39.2)
APTT BLD: 48.6 SEC — HIGH (ref 27–39.2)
ASO AB SER QL: 22 IU/ML — SIGNIFICANT CHANGE UP (ref 0–199)
BUN SERPL-MCNC: 40 MG/DL — HIGH (ref 10–20)
C-ANCA SER-ACNC: NEGATIVE — SIGNIFICANT CHANGE UP
C3 SERPL-MCNC: 175 MG/DL — HIGH (ref 81–157)
C4 SERPL-MCNC: 36 MG/DL — SIGNIFICANT CHANGE UP (ref 13–39)
CALCIUM SERPL-MCNC: 10.5 MG/DL — HIGH (ref 8.5–10.1)
CHLORIDE SERPL-SCNC: 111 MMOL/L — HIGH (ref 98–110)
CO2 SERPL-SCNC: 22 MMOL/L — SIGNIFICANT CHANGE UP (ref 17–32)
CREAT SERPL-MCNC: 2.4 MG/DL — HIGH (ref 0.7–1.5)
DSDNA AB SER-ACNC: <12 IU/ML — SIGNIFICANT CHANGE UP
GLUCOSE BLDC GLUCOMTR-MCNC: 124 MG/DL — HIGH (ref 70–99)
GLUCOSE BLDC GLUCOMTR-MCNC: 161 MG/DL — HIGH (ref 70–99)
GLUCOSE BLDC GLUCOMTR-MCNC: 180 MG/DL — HIGH (ref 70–99)
GLUCOSE BLDC GLUCOMTR-MCNC: 235 MG/DL — HIGH (ref 70–99)
GLUCOSE SERPL-MCNC: 124 MG/DL — HIGH (ref 70–99)
HAV IGM SER-ACNC: SIGNIFICANT CHANGE UP
HBV CORE IGM SER-ACNC: SIGNIFICANT CHANGE UP
HBV SURFACE AG SER-ACNC: SIGNIFICANT CHANGE UP
HCT VFR BLD CALC: 27.2 % — LOW (ref 37–47)
HCV AB S/CO SERPL IA: 0.06 S/CO — SIGNIFICANT CHANGE UP
HCV AB SERPL-IMP: SIGNIFICANT CHANGE UP
HGB BLD-MCNC: 8.3 G/DL — LOW (ref 12–16)
MCHC RBC-ENTMCNC: 29.1 PG — SIGNIFICANT CHANGE UP (ref 27–31)
MCHC RBC-ENTMCNC: 30.5 G/DL — LOW (ref 32–37)
MCV RBC AUTO: 95.4 FL — SIGNIFICANT CHANGE UP (ref 81–99)
NRBC # BLD: 0 /100 WBCS — SIGNIFICANT CHANGE UP (ref 0–0)
P-ANCA SER-ACNC: NEGATIVE — SIGNIFICANT CHANGE UP
PLATELET # BLD AUTO: 263 K/UL — SIGNIFICANT CHANGE UP (ref 130–400)
POTASSIUM SERPL-MCNC: 3.9 MMOL/L — SIGNIFICANT CHANGE UP (ref 3.5–5)
POTASSIUM SERPL-SCNC: 3.9 MMOL/L — SIGNIFICANT CHANGE UP (ref 3.5–5)
RBC # BLD: 2.85 M/UL — LOW (ref 4.2–5.4)
RBC # FLD: 16.3 % — HIGH (ref 11.5–14.5)
SODIUM SERPL-SCNC: 144 MMOL/L — SIGNIFICANT CHANGE UP (ref 135–146)
WBC # BLD: 6.2 K/UL — SIGNIFICANT CHANGE UP (ref 4.8–10.8)
WBC # FLD AUTO: 6.2 K/UL — SIGNIFICANT CHANGE UP (ref 4.8–10.8)

## 2019-02-06 RX ORDER — DEXTROSE 50 % IN WATER 50 %
12.5 SYRINGE (ML) INTRAVENOUS ONCE
Qty: 0 | Refills: 0 | Status: DISCONTINUED | OUTPATIENT
Start: 2019-02-06 | End: 2019-02-19

## 2019-02-06 RX ORDER — GLUCAGON INJECTION, SOLUTION 0.5 MG/.1ML
1 INJECTION, SOLUTION SUBCUTANEOUS ONCE
Qty: 0 | Refills: 0 | Status: DISCONTINUED | OUTPATIENT
Start: 2019-02-06 | End: 2019-02-19

## 2019-02-06 RX ORDER — SODIUM CHLORIDE 9 MG/ML
1000 INJECTION, SOLUTION INTRAVENOUS
Qty: 0 | Refills: 0 | Status: DISCONTINUED | OUTPATIENT
Start: 2019-02-06 | End: 2019-02-19

## 2019-02-06 RX ORDER — DEXTROSE 50 % IN WATER 50 %
15 SYRINGE (ML) INTRAVENOUS ONCE
Qty: 0 | Refills: 0 | Status: DISCONTINUED | OUTPATIENT
Start: 2019-02-06 | End: 2019-02-19

## 2019-02-06 RX ORDER — HEPARIN SODIUM 5000 [USP'U]/ML
INJECTION INTRAVENOUS; SUBCUTANEOUS
Qty: 25000 | Refills: 0 | Status: DISCONTINUED | OUTPATIENT
Start: 2019-02-06 | End: 2019-02-10

## 2019-02-06 RX ORDER — DEXTROSE 50 % IN WATER 50 %
25 SYRINGE (ML) INTRAVENOUS ONCE
Qty: 0 | Refills: 0 | Status: DISCONTINUED | OUTPATIENT
Start: 2019-02-06 | End: 2019-02-19

## 2019-02-06 RX ORDER — INSULIN LISPRO 100/ML
VIAL (ML) SUBCUTANEOUS AT BEDTIME
Qty: 0 | Refills: 0 | Status: DISCONTINUED | OUTPATIENT
Start: 2019-02-06 | End: 2019-02-19

## 2019-02-06 RX ORDER — HEPARIN SODIUM 5000 [USP'U]/ML
6500 INJECTION INTRAVENOUS; SUBCUTANEOUS ONCE
Qty: 0 | Refills: 0 | Status: COMPLETED | OUTPATIENT
Start: 2019-02-06 | End: 2019-02-06

## 2019-02-06 RX ORDER — INSULIN LISPRO 100/ML
VIAL (ML) SUBCUTANEOUS
Qty: 0 | Refills: 0 | Status: DISCONTINUED | OUTPATIENT
Start: 2019-02-06 | End: 2019-02-19

## 2019-02-06 RX ADMIN — CHLORHEXIDINE GLUCONATE 1 APPLICATION(S): 213 SOLUTION TOPICAL at 10:37

## 2019-02-06 RX ADMIN — CEFTAZIDIME 100 MILLIGRAM(S): 6 INJECTION, POWDER, FOR SOLUTION INTRAVENOUS at 17:27

## 2019-02-06 RX ADMIN — Medication 2: at 16:50

## 2019-02-06 RX ADMIN — Medication 100 MILLIGRAM(S): at 11:30

## 2019-02-06 RX ADMIN — Medication 5 MILLIGRAM(S): at 11:31

## 2019-02-06 RX ADMIN — Medication 1 APPLICATION(S): at 11:30

## 2019-02-06 RX ADMIN — OXYCODONE HYDROCHLORIDE 5 MILLIGRAM(S): 5 TABLET ORAL at 21:05

## 2019-02-06 RX ADMIN — OXYCODONE HYDROCHLORIDE 5 MILLIGRAM(S): 5 TABLET ORAL at 19:41

## 2019-02-06 RX ADMIN — GABAPENTIN 100 MILLIGRAM(S): 400 CAPSULE ORAL at 06:46

## 2019-02-06 RX ADMIN — HEPARIN SODIUM 1400 UNIT(S)/HR: 5000 INJECTION INTRAVENOUS; SUBCUTANEOUS at 21:00

## 2019-02-06 RX ADMIN — TAMSULOSIN HYDROCHLORIDE 0.4 MILLIGRAM(S): 0.4 CAPSULE ORAL at 21:02

## 2019-02-06 RX ADMIN — HEPARIN SODIUM 6500 UNIT(S): 5000 INJECTION INTRAVENOUS; SUBCUTANEOUS at 21:01

## 2019-02-06 RX ADMIN — Medication 1 MILLIGRAM(S): at 11:30

## 2019-02-06 RX ADMIN — HEPARIN SODIUM 1400 UNIT(S)/HR: 5000 INJECTION INTRAVENOUS; SUBCUTANEOUS at 11:37

## 2019-02-06 RX ADMIN — Medication 100 MILLIGRAM(S): at 06:46

## 2019-02-06 RX ADMIN — DULOXETINE HYDROCHLORIDE 30 MILLIGRAM(S): 30 CAPSULE, DELAYED RELEASE ORAL at 11:30

## 2019-02-06 RX ADMIN — GABAPENTIN 100 MILLIGRAM(S): 400 CAPSULE ORAL at 21:02

## 2019-02-06 RX ADMIN — SODIUM CHLORIDE 50 MILLILITER(S): 9 INJECTION INTRAMUSCULAR; INTRAVENOUS; SUBCUTANEOUS at 06:46

## 2019-02-06 RX ADMIN — GABAPENTIN 100 MILLIGRAM(S): 400 CAPSULE ORAL at 13:36

## 2019-02-06 RX ADMIN — SENNA PLUS 2 TABLET(S): 8.6 TABLET ORAL at 21:02

## 2019-02-06 RX ADMIN — Medication 5 MILLIGRAM(S): at 06:46

## 2019-02-06 NOTE — CHART NOTE - NSCHARTNOTEFT_GEN_A_CORE
Registered Dietitian Follow-Up     Patient Profile Reviewed                           Yes [x]   No []     Nutrition History Previously Obtained        Yes [x]  No []       Pertinent Subjective Information: Pt reports that her appetite is "fine" and that she eats what she can. Pt is a total feed. EMR reports 0-50% intake. Poor PO is most likely due to mental status. Pt does not like Glucerna and is not drinking them. Pt does not want a supplement at this time.      Pertinent Medical Interventions: Pt presented to ED for agitation and AMS. Pt is admitted for change in mental status. Toxic metabolic encephalopathy (improved), SHONNA on CKD lll (ATN vs. obstruction vs. AIN, PAD s/p angiogram 1/9. Chronic DVT, chronic normocytic anemia due to ACD, hypercalcemia (10.5), and DM noted.      Diet order: Carb con w/snack, DASH/TLC diet w/ Glucerna q12     Anthropometrics:  - Ht. 152.4cm/5'0  - Wt. 79.4kg, 175#  - Wt range 79.4kg and 90kg documented. Was unable to obtain new wt. Pt was sitting in chair. 79.4 appears to be more accurate.    - BMI 34.2  - IBW 45kg     Pertinent Lab Data: (2/6) H/H 8.3/27.2, chloride 111, BUN 40, creat 2.4, glucose 124, calcium 10.5     Pertinent Meds: heparin, bisacodyl suppository, docusate sodium, miralax, folic acid, prednisone, senna      Physical Findings:  - Appearance: confused but answering questions appropriately   - GI function: Pt does not remember her last BM but denies constipation. Abdomen noted as soft/nontender/nondistended + BS.   - Tubes: n/a  - Oral/Mouth cavity: n/a  - Skin: Anson score 15. No PU. bilateral chronic nonhealing ulcers and Rt third metatarsal OM s/p amputation noted.      Nutrition Requirements: Needs assessed on 2/3   Weight Used: 79.4kg     Estimated Energy Needs    Continue [x]  Adjust []  8091-4118 (25-30kcals/kg)     Estimated Protein Needs    Continue [x]  Adjust []  95-111g/day (1.2-1.4g/kg)    Estimated Fluid Needs        Continue [x]  Adjust []  1:1ml/kcal     [] Previous Nutrition Diagnosis: Increased nutrient needs (specify); protein            [] Ongoing          [x] Resolved    [] No active nutrition diagnosis identified at this time     Nutrition Diagnostic #1 New PES statement   Problem: Inadequate oral intake   Etiology: most likely 2/2 mental status   Statement: 0-50% intake      Nutrition Intervention: Meals and snacks/Medical food supplements      Goal/Expected Outcome: Consume >75% of meals and supplements for 3 days      Indicator/Monitoring: Will monitor intake, nutrition focused physical findings, and labs. At risk.     Recommendations:  Meals and snacks: Continue on a carb con w/ snack, DASH/TLC diet   Medical food supplements: D/C Glucerna. Pt is declining to try a new supplement at this time. Registered Dietitian Follow-Up     Patient Profile Reviewed                           Yes [x]   No []     Nutrition History Previously Obtained        Yes [x]  No []       Pertinent Subjective Information: Pt reports that her appetite is "fine" and that she eats what she can. Pt is a total feed. EMR reports 0-50% intake. Poor PO is most likely due to mental status. Pt does not like Glucerna and is not drinking them. Pt does not want a supplement at this time.      Pertinent Medical Interventions: Pt presented to ED for agitation and AMS. Pt is admitted for change in mental status. Toxic metabolic encephalopathy (improved), SHONNA on CKD lll (ATN vs. obstruction vs. AIN, PAD s/p angiogram 1/9. Chronic DVT, chronic normocytic anemia due to ACD, hypercalcemia (10.5), and DM noted.      Diet order: Carb con w/snack, DASH/TLC diet w/ Glucerna q12     Anthropometrics:  - Ht. 152.4cm/5'0  - Wt. 79.4kg, 175#  - Wt range 79.4kg and 90kg documented. Was unable to obtain new wt. Pt was sitting in chair. 79.4 appears to be more accurate.    - BMI 34.2  - IBW 45kg     Pertinent Lab Data: (2/6) H/H 8.3/27.2, chloride 111, BUN 40, creat 2.4, glucose 124, calcium 10.5     Pertinent Meds: heparin, bisacodyl suppository, docusate sodium, miralax, folic acid, prednisone, senna      Physical Findings:  - Appearance: confused but answering questions appropriately   - GI function: Pt does not remember her last BM but denies constipation. Last BM documented was on 2/4. Fecal incontinence. Abdomen noted as soft/nontender/nondistended + BS.   - Tubes: n/a  - Oral/Mouth cavity: n/a  - Skin: Anson score 15. No PU. Edema 1+ right and left foot. Bilateral chronic nonhealing ulcers and Rt third metatarsal OM s/p amputation noted.      Nutrition Requirements: Needs assessed on 2/3   Weight Used: 79.4kg     Estimated Energy Needs    Continue [x]  Adjust []  [id number] (25-30kcals/kg)     Estimated Protein Needs    Continue [x]  Adjust []  95-111g/day (1.2-1.4g/kg)    Estimated Fluid Needs        Continue [x]  Adjust []  1:1ml/kcal     [] Previous Nutrition Diagnosis: Increased nutrient needs (specify); protein            [] Ongoing          [x] Resolved    [] No active nutrition diagnosis identified at this time     Nutrition Diagnostic #1 New PES statement   Problem: Inadequate oral intake   Etiology: most likely 2/2 mental status   Statement: 0-50% intake      Nutrition Intervention: Meals and snacks/Medical food supplements      Goal/Expected Outcome: Consume >75% of meals and supplements for 3 days      Indicator/Monitoring: Will monitor intake, nutrition focused physical findings, and labs. At risk.     Recommendations:  Meals and snacks: Continue on a carb con w/ snack, DASH/TLC diet   Medical food supplements: D/C Glucerna. Pt is declining to try a new supplement at this time.

## 2019-02-06 NOTE — PROGRESS NOTE ADULT - ASSESSMENT
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 She was admitted on 12/19/18 to the [facility] and managed for OM of the Right 3rd digit OM (cultures positive for Carbapenem Resistant Pseudomonas) and discharged on Cefepime for a total of 6 weeks.     Per Dr. Monge's note:   Patient has been having intermittent episodes of confusions since discharge from the hospital on 1/19. Reported that there are times where pt is AAOx3 and other times where she only repeats her name. Patients condition worsened in the past few days when she was told that she may not be able to remain in the NH due to insurance issues. Patient was seen by psych and prescribed Abilify.       Assessment and Plan:    1. Toxic metabolic Encephalopathy: Improved.  ? Underlying Dementia with Medication side effect vs. SHONNA vs. Dehydration.  ID following: Risk of encephalopathy similar with all Cephalosporins. Dr. Lau agreed to a trial of Ceftazidime.   Pan cultures: UA & Culture positive for yeast. No need to treat.   Psych following: delirium appears to have resolved with underlying severe Neurocognitive disorder.   Seroquel 25 mg po q 12 prn for anxiety and agitation.      2. SHONNA on CKD 3:   ? SHONNA, ATN vs obstruction vs AIN ( Urine eosinophils suggestive) vs prerenal.   Nephrology following: appreciate input. Recommendations noted.  d/c IV fluid. Creatinine trending down 3.1-->2.4  Monitor ins and out & BMP daily  Voiding trial today.  avoid nephrotoxics     2. RT third metatarsal OM:  s/p Amputation.  Podiatry following: Local wound care with Santyl and moist dressing.  Continue IV Ceftazidime for now. STOP date 2/27/19.  PICC placed on 1/16        3. PAD:  s/p Angiogram 1/9/19.  Vascular recommended follow up in 2 weeks.        4. Chronic DVT:  Duplex LE venous: Chronic-appearing right common femoral and femoral deep venous thromboses; left popliteal deep venous thrombosis  Eliquis discontinued due to worsening renal function and possibility of having a Renal Biopsy.    on IV heparin gtt - Monitor PTT and adjust rate.      5. Chronic Normocytic Anemia: due to ACD  Goal hgb >7 g/dl.  Hgb stable.      6. Hypercalcemia:  Improved with IV hydration.  Vitamin D and PTH levels low. Follow up PTHrP.  Monitor levels.        7. Rheumatoid Arthritis:  Continue home Prednisone 5mg Daily.  Pain control.      8. DM II:  Hemoglobin A1C, Whole Blood: 6.3% (01.12.19 @ 08:08)  Monitor finger sticks. Insulin coverage if fingersticks are greater than 180.      9. Latent TB:  Rifampin discontinued by ID.  Per HCP this was started 2 months ago in anticipation of starting ? Biologic DMARD.      10. Gout:  Continue Allopurinol.      11.  Bilateral Chronic Nonhealing Ulcers:  Full thickness wounds s/p debridement by burn 1/9/19.  Continue local wound care: Xeroform dsd kelrix q24h.      12. Chronic Pain:  Continue Neurontin / Cymbalta and oxycodone prn.  Hold Off Oxycontin for now.  oob to chair daily  pt to see pt daily      DVT ppx: Heparin drip.   Code Status: DNR/DNI.  Disposition: NH when stable.  Pt has 20 days left at [facility]      Progress Note Handoff  Pending:  Consults  Tests  Results  Family Discussion:  Disposition: Home___/SNF____/Other______________/Unknown at this time_____

## 2019-02-06 NOTE — PROGRESS NOTE ADULT - SUBJECTIVE AND OBJECTIVE BOX
[place] NEPHROLOGY FOLLOW UP NOTE  --------------------------------------------------------------------------------  24 hour events/subjective: Patient examined. Appears comfortable.    PAST HISTORY  --------------------------------------------------------------------------------  No significant changes to PMH, PSH, FHx, SHx, unless otherwise noted    ALLERGIES & MEDICATIONS  --------------------------------------------------------------------------------  Allergies    clindamycin (Unknown)  erythromycin (Unknown)  penicillin (Unknown)  strawberry (Unknown)    Standing Inpatient Medications  allopurinol 100 milliGRAM(s) Oral daily  cefTAZidime  IVPB 500 milliGRAM(s) IV Intermittent every 24 hours  chlorhexidine 4% Liquid 1 Application(s) Topical <User Schedule>  collagenase Ointment 1 Application(s) Topical daily  docusate sodium 100 milliGRAM(s) Oral two times a day  DULoxetine 30 milliGRAM(s) Oral daily  folic acid 1 milliGRAM(s) Oral daily  gabapentin 100 milliGRAM(s) Oral every 8 hours  heparin  Infusion. 1200 Unit(s)/Hr IV Continuous <Continuous>  leucovorin 5 milliGRAM(s) Oral daily  polyethylene glycol 3350 17 Gram(s) Oral daily  predniSONE   Tablet 5 milliGRAM(s) Oral daily  senna 2 Tablet(s) Oral at bedtime  sodium chloride 0.9%. 1000 milliLiter(s) IV Continuous <Continuous>  tamsulosin 0.4 milliGRAM(s) Oral at bedtime    PRN Inpatient Medications  acetaminophen   Tablet .. 975 milliGRAM(s) Oral every 6 hours PRN  bisacodyl Suppository 10 milliGRAM(s) Rectal daily PRN  oxyCODONE    IR 5 milliGRAM(s) Oral every 6 hours PRN  QUEtiapine 25 milliGRAM(s) Oral every 12 hours PRN    VITALS/PHYSICAL EXAM  --------------------------------------------------------------------------------  T(C): 35.9 (02-06-19 @ 06:14), Max: 36.1 (02-05-19 @ 14:07)  HR: 86 (02-06-19 @ 06:14) (86 - 88)  BP: 130/60 (02-06-19 @ 06:14) (130/60 - 138/80)  RR: 16 (02-06-19 @ 06:14) (16 - 16)    02-05-19 @ 07:01  -  02-06-19 @ 07:00  --------------------------------------------------------  IN: 240 mL / OUT: 500 mL / NET: -260 mL      Physical Exam:  	Gen: NAD  	Pulm: CTA B/L  	CV: RRR, S1S2  	Abd: +BS, soft, nontender/nondistended  	: No suprapubic tenderness  	LE: Warm, FROM, no clubbing, intact strength; no edema  	Vascular access:    LABS/STUDIES  --------------------------------------------------------------------------------              8.3    7.27  >-----------<  298      [02-05-19 @ 09:30]              26.4     141  |  106  |  47  ----------------------------<  129      [02-05-19 @ 09:30]  3.5   |  20  |  2.5        Ca     10.8     [02-05-19 @ 09:30]    PTT: 58.2       [02-05-19 @ 20:00]    Creatinine Trend:  SCr 2.5 [02-05 @ 09:30]  SCr 2.9 [02-04 @ 10:20]  SCr 3.0 [02-03 @ 09:18]  SCr 3.0 [02-02 @ 08:57]  SCr 3.0 [02-01 @ 07:32]    Urinalysis - [01-31-19 @ 13:57]      Color Yellow / Appearance Clear / SG >=1.030 / pH 6.0      Gluc Negative / Ketone Negative  / Bili Negative / Urobili 0.2       Blood Moderate / Protein 100 / Leuk Est Moderate / Nitrite Negative      RBC 26-50 / WBC 26-50 / Hyaline  / Gran 3-5 / Sq Epi  / Non Sq Epi  / Bacteria     Urine Creatinine 71      [01-30-19 @ 14:22]  Urine Protein 83      [01-30-19 @ 14:22]  Urine Sodium 64.0      [01-30-19 @ 14:22]    Iron 26, TIBC 120, %sat 22      [01-31-19 @ 09:22]  Ferritin 428      [01-31-19 @ 09:22]  PTH -- (Ca 10.5)      [01-30-19 @ 06:30]   8  Vitamin D (25OH) 20      [01-31-19 @ 09:22]  HbA1c 6.3      [01-12-19 @ 08:08]  TSH 5.03      [02-02-19 @ 08:57]  Lipid: chol 157, , HDL 46, LDL 77      [06-09-18 @ 09:46]    HBsAg Nonreact      [02-05-19 @ 09:30]  HCV 0.06, Nonreact      [02-05-19 @ 09:30]

## 2019-02-06 NOTE — PROGRESS NOTE ADULT - SUBJECTIVE AND OBJECTIVE BOX
LINCOLN HEREDIA  78y  Female    Patient is a 78y old  Female who presents with a chief complaint of Change in mental status.      INTERVAL HPI/OVERNIGHT EVENTS:  No interval events.   Patient awake & alert, sitting comfortably in chair.  Asking why her hands are trembling  bowman removed this morning - voiding trial  Still with periods of confusion.      REVIEW OF SYSTEMS: UTO fully due to confusion.  CONSTITUTIONAL: No fever  ENMT:  No throat pain  RESPIRATORY: No cough, No shortness of breath  CARDIOVASCULAR: No chest pain  GASTROINTESTINAL: No abdominal pain. No nausea, vomiting, No diarrhea or constipation.   GENITOURINARY: No dysuria  NEUROLOGICAL: No headaches, generalized weakness  MUSCULOSKELETAL: Back and bilateral LE pain.       VITALS:  Vital Signs Last 24 Hrs  T(C): 35.9 (06 Feb 2019 06:14), Max: 36.1 (05 Feb 2019 14:07)  T(F): 96.7 (06 Feb 2019 06:14), Max: 97 (05 Feb 2019 22:09)  HR: 86 (06 Feb 2019 06:14) (86 - 88)  BP: 130/60 (06 Feb 2019 06:14) (130/60 - 138/80)  BP(mean): --  RR: 16 (06 Feb 2019 06:14) (16 - 16)  SpO2: --      I&O's Summary  05 Feb 2019 07:01  -  06 Feb 2019 07:00  --------------------------------------------------------  IN: 240 mL / OUT: 500 mL / NET: -260 mL    06 Feb 2019 07:01  -  06 Feb 2019 11:06  --------------------------------------------------------  IN: 0 mL / OUT: 275 mL / NET: -275 mL        PHYSICAL EXAM:  GENERAL: NAD  HEAD:  Atraumatic, Normocephalic  EYES: conjunctiva and sclera clear  ENMT: Moist mucous membranes  NECK: Supple, Normal thyroid  NERVOUS SYSTEM:  Awake & Alert, oriented x 2-3, Moves all extremities.   CHEST/LUNG: Clear to auscultation bilaterally; No rales, rhonchi, wheezing, or rubs  HEART: Regular rate and rhythm; No murmurs, rubs, or gallops  ABDOMEN: Soft, Nontender, Nondistended; Bowel sounds present  EXTREMITIES:  2+ Peripheral Pulses, No clubbing or cyanosis. +1 edema b/l LE  LYMPH: No lymphadenopathy noted  SKIN: Right 3rd toe amputation with purulent drainage. Left leg ulcer: dressing in place.    Bowman draining clear urine.    Consultant(s) Notes Reviewed:  [x ] YES  [ ] NO  Care Discussed with Consultants/Other Providers [ x] YES  [ ] NO    LABS:                                         8.3    6.20  )-----------( 263      ( 06 Feb 2019 07:50 )             27.2     02-06    144  |  111<H>  |  40<H>  ----------------------------<  124<H>  3.9   |  22  |  2.4<H>    Ca    10.5<H>      06 Feb 2019 07:50        Vitamin D, 1, 25-Dihydroxy: 12.4 pg/mL (01.31.19 @ 14:01)  Vitamin D, 25-Hydroxy: 20 ng/mL (01.31.19 @ 09:22)  Intact PTH: 8 pg/mL (01.30.19 @ 06:30)      PTT - ( 06 Feb 2019 07:50 )  PTT:48.6 sec      MICROBIOLOGY:   Culture - Urine (01.31.19 @ 13:57)    Specimen Source: .Urine Clean Catch (Midstream)    Culture Results:   50,000 - 99,000 CFU/mL Yeast-like cells, presumptively not Candida  albicans "Susceptibilities not performed"      Culture - Urine (01.28.19 @ 17:20)    Specimen Source: .Urine Clean Catch (Midstream)    Culture Results: No growth      Culture - Blood (01.28.19 @ 14:16)    Specimen Source: .Blood Blood-Peripheral    Culture Results:   No growth to date.      Culture - Blood (01.28.19 @ 14:14)    Specimen Source: .Blood Blood-Peripheral    Culture Results:   No growth to date.      Culture - Other (01.10.19 @ 14:00)    -  Ciprofloxacin: R >2    -  Gentamicin: R >8    -  Imipenem: R >8    -  Levofloxacin: R >4    -  Meropenem: R >8    -  Piperacillin/Tazobactam: S 16    -  Tobramycin: R >8    -  Aztreonam: I 16    -  Ceftazidime: S 4    -  Cefepime: S 8    -  Amikacin: S 16    Specimen Source: .Other None    Culture Results:   Few Pseudomonas aeruginosa (Carbapenem Resistant)    Organism Identification: Pseudomonas aeruginosa (Carbapenem Resistant)    Organism: Pseudomonas aeruginosa (Carbapenem Resistant)    Method Type: SHANNAN      RADIOLOGY & ADDITIONAL TESTS:  CT Head No Cont (01.28.19 @ 15:54)   1.  The study is limited by motion artifact.    2.  Cerebral atrophy.    3.  Periventricular white matter hypodensities, nonspecific, differential   diagnostic possibilities include ischemic change, gliosis or  demyelination.      X-ray Chest 1 View-PORTABLE IMMEDIATE (01.28.19 @ 13:42)   Support devices: Left-sided PICC with the tip in the distal SVC.    Cardiac/mediastinum/hilum: Grossly stable.    Lung parenchyma/Pleura: Low lung volumes limiting evaluation. No definite   airspace opacity or effusion. Left lower lobe scar/atelectasis.    Skeleton/soft tissues: Stable.    Impression:      Low lung volumes limiting evaluation. No definite airspace opacity or   effusion. Left lower lobe scar/atelectasis.      Imaging Personally Reviewed:  [x] YES  [ ] NO    MEDICATIONS  (STANDING):  allopurinol 100 milliGRAM(s) Oral daily  cefTAZidime  IVPB 500 milliGRAM(s) IV Intermittent every 24 hours  chlorhexidine 4% Liquid 1 Application(s) Topical <User Schedule>  collagenase Ointment 1 Application(s) Topical daily  docusate sodium 100 milliGRAM(s) Oral two times a day  DULoxetine 30 milliGRAM(s) Oral daily  folic acid 1 milliGRAM(s) Oral daily  gabapentin 100 milliGRAM(s) Oral every 8 hours  heparin  Infusion. 1200 Unit(s)/Hr (12 mL/Hr) IV Continuous <Continuous>  leucovorin 5 milliGRAM(s) Oral daily  polyethylene glycol 3350 17 Gram(s) Oral daily  predniSONE   Tablet 5 milliGRAM(s) Oral daily  senna 2 Tablet(s) Oral at bedtime  tamsulosin 0.4 milliGRAM(s) Oral at bedtime    MEDICATIONS  (PRN):  acetaminophen   Tablet .. 975 milliGRAM(s) Oral every 6 hours PRN Moderate Pain (4 - 6)  bisacodyl Suppository 10 milliGRAM(s) Rectal daily PRN Constipation  oxyCODONE    IR 5 milliGRAM(s) Oral every 6 hours PRN Severe Pain (7 - 10)  QUEtiapine 25 milliGRAM(s) Oral every 12 hours PRN Anxiety/Agitation      HEALTH ISSUES - PROBLEM Dx:  Other specified diabetes mellitus with other specified complication, unspecified whether long term insulin use  Rheumatoid arteritis  DVT (deep venous thrombosis)  HTN (hypertension)  Gout  Altered mental status  Osteomyelitis right foot.

## 2019-02-06 NOTE — PROGRESS NOTE ADULT - ASSESSMENT
1. SHONNA, ATN vs obstruction vs AIN   2. CKD III  3. Hypercalcemia, PTH suppressed  4. R foot OM    Plan:  Eosinophiluria is suspicious for AIN  Monitor renal function   Hold off on kidney biopsy as renal function is improving  Stop IVF  Daily BMP  No NSAIDs  No IV contrast

## 2019-02-06 NOTE — CHART NOTE - NSCHARTNOTEFT_GEN_A_CORE
hep gtt inadevertantly set at 25 ml/hr for approx 2 hours. held gtt, check PTT; found to be 100s.    will continue to hold hep gtt, repeat PTT at 19:00  if within goal, will resume hep gtt at 12 cc/hr  d/w RN

## 2019-02-07 LAB
ALBUMIN SERPL ELPH-MCNC: 3 G/DL — LOW (ref 3.5–5.2)
ALP SERPL-CCNC: 47 U/L — SIGNIFICANT CHANGE UP (ref 30–115)
ALT FLD-CCNC: 6 U/L — SIGNIFICANT CHANGE UP (ref 0–41)
ANA PAT FLD IF-IMP: ABNORMAL
ANA TITR SER: ABNORMAL
ANION GAP SERPL CALC-SCNC: 14 MMOL/L — SIGNIFICANT CHANGE UP (ref 7–14)
APTT BLD: 64.5 SEC — HIGH (ref 27–39.2)
APTT BLD: 68.3 SEC — HIGH (ref 27–39.2)
AST SERPL-CCNC: 13 U/L — SIGNIFICANT CHANGE UP (ref 0–41)
AUTO DIFF PNL BLD: ABNORMAL
BILIRUB SERPL-MCNC: <0.2 MG/DL — SIGNIFICANT CHANGE UP (ref 0.2–1.2)
BUN SERPL-MCNC: 37 MG/DL — HIGH (ref 10–20)
CALCIUM SERPL-MCNC: 11 MG/DL — HIGH (ref 8.5–10.1)
CHLORIDE SERPL-SCNC: 111 MMOL/L — HIGH (ref 98–110)
CO2 SERPL-SCNC: 19 MMOL/L — SIGNIFICANT CHANGE UP (ref 17–32)
CREAT SERPL-MCNC: 2.4 MG/DL — HIGH (ref 0.7–1.5)
GLUCOSE BLDC GLUCOMTR-MCNC: 134 MG/DL — HIGH (ref 70–99)
GLUCOSE BLDC GLUCOMTR-MCNC: 176 MG/DL — HIGH (ref 70–99)
GLUCOSE BLDC GLUCOMTR-MCNC: 178 MG/DL — HIGH (ref 70–99)
GLUCOSE BLDC GLUCOMTR-MCNC: 228 MG/DL — HIGH (ref 70–99)
GLUCOSE SERPL-MCNC: 132 MG/DL — HIGH (ref 70–99)
HCT VFR BLD CALC: 29.2 % — LOW (ref 37–47)
HGB BLD-MCNC: 9 G/DL — LOW (ref 12–16)
INR BLD: 1.05 RATIO — SIGNIFICANT CHANGE UP (ref 0.65–1.3)
MCHC RBC-ENTMCNC: 28.6 PG — SIGNIFICANT CHANGE UP (ref 27–31)
MCHC RBC-ENTMCNC: 30.8 G/DL — LOW (ref 32–37)
MCV RBC AUTO: 92.7 FL — SIGNIFICANT CHANGE UP (ref 81–99)
NRBC # BLD: 0 /100 WBCS — SIGNIFICANT CHANGE UP (ref 0–0)
PLATELET # BLD AUTO: 286 K/UL — SIGNIFICANT CHANGE UP (ref 130–400)
POTASSIUM SERPL-MCNC: 4.3 MMOL/L — SIGNIFICANT CHANGE UP (ref 3.5–5)
POTASSIUM SERPL-SCNC: 4.3 MMOL/L — SIGNIFICANT CHANGE UP (ref 3.5–5)
PROT SERPL-MCNC: 5.3 G/DL — LOW (ref 6–8)
PROTHROM AB SERPL-ACNC: 12.1 SEC — SIGNIFICANT CHANGE UP (ref 9.95–12.87)
RBC # BLD: 3.15 M/UL — LOW (ref 4.2–5.4)
RBC # FLD: 16.2 % — HIGH (ref 11.5–14.5)
SODIUM SERPL-SCNC: 144 MMOL/L — SIGNIFICANT CHANGE UP (ref 135–146)
WBC # BLD: 8.89 K/UL — SIGNIFICANT CHANGE UP (ref 4.8–10.8)
WBC # FLD AUTO: 8.89 K/UL — SIGNIFICANT CHANGE UP (ref 4.8–10.8)

## 2019-02-07 RX ADMIN — Medication 100 MILLIGRAM(S): at 17:39

## 2019-02-07 RX ADMIN — Medication 5 MILLIGRAM(S): at 11:15

## 2019-02-07 RX ADMIN — Medication 1 MILLIGRAM(S): at 11:15

## 2019-02-07 RX ADMIN — Medication 100 MILLIGRAM(S): at 11:15

## 2019-02-07 RX ADMIN — DULOXETINE HYDROCHLORIDE 30 MILLIGRAM(S): 30 CAPSULE, DELAYED RELEASE ORAL at 11:15

## 2019-02-07 RX ADMIN — Medication 1 APPLICATION(S): at 11:16

## 2019-02-07 RX ADMIN — Medication 2: at 12:51

## 2019-02-07 RX ADMIN — GABAPENTIN 100 MILLIGRAM(S): 400 CAPSULE ORAL at 13:35

## 2019-02-07 RX ADMIN — CHLORHEXIDINE GLUCONATE 1 APPLICATION(S): 213 SOLUTION TOPICAL at 11:18

## 2019-02-07 RX ADMIN — Medication 1: at 17:39

## 2019-02-07 RX ADMIN — POLYETHYLENE GLYCOL 3350 17 GRAM(S): 17 POWDER, FOR SOLUTION ORAL at 11:17

## 2019-02-07 NOTE — PROGRESS NOTE ADULT - ASSESSMENT
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 She was admitted on 12/19/18 to the [facility] and managed for OM of the Right 3rd digit OM (cultures positive for Carbapenem Resistant Pseudomonas) and discharged on Cefepime for a total of 6 weeks.     Per Dr. Monge's note:   Patient has been having intermittent episodes of confusions since discharge from the hospital on 1/19. Reported that there are times where pt is AAOx3 and other times where she only repeats her name. Patients condition worsened in the past few days when she was told that she may not be able to remain in the NH due to insurance issues. Patient was seen by psych and prescribed Abilify.       Assessment and Plan:    1. Toxic metabolic Encephalopathy: Improved.  ? Underlying Dementia with Medication side effect vs. SHONNA vs. Dehydration.  ID following: Risk of encephalopathy similar with all Cephalosporins. Dr. Lau agreed to a trial of Ceftazidime.   Pan cultures: UA & Culture positive for yeast. No need to treat.   Psych following: delirium appears to have resolved with underlying severe Neurocognitive disorder.   Seroquel 25 mg po q 12 prn for anxiety and agitation.      2. SHONNA on CKD 3:   ? SHONNA, ATN vs obstruction vs AIN ( Urine eosinophils suggestive) vs prerenal.   Nephrology following: appreciate input. Recommendations noted.  d/c IV fluid. Creatinine trending down 3.1-->2.4  Monitor ins and out & BMP daily  Voiding trial today.  avoid nephrotoxics     2. RT third metatarsal OM:  s/p Amputation.  Podiatry following: Local wound care with Santyl and moist dressing.  Continue IV Ceftazidime for now. STOP date 2/27/19.  PICC placed on 1/16        3. PAD:  s/p Angiogram 1/9/19.  Vascular recommended follow up in 2 weeks.        4. Chronic DVT:  Duplex LE venous: Chronic-appearing right common femoral and femoral deep venous thromboses; left popliteal deep venous thrombosis  Eliquis discontinued due to worsening renal function and possibility of having a Renal Biopsy.    on IV heparin gtt - Monitor PTT and adjust rate daily      5. Chronic Normocytic Anemia: due to ACD  Goal hgb >7 g/dl.  Hgb stable.      6. Hypercalcemia:  Improved with IV hydration.  Vitamin D and PTH levels low. Follow up PTHrP.  Monitor levels and restart IVF if elevated tomorrow        7. Rheumatoid Arthritis:  Continue home Prednisone 5mg Daily.  Pain control.      8. DM II:  Hemoglobin A1C, Whole Blood: 6.3% (01.12.19 @ 08:08)  Monitor finger sticks. Insulin coverage if fingersticks are greater than 180.      9. Latent TB:  Rifampin discontinued by ID.  Per HCP this was started 2 months ago in anticipation of starting ? Biologic DMARD.      10. Gout:  Continue Allopurinol.      11.  Bilateral Chronic Nonhealing Ulcers:  Full thickness wounds s/p debridement by burn 1/9/19.  Continue local wound care: Xeroform dsd kelrix q24h.      12. Chronic Pain:  Continue Neurontin / Cymbalta and oxycodone prn.  Hold Off Oxycontin for now.  oob to chair daily via [place]  pt to see pt daily      DVT ppx: Heparin drip.   Code Status: DNR/DNI.  Disposition: NH when stable.  Pt has 20 days left at [facility]      Progress Note Handoff  Pending:  Consults  Tests monitoring BMP  Results  Family Discussion:  Disposition: Home___/SNF_x___/Other______________/Unknown at this time_____

## 2019-02-07 NOTE — PROGRESS NOTE ADULT - SUBJECTIVE AND OBJECTIVE BOX
LINCOLN HEREDIA  78y  Female    Patient is a 78y old  Female who presents with change in mental status.      INTERVAL HPI/OVERNIGHT EVENTS:  No interval events.   Patient awake & alert, sitting comfortably in chair.  Asking why her hands are trembling  bowman removed yesterday- voiding well  Still with periods of confusion      REVIEW OF SYSTEMS: UTO fully due to confusion.  CONSTITUTIONAL: No fever  ENMT:  No throat pain  RESPIRATORY: No cough, No shortness of breath  CARDIOVASCULAR: No chest pain  GASTROINTESTINAL: No abdominal pain. No nausea, vomiting, No diarrhea or constipation.   GENITOURINARY: No dysuria  NEUROLOGICAL: No headaches, generalized weakness  MUSCULOSKELETAL: Back and bilateral LE pain.       VITALS:  Vital Signs Last 24 Hrs  T(C): 36.7 (07 Feb 2019 05:50), Max: 36.7 (07 Feb 2019 05:50)  T(F): 98.1 (07 Feb 2019 05:50), Max: 98.1 (07 Feb 2019 05:50)  HR: 81 (07 Feb 2019 05:50) (81 - 84)  BP: 156/83 (07 Feb 2019 05:50) (142/66 - 156/83)  BP(mean): --  RR: 16 (07 Feb 2019 05:50) (16 - 16)  SpO2: --      I&O's Summary      06 Feb 2019 07:01  -  07 Feb 2019 07:00  --------------------------------------------------------  IN: 0 mL / OUT: 775 mL / NET: -775 mL            PHYSICAL EXAM:  GENERAL: NAD  HEAD:  Atraumatic, Normocephalic  EYES: conjunctiva and sclera clear  ENMT: Moist mucous membranes  NECK: Supple, Normal thyroid  NERVOUS SYSTEM:  Awake & Alert, oriented x 2-3, Moves all extremities.   CHEST/LUNG: Clear to auscultation bilaterally; No rales, rhonchi, wheezing, or rubs  HEART: Regular rate and rhythm; No murmurs, rubs, or gallops  ABDOMEN: Soft, Nontender, Nondistended; Bowel sounds present  EXTREMITIES:  2+ Peripheral Pulses, No clubbing or cyanosis. +1 edema b/l LE  LYMPH: No lymphadenopathy noted  SKIN: Right 3rd toe amputation with purulent drainage. Left leg ulcer: dressing in place.    Consultant(s) Notes Reviewed:  [x ] YES  [ ] NO  Care Discussed with Consultants/Other Providers [ x] YES  [ ] NO    LABS:                                   9.0    8.89  )-----------( 286      ( 07 Feb 2019 08:05 )             29.2     02-07    144  |  111<H>  |  37<H>  ----------------------------<  132<H>  4.3   |  19  |  2.4<H>    Ca    11.0<H>      07 Feb 2019 08:05    TPro  5.3<L>  /  Alb  3.0<L>  /  TBili  <0.2  /  DBili  x   /  AST  13  /  ALT  6   /  AlkPhos  47  02-07      Vitamin D, 1, 25-Dihydroxy: 12.4 pg/mL (01.31.19 @ 14:01)  Vitamin D, 25-Hydroxy: 20 ng/mL (01.31.19 @ 09:22)  Intact PTH: 8 pg/mL (01.30.19 @ 06:30)      PT/INR - ( 07 Feb 2019 08:05 )   PT: 12.10 sec;   INR: 1.05 ratio    PTT - ( 07 Feb 2019 08:05 )  PTT:64.5 sec      MICROBIOLOGY:   Culture - Urine (01.31.19 @ 13:57)    Specimen Source: .Urine Clean Catch (Midstream)    Culture Results:   50,000 - 99,000 CFU/mL Yeast-like cells, presumptively not Candida  albicans "Susceptibilities not performed"      Culture - Urine (01.28.19 @ 17:20)    Specimen Source: .Urine Clean Catch (Midstream)    Culture Results: No growth      Culture - Blood (01.28.19 @ 14:16)    Specimen Source: .Blood Blood-Peripheral    Culture Results:   No growth to date.      Culture - Blood (01.28.19 @ 14:14)    Specimen Source: .Blood Blood-Peripheral    Culture Results:   No growth to date.      Culture - Other (01.10.19 @ 14:00)    -  Ciprofloxacin: R >2    -  Gentamicin: R >8    -  Imipenem: R >8    -  Levofloxacin: R >4    -  Meropenem: R >8    -  Piperacillin/Tazobactam: S 16    -  Tobramycin: R >8    -  Aztreonam: I 16    -  Ceftazidime: S 4    -  Cefepime: S 8    -  Amikacin: S 16    Specimen Source: .Other None    Culture Results:   Few Pseudomonas aeruginosa (Carbapenem Resistant)    Organism Identification: Pseudomonas aeruginosa (Carbapenem Resistant)    Organism: Pseudomonas aeruginosa (Carbapenem Resistant)    Method Type: SHANNAN      RADIOLOGY & ADDITIONAL TESTS:  CT Head No Cont (01.28.19 @ 15:54)   1.  The study is limited by motion artifact.    2.  Cerebral atrophy.    3.  Periventricular white matter hypodensities, nonspecific, differential   diagnostic possibilities include ischemic change, gliosis or  demyelination.      X-ray Chest 1 View-PORTABLE IMMEDIATE (01.28.19 @ 13:42)   Support devices: Left-sided PICC with the tip in the distal SVC.    Cardiac/mediastinum/hilum: Grossly stable.    Lung parenchyma/Pleura: Low lung volumes limiting evaluation. No definite   airspace opacity or effusion. Left lower lobe scar/atelectasis.    Skeleton/soft tissues: Stable.    Impression:      Low lung volumes limiting evaluation. No definite airspace opacity or   effusion. Left lower lobe scar/atelectasis.      Imaging Personally Reviewed:  [x] YES  [ ] NO    MEDICATIONS  (STANDING):  allopurinol 100 milliGRAM(s) Oral daily  cefTAZidime  IVPB 500 milliGRAM(s) IV Intermittent every 24 hours  chlorhexidine 4% Liquid 1 Application(s) Topical <User Schedule>  collagenase Ointment 1 Application(s) Topical daily  dextrose 5%. 1000 milliLiter(s) (50 mL/Hr) IV Continuous <Continuous>  dextrose 50% Injectable 12.5 Gram(s) IV Push once  dextrose 50% Injectable 25 Gram(s) IV Push once  dextrose 50% Injectable 25 Gram(s) IV Push once  docusate sodium 100 milliGRAM(s) Oral two times a day  DULoxetine 30 milliGRAM(s) Oral daily  folic acid 1 milliGRAM(s) Oral daily  gabapentin 100 milliGRAM(s) Oral every 8 hours  heparin  Infusion.  Unit(s)/Hr (14 mL/Hr) IV Continuous <Continuous>  insulin lispro (HumaLOG) corrective regimen sliding scale   SubCutaneous three times a day before meals  insulin lispro (HumaLOG) corrective regimen sliding scale   SubCutaneous at bedtime  leucovorin 5 milliGRAM(s) Oral daily  polyethylene glycol 3350 17 Gram(s) Oral daily  predniSONE   Tablet 5 milliGRAM(s) Oral daily  senna 2 Tablet(s) Oral at bedtime  tamsulosin 0.4 milliGRAM(s) Oral at bedtime    MEDICATIONS  (PRN):  acetaminophen   Tablet .. 975 milliGRAM(s) Oral every 6 hours PRN Moderate Pain (4 - 6)  bisacodyl Suppository 10 milliGRAM(s) Rectal daily PRN Constipation  dextrose 40% Gel 15 Gram(s) Oral once PRN Blood Glucose LESS THAN 70 milliGRAM(s)/deciliter  glucagon  Injectable 1 milliGRAM(s) IntraMuscular once PRN Glucose LESS THAN 70 milligrams/deciliter  oxyCODONE    IR 5 milliGRAM(s) Oral every 6 hours PRN Severe Pain (7 - 10)  QUEtiapine 25 milliGRAM(s) Oral every 12 hours PRN Anxiety/Agitation      HEALTH ISSUES - PROBLEM Dx:  Other specified diabetes mellitus with other specified complication, unspecified whether long term insulin use  Rheumatoid arteritis  DVT (deep venous thrombosis)  HTN (hypertension)  Gout  Altered mental status  Osteomyelitis right foot.

## 2019-02-07 NOTE — PROGRESS NOTE ADULT - SUBJECTIVE AND OBJECTIVE BOX
Podiatry Pre-Operative Note   LINCOLN HEREDIA is a 78y year old Female patient presenting to the operating room for surgical management of the right foot. The patient has failed all conservative measures and requires surgical intervention at this time.       PAST MEDICAL & SURGICAL HISTORY:  Gout  DVT (deep venous thrombosis)  HTN (hypertension)  Rheumatoid arteritis  Other specified diabetes mellitus with other specified complication, unspecified whether long term insulin use  Primary osteoarthritis of right knee: s/p knee repalcement      Medications:   acetaminophen   Tablet .. 975 milliGRAM(s) Oral every 6 hours PRN  allopurinol 100 milliGRAM(s) Oral daily  bisacodyl Suppository 10 milliGRAM(s) Rectal daily PRN  cefTAZidime  IVPB 500 milliGRAM(s) IV Intermittent every 24 hours  chlorhexidine 4% Liquid 1 Application(s) Topical <User Schedule>  collagenase Ointment 1 Application(s) Topical daily  dextrose 40% Gel 15 Gram(s) Oral once PRN  dextrose 5%. 1000 milliLiter(s) IV Continuous <Continuous>  dextrose 50% Injectable 12.5 Gram(s) IV Push once  dextrose 50% Injectable 25 Gram(s) IV Push once  dextrose 50% Injectable 25 Gram(s) IV Push once  docusate sodium 100 milliGRAM(s) Oral two times a day  DULoxetine 30 milliGRAM(s) Oral daily  folic acid 1 milliGRAM(s) Oral daily  gabapentin 100 milliGRAM(s) Oral every 8 hours  glucagon  Injectable 1 milliGRAM(s) IntraMuscular once PRN  heparin  Infusion.  Unit(s)/Hr IV Continuous <Continuous>  insulin lispro (HumaLOG) corrective regimen sliding scale   SubCutaneous three times a day before meals  insulin lispro (HumaLOG) corrective regimen sliding scale   SubCutaneous at bedtime  leucovorin 5 milliGRAM(s) Oral daily  oxyCODONE    IR 5 milliGRAM(s) Oral every 6 hours PRN  polyethylene glycol 3350 17 Gram(s) Oral daily  predniSONE   Tablet 5 milliGRAM(s) Oral daily  QUEtiapine 25 milliGRAM(s) Oral every 12 hours PRN  senna 2 Tablet(s) Oral at bedtime  tamsulosin 0.4 milliGRAM(s) Oral at bedtime    Allergies    clindamycin (Unknown)  erythromycin (Unknown)  penicillin (Unknown)  strawberry (Unknown)    Intolerances        Consent [x_]  H&P [x_]  Medical Clearance [x_]  EKG [x_]  CXR [x_]  UCG [n/a_]  T&S [x_]                          9.0    8.89  )-----------( 286      ( 07 Feb 2019 08:05 )             29.2     02-07    144  |  111<H>  |  37<H>  ----------------------------<  132<H>  4.3   |  19  |  2.4<H>    Ca    11.0<H>      07 Feb 2019 08:05    TPro  5.3<L>  /  Alb  3.0<L>  /  TBili  <0.2  /  DBili  x   /  AST  13  /  ALT  6   /  AlkPhos  47  02-07    PT/INR - ( 07 Feb 2019 08:05 )   PT: 12.10 sec;   INR: 1.05 ratio         PTT - ( 07 Feb 2019 08:05 )  PTT:64.5 sec    CAPILLARY BLOOD GLUCOSE      POCT Blood Glucose.: 176 mg/dL (07 Feb 2019 21:04)  POCT Blood Glucose.: 178 mg/dL (07 Feb 2019 16:13)  POCT Blood Glucose.: 228 mg/dL (07 Feb 2019 11:42)  POCT Blood Glucose.: 134 mg/dL (07 Feb 2019 07:38)            T(C): 36.1 (02-07-19 @ 22:06), Max: 36.7 (02-07-19 @ 05:50)  HR: 84 (02-07-19 @ 22:06) (81 - 92)  BP: 163/70 (02-07-19 @ 22:06) (142/65 - 163/70)  RR: 16 (02-07-19 @ 22:06) (16 - 16)  SpO2: --    Surgeon: Dr. estrada  Assistant (s): [facility] residents   Planned Procedure: excisional debridement soft tissue/bone w/ partial 2nd ray amp right foot    The patient has beenn educated on the above procedure, with all risks and benefits described. No guarentees were given or implied for the aforementioned procedure.  The above assistant(s) have introduced themselves to the patient. The patient consents to their participation in the procedure listed above.

## 2019-02-08 LAB
ALBUMIN SERPL ELPH-MCNC: 2.8 G/DL — LOW (ref 3.5–5.2)
ALLERGY+IMMUNOLOGY DIAG STUDY NOTE: SIGNIFICANT CHANGE UP
ALP SERPL-CCNC: 44 U/L — SIGNIFICANT CHANGE UP (ref 30–115)
ALT FLD-CCNC: <5 U/L — SIGNIFICANT CHANGE UP (ref 0–41)
ANION GAP SERPL CALC-SCNC: 13 MMOL/L — SIGNIFICANT CHANGE UP (ref 7–14)
APTT BLD: 63.3 SEC — HIGH (ref 27–39.2)
AST SERPL-CCNC: 7 U/L — SIGNIFICANT CHANGE UP (ref 0–41)
BILIRUB SERPL-MCNC: <0.2 MG/DL — SIGNIFICANT CHANGE UP (ref 0.2–1.2)
BUN SERPL-MCNC: 35 MG/DL — HIGH (ref 10–20)
CALCIUM SERPL-MCNC: 10.3 MG/DL — HIGH (ref 8.5–10.1)
CHLORIDE SERPL-SCNC: 110 MMOL/L — SIGNIFICANT CHANGE UP (ref 98–110)
CO2 SERPL-SCNC: 21 MMOL/L — SIGNIFICANT CHANGE UP (ref 17–32)
CREAT SERPL-MCNC: 2.3 MG/DL — HIGH (ref 0.7–1.5)
GBM IGG SER-ACNC: <1 AI — SIGNIFICANT CHANGE UP
GLUCOSE BLDC GLUCOMTR-MCNC: 168 MG/DL — HIGH (ref 70–99)
GLUCOSE BLDC GLUCOMTR-MCNC: 175 MG/DL — HIGH (ref 70–99)
GLUCOSE BLDC GLUCOMTR-MCNC: 189 MG/DL — HIGH (ref 70–99)
GLUCOSE BLDC GLUCOMTR-MCNC: 193 MG/DL — HIGH (ref 70–99)
GLUCOSE SERPL-MCNC: 145 MG/DL — HIGH (ref 70–99)
POTASSIUM SERPL-MCNC: 3.5 MMOL/L — SIGNIFICANT CHANGE UP (ref 3.5–5)
POTASSIUM SERPL-SCNC: 3.5 MMOL/L — SIGNIFICANT CHANGE UP (ref 3.5–5)
PROT SERPL-MCNC: 4.9 G/DL — LOW (ref 6–8)
SODIUM SERPL-SCNC: 144 MMOL/L — SIGNIFICANT CHANGE UP (ref 135–146)
TYPE + AB SCN PNL BLD: SIGNIFICANT CHANGE UP

## 2019-02-08 RX ORDER — WARFARIN SODIUM 2.5 MG/1
5 TABLET ORAL ONCE
Qty: 0 | Refills: 0 | Status: COMPLETED | OUTPATIENT
Start: 2019-02-08 | End: 2019-02-08

## 2019-02-08 RX ADMIN — Medication 5 MILLIGRAM(S): at 05:42

## 2019-02-08 RX ADMIN — Medication 1 APPLICATION(S): at 11:04

## 2019-02-08 RX ADMIN — Medication 1: at 08:42

## 2019-02-08 RX ADMIN — DULOXETINE HYDROCHLORIDE 30 MILLIGRAM(S): 30 CAPSULE, DELAYED RELEASE ORAL at 11:03

## 2019-02-08 RX ADMIN — WARFARIN SODIUM 5 MILLIGRAM(S): 2.5 TABLET ORAL at 21:11

## 2019-02-08 RX ADMIN — CHLORHEXIDINE GLUCONATE 1 APPLICATION(S): 213 SOLUTION TOPICAL at 05:43

## 2019-02-08 RX ADMIN — Medication 1: at 17:30

## 2019-02-08 RX ADMIN — GABAPENTIN 100 MILLIGRAM(S): 400 CAPSULE ORAL at 12:42

## 2019-02-08 RX ADMIN — POLYETHYLENE GLYCOL 3350 17 GRAM(S): 17 POWDER, FOR SOLUTION ORAL at 11:04

## 2019-02-08 RX ADMIN — SENNA PLUS 2 TABLET(S): 8.6 TABLET ORAL at 21:11

## 2019-02-08 RX ADMIN — Medication 1 MILLIGRAM(S): at 11:03

## 2019-02-08 RX ADMIN — GABAPENTIN 100 MILLIGRAM(S): 400 CAPSULE ORAL at 21:11

## 2019-02-08 RX ADMIN — CEFTAZIDIME 100 MILLIGRAM(S): 6 INJECTION, POWDER, FOR SOLUTION INTRAVENOUS at 05:43

## 2019-02-08 RX ADMIN — TAMSULOSIN HYDROCHLORIDE 0.4 MILLIGRAM(S): 0.4 CAPSULE ORAL at 21:11

## 2019-02-08 RX ADMIN — OXYCODONE HYDROCHLORIDE 5 MILLIGRAM(S): 5 TABLET ORAL at 21:11

## 2019-02-08 RX ADMIN — Medication 100 MILLIGRAM(S): at 11:03

## 2019-02-08 RX ADMIN — Medication 1: at 12:38

## 2019-02-08 RX ADMIN — Medication 100 MILLIGRAM(S): at 05:43

## 2019-02-08 RX ADMIN — GABAPENTIN 100 MILLIGRAM(S): 400 CAPSULE ORAL at 05:43

## 2019-02-08 RX ADMIN — Medication 100 MILLIGRAM(S): at 17:32

## 2019-02-08 RX ADMIN — Medication 5 MILLIGRAM(S): at 11:04

## 2019-02-08 NOTE — PROGRESS NOTE ADULT - PROBLEM SELECTOR PLAN 2
? sec to Cephalosporins  NO OTHER ABX choices  supportive care till done with abx  Recall if needed
on AB with ID f/u; continue with local care to LE wounds and also podiatry f/u of amputation site
significntly improved

## 2019-02-08 NOTE — PROGRESS NOTE ADULT - SUBJECTIVE AND OBJECTIVE BOX
Seeing Ms Gamino in geriatric follow up.    Her acute delirium has improved; her behavior is calmer and with IV AB and IVF her metabolic encephalopathy has improved; she is able to speak more clearly, more regularly.  She does have underlying dementia.    Furthermore her SHONNA on CKD has improved with decreasing creatinine.    She is post angiogram with plans for vascular f/u in 2 weeks.    OF NOTE SHE IS ON CHRONIC SYSTEMIC ANTICOAGULATION FOR RECURRENT DVTS.  SHE IS OFF ELOQUIS AND ON IV HEPARIN.    Comfortable in bed  afebrile  VSS  dressings intact to LE  no new focal changes

## 2019-02-08 NOTE — PROGRESS NOTE ADULT - SUBJECTIVE AND OBJECTIVE BOX
PODIATRY PROGRESS NOTE   [id number] LINCOLN HEREDIA is a pleasant well-nourished, well developed 78y Female in no acute distress, alert awake, and oriented to person, place and time.   Patient is a 78y old  Female who presents with a chief complaint of Encephalopathy (05 Feb 2019 13:02)    HPI:  · Chief Complaint: The patient is a 78y Female complaining of confusion.	  · HPI Objective Statement: 77 yo F with a DVT, HTN, DM, s/p 3rd right toe last month due to osteo, picc line with cefepime, presents with AMS. Sent in from NH. According to friend at bedside patient progressively becoming more agitated and altered over the last few days. Talking to self, shouting at people. Associated with cough. Denies fevers, chills, abd pain, NVCD, CP, SOB, HA. (28 Jan 2019 19:56)    pt was seen and assessed am rounds at bedside.  dressing clean dry intact.    Vital Signs Last 24 Hrs  T(C): 35.9 (08 Feb 2019 05:58), Max: 36.5 (07 Feb 2019 13:59)  T(F): 96.6 (08 Feb 2019 05:58), Max: 97.7 (07 Feb 2019 13:59)  HR: 92 (08 Feb 2019 05:58) (84 - 92)  BP: 154/75 (08 Feb 2019 05:58) (142/65 - 163/70)  BP(mean): --  RR: 16 (08 Feb 2019 05:58) (16 - 16)  SpO2: --                        9.0    8.89  )-----------( 286      ( 07 Feb 2019 08:05 )             29.2                 02-07    144  |  111<H>  |  37<H>  ----------------------------<  132<H>  4.3   |  19  |  2.4<H>    Ca    11.0<H>      07 Feb 2019 08:05    TPro  5.3<L>  /  Alb  3.0<L>  /  TBili  <0.2  /  DBili  x   /  AST  13  /  ALT  6   /  AlkPhos  47  02-07       Surgical site at this time wound appears stable at this time with no signs infection. suture intact proximal end of surgical site.   A:  s/p partial 3rd ray resection partially closed with no acute signs of infection   P:  Santyl with moist dressing to right foot wound as outpatient    Podiatry will follow q72h if patient in house   continue local wound care  02-08-19 @ 08:11

## 2019-02-08 NOTE — PROGRESS NOTE ADULT - SUBJECTIVE AND OBJECTIVE BOX
LINCOLN HEREDIA  78y  Female    Patient is a 78y old  Female who presents with change in mental status.      INTERVAL HPI/OVERNIGHT EVENTS:  No interval events.   Patient awake & alert, sitting comfortably in chair.      Vital Signs Last 24 Hrs  T(C): 35.9 (08 Feb 2019 05:58), Max: 36.5 (07 Feb 2019 13:59)  T(F): 96.6 (08 Feb 2019 05:58), Max: 97.7 (07 Feb 2019 13:59)  HR: 92 (08 Feb 2019 05:58) (84 - 92)  BP: 154/75 (08 Feb 2019 05:58) (142/65 - 163/70)  BP(mean): --  RR: 16 (08 Feb 2019 05:58) (16 - 16)    PHYSICAL EXAM:  GENERAL: NAD  HEAD:  Atraumatic, Normocephalic  EYES: conjunctiva and sclera clear  ENMT: Moist mucous membranes  NECK: Supple, Normal thyroid  NERVOUS SYSTEM:  Awake & Alert, oriented x 2-3, Moves all extremities.   CHEST/LUNG: Clear to auscultation bilaterally; No rales, rhonchi, wheezing, or rubs  CV/HEART: Regular rate and rhythm; No murmurs, rubs, or gallops  GI/ABDOMEN: Soft, Nontender, Nondistended; Bowel sounds present  EXTREMITIES:  2+ Peripheral Pulses, No clubbing or cyanosis. +1 edema b/l LE  LYMPH: No lymphadenopathy noted  SKIN: Right 3rd toe amputation with purulent drainage. Left leg ulcer: dressing in place.    Consultant(s) Notes Reviewed:  [x ] YES  [ ] NO  Care Discussed with Consultants/Other Providers [ x] YES  [ ] NO    LABS:            will follow am labs                          9.0    8.89  )-----------( 286      ( 07 Feb 2019 08:05 )             29.2     02-07    144  |  111<H>  |  37<H>  ----------------------------<  132<H>  4.3   |  19  |  2.4<H>    Ca    11.0<H>      07 Feb 2019 08:05    TPro  5.3<L>  /  Alb  3.0<L>  /  TBili  <0.2  /  DBili  x   /  AST  13  /  ALT  6   /  AlkPhos  47  02-07      Vitamin D, 1, 25-Dihydroxy: 12.4 pg/mL (01.31.19 @ 14:01)  Vitamin D, 25-Hydroxy: 20 ng/mL (01.31.19 @ 09:22)  Intact PTH: 8 pg/mL (01.30.19 @ 06:30)      PT/INR - ( 07 Feb 2019 08:05 )   PT: 12.10 sec;   INR: 1.05 ratio    PTT - ( 07 Feb 2019 08:05 )  PTT:64.5 sec      MICROBIOLOGY:   Culture - Urine (01.31.19 @ 13:57)    Specimen Source: .Urine Clean Catch (Midstream)    Culture Results:   50,000 - 99,000 CFU/mL Yeast-like cells, presumptively not Candida  albicans "Susceptibilities not performed"      Culture - Urine (01.28.19 @ 17:20)    Specimen Source: .Urine Clean Catch (Midstream)    Culture Results: No growth      Culture - Blood (01.28.19 @ 14:16)    Specimen Source: .Blood Blood-Peripheral    Culture Results:   No growth to date.      Culture - Blood (01.28.19 @ 14:14)    Specimen Source: .Blood Blood-Peripheral    Culture Results:   No growth to date.      Culture - Other (01.10.19 @ 14:00)    -  Ciprofloxacin: R >2    -  Gentamicin: R >8    -  Imipenem: R >8    -  Levofloxacin: R >4    -  Meropenem: R >8    -  Piperacillin/Tazobactam: S 16    -  Tobramycin: R >8    -  Aztreonam: I 16    -  Ceftazidime: S 4    -  Cefepime: S 8    -  Amikacin: S 16    Specimen Source: .Other None    Culture Results:   Few Pseudomonas aeruginosa (Carbapenem Resistant)    Organism Identification: Pseudomonas aeruginosa (Carbapenem Resistant)    Organism: Pseudomonas aeruginosa (Carbapenem Resistant)    Method Type: SHANNAN      RADIOLOGY & ADDITIONAL TESTS:  CT Head No Cont (01.28.19 @ 15:54)   1.  The study is limited by motion artifact.    2.  Cerebral atrophy.    3.  Periventricular white matter hypodensities, nonspecific, differential   diagnostic possibilities include ischemic change, gliosis or  demyelination.      X-ray Chest 1 View-PORTABLE IMMEDIATE (01.28.19 @ 13:42)   Support devices: Left-sided PICC with the tip in the distal SVC.    Cardiac/mediastinum/hilum: Grossly stable.    Lung parenchyma/Pleura: Low lung volumes limiting evaluation. No definite   airspace opacity or effusion. Left lower lobe scar/atelectasis.    Skeleton/soft tissues: Stable.    Impression:      Low lung volumes limiting evaluation. No definite airspace opacity or   effusion. Left lower lobe scar/atelectasis.      Imaging Personally Reviewed:  [x] YES  [ ] NO      HEALTH ISSUES - PROBLEM Dx:  Other specified diabetes mellitus with other specified complication, unspecified whether long term insulin use  Rheumatoid arteritis  DVT (deep venous thrombosis)  HTN (hypertension)  Gout  Altered mental status  Osteomyelitis right foot.          MEDICATIONS  (STANDING):  allopurinol 100 milliGRAM(s) Oral daily  cefTAZidime  IVPB 500 milliGRAM(s) IV Intermittent every 24 hours  chlorhexidine 4% Liquid 1 Application(s) Topical <User Schedule>  collagenase Ointment 1 Application(s) Topical daily  dextrose 5%. 1000 milliLiter(s) (50 mL/Hr) IV Continuous <Continuous>  dextrose 50% Injectable 12.5 Gram(s) IV Push once  dextrose 50% Injectable 25 Gram(s) IV Push once  dextrose 50% Injectable 25 Gram(s) IV Push once  docusate sodium 100 milliGRAM(s) Oral two times a day  DULoxetine 30 milliGRAM(s) Oral daily  folic acid 1 milliGRAM(s) Oral daily  gabapentin 100 milliGRAM(s) Oral every 8 hours  heparin  Infusion.  Unit(s)/Hr (14 mL/Hr) IV Continuous <Continuous>  insulin lispro (HumaLOG) corrective regimen sliding scale   SubCutaneous three times a day before meals  insulin lispro (HumaLOG) corrective regimen sliding scale   SubCutaneous at bedtime  leucovorin 5 milliGRAM(s) Oral daily  polyethylene glycol 3350 17 Gram(s) Oral daily  predniSONE   Tablet 5 milliGRAM(s) Oral daily  senna 2 Tablet(s) Oral at bedtime  tamsulosin 0.4 milliGRAM(s) Oral at bedtime    MEDICATIONS  (PRN):  acetaminophen   Tablet .. 975 milliGRAM(s) Oral every 6 hours PRN Moderate Pain (4 - 6)  bisacodyl Suppository 10 milliGRAM(s) Rectal daily PRN Constipation  dextrose 40% Gel 15 Gram(s) Oral once PRN Blood Glucose LESS THAN 70 milliGRAM(s)/deciliter  glucagon  Injectable 1 milliGRAM(s) IntraMuscular once PRN Glucose LESS THAN 70 milligrams/deciliter  oxyCODONE    IR 5 milliGRAM(s) Oral every 6 hours PRN Severe Pain (7 - 10)  QUEtiapine 25 milliGRAM(s) Oral every 12 hours PRN Anxiety/Agitation YANLINCOLN  78y  Female    Patient is a 78y old  Female who presents with change in mental status.      INTERVAL HPI/OVERNIGHT EVENTS:  No interval events.   Patient awake & alert, sitting comfortably in chair.  -refused PT at bedside, explained its needed for her placement and she still refused;  working on STR      Vital Signs Last 24 Hrs  T(C): 35.9 (08 Feb 2019 05:58), Max: 36.5 (07 Feb 2019 13:59)  T(F): 96.6 (08 Feb 2019 05:58), Max: 97.7 (07 Feb 2019 13:59)  HR: 92 (08 Feb 2019 05:58) (84 - 92)  BP: 154/75 (08 Feb 2019 05:58) (142/65 - 163/70)  BP(mean): --  RR: 16 (08 Feb 2019 05:58) (16 - 16)    PHYSICAL EXAM:  GENERAL: NAD  HEAD:  Atraumatic, Normocephalic  EYES: conjunctiva and sclera clear  ENMT: Moist mucous membranes  NECK: Supple, Normal thyroid  NERVOUS SYSTEM:  Awake & Alert, oriented x 2-3, Moves all extremities.   CHEST/LUNG: Clear to auscultation bilaterally; No rales, rhonchi, wheezing, or rubs  CV/HEART: Regular rate and rhythm; No murmurs, rubs, or gallops  GI/ABDOMEN: Soft, Nontender, Nondistended; Bowel sounds present  EXTREMITIES:  2+ Peripheral Pulses, No clubbing or cyanosis. +1 edema b/l LE  LYMPH: No lymphadenopathy noted  SKIN: Right 3rd toe amputation with purulent drainage. Left leg ulcer: dressing in place.    Consultant(s) Notes Reviewed:  [x ] YES  [ ] NO  Care Discussed with Consultants/Other Providers [ x] YES  [ ] NO    LABS:            will follow am labs                          9.0    8.89  )-----------( 286      ( 07 Feb 2019 08:05 )             29.2     02-07    144  |  111<H>  |  37<H>  ----------------------------<  132<H>  4.3   |  19  |  2.4<H>    Ca    11.0<H>      07 Feb 2019 08:05    TPro  5.3<L>  /  Alb  3.0<L>  /  TBili  <0.2  /  DBili  x   /  AST  13  /  ALT  6   /  AlkPhos  47  02-07      Vitamin D, 1, 25-Dihydroxy: 12.4 pg/mL (01.31.19 @ 14:01)  Vitamin D, 25-Hydroxy: 20 ng/mL (01.31.19 @ 09:22)  Intact PTH: 8 pg/mL (01.30.19 @ 06:30)      PT/INR - ( 07 Feb 2019 08:05 )   PT: 12.10 sec;   INR: 1.05 ratio    PTT - ( 07 Feb 2019 08:05 )  PTT:64.5 sec      MICROBIOLOGY:   Culture - Urine (01.31.19 @ 13:57)    Specimen Source: .Urine Clean Catch (Midstream)    Culture Results:   50,000 - 99,000 CFU/mL Yeast-like cells, presumptively not Candida  albicans "Susceptibilities not performed"      Culture - Urine (01.28.19 @ 17:20)    Specimen Source: .Urine Clean Catch (Midstream)    Culture Results: No growth      Culture - Blood (01.28.19 @ 14:16)    Specimen Source: .Blood Blood-Peripheral    Culture Results:   No growth to date.      Culture - Blood (01.28.19 @ 14:14)    Specimen Source: .Blood Blood-Peripheral    Culture Results:   No growth to date.      Culture - Other (01.10.19 @ 14:00)    -  Ciprofloxacin: R >2    -  Gentamicin: R >8    -  Imipenem: R >8    -  Levofloxacin: R >4    -  Meropenem: R >8    -  Piperacillin/Tazobactam: S 16    -  Tobramycin: R >8    -  Aztreonam: I 16    -  Ceftazidime: S 4    -  Cefepime: S 8    -  Amikacin: S 16    Specimen Source: .Other None    Culture Results:   Few Pseudomonas aeruginosa (Carbapenem Resistant)    Organism Identification: Pseudomonas aeruginosa (Carbapenem Resistant)    Organism: Pseudomonas aeruginosa (Carbapenem Resistant)    Method Type: SHANNAN      RADIOLOGY & ADDITIONAL TESTS:  CT Head No Cont (01.28.19 @ 15:54)   1.  The study is limited by motion artifact.    2.  Cerebral atrophy.    3.  Periventricular white matter hypodensities, nonspecific, differential   diagnostic possibilities include ischemic change, gliosis or  demyelination.      X-ray Chest 1 View-PORTABLE IMMEDIATE (01.28.19 @ 13:42)   Support devices: Left-sided PICC with the tip in the distal SVC.    Cardiac/mediastinum/hilum: Grossly stable.    Lung parenchyma/Pleura: Low lung volumes limiting evaluation. No definite   airspace opacity or effusion. Left lower lobe scar/atelectasis.    Skeleton/soft tissues: Stable.    Impression:      Low lung volumes limiting evaluation. No definite airspace opacity or   effusion. Left lower lobe scar/atelectasis.    Imaging Personally Reviewed:  [x] YES  [ ] NO    HEALTH ISSUES - PROBLEM Dx:  Other specified diabetes mellitus with other specified complication, unspecified whether long term insulin use  Rheumatoid arteritis  DVT (deep venous thrombosis)  HTN (hypertension)  Gout  Altered mental status  Osteomyelitis right foot.    MEDICATIONS  (STANDING):  allopurinol 100 milliGRAM(s) Oral daily  cefTAZidime  IVPB 500 milliGRAM(s) IV Intermittent every 24 hours  chlorhexidine 4% Liquid 1 Application(s) Topical <User Schedule>  collagenase Ointment 1 Application(s) Topical daily  dextrose 5%. 1000 milliLiter(s) (50 mL/Hr) IV Continuous <Continuous>  dextrose 50% Injectable 12.5 Gram(s) IV Push once  dextrose 50% Injectable 25 Gram(s) IV Push once  dextrose 50% Injectable 25 Gram(s) IV Push once  docusate sodium 100 milliGRAM(s) Oral two times a day  DULoxetine 30 milliGRAM(s) Oral daily  folic acid 1 milliGRAM(s) Oral daily  gabapentin 100 milliGRAM(s) Oral every 8 hours  heparin  Infusion.  Unit(s)/Hr (14 mL/Hr) IV Continuous <Continuous>  insulin lispro (HumaLOG) corrective regimen sliding scale   SubCutaneous three times a day before meals  insulin lispro (HumaLOG) corrective regimen sliding scale   SubCutaneous at bedtime  leucovorin 5 milliGRAM(s) Oral daily  polyethylene glycol 3350 17 Gram(s) Oral daily  predniSONE   Tablet 5 milliGRAM(s) Oral daily  senna 2 Tablet(s) Oral at bedtime  tamsulosin 0.4 milliGRAM(s) Oral at bedtime    MEDICATIONS  (PRN):  acetaminophen   Tablet .. 975 milliGRAM(s) Oral every 6 hours PRN Moderate Pain (4 - 6)  bisacodyl Suppository 10 milliGRAM(s) Rectal daily PRN Constipation  dextrose 40% Gel 15 Gram(s) Oral once PRN Blood Glucose LESS THAN 70 milliGRAM(s)/deciliter  glucagon  Injectable 1 milliGRAM(s) IntraMuscular once PRN Glucose LESS THAN 70 milligrams/deciliter  oxyCODONE    IR 5 milliGRAM(s) Oral every 6 hours PRN Severe Pain (7 - 10)  QUEtiapine 25 milliGRAM(s) Oral every 12 hours PRN Anxiety/Agitation

## 2019-02-08 NOTE — PROGRESS NOTE ADULT - SUBJECTIVE AND OBJECTIVE BOX
PODIATRY PROGRESS NOTE   [id number] LINCOLN HEREDIA is a pleasant well-nourished, well developed 78y Female in no acute distress, alert awake, and oriented to person, place and time.   Patient is a 78y old  Female who presents with a chief complaint of Encephalopathy (05 Feb 2019 13:02)    HPI:  · Chief Complaint: The patient is a 78y Female complaining of confusion.	  · HPI Objective Statement: 79 yo F with a DVT, HTN, DM, s/p 3rd right toe last month due to osteo, picc line with cefepime, presents with AMS. Sent in from NH. According to friend at bedside patient progressively becoming more agitated and altered over the last few days. Talking to self, shouting at people. Associated with cough. Denies fevers, chills, abd pain, NVCD, CP, SOB, HA. (28 Jan 2019 19:56)    pt seen and assessed am rounds at bedside.  pt resting comfortably in bed.  dressing clean dry intact.      Vital Signs Last 24 Hrs  T(C): 35.5 (11 Feb 2019 05:44), Max: 38.2 (10 Feb 2019 19:39)  T(F): 95.9 (11 Feb 2019 05:44), Max: 100.7 (10 Feb 2019 19:39)  HR: 79 (11 Feb 2019 06:39) (73 - 86)  BP: 167/80 (11 Feb 2019 06:39) (133/63 - 185/79)  BP(mean): --  RR: 16 (11 Feb 2019 06:39) (16 - 16)  SpO2: --                        7.4    8.82  )-----------( 275      ( 10 Feb 2019 07:23 )             23.9                 02-10    142  |  107  |  33<H>  ----------------------------<  129<H>  3.5   |  24  |  2.0<H>    Ca    10.6<H>      10 Feb 2019 07:23    Surgical site at this time wound appears stable at this time with no signs infection. suture intact proximal end of surgical site.   A:  s/p partial 3rd ray resection partially closed with no acute signs of infection   P:  Santyl with moist dressing to right foot wound q24h as outpatient    Podiatry will follow q72h if patient in house   continue local wound care    02-11-19 @ 08:52

## 2019-02-08 NOTE — PROGRESS NOTE ADULT - ASSESSMENT
SHONNA   - resolving  CKD III  Hypercalcemia   - better  R foot OM  encephalopathy  RA  anemia  DVT  PVD    Plan:      off ivf  voiding trials  avoid vitamin D and Ca++ supplements  encourage po hydration  abx per ID / wound care per podiatry  lantus / humalog

## 2019-02-08 NOTE — PROGRESS NOTE ADULT - ASSESSMENT
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 She was admitted on 12/19/18 to the [facility] and managed for OM of the Right 3rd digit OM (cultures positive for Carbapenem Resistant Pseudomonas) and discharged on Cefepime for a total of 6 weeks.     Per Dr. Monge's note:   Patient has been having intermittent episodes of confusions since discharge from the hospital on 1/19. Reported that there are times where pt is AAOx3 and other times where she only repeats her name. Patients condition worsened in the past few days when she was told that she may not be able to remain in the NH due to insurance issues. Patient was seen by psych and prescribed Abilify.       Assessment and Plan:    1. Toxic metabolic Encephalopathy: Improved.  ? Underlying Dementia with Medication side effect vs. SHONNA vs. Dehydration.  ID following: Risk of encephalopathy similar with all Cephalosporins. Dr. Lau agreed to a trial of Ceftazidime.   Pan cultures: UA & Culture positive for yeast. No need to treat.   Psych following: delirium appears to have resolved with underlying severe Neurocognitive disorder.   Seroquel 25 mg po q 12 prn for anxiety and agitation.      2. SHONNA on CKD 3:   ? SHONNA, ATN vs obstruction vs AIN ( Urine eosinophils suggestive) vs prerenal.   Nephrology following: appreciate input. Recommendations noted.  d/c IV fluid. Creatinine trending down 3.1-->2.4  Monitor ins and out & BMP daily  Voiding trial today.  avoid nephrotoxics     2. RT third metatarsal OM:  s/p Amputation.  Podiatry following: Local wound care with Santyl and moist dressing.  Continue IV Ceftazidime for now. STOP date 2/27/19.  PICC placed on 1/16        3. PAD:  s/p Angiogram 1/9/19.  Vascular recommended follow up in 2 weeks.        4. Chronic DVT:  Duplex LE venous: Chronic-appearing right common femoral and femoral deep venous thromboses; left popliteal deep venous thrombosis  Eliquis discontinued due to worsening renal function and possibility of having a Renal Biopsy.    on IV heparin gtt - Monitor PTT and adjust rate daily      5. Chronic Normocytic Anemia: due to ACD  Goal hgb >7 g/dl.  Hgb stable.      6. Hypercalcemia:  Improved with IV hydration.  Vitamin D and PTH levels low. Follow up PTHrP.  Monitor levels and restart IVF if elevated tomorrow        7. Rheumatoid Arthritis:  Continue home Prednisone 5mg Daily.  Pain control.      8. DM II:  Hemoglobin A1C, Whole Blood: 6.3% (01.12.19 @ 08:08)  Monitor finger sticks. Insulin coverage if fingersticks are greater than 180.      9. Latent TB:  Rifampin discontinued by ID.  Per HCP this was started 2 months ago in anticipation of starting ? Biologic DMARD.      10. Gout:  Continue Allopurinol.      11.  Bilateral Chronic Nonhealing Ulcers:  Full thickness wounds s/p debridement by burn 1/9/19.  Continue local wound care: Xeroform dsd kelrix q24h.      12. Chronic Pain:  Continue Neurontin / Cymbalta and oxycodone prn.  Hold Off Oxycontin for now.  oob to chair daily via [place]  pt to see pt daily      DVT ppx: Heparin drip.   Code Status: DNR/DNI.  Disposition: NH when stable.  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 She was admitted on 12/19/18 to the [facility] and managed for OM of the Right 3rd digit OM (cultures positive for Carbapenem Resistant Pseudomonas) and discharged on Cefepime for a total of 6 weeks.     Per Dr. Monge's note:   Patient has been having intermittent episodes of confusions since discharge from the hospital on 1/19. Reported that there are times where pt is AAOx3 and other times where she only repeats her name. Patients condition worsened in the past few days when she was told that she may not be able to remain in the NH due to insurance issues. Patient was seen by psych and prescribed Abilify.       Assessment and Plan:    1. Toxic metabolic Encephalopathy: Improved.  ? Underlying Dementia with Medication side effect vs. SHONNA vs. Dehydration.  ID following: Risk of encephalopathy similar with all Cephalosporins. Dr. Lau agreed to a trial of Ceftazidime.   Pan cultures: UA & Culture positive for yeast. No need to treat.   Psych following: delirium appears to have resolved with underlying severe Neurocognitive disorder.   Seroquel 25 mg po q 12 prn for anxiety and agitation.      2. SHONNA on CKD 3:   ? SHONNA, ATN vs obstruction vs AIN ( Urine eosinophils suggestive) vs prerenal.   Nephrology following: appreciate input. Recommendations noted.  d/c IV fluid. Creatinine trending down 3.1-->2.4  Monitor ins and out & BMP daily  Voiding trial today.  avoid nephrotoxics     2. RT third metatarsal OM:  s/p Amputation.  Podiatry following: Local wound care with Santyl and moist dressing.  Continue IV Ceftazidime for now. STOP date 2/27/19.  PICC placed on 1/16        3. PAD:  s/p Angiogram 1/9/19.  Vascular recommended follow up in 2 weeks.        4. Chronic DVT:  Duplex LE venous: Chronic-appearing right common femoral and femoral deep venous thromboses; left popliteal deep venous thrombosis  Eliquis discontinued due to worsening renal function and possibility of having a Renal Biopsy.    on IV heparin gtt - Monitor PTT and adjust rate daily      5. Chronic Normocytic Anemia: due to ACD  Goal hgb >7 g/dl.  Hgb stable.      6. Hypercalcemia:  Improved with IV hydration.  Vitamin D and PTH levels low. Follow up PTHrP.  Monitor levels and restart IVF if elevated tomorrow        7. Rheumatoid Arthritis:  Continue home Prednisone 5mg Daily.  Pain control.      8. DM II:  Hemoglobin A1C, Whole Blood: 6.3% (01.12.19 @ 08:08)  Monitor finger sticks. Insulin coverage if fingersticks are greater than 180.      9. Latent TB:  Rifampin discontinued by ID.  Per HCP this was started 2 months ago in anticipation of starting ? Biologic DMARD.      10. Gout:  Continue Allopurinol.      11.  Bilateral Chronic Nonhealing Ulcers:  Full thickness wounds s/p debridement by burn 1/9/19.  Continue local wound care: Xeroform dsd kelrix q24h.      12. Chronic Pain:  Continue Neurontin / Cymbalta and oxycodone prn.  Hold Off Oxycontin for now.  oob to chair daily via [place]  pt to see pt daily    13. suspected folic acid def; on supplement; check levels on the outside     DVT ppx: Heparin drip.   Code Status: DNR/DNI.  Disposition: NH when stable.  Pt has 20 days left at [facility]      Progress Note Handoff  Pending:  Consults  Tests monitoring BMP; pending this morning labs, will follow  Results: hypercalcemia   Family Discussion:  Disposition: Home___/[facility]_x___/Other______________/Unknown at this time_____

## 2019-02-08 NOTE — PROGRESS NOTE ADULT - ASSESSMENT
Overall, my recommendations are to start coumadin to be able to stop IV heparin and send her back to SNF for continuation of care.  She can receive the balance of her IV AB at SNF via her PICC line.  I would continue her off Oxycontin and continue to follow her electrolytes, calcium and renal parameters there.  She can also f/u with vascular as an out patient.

## 2019-02-08 NOTE — PROGRESS NOTE ADULT - SUBJECTIVE AND OBJECTIVE BOX
[facility] FOLLOW UP NOTE  --------------------------------------------------------------------------------  pt seen and examined  cr improving  off ivf  bowman out    PAST HISTORY  --------------------------------------------------------------------------------  No significant changes to PMH, PSH, FHx, SHx, unless otherwise noted    ALLERGIES & MEDICATIONS  --------------------------------------------------------------------------------  Allergies    clindamycin (Unknown)  erythromycin (Unknown)  penicillin (Unknown)  strawberry (Unknown)    Physical Exam:  	  NAD  comfortable  moist mm  no jvd  cta bl  rrr  soft, nt   no edema    Hospital Medications:   MEDICATIONS  (STANDING):  allopurinol 100 milliGRAM(s) Oral daily  cefTAZidime  IVPB 500 milliGRAM(s) IV Intermittent every 24 hours  chlorhexidine 4% Liquid 1 Application(s) Topical <User Schedule>  collagenase Ointment 1 Application(s) Topical daily  dextrose 5%. 1000 milliLiter(s) (50 mL/Hr) IV Continuous <Continuous>  dextrose 50% Injectable 12.5 Gram(s) IV Push once  dextrose 50% Injectable 25 Gram(s) IV Push once  dextrose 50% Injectable 25 Gram(s) IV Push once  docusate sodium 100 milliGRAM(s) Oral two times a day  DULoxetine 30 milliGRAM(s) Oral daily  folic acid 1 milliGRAM(s) Oral daily  gabapentin 100 milliGRAM(s) Oral every 8 hours  heparin  Infusion.  Unit(s)/Hr (14 mL/Hr) IV Continuous <Continuous>  insulin lispro (HumaLOG) corrective regimen sliding scale   SubCutaneous three times a day before meals  insulin lispro (HumaLOG) corrective regimen sliding scale   SubCutaneous at bedtime  leucovorin 5 milliGRAM(s) Oral daily  polyethylene glycol 3350 17 Gram(s) Oral daily  predniSONE   Tablet 5 milliGRAM(s) Oral daily  senna 2 Tablet(s) Oral at bedtime  tamsulosin 0.4 milliGRAM(s) Oral at bedtime  warfarin 5 milliGRAM(s) Oral once        VITALS:  T(F): 97.1 (02-08-19 @ 14:00), Max: 97.1 (02-08-19 @ 14:00)  HR: 81 (02-08-19 @ 14:00)  BP: 145/67 (02-08-19 @ 14:00)  RR: 16 (02-08-19 @ 05:58)  SpO2: --  Wt(kg): --    02-06 @ 07:01  -  02-07 @ 07:00  --------------------------------------------------------  IN: 0 mL / OUT: 775 mL / NET: -775 mL    02-07 @ 07:01  -  02-08 @ 07:00  --------------------------------------------------------  IN: 94 mL / OUT: 1400 mL / NET: -1306 mL          LABS:  02-08    144  |  110  |  35<H>  ----------------------------<  145<H>  3.5   |  21  |  2.3<H>    Ca    10.3<H>      08 Feb 2019 08:58    TPro  4.9<L>  /  Alb  2.8<L>  /  TBili  <0.2  /  DBili      /  AST  7   /  ALT  <5  /  AlkPhos  44  02-08                          9.0    8.89  )-----------( 286      ( 07 Feb 2019 08:05 )             29.2       Urine Studies:        RADIOLOGY & ADDITIONAL STUDIES:

## 2019-02-09 LAB
ANION GAP SERPL CALC-SCNC: 11 MMOL/L — SIGNIFICANT CHANGE UP (ref 7–14)
APTT BLD: 62.8 SEC — HIGH (ref 27–39.2)
BUN SERPL-MCNC: 34 MG/DL — HIGH (ref 10–20)
CALCIUM SERPL-MCNC: 10.8 MG/DL — HIGH (ref 8.5–10.1)
CHLORIDE SERPL-SCNC: 109 MMOL/L — SIGNIFICANT CHANGE UP (ref 98–110)
CO2 SERPL-SCNC: 24 MMOL/L — SIGNIFICANT CHANGE UP (ref 17–32)
CREAT SERPL-MCNC: 2.1 MG/DL — HIGH (ref 0.7–1.5)
GLUCOSE BLDC GLUCOMTR-MCNC: 143 MG/DL — HIGH (ref 70–99)
GLUCOSE BLDC GLUCOMTR-MCNC: 169 MG/DL — HIGH (ref 70–99)
GLUCOSE BLDC GLUCOMTR-MCNC: 179 MG/DL — HIGH (ref 70–99)
GLUCOSE BLDC GLUCOMTR-MCNC: 184 MG/DL — HIGH (ref 70–99)
GLUCOSE BLDC GLUCOMTR-MCNC: 207 MG/DL — HIGH (ref 70–99)
GLUCOSE SERPL-MCNC: 139 MG/DL — HIGH (ref 70–99)
HCT VFR BLD CALC: 26.4 % — LOW (ref 37–47)
HGB BLD-MCNC: 8.2 G/DL — LOW (ref 12–16)
INR BLD: 1.14 RATIO — SIGNIFICANT CHANGE UP (ref 0.65–1.3)
MCHC RBC-ENTMCNC: 28.7 PG — SIGNIFICANT CHANGE UP (ref 27–31)
MCHC RBC-ENTMCNC: 31.1 G/DL — LOW (ref 32–37)
MCV RBC AUTO: 92.3 FL — SIGNIFICANT CHANGE UP (ref 81–99)
NRBC # BLD: 0 /100 WBCS — SIGNIFICANT CHANGE UP (ref 0–0)
PLATELET # BLD AUTO: 333 K/UL — SIGNIFICANT CHANGE UP (ref 130–400)
POTASSIUM SERPL-MCNC: 3.7 MMOL/L — SIGNIFICANT CHANGE UP (ref 3.5–5)
POTASSIUM SERPL-SCNC: 3.7 MMOL/L — SIGNIFICANT CHANGE UP (ref 3.5–5)
PROTHROM AB SERPL-ACNC: 13.1 SEC — HIGH (ref 9.95–12.87)
RBC # BLD: 2.86 M/UL — LOW (ref 4.2–5.4)
RBC # FLD: 16.4 % — HIGH (ref 11.5–14.5)
SODIUM SERPL-SCNC: 144 MMOL/L — SIGNIFICANT CHANGE UP (ref 135–146)
WBC # BLD: 9.54 K/UL — SIGNIFICANT CHANGE UP (ref 4.8–10.8)
WBC # FLD AUTO: 9.54 K/UL — SIGNIFICANT CHANGE UP (ref 4.8–10.8)

## 2019-02-09 RX ORDER — ACETAMINOPHEN 500 MG
650 TABLET ORAL EVERY 6 HOURS
Qty: 0 | Refills: 0 | Status: DISCONTINUED | OUTPATIENT
Start: 2019-02-09 | End: 2019-02-19

## 2019-02-09 RX ORDER — WARFARIN SODIUM 2.5 MG/1
5 TABLET ORAL ONCE
Qty: 0 | Refills: 0 | Status: COMPLETED | OUTPATIENT
Start: 2019-02-09 | End: 2019-02-09

## 2019-02-09 RX ADMIN — HEPARIN SODIUM 1400 UNIT(S)/HR: 5000 INJECTION INTRAVENOUS; SUBCUTANEOUS at 12:01

## 2019-02-09 RX ADMIN — DULOXETINE HYDROCHLORIDE 30 MILLIGRAM(S): 30 CAPSULE, DELAYED RELEASE ORAL at 12:04

## 2019-02-09 RX ADMIN — GABAPENTIN 100 MILLIGRAM(S): 400 CAPSULE ORAL at 23:21

## 2019-02-09 RX ADMIN — Medication 1 MILLIGRAM(S): at 12:04

## 2019-02-09 RX ADMIN — Medication 5 MILLIGRAM(S): at 12:05

## 2019-02-09 RX ADMIN — GABAPENTIN 100 MILLIGRAM(S): 400 CAPSULE ORAL at 14:55

## 2019-02-09 RX ADMIN — Medication 5 MILLIGRAM(S): at 06:28

## 2019-02-09 RX ADMIN — Medication 1 APPLICATION(S): at 12:06

## 2019-02-09 RX ADMIN — SENNA PLUS 2 TABLET(S): 8.6 TABLET ORAL at 23:21

## 2019-02-09 RX ADMIN — CHLORHEXIDINE GLUCONATE 1 APPLICATION(S): 213 SOLUTION TOPICAL at 06:29

## 2019-02-09 RX ADMIN — GABAPENTIN 100 MILLIGRAM(S): 400 CAPSULE ORAL at 06:28

## 2019-02-09 RX ADMIN — CEFTAZIDIME 100 MILLIGRAM(S): 6 INJECTION, POWDER, FOR SOLUTION INTRAVENOUS at 06:36

## 2019-02-09 RX ADMIN — TAMSULOSIN HYDROCHLORIDE 0.4 MILLIGRAM(S): 0.4 CAPSULE ORAL at 23:20

## 2019-02-09 RX ADMIN — POLYETHYLENE GLYCOL 3350 17 GRAM(S): 17 POWDER, FOR SOLUTION ORAL at 12:06

## 2019-02-09 RX ADMIN — WARFARIN SODIUM 5 MILLIGRAM(S): 2.5 TABLET ORAL at 23:20

## 2019-02-09 RX ADMIN — Medication 100 MILLIGRAM(S): at 06:29

## 2019-02-09 RX ADMIN — Medication 100 MILLIGRAM(S): at 18:16

## 2019-02-09 RX ADMIN — Medication 1: at 18:15

## 2019-02-09 RX ADMIN — Medication 100 MILLIGRAM(S): at 12:04

## 2019-02-09 NOTE — PROGRESS NOTE ADULT - ASSESSMENT
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 She was admitted on 12/19/18 to the [facility] and managed for OM of the Right 3rd digit OM (cultures positive for Carbapenem Resistant Pseudomonas) and discharged on Cefepime for a total of 6 weeks.     Per Dr. Monge's note:   Patient has been having intermittent episodes of confusions since discharge from the hospital on 1/19. Reported that there are times where pt is AAOx3 and other times where she only repeats her name. Patients condition worsened in the past few days when she was told that she may not be able to remain in the NH due to insurance issues. Patient was seen by psych and prescribed Abilify.       Assessment and Plan:    1. Toxic metabolic Encephalopathy: Improved.  ? Underlying Dementia with Medication side effect vs. SHONNA vs. Dehydration.  ID following: Risk of encephalopathy similar with all Cephalosporins. Dr. Lau agreed to a trial of Ceftazidime.   Pan cultures: UA & Culture positive for yeast. No need to treat.   Psych following: delirium appears to have resolved with underlying severe Neurocognitive disorder.   Seroquel 25 mg po q 12 prn for anxiety and agitation.      2. SHONNA on CKD 3:   ? SHONNA, ATN vs obstruction vs AIN ( Urine eosinophils suggestive) vs prerenal.   Nephrology following: appreciate input. Recommendations noted.  d/c IV fluid. Creatinine trending down 3.1-->2.4 --->2.1 today  Monitor ins and out & BMP daily  avoid nephrotoxics     2. RT third metatarsal OM:  s/p Amputation.  Podiatry following: Local wound care with Santyl and moist dressing.  Continue IV Ceftazidime for now. STOP date 2/27/19.  PICC placed on 1/16    3. PAD:  s/p Angiogram 1/9/19.  Vascular recommended follow up in 2 weeks.    4. Chronic DVT:  Duplex LE venous: Chronic-appearing right common femoral and femoral deep venous thromboses; left popliteal deep venous thrombosis  Eliquis discontinued due to worsening renal function and possibility of having a Renal Biopsy.    on IV heparin gtt - Monitor PTT and adjust rate daily    5. Chronic Normocytic Anemia: due to ACD  Goal hgb >7 g/dl.  Hgb stable.    6. Hypercalcemia:  Improved with IV hydration.  Vitamin D and PTH levels low. Follow up PTHrP.  Monitor levels and restart IVF if elevated tomorrow    7. Rheumatoid Arthritis:  Continue home Prednisone 5mg Daily.  Pain control.      8. DM II:  Hemoglobin A1C, Whole Blood: 6.3% (01.12.19 @ 08:08)  Monitor finger sticks. Insulin coverage if fingersticks are greater than 180.    9. Latent TB:  Rifampin discontinued by ID.  Per HCP this was started 2 months ago in anticipation of starting ? Biologic DMARD.      10. Gout:  Continue Allopurinol.      11.  Bilateral Chronic Nonhealing Ulcers:  Full thickness wounds s/p debridement by burn 1/9/19.  Continue local wound care: Xeroform dsd kelrix q24h.      12. Chronic Pain:  Continue Neurontin / Cymbalta and oxycodone prn.  Hold Off Oxycontin for now.  oob to chair daily via Graham  pt to see pt daily    13. suspected folic acid def; on supplement; check levels on the outside     DVT ppx: Heparin drip.   Code Status: DNR/DNI.  Disposition: NH when stable.  Pt has 20 days left at [facility]      Progress Note Handoff  Pending:  Consults  Tests monitoring BMP  Results: hypercalcemia   Family Discussion:  Disposition: Home___/[facility]_x___/Other______________/Unknown at this time_____

## 2019-02-09 NOTE — CHART NOTE - NSCHARTNOTEFT_GEN_A_CORE
Registered Dietitian Follow-Up     Patient Profile Reviewed                           Yes [x]   No []     Nutrition History Previously Obtained        Yes [x]  No []       Pertinent Subjective Information: Reason for visit: at nutritional risk follow up. Pt resting during visit and did not respond to RD.     Pertinent Medical Interventions: Pt presented to ED for agitation and AMS. Pt is admitted for change in mental status. Toxic metabolic encephalopathy (improved), SHONNA on CKD lll (ATN vs. obstruction vs. AIN, PAD s/p angiogram 1/9. Chronic DVT, chronic normocytic anemia due to ACD, hypercalcemia (10.5), and DM noted. Discharge planning to NH when stable.     Diet order: Carb con w/snack, DASH/TLC diet w/ Glucerna q12     Anthropometrics:  - Ht. 152.4cm  - Wt. 79.4kg  - Wt range 79.4, 90 kg wts in chart. 79.4 appears to be more accurate.    - BMI 34.2     Pertinent Lab Data: 2/9- h/h 8.2/26.4L, BUN 34H, Cr 2.1H (improving and trending downward), Gluc 139H, GFR 22L, blood sugars over past 24hrs- 143-207 mg/dL      Pertinent Meds: warfarin, heparin, Insulin, seroquel, colace, gabapentin, oxycodone, dulcolax, folic acid, senna     Physical Findings:  - Appearance: resting  - GI function: 2/5 BM+ (last documented); on bowel regimen  - Tubes: n/a  - Oral/Mouth cavity: n/a  - Skin: Edema 1+ right and left foot. Bilateral chronic nonhealing ulcers and Rt third metatarsal OM s/p amputation noted.      Nutrition Requirements: nutritional needs calculated in RD assessment on 2/3  Weight Used: 79.4kg     Estimated Energy Needs    Continue [x]  Adjust []  4864-3283 (25-30kcals/kg)     Estimated Protein Needs    Continue [x]  Adjust []  95-111g/day (1.2-1.4g/kg)    Estimated Fluid Needs        Continue [x]  Adjust []  1:1ml/kcal     [] Previous Nutrition Diagnosis: Inadequate oral intake             [] Ongoing          [X] Resolving as intakes are now 25-75%    [] No active nutrition diagnosis identified at this time        Nutrition Intervention: meals (continue current diet order), snacks; pt does not want medical food supplements yet they are still on diet order so will d/c Glucerna     Goal/Expected Outcome: Pt able to meet 75% of needs within 3 days via meals, snacks      Indicator/Monitoring: RD to monitor diet order & intakes, wt trends, and nutrition-focused physical findings.

## 2019-02-09 NOTE — PROGRESS NOTE ADULT - SUBJECTIVE AND OBJECTIVE BOX
LINCOLN HEREDIA  78y  Female    Patient is a 78y old  Female who presents with change in mental status.      INTERVAL HPI/OVERNIGHT EVENTS:  No significant events  -labile moods with laughters and crying  -needs PT for STR for Monday    Vital Signs Last 24 Hrs  T(C): 36.4 (09 Feb 2019 05:50), Max: 36.4 (09 Feb 2019 05:50)  T(F): 97.6 (09 Feb 2019 05:50), Max: 97.6 (09 Feb 2019 05:50)  HR: 86 (09 Feb 2019 05:50) (81 - 87)  BP: 146/72 (09 Feb 2019 05:50) (145/67 - 153/65)  RR: 18 (09 Feb 2019 05:50) (16 - 18)      PHYSICAL EXAM:  GENERAL: NAD  HEAD:  Atraumatic, Normocephalic  EYES: conjunctiva and sclera clear  ENMT: Moist mucous membranes  NECK: Supple, Normal thyroid  NERVOUS SYSTEM:  Awake & Alert, oriented x 2-3, Moves all extremities.   CHEST/LUNG: Clear to auscultation bilaterally; No rales, rhonchi, wheezing, or rubs  CV/HEART: Regular rate and rhythm; No murmurs, rubs, or gallops  GI/ABDOMEN: Soft, Nontender, Nondistended; Bowel sounds present  EXTREMITIES:  2+ Peripheral Pulses, No clubbing or cyanosis. +1 edema b/l LE  LYMPH: No lymphadenopathy noted  SKIN: Right 3rd toe amputation with purulent drainage. Left leg ulcer: dressing in place.    Consultant(s) Notes Reviewed:  [x ] YES  [ ] NO  Care Discussed with Consultants/Other Providers [ x] YES  [ ] NO    LABS:                                  8.2    9.54  )-----------( 333      ( 09 Feb 2019 06:30 )             26.4   02-09    144  |  109  |  34<H>  ----------------------------<  139<H>  3.7   |  24  |  2.1<H>    Ca    10.8<H>      09 Feb 2019 06:30    TPro  4.9<L>  /  Alb  2.8<L>  /  TBili  <0.2  /  DBili  x   /  AST  7   /  ALT  <5  /  AlkPhos  44  02-08                   02-07    144  |  111<H>  |  37<H>  ----------------------------<  132<H>  4.3   |  19  |  2.4<H>    Ca    11.0<H>      07 Feb 2019 08:05    TPro  5.3<L>  /  Alb  3.0<L>  /  TBili  <0.2  /  DBili  x   /  AST  13  /  ALT  6   /  AlkPhos  47  02-07      Vitamin D, 1, 25-Dihydroxy: 12.4 pg/mL (01.31.19 @ 14:01)  Vitamin D, 25-Hydroxy: 20 ng/mL (01.31.19 @ 09:22)  Intact PTH: 8 pg/mL (01.30.19 @ 06:30)      PT/INR - ( 07 Feb 2019 08:05 )   PT: 12.10 sec;   INR: 1.05 ratio    PTT - ( 07 Feb 2019 08:05 )  PTT:64.5 sec      MICROBIOLOGY:   Culture - Urine (01.31.19 @ 13:57)    Specimen Source: .Urine Clean Catch (Midstream)    Culture Results:   50,000 - 99,000 CFU/mL Yeast-like cells, presumptively not Candida  albicans "Susceptibilities not performed"      Culture - Urine (01.28.19 @ 17:20)    Specimen Source: .Urine Clean Catch (Midstream)    Culture Results: No growth      Culture - Blood (01.28.19 @ 14:16)    Specimen Source: .Blood Blood-Peripheral    Culture Results:   No growth to date.      Culture - Blood (01.28.19 @ 14:14)    Specimen Source: .Blood Blood-Peripheral    Culture Results:   No growth to date.      Culture - Other (01.10.19 @ 14:00)    -  Ciprofloxacin: R >2    -  Gentamicin: R >8    -  Imipenem: R >8    -  Levofloxacin: R >4    -  Meropenem: R >8    -  Piperacillin/Tazobactam: S 16    -  Tobramycin: R >8    -  Aztreonam: I 16    -  Ceftazidime: S 4    -  Cefepime: S 8    -  Amikacin: S 16    Specimen Source: .Other None    Culture Results:   Few Pseudomonas aeruginosa (Carbapenem Resistant)    Organism Identification: Pseudomonas aeruginosa (Carbapenem Resistant)    Organism: Pseudomonas aeruginosa (Carbapenem Resistant)    Method Type: SHANNAN      RADIOLOGY & ADDITIONAL TESTS:  CT Head No Cont (01.28.19 @ 15:54)   1.  The study is limited by motion artifact.    2.  Cerebral atrophy.    3.  Periventricular white matter hypodensities, nonspecific, differential   diagnostic possibilities include ischemic change, gliosis or  demyelination.      X-ray Chest 1 View-PORTABLE IMMEDIATE (01.28.19 @ 13:42)   Support devices: Left-sided PICC with the tip in the distal SVC.    Cardiac/mediastinum/hilum: Grossly stable.    Lung parenchyma/Pleura: Low lung volumes limiting evaluation. No definite   airspace opacity or effusion. Left lower lobe scar/atelectasis.    Skeleton/soft tissues: Stable.    Impression:      Low lung volumes limiting evaluation. No definite airspace opacity or   effusion. Left lower lobe scar/atelectasis.    Imaging Personally Reviewed:  [x] YES  [ ] NO    HEALTH ISSUES - PROBLEM Dx:  Other specified diabetes mellitus with other specified complication, unspecified whether long term insulin use  Rheumatoid arteritis  DVT (deep venous thrombosis)  HTN (hypertension)  Gout  Altered mental status  Osteomyelitis right foot.    MEDICATIONS  (STANDING):  allopurinol 100 milliGRAM(s) Oral daily  cefTAZidime  IVPB 500 milliGRAM(s) IV Intermittent every 24 hours  chlorhexidine 4% Liquid 1 Application(s) Topical <User Schedule>  collagenase Ointment 1 Application(s) Topical daily  dextrose 5%. 1000 milliLiter(s) (50 mL/Hr) IV Continuous <Continuous>  dextrose 50% Injectable 12.5 Gram(s) IV Push once  dextrose 50% Injectable 25 Gram(s) IV Push once  dextrose 50% Injectable 25 Gram(s) IV Push once  docusate sodium 100 milliGRAM(s) Oral two times a day  DULoxetine 30 milliGRAM(s) Oral daily  folic acid 1 milliGRAM(s) Oral daily  gabapentin 100 milliGRAM(s) Oral every 8 hours  heparin  Infusion.  Unit(s)/Hr (14 mL/Hr) IV Continuous <Continuous>  insulin lispro (HumaLOG) corrective regimen sliding scale   SubCutaneous three times a day before meals  insulin lispro (HumaLOG) corrective regimen sliding scale   SubCutaneous at bedtime  leucovorin 5 milliGRAM(s) Oral daily  polyethylene glycol 3350 17 Gram(s) Oral daily  predniSONE   Tablet 5 milliGRAM(s) Oral daily  senna 2 Tablet(s) Oral at bedtime  tamsulosin 0.4 milliGRAM(s) Oral at bedtime    MEDICATIONS  (PRN):  acetaminophen   Tablet .. 975 milliGRAM(s) Oral every 6 hours PRN Moderate Pain (4 - 6)  bisacodyl Suppository 10 milliGRAM(s) Rectal daily PRN Constipation  dextrose 40% Gel 15 Gram(s) Oral once PRN Blood Glucose LESS THAN 70 milliGRAM(s)/deciliter  glucagon  Injectable 1 milliGRAM(s) IntraMuscular once PRN Glucose LESS THAN 70 milligrams/deciliter  oxyCODONE    IR 5 milliGRAM(s) Oral every 6 hours PRN Severe Pain (7 - 10)  QUEtiapine 25 milliGRAM(s) Oral every 12 hours PRN Anxiety/Agitation

## 2019-02-10 LAB
ANION GAP SERPL CALC-SCNC: 11 MMOL/L — SIGNIFICANT CHANGE UP (ref 7–14)
APPEARANCE UR: CLEAR — SIGNIFICANT CHANGE UP
APTT BLD: 85.7 SEC — CRITICAL HIGH (ref 27–39.2)
BACTERIA # UR AUTO: ABNORMAL
BILIRUB UR-MCNC: NEGATIVE — SIGNIFICANT CHANGE UP
BUN SERPL-MCNC: 33 MG/DL — HIGH (ref 10–20)
CALCIUM SERPL-MCNC: 10.6 MG/DL — HIGH (ref 8.5–10.1)
CHLORIDE SERPL-SCNC: 107 MMOL/L — SIGNIFICANT CHANGE UP (ref 98–110)
CO2 SERPL-SCNC: 24 MMOL/L — SIGNIFICANT CHANGE UP (ref 17–32)
COLOR SPEC: YELLOW — SIGNIFICANT CHANGE UP
COMMENT - URINE: SIGNIFICANT CHANGE UP
CREAT SERPL-MCNC: 2 MG/DL — HIGH (ref 0.7–1.5)
DIFF PNL FLD: ABNORMAL
EPI CELLS # UR: ABNORMAL /HPF
GLUCOSE BLDC GLUCOMTR-MCNC: 122 MG/DL — HIGH (ref 70–99)
GLUCOSE BLDC GLUCOMTR-MCNC: 138 MG/DL — HIGH (ref 70–99)
GLUCOSE BLDC GLUCOMTR-MCNC: 139 MG/DL — HIGH (ref 70–99)
GLUCOSE BLDC GLUCOMTR-MCNC: 168 MG/DL — HIGH (ref 70–99)
GLUCOSE SERPL-MCNC: 129 MG/DL — HIGH (ref 70–99)
GLUCOSE UR QL: NEGATIVE MG/DL — SIGNIFICANT CHANGE UP
HCT VFR BLD CALC: 23.9 % — LOW (ref 37–47)
HGB BLD-MCNC: 7.4 G/DL — CRITICAL LOW (ref 12–16)
INR BLD: 1.2 RATIO — SIGNIFICANT CHANGE UP (ref 0.65–1.3)
KETONES UR-MCNC: NEGATIVE — SIGNIFICANT CHANGE UP
LEUKOCYTE ESTERASE UR-ACNC: ABNORMAL
MCHC RBC-ENTMCNC: 28.8 PG — SIGNIFICANT CHANGE UP (ref 27–31)
MCHC RBC-ENTMCNC: 31 G/DL — LOW (ref 32–37)
MCV RBC AUTO: 93 FL — SIGNIFICANT CHANGE UP (ref 81–99)
NITRITE UR-MCNC: NEGATIVE — SIGNIFICANT CHANGE UP
NRBC # BLD: 0 /100 WBCS — SIGNIFICANT CHANGE UP (ref 0–0)
OB PNL STL: NEGATIVE — SIGNIFICANT CHANGE UP
PH UR: 6 — SIGNIFICANT CHANGE UP (ref 5–8)
PLATELET # BLD AUTO: 275 K/UL — SIGNIFICANT CHANGE UP (ref 130–400)
POTASSIUM SERPL-MCNC: 3.5 MMOL/L — SIGNIFICANT CHANGE UP (ref 3.5–5)
POTASSIUM SERPL-SCNC: 3.5 MMOL/L — SIGNIFICANT CHANGE UP (ref 3.5–5)
PROT UR-MCNC: 100 MG/DL
PROTHROM AB SERPL-ACNC: 13.8 SEC — HIGH (ref 9.95–12.87)
RBC # BLD: 2.57 M/UL — LOW (ref 4.2–5.4)
RBC # FLD: 16.5 % — HIGH (ref 11.5–14.5)
RBC CASTS # UR COMP ASSIST: ABNORMAL /HPF
SODIUM SERPL-SCNC: 142 MMOL/L — SIGNIFICANT CHANGE UP (ref 135–146)
SP GR SPEC: 1.02 — SIGNIFICANT CHANGE UP (ref 1.01–1.03)
UROBILINOGEN FLD QL: 0.2 MG/DL — SIGNIFICANT CHANGE UP (ref 0.2–0.2)
WBC # BLD: 8.82 K/UL — SIGNIFICANT CHANGE UP (ref 4.8–10.8)
WBC # FLD AUTO: 8.82 K/UL — SIGNIFICANT CHANGE UP (ref 4.8–10.8)
WBC UR QL: ABNORMAL /HPF

## 2019-02-10 RX ORDER — FLUCONAZOLE 150 MG/1
200 TABLET ORAL EVERY 24 HOURS
Qty: 0 | Refills: 0 | Status: DISCONTINUED | OUTPATIENT
Start: 2019-02-11 | End: 2019-02-11

## 2019-02-10 RX ORDER — FLUCONAZOLE 150 MG/1
TABLET ORAL
Qty: 0 | Refills: 0 | Status: DISCONTINUED | OUTPATIENT
Start: 2019-02-10 | End: 2019-02-11

## 2019-02-10 RX ORDER — ACETAMINOPHEN 500 MG
975 TABLET ORAL ONCE
Qty: 0 | Refills: 0 | Status: COMPLETED | OUTPATIENT
Start: 2019-02-10 | End: 2019-02-10

## 2019-02-10 RX ORDER — FLUCONAZOLE 150 MG/1
200 TABLET ORAL ONCE
Qty: 0 | Refills: 0 | Status: COMPLETED | OUTPATIENT
Start: 2019-02-10 | End: 2019-02-10

## 2019-02-10 RX ADMIN — Medication 100 MILLIGRAM(S): at 12:13

## 2019-02-10 RX ADMIN — FLUCONAZOLE 100 MILLIGRAM(S): 150 TABLET ORAL at 14:51

## 2019-02-10 RX ADMIN — DULOXETINE HYDROCHLORIDE 30 MILLIGRAM(S): 30 CAPSULE, DELAYED RELEASE ORAL at 12:13

## 2019-02-10 RX ADMIN — Medication 100 MILLIGRAM(S): at 18:59

## 2019-02-10 RX ADMIN — GABAPENTIN 100 MILLIGRAM(S): 400 CAPSULE ORAL at 14:52

## 2019-02-10 RX ADMIN — SENNA PLUS 2 TABLET(S): 8.6 TABLET ORAL at 19:49

## 2019-02-10 RX ADMIN — Medication 1: at 12:12

## 2019-02-10 RX ADMIN — Medication 1 MILLIGRAM(S): at 12:13

## 2019-02-10 RX ADMIN — QUETIAPINE FUMARATE 25 MILLIGRAM(S): 200 TABLET, FILM COATED ORAL at 19:48

## 2019-02-10 RX ADMIN — Medication 650 MILLIGRAM(S): at 21:31

## 2019-02-10 RX ADMIN — Medication 5 MILLIGRAM(S): at 12:13

## 2019-02-10 RX ADMIN — Medication 5 MILLIGRAM(S): at 06:41

## 2019-02-10 RX ADMIN — CHLORHEXIDINE GLUCONATE 1 APPLICATION(S): 213 SOLUTION TOPICAL at 06:42

## 2019-02-10 RX ADMIN — CEFTAZIDIME 100 MILLIGRAM(S): 6 INJECTION, POWDER, FOR SOLUTION INTRAVENOUS at 08:29

## 2019-02-10 RX ADMIN — TAMSULOSIN HYDROCHLORIDE 0.4 MILLIGRAM(S): 0.4 CAPSULE ORAL at 19:49

## 2019-02-10 RX ADMIN — Medication 100 MILLIGRAM(S): at 06:41

## 2019-02-10 RX ADMIN — POLYETHYLENE GLYCOL 3350 17 GRAM(S): 17 POWDER, FOR SOLUTION ORAL at 12:13

## 2019-02-10 RX ADMIN — Medication 1 APPLICATION(S): at 14:50

## 2019-02-10 RX ADMIN — Medication 975 MILLIGRAM(S): at 01:43

## 2019-02-10 RX ADMIN — GABAPENTIN 100 MILLIGRAM(S): 400 CAPSULE ORAL at 06:41

## 2019-02-10 RX ADMIN — Medication 650 MILLIGRAM(S): at 19:49

## 2019-02-10 NOTE — CHART NOTE - NSCHARTNOTEFT_GEN_A_CORE
Patient was found to have urinary retention by RN. Hooper placed with 800 ml urine return. Patient now much more calm.

## 2019-02-10 NOTE — PROGRESS NOTE ADULT - ASSESSMENT
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 She was admitted on 12/19/18 to the [facility] and managed for OM of the Right 3rd digit OM (cultures positive for Carbapenem Resistant Pseudomonas) and discharged on Cefepime for a total of 6 weeks.     Per Dr. Monge's note:   Patient has been having intermittent episodes of confusions since discharge from the hospital on 1/19. Reported that there are times where pt is AAOx3 and other times where she only repeats her name. Patients condition worsened in the past few days when she was told that she may not be able to remain in the NH due to insurance issues. Patient was seen by psych and prescribed Abilify.       Assessment and Plan:    1. Toxic metabolic Encephalopathy: Improved.  ? Underlying Dementia with Medication side effect vs. SHONNA vs. Dehydration.  ID following: Risk of encephalopathy similar with all Cephalosporins. Dr. Lau agreed to a trial of Ceftazidime.   Pan cultures: UA & Culture positive for yeast. No need to treat.   Psych following: delirium appears to have resolved with underlying severe Neurocognitive disorder.   Seroquel 25 mg po q 12 prn for anxiety and agitation.      2. SHONNA on CKD 3:   ? SHONNA, ATN vs obstruction vs AIN ( Urine eosinophils suggestive) vs prerenal.   Nephrology following: appreciate input. Recommendations noted.  d/c IV fluid. Creatinine trending down 3.1-->2.4 --->2.1 today  Monitor ins and out & BMP daily  avoid nephrotoxics     2. RT third metatarsal OM:  s/p Amputation.  Podiatry following: Local wound care with Santyl and moist dressing.  Continue IV Ceftazidime for now. STOP date 2/27/19.  PICC placed on 1/16    3. PAD:  s/p Angiogram 1/9/19.  Vascular recommended follow up in 2 weeks.    4. Chronic DVT:  Duplex LE venous: Chronic-appearing right common femoral and femoral deep venous thromboses; left popliteal deep venous thrombosis  Eliquis discontinued due to worsening renal function and possibility of having a Renal Biopsy.    on IV heparin gtt - Monitor PTT and adjust rate daily    5. Chronic Normocytic Anemia: due to ACD  Goal hgb >7 g/dl.  Hgb stable.    6. Hypercalcemia:  Improved with IV hydration.  Vitamin D and PTH levels low. Follow up PTHrP.  Monitor levels and restart IVF if elevated tomorrow    7. Rheumatoid Arthritis:  Continue home Prednisone 5mg Daily.  Pain control.      8. DM II:  Hemoglobin A1C, Whole Blood: 6.3% (01.12.19 @ 08:08)  Monitor finger sticks. Insulin coverage if fingersticks are greater than 180.    9. Latent TB:  Rifampin discontinued by ID.  Per HCP this was started 2 months ago in anticipation of starting ? Biologic DMARD.      10. Gout:  Continue Allopurinol.      11.  Bilateral Chronic Nonhealing Ulcers:  Full thickness wounds s/p debridement by burn 1/9/19.  Continue local wound care: Xeroform dsd kelrix q24h.      12. Chronic Pain:  Continue Neurontin / Cymbalta and oxycodone prn.  Hold Off Oxycontin for now.  oob to chair daily via [place]  pt to see pt daily    13. suspected folic acid def; on supplement; check levels on the outside     DVT ppx: Heparin drip.   Code Status: DNR/DNI.  Disposition: NH when stable.  Pt has 20 days left at [facility]      Progress Note Handoff  Pending:  Consults  Tests monitoring BMP  Results: hypercalcemia   Family Discussion:  Disposition: Home___/[facility]_x___/Other______________/Unknown at this time_____ 79 yo F with PMHx of DM II, Anxiety, RA on Rituximab (s/p 2 infusions next dose in 6 months), Chronic bilateral femoral and popliteal DVT on Eliquis, Gout, latent TB on Rifampin and chronic bilateral lower extremity ulcers presented from NH with complaints of worsening mental status since her discharge on 1/19/19. She was admitted on 12/19/18 to the [facility] and managed for OM of the Right 3rd digit OM (cultures positive for Carbapenem Resistant Pseudomonas) and discharged on Cefepime for a total of 6 weeks.     Per Dr. Monge's note:   Patient has been having intermittent episodes of confusions since discharge from the hospital on 1/19. Reported that there are times where pt is AAOx3 and other times where she only repeats her name. Patients condition worsened in the past few days when she was told that she may not be able to remain in the NH due to insurance issues. Patient was seen by psych and prescribed Abilify.       Assessment and Plan:    1. Toxic metabolic Encephalopathy: Improved.  ? Underlying Dementia with Medication side effect vs. SHONNA vs. Dehydration.  ID following: Risk of encephalopathy similar with all Cephalosporins. Dr. Lau agreed to a trial of Ceftazidime.   -re send U/A with urine cultures; will add diflucan ppx for funguria  Seroquel 25 mg po q 12 prn for anxiety and agitation.      2. SHONNA on CKD 3/Urinary retention  SHONNA, ATN vs obstruction vs AIN ( Urine eosinophils suggestive) vs prerenal.    Creatinine trending down 3.1-->2.4 --->2.0 today  Monitor ins and out & BMP daily  avoid nephrotoxics   -continue with bowman     2. RT third metatarsal OM:  s/p Amputation.  Podiatry following: Local wound care with Santyl and moist dressing.  Continue IV Ceftazidime for now. STOP date 2/27/19.  PICC placed on 1/16    3. PAD:  s/p Angiogram 1/9/19.  Vascular recommended follow up in 2 weeks.    4. Chronic DVT:  -off AC in Lieu of acute anemia and blood transfusion  -re-assess in am to restart AC if stool guaiac negative and patient with no active bleeding     5. Acute on Chronic Normocytic Anemia: due to ACD  Goal hgb >7 g/dl.  -Transfuse one unit PRBC STAT today     6. Hypercalcemia:  Improved with IV hydration.  Vitamin D and PTH levels low. Follow up PTHrP (never sent, will reorder)      7. Rheumatoid Arthritis:  Continue home Prednisone 5mg Daily.  Pain control.      8. DM II:  Hemoglobin A1C, Whole Blood: 6.3% (01.12.19 @ 08:08)  Monitor finger sticks. Insulin coverage if fingersticks are greater than 180.    9. Latent TB:  Rifampin discontinued by ID.  Per HCP this was started 2 months ago in anticipation of starting ? Biologic DMARD.      10. Gout:  Continue Allopurinol.      11.  Bilateral Chronic Nonhealing Ulcers:  Full thickness wounds s/p debridement by burn 1/9/19.  Continue local wound care: Xeroform dsd kelrix q24h.      12. Chronic Pain:  Continue Neurontin / Cymbalta and oxycodone prn.  oob to chair daily via Graham lift   -pt refuses PT     13. suspected folic acid def; on supplement; check levels on the outside     DVT ppx: d/c heparin today; hold off compression device as pt with LE DVT  Code Status: DNR/DNI.  Disposition: NH when stable.  Pt has 20 days left at [facility]      Progress Note Handoff  Pending:  Consults  Tests monitoring BMP and CBC  Results: low Hb  Family Discussion:  Disposition: Home___/SNF_x___/Other______________/Unknown at this time_____

## 2019-02-10 NOTE — PROGRESS NOTE ADULT - SUBJECTIVE AND OBJECTIVE BOX
YANLINCOLN  78y  Female    Patient is a 78y old  Female who presents with change in mental status.      INTERVAL HPI/OVERNIGHT EVENTS:  -pt seen and examined  -still with confusion and labile mood (was trying to make phone calls to her mother who passed away long time ago); then became alert during my assessment.  -bowman inserted overnight for urinary retention > 800cc  stop ANTICOAGULATION; as pt with acute anemia, no obvious signs of bleeding but will hold of AC, obtain stool guaiac and stat one unit PRBC transfusion; will hold off on GI consult for now, if positive stool guaiac, GI team to be called (Two physician consent for blood transfusion)  -oob to chair encouraged, will be on hold today due to acute anemia; but patient resists and refuses to get oob to chair much needed for STR paperwork for Case management      Vital Signs Last 24 Hrs  T(C): 36.1 (10 Feb 2019 06:00), Max: 36.3 (09 Feb 2019 14:07)  T(F): 97 (10 Feb 2019 06:00), Max: 97.4 (09 Feb 2019 14:07)  HR: 70 (10 Feb 2019 06:00) (70 - 95)  BP: 108/54 (10 Feb 2019 06:00) (108/54 - 137/76)  RR: 16 (10 Feb 2019 06:00) (16 - 18)      PHYSICAL EXAM:  GENERAL: NAD  HEAD:  Atraumatic, Normocephalic  EYES: conjunctiva and sclera clear  ENMT: Moist mucous membranes  NECK: Supple, Normal thyroid  NERVOUS SYSTEM:  Awake & Alert, oriented x 2-3, Moves all extremities.   CHEST/LUNG: Clear to auscultation bilaterally; No rales, rhonchi, wheezing, or rubs  CV/HEART: Regular rate and rhythm; No murmurs, rubs, or gallops  GI/ABDOMEN: Soft, Nontender, obese abdomen; Bowel sounds present; Bowman present (cloudy)  EXTREMITIES:  2+ Peripheral Pulses, No clubbing or cyanosis. LE edema   LYMPH: No lymphadenopathy noted  SKIN: Right 3rd toe amputation with purulent drainage. Left leg ulcer: dressing in place.             Consultant(s) Notes Reviewed:  [x ] YES  [ ] NO  Care Discussed with Consultants/Other Providers [ x] YES  [ ] NO    LABS:                                             7.4    8.82  )-----------( 275      ( 10 Feb 2019 07:23 )             23.9     02-10    142  |  107  |  33<H>  ----------------------------<  129<H>  3.5   |  24  |  2.0<H>    Ca    10.6<H>      10 Feb 2019 07:23          02-07    144  |  111<H>  |  37<H>  ----------------------------<  132<H>  4.3   |  19  |  2.4<H>    Ca    11.0<H>      07 Feb 2019 08:05    TPro  5.3<L>  /  Alb  3.0<L>  /  TBili  <0.2  /  DBili  x   /  AST  13  /  ALT  6   /  AlkPhos  47  02-07      Vitamin D, 1, 25-Dihydroxy: 12.4 pg/mL (01.31.19 @ 14:01)  Vitamin D, 25-Hydroxy: 20 ng/mL (01.31.19 @ 09:22)  Intact PTH: 8 pg/mL (01.30.19 @ 06:30)      PT/INR - ( 07 Feb 2019 08:05 )   PT: 12.10 sec;   INR: 1.05 ratio    PTT - ( 07 Feb 2019 08:05 )  PTT:64.5 sec      MICROBIOLOGY:   Culture - Urine (01.31.19 @ 13:57)    Specimen Source: .Urine Clean Catch (Midstream)    Culture Results:   50,000 - 99,000 CFU/mL Yeast-like cells, presumptively not Candida  albicans "Susceptibilities not performed"      Culture - Urine (01.28.19 @ 17:20)    Specimen Source: .Urine Clean Catch (Midstream)    Culture Results: No growth      Culture - Blood (01.28.19 @ 14:16)    Specimen Source: .Blood Blood-Peripheral    Culture Results:   No growth to date.      Culture - Blood (01.28.19 @ 14:14)    Specimen Source: .Blood Blood-Peripheral    Culture Results:   No growth to date.      Culture - Other (01.10.19 @ 14:00)    -  Ciprofloxacin: R >2    -  Gentamicin: R >8    -  Imipenem: R >8    -  Levofloxacin: R >4    -  Meropenem: R >8    -  Piperacillin/Tazobactam: S 16    -  Tobramycin: R >8    -  Aztreonam: I 16    -  Ceftazidime: S 4    -  Cefepime: S 8    -  Amikacin: S 16    Specimen Source: .Other None    Culture Results:   Few Pseudomonas aeruginosa (Carbapenem Resistant)    Organism Identification: Pseudomonas aeruginosa (Carbapenem Resistant)    Organism: Pseudomonas aeruginosa (Carbapenem Resistant)    Method Type: SHANNAN      RADIOLOGY & ADDITIONAL TESTS:  CT Head No Cont (01.28.19 @ 15:54)   1.  The study is limited by motion artifact.    2.  Cerebral atrophy.    3.  Periventricular white matter hypodensities, nonspecific, differential   diagnostic possibilities include ischemic change, gliosis or  demyelination.      X-ray Chest 1 View-PORTABLE IMMEDIATE (01.28.19 @ 13:42)   Support devices: Left-sided PICC with the tip in the distal SVC.    Cardiac/mediastinum/hilum: Grossly stable.    Lung parenchyma/Pleura: Low lung volumes limiting evaluation. No definite   airspace opacity or effusion. Left lower lobe scar/atelectasis.    Skeleton/soft tissues: Stable.    Impression:      Low lung volumes limiting evaluation. No definite airspace opacity or   effusion. Left lower lobe scar/atelectasis.    Imaging Personally Reviewed:  [x] YES  [ ] NO    HEALTH ISSUES - PROBLEM Dx:  Other specified diabetes mellitus with other specified complication, unspecified whether long term insulin use  Rheumatoid arteritis  DVT (deep venous thrombosis)  HTN (hypertension)  Gout  Altered mental status  Osteomyelitis right foot.    MEDICATIONS  (STANDING):  allopurinol 100 milliGRAM(s) Oral daily  cefTAZidime  IVPB 500 milliGRAM(s) IV Intermittent every 24 hours  chlorhexidine 4% Liquid 1 Application(s) Topical <User Schedule>  collagenase Ointment 1 Application(s) Topical daily  dextrose 5%. 1000 milliLiter(s) (50 mL/Hr) IV Continuous <Continuous>  dextrose 50% Injectable 12.5 Gram(s) IV Push once  dextrose 50% Injectable 25 Gram(s) IV Push once  dextrose 50% Injectable 25 Gram(s) IV Push once  docusate sodium 100 milliGRAM(s) Oral two times a day  DULoxetine 30 milliGRAM(s) Oral daily  folic acid 1 milliGRAM(s) Oral daily  gabapentin 100 milliGRAM(s) Oral every 8 hours  insulin lispro (HumaLOG) corrective regimen sliding scale   SubCutaneous three times a day before meals  insulin lispro (HumaLOG) corrective regimen sliding scale   SubCutaneous at bedtime  leucovorin 5 milliGRAM(s) Oral daily  polyethylene glycol 3350 17 Gram(s) Oral daily  predniSONE   Tablet 5 milliGRAM(s) Oral daily  senna 2 Tablet(s) Oral at bedtime  tamsulosin 0.4 milliGRAM(s) Oral at bedtime    MEDICATIONS  (PRN):  acetaminophen   Tablet .. 975 milliGRAM(s) Oral every 6 hours PRN Moderate Pain (4 - 6)  acetaminophen   Tablet .. 650 milliGRAM(s) Oral every 6 hours PRN Temp greater or equal to 38C (100.4F), Mild Pain (1 - 3)  bisacodyl Suppository 10 milliGRAM(s) Rectal daily PRN Constipation  dextrose 40% Gel 15 Gram(s) Oral once PRN Blood Glucose LESS THAN 70 milliGRAM(s)/deciliter  glucagon  Injectable 1 milliGRAM(s) IntraMuscular once PRN Glucose LESS THAN 70 milligrams/deciliter  QUEtiapine 25 milliGRAM(s) Oral every 12 hours PRN Anxiety/Agitation

## 2019-02-10 NOTE — PROGRESS NOTE ADULT - SUBJECTIVE AND OBJECTIVE BOX
Follow up note. Receiving local wound care right foot with Santyl, Dakins and moistened dressing. Experiencing mental status changes . Afebrile  Foot stable with no signs of infection. Slowly granulating.  WBC 8.82  HH 7.4/23.9  INR 1.2o PTT  85.7  Bun/Cre  33/2.0   Urine: prot-100, leukocyte esterase and blood moderate. Many bact  Continue local care Will follow

## 2019-02-10 NOTE — CHART NOTE - NSCHARTNOTEFT_GEN_A_CORE
Patient (again) seems to have very labile moods, crying and stating her head hurts and that she banged it yesterday morning. Not able to provide specifics. Will try try tylenol now but will consider CAT scan (on heparin drip but episode happened many hours ago Patient (again) seems to have very labile moods, crying and stating her head hurts and that she banged it yesterday morning. Not able to provide specifics. Will try try tylenol now but will consider CAT scan (on heparin drip but episode happened many hours ago and she is too restless for this exam at this time, also very low level of suspicion for injury)

## 2019-02-11 LAB
ANION GAP SERPL CALC-SCNC: 10 MMOL/L — SIGNIFICANT CHANGE UP (ref 7–14)
APTT BLD: 32.3 SEC — SIGNIFICANT CHANGE UP (ref 27–39.2)
BUN SERPL-MCNC: 31 MG/DL — HIGH (ref 10–20)
CALCIUM SERPL-MCNC: 10.9 MG/DL — HIGH (ref 8.5–10.1)
CHLORIDE SERPL-SCNC: 106 MMOL/L — SIGNIFICANT CHANGE UP (ref 98–110)
CO2 SERPL-SCNC: 26 MMOL/L — SIGNIFICANT CHANGE UP (ref 17–32)
CREAT SERPL-MCNC: 1.8 MG/DL — HIGH (ref 0.7–1.5)
GLUCOSE BLDC GLUCOMTR-MCNC: 134 MG/DL — HIGH (ref 70–99)
GLUCOSE BLDC GLUCOMTR-MCNC: 153 MG/DL — HIGH (ref 70–99)
GLUCOSE BLDC GLUCOMTR-MCNC: 192 MG/DL — HIGH (ref 70–99)
GLUCOSE BLDC GLUCOMTR-MCNC: 97 MG/DL — SIGNIFICANT CHANGE UP (ref 70–99)
GLUCOSE SERPL-MCNC: 176 MG/DL — HIGH (ref 70–99)
HCT VFR BLD CALC: 29.6 % — LOW (ref 37–47)
HGB BLD-MCNC: 9.5 G/DL — LOW (ref 12–16)
INR BLD: 1.17 RATIO — SIGNIFICANT CHANGE UP (ref 0.65–1.3)
MCHC RBC-ENTMCNC: 29.1 PG — SIGNIFICANT CHANGE UP (ref 27–31)
MCHC RBC-ENTMCNC: 32.1 G/DL — SIGNIFICANT CHANGE UP (ref 32–37)
MCV RBC AUTO: 90.5 FL — SIGNIFICANT CHANGE UP (ref 81–99)
NRBC # BLD: 0 /100 WBCS — SIGNIFICANT CHANGE UP (ref 0–0)
PLATELET # BLD AUTO: 283 K/UL — SIGNIFICANT CHANGE UP (ref 130–400)
POTASSIUM SERPL-MCNC: 3.8 MMOL/L — SIGNIFICANT CHANGE UP (ref 3.5–5)
POTASSIUM SERPL-SCNC: 3.8 MMOL/L — SIGNIFICANT CHANGE UP (ref 3.5–5)
PROTHROM AB SERPL-ACNC: 13.4 SEC — HIGH (ref 9.95–12.87)
RBC # BLD: 3.27 M/UL — LOW (ref 4.2–5.4)
RBC # FLD: 16.9 % — HIGH (ref 11.5–14.5)
SODIUM SERPL-SCNC: 142 MMOL/L — SIGNIFICANT CHANGE UP (ref 135–146)
WBC # BLD: 9.34 K/UL — SIGNIFICANT CHANGE UP (ref 4.8–10.8)
WBC # FLD AUTO: 9.34 K/UL — SIGNIFICANT CHANGE UP (ref 4.8–10.8)

## 2019-02-11 RX ADMIN — POLYETHYLENE GLYCOL 3350 17 GRAM(S): 17 POWDER, FOR SOLUTION ORAL at 13:26

## 2019-02-11 RX ADMIN — QUETIAPINE FUMARATE 25 MILLIGRAM(S): 200 TABLET, FILM COATED ORAL at 22:23

## 2019-02-11 RX ADMIN — Medication 5 MILLIGRAM(S): at 13:25

## 2019-02-11 RX ADMIN — CEFTAZIDIME 100 MILLIGRAM(S): 6 INJECTION, POWDER, FOR SOLUTION INTRAVENOUS at 05:19

## 2019-02-11 RX ADMIN — GABAPENTIN 100 MILLIGRAM(S): 400 CAPSULE ORAL at 13:25

## 2019-02-11 RX ADMIN — Medication 1 MILLIGRAM(S): at 13:25

## 2019-02-11 RX ADMIN — Medication 5 MILLIGRAM(S): at 05:20

## 2019-02-11 RX ADMIN — DULOXETINE HYDROCHLORIDE 30 MILLIGRAM(S): 30 CAPSULE, DELAYED RELEASE ORAL at 13:25

## 2019-02-11 RX ADMIN — Medication 100 MILLIGRAM(S): at 13:25

## 2019-02-11 RX ADMIN — TAMSULOSIN HYDROCHLORIDE 0.4 MILLIGRAM(S): 0.4 CAPSULE ORAL at 22:23

## 2019-02-11 RX ADMIN — SENNA PLUS 2 TABLET(S): 8.6 TABLET ORAL at 22:23

## 2019-02-11 RX ADMIN — Medication 100 MILLIGRAM(S): at 05:19

## 2019-02-11 RX ADMIN — Medication 1: at 13:23

## 2019-02-11 RX ADMIN — GABAPENTIN 100 MILLIGRAM(S): 400 CAPSULE ORAL at 22:23

## 2019-02-11 RX ADMIN — CHLORHEXIDINE GLUCONATE 1 APPLICATION(S): 213 SOLUTION TOPICAL at 05:21

## 2019-02-11 NOTE — CONSULT NOTE ADULT - ASSESSMENT
79 yo F with a hx of RA, DVT, HTN, DM, s/p 3rd right toe amputation last month due to osteo, picc line with cefepime, presents with AMS.    -AMS improved, AAOx3 today (although waxing and waning as per recent notes), likely toxic metabolic  -cont abx as per ID  -wound care  -pain control + bowel regimen  -cont duloxetine and cymbalta  -encourage physical therapy'  -f/u hb, no signs of active bleeding s/p 1 u of PRBCs  -glycemic control  -cont treatment of RA, DVT as per primary team  -needs to be sent back to STR on discharge    ATTENDING NOTE TO FOLLOW 79 yo F with a hx of RA, DVT, HTN, DM, s/p 3rd right toe amputation last month due to osteo, picc line with cefepime, presents with AMS.    -AMS improved, AAOx3 today (although waxing and waning as per recent notes), likely toxic metabolic  -SHONNA improving  -cont abx as per ID  -wound care  -pain control + bowel regimen  -cont duloxetine and cymbalta  -encourage physical therapy'  -f/u hb, no signs of active bleeding s/p 1 u of PRBCs  -glycemic control  -AC for DVT, consider bridging to coumadin  -cont treatment of RA-needs to be sent back to STR on discharge    ATTENDING NOTE TO FOLLOW 79 yo F with a hx of RA, DVT, HTN, DM, s/p 3rd right toe amputation last month due to osteo, picc line with cefepime, presents with AMS.    -AMS improved, AAOx3 today (although waxing and waning as per recent notes), likely toxic metabolic  -SHONNA improving  -cont abx as per ID  -wound care  -pain control + bowel regimen  -cont duloxetine, consider stopping gabapentin   -encourage physical therapy'  -drop in hb, no signs of active bleeding s/p 1 u of PRBCs, repeat today is 9.5 (7.4 is likely a lab error)  -glycemic control  -AC for DVT, consider restarting coumadin  -cont treatment of RA  -needs STR on discharge    ATTENDING NOTE TO FOLLOW 79 yo F with a hx of RA, DVT, HTN, DM, s/p 3rd right toe amputation last month due to osteo, picc line with cefepime, presents with AMS.    -waxing and waning mental status, likely related to toxic metabolic encephelopathy  -urinary retention, low grade fever, likely UTI ---> consider switching abx to meropenem since developed UTI while on cefepime/ ceftazidime  -urine culture  -f/u ID  -SHONNA improving  -wound care  -pain control + bowel regimen  -cont duloxetine, consider stopping gabapentin   -encourage physical therapy'  -drop in hb, no signs of active bleeding s/p 1 u of PRBCs, repeat today is 9.5 (7.4 is likely a lab error)  -glycemic control  -AC for DVT, consider restarting coumadin  -cont treatment of RA  -needs STR on discharge    ATTENDING NOTE TO FOLLOW 79 yo F with a hx of RA, DVT, HTN, DM, s/p 3rd right toe amputation last month due to osteo, picc line with cefepime, presents with AMS.    -waxing and waning mental status, likely related to toxic metabolic encephelopathy/delirium r/o depression with psychotic features  -urinary retention, low grade fever, likely UTI ---> consider switching abx to meropenem since developed UTI while on cefepime/ ceftazidime  -urine culture needed  -f/u ID  -SHONNA improving  -wound care  -pain control + bowel regimen  -cont duloxetine, consider stopping gabapentin as it may contribute to altered MS and needs to be renally dosed, Seroquel PRN only, would advise psychiatry follow up? increase duloxetine?  -encourage physical therapy/mobilization  -drop in hb, no signs of active bleeding s/p 1 u of PRBCs, repeat today is 9.5 (7.4 is likely a lab error)  -glycemic control, needs HgA1c, would avoid sulfonylureas if possible  -AC for DVT, consider restarting coumadin  -cont treatment of RA  -needs STR on discharge    will follow up with the [facility] Visit Program after d/c from [facility], will notify [facility]'s medical director

## 2019-02-11 NOTE — CONSULT NOTE ADULT - ATTENDING COMMENTS
Above findings reviewed and appreciated. Pt  known to writer. s/p amp partial 3rd ray for OM/ ischemic gangrene. Tolerated surgery well on last admission and is healing slowly but steadily. All labs reviewed as well as x-rays. No hi WBC count with radiographic findings stable from last series.  Has been on PICC with Cefepime. BUN and creatinine rising as well as signs of UTI. No infection from foot at this time. Continue local care-Santyl with moist dressing
edited above

## 2019-02-11 NOTE — CONSULT NOTE ADULT - CONSULT REQUESTED DATE/TIME
11-Feb-2019 09:56
30-Jan-2019 10:28
30-Jan-2019 20:34
31-Jan-2019 09:08
31-Jan-2019 11:24
30-Jan-2019 16:39

## 2019-02-11 NOTE — PROGRESS NOTE ADULT - ASSESSMENT
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 She was admitted on 12/19/18 to the [facility] and managed for OM of the Right 3rd digit OM (cultures positive for Carbapenem Resistant Pseudomonas) and discharged on Cefepime for a total of 6 weeks.     Per Dr. Monge's note:   Patient has been having intermittent episodes of confusions since discharge from the hospital on 1/19. Reported that there are times where pt is AAOx3 and other times where she only repeats her name. Patients condition worsened in the past few days when she was told that she may not be able to remain in the NH due to insurance issues. Patient was seen by psych and prescribed Abilify.       Assessment and Plan:    1. Toxic metabolic Encephalopathy: Improved?  ? Underlying Dementia with Medication side effect vs. SHONNA vs. Dehydration.  ID following: Risk of encephalopathy similar with all Cephalosporins. Dr. Lau agreed to a trial of Ceftazidime.   -positive U/A with urine cultures pending   -ID follow up requested  -Seroquel 25 mg po q 12 prn for anxiety and agitation.      2. SHONNA on CKD 3/Urinary retention  SHONNA, ATN vs obstruction vs AIN ( Urine eosinophils suggestive) vs prerenal.    Creatinine trending down 3.1-->2.4 --->2.0 --> 1.8  Monitor ins and out & BMP daily  avoid nephrotoxics   -continue with bowman - renewed today    2. RT third metatarsal OM:  s/p Amputation.  Podiatry following: Local wound care with Santyl and moist dressing.  Continue IV Ceftazidime for now. STOP date 2/27/19.  PICC placed on 1/16    3. PAD:  s/p Angiogram 1/9/19.  Vascular recommended follow up in 2 weeks.    4. Chronic DVT:  -off AC in Lieu of acute anemia and blood transfusion  -re-assess in am to restart AC if stool guaiac negative and patient with no active bleeding     5. Acute on Chronic Normocytic Anemia: due to ACD  Goal hgb >7 g/dl.  -Transfused one unit PRBC on 2/10    6. Hypercalcemia:  Improved with IV hydration.  Vitamin D and PTH levels low. Follow up PTHrP (never sent, will reorder)      7. Rheumatoid Arthritis:  Continue home Prednisone 5mg Daily.  Pain control.      8. DM II:  Hemoglobin A1C, Whole Blood: 6.3% (01.12.19 @ 08:08)  Monitor finger sticks. Insulin coverage if fingersticks are greater than 180.    9. Latent TB:  Rifampin discontinued by ID.  Per HCP this was started 2 months ago in anticipation of starting ? Biologic DMARD.      10. Gout:  Continue Allopurinol.      11.  Bilateral Chronic Nonhealing Ulcers:  Full thickness wounds s/p debridement by burn 1/9/19.  Continue local wound care: Xeroform dsd kelrix q24h.      12. Chronic Pain:  Continue Neurontin / Cymbalta and oxycodone prn.  oob to chair daily via Graham lift   -daily PT/Rehab    13. suspected folic acid def;   on supplement; check levels on the outside     DVT ppx: off heparin ; hold off compression device as pt with LE DVT  Code Status: DNR/DNI.  Disposition: NH when stable.  Pt has 20 days left at [facility]      Progress Note Handoff  Pending:  Consults  Tests monitoring BMP and CBC  Results:   Family Discussion:  Disposition: Home___/SNF_x___/Other______________/Unknown at this time_____

## 2019-02-11 NOTE — CONSULT NOTE ADULT - SUBJECTIVE AND OBJECTIVE BOX
Patient is a 78y old  Female who presents with a chief complaint of Encephalopathy (2019 13:02)      HPI:  · Chief Complaint: The patient is a 78y Female complaining of confusion.	  · HPI Objective Statement: 79 yo F with a hx of RA, DVT, HTN, DM, s/p 3rd right toe amputation last month due to osteo, picc line with cefepime, presents with AMS. Sent in from NH. According to friend at bedside patient progressively becoming more agitated and altered over the last few days. Talking to self, shouting at people. Associated with cough. Denies fevers, chills, abd pain, NVCD, CP, SOB, HA. (2019 19:56)    Overnight events:  patient had a drop in her hb yesterday to 7.4 from 8.2, no signs of bleeding, s/p 1 u of PRBC.   She is complaining of stiffness in both her hands in addition to low back pain.    Social hx:  ex-smoker  denies alcohol or drugs    PAST MEDICAL & SURGICAL HISTORY:  Gout  DVT (deep venous thrombosis)  HTN (hypertension)  Rheumatoid arteritis  Other specified diabetes mellitus with other specified complication, unspecified whether long term insulin use  Primary osteoarthritis of right knee: s/p knee replacement      MEDICATIONS  (STANDING):  allopurinol 100 milliGRAM(s) Oral daily  cefTAZidime  IVPB 500 milliGRAM(s) IV Intermittent every 24 hours  chlorhexidine 4% Liquid 1 Application(s) Topical <User Schedule>  collagenase Ointment 1 Application(s) Topical daily  dextrose 5%. 1000 milliLiter(s) (50 mL/Hr) IV Continuous <Continuous>  dextrose 50% Injectable 12.5 Gram(s) IV Push once  dextrose 50% Injectable 25 Gram(s) IV Push once  dextrose 50% Injectable 25 Gram(s) IV Push once  docusate sodium 100 milliGRAM(s) Oral two times a day  DULoxetine 30 milliGRAM(s) Oral daily  fluconAZOLE IVPB      fluconAZOLE IVPB 200 milliGRAM(s) IV Intermittent every 24 hours  folic acid 1 milliGRAM(s) Oral daily  gabapentin 100 milliGRAM(s) Oral every 8 hours  insulin lispro (HumaLOG) corrective regimen sliding scale   SubCutaneous three times a day before meals  insulin lispro (HumaLOG) corrective regimen sliding scale   SubCutaneous at bedtime  leucovorin 5 milliGRAM(s) Oral daily  polyethylene glycol 3350 17 Gram(s) Oral daily  predniSONE   Tablet 5 milliGRAM(s) Oral daily  senna 2 Tablet(s) Oral at bedtime  tamsulosin 0.4 milliGRAM(s) Oral at bedtime    MEDICATIONS  (PRN):  acetaminophen   Tablet .. 975 milliGRAM(s) Oral every 6 hours PRN Moderate Pain (4 - 6)  acetaminophen   Tablet .. 650 milliGRAM(s) Oral every 6 hours PRN Temp greater or equal to 38C (100.4F), Mild Pain (1 - 3)  bisacodyl Suppository 10 milliGRAM(s) Rectal daily PRN Constipation  dextrose 40% Gel 15 Gram(s) Oral once PRN Blood Glucose LESS THAN 70 milliGRAM(s)/deciliter  glucagon  Injectable 1 milliGRAM(s) IntraMuscular once PRN Glucose LESS THAN 70 milligrams/deciliter  QUEtiapine 25 milliGRAM(s) Oral every 12 hours PRN Anxiety/Agitation      Home medications  Actos 30 mg oral tablet: 1 tab(s) orally once a day  allopurinol 300 mg oral tablet: 1 tab(s) orally once a day  cefepime: 2 gram(s) intravenous once a day until   collagenase 250 units/g topical ointment: 1 application topically once a day  Eliquis 5 mg oral tablet:   folic acid 1 mg oral tablet: 1 tab(s) orally once a day  gabapentin 300 mg oral capsule: orally every 12 hours  glimepiride 2 mg oral tablet: 1  orally 3 times a day  leucovorin 5 mg oral tablet: 1 tab(s) orally once a day  OxyCONTIN 20 mg oral tablet, extended release: 1 tab(s) orally every 12 hours, As Needed  predniSONE 5 mg oral tablet: 1 tab(s) orally once a day  rifAMPin 300 mg oral capsule: 1 cap(s) orally 2 times a day  senna oral tablet: 2 tab(s) orally once a day (at bedtime)      Vital Signs Last 24 Hrs  T(C): 35.5 (2019 05:44), Max: 38.2 (10 Feb 2019 19:39)  T(F): 95.9 (2019 05:44), Max: 100.7 (10 Feb 2019 19:39)  HR: 79 (2019 06:39) (73 - 86)  BP: 167/80 (2019 06:39) (133/63 - 185/79)  BP(mean): --  RR: 16 (2019 06:39) (16 - 16)  SpO2: --  CAPILLARY BLOOD GLUCOSE      POCT Blood Glucose.: 97 mg/dL (2019 07:17)  POCT Blood Glucose.: 139 mg/dL (10 Feb 2019 21:21)  POCT Blood Glucose.: 122 mg/dL (10 Feb 2019 16:15)  POCT Blood Glucose.: 168 mg/dL (10 Feb 2019 11:23)    I&O's Summary    10 Feb 2019 07:01  -  2019 07:00  --------------------------------------------------------  IN: 0 mL / OUT: 1150 mL / NET: -1150 mL        Physical Exam:    GENERAL: NAD  HEAD:  Atraumatic, Normocephalic  EYES: conjunctiva and sclera clear  ENMT: Moist mucous membranes  NECK: Supple, Normal thyroid  NERVOUS SYSTEM:  Awake & Alert, oriented x 3, Moves all extremities.   CHEST/LUNG: Clear to auscultation bilaterally; No rales, rhonchi, wheezing, or rubs  CV/HEART: Regular rate and rhythm; No murmurs, rubs, or gallops  GI/ABDOMEN: Soft, Nontender, obese abdomen; Bowel sounds present; Hooper present (cloudy)  EXTREMITIES:  2+ Peripheral Pulses, No clubbing or cyanosis. LE edema, dressing to right foot   LYMPH: No lymphadenopathy noted  SKIN: Right 3rd toe amputation with purulent drainage. Left leg ulcer: dressing in place.      Labs:                        7.4    8.82  )-----------( 275      ( 10 Feb 2019 07:23 )             23.9             02-10    142  |  107  |  33<H>  ----------------------------<  129<H>  3.5   |  24  |  2.0<H>    Ca    10.6<H>      10 Feb 2019 07:23              PT/INR - ( 10 Feb 2019 07:23 )   PT: 13.80 sec;   INR: 1.20 ratio         PTT - ( 10 Feb 2019 07:23 )  PTT:85.7 sec        Urinalysis Basic - ( 10 Feb 2019 14:55 )    Color: Yellow / Appearance: Clear / S.025 / pH: x  Gluc: x / Ketone: Negative  / Bili: Negative / Urobili: 0.2 mg/dL   Blood: x / Protein: 100 mg/dL / Nitrite: Negative   Leuk Esterase: Moderate / RBC: 11-25 /HPF / WBC 26-50 /HPF   Sq Epi: x / Non Sq Epi: Few /HPF / Bacteria: Many Patient is a 78y old  Female who presents with a chief complaint of Encephalopathy (2019 13:02)      HPI:  · Chief Complaint: The patient is a 78y Female admitted for evaluation of confusion.	  · HPI Objective Statement: 77 yo F with a hx of RA, DVT, HTN, DM, s/p 3rd right toe amputation last month due to osteo, picc line with cefepime, presents with AMS. Sent in from NH. According to friend at bedside patient progressively becoming more agitated and altered over the last few days. Talking to self, shouting at people, crying. Denies fevers, chills, abd pain, NVCD, CP, SOB, HA. (2019 19:56)    Overnight events:  patient had a drop in her hb yesterday to 7.4 from 8.2, no signs of bleeding, s/p 1 u of PRBC.   She is complaining of stiffness in both her hands in addition to low back pain.    Social hx:  ex-smoker  denies alcohol or drugs    PAST MEDICAL & SURGICAL HISTORY:  Gout  DVT (deep venous thrombosis)  HTN (hypertension)  Rheumatoid arteritis  Other specified diabetes mellitus with other specified complication, unspecified whether long term insulin use  Primary osteoarthritis of right knee: s/p knee replacement      MEDICATIONS  (STANDING):  allopurinol 100 milliGRAM(s) Oral daily  cefTAZidime  IVPB 500 milliGRAM(s) IV Intermittent every 24 hours  chlorhexidine 4% Liquid 1 Application(s) Topical <User Schedule>  collagenase Ointment 1 Application(s) Topical daily  dextrose 5%. 1000 milliLiter(s) (50 mL/Hr) IV Continuous <Continuous>  dextrose 50% Injectable 12.5 Gram(s) IV Push once  dextrose 50% Injectable 25 Gram(s) IV Push once  dextrose 50% Injectable 25 Gram(s) IV Push once  docusate sodium 100 milliGRAM(s) Oral two times a day  DULoxetine 30 milliGRAM(s) Oral daily  fluconAZOLE IVPB      fluconAZOLE IVPB 200 milliGRAM(s) IV Intermittent every 24 hours  folic acid 1 milliGRAM(s) Oral daily  gabapentin 100 milliGRAM(s) Oral every 8 hours  insulin lispro (HumaLOG) corrective regimen sliding scale   SubCutaneous three times a day before meals  insulin lispro (HumaLOG) corrective regimen sliding scale   SubCutaneous at bedtime  leucovorin 5 milliGRAM(s) Oral daily  polyethylene glycol 3350 17 Gram(s) Oral daily  predniSONE   Tablet 5 milliGRAM(s) Oral daily  senna 2 Tablet(s) Oral at bedtime  tamsulosin 0.4 milliGRAM(s) Oral at bedtime    MEDICATIONS  (PRN):  acetaminophen   Tablet .. 975 milliGRAM(s) Oral every 6 hours PRN Moderate Pain (4 - 6)  acetaminophen   Tablet .. 650 milliGRAM(s) Oral every 6 hours PRN Temp greater or equal to 38C (100.4F), Mild Pain (1 - 3)  bisacodyl Suppository 10 milliGRAM(s) Rectal daily PRN Constipation  dextrose 40% Gel 15 Gram(s) Oral once PRN Blood Glucose LESS THAN 70 milliGRAM(s)/deciliter  glucagon  Injectable 1 milliGRAM(s) IntraMuscular once PRN Glucose LESS THAN 70 milligrams/deciliter  QUEtiapine 25 milliGRAM(s) Oral every 12 hours PRN Anxiety/Agitation      Home medications  Actos 30 mg oral tablet: 1 tab(s) orally once a day  allopurinol 300 mg oral tablet: 1 tab(s) orally once a day  cefepime: 2 gram(s) intravenous once a day until   collagenase 250 units/g topical ointment: 1 application topically once a day  Eliquis 5 mg oral tablet:   folic acid 1 mg oral tablet: 1 tab(s) orally once a day  gabapentin 300 mg oral capsule: orally every 12 hours  glimepiride 2 mg oral tablet: 1  orally 3 times a day  leucovorin 5 mg oral tablet: 1 tab(s) orally once a day  OxyCONTIN 20 mg oral tablet, extended release: 1 tab(s) orally every 12 hours, As Needed  predniSONE 5 mg oral tablet: 1 tab(s) orally once a day  rifAMPin 300 mg oral capsule: 1 cap(s) orally 2 times a day  senna oral tablet: 2 tab(s) orally once a day (at bedtime)      Vital Signs Last 24 Hrs  T(C): 35.5 (2019 05:44), Max: 38.2 (10 Feb 2019 19:39)  T(F): 95.9 (2019 05:44), Max: 100.7 (10 Feb 2019 19:39)  HR: 79 (2019 06:39) (73 - 86)  BP: 167/80 (2019 06:39) (133/63 - 185/79)  BP(mean): --  RR: 16 (2019 06:39) (16 - 16)  SpO2: --  CAPILLARY BLOOD GLUCOSE      POCT Blood Glucose.: 97 mg/dL (2019 07:17)  POCT Blood Glucose.: 139 mg/dL (10 Feb 2019 21:21)  POCT Blood Glucose.: 122 mg/dL (10 Feb 2019 16:15)  POCT Blood Glucose.: 168 mg/dL (10 Feb 2019 11:23)    I&O's Summary    10 Feb 2019 07:01  -  2019 07:00  --------------------------------------------------------  IN: 0 mL / OUT: 1150 mL / NET: -1150 mL        Physical Exam:    GENERAL: NAD  HEAD:  Atraumatic, Normocephalic  EYES: conjunctiva and sclera clear  ENMT: Moist mucous membranes  NECK: Supple, Normal thyroid  NERVOUS SYSTEM:  Awake & Alert, oriented x 3, Moves all extremities.   CHEST/LUNG: Clear to auscultation bilaterally; No rales, rhonchi, wheezing, or rubs  CV/HEART: Regular rate and rhythm; No murmurs, rubs, or gallops  GI/ABDOMEN: Soft, Nontender, obese abdomen; Bowel sounds present; Hooper present (cloudy)  EXTREMITIES:  2+ Peripheral Pulses, No clubbing or cyanosis. LE edema, dressing to right foot   LYMPH: No lymphadenopathy noted  SKIN: Right 3rd toe amputation with purulent drainage. Left leg ulcer: dressing in place.      Labs:                        7.4    8.82  )-----------( 275      ( 10 Feb 2019 07:23 )             23.9             02-10    142  |  107  |  33<H>  ----------------------------<  129<H>  3.5   |  24  |  2.0<H>    Ca    10.6<H>      10 Feb 2019 07:23              PT/INR - ( 10 Feb 2019 07:23 )   PT: 13.80 sec;   INR: 1.20 ratio         PTT - ( 10 Feb 2019 07:23 )  PTT:85.7 sec        Urinalysis Basic - ( 10 Feb 2019 14:55 )    Color: Yellow / Appearance: Clear / S.025 / pH: x  Gluc: x / Ketone: Negative  / Bili: Negative / Urobili: 0.2 mg/dL   Blood: x / Protein: 100 mg/dL / Nitrite: Negative   Leuk Esterase: Moderate / RBC: 11-25 /HPF / WBC 26-50 /HPF   Sq Epi: x / Non Sq Epi: Few /HPF / Bacteria: Many

## 2019-02-11 NOTE — PROGRESS NOTE ADULT - ASSESSMENT
SHONNA   - resolving  CKD III  Hypercalcemia with low PTH   - may be due to immobility / OM and SHONNA, but malignancy should be ruled out  urine retention   - bowman replaced  R foot OM  encephalopathy  RA  anemia  DVT  PVD    Plan:    encourage po hydration  con't bowman today  avoid vitamin D and Ca++ supplements, thiazide diuretics  pamidronate 60mg ivpb over 4 hours if Ca++ worsens  check PTHrP  consider malignancy screening - ct c/a/p, colonoscopy and mammogram

## 2019-02-11 NOTE — PROGRESS NOTE ADULT - SUBJECTIVE AND OBJECTIVE BOX
[facility] FOLLOW UP NOTE  --------------------------------------------------------------------------------  pt seen and examined  cr continues to improve  fanny replaced  good uop with bowman    PAST HISTORY  --------------------------------------------------------------------------------  No significant changes to PMH, PSH, FHx, SHx, unless otherwise noted    ALLERGIES & MEDICATIONS  --------------------------------------------------------------------------------  Allergies    clindamycin (Unknown)  erythromycin (Unknown)  penicillin (Unknown)  strawberry (Unknown)    Physical Exam:  	  NAD  comfortable  moist mm  no jvd  cta bl  rrr  soft, nt   no edema    Hospital Medications:   MEDICATIONS  (STANDING):  allopurinol 100 milliGRAM(s) Oral daily  cefTAZidime  IVPB 500 milliGRAM(s) IV Intermittent every 24 hours  chlorhexidine 4% Liquid 1 Application(s) Topical <User Schedule>  collagenase Ointment 1 Application(s) Topical daily  dextrose 5%. 1000 milliLiter(s) (50 mL/Hr) IV Continuous <Continuous>  dextrose 50% Injectable 12.5 Gram(s) IV Push once  dextrose 50% Injectable 25 Gram(s) IV Push once  dextrose 50% Injectable 25 Gram(s) IV Push once  docusate sodium 100 milliGRAM(s) Oral two times a day  DULoxetine 30 milliGRAM(s) Oral daily  fluconAZOLE IVPB      fluconAZOLE IVPB 200 milliGRAM(s) IV Intermittent every 24 hours  folic acid 1 milliGRAM(s) Oral daily  gabapentin 100 milliGRAM(s) Oral every 8 hours  insulin lispro (HumaLOG) corrective regimen sliding scale   SubCutaneous three times a day before meals  insulin lispro (HumaLOG) corrective regimen sliding scale   SubCutaneous at bedtime  leucovorin 5 milliGRAM(s) Oral daily  polyethylene glycol 3350 17 Gram(s) Oral daily  predniSONE   Tablet 5 milliGRAM(s) Oral daily  senna 2 Tablet(s) Oral at bedtime  tamsulosin 0.4 milliGRAM(s) Oral at bedtime        VITALS:  T(F): 95.9 (19 @ 05:44), Max: 100.7 (02-10-19 @ 19:39)  HR: 79 (19 @ 06:39)  BP: 167/80 (19 @ 06:39)  RR: 16 (19 @ 06:39)  SpO2: --  Wt(kg): --     @ 07:01  -  02-10 @ 07:00  --------------------------------------------------------  IN: 0 mL / OUT: 650 mL / NET: -650 mL    02-10 @ 07:01  -   @ 07:00  --------------------------------------------------------  IN: 0 mL / OUT: 1150 mL / NET: -1150 mL          LABS:  02-10    142  |  107  |  33<H>  ----------------------------<  129<H>  3.5   |  24  |  2.0<H>    Ca    10.6<H>      10 Feb 2019 07:23                            9.5    9.34  )-----------( 283      ( 2019 09:30 )             29.6       Urine Studies:  Urinalysis Basic - ( 10 Feb 2019 14:55 )    Color: Yellow / Appearance: Clear / S.025 / pH:   Gluc:  / Ketone: Negative  / Bili: Negative / Urobili: 0.2 mg/dL   Blood:  / Protein: 100 mg/dL / Nitrite: Negative   Leuk Esterase: Moderate / RBC: 11-25 /HPF / WBC 26-50 /HPF   Sq Epi:  / Non Sq Epi: Few /HPF / Bacteria: Many          RADIOLOGY & ADDITIONAL STUDIES:      Urine Studies:        RADIOLOGY & ADDITIONAL STUDIES:

## 2019-02-11 NOTE — PROGRESS NOTE ADULT - SUBJECTIVE AND OBJECTIVE BOX
LINCOLN HEREDIA  78y  Female    Patient is a 78y old  Female who presents with change in mental status.      INTERVAL HPI/OVERNIGHT EVENTS:  -pt seen and examined  -still with confusion and labile mood   -bowman inserted overnight for urinary retention > 800cc  -stopped ANTICOAGULATION over the weekend due to drop in HGB; s/p transfusion; no obvious signs of bleeding  -oob to chair encouraged daily     Vital Signs Last 24 Hrs  T(C): 35.9 (11 Feb 2019 14:15), Max: 38.2 (10 Feb 2019 19:39)  T(F): 96.6 (11 Feb 2019 14:15), Max: 100.7 (10 Feb 2019 19:39)  HR: 79 (11 Feb 2019 06:39) (73 - 86)  BP: 176/78 (11 Feb 2019 14:15) (133/63 - 185/79)  BP(mean): --  RR: 16 (11 Feb 2019 14:15) (16 - 16)  SpO2: --    PHYSICAL EXAM:  GENERAL: NAD  HEAD:  Atraumatic, Normocephalic  EYES: conjunctiva and sclera clear  ENMT: Moist mucous membranes  NECK: Supple, Normal thyroid  NERVOUS SYSTEM:  Awake & Alert, oriented x 2-3, Moves all extremities.   CHEST/LUNG: Clear to auscultation bilaterally; No rales, rhonchi, wheezing, or rubs  CV/HEART: Regular rate and rhythm; No murmurs, rubs, or gallops  GI/ABDOMEN: Soft, Nontender, obese abdomen; Bowel sounds present; Bowman present (cloudy)  EXTREMITIES:  2+ Peripheral Pulses, No clubbing or cyanosis. LE edema   LYMPH: No lymphadenopathy noted  SKIN: Right 3rd toe amputation with purulent drainage. Left leg ulcer: dressing in place.             Consultant(s) Notes Reviewed:  [x ] YES  [ ] NO  Care Discussed with Consultants/Other Providers [ x] YES  [ ] NO    LABS:                                                  9.5    9.34  )-----------( 283      ( 11 Feb 2019 09:30 )             29.6     02-11    142  |  106  |  31<H>  ----------------------------<  176<H>  3.8   |  26  |  1.8<H>    Ca    10.9<H>      11 Feb 2019 09:30      Vitamin D, 1, 25-Dihydroxy: 12.4 pg/mL (01.31.19 @ 14:01)  Vitamin D, 25-Hydroxy: 20 ng/mL (01.31.19 @ 09:22)  Intact PTH: 8 pg/mL (01.30.19 @ 06:30)      MICROBIOLOGY:   Culture - Urine (01.31.19 @ 13:57)    Specimen Source: .Urine Clean Catch (Midstream)    Culture Results:   50,000 - 99,000 CFU/mL Yeast-like cells, presumptively not Candida  albicans "Susceptibilities not performed"      Culture - Urine (01.28.19 @ 17:20)    Specimen Source: .Urine Clean Catch (Midstream)    Culture Results: No growth      Culture - Blood (01.28.19 @ 14:16)    Specimen Source: .Blood Blood-Peripheral    Culture Results:   No growth to date.      Culture - Blood (01.28.19 @ 14:14)    Specimen Source: .Blood Blood-Peripheral    Culture Results:   No growth to date.      Culture - Other (01.10.19 @ 14:00)    -  Ciprofloxacin: R >2    -  Gentamicin: R >8    -  Imipenem: R >8    -  Levofloxacin: R >4    -  Meropenem: R >8    -  Piperacillin/Tazobactam: S 16    -  Tobramycin: R >8    -  Aztreonam: I 16    -  Ceftazidime: S 4    -  Cefepime: S 8    -  Amikacin: S 16    Specimen Source: .Other None    Culture Results:   Few Pseudomonas aeruginosa (Carbapenem Resistant)    Organism Identification: Pseudomonas aeruginosa (Carbapenem Resistant)    Organism: Pseudomonas aeruginosa (Carbapenem Resistant)    Method Type: SHANNAN      RADIOLOGY & ADDITIONAL TESTS:  CT Head No Cont (01.28.19 @ 15:54)   1.  The study is limited by motion artifact.    2.  Cerebral atrophy.    3.  Periventricular white matter hypodensities, nonspecific, differential   diagnostic possibilities include ischemic change, gliosis or  demyelination.      X-ray Chest 1 View-PORTABLE IMMEDIATE (01.28.19 @ 13:42)   Support devices: Left-sided PICC with the tip in the distal SVC.    Cardiac/mediastinum/hilum: Grossly stable.    Lung parenchyma/Pleura: Low lung volumes limiting evaluation. No definite   airspace opacity or effusion. Left lower lobe scar/atelectasis.    Skeleton/soft tissues: Stable.    Impression:      Low lung volumes limiting evaluation. No definite airspace opacity or   effusion. Left lower lobe scar/atelectasis.    Imaging Personally Reviewed:  [x] YES  [ ] NO    HEALTH ISSUES - PROBLEM Dx:  Other specified diabetes mellitus with other specified complication, unspecified whether long term insulin use  Rheumatoid arteritis  DVT (deep venous thrombosis)  HTN (hypertension)  Gout  Altered mental status  Osteomyelitis right foot.    MEDICATIONS  (STANDING):  allopurinol 100 milliGRAM(s) Oral daily  cefTAZidime  IVPB 500 milliGRAM(s) IV Intermittent every 24 hours  chlorhexidine 4% Liquid 1 Application(s) Topical <User Schedule>  collagenase Ointment 1 Application(s) Topical daily  dextrose 5%. 1000 milliLiter(s) (50 mL/Hr) IV Continuous <Continuous>  dextrose 50% Injectable 12.5 Gram(s) IV Push once  dextrose 50% Injectable 25 Gram(s) IV Push once  dextrose 50% Injectable 25 Gram(s) IV Push once  docusate sodium 100 milliGRAM(s) Oral two times a day  DULoxetine 30 milliGRAM(s) Oral daily  folic acid 1 milliGRAM(s) Oral daily  gabapentin 100 milliGRAM(s) Oral every 8 hours  insulin lispro (HumaLOG) corrective regimen sliding scale   SubCutaneous three times a day before meals  insulin lispro (HumaLOG) corrective regimen sliding scale   SubCutaneous at bedtime  leucovorin 5 milliGRAM(s) Oral daily  polyethylene glycol 3350 17 Gram(s) Oral daily  predniSONE   Tablet 5 milliGRAM(s) Oral daily  senna 2 Tablet(s) Oral at bedtime  tamsulosin 0.4 milliGRAM(s) Oral at bedtime    MEDICATIONS  (PRN):  acetaminophen   Tablet .. 975 milliGRAM(s) Oral every 6 hours PRN Moderate Pain (4 - 6)  acetaminophen   Tablet .. 650 milliGRAM(s) Oral every 6 hours PRN Temp greater or equal to 38C (100.4F), Mild Pain (1 - 3)  bisacodyl Suppository 10 milliGRAM(s) Rectal daily PRN Constipation  dextrose 40% Gel 15 Gram(s) Oral once PRN Blood Glucose LESS THAN 70 milliGRAM(s)/deciliter  glucagon  Injectable 1 milliGRAM(s) IntraMuscular once PRN Glucose LESS THAN 70 milligrams/deciliter  QUEtiapine 25 milliGRAM(s) Oral every 12 hours PRN Anxiety/Agitation

## 2019-02-12 LAB
ALBUMIN SERPL ELPH-MCNC: 3.1 G/DL — LOW (ref 3.5–5.2)
ALP SERPL-CCNC: 47 U/L — SIGNIFICANT CHANGE UP (ref 30–115)
ALT FLD-CCNC: 5 U/L — SIGNIFICANT CHANGE UP (ref 0–41)
ANION GAP SERPL CALC-SCNC: 12 MMOL/L — SIGNIFICANT CHANGE UP (ref 7–14)
APTT BLD: 60.1 SEC — HIGH (ref 27–39.2)
AST SERPL-CCNC: 8 U/L — SIGNIFICANT CHANGE UP (ref 0–41)
BILIRUB SERPL-MCNC: 0.2 MG/DL — SIGNIFICANT CHANGE UP (ref 0.2–1.2)
BUN SERPL-MCNC: 30 MG/DL — HIGH (ref 10–20)
CALCIUM SERPL-MCNC: 11.1 MG/DL — HIGH (ref 8.5–10.1)
CHLORIDE SERPL-SCNC: 108 MMOL/L — SIGNIFICANT CHANGE UP (ref 98–110)
CO2 SERPL-SCNC: 26 MMOL/L — SIGNIFICANT CHANGE UP (ref 17–32)
CREAT SERPL-MCNC: 1.7 MG/DL — HIGH (ref 0.7–1.5)
GLUCOSE BLDC GLUCOMTR-MCNC: 123 MG/DL — HIGH (ref 70–99)
GLUCOSE BLDC GLUCOMTR-MCNC: 142 MG/DL — HIGH (ref 70–99)
GLUCOSE BLDC GLUCOMTR-MCNC: 149 MG/DL — HIGH (ref 70–99)
GLUCOSE BLDC GLUCOMTR-MCNC: 166 MG/DL — HIGH (ref 70–99)
GLUCOSE SERPL-MCNC: 147 MG/DL — HIGH (ref 70–99)
HCT VFR BLD CALC: 28.7 % — LOW (ref 37–47)
HGB BLD-MCNC: 9.2 G/DL — LOW (ref 12–16)
MAGNESIUM SERPL-MCNC: 1.8 MG/DL — SIGNIFICANT CHANGE UP (ref 1.8–2.4)
MCHC RBC-ENTMCNC: 29.1 PG — SIGNIFICANT CHANGE UP (ref 27–31)
MCHC RBC-ENTMCNC: 32.1 G/DL — SIGNIFICANT CHANGE UP (ref 32–37)
MCV RBC AUTO: 90.8 FL — SIGNIFICANT CHANGE UP (ref 81–99)
NRBC # BLD: 0 /100 WBCS — SIGNIFICANT CHANGE UP (ref 0–0)
PLATELET # BLD AUTO: 286 K/UL — SIGNIFICANT CHANGE UP (ref 130–400)
POTASSIUM SERPL-MCNC: 3.4 MMOL/L — LOW (ref 3.5–5)
POTASSIUM SERPL-SCNC: 3.4 MMOL/L — LOW (ref 3.5–5)
PROT SERPL-MCNC: 5.3 G/DL — LOW (ref 6–8)
RBC # BLD: 3.16 M/UL — LOW (ref 4.2–5.4)
RBC # FLD: 16.7 % — HIGH (ref 11.5–14.5)
SODIUM SERPL-SCNC: 146 MMOL/L — SIGNIFICANT CHANGE UP (ref 135–146)
WBC # BLD: 9.35 K/UL — SIGNIFICANT CHANGE UP (ref 4.8–10.8)
WBC # FLD AUTO: 9.35 K/UL — SIGNIFICANT CHANGE UP (ref 4.8–10.8)

## 2019-02-12 RX ORDER — HEPARIN SODIUM 5000 [USP'U]/ML
1200 INJECTION INTRAVENOUS; SUBCUTANEOUS
Qty: 25000 | Refills: 0 | Status: DISCONTINUED | OUTPATIENT
Start: 2019-02-12 | End: 2019-02-15

## 2019-02-12 RX ORDER — POTASSIUM CHLORIDE 20 MEQ
40 PACKET (EA) ORAL ONCE
Qty: 0 | Refills: 0 | Status: COMPLETED | OUTPATIENT
Start: 2019-02-12 | End: 2019-02-12

## 2019-02-12 RX ORDER — PAMIDRONATE DISODIUM 9 MG/ML
60 INJECTION, SOLUTION INTRAVENOUS ONCE
Qty: 0 | Refills: 0 | Status: DISCONTINUED | OUTPATIENT
Start: 2019-02-12 | End: 2019-02-12

## 2019-02-12 RX ORDER — OXYCODONE AND ACETAMINOPHEN 5; 325 MG/1; MG/1
1 TABLET ORAL EVERY 8 HOURS
Qty: 0 | Refills: 0 | Status: DISCONTINUED | OUTPATIENT
Start: 2019-02-12 | End: 2019-02-14

## 2019-02-12 RX ORDER — PAMIDRONATE DISODIUM 9 MG/ML
60 INJECTION, SOLUTION INTRAVENOUS ONCE
Qty: 0 | Refills: 0 | Status: COMPLETED | OUTPATIENT
Start: 2019-02-12 | End: 2019-02-12

## 2019-02-12 RX ADMIN — Medication 5 MILLIGRAM(S): at 05:59

## 2019-02-12 RX ADMIN — PAMIDRONATE DISODIUM 251.67 MILLIGRAM(S): 9 INJECTION, SOLUTION INTRAVENOUS at 13:35

## 2019-02-12 RX ADMIN — GABAPENTIN 100 MILLIGRAM(S): 400 CAPSULE ORAL at 06:00

## 2019-02-12 RX ADMIN — SENNA PLUS 2 TABLET(S): 8.6 TABLET ORAL at 21:14

## 2019-02-12 RX ADMIN — Medication 100 MILLIGRAM(S): at 18:03

## 2019-02-12 RX ADMIN — Medication 5 MILLIGRAM(S): at 12:03

## 2019-02-12 RX ADMIN — DULOXETINE HYDROCHLORIDE 30 MILLIGRAM(S): 30 CAPSULE, DELAYED RELEASE ORAL at 12:02

## 2019-02-12 RX ADMIN — Medication 100 MILLIGRAM(S): at 12:02

## 2019-02-12 RX ADMIN — HEPARIN SODIUM 1200 UNIT(S)/HR: 5000 INJECTION INTRAVENOUS; SUBCUTANEOUS at 13:34

## 2019-02-12 RX ADMIN — TAMSULOSIN HYDROCHLORIDE 0.4 MILLIGRAM(S): 0.4 CAPSULE ORAL at 21:13

## 2019-02-12 RX ADMIN — Medication 1 APPLICATION(S): at 12:03

## 2019-02-12 RX ADMIN — Medication 40 MILLIEQUIVALENT(S): at 18:03

## 2019-02-12 RX ADMIN — Medication 1 MILLIGRAM(S): at 12:02

## 2019-02-12 RX ADMIN — POLYETHYLENE GLYCOL 3350 17 GRAM(S): 17 POWDER, FOR SOLUTION ORAL at 12:02

## 2019-02-12 RX ADMIN — GABAPENTIN 100 MILLIGRAM(S): 400 CAPSULE ORAL at 13:35

## 2019-02-12 RX ADMIN — GABAPENTIN 100 MILLIGRAM(S): 400 CAPSULE ORAL at 21:14

## 2019-02-12 RX ADMIN — CHLORHEXIDINE GLUCONATE 1 APPLICATION(S): 213 SOLUTION TOPICAL at 12:03

## 2019-02-12 RX ADMIN — Medication 100 MILLIGRAM(S): at 06:00

## 2019-02-12 RX ADMIN — CEFTAZIDIME 100 MILLIGRAM(S): 6 INJECTION, POWDER, FOR SOLUTION INTRAVENOUS at 05:56

## 2019-02-12 NOTE — CHART NOTE - NSCHARTNOTEFT_GEN_A_CORE
egistered Dietitian Follow-Up     Patient Profile Reviewed                           Yes [x]   No []     Nutrition History Previously Obtained        Yes [x]  No []       Pertinent Subjective Information: Pt reports a good appetite. EMR reports 50-75% intake since last f/u. Pt has Glucerna ordered which she does not like.      Pertinent Medical Interventions: Pt presented to ED for agitation and AMS. Pt is admitted for change in mental status. Toxic metabolic encephalopathy (improved), SHONNA on CKD lll, chronic DVT, acute on chronic normocytic anemia due to ACD (transfused one unit PRBC on 2/10), hypercalcemia (11.1), and DM ll, and suspected folic acid deficiency noted. Discharge planning to NH when stable.     Diet order: Carb con w/snack, DASH/TLC diet w/ Glucerna q12     Anthropometrics:  - Ht. 152.4cm/5'0  - Wt. 79.4kg/175#  - Wt range 79.4, 90 kg wts in chart. 79.4 appears to be more accurate.    - BMI 34.2  - IBW 45kg     Pertinent Lab Data: (2/12) H/H 9.2/28.7, potassium 3.4, BUN 30, creat 1.7, glucose 147, calcium 11.1, albumin 3.1     Pertinent Meds: haprin, humalog, potassium chloride, bisacodyl suppository, docusate sodium, miralax, folic acid, prednisone, senna      Physical Findings:  - Appearance: Underline dementia noted. Pt was able to answer questions appropriately     - GI function: Last BM was yesterday 2/10. Fecal incontinence. Abdomen noted as soft, nontender, nondistended + BS.  - Tubes: n/a  - Oral/Mouth cavity: n/a  - Skin: Anson score 13. No PU. DU right 3rd toe and calf, left shin. Edema 4+ generalized, left and right foot and knee and right leg      Nutrition Requirements:   Weight Used: 79.4kg/45kg     Estimated Energy Needs    Continue []  Adjust [x]  1441-1561kcals/day (MSJ A.F 1.2-1.3)      Estimated Protein Needs    Continue []  Adjust [x]  45-54g/day (1-1.2g/IBW 45kg) Monitor renal labs     Estimated Fluid Needs        Continue []  Adjust [x]  1:1ml/kcal     [] Previous Nutrition Diagnosis: Inadequate oral intake             [] Ongoing          [X] Resolved     [] No active nutrition diagnosis identified at this time     Nutrition Intervention: meals and snacks/medical food supplements      Goal/Expected Outcome: Consume >75% of meals provided for 7 days      Indicator/Monitoring: Will monitor intake, nutrition focused physical findings, and labs. Not at risk.     Recommendations:  Meals and snacks: continue on a carb con w/ snack DASH/TLC diet   Medical food supplements: D/C Glucerna

## 2019-02-12 NOTE — PROGRESS NOTE ADULT - PROBLEM SELECTOR PROBLEM 1
Subacute osteomyelitis of right foot
Subacute osteomyelitis of right foot
Chronic deep vein thrombosis (DVT) of femoral vein of both lower extremities
Metabolic encephalopathy

## 2019-02-12 NOTE — PROGRESS NOTE ADULT - PROBLEM SELECTOR PLAN 1
Please see notes - Jan 31  CNS issues likely from IV Cephalosporins   NO OTHER ABX CHOICES - dw Hospitalist on jan 31   Please DO NOT obtain any more cx unless sig change in status  Need IV Ceftazidime 2 g Q 12 - 17 more days   recall if needed
Complete 4 more weeks of Iv Cefepime 2 Q 12  Dc planning
patient exhibiting classic delirium symptoms and etiology will likely be multifactorial; w/u has been extensive and hopefully with change of IV AB to ceftazidime and reversing downward renal trend this will improve.  I don't believe this is an acute psychiatric issue.
recommend starting coumadin to be able to bridge patient off IV heparin; would optimize and adjust doses at SNF

## 2019-02-12 NOTE — PROGRESS NOTE ADULT - SUBJECTIVE AND OBJECTIVE BOX
LINCOLN HEREDIA  78y  Female    Patient is a 78y old  Female who presents with change in mental status.      INTERVAL HPI/OVERNIGHT EVENTS:  -pt seen and examined  -sitting comfortably in chair, no complaints  -bowman inserted this weekend for urinary retention > 800cc  -stopped ANTICOAGULATION over the weekend due to drop in HGB; s/p transfusion; no obvious signs of bleeding - restart today and check PTT at 7pm today  -oob to chair encouraged daily     Vital Signs Last 24 Hrs  T(C): 36.7 (12 Feb 2019 05:12), Max: 36.7 (12 Feb 2019 05:12)  T(F): 98 (12 Feb 2019 05:12), Max: 98 (12 Feb 2019 05:12)  HR: 86 (12 Feb 2019 05:12) (73 - 86)  BP: 121/71 (12 Feb 2019 05:12) (121/71 - 176/78)  BP(mean): --  RR: 16 (12 Feb 2019 05:12) (16 - 16)  SpO2: --    PHYSICAL EXAM:  GENERAL: NAD  HEAD:  Atraumatic, Normocephalic  EYES: conjunctiva and sclera clear  ENMT: Moist mucous membranes  NECK: Supple, Normal thyroid  NERVOUS SYSTEM:  Awake & Alert, oriented x 2-3, Moves all extremities.   CHEST/LUNG: Clear to auscultation bilaterally; No rales, rhonchi, wheezing, or rubs  CV/HEART: Regular rate and rhythm; No murmurs, rubs, or gallops  GI/ABDOMEN: Soft, Nontender, obese abdomen; Bowel sounds present; Bowman present (cloudy)  EXTREMITIES:  2+ Peripheral Pulses, No clubbing or cyanosis. LE edema   LYMPH: No lymphadenopathy noted  SKIN: Right 3rd toe amputation with purulent drainage. Left leg ulcer: dressing in place.             Consultant(s) Notes Reviewed:  [x ] YES  [ ] NO  Care Discussed with Consultants/Other Providers [ x] YES  [ ] NO    LABS:                                                  9.2    9.35  )-----------( 286      ( 12 Feb 2019 09:53 )             28.7     02-12    146  |  108  |  30<H>  ----------------------------<  147<H>  3.4<L>   |  26  |  1.7<H>    Ca    11.1<H>      12 Feb 2019 09:53  Mg     1.8     02-12    TPro  5.3<L>  /  Alb  3.1<L>  /  TBili  0.2  /  DBili  x   /  AST  8   /  ALT  5   /  AlkPhos  47  02-12        Vitamin D, 1, 25-Dihydroxy: 12.4 pg/mL (01.31.19 @ 14:01)  Vitamin D, 25-Hydroxy: 20 ng/mL (01.31.19 @ 09:22)  Intact PTH: 8 pg/mL (01.30.19 @ 06:30)      MICROBIOLOGY:   Culture - Urine (01.31.19 @ 13:57)    Specimen Source: .Urine Clean Catch (Midstream)    Culture Results:   50,000 - 99,000 CFU/mL Yeast-like cells, presumptively not Candida  albicans "Susceptibilities not performed"      Culture - Urine (01.28.19 @ 17:20)    Specimen Source: .Urine Clean Catch (Midstream)    Culture Results: No growth      Culture - Blood (01.28.19 @ 14:16)    Specimen Source: .Blood Blood-Peripheral    Culture Results:   No growth to date.      Culture - Blood (01.28.19 @ 14:14)    Specimen Source: .Blood Blood-Peripheral    Culture Results:   No growth to date.      Culture - Other (01.10.19 @ 14:00)    -  Ciprofloxacin: R >2    -  Gentamicin: R >8    -  Imipenem: R >8    -  Levofloxacin: R >4    -  Meropenem: R >8    -  Piperacillin/Tazobactam: S 16    -  Tobramycin: R >8    -  Aztreonam: I 16    -  Ceftazidime: S 4    -  Cefepime: S 8    -  Amikacin: S 16    Specimen Source: .Other None    Culture Results:   Few Pseudomonas aeruginosa (Carbapenem Resistant)    Organism Identification: Pseudomonas aeruginosa (Carbapenem Resistant)    Organism: Pseudomonas aeruginosa (Carbapenem Resistant)    Method Type: SHANNAN      RADIOLOGY & ADDITIONAL TESTS:  CT Head No Cont (01.28.19 @ 15:54)   1.  The study is limited by motion artifact.    2.  Cerebral atrophy.    3.  Periventricular white matter hypodensities, nonspecific, differential   diagnostic possibilities include ischemic change, gliosis or  demyelination.      X-ray Chest 1 View-PORTABLE IMMEDIATE (01.28.19 @ 13:42)   Support devices: Left-sided PICC with the tip in the distal SVC.    Cardiac/mediastinum/hilum: Grossly stable.    Lung parenchyma/Pleura: Low lung volumes limiting evaluation. No definite   airspace opacity or effusion. Left lower lobe scar/atelectasis.    Skeleton/soft tissues: Stable.    Impression:      Low lung volumes limiting evaluation. No definite airspace opacity or   effusion. Left lower lobe scar/atelectasis.    Imaging Personally Reviewed:  [x] YES  [ ] NO    HEALTH ISSUES - PROBLEM Dx:  Other specified diabetes mellitus with other specified complication, unspecified whether long term insulin use  Rheumatoid arteritis  DVT (deep venous thrombosis)  HTN (hypertension)  Gout  Altered mental status  Osteomyelitis right foot.    MEDICATIONS  (STANDING):  allopurinol 100 milliGRAM(s) Oral daily  cefTAZidime  IVPB 500 milliGRAM(s) IV Intermittent every 24 hours  chlorhexidine 4% Liquid 1 Application(s) Topical <User Schedule>  collagenase Ointment 1 Application(s) Topical daily  dextrose 5%. 1000 milliLiter(s) (50 mL/Hr) IV Continuous <Continuous>  dextrose 50% Injectable 12.5 Gram(s) IV Push once  dextrose 50% Injectable 25 Gram(s) IV Push once  dextrose 50% Injectable 25 Gram(s) IV Push once  docusate sodium 100 milliGRAM(s) Oral two times a day  DULoxetine 30 milliGRAM(s) Oral daily  folic acid 1 milliGRAM(s) Oral daily  gabapentin 100 milliGRAM(s) Oral every 8 hours  heparin  Infusion. 1200 Unit(s)/Hr (12 mL/Hr) IV Continuous <Continuous>  insulin lispro (HumaLOG) corrective regimen sliding scale   SubCutaneous three times a day before meals  insulin lispro (HumaLOG) corrective regimen sliding scale   SubCutaneous at bedtime  leucovorin 5 milliGRAM(s) Oral daily  pamidronate IVPB 60 milliGRAM(s) IV Intermittent once  polyethylene glycol 3350 17 Gram(s) Oral daily  potassium chloride    Tablet ER 40 milliEquivalent(s) Oral once  predniSONE   Tablet 5 milliGRAM(s) Oral daily  senna 2 Tablet(s) Oral at bedtime  tamsulosin 0.4 milliGRAM(s) Oral at bedtime    MEDICATIONS  (PRN):  acetaminophen   Tablet .. 650 milliGRAM(s) Oral every 6 hours PRN Temp greater or equal to 38C (100.4F), Mild Pain (1 - 3)  bisacodyl Suppository 10 milliGRAM(s) Rectal daily PRN Constipation  dextrose 40% Gel 15 Gram(s) Oral once PRN Blood Glucose LESS THAN 70 milliGRAM(s)/deciliter  glucagon  Injectable 1 milliGRAM(s) IntraMuscular once PRN Glucose LESS THAN 70 milligrams/deciliter  oxyCODONE    5 mG/acetaminophen 325 mG 1 Tablet(s) Oral every 8 hours PRN Moderate Pain (4 - 6)  QUEtiapine 25 milliGRAM(s) Oral every 12 hours PRN Anxiety/Agitation

## 2019-02-12 NOTE — PROGRESS NOTE ADULT - SUBJECTIVE AND OBJECTIVE BOX
infectious diseases progress note:  LINCOLN HEREDIA is a 78yFemale patient    ALTERED MENTAL STATUS;SEPSIS    Chronic deep vein thrombosis (DVT) of femoral vein of both lower extremities  SHONNA (acute kidney injury)  Sepsis due to Pseudomonas species  Metabolic encephalopathy  Encephalopathy, unspecified  Subacute osteomyelitis of right foot  Other specified diabetes mellitus with other specified complication, unspecified whether long term insulin use  Rheumatoid arteritis  DVT (deep venous thrombosis)  HTN (hypertension)  Sepsis  Gout  Altered mental status      ROS:  UTO   Allergies    clindamycin (Unknown)  erythromycin (Unknown)  penicillin (Unknown)  strawberry (Unknown)    Intolerances        ANTIBIOTICS/RELEVANT:  antimicrobials  cefTAZidime  IVPB 500 milliGRAM(s) IV Intermittent every 24 hours    immunologic:    OTHER:  acetaminophen   Tablet .. 975 milliGRAM(s) Oral every 6 hours PRN  acetaminophen   Tablet .. 650 milliGRAM(s) Oral every 6 hours PRN  allopurinol 100 milliGRAM(s) Oral daily  bisacodyl Suppository 10 milliGRAM(s) Rectal daily PRN  chlorhexidine 4% Liquid 1 Application(s) Topical <User Schedule>  collagenase Ointment 1 Application(s) Topical daily  dextrose 40% Gel 15 Gram(s) Oral once PRN  dextrose 5%. 1000 milliLiter(s) IV Continuous <Continuous>  dextrose 50% Injectable 12.5 Gram(s) IV Push once  dextrose 50% Injectable 25 Gram(s) IV Push once  dextrose 50% Injectable 25 Gram(s) IV Push once  docusate sodium 100 milliGRAM(s) Oral two times a day  DULoxetine 30 milliGRAM(s) Oral daily  folic acid 1 milliGRAM(s) Oral daily  gabapentin 100 milliGRAM(s) Oral every 8 hours  glucagon  Injectable 1 milliGRAM(s) IntraMuscular once PRN  insulin lispro (HumaLOG) corrective regimen sliding scale   SubCutaneous three times a day before meals  insulin lispro (HumaLOG) corrective regimen sliding scale   SubCutaneous at bedtime  leucovorin 5 milliGRAM(s) Oral daily  polyethylene glycol 3350 17 Gram(s) Oral daily  predniSONE   Tablet 5 milliGRAM(s) Oral daily  QUEtiapine 25 milliGRAM(s) Oral every 12 hours PRN  senna 2 Tablet(s) Oral at bedtime  tamsulosin 0.4 milliGRAM(s) Oral at bedtime      Objective:  T(F): 98 (19 @ 05:12), Max: 98 (19 @ 05:12)  HR: 86 (19 @ 05:12) (73 - 86)  BP: 121/71 (19 @ 05:12) (121/71 - 176/78)  RR: 16 (19 @ 05:12) (16 - 16)  SpO2: --    PHYSICAL EXAM:  Constitutional:Well-developed, well nourished  Ear/Nose/Throat: no oral lesion, no sinus tenderness on percussion	  Neck:no JVD, no lymphadenopathy, supple  Respiratory: CTA lico  Cardiovascular: S1S2 RRR, no murmurs  Gastrointestinal:soft, (+) BS, no HSM  Extremities:no phlebitis         LABS:                        9.5    9.34  )-----------( 283      ( 2019 09:30 )             29.6         142  |  106  |  31<H>  ----------------------------<  176<H>  3.8   |  26  |  1.8<H>    Ca    10.9<H>      2019 09:30      PT/INR - ( 2019 09:30 )   PT: 13.40 sec;   INR: 1.17 ratio         PTT - ( 2019 09:30 )  PTT:32.3 sec  Urinalysis Basic - ( 10 Feb 2019 14:55 )    Color: Yellow / Appearance: Clear / S.025 / pH: x  Gluc: x / Ketone: Negative  / Bili: Negative / Urobili: 0.2 mg/dL   Blood: x / Protein: 100 mg/dL / Nitrite: Negative   Leuk Esterase: Moderate / RBC: 11-25 /HPF / WBC 26-50 /HPF   Sq Epi: x / Non Sq Epi: Few /HPF / Bacteria: Many          MICROBIOLOGY:    Culture - Urine (collected 02-10-19 @ 14:55)  Source: .Urine Catheterized  Final Report (19 @ 18:41):    <10,000 CFU/ml    Normal Urogenital reva present        Culture - Urine (collected 10 Feb 2019 14:55)  Source: .Urine Catheterized  Final Report (2019 18:41):    <10,000 CFU/ml    Normal Urogenital reva present      Culture Results:   <10,000 CFU/ml  Normal Urogenital reva present (02-10 @ 14:55)        RADIOLOGY & ADDITIONAL STUDIES:

## 2019-02-12 NOTE — PROGRESS NOTE ADULT - ASSESSMENT
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 She was admitted on 12/19/18 to the [facility] and managed for OM of the Right 3rd digit OM (cultures positive for Carbapenem Resistant Pseudomonas) and discharged on Cefepime for a total of 6 weeks.     Per Dr. Monge's note:   Patient has been having intermittent episodes of confusions since discharge from the hospital on 1/19. Reported that there are times where pt is AAOx3 and other times where she only repeats her name. Patients condition worsened in the past few days when she was told that she may not be able to remain in the NH due to insurance issues. Patient was seen by psych and prescribed Abilify.       Assessment and Plan:    1. Toxic metabolic Encephalopathy: Improved?  ? Underlying Dementia with Medication side effect vs. SHONNA vs. Dehydration.  ID following: Risk of encephalopathy similar with all Cephalosporins. Dr. Lau agreed to a trial of Ceftazidime.   -positive U/A with urine cultures pending   -ID follow up appreciated  -Seroquel 25 mg po q 12 prn for anxiety and agitation.      2. SHONNA on CKD 3/Urinary retention  SHONNA, ATN vs obstruction vs AIN ( Urine eosinophils suggestive) vs prerenal.    Creatinine trending down 3.1-->2.4 --->2.0 --> 1.8-->1.7  Monitor ins and out & BMP daily  avoid nephrotoxics   -continue with bowman - renewed today    2. RT third metatarsal OM:  s/p Amputation.  Podiatry following: Local wound care with Santyl and moist dressing.  Continue IV Ceftazidime for now. STOP date 2/27/19.  PICC placed on 1/16    3. PAD:  s/p Angiogram 1/9/19.  Vascular recommended follow up in 2 weeks.    4. Chronic DVT:  -off AC in Lieu of acute anemia and blood transfusion for a few days, however will restart heparin gtt today with close monitoring of ptt (ordered for 7pm)      5. Acute on Chronic Normocytic Anemia: due to ACD  Goal hgb >7 g/dl.  -Transfused one unit PRBC on 2/10    6. Hypercalcemia:  -one dose of pamidronate today  Vitamin D and PTH levels low. Follow up PTHrP - QNS from 1/31.  Ordered yesterday.      7. Rheumatoid Arthritis:  Continue home Prednisone 5mg Daily.  Pain control.      8. DM II:  Hemoglobin A1C, Whole Blood: 6.3% (01.12.19 @ 08:08)  Monitor finger sticks. Insulin coverage if fingersticks are greater than 180.    9. Latent TB:  Rifampin discontinued by ID.  Per HCP this was started 2 months ago in anticipation of starting ? Biologic DMARD.      10. Gout:  Continue Allopurinol.      11.  Bilateral Chronic Nonhealing Ulcers:  Full thickness wounds s/p debridement by burn 1/9/19.  Continue local wound care: Xeroform dsd kelrix q24h.      12. Chronic Pain:  Continue Neurontin / Cymbalta and oxycodone prn.  oob to chair daily via Graham lift   -daily PT/Rehab    13. suspected folic acid def;   on supplement; check levels on the outside     DVT ppx: on heparin  Code Status: DNR/DNI.  Disposition: NH when stable.  Pt has 20 days left at [facility]      Progress Note Handoff  Pending:  Consults  Tests monitoring BMP and CBC  Results:   Family Discussion:  Disposition: Home___/SNF_x___/Other______________/Unknown at this time_____

## 2019-02-13 LAB
ALBUMIN SERPL ELPH-MCNC: 3 G/DL — LOW (ref 3.5–5.2)
ALP SERPL-CCNC: 46 U/L — SIGNIFICANT CHANGE UP (ref 30–115)
ALT FLD-CCNC: 5 U/L — SIGNIFICANT CHANGE UP (ref 0–41)
ANION GAP SERPL CALC-SCNC: 11 MMOL/L — SIGNIFICANT CHANGE UP (ref 7–14)
APTT BLD: 69.8 SEC — HIGH (ref 27–39.2)
APTT BLD: 75.9 SEC — CRITICAL HIGH (ref 27–39.2)
AST SERPL-CCNC: 8 U/L — SIGNIFICANT CHANGE UP (ref 0–41)
BILIRUB SERPL-MCNC: <0.2 MG/DL — SIGNIFICANT CHANGE UP (ref 0.2–1.2)
BUN SERPL-MCNC: 27 MG/DL — HIGH (ref 10–20)
CALCIUM SERPL-MCNC: 10.8 MG/DL — HIGH (ref 8.5–10.1)
CHLORIDE SERPL-SCNC: 105 MMOL/L — SIGNIFICANT CHANGE UP (ref 98–110)
CO2 SERPL-SCNC: 26 MMOL/L — SIGNIFICANT CHANGE UP (ref 17–32)
CREAT SERPL-MCNC: 1.8 MG/DL — HIGH (ref 0.7–1.5)
GLUCOSE BLDC GLUCOMTR-MCNC: 125 MG/DL — HIGH (ref 70–99)
GLUCOSE BLDC GLUCOMTR-MCNC: 127 MG/DL — HIGH (ref 70–99)
GLUCOSE BLDC GLUCOMTR-MCNC: 131 MG/DL — HIGH (ref 70–99)
GLUCOSE BLDC GLUCOMTR-MCNC: 163 MG/DL — HIGH (ref 70–99)
GLUCOSE BLDC GLUCOMTR-MCNC: 181 MG/DL — HIGH (ref 70–99)
GLUCOSE SERPL-MCNC: 149 MG/DL — HIGH (ref 70–99)
HCT VFR BLD CALC: 29.3 % — LOW (ref 37–47)
HGB BLD-MCNC: 9.2 G/DL — LOW (ref 12–16)
MCHC RBC-ENTMCNC: 28.8 PG — SIGNIFICANT CHANGE UP (ref 27–31)
MCHC RBC-ENTMCNC: 31.4 G/DL — LOW (ref 32–37)
MCV RBC AUTO: 91.8 FL — SIGNIFICANT CHANGE UP (ref 81–99)
NRBC # BLD: 0 /100 WBCS — SIGNIFICANT CHANGE UP (ref 0–0)
PLATELET # BLD AUTO: 274 K/UL — SIGNIFICANT CHANGE UP (ref 130–400)
POTASSIUM SERPL-MCNC: 3.7 MMOL/L — SIGNIFICANT CHANGE UP (ref 3.5–5)
POTASSIUM SERPL-SCNC: 3.7 MMOL/L — SIGNIFICANT CHANGE UP (ref 3.5–5)
PROT SERPL-MCNC: 5.1 G/DL — LOW (ref 6–8)
RBC # BLD: 3.19 M/UL — LOW (ref 4.2–5.4)
RBC # FLD: 16.7 % — HIGH (ref 11.5–14.5)
SODIUM SERPL-SCNC: 142 MMOL/L — SIGNIFICANT CHANGE UP (ref 135–146)
WBC # BLD: 8.69 K/UL — SIGNIFICANT CHANGE UP (ref 4.8–10.8)
WBC # FLD AUTO: 8.69 K/UL — SIGNIFICANT CHANGE UP (ref 4.8–10.8)

## 2019-02-13 RX ADMIN — QUETIAPINE FUMARATE 25 MILLIGRAM(S): 200 TABLET, FILM COATED ORAL at 23:03

## 2019-02-13 RX ADMIN — POLYETHYLENE GLYCOL 3350 17 GRAM(S): 17 POWDER, FOR SOLUTION ORAL at 13:31

## 2019-02-13 RX ADMIN — Medication 1: at 08:36

## 2019-02-13 RX ADMIN — GABAPENTIN 100 MILLIGRAM(S): 400 CAPSULE ORAL at 13:30

## 2019-02-13 RX ADMIN — Medication 1 APPLICATION(S): at 19:03

## 2019-02-13 RX ADMIN — Medication 100 MILLIGRAM(S): at 19:05

## 2019-02-13 RX ADMIN — Medication 1 MILLIGRAM(S): at 13:31

## 2019-02-13 RX ADMIN — TAMSULOSIN HYDROCHLORIDE 0.4 MILLIGRAM(S): 0.4 CAPSULE ORAL at 21:58

## 2019-02-13 RX ADMIN — GABAPENTIN 100 MILLIGRAM(S): 400 CAPSULE ORAL at 05:18

## 2019-02-13 RX ADMIN — OXYCODONE AND ACETAMINOPHEN 1 TABLET(S): 5; 325 TABLET ORAL at 13:27

## 2019-02-13 RX ADMIN — Medication 100 MILLIGRAM(S): at 13:29

## 2019-02-13 RX ADMIN — Medication 5 MILLIGRAM(S): at 13:31

## 2019-02-13 RX ADMIN — OXYCODONE AND ACETAMINOPHEN 1 TABLET(S): 5; 325 TABLET ORAL at 14:30

## 2019-02-13 RX ADMIN — GABAPENTIN 100 MILLIGRAM(S): 400 CAPSULE ORAL at 21:58

## 2019-02-13 RX ADMIN — Medication 100 MILLIGRAM(S): at 05:18

## 2019-02-13 RX ADMIN — HEPARIN SODIUM 1200 UNIT(S)/HR: 5000 INJECTION INTRAVENOUS; SUBCUTANEOUS at 19:35

## 2019-02-13 RX ADMIN — DULOXETINE HYDROCHLORIDE 30 MILLIGRAM(S): 30 CAPSULE, DELAYED RELEASE ORAL at 13:30

## 2019-02-13 RX ADMIN — CEFTAZIDIME 100 MILLIGRAM(S): 6 INJECTION, POWDER, FOR SOLUTION INTRAVENOUS at 05:19

## 2019-02-13 RX ADMIN — Medication 5 MILLIGRAM(S): at 05:18

## 2019-02-13 RX ADMIN — CHLORHEXIDINE GLUCONATE 1 APPLICATION(S): 213 SOLUTION TOPICAL at 19:04

## 2019-02-13 NOTE — PROGRESS NOTE ADULT - ASSESSMENT
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 She was admitted on 12/19/18 to the [facility] and managed for OM of the Right 3rd digit OM (cultures positive for Carbapenem Resistant Pseudomonas) and discharged on Cefepime for a total of 6 weeks.     Per Dr. Monge's note:   Patient has been having intermittent episodes of confusions since discharge from the hospital on 1/19. Reported that there are times where pt is AAOx3 and other times where she only repeats her name. Patients condition worsened in the past few days when she was told that she may not be able to remain in the NH due to insurance issues. Patient was seen by psych and prescribed Abilify.       Assessment and Plan:    1. Toxic metabolic Encephalopathy: Improved?  ? Underlying Dementia with Medication side effect vs. SHONNA vs. Dehydration.  ID following: Risk of encephalopathy similar with all Cephalosporins. Dr. Lau agreed to a trial of Ceftazidime.   -positive U/A with urine cultures pending   -ID follow up appreciated  -Seroquel 25 mg po q 12 prn for anxiety and agitation.      2. SHONNA on CKD 3/Urinary retention  SHONNA, ATN vs obstruction vs AIN ( Urine eosinophils suggestive) vs prerenal.    Creatinine trending down 3.1-->1.7--> 1.8  Monitor ins and out & BMP daily  avoid nephrotoxics   -continue with bowman - renewed today    2. RT third metatarsal OM:  s/p Amputation.  Podiatry following: Local wound care with Santyl and moist dressing.  Continue IV Ceftazidime for now. STOP date 2/27/19.  PICC placed on 1/16    3. PAD:  s/p Angiogram 1/9/19.  Vascular recommended follow up in 2 weeks.    4. Chronic DVT:  -restarted heparin gtt on 2/12 with close monitoring of ptt       5. Acute on Chronic Normocytic Anemia: due to ACD  Goal hgb >7 g/dl.  -Transfused one unit PRBC on 2/10    6. Hypercalcemia:  -one dose of pamidronate on 2/12  Vitamin D and PTH levels low. Follow up PTHrP - QNS from 1/31.  Ordered on 2/10.      7. Rheumatoid Arthritis:  Continue home Prednisone 5mg Daily.  Pain control.      8. DM II:  Hemoglobin A1C, Whole Blood: 6.3% (01.12.19 @ 08:08)  Monitor finger sticks. Insulin coverage if fingersticks are greater than 180.    9. Latent TB:  Rifampin discontinued by ID.  Per HCP this was started 2 months ago in anticipation of starting ? Biologic DMARD.      10. Gout:  Continue Allopurinol.      11.  Bilateral Chronic Nonhealing Ulcers:  Full thickness wounds s/p debridement by burn 1/9/19.  Continue local wound care: Xeroform dsd kelrix q24h.      12. Chronic Pain:  Continue Neurontin / Cymbalta and oxycodone prn.  oob to chair daily via Graham lift   -daily PT/Rehab    13. suspected folic acid def;   on supplement; check levels on the outside     DVT ppx: on heparin  Code Status: DNR/DNI.  Disposition: NH when stable.  Pt has 20 days left at [facility]      Progress Note Handoff  Pending:  Consults  Tests monitoring BMP and CBC  Results:   Family Discussion:  Disposition: Home___/SNF_x___/Other______________/Unknown at this time_____

## 2019-02-13 NOTE — PROGRESS NOTE ADULT - SUBJECTIVE AND OBJECTIVE BOX
LINCOLN HEREDIA  78y  Female    Patient is a 78y old  Female who presents with change in mental status.      INTERVAL HPI/OVERNIGHT EVENTS:  -pt seen and examined  -sitting comfortably in bed, no complaints  -bowman inserted this weekend for urinary retention > 800cc  -acute anemia this past weekend, s/p 1 unit prbc  -restarted IV heparin gtt, monitor ptt  -oob to chair encouraged daily     Vital Signs Last 24 Hrs  T(C): 36.4 (13 Feb 2019 04:50), Max: 36.7 (12 Feb 2019 14:03)  T(F): 97.5 (13 Feb 2019 04:50), Max: 98 (12 Feb 2019 14:03)  HR: 82 (13 Feb 2019 04:50) (81 - 97)  BP: 143/68 (13 Feb 2019 04:50) (139/70 - 161/78)  BP(mean): --  RR: 16 (13 Feb 2019 04:50) (16 - 16)  SpO2: --    PHYSICAL EXAM:  GENERAL: NAD  HEAD:  Atraumatic, Normocephalic  EYES: conjunctiva and sclera clear  ENMT: Moist mucous membranes  NECK: Supple, Normal thyroid  NERVOUS SYSTEM:  Awake & Alert, oriented x 2-3, Moves all extremities.   CHEST/LUNG: Clear to auscultation bilaterally; No rales, rhonchi, wheezing, or rubs  CV/HEART: Regular rate and rhythm; No murmurs, rubs, or gallops  GI/ABDOMEN: Soft, Nontender, obese abdomen; Bowel sounds present; Bowman present (cloudy)  EXTREMITIES:  2+ Peripheral Pulses, No clubbing or cyanosis. LE edema +  LYMPH: No lymphadenopathy noted  SKIN: Right 3rd toe amputation with purulent drainage. Left leg ulcer: dressing in place.             Consultant(s) Notes Reviewed:  [x ] YES  [ ] NO  Care Discussed with Consultants/Other Providers [ x] YES  [ ] NO    LABS:                                                      9.2    8.69  )-----------( 274      ( 13 Feb 2019 07:02 )             29.3       02-13    142  |  105  |  27<H>  ----------------------------<  149<H>  3.7   |  26  |  1.8<H>    Ca    10.8<H>      13 Feb 2019 07:02  Mg     1.8     02-12    TPro  5.1<L>  /  Alb  3.0<L>  /  TBili  <0.2  /  DBili  x   /  AST  8   /  ALT  5   /  AlkPhos  46  02-13        Vitamin D, 1, 25-Dihydroxy: 12.4 pg/mL (01.31.19 @ 14:01)  Vitamin D, 25-Hydroxy: 20 ng/mL (01.31.19 @ 09:22)  Intact PTH: 8 pg/mL (01.30.19 @ 06:30)      MICROBIOLOGY:   Culture - Urine (01.31.19 @ 13:57)    Specimen Source: .Urine Clean Catch (Midstream)    Culture Results:   50,000 - 99,000 CFU/mL Yeast-like cells, presumptively not Candida  albicans "Susceptibilities not performed"      Culture - Urine (01.28.19 @ 17:20)    Specimen Source: .Urine Clean Catch (Midstream)    Culture Results: No growth      Culture - Blood (01.28.19 @ 14:16)    Specimen Source: .Blood Blood-Peripheral    Culture Results:   No growth to date.      Culture - Blood (01.28.19 @ 14:14)    Specimen Source: .Blood Blood-Peripheral    Culture Results:   No growth to date.      Culture - Other (01.10.19 @ 14:00)    -  Ciprofloxacin: R >2    -  Gentamicin: R >8    -  Imipenem: R >8    -  Levofloxacin: R >4    -  Meropenem: R >8    -  Piperacillin/Tazobactam: S 16    -  Tobramycin: R >8    -  Aztreonam: I 16    -  Ceftazidime: S 4    -  Cefepime: S 8    -  Amikacin: S 16    Specimen Source: .Other None    Culture Results:   Few Pseudomonas aeruginosa (Carbapenem Resistant)    Organism Identification: Pseudomonas aeruginosa (Carbapenem Resistant)    Organism: Pseudomonas aeruginosa (Carbapenem Resistant)    Method Type: SHANNAN      RADIOLOGY & ADDITIONAL TESTS:  CT Head No Cont (01.28.19 @ 15:54)   1.  The study is limited by motion artifact.    2.  Cerebral atrophy.    3.  Periventricular white matter hypodensities, nonspecific, differential   diagnostic possibilities include ischemic change, gliosis or  demyelination.      X-ray Chest 1 View-PORTABLE IMMEDIATE (01.28.19 @ 13:42)   Support devices: Left-sided PICC with the tip in the distal SVC.    Cardiac/mediastinum/hilum: Grossly stable.    Lung parenchyma/Pleura: Low lung volumes limiting evaluation. No definite   airspace opacity or effusion. Left lower lobe scar/atelectasis.    Skeleton/soft tissues: Stable.    Impression:      Low lung volumes limiting evaluation. No definite airspace opacity or   effusion. Left lower lobe scar/atelectasis.    Imaging Personally Reviewed:  [x] YES  [ ] NO    HEALTH ISSUES - PROBLEM Dx:  Other specified diabetes mellitus with other specified complication, unspecified whether long term insulin use  Rheumatoid arteritis  DVT (deep venous thrombosis)  HTN (hypertension)  Gout  Altered mental status  Osteomyelitis right foot.    MEDICATIONS  (STANDING):  allopurinol 100 milliGRAM(s) Oral daily  cefTAZidime  IVPB 500 milliGRAM(s) IV Intermittent every 24 hours  chlorhexidine 4% Liquid 1 Application(s) Topical <User Schedule>  collagenase Ointment 1 Application(s) Topical daily  dextrose 5%. 1000 milliLiter(s) (50 mL/Hr) IV Continuous <Continuous>  dextrose 50% Injectable 12.5 Gram(s) IV Push once  dextrose 50% Injectable 25 Gram(s) IV Push once  dextrose 50% Injectable 25 Gram(s) IV Push once  docusate sodium 100 milliGRAM(s) Oral two times a day  DULoxetine 30 milliGRAM(s) Oral daily  folic acid 1 milliGRAM(s) Oral daily  gabapentin 100 milliGRAM(s) Oral every 8 hours  heparin  Infusion. 1200 Unit(s)/Hr (12 mL/Hr) IV Continuous <Continuous>  insulin lispro (HumaLOG) corrective regimen sliding scale   SubCutaneous three times a day before meals  insulin lispro (HumaLOG) corrective regimen sliding scale   SubCutaneous at bedtime  leucovorin 5 milliGRAM(s) Oral daily  polyethylene glycol 3350 17 Gram(s) Oral daily  predniSONE   Tablet 5 milliGRAM(s) Oral daily  senna 2 Tablet(s) Oral at bedtime  tamsulosin 0.4 milliGRAM(s) Oral at bedtime    MEDICATIONS  (PRN):  acetaminophen   Tablet .. 650 milliGRAM(s) Oral every 6 hours PRN Temp greater or equal to 38C (100.4F), Mild Pain (1 - 3)  bisacodyl Suppository 10 milliGRAM(s) Rectal daily PRN Constipation  dextrose 40% Gel 15 Gram(s) Oral once PRN Blood Glucose LESS THAN 70 milliGRAM(s)/deciliter  glucagon  Injectable 1 milliGRAM(s) IntraMuscular once PRN Glucose LESS THAN 70 milligrams/deciliter  oxyCODONE    5 mG/acetaminophen 325 mG 1 Tablet(s) Oral every 8 hours PRN Moderate Pain (4 - 6)  QUEtiapine 25 milliGRAM(s) Oral every 12 hours PRN Anxiety/Agitation

## 2019-02-14 LAB
ALBUMIN SERPL ELPH-MCNC: 3 G/DL — LOW (ref 3.5–5.2)
ALP SERPL-CCNC: 47 U/L — SIGNIFICANT CHANGE UP (ref 30–115)
ALT FLD-CCNC: 5 U/L — SIGNIFICANT CHANGE UP (ref 0–41)
ANION GAP SERPL CALC-SCNC: 16 MMOL/L — HIGH (ref 7–14)
APTT BLD: 71.5 SEC — CRITICAL HIGH (ref 27–39.2)
AST SERPL-CCNC: 10 U/L — SIGNIFICANT CHANGE UP (ref 0–41)
BILIRUB SERPL-MCNC: 0.2 MG/DL — SIGNIFICANT CHANGE UP (ref 0.2–1.2)
BUN SERPL-MCNC: 25 MG/DL — HIGH (ref 10–20)
CALCIUM SERPL-MCNC: 10.1 MG/DL — SIGNIFICANT CHANGE UP (ref 8.5–10.1)
CHLORIDE SERPL-SCNC: 103 MMOL/L — SIGNIFICANT CHANGE UP (ref 98–110)
CO2 SERPL-SCNC: 23 MMOL/L — SIGNIFICANT CHANGE UP (ref 17–32)
CREAT SERPL-MCNC: 1.7 MG/DL — HIGH (ref 0.7–1.5)
GLUCOSE BLDC GLUCOMTR-MCNC: 122 MG/DL — HIGH (ref 70–99)
GLUCOSE BLDC GLUCOMTR-MCNC: 125 MG/DL — HIGH (ref 70–99)
GLUCOSE BLDC GLUCOMTR-MCNC: 145 MG/DL — HIGH (ref 70–99)
GLUCOSE BLDC GLUCOMTR-MCNC: 184 MG/DL — HIGH (ref 70–99)
GLUCOSE SERPL-MCNC: 124 MG/DL — HIGH (ref 70–99)
HCT VFR BLD CALC: 29.5 % — LOW (ref 37–47)
HGB BLD-MCNC: 9.2 G/DL — LOW (ref 12–16)
INR BLD: 1.15 RATIO — SIGNIFICANT CHANGE UP (ref 0.65–1.3)
MCHC RBC-ENTMCNC: 28.8 PG — SIGNIFICANT CHANGE UP (ref 27–31)
MCHC RBC-ENTMCNC: 31.2 G/DL — LOW (ref 32–37)
MCV RBC AUTO: 92.2 FL — SIGNIFICANT CHANGE UP (ref 81–99)
NRBC # BLD: 0 /100 WBCS — SIGNIFICANT CHANGE UP (ref 0–0)
PLATELET # BLD AUTO: 277 K/UL — SIGNIFICANT CHANGE UP (ref 130–400)
POTASSIUM SERPL-MCNC: 3.5 MMOL/L — SIGNIFICANT CHANGE UP (ref 3.5–5)
POTASSIUM SERPL-SCNC: 3.5 MMOL/L — SIGNIFICANT CHANGE UP (ref 3.5–5)
PROT SERPL-MCNC: 5.1 G/DL — LOW (ref 6–8)
PROTHROM AB SERPL-ACNC: 13.2 SEC — HIGH (ref 9.95–12.87)
RBC # BLD: 3.2 M/UL — LOW (ref 4.2–5.4)
RBC # FLD: 16.2 % — HIGH (ref 11.5–14.5)
SODIUM SERPL-SCNC: 142 MMOL/L — SIGNIFICANT CHANGE UP (ref 135–146)
WBC # BLD: 8.37 K/UL — SIGNIFICANT CHANGE UP (ref 4.8–10.8)
WBC # FLD AUTO: 8.37 K/UL — SIGNIFICANT CHANGE UP (ref 4.8–10.8)

## 2019-02-14 RX ADMIN — SENNA PLUS 2 TABLET(S): 8.6 TABLET ORAL at 21:38

## 2019-02-14 RX ADMIN — Medication 5 MILLIGRAM(S): at 11:46

## 2019-02-14 RX ADMIN — CHLORHEXIDINE GLUCONATE 1 APPLICATION(S): 213 SOLUTION TOPICAL at 10:29

## 2019-02-14 RX ADMIN — Medication 100 MILLIGRAM(S): at 17:40

## 2019-02-14 RX ADMIN — OXYCODONE AND ACETAMINOPHEN 1 TABLET(S): 5; 325 TABLET ORAL at 12:18

## 2019-02-14 RX ADMIN — CEFTAZIDIME 100 MILLIGRAM(S): 6 INJECTION, POWDER, FOR SOLUTION INTRAVENOUS at 05:42

## 2019-02-14 RX ADMIN — OXYCODONE AND ACETAMINOPHEN 1 TABLET(S): 5; 325 TABLET ORAL at 11:45

## 2019-02-14 RX ADMIN — Medication 5 MILLIGRAM(S): at 05:43

## 2019-02-14 RX ADMIN — Medication 1 APPLICATION(S): at 11:46

## 2019-02-14 RX ADMIN — HEPARIN SODIUM 1200 UNIT(S)/HR: 5000 INJECTION INTRAVENOUS; SUBCUTANEOUS at 11:14

## 2019-02-14 RX ADMIN — TAMSULOSIN HYDROCHLORIDE 0.4 MILLIGRAM(S): 0.4 CAPSULE ORAL at 21:38

## 2019-02-14 RX ADMIN — POLYETHYLENE GLYCOL 3350 17 GRAM(S): 17 POWDER, FOR SOLUTION ORAL at 11:45

## 2019-02-14 RX ADMIN — DULOXETINE HYDROCHLORIDE 30 MILLIGRAM(S): 30 CAPSULE, DELAYED RELEASE ORAL at 11:45

## 2019-02-14 RX ADMIN — Medication 1 MILLIGRAM(S): at 11:45

## 2019-02-14 RX ADMIN — GABAPENTIN 100 MILLIGRAM(S): 400 CAPSULE ORAL at 21:38

## 2019-02-14 RX ADMIN — GABAPENTIN 100 MILLIGRAM(S): 400 CAPSULE ORAL at 05:43

## 2019-02-14 RX ADMIN — GABAPENTIN 100 MILLIGRAM(S): 400 CAPSULE ORAL at 15:07

## 2019-02-14 RX ADMIN — Medication 100 MILLIGRAM(S): at 11:45

## 2019-02-14 NOTE — PROGRESS NOTE ADULT - SUBJECTIVE AND OBJECTIVE BOX
PODIATRY PROGRESS NOTE   [id number] LINCOLN HEREDIA is a pleasant well-nourished, well developed 78y Female in no acute distress, alert awake, and oriented to person, place and time.   Patient is a 78y old  Female who presents with a chief complaint of Encephalopathy (05 Feb 2019 13:02)    HPI:  · Chief Complaint: The patient is a 78y Female complaining of confusion.	  · HPI Objective Statement: 77 yo F with a DVT, HTN, DM, s/p 3rd right toe last month due to osteo, picc line with cefepime, presents with AMS. Sent in from NH. According to friend at bedside patient progressively becoming more agitated and altered over the last few days. Talking to self, shouting at people. Associated with cough. Denies fevers, chills, abd pain, NVCD, CP, SOB, HA. (28 Jan 2019 19:56)    pt was seen at bedside am rounds.  pt in severe right hip pain today possible due to RA.  I was not able to change the dressing today.  nurse will change later today    Vital Signs Last 24 Hrs  T(C): 36.8 (14 Feb 2019 05:41), Max: 36.8 (14 Feb 2019 05:41)  T(F): 98.2 (14 Feb 2019 05:41), Max: 98.2 (14 Feb 2019 05:41)  HR: 91 (14 Feb 2019 05:41) (77 - 91)  BP: 138/59 (14 Feb 2019 05:41) (115/59 - 164/79)  BP(mean): --  RR: 16 (14 Feb 2019 05:41) (16 - 16)  SpO2: --                        9.2    8.69  )-----------( 274      ( 13 Feb 2019 07:02 )             29.3                 02-13    142  |  105  |  27<H>  ----------------------------<  149<H>  3.7   |  26  |  1.8<H>    Ca    10.8<H>      13 Feb 2019 07:02  Mg     1.8     02-12    TPro  5.1<L>  /  Alb  3.0<L>  /  TBili  <0.2  /  DBili  x   /  AST  8   /  ALT  5   /  AlkPhos  46  02-13    A:  s/p partial 3rd ray resection partially closed with no acute signs of infection   P:  Santyl with moist dressing to right foot wound as outpatient    Podiatry will follow q72h if patient in house   continue local wound care  02-14-19 @ 08:05

## 2019-02-14 NOTE — PROGRESS NOTE ADULT - SUBJECTIVE AND OBJECTIVE BOX
LINCOLN HEREDIA  78y  Female    Patient is a 78y old  Female who presents with change in mental status.      INTERVAL HPI/OVERNIGHT EVENTS:  -pt seen and examined  -sitting comfortably in bed, no complaints  -bowman inserted this weekend for acute urinary retention > 800cc  -acute anemia this past weekend, s/p 1 unit prbc  -restarted IV heparin gtt, monitor ptt  -oob to chair encouraged daily     Vital Signs Last 24 Hrs  T(C): 36.7 (14 Feb 2019 14:00), Max: 36.8 (14 Feb 2019 05:41)  T(F): 98 (14 Feb 2019 14:00), Max: 98.2 (14 Feb 2019 05:41)  HR: 88 (14 Feb 2019 14:00) (81 - 91)  BP: 137/62 (14 Feb 2019 14:00) (137/62 - 164/79)  BP(mean): --  RR: 16 (14 Feb 2019 14:00) (16 - 16)  SpO2: --    PHYSICAL EXAM:  GENERAL: NAD  HEAD:  Atraumatic, Normocephalic  EYES: conjunctiva and sclera clear  ENMT: Moist mucous membranes  NECK: Supple, Normal thyroid  NERVOUS SYSTEM:  Awake & Alert, oriented x 2-3, Moves all extremities.   CHEST/LUNG: Clear to auscultation bilaterally; No rales, rhonchi, wheezing, or rubs  CV/HEART: Regular rate and rhythm; No murmurs, rubs, or gallops  GI/ABDOMEN: Soft, Nontender, obese abdomen; Bowel sounds present; Bowman present (cloudy)  EXTREMITIES:  2+ Peripheral Pulses, No clubbing or cyanosis. LE edema +  LYMPH: No lymphadenopathy noted  SKIN: Right 3rd toe amputation with purulent drainage. Left leg ulcer: dressing in place.             Consultant(s) Notes Reviewed:  [x ] YES  [ ] NO  Care Discussed with Consultants/Other Providers [ x] YES  [ ] NO    LABS:                                                    9.2    8.37  )-----------( 277      ( 14 Feb 2019 09:57 )             29.5     02-14    142  |  103  |  25<H>  ----------------------------<  124<H>  3.5   |  23  |  1.7<H>    Ca    10.1      14 Feb 2019 09:57    TPro  5.1<L>  /  Alb  3.0<L>  /  TBili  0.2  /  DBili  x   /  AST  10  /  ALT  5   /  AlkPhos  47  02-14        Vitamin D, 1, 25-Dihydroxy: 12.4 pg/mL (01.31.19 @ 14:01)  Vitamin D, 25-Hydroxy: 20 ng/mL (01.31.19 @ 09:22)  Intact PTH: 8 pg/mL (01.30.19 @ 06:30)      MICROBIOLOGY:   Culture - Urine (01.31.19 @ 13:57)    Specimen Source: .Urine Clean Catch (Midstream)    Culture Results:   50,000 - 99,000 CFU/mL Yeast-like cells, presumptively not Candida  albicans "Susceptibilities not performed"      Culture - Urine (01.28.19 @ 17:20)    Specimen Source: .Urine Clean Catch (Midstream)    Culture Results: No growth      Culture - Blood (01.28.19 @ 14:16)    Specimen Source: .Blood Blood-Peripheral    Culture Results:   No growth to date.      Culture - Blood (01.28.19 @ 14:14)    Specimen Source: .Blood Blood-Peripheral    Culture Results:   No growth to date.      Culture - Other (01.10.19 @ 14:00)    -  Ciprofloxacin: R >2    -  Gentamicin: R >8    -  Imipenem: R >8    -  Levofloxacin: R >4    -  Meropenem: R >8    -  Piperacillin/Tazobactam: S 16    -  Tobramycin: R >8    -  Aztreonam: I 16    -  Ceftazidime: S 4    -  Cefepime: S 8    -  Amikacin: S 16    Specimen Source: .Other None    Culture Results:   Few Pseudomonas aeruginosa (Carbapenem Resistant)    Organism Identification: Pseudomonas aeruginosa (Carbapenem Resistant)    Organism: Pseudomonas aeruginosa (Carbapenem Resistant)    Method Type: SHANNAN      RADIOLOGY & ADDITIONAL TESTS:  CT Head No Cont (01.28.19 @ 15:54)   1.  The study is limited by motion artifact.    2.  Cerebral atrophy.    3.  Periventricular white matter hypodensities, nonspecific, differential   diagnostic possibilities include ischemic change, gliosis or  demyelination.      X-ray Chest 1 View-PORTABLE IMMEDIATE (01.28.19 @ 13:42)   Support devices: Left-sided PICC with the tip in the distal SVC.    Cardiac/mediastinum/hilum: Grossly stable.    Lung parenchyma/Pleura: Low lung volumes limiting evaluation. No definite   airspace opacity or effusion. Left lower lobe scar/atelectasis.    Skeleton/soft tissues: Stable.    Impression:      Low lung volumes limiting evaluation. No definite airspace opacity or   effusion. Left lower lobe scar/atelectasis.    Imaging Personally Reviewed:  [x] YES  [ ] NO    HEALTH ISSUES - PROBLEM Dx:  Other specified diabetes mellitus with other specified complication, unspecified whether long term insulin use  Rheumatoid arteritis  DVT (deep venous thrombosis)  HTN (hypertension)  Gout  Altered mental status  Osteomyelitis right foot.    MEDICATIONS  (STANDING):  allopurinol 100 milliGRAM(s) Oral daily  cefTAZidime  IVPB 500 milliGRAM(s) IV Intermittent every 24 hours  chlorhexidine 4% Liquid 1 Application(s) Topical <User Schedule>  collagenase Ointment 1 Application(s) Topical daily  dextrose 5%. 1000 milliLiter(s) (50 mL/Hr) IV Continuous <Continuous>  dextrose 50% Injectable 12.5 Gram(s) IV Push once  dextrose 50% Injectable 25 Gram(s) IV Push once  dextrose 50% Injectable 25 Gram(s) IV Push once  docusate sodium 100 milliGRAM(s) Oral two times a day  DULoxetine 30 milliGRAM(s) Oral daily  folic acid 1 milliGRAM(s) Oral daily  gabapentin 100 milliGRAM(s) Oral every 8 hours  heparin  Infusion. 1200 Unit(s)/Hr (12 mL/Hr) IV Continuous <Continuous>  insulin lispro (HumaLOG) corrective regimen sliding scale   SubCutaneous three times a day before meals  insulin lispro (HumaLOG) corrective regimen sliding scale   SubCutaneous at bedtime  leucovorin 5 milliGRAM(s) Oral daily  polyethylene glycol 3350 17 Gram(s) Oral daily  predniSONE   Tablet 5 milliGRAM(s) Oral daily  senna 2 Tablet(s) Oral at bedtime  tamsulosin 0.4 milliGRAM(s) Oral at bedtime    MEDICATIONS  (PRN):  acetaminophen   Tablet .. 650 milliGRAM(s) Oral every 6 hours PRN Temp greater or equal to 38C (100.4F), Mild Pain (1 - 3)  bisacodyl Suppository 10 milliGRAM(s) Rectal daily PRN Constipation  dextrose 40% Gel 15 Gram(s) Oral once PRN Blood Glucose LESS THAN 70 milliGRAM(s)/deciliter  glucagon  Injectable 1 milliGRAM(s) IntraMuscular once PRN Glucose LESS THAN 70 milligrams/deciliter  oxyCODONE    5 mG/acetaminophen 325 mG 1 Tablet(s) Oral every 8 hours PRN Moderate Pain (4 - 6)  QUEtiapine 25 milliGRAM(s) Oral every 12 hours PRN Anxiety/Agitation

## 2019-02-14 NOTE — PROGRESS NOTE ADULT - ASSESSMENT
SHONNA   - resolving  CKD III  Hypercalcemia    - better after pamidronate on 2/12  urine retention   - bowman in place  R foot OM  encephalopathy  RA  anemia  DVT  PVD    Plan:    encourage po hydration  consider repeat voiding trial  avoid vitamin D and Ca++ supplements, thiazide diuretics  pamidronate 60mg ivpb over 4 hours if Ca++ worsens  consider age appropriate malignancy screening as oupt  will follow

## 2019-02-14 NOTE — PROGRESS NOTE ADULT - ASSESSMENT
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 She was admitted on 12/19/18 to the [facility] and managed for OM of the Right 3rd digit OM (cultures positive for Carbapenem Resistant Pseudomonas) and discharged on Cefepime for a total of 6 weeks.     Per Dr. Monge's note:   Patient has been having intermittent episodes of confusions since discharge from the hospital on 1/19. Reported that there are times where pt is AAOx3 and other times where she only repeats her name. Patients condition worsened in the past few days when she was told that she may not be able to remain in the NH due to insurance issues. Patient was seen by psych and prescribed Abilify.       Assessment and Plan:    1. Toxic metabolic Encephalopathy: Improved?  ? Underlying Dementia with Medication side effect vs. SHONNA vs. Dehydration.  ID following: Risk of encephalopathy similar with all Cephalosporins. Dr. Lau agreed to a trial of Ceftazidime.   -ID follow up appreciated - no abx except ceftazidime  -Seroquel 25 mg po q 12 prn for anxiety and agitation.      2. SHONNA on CKD 3/Acute Urinary retention  SHONNA, ATN vs obstruction vs AIN ( Urine eosinophils suggestive) vs prerenal.    Creatinine trending down 3.1-->1.7--> 1.8--> 1.7  Monitor ins and out & BMP daily  avoid nephrotoxics   -continue with bowman - renewed today    2. RT third metatarsal OM:  s/p Amputation.  Podiatry following: Local wound care with Santyl and moist dressing.  Continue IV Ceftazidime for now. STOP date 2/27/19.  PICC placed on 1/16 however it came out yesterday evening    3. PAD:  s/p Angiogram 1/9/19.  Vascular recommended follow up in 2 weeks.    4. Chronic DVT:  -restarted heparin gtt on 2/12 with close monitoring of ptt       5. Acute on Chronic Normocytic Anemia: due to ACD  Goal hgb >7 g/dl.  -Transfused one unit PRBC on 2/10    6. Hypercalcemia:  -one dose of pamidronate on 2/12  Vitamin D and PTH levels low. Follow up PTHrP - QNS from 1/31.  Ordered on 2/10.      7. Rheumatoid Arthritis:  Continue home Prednisone 5mg Daily.  Pain control.      8. DM II:  Hemoglobin A1C, Whole Blood: 6.3% (01.12.19 @ 08:08)  Monitor finger sticks. Insulin coverage if fingersticks are greater than 180.    9. Latent TB:  Rifampin discontinued by ID.  Per HCP this was started 2 months ago in anticipation of starting ? Biologic DMARD.      10. Gout:  Continue Allopurinol.      11.  Bilateral Chronic Nonhealing Ulcers:  Full thickness wounds s/p debridement by burn 1/9/19.  Continue local wound care: Xeroform dsd kelrix q24h.  Podiatry following      12. Chronic Pain:  Continue Neurontin / Cymbalta and oxycodone prn.  oob to chair daily via Graham lift   -daily PT/Rehab    13. suspected folic acid def;   on supplement; check levels on the outside     DVT ppx: on heparin  Code Status: DNR/DNI.  Disposition: NH when stable.  Pt has 20 days left at [facility]      Progress Note Handoff  Pending:  Consults  Tests monitoring BMP and CBC  Results:   Family Discussion:  Disposition: Home___/SNF_x___/Other______________/Unknown at this time_____

## 2019-02-14 NOTE — PROGRESS NOTE ADULT - SUBJECTIVE AND OBJECTIVE BOX
[facility] NEPHROLOGY FOLLOW UP NOTE  --------------------------------------------------------------------------------  pt seen and examined  cr stable  ca++ better  [place] remain in place    PAST HISTORY  --------------------------------------------------------------------------------  No significant changes to PMH, PSH, FHx, SHx, unless otherwise noted    ALLERGIES & MEDICATIONS  --------------------------------------------------------------------------------  Allergies    clindamycin (Unknown)  erythromycin (Unknown)  penicillin (Unknown)  strawberry (Unknown)    Physical Exam:  	  NAD  comfortable  moist mm  no jvd  cta bl  rrr  soft, nt   no edema  [facility] Medications:   MEDICATIONS  (STANDING):  allopurinol 100 milliGRAM(s) Oral daily  cefTAZidime  IVPB 500 milliGRAM(s) IV Intermittent every 24 hours  chlorhexidine 4% Liquid 1 Application(s) Topical <User Schedule>  collagenase Ointment 1 Application(s) Topical daily  dextrose 5%. 1000 milliLiter(s) (50 mL/Hr) IV Continuous <Continuous>  dextrose 50% Injectable 12.5 Gram(s) IV Push once  dextrose 50% Injectable 25 Gram(s) IV Push once  dextrose 50% Injectable 25 Gram(s) IV Push once  docusate sodium 100 milliGRAM(s) Oral two times a day  DULoxetine 30 milliGRAM(s) Oral daily  folic acid 1 milliGRAM(s) Oral daily  gabapentin 100 milliGRAM(s) Oral every 8 hours  heparin  Infusion. 1200 Unit(s)/Hr (12 mL/Hr) IV Continuous <Continuous>  insulin lispro (HumaLOG) corrective regimen sliding scale   SubCutaneous three times a day before meals  insulin lispro (HumaLOG) corrective regimen sliding scale   SubCutaneous at bedtime  leucovorin 5 milliGRAM(s) Oral daily  polyethylene glycol 3350 17 Gram(s) Oral daily  predniSONE   Tablet 5 milliGRAM(s) Oral daily  senna 2 Tablet(s) Oral at bedtime  tamsulosin 0.4 milliGRAM(s) Oral at bedtime        VITALS:  T(F): 98 (19 @ 14:00), Max: 98.2 (19 @ 05:41)  HR: 88 (19 @ 14:00)  BP: 137/62 (19 @ 14:00)  RR: 16 (19 @ 14:00)  SpO2: --  Wt(kg): --     @ 07:01  -   @ 07:00  --------------------------------------------------------  IN: 0 mL / OUT: 1150 mL / NET: -1150 mL     @ 07:01  -   @ 07:00  --------------------------------------------------------  IN: 0 mL / OUT: 1500 mL / NET: -1500 mL     @ 07:01  -   @ 17:49  --------------------------------------------------------  IN: 0 mL / OUT: 300 mL / NET: -300 mL          LABS:      142  |  103  |  25<H>  ----------------------------<  124<H>  3.5   |  23  |  1.7<H>    Ca    10.1      2019 09:57    TPro  5.1<L>  /  Alb  3.0<L>  /  TBili  0.2  /  DBili      /  AST  10  /  ALT  5   /  AlkPhos  47  02-14                          9.2    8.37  )-----------( 277      ( 2019 09:57 )             29.5       Urine Studies:  Urinalysis Basic - ( 10 Feb 2019 14:55 )    Color: Yellow / Appearance: Clear / S.025 / pH:   Gluc:  / Ketone: Negative  / Bili: Negative / Urobili: 0.2 mg/dL   Blood:  / Protein: 100 mg/dL / Nitrite: Negative   Leuk Esterase: Moderate / RBC: 11-25 /HPF / WBC 26-50 /HPF   Sq Epi:  / Non Sq Epi: Few /HPF / Bacteria: Many          RADIOLOGY & ADDITIONAL STUDIES:

## 2019-02-15 LAB
ALBUMIN SERPL ELPH-MCNC: 3 G/DL — LOW (ref 3.5–5.2)
ALP SERPL-CCNC: 46 U/L — SIGNIFICANT CHANGE UP (ref 30–115)
ALT FLD-CCNC: 6 U/L — SIGNIFICANT CHANGE UP (ref 0–41)
ANION GAP SERPL CALC-SCNC: 14 MMOL/L — SIGNIFICANT CHANGE UP (ref 7–14)
APTT BLD: 45.1 SEC — HIGH (ref 27–39.2)
APTT BLD: 81.2 SEC — CRITICAL HIGH (ref 27–39.2)
AST SERPL-CCNC: 10 U/L — SIGNIFICANT CHANGE UP (ref 0–41)
BILIRUB SERPL-MCNC: <0.2 MG/DL — SIGNIFICANT CHANGE UP (ref 0.2–1.2)
BUN SERPL-MCNC: 22 MG/DL — HIGH (ref 10–20)
CALCIUM SERPL-MCNC: 9.6 MG/DL — SIGNIFICANT CHANGE UP (ref 8.5–10.1)
CHLORIDE SERPL-SCNC: 102 MMOL/L — SIGNIFICANT CHANGE UP (ref 98–110)
CO2 SERPL-SCNC: 25 MMOL/L — SIGNIFICANT CHANGE UP (ref 17–32)
CREAT SERPL-MCNC: 1.5 MG/DL — SIGNIFICANT CHANGE UP (ref 0.7–1.5)
GLUCOSE BLDC GLUCOMTR-MCNC: 117 MG/DL — HIGH (ref 70–99)
GLUCOSE BLDC GLUCOMTR-MCNC: 129 MG/DL — HIGH (ref 70–99)
GLUCOSE BLDC GLUCOMTR-MCNC: 177 MG/DL — HIGH (ref 70–99)
GLUCOSE BLDC GLUCOMTR-MCNC: 183 MG/DL — HIGH (ref 70–99)
GLUCOSE SERPL-MCNC: 128 MG/DL — HIGH (ref 70–99)
HCT VFR BLD CALC: 30.3 % — LOW (ref 37–47)
HGB BLD-MCNC: 9.5 G/DL — LOW (ref 12–16)
MCHC RBC-ENTMCNC: 28.9 PG — SIGNIFICANT CHANGE UP (ref 27–31)
MCHC RBC-ENTMCNC: 31.4 G/DL — LOW (ref 32–37)
MCV RBC AUTO: 92.1 FL — SIGNIFICANT CHANGE UP (ref 81–99)
NRBC # BLD: 0 /100 WBCS — SIGNIFICANT CHANGE UP (ref 0–0)
PLATELET # BLD AUTO: 268 K/UL — SIGNIFICANT CHANGE UP (ref 130–400)
POTASSIUM SERPL-MCNC: 4 MMOL/L — SIGNIFICANT CHANGE UP (ref 3.5–5)
POTASSIUM SERPL-SCNC: 4 MMOL/L — SIGNIFICANT CHANGE UP (ref 3.5–5)
PROT SERPL-MCNC: 5.1 G/DL — LOW (ref 6–8)
RBC # BLD: 3.29 M/UL — LOW (ref 4.2–5.4)
RBC # FLD: 16.2 % — HIGH (ref 11.5–14.5)
SODIUM SERPL-SCNC: 141 MMOL/L — SIGNIFICANT CHANGE UP (ref 135–146)
WBC # BLD: 9 K/UL — SIGNIFICANT CHANGE UP (ref 4.8–10.8)
WBC # FLD AUTO: 9 K/UL — SIGNIFICANT CHANGE UP (ref 4.8–10.8)

## 2019-02-15 RX ORDER — HEPARIN SODIUM 5000 [USP'U]/ML
1200 INJECTION INTRAVENOUS; SUBCUTANEOUS
Qty: 25000 | Refills: 0 | Status: DISCONTINUED | OUTPATIENT
Start: 2019-02-15 | End: 2019-02-16

## 2019-02-15 RX ORDER — HEPARIN SODIUM 5000 [USP'U]/ML
1100 INJECTION INTRAVENOUS; SUBCUTANEOUS
Qty: 25000 | Refills: 0 | Status: DISCONTINUED | OUTPATIENT
Start: 2019-02-15 | End: 2019-02-16

## 2019-02-15 RX ORDER — HEPARIN SODIUM 5000 [USP'U]/ML
11 INJECTION INTRAVENOUS; SUBCUTANEOUS
Qty: 25000 | Refills: 0 | Status: DISCONTINUED | OUTPATIENT
Start: 2019-02-15 | End: 2019-02-15

## 2019-02-15 RX ADMIN — HEPARIN SODIUM 1200 UNIT(S)/HR: 5000 INJECTION INTRAVENOUS; SUBCUTANEOUS at 11:11

## 2019-02-15 RX ADMIN — Medication 100 MILLIGRAM(S): at 05:45

## 2019-02-15 RX ADMIN — Medication 1: at 12:50

## 2019-02-15 RX ADMIN — POLYETHYLENE GLYCOL 3350 17 GRAM(S): 17 POWDER, FOR SOLUTION ORAL at 11:08

## 2019-02-15 RX ADMIN — Medication 5 MILLIGRAM(S): at 05:46

## 2019-02-15 RX ADMIN — GABAPENTIN 100 MILLIGRAM(S): 400 CAPSULE ORAL at 05:46

## 2019-02-15 RX ADMIN — Medication 100 MILLIGRAM(S): at 11:08

## 2019-02-15 RX ADMIN — CHLORHEXIDINE GLUCONATE 1 APPLICATION(S): 213 SOLUTION TOPICAL at 05:39

## 2019-02-15 RX ADMIN — Medication 1 APPLICATION(S): at 11:10

## 2019-02-15 RX ADMIN — CEFTAZIDIME 100 MILLIGRAM(S): 6 INJECTION, POWDER, FOR SOLUTION INTRAVENOUS at 05:46

## 2019-02-15 RX ADMIN — TAMSULOSIN HYDROCHLORIDE 0.4 MILLIGRAM(S): 0.4 CAPSULE ORAL at 22:49

## 2019-02-15 RX ADMIN — Medication 1 MILLIGRAM(S): at 11:08

## 2019-02-15 RX ADMIN — GABAPENTIN 100 MILLIGRAM(S): 400 CAPSULE ORAL at 13:31

## 2019-02-15 RX ADMIN — Medication 5 MILLIGRAM(S): at 11:09

## 2019-02-15 RX ADMIN — DULOXETINE HYDROCHLORIDE 30 MILLIGRAM(S): 30 CAPSULE, DELAYED RELEASE ORAL at 11:08

## 2019-02-15 RX ADMIN — GABAPENTIN 100 MILLIGRAM(S): 400 CAPSULE ORAL at 22:49

## 2019-02-15 RX ADMIN — HEPARIN SODIUM 11 UNIT(S)/HR: 5000 INJECTION INTRAVENOUS; SUBCUTANEOUS at 13:31

## 2019-02-15 NOTE — PROGRESS NOTE ADULT - ASSESSMENT
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 She was admitted on 12/19/18 to the [facility] and managed for OM of the Right 3rd digit OM (cultures positive for Carbapenem Resistant Pseudomonas) and discharged on Cefepime for a total of 6 weeks.     Per Dr. Monge's note:   Patient has been having intermittent episodes of confusions since discharge from the hospital on 1/19. Reported that there are times where pt is AAOx3 and other times where she only repeats her name. Patients condition worsened in the past few days when she was told that she may not be able to remain in the NH due to insurance issues. Patient was seen by psych and prescribed Abilify.   Currently she is pleasant, awake and alert.       Assessment and Plan:    1. Toxic metabolic Encephalopathy: Improved?  ? Underlying Dementia with Medication side effect vs. SHONNA vs. Dehydration.  ID following: Risk of encephalopathy similar with all Cephalosporins. Dr. Lau agreed to a trial of Ceftazidime.   -ID follow up appreciated - no abx except ceftazidime  -Seroquel 25 mg po q 12 prn for anxiety and agitation.      2. SHONNA on CKD 3/Acute Urinary retention  SHONNA, ATN vs obstruction vs AIN ( Urine eosinophils suggestive) vs prerenal.    Creatinine trending down 3.1-->1.7--> 1.8--> 1.7--> 1.5  Monitor ins and out & BMP daily  avoid nephrotoxics   -continue with bowman for retention - renewed today - will attempt trial void in am    2. RT third metatarsal OM:  s/p Amputation.  Podiatry following: Local wound care with Santyl and moist dressing.  Continue IV Ceftazidime for now. STOP date 2/27/19.  PICC placed on 1/16 however it came out on 2/13    3. PAD:  s/p Angiogram 1/9/19.  Vascular recommended follow up in 2 weeks.    4. Chronic DVT:  -restarted heparin gtt on 2/12 with close monitoring of ptt  -rate adjusted today      5. Acute on Chronic Normocytic Anemia: due to ACD  Goal hgb >7 g/dl.  -Transfused one unit PRBC on 2/10    6. Hypercalcemia:  -improving  -one dose of pamidronate on 2/12  Vitamin D and PTH levels low. Follow up PTHrP - QNS from 1/31.  Ordered on 2/10.      7. Rheumatoid Arthritis:  Continue home Prednisone 5mg Daily.  Pain control.      8. DM II:  Hemoglobin A1C, Whole Blood: 6.3% (01.12.19 @ 08:08)  Monitor finger sticks. Insulin coverage if fingersticks are greater than 180.    9. Latent TB:  Rifampin discontinued by ID.  Per HCP this was started 2 months ago in anticipation of starting ? Biologic DMARD.      10. Gout:  Continue Allopurinol.      11.  Bilateral Chronic Nonhealing Ulcers:  Full thickness wounds s/p debridement by burn 1/9/19.  Continue local wound care: Xeroform dsd kelrix q24h.  Podiatry following      12. Chronic Pain:  Continue Neurontin / Cymbalta and oxycodone prn.  oob to chair daily via [place] lift   -daily PT/Rehab    13. suspected folic acid def;   on supplement; check levels on the outside     DVT ppx: on heparin  Code Status: DNR/DNI.  Disposition: NH when stable.  Pt has 20 days left at [facility]      Progress Note Handoff  Pending:  Consults  Tests monitoring BMP and CBC  Results:   Family Discussion:  Disposition: Home___/SNF_x___/Other______________/Unknown at this time_____

## 2019-02-15 NOTE — PROGRESS NOTE ADULT - SUBJECTIVE AND OBJECTIVE BOX
[place] NEPHROLOGY FOLLOW UP NOTE  --------------------------------------------------------------------------------  pt seen and examined  bowman still in place  cr and ca better  no new events    PAST HISTORY  --------------------------------------------------------------------------------  No significant changes to PMH, PSH, FHx, SHx, unless otherwise noted    ALLERGIES & MEDICATIONS  --------------------------------------------------------------------------------  Allergies    clindamycin (Unknown)  erythromycin (Unknown)  penicillin (Unknown)  strawberry (Unknown)    Physical Exam:  	  NAD  comfortable  moist mm  no jvd  cta bl  rrr  soft, nt   no edema  [facility] Medications:   MEDICATIONS  (STANDING):  allopurinol 100 milliGRAM(s) Oral daily  cefTAZidime  IVPB 500 milliGRAM(s) IV Intermittent every 24 hours  chlorhexidine 4% Liquid 1 Application(s) Topical <User Schedule>  collagenase Ointment 1 Application(s) Topical daily  dextrose 5%. 1000 milliLiter(s) (50 mL/Hr) IV Continuous <Continuous>  dextrose 50% Injectable 12.5 Gram(s) IV Push once  dextrose 50% Injectable 25 Gram(s) IV Push once  dextrose 50% Injectable 25 Gram(s) IV Push once  docusate sodium 100 milliGRAM(s) Oral two times a day  DULoxetine 30 milliGRAM(s) Oral daily  folic acid 1 milliGRAM(s) Oral daily  gabapentin 100 milliGRAM(s) Oral every 8 hours  heparin  Infusion. 1100 Unit(s)/Hr (11 mL/Hr) IV Continuous <Continuous>  insulin lispro (HumaLOG) corrective regimen sliding scale   SubCutaneous three times a day before meals  insulin lispro (HumaLOG) corrective regimen sliding scale   SubCutaneous at bedtime  leucovorin 5 milliGRAM(s) Oral daily  polyethylene glycol 3350 17 Gram(s) Oral daily  predniSONE   Tablet 5 milliGRAM(s) Oral daily  senna 2 Tablet(s) Oral at bedtime  tamsulosin 0.4 milliGRAM(s) Oral at bedtime        VITALS:  T(F): 97 (02-15-19 @ 13:49), Max: 98.7 (19 @ 21:31)  HR: 79 (02-15-19 @ 13:49)  BP: 139/61 (02-15-19 @ 13:49)  RR: 16 (02-15-19 @ 13:49)  SpO2: --  Wt(kg): --     @ 07:01  -   @ 07:00  --------------------------------------------------------  IN: 0 mL / OUT: 1500 mL / NET: -1500 mL     @ 07:01  -  02-15 @ 07:00  --------------------------------------------------------  IN: 0 mL / OUT: 1450 mL / NET: -1450 mL    02-15 @ 07:01  -  02-15 @ 17:06  --------------------------------------------------------  IN: 0 mL / OUT: 400 mL / NET: -400 mL          LABS:  02-15    141  |  102  |  22<H>  ----------------------------<  128<H>  4.0   |  25  |  1.5    Ca    9.6      15 Feb 2019 10:00    TPro  5.1<L>  /  Alb  3.0<L>  /  TBili  <0.2  /  DBili      /  AST  10  /  ALT  6   /  AlkPhos  46  02-15                          9.5    9.00  )-----------( 268      ( 15 Feb 2019 10:00 )             30.3       Urine Studies:  Urinalysis Basic - ( 10 Feb 2019 14:55 )    Color: Yellow / Appearance: Clear / S.025 / pH:   Gluc:  / Ketone: Negative  / Bili: Negative / Urobili: 0.2 mg/dL   Blood:  / Protein: 100 mg/dL / Nitrite: Negative   Leuk Esterase: Moderate / RBC: 11-25 /HPF / WBC 26-50 /HPF   Sq Epi:  / Non Sq Epi: Few /HPF / Bacteria: Many          RADIOLOGY & ADDITIONAL STUDIES:

## 2019-02-15 NOTE — PHARMACOTHERAPY INTERVENTION NOTE - COMMENTS
DR javier aware of crcl=20ml/min
Dr Boone aware of Penicillin allergy.
DR javier aware of allergy to pcn
NF med ordered by ID and approved by David chun. SW DR De Jesus about the PCN allergy. MD aware of the allergy.
sw dr welch about heparin order. Heparin 11units changed to 1100units

## 2019-02-16 LAB
ALBUMIN SERPL ELPH-MCNC: 3 G/DL — LOW (ref 3.5–5.2)
ALP SERPL-CCNC: 46 U/L — SIGNIFICANT CHANGE UP (ref 30–115)
ALT FLD-CCNC: 6 U/L — SIGNIFICANT CHANGE UP (ref 0–41)
ANION GAP SERPL CALC-SCNC: 10 MMOL/L — SIGNIFICANT CHANGE UP (ref 7–14)
APTT BLD: 84.8 SEC — CRITICAL HIGH (ref 27–39.2)
APTT BLD: 85.6 SEC — CRITICAL HIGH (ref 27–39.2)
AST SERPL-CCNC: 10 U/L — SIGNIFICANT CHANGE UP (ref 0–41)
BILIRUB SERPL-MCNC: 0.2 MG/DL — SIGNIFICANT CHANGE UP (ref 0.2–1.2)
BUN SERPL-MCNC: 22 MG/DL — HIGH (ref 10–20)
CALCIUM SERPL-MCNC: 9 MG/DL — SIGNIFICANT CHANGE UP (ref 8.5–10.1)
CHLORIDE SERPL-SCNC: 99 MMOL/L — SIGNIFICANT CHANGE UP (ref 98–110)
CO2 SERPL-SCNC: 28 MMOL/L — SIGNIFICANT CHANGE UP (ref 17–32)
CREAT SERPL-MCNC: 1.5 MG/DL — SIGNIFICANT CHANGE UP (ref 0.7–1.5)
CULTURE RESULTS: SIGNIFICANT CHANGE UP
GLUCOSE BLDC GLUCOMTR-MCNC: 121 MG/DL — HIGH (ref 70–99)
GLUCOSE BLDC GLUCOMTR-MCNC: 139 MG/DL — HIGH (ref 70–99)
GLUCOSE BLDC GLUCOMTR-MCNC: 146 MG/DL — HIGH (ref 70–99)
GLUCOSE BLDC GLUCOMTR-MCNC: 212 MG/DL — HIGH (ref 70–99)
GLUCOSE SERPL-MCNC: 123 MG/DL — HIGH (ref 70–99)
HCT VFR BLD CALC: 29.1 % — LOW (ref 37–47)
HGB BLD-MCNC: 9.3 G/DL — LOW (ref 12–16)
MCHC RBC-ENTMCNC: 29.5 PG — SIGNIFICANT CHANGE UP (ref 27–31)
MCHC RBC-ENTMCNC: 32 G/DL — SIGNIFICANT CHANGE UP (ref 32–37)
MCV RBC AUTO: 92.4 FL — SIGNIFICANT CHANGE UP (ref 81–99)
NRBC # BLD: 0 /100 WBCS — SIGNIFICANT CHANGE UP (ref 0–0)
PLATELET # BLD AUTO: 256 K/UL — SIGNIFICANT CHANGE UP (ref 130–400)
POTASSIUM SERPL-MCNC: 3.6 MMOL/L — SIGNIFICANT CHANGE UP (ref 3.5–5)
POTASSIUM SERPL-SCNC: 3.6 MMOL/L — SIGNIFICANT CHANGE UP (ref 3.5–5)
PROT SERPL-MCNC: 5.1 G/DL — LOW (ref 6–8)
RBC # BLD: 3.15 M/UL — LOW (ref 4.2–5.4)
RBC # FLD: 15.9 % — HIGH (ref 11.5–14.5)
SODIUM SERPL-SCNC: 137 MMOL/L — SIGNIFICANT CHANGE UP (ref 135–146)
SPECIMEN SOURCE: SIGNIFICANT CHANGE UP
WBC # BLD: 8.08 K/UL — SIGNIFICANT CHANGE UP (ref 4.8–10.8)
WBC # FLD AUTO: 8.08 K/UL — SIGNIFICANT CHANGE UP (ref 4.8–10.8)

## 2019-02-16 RX ORDER — HEPARIN SODIUM 5000 [USP'U]/ML
1150 INJECTION INTRAVENOUS; SUBCUTANEOUS
Qty: 25000 | Refills: 0 | Status: DISCONTINUED | OUTPATIENT
Start: 2019-02-16 | End: 2019-02-17

## 2019-02-16 RX ORDER — SODIUM HYPOCHLORITE 0.125 %
1 SOLUTION, NON-ORAL MISCELLANEOUS DAILY
Qty: 0 | Refills: 0 | Status: DISCONTINUED | OUTPATIENT
Start: 2019-02-16 | End: 2019-02-19

## 2019-02-16 RX ADMIN — CEFTAZIDIME 100 MILLIGRAM(S): 6 INJECTION, POWDER, FOR SOLUTION INTRAVENOUS at 05:38

## 2019-02-16 RX ADMIN — DULOXETINE HYDROCHLORIDE 30 MILLIGRAM(S): 30 CAPSULE, DELAYED RELEASE ORAL at 14:43

## 2019-02-16 RX ADMIN — SENNA PLUS 2 TABLET(S): 8.6 TABLET ORAL at 21:36

## 2019-02-16 RX ADMIN — GABAPENTIN 100 MILLIGRAM(S): 400 CAPSULE ORAL at 05:39

## 2019-02-16 RX ADMIN — Medication 1 APPLICATION(S): at 14:45

## 2019-02-16 RX ADMIN — GABAPENTIN 100 MILLIGRAM(S): 400 CAPSULE ORAL at 21:36

## 2019-02-16 RX ADMIN — TAMSULOSIN HYDROCHLORIDE 0.4 MILLIGRAM(S): 0.4 CAPSULE ORAL at 21:36

## 2019-02-16 RX ADMIN — Medication 5 MILLIGRAM(S): at 05:39

## 2019-02-16 RX ADMIN — Medication 100 MILLIGRAM(S): at 14:42

## 2019-02-16 RX ADMIN — POLYETHYLENE GLYCOL 3350 17 GRAM(S): 17 POWDER, FOR SOLUTION ORAL at 14:44

## 2019-02-16 RX ADMIN — CHLORHEXIDINE GLUCONATE 1 APPLICATION(S): 213 SOLUTION TOPICAL at 05:38

## 2019-02-16 RX ADMIN — HEPARIN SODIUM 1150 UNIT(S)/HR: 5000 INJECTION INTRAVENOUS; SUBCUTANEOUS at 11:00

## 2019-02-16 RX ADMIN — HEPARIN SODIUM 12 UNIT(S)/HR: 5000 INJECTION INTRAVENOUS; SUBCUTANEOUS at 07:00

## 2019-02-16 RX ADMIN — HEPARIN SODIUM 12 UNIT(S)/HR: 5000 INJECTION INTRAVENOUS; SUBCUTANEOUS at 05:39

## 2019-02-16 RX ADMIN — GABAPENTIN 100 MILLIGRAM(S): 400 CAPSULE ORAL at 14:46

## 2019-02-16 RX ADMIN — Medication 1 MILLIGRAM(S): at 14:43

## 2019-02-16 RX ADMIN — GABAPENTIN 100 MILLIGRAM(S): 400 CAPSULE ORAL at 08:20

## 2019-02-16 RX ADMIN — Medication 5 MILLIGRAM(S): at 14:44

## 2019-02-16 RX ADMIN — Medication 100 MILLIGRAM(S): at 18:46

## 2019-02-16 NOTE — PROGRESS NOTE ADULT - SUBJECTIVE AND OBJECTIVE BOX
PODIATRY PROGRESS NOTE   [id number] LINCOLN HEREDIA is a pleasant well-nourished, well developed 78y Female in no acute distress, alert awake, and oriented to person, place and time.   Patient is a 78y old  Female who presents with a chief complaint of altered mental status (16 Feb 2019 10:19)    HPI:  · Chief Complaint: The patient is a 78y Female complaining of confusion.	  · HPI Objective Statement: 79 yo F with a DVT, HTN, DM, s/p 3rd right toe last month due to osteo, picc line with cefepime, presents with AMS. Sent in from NH. According to friend at bedside patient progressively becoming more agitated and altered over the last few days. Talking to self, shouting at people. Associated with cough. Denies fevers, chills, abd pain, NVCD, CP, SOB, HA. (28 Jan 2019 19:56)    pt seen and assessed at bedside.  dressing clean dry intact.    Vital Signs Last 24 Hrs  T(C): 36.7 (16 Feb 2019 13:30), Max: 36.7 (16 Feb 2019 13:30)  T(F): 98 (16 Feb 2019 13:30), Max: 98 (16 Feb 2019 13:30)  HR: 83 (16 Feb 2019 13:30) (78 - 83)  BP: 157/70 (16 Feb 2019 13:30) (145/77 - 175/81)  RR: 16 (16 Feb 2019 05:10) (16 - 17)                        9.3    8.08  )-----------( 256      ( 16 Feb 2019 08:15 )             29.1                 02-16    137  |  99  |  22<H>  ----------------------------<  123<H>  3.6   |  28  |  1.5    Ca    9.0      16 Feb 2019 08:15    TPro  5.1<L>  /  Alb  3.0<L>  /  TBili  0.2  /  DBili  x   /  AST  10  /  ALT  6   /  AlkPhos  46  02-16    Derm: suture intact proximal aspect of the surgical site, wound base granular, no clinical signs of infection at this time.    A:  s/p partial 3rd ray resection partially closed with no acute signs of infection   P:  discontinue santyl  ordered Dakin's  applied wet to dry  local wound care:  dakins soaked guaze/dsd/kerlix right foot daily.  Podiatry will follow q72h if patient in house   continue local wound [id number] @ 16:37

## 2019-02-16 NOTE — PROGRESS NOTE ADULT - ASSESSMENT
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 She was admitted on 12/19/18 to the [facility] and managed for OM of the Right 3rd digit OM (cultures positive for Carbapenem Resistant Pseudomonas) and discharged on Cefepime for a total of 6 weeks.     Per Dr. Monge's note:   Patient has been having intermittent episodes of confusions since discharge from the hospital on 1/19. Reported that there are times where pt is AAOx3 and other times where she only repeats her name. Patients condition worsened in the past few days when she was told that she may not be able to remain in the NH due to insurance issues. Patient was seen by psych and prescribed Abilify.   Currently she is pleasant, awake and alert.       Assessment and Plan:    1. Toxic metabolic Encephalopathy: Improved?  ? Underlying Dementia with Medication side effect vs. SHONNA vs. Dehydration.  ID following: Risk of encephalopathy similar with all Cephalosporins. Dr. Lau agreed to a trial of Ceftazidime.   -ID follow up appreciated - no abx except ceftazidime  -Seroquel 25 mg po q 12 prn for anxiety and agitation.      2. SHONNA on CKD 3/Acute Urinary retention  SHONNA, ATN vs obstruction vs AIN ( Urine eosinophils suggestive) vs prerenal.    Creatinine trending down 3.1-->1.7--> 1.8--> 1.7--> 1.5  Monitor ins and out & BMP daily  avoid nephrotoxics    attempt trial void today    2. RT third metatarsal OM:  s/p Amputation.  Podiatry following: Local wound care with Santyl and moist dressing.  Continue IV Ceftazidime for now. STOP date 2/27/19.  PICC placed on 1/16 however it came out on 2/13    3. PAD:  s/p Angiogram 1/9/19.  Vascular recommended follow up in 2 weeks.    4. Chronic DVT:  -restarted heparin gtt on 2/12 with close monitoring of ptt  -rate adjusted today  -check PTT at 7pm      5. Acute on Chronic Normocytic Anemia: due to ACD  Goal hgb >7 g/dl.  -Transfused one unit PRBC on 2/10    6. Hypercalcemia:  -improving  -one dose of pamidronate on 2/12  Vitamin D and PTH levels low. Follow up PTHrP - QNS from 1/31.  Ordered on 2/10.      7. Rheumatoid Arthritis:  Continue home Prednisone 5mg Daily.  Pain control.      8. DM II:  Hemoglobin A1C, Whole Blood: 6.3% (01.12.19 @ 08:08)  Monitor finger sticks. Insulin coverage if fingersticks are greater than 180.    9. Latent TB:  Rifampin discontinued by ID.  Per HCP this was started 2 months ago in anticipation of starting ? Biologic DMARD.      10. Gout:  Continue Allopurinol.      11.  Bilateral Chronic Nonhealing Ulcers:  Full thickness wounds s/p debridement by burn 1/9/19.  Continue local wound care: Xeroform dsd kelrix q24h.  Podiatry following      12. Chronic Pain:  Continue Neurontin / Cymbalta and oxycodone prn.  oob to chair daily via Graham lift   -daily PT/Rehab    13. suspected folic acid def;   on supplement; check levels on the outside     DVT ppx: on heparin  Code Status: DNR/DNI.  Disposition: NH when stable.  Pt has 20 days left at [facility]      Progress Note Handoff  Pending:  Consults - renal following  Tests monitoring BMP and CBC. On heparin gtt  Results:   Family Discussion:  Disposition: Home___/SNF_x___/Other______________/Unknown at this time_____

## 2019-02-16 NOTE — PROGRESS NOTE ADULT - REASON FOR ADMISSION
AMS
Encephalopathy
altered mental status

## 2019-02-16 NOTE — PROGRESS NOTE ADULT - SUBJECTIVE AND OBJECTIVE BOX
LINCOLN HEREDIA  78y  Female    Patient is a 78y old  Female who presents with change in mental status.      INTERVAL HPI/OVERNIGHT EVENTS:  -pt seen and examined  -sitting comfortably in bed, no complaints  -bowman to be removed today for voiding trial  -acute anemia last weekend, s/p 1 unit prbc  -restarted IV heparin gtt, monitor ptt, keep 50-70  -oob to chair encouraged daily /PT following    Vital Signs Last 24 Hrs  T(C): 35.8 (16 Feb 2019 05:10), Max: 36.1 (15 Feb 2019 13:49)  T(F): 96.5 (16 Feb 2019 05:10), Max: 97 (15 Feb 2019 13:49)  HR: 79 (16 Feb 2019 05:10) (78 - 79)  BP: 145/77 (16 Feb 2019 05:10) (139/61 - 175/81)  BP(mean): --  RR: 16 (16 Feb 2019 05:10) (16 - 17)  SpO2: --      PHYSICAL EXAM:  GENERAL: NAD  HEAD:  Atraumatic, Normocephalic  EYES: conjunctiva and sclera clear  ENMT: Moist mucous membranes  NECK: Supple, Normal thyroid  NERVOUS SYSTEM:  Awake & Alert, oriented x 2-3, Moves all extremities.   CHEST/LUNG: Clear to auscultation bilaterally; No rales, rhonchi, wheezing, or rubs  CV/HEART: Regular rate and rhythm; No murmurs, rubs, or gallops  GI/ABDOMEN: Soft, Nontender, obese abdomen; Bowel sounds present; Bowman present   EXTREMITIES:  2+ Peripheral Pulses, No clubbing or cyanosis. LE edema +  LYMPH: No lymphadenopathy noted  SKIN: Right 3rd toe amputation with purulent drainage. Left leg ulcer: dressing in place.             Consultant(s) Notes Reviewed:  [x ] YES  [ ] NO  Care Discussed with Consultants/Other Providers [ x] YES  [ ] NO    LABS:                         9.3    8.08  )-----------( 256      ( 16 Feb 2019 08:15 )             29.1   PTT - ( 16 Feb 2019 08:15 )  PTT:84.8 sec                    BMP Pending for today    02-15    141  |  102  |  22<H>  ----------------------------<  128<H>  4.0   |  25  |  1.5    Ca    9.6      15 Feb 2019 10:00    TPro  5.1<L>  /  Alb  3.0<L>  /  TBili  <0.2  /  DBili  x   /  AST  10  /  ALT  6   /  AlkPhos  46  02-15      Vitamin D, 1, 25-Dihydroxy: 12.4 pg/mL (01.31.19 @ 14:01)  Vitamin D, 25-Hydroxy: 20 ng/mL (01.31.19 @ 09:22)  Intact PTH: 8 pg/mL (01.30.19 @ 06:30)      MICROBIOLOGY:   Culture - Urine (01.31.19 @ 13:57)    Specimen Source: .Urine Clean Catch (Midstream)    Culture Results:   50,000 - 99,000 CFU/mL Yeast-like cells, presumptively not Candida  albicans "Susceptibilities not performed"      Culture - Urine (01.28.19 @ 17:20)    Specimen Source: .Urine Clean Catch (Midstream)    Culture Results: No growth      Culture - Blood (01.28.19 @ 14:16)    Specimen Source: .Blood Blood-Peripheral    Culture Results:   No growth to date.      Culture - Blood (01.28.19 @ 14:14)    Specimen Source: .Blood Blood-Peripheral    Culture Results:   No growth to date.      Culture - Other (01.10.19 @ 14:00)    -  Ciprofloxacin: R >2    -  Gentamicin: R >8    -  Imipenem: R >8    -  Levofloxacin: R >4    -  Meropenem: R >8    -  Piperacillin/Tazobactam: S 16    -  Tobramycin: R >8    -  Aztreonam: I 16    -  Ceftazidime: S 4    -  Cefepime: S 8    -  Amikacin: S 16    Specimen Source: .Other None    Culture Results:   Few Pseudomonas aeruginosa (Carbapenem Resistant)    Organism Identification: Pseudomonas aeruginosa (Carbapenem Resistant)    Organism: Pseudomonas aeruginosa (Carbapenem Resistant)    Method Type: SHANNAN      RADIOLOGY & ADDITIONAL TESTS:  CT Head No Cont (01.28.19 @ 15:54)   1.  The study is limited by motion artifact.    2.  Cerebral atrophy.    3.  Periventricular white matter hypodensities, nonspecific, differential   diagnostic possibilities include ischemic change, gliosis or  demyelination.      X-ray Chest 1 View-PORTABLE IMMEDIATE (01.28.19 @ 13:42)   Support devices: Left-sided PICC with the tip in the distal SVC.    Cardiac/mediastinum/hilum: Grossly stable.    Lung parenchyma/Pleura: Low lung volumes limiting evaluation. No definite   airspace opacity or effusion. Left lower lobe scar/atelectasis.    Skeleton/soft tissues: Stable.    Impression:      Low lung volumes limiting evaluation. No definite airspace opacity or   effusion. Left lower lobe scar/atelectasis.    Imaging Personally Reviewed:  [x] YES  [ ] NO    HEALTH ISSUES - PROBLEM Dx:  Other specified diabetes mellitus with other specified complication, unspecified whether long term insulin use  Rheumatoid arteritis  DVT (deep venous thrombosis)  HTN (hypertension)  Gout  Altered mental status  Osteomyelitis right foot.    MEDICATIONS  (STANDING):  allopurinol 100 milliGRAM(s) Oral daily  cefTAZidime  IVPB 500 milliGRAM(s) IV Intermittent every 24 hours  chlorhexidine 4% Liquid 1 Application(s) Topical <User Schedule>  collagenase Ointment 1 Application(s) Topical daily  dextrose 5%. 1000 milliLiter(s) (50 mL/Hr) IV Continuous <Continuous>  dextrose 50% Injectable 12.5 Gram(s) IV Push once  dextrose 50% Injectable 25 Gram(s) IV Push once  dextrose 50% Injectable 25 Gram(s) IV Push once  docusate sodium 100 milliGRAM(s) Oral two times a day  DULoxetine 30 milliGRAM(s) Oral daily  folic acid 1 milliGRAM(s) Oral daily  gabapentin 100 milliGRAM(s) Oral every 8 hours  heparin  Infusion. 1150 Unit(s)/Hr (11.5 mL/Hr) IV Continuous <Continuous>  insulin lispro (HumaLOG) corrective regimen sliding scale   SubCutaneous three times a day before meals  insulin lispro (HumaLOG) corrective regimen sliding scale   SubCutaneous at bedtime  leucovorin 5 milliGRAM(s) Oral daily  polyethylene glycol 3350 17 Gram(s) Oral daily  predniSONE   Tablet 5 milliGRAM(s) Oral daily  senna 2 Tablet(s) Oral at bedtime  tamsulosin 0.4 milliGRAM(s) Oral at bedtime    MEDICATIONS  (PRN):  acetaminophen   Tablet .. 650 milliGRAM(s) Oral every 6 hours PRN Temp greater or equal to 38C (100.4F), Mild Pain (1 - 3)  bisacodyl Suppository 10 milliGRAM(s) Rectal daily PRN Constipation  dextrose 40% Gel 15 Gram(s) Oral once PRN Blood Glucose LESS THAN 70 milliGRAM(s)/deciliter  glucagon  Injectable 1 milliGRAM(s) IntraMuscular once PRN Glucose LESS THAN 70 milligrams/deciliter  oxyCODONE    5 mG/acetaminophen 325 mG 1 Tablet(s) Oral every 8 hours PRN Moderate Pain (4 - 6)  QUEtiapine 25 milliGRAM(s) Oral every 12 hours PRN Anxiety/Agitation

## 2019-02-17 LAB
ALBUMIN SERPL ELPH-MCNC: 3.2 G/DL — LOW (ref 3.5–5.2)
ALBUMIN SERPL ELPH-MCNC: 3.4 G/DL — LOW (ref 3.5–5.2)
ALP SERPL-CCNC: 45 U/L — SIGNIFICANT CHANGE UP (ref 30–115)
ALP SERPL-CCNC: 52 U/L — SIGNIFICANT CHANGE UP (ref 30–115)
ALT FLD-CCNC: 6 U/L — SIGNIFICANT CHANGE UP (ref 0–41)
ALT FLD-CCNC: 7 U/L — SIGNIFICANT CHANGE UP (ref 0–41)
ANION GAP SERPL CALC-SCNC: 11 MMOL/L — SIGNIFICANT CHANGE UP (ref 7–14)
ANION GAP SERPL CALC-SCNC: 16 MMOL/L — HIGH (ref 7–14)
APTT BLD: 71.8 SEC — CRITICAL HIGH (ref 27–39.2)
APTT BLD: 97.7 SEC — CRITICAL HIGH (ref 27–39.2)
AST SERPL-CCNC: 10 U/L — SIGNIFICANT CHANGE UP (ref 0–41)
AST SERPL-CCNC: 11 U/L — SIGNIFICANT CHANGE UP (ref 0–41)
BILIRUB SERPL-MCNC: 0.2 MG/DL — SIGNIFICANT CHANGE UP (ref 0.2–1.2)
BILIRUB SERPL-MCNC: 0.3 MG/DL — SIGNIFICANT CHANGE UP (ref 0.2–1.2)
BUN SERPL-MCNC: 21 MG/DL — HIGH (ref 10–20)
BUN SERPL-MCNC: 23 MG/DL — HIGH (ref 10–20)
CALCIUM SERPL-MCNC: 8.4 MG/DL — LOW (ref 8.5–10.1)
CALCIUM SERPL-MCNC: 9.1 MG/DL — SIGNIFICANT CHANGE UP (ref 8.5–10.1)
CHLORIDE SERPL-SCNC: 100 MMOL/L — SIGNIFICANT CHANGE UP (ref 98–110)
CHLORIDE SERPL-SCNC: 98 MMOL/L — SIGNIFICANT CHANGE UP (ref 98–110)
CO2 SERPL-SCNC: 27 MMOL/L — SIGNIFICANT CHANGE UP (ref 17–32)
CO2 SERPL-SCNC: 28 MMOL/L — SIGNIFICANT CHANGE UP (ref 17–32)
CREAT SERPL-MCNC: 1.5 MG/DL — SIGNIFICANT CHANGE UP (ref 0.7–1.5)
CREAT SERPL-MCNC: 1.6 MG/DL — HIGH (ref 0.7–1.5)
GLUCOSE BLDC GLUCOMTR-MCNC: 120 MG/DL — HIGH (ref 70–99)
GLUCOSE BLDC GLUCOMTR-MCNC: 125 MG/DL — HIGH (ref 70–99)
GLUCOSE BLDC GLUCOMTR-MCNC: 127 MG/DL — HIGH (ref 70–99)
GLUCOSE BLDC GLUCOMTR-MCNC: 158 MG/DL — HIGH (ref 70–99)
GLUCOSE SERPL-MCNC: 117 MG/DL — HIGH (ref 70–99)
GLUCOSE SERPL-MCNC: 133 MG/DL — HIGH (ref 70–99)
HCT VFR BLD CALC: 28.5 % — LOW (ref 37–47)
HCT VFR BLD CALC: 33.2 % — LOW (ref 37–47)
HGB BLD-MCNC: 10.4 G/DL — LOW (ref 12–16)
HGB BLD-MCNC: 8.9 G/DL — LOW (ref 12–16)
MCHC RBC-ENTMCNC: 28.8 PG — SIGNIFICANT CHANGE UP (ref 27–31)
MCHC RBC-ENTMCNC: 29.1 PG — SIGNIFICANT CHANGE UP (ref 27–31)
MCHC RBC-ENTMCNC: 31.2 G/DL — LOW (ref 32–37)
MCHC RBC-ENTMCNC: 31.3 G/DL — LOW (ref 32–37)
MCV RBC AUTO: 92.2 FL — SIGNIFICANT CHANGE UP (ref 81–99)
MCV RBC AUTO: 92.7 FL — SIGNIFICANT CHANGE UP (ref 81–99)
NRBC # BLD: 0 /100 WBCS — SIGNIFICANT CHANGE UP (ref 0–0)
NRBC # BLD: 0 /100 WBCS — SIGNIFICANT CHANGE UP (ref 0–0)
PLATELET # BLD AUTO: 244 K/UL — SIGNIFICANT CHANGE UP (ref 130–400)
PLATELET # BLD AUTO: 278 K/UL — SIGNIFICANT CHANGE UP (ref 130–400)
POTASSIUM SERPL-MCNC: 3.6 MMOL/L — SIGNIFICANT CHANGE UP (ref 3.5–5)
POTASSIUM SERPL-MCNC: 4 MMOL/L — SIGNIFICANT CHANGE UP (ref 3.5–5)
POTASSIUM SERPL-SCNC: 3.6 MMOL/L — SIGNIFICANT CHANGE UP (ref 3.5–5)
POTASSIUM SERPL-SCNC: 4 MMOL/L — SIGNIFICANT CHANGE UP (ref 3.5–5)
PROT SERPL-MCNC: 5.1 G/DL — LOW (ref 6–8)
PROT SERPL-MCNC: 5.9 G/DL — LOW (ref 6–8)
RBC # BLD: 3.09 M/UL — LOW (ref 4.2–5.4)
RBC # BLD: 3.58 M/UL — LOW (ref 4.2–5.4)
RBC # FLD: 16 % — HIGH (ref 11.5–14.5)
RBC # FLD: 16.1 % — HIGH (ref 11.5–14.5)
SODIUM SERPL-SCNC: 139 MMOL/L — SIGNIFICANT CHANGE UP (ref 135–146)
SODIUM SERPL-SCNC: 141 MMOL/L — SIGNIFICANT CHANGE UP (ref 135–146)
WBC # BLD: 7.71 K/UL — SIGNIFICANT CHANGE UP (ref 4.8–10.8)
WBC # BLD: 8.15 K/UL — SIGNIFICANT CHANGE UP (ref 4.8–10.8)
WBC # FLD AUTO: 7.71 K/UL — SIGNIFICANT CHANGE UP (ref 4.8–10.8)
WBC # FLD AUTO: 8.15 K/UL — SIGNIFICANT CHANGE UP (ref 4.8–10.8)

## 2019-02-17 RX ORDER — HEPARIN SODIUM 5000 [USP'U]/ML
1100 INJECTION INTRAVENOUS; SUBCUTANEOUS
Qty: 25000 | Refills: 0 | Status: DISCONTINUED | OUTPATIENT
Start: 2019-02-17 | End: 2019-02-19

## 2019-02-17 RX ADMIN — GABAPENTIN 100 MILLIGRAM(S): 400 CAPSULE ORAL at 05:37

## 2019-02-17 RX ADMIN — Medication 1 MILLIGRAM(S): at 12:30

## 2019-02-17 RX ADMIN — GABAPENTIN 100 MILLIGRAM(S): 400 CAPSULE ORAL at 13:21

## 2019-02-17 RX ADMIN — SENNA PLUS 2 TABLET(S): 8.6 TABLET ORAL at 22:12

## 2019-02-17 RX ADMIN — Medication 5 MILLIGRAM(S): at 12:36

## 2019-02-17 RX ADMIN — TAMSULOSIN HYDROCHLORIDE 0.4 MILLIGRAM(S): 0.4 CAPSULE ORAL at 22:12

## 2019-02-17 RX ADMIN — Medication 1 APPLICATION(S): at 17:04

## 2019-02-17 RX ADMIN — CEFTAZIDIME 100 MILLIGRAM(S): 6 INJECTION, POWDER, FOR SOLUTION INTRAVENOUS at 05:37

## 2019-02-17 RX ADMIN — Medication 100 MILLIGRAM(S): at 12:31

## 2019-02-17 RX ADMIN — DULOXETINE HYDROCHLORIDE 30 MILLIGRAM(S): 30 CAPSULE, DELAYED RELEASE ORAL at 12:30

## 2019-02-17 RX ADMIN — Medication 100 MILLIGRAM(S): at 05:37

## 2019-02-17 RX ADMIN — Medication 1 APPLICATION(S): at 12:32

## 2019-02-17 RX ADMIN — GABAPENTIN 100 MILLIGRAM(S): 400 CAPSULE ORAL at 22:12

## 2019-02-17 RX ADMIN — CHLORHEXIDINE GLUCONATE 1 APPLICATION(S): 213 SOLUTION TOPICAL at 11:42

## 2019-02-17 RX ADMIN — Medication 1: at 12:31

## 2019-02-17 RX ADMIN — Medication 5 MILLIGRAM(S): at 05:37

## 2019-02-17 NOTE — PROGRESS NOTE ADULT - ASSESSMENT
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 She was admitted on 12/19/18 to the [facility] and managed for OM of the Right 3rd digit OM (cultures positive for Carbapenem Resistant Pseudomonas) and discharged on Cefepime for a total of 6 weeks.     Per Dr. Monge's note:   Patient has been having intermittent episodes of confusions since discharge from the hospital on 1/19. Reported that there are times where pt is AAOx3 and other times where she only repeats her name. Patients condition worsened in the past few days when she was told that she may not be able to remain in the NH due to insurance issues. Patient was seen by psych and prescribed Abilify.   Currently she is pleasant, awake and alert.       Assessment and Plan:    1. Toxic metabolic Encephalopathy: Improved?  ? Underlying Dementia with Medication side effect vs. SHONNA vs. Dehydration.  ID following: Risk of encephalopathy similar with all Cephalosporins. Dr. Lau agreed to a trial of Ceftazidime.   -ID follow up appreciated - no abx except ceftazidime  -Seroquel 25 mg po q 12 prn for anxiety and agitation.      2. SHONNA on CKD 3/Acute Urinary retention  SHONNA, ATN vs obstruction vs AIN ( Urine eosinophils suggestive) vs prerenal.    Creatinine trending down 3.1-->1.7--> 1.8--> 1.7--> 1.5  Monitor ins and out & BMP daily  avoid nephrotoxics    attempt trial void today    2. RT third metatarsal OM:  s/p Amputation.  Podiatry following: Local wound care with Santyl and moist dressing.  Continue IV Ceftazidime for now. STOP date 2/27/19.  PICC placed on 1/16 however it came out on 2/13    3. PAD:  s/p Angiogram 1/9/19.  Vascular recommended follow up in 2 weeks.    4. Chronic DVT:  -restarted heparin gtt on 2/12 with close monitoring of ptt  -rate adjusted today  -check PTT at 7pm      5. Acute on Chronic Normocytic Anemia: due to ACD  Goal hgb >7 g/dl.  -Transfused one unit PRBC on 2/10    6. Hypercalcemia:  -improving  -one dose of pamidronate on 2/12  Vitamin D and PTH levels low. Follow up PTHrP - QNS from 1/31.  Ordered on 2/10.      7. Rheumatoid Arthritis:  Continue home Prednisone 5mg Daily.  Pain control.      8. DM II:  Hemoglobin A1C, Whole Blood: 6.3% (01.12.19 @ 08:08)  Monitor finger sticks. Insulin coverage if fingersticks are greater than 180.    9. Latent TB:  Rifampin discontinued by ID.  Per HCP this was started 2 months ago in anticipation of starting ? Biologic DMARD.      10. Gout:  Continue Allopurinol.      11.  Bilateral Chronic Nonhealing Ulcers:  Full thickness wounds s/p debridement by burn 1/9/19.  Continue local wound care: Xeroform dsd kelrix q24h.  Podiatry following      12. Chronic Pain:  Continue Neurontin / Cymbalta and oxycodone prn.  oob to chair daily via Graham lift   -daily PT/Rehab    13. suspected folic acid def;   on supplement; check levels on the outside     DVT ppx: on heparin  Code Status: DNR/DNI.  Disposition: NH when stable.  Pt has 20 days left at [facility]      Progress Note Handoff  Pending:  Consults - renal following  Tests monitoring BMP and CBC. On heparin gtt  Results:   Family Discussion:  Disposition: Home___/SNF_x___/Other______________/Unknown at this time_____

## 2019-02-17 NOTE — PROGRESS NOTE ADULT - SUBJECTIVE AND OBJECTIVE BOX
LINCOLN HEREDIA  78y  Female    Patient is a 78y old  Female who presents with change in mental status.      INTERVAL HPI/OVERNIGHT EVENTS:  -pt seen and examined  -sitting comfortably in bed, no complaints  -bowman removed 2/16 for voiding trial  -acute anemia last weekend, s/p 1 unit prbc  -restarted IV heparin gtt, monitor ptt, keep 50-70 - adjusted today  -oob to chair encouraged daily /PT following    Vital Signs Last 24 Hrs  T(C): 36.3 (17 Feb 2019 05:10), Max: 36.7 (16 Feb 2019 13:30)  T(F): 97.3 (17 Feb 2019 05:10), Max: 98 (16 Feb 2019 13:30)  HR: 79 (17 Feb 2019 05:10) (79 - 84)  BP: 153/72 (17 Feb 2019 05:10) (141/79 - 157/70)  BP(mean): --  RR: 16 (17 Feb 2019 05:10) (16 - 16)  SpO2: --    PHYSICAL EXAM:  GENERAL: NAD  HEAD:  Atraumatic, Normocephalic  EYES: conjunctiva and sclera clear  ENMT: Moist mucous membranes  NECK: Supple, Normal thyroid  NERVOUS SYSTEM:  Awake & Alert, oriented x 2-3, Moves all extremities.   CHEST/LUNG: Clear to auscultation bilaterally; No rales, rhonchi, wheezing, or rubs  CV/HEART: Regular rate and rhythm; No murmurs, rubs, or gallops  GI/ABDOMEN: Soft, Nontender, obese abdomen; Bowel sounds present; Bowman present   EXTREMITIES:  2+ Peripheral Pulses, No clubbing or cyanosis. LE edema +  LYMPH: No lymphadenopathy noted  SKIN: Right 3rd toe amputation with purulent drainage. Left leg ulcer: dressing in place.             Consultant(s) Notes Reviewed:  [x ] YES  [ ] NO  Care Discussed with Consultants/Other Providers [ x] YES  [ ] NO    LABS:                                      10.4   8.15  )-----------( 278      ( 17 Feb 2019 08:26 )             33.2     02-17    141  |  98  |  21<H>  ----------------------------<  133<H>  4.0   |  27  |  1.5    Ca    9.1      17 Feb 2019 08:26    TPro  5.9<L>  /  Alb  3.4<L>  /  TBili  0.3  /  DBili  x   /  AST  11  /  ALT  6   /  AlkPhos  52  02-17    PTT - ( 17 Feb 2019 08:26 )  PTT:97.7 sec      Vitamin D, 1, 25-Dihydroxy: 12.4 pg/mL (01.31.19 @ 14:01)  Vitamin D, 25-Hydroxy: 20 ng/mL (01.31.19 @ 09:22)  Intact PTH: 8 pg/mL (01.30.19 @ 06:30)      MICROBIOLOGY:   Culture - Urine (01.31.19 @ 13:57)    Specimen Source: .Urine Clean Catch (Midstream)    Culture Results:   50,000 - 99,000 CFU/mL Yeast-like cells, presumptively not Candida  albicans "Susceptibilities not performed"      Culture - Urine (01.28.19 @ 17:20)    Specimen Source: .Urine Clean Catch (Midstream)    Culture Results: No growth      Culture - Blood (01.28.19 @ 14:16)    Specimen Source: .Blood Blood-Peripheral    Culture Results:   No growth to date.      Culture - Blood (01.28.19 @ 14:14)    Specimen Source: .Blood Blood-Peripheral    Culture Results:   No growth to date.      Culture - Other (01.10.19 @ 14:00)    -  Ciprofloxacin: R >2    -  Gentamicin: R >8    -  Imipenem: R >8    -  Levofloxacin: R >4    -  Meropenem: R >8    -  Piperacillin/Tazobactam: S 16    -  Tobramycin: R >8    -  Aztreonam: I 16    -  Ceftazidime: S 4    -  Cefepime: S 8    -  Amikacin: S 16    Specimen Source: .Other None    Culture Results:   Few Pseudomonas aeruginosa (Carbapenem Resistant)    Organism Identification: Pseudomonas aeruginosa (Carbapenem Resistant)    Organism: Pseudomonas aeruginosa (Carbapenem Resistant)    Method Type: SHANNAN      RADIOLOGY & ADDITIONAL TESTS:  CT Head No Cont (01.28.19 @ 15:54)   1.  The study is limited by motion artifact.    2.  Cerebral atrophy.    3.  Periventricular white matter hypodensities, nonspecific, differential   diagnostic possibilities include ischemic change, gliosis or  demyelination.      X-ray Chest 1 View-PORTABLE IMMEDIATE (01.28.19 @ 13:42)   Support devices: Left-sided PICC with the tip in the distal SVC.    Cardiac/mediastinum/hilum: Grossly stable.    Lung parenchyma/Pleura: Low lung volumes limiting evaluation. No definite   airspace opacity or effusion. Left lower lobe scar/atelectasis.    Skeleton/soft tissues: Stable.    Impression:      Low lung volumes limiting evaluation. No definite airspace opacity or   effusion. Left lower lobe scar/atelectasis.    Imaging Personally Reviewed:  [x] YES  [ ] NO    HEALTH ISSUES - PROBLEM Dx:  Other specified diabetes mellitus with other specified complication, unspecified whether long term insulin use  Rheumatoid arteritis  DVT (deep venous thrombosis)  HTN (hypertension)  Gout  Altered mental status  Osteomyelitis right foot.      MEDICATIONS  (STANDING):  allopurinol 100 milliGRAM(s) Oral daily  cefTAZidime  IVPB 500 milliGRAM(s) IV Intermittent every 24 hours  chlorhexidine 4% Liquid 1 Application(s) Topical <User Schedule>  collagenase Ointment 1 Application(s) Topical daily  Dakins Solution - 1/2 Strength 1 Application(s) Topical daily  dextrose 5%. 1000 milliLiter(s) (50 mL/Hr) IV Continuous <Continuous>  dextrose 50% Injectable 12.5 Gram(s) IV Push once  dextrose 50% Injectable 25 Gram(s) IV Push once  dextrose 50% Injectable 25 Gram(s) IV Push once  docusate sodium 100 milliGRAM(s) Oral two times a day  DULoxetine 30 milliGRAM(s) Oral daily  folic acid 1 milliGRAM(s) Oral daily  gabapentin 100 milliGRAM(s) Oral every 8 hours  heparin  Infusion. 1100 Unit(s)/Hr (11 mL/Hr) IV Continuous <Continuous>  insulin lispro (HumaLOG) corrective regimen sliding scale   SubCutaneous three times a day before meals  insulin lispro (HumaLOG) corrective regimen sliding scale   SubCutaneous at bedtime  leucovorin 5 milliGRAM(s) Oral daily  polyethylene glycol 3350 17 Gram(s) Oral daily  predniSONE   Tablet 5 milliGRAM(s) Oral daily  senna 2 Tablet(s) Oral at bedtime  tamsulosin 0.4 milliGRAM(s) Oral at bedtime    MEDICATIONS  (PRN):  acetaminophen   Tablet .. 650 milliGRAM(s) Oral every 6 hours PRN Temp greater or equal to 38C (100.4F), Mild Pain (1 - 3)  bisacodyl Suppository 10 milliGRAM(s) Rectal daily PRN Constipation  dextrose 40% Gel 15 Gram(s) Oral once PRN Blood Glucose LESS THAN 70 milliGRAM(s)/deciliter  glucagon  Injectable 1 milliGRAM(s) IntraMuscular once PRN Glucose LESS THAN 70 milligrams/deciliter  oxyCODONE    5 mG/acetaminophen 325 mG 1 Tablet(s) Oral every 8 hours PRN Moderate Pain (4 - 6)  QUEtiapine 25 milliGRAM(s) Oral every 12 hours PRN Anxiety/Agitation

## 2019-02-17 NOTE — PROGRESS NOTE ADULT - SUBJECTIVE AND OBJECTIVE BOX
Pt appears to be doing well podiatrically. No complaints. AF  Wound appears clean and granulating. No indication of progressive ischemia.  Edges viable.    WBC 8.15  Hb 10.4  Bun/Cre  21/1.4  Glu 133  Continue Ceftazidime  , DC Santyl-continue Dakins daily wound care

## 2019-02-18 LAB
APTT BLD: 66.7 SEC — HIGH (ref 27–39.2)
C DIFF BY PCR RESULT: NEGATIVE — SIGNIFICANT CHANGE UP
C DIFF TOX GENS STL QL NAA+PROBE: SIGNIFICANT CHANGE UP
GLUCOSE BLDC GLUCOMTR-MCNC: 103 MG/DL — HIGH (ref 70–99)
GLUCOSE BLDC GLUCOMTR-MCNC: 107 MG/DL — HIGH (ref 70–99)
GLUCOSE BLDC GLUCOMTR-MCNC: 162 MG/DL — HIGH (ref 70–99)
GLUCOSE BLDC GLUCOMTR-MCNC: 68 MG/DL — LOW (ref 70–99)
HCT VFR BLD CALC: 27.3 % — LOW (ref 37–47)
HGB BLD-MCNC: 8.7 G/DL — LOW (ref 12–16)
MCHC RBC-ENTMCNC: 29.1 PG — SIGNIFICANT CHANGE UP (ref 27–31)
MCHC RBC-ENTMCNC: 31.9 G/DL — LOW (ref 32–37)
MCV RBC AUTO: 91.3 FL — SIGNIFICANT CHANGE UP (ref 81–99)
NRBC # BLD: 0 /100 WBCS — SIGNIFICANT CHANGE UP (ref 0–0)
PLATELET # BLD AUTO: 258 K/UL — SIGNIFICANT CHANGE UP (ref 130–400)
RBC # BLD: 2.99 M/UL — LOW (ref 4.2–5.4)
RBC # FLD: 16 % — HIGH (ref 11.5–14.5)
WBC # BLD: 6.4 K/UL — SIGNIFICANT CHANGE UP (ref 4.8–10.8)
WBC # FLD AUTO: 6.4 K/UL — SIGNIFICANT CHANGE UP (ref 4.8–10.8)

## 2019-02-18 RX ORDER — AMLODIPINE BESYLATE 2.5 MG/1
2.5 TABLET ORAL ONCE
Qty: 0 | Refills: 0 | Status: COMPLETED | OUTPATIENT
Start: 2019-02-18 | End: 2019-02-18

## 2019-02-18 RX ADMIN — HEPARIN SODIUM 1100 UNIT(S)/HR: 5000 INJECTION INTRAVENOUS; SUBCUTANEOUS at 08:29

## 2019-02-18 RX ADMIN — Medication 1 APPLICATION(S): at 14:35

## 2019-02-18 RX ADMIN — Medication 5 MILLIGRAM(S): at 06:02

## 2019-02-18 RX ADMIN — Medication 100 MILLIGRAM(S): at 06:02

## 2019-02-18 RX ADMIN — CEFTAZIDIME 100 MILLIGRAM(S): 6 INJECTION, POWDER, FOR SOLUTION INTRAVENOUS at 06:02

## 2019-02-18 RX ADMIN — Medication 5 MILLIGRAM(S): at 11:01

## 2019-02-18 RX ADMIN — Medication 1 APPLICATION(S): at 14:36

## 2019-02-18 RX ADMIN — AMLODIPINE BESYLATE 2.5 MILLIGRAM(S): 2.5 TABLET ORAL at 23:11

## 2019-02-18 RX ADMIN — TAMSULOSIN HYDROCHLORIDE 0.4 MILLIGRAM(S): 0.4 CAPSULE ORAL at 21:11

## 2019-02-18 RX ADMIN — DULOXETINE HYDROCHLORIDE 30 MILLIGRAM(S): 30 CAPSULE, DELAYED RELEASE ORAL at 11:01

## 2019-02-18 RX ADMIN — GABAPENTIN 100 MILLIGRAM(S): 400 CAPSULE ORAL at 14:36

## 2019-02-18 RX ADMIN — CHLORHEXIDINE GLUCONATE 1 APPLICATION(S): 213 SOLUTION TOPICAL at 10:51

## 2019-02-18 RX ADMIN — Medication 100 MILLIGRAM(S): at 11:01

## 2019-02-18 RX ADMIN — GABAPENTIN 100 MILLIGRAM(S): 400 CAPSULE ORAL at 06:02

## 2019-02-18 RX ADMIN — Medication 1 MILLIGRAM(S): at 11:01

## 2019-02-18 RX ADMIN — Medication 1: at 11:38

## 2019-02-18 RX ADMIN — GABAPENTIN 100 MILLIGRAM(S): 400 CAPSULE ORAL at 21:11

## 2019-02-18 NOTE — PROGRESS NOTE ADULT - SUBJECTIVE AND OBJECTIVE BOX
[place] NEPHROLOGY FOLLOW UP NOTE  --------------------------------------------------------------------------------  pt seen and examined  bowman out, + void  no new events  tolerating po, no fever, no cp, sob, n/v    PAST HISTORY  --------------------------------------------------------------------------------  No significant changes to PMH, PSH, FHx, SHx, unless otherwise noted    ALLERGIES & MEDICATIONS  --------------------------------------------------------------------------------  Allergies    clindamycin (Unknown)  erythromycin (Unknown)  penicillin (Unknown)  strawberry (Unknown)    Physical Exam:  	  NAD  comfortable  moist mm  no jvd  cta bl  rrr  soft, nt   no edema, knee scar  no fo;[facility] Medications:   MEDICATIONS  (STANDING):  allopurinol 100 milliGRAM(s) Oral daily  cefTAZidime  IVPB 500 milliGRAM(s) IV Intermittent every 24 hours  chlorhexidine 4% Liquid 1 Application(s) Topical <User Schedule>  collagenase Ointment 1 Application(s) Topical daily  Dakins Solution - 1/2 Strength 1 Application(s) Topical daily  dextrose 5%. 1000 milliLiter(s) (50 mL/Hr) IV Continuous <Continuous>  dextrose 50% Injectable 12.5 Gram(s) IV Push once  dextrose 50% Injectable 25 Gram(s) IV Push once  dextrose 50% Injectable 25 Gram(s) IV Push once  docusate sodium 100 milliGRAM(s) Oral two times a day  DULoxetine 30 milliGRAM(s) Oral daily  folic acid 1 milliGRAM(s) Oral daily  gabapentin 100 milliGRAM(s) Oral every 8 hours  heparin  Infusion. 1100 Unit(s)/Hr (11 mL/Hr) IV Continuous <Continuous>  insulin lispro (HumaLOG) corrective regimen sliding scale   SubCutaneous three times a day before meals  insulin lispro (HumaLOG) corrective regimen sliding scale   SubCutaneous at bedtime  leucovorin 5 milliGRAM(s) Oral daily  polyethylene glycol 3350 17 Gram(s) Oral daily  predniSONE   Tablet 5 milliGRAM(s) Oral daily  senna 2 Tablet(s) Oral at bedtime  tamsulosin 0.4 milliGRAM(s) Oral at bedtime        VITALS:  T(F): 98.1 (02-18-19 @ 05:03), Max: 98.5 (02-17-19 @ 22:22)  HR: 84 (02-18-19 @ 05:03)  BP: 139/70 (02-18-19 @ 05:03)  RR: 16 (02-18-19 @ 05:03)  SpO2: --  Wt(kg): --    02-17 @ 07:01  -  02-18 @ 07:00  --------------------------------------------------------  IN: 0 mL / OUT: 1300 mL / NET: -1300 mL          LABS:  02-17    139  |  100  |  23<H>  ----------------------------<  117<H>  3.6   |  28  |  1.6<H>    Ca    8.4<L>      17 Feb 2019 21:30    TPro  5.1<L>  /  Alb  3.2<L>  /  TBili  0.2  /  DBili      /  AST  10  /  ALT  7   /  AlkPhos  45  02-17                          8.7    6.40  )-----------( 258      ( 18 Feb 2019 07:40 )             27.3       Urine Studies:        RADIOLOGY & ADDITIONAL STUDIES:

## 2019-02-18 NOTE — PROGRESS NOTE ADULT - ASSESSMENT
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 She was admitted on 12/19/18 to the [facility] and managed for OM of the Right 3rd digit OM (cultures positive for Carbapenem Resistant Pseudomonas) and discharged on Cefepime for a total of 6 weeks.     Per Dr. Monge's note:   Patient has been having intermittent episodes of confusions since discharge from the hospital on 1/19. Reported that there are times where pt is AAOx3 and other times where she only repeats her name. Patients condition worsened in the past few days when she was told that she may not be able to remain in the NH due to insurance issues. Patient was seen by psych and prescribed Abilify.   Currently she is pleasant, awake and alert.       Assessment and Plan:    1. Toxic metabolic Encephalopathy: Improved?  ? Underlying Dementia with Medication side effect vs. SHONNA vs. Dehydration.  ID following: Risk of encephalopathy similar with all Cephalosporins. Dr. Lau agreed to a trial of Ceftazidime.   -ID follow up appreciated - no abx except ceftazidime  -Seroquel 25 mg po q 12 prn for anxiety and agitation.      2. SHONNA on CKD 3/Acute Urinary retention  SHONNA, ATN vs obstruction vs AIN ( Urine eosinophils suggestive) vs prerenal.    Creatinine trending down 3.1-->1.7--> 1.8--> 1.7--> 1.5  Monitor ins and out & BMP daily  avoid nephrotoxics   bowman removed - voiding trial     2. RT third metatarsal OM:  s/p Amputation.  Podiatry following: Local wound care with Santyl and moist dressing.  Continue IV Ceftazidime for now. STOP date 2/27/19.  PICC placed on 1/16 however it came out on 2/13  Discussed with medical PA - will need midline prior to D/C    3. PAD:  s/p Angiogram 1/9/19.  Vascular recommended follow up in 2 weeks.    4. Chronic DVT:  -restarted heparin gtt on 2/12 with close monitoring of ptt  -check PTT daily       5. Acute on Chronic Normocytic Anemia: due to ACD  Goal hgb >7 g/dl.  -Transfused one unit PRBC on 2/10    6. Hypercalcemia:  -improving  -one dose of pamidronate on 2/12  Vitamin D and PTH levels low. Follow up PTHrP - QNS from 1/31.  Ordered on 2/10.      7. Rheumatoid Arthritis:  Continue home Prednisone 5mg Daily.  Pain control.      8. DM II:  Hemoglobin A1C, Whole Blood: 6.3% (01.12.19 @ 08:08)  Monitor finger sticks. Insulin coverage if fingersticks are greater than 180.    9. Latent TB:  Rifampin discontinued by ID.  Per HCP this was started 2 months ago in anticipation of starting ? Biologic DMARD.      10. Gout:  Continue Allopurinol.      11.  Bilateral Chronic Nonhealing Ulcers:  Full thickness wounds s/p debridement by burn 1/9/19.  Continue local wound care: Xeroform dsd kelrix q24h.  Podiatry following      12. Chronic Pain:  Continue Neurontin / Cymbalta and oxycodone prn.  oob to chair daily via Graham lift   -daily PT/Rehab    13. suspected folic acid def;   on supplement; check levels on the outside     14. Diarrhea r/o C. DIFF  -check sample - sent today      DVT ppx: on heparin  Code Status: DNR/DNI.  Disposition: NH when stable.  Pt has 20 days left at [facility]      Progress Note Handoff  Pending:  Consults - renal following  Tests monitoring BMP and CBC. On heparin gtt  Results: cdiff  Family Discussion:  Disposition: Home___/SNF_x___/Other______________/Unknown at this time_____

## 2019-02-18 NOTE — PROGRESS NOTE ADULT - ASSESSMENT
SHONNA   - resolving  CKD III  Hypercalcemia    - better after pamidronate on 2/12  R foot OM  encephalopathy  RA  anemia  DVT  PVD    Plan:    maintain po hydration  complete abx course  wound care  avoid further bowman  dc planning  oupt renal f/u  consider age appropriate malignancy screening as oupt  d/w Dr. Del Rosario

## 2019-02-18 NOTE — PROGRESS NOTE ADULT - SUBJECTIVE AND OBJECTIVE BOX
LINCOLN HEREDIA  78y  Female    Patient is a 78y old  Female who presents with change in mental status.      INTERVAL HPI/OVERNIGHT EVENTS:  -pt seen and examined  -sitting comfortably in bed, awake and alert  -bowman removed 2/16 for voiding trial - voiding well  -acute anemia last weekend, s/p 1 unit prbc  -restarted IV heparin gtt, monitor ptt, keep 50-70   -oob to chair encouraged daily /PT following  complains of hourly diarrhea for three days - hasn't told me until today.  Senna given yesterday.  Send CDIFF    Vital Signs Last 24 Hrs  T(C): 36.7 (18 Feb 2019 05:03), Max: 36.9 (17 Feb 2019 22:22)  T(F): 98.1 (18 Feb 2019 05:03), Max: 98.5 (17 Feb 2019 22:22)  HR: 84 (18 Feb 2019 05:03) (84 - 89)  BP: 139/70 (18 Feb 2019 05:03) (132/68 - 151/67)  BP(mean): --  RR: 16 (18 Feb 2019 05:03) (16 - 16)  SpO2: --      PHYSICAL EXAM:  GENERAL: NAD  HEAD:  Atraumatic, Normocephalic  EYES: conjunctiva and sclera clear  ENMT: Moist mucous membranes  NECK: Supple, Normal thyroid  NERVOUS SYSTEM:  Awake & Alert, oriented x 2-3, Moves all extremities.   CHEST/LUNG: Clear to auscultation bilaterally; No rales, rhonchi, wheezing, or rubs  CV/HEART: Regular rate and rhythm; No murmurs, rubs, or gallops  GI/ABDOMEN: Soft, Nontender, obese abdomen; Bowel sounds present; Bowman present   EXTREMITIES:  2+ Peripheral Pulses, No clubbing or cyanosis. LE edema +  LYMPH: No lymphadenopathy noted  SKIN: Right 3rd toe amputation with purulent drainage. Left leg ulcer: dressing in place.             Consultant(s) Notes Reviewed:  [x ] YES  [ ] NO  Care Discussed with Consultants/Other Providers [ x] YES  [ ] NO    LABS:                                    8.7    6.40  )-----------( 258      ( 18 Feb 2019 07:40 )             27.3     02-17    139  |  100  |  23<H>  ----------------------------<  117<H>  3.6   |  28  |  1.6<H>    Ca    8.4<L>      17 Feb 2019 21:30    TPro  5.1<L>  /  Alb  3.2<L>  /  TBili  0.2  /  DBili  x   /  AST  10  /  ALT  7   /  AlkPhos  45  02-17    PTT - ( 18 Feb 2019 07:40 )  PTT:66.7 sec                          10.4   8.15  )-----------( 278      ( 17 Feb 2019 08:26 )             33.2     02-17    141  |  98  |  21<H>  ----------------------------<  133<H>  4.0   |  27  |  1.5    Ca    9.1      17 Feb 2019 08:26    TPro  5.9<L>  /  Alb  3.4<L>  /  TBili  0.3  /  DBili  x   /  AST  11  /  ALT  6   /  AlkPhos  52  02-17    PTT - ( 17 Feb 2019 08:26 )  PTT:97.7 sec      Vitamin D, 1, 25-Dihydroxy: 12.4 pg/mL (01.31.19 @ 14:01)  Vitamin D, 25-Hydroxy: 20 ng/mL (01.31.19 @ 09:22)  Intact PTH: 8 pg/mL (01.30.19 @ 06:30)      MICROBIOLOGY:   Culture - Urine (01.31.19 @ 13:57)    Specimen Source: .Urine Clean Catch (Midstream)    Culture Results:   50,000 - 99,000 CFU/mL Yeast-like cells, presumptively not Candida  albicans "Susceptibilities not performed"      Culture - Urine (01.28.19 @ 17:20)    Specimen Source: .Urine Clean Catch (Midstream)    Culture Results: No growth      Culture - Blood (01.28.19 @ 14:16)    Specimen Source: .Blood Blood-Peripheral    Culture Results:   No growth to date.      Culture - Blood (01.28.19 @ 14:14)    Specimen Source: .Blood Blood-Peripheral    Culture Results:   No growth to date.      Culture - Other (01.10.19 @ 14:00)    -  Ciprofloxacin: R >2    -  Gentamicin: R >8    -  Imipenem: R >8    -  Levofloxacin: R >4    -  Meropenem: R >8    -  Piperacillin/Tazobactam: S 16    -  Tobramycin: R >8    -  Aztreonam: I 16    -  Ceftazidime: S 4    -  Cefepime: S 8    -  Amikacin: S 16    Specimen Source: .Other None    Culture Results:   Few Pseudomonas aeruginosa (Carbapenem Resistant)    Organism Identification: Pseudomonas aeruginosa (Carbapenem Resistant)    Organism: Pseudomonas aeruginosa (Carbapenem Resistant)    Method Type: SHANNAN      RADIOLOGY & ADDITIONAL TESTS:  CT Head No Cont (01.28.19 @ 15:54)   1.  The study is limited by motion artifact.    2.  Cerebral atrophy.    3.  Periventricular white matter hypodensities, nonspecific, differential   diagnostic possibilities include ischemic change, gliosis or  demyelination.      X-ray Chest 1 View-PORTABLE IMMEDIATE (01.28.19 @ 13:42)   Support devices: Left-sided PICC with the tip in the distal SVC.    Cardiac/mediastinum/hilum: Grossly stable.    Lung parenchyma/Pleura: Low lung volumes limiting evaluation. No definite   airspace opacity or effusion. Left lower lobe scar/atelectasis.    Skeleton/soft tissues: Stable.    Impression:      Low lung volumes limiting evaluation. No definite airspace opacity or   effusion. Left lower lobe scar/atelectasis.    Imaging Personally Reviewed:  [x] YES  [ ] NO    HEALTH ISSUES - PROBLEM Dx:  Other specified diabetes mellitus with other specified complication, unspecified whether long term insulin use  Rheumatoid arteritis  DVT (deep venous thrombosis)  HTN (hypertension)  Gout  Altered mental status  Osteomyelitis right foot.      MEDICATIONS  (STANDING):  allopurinol 100 milliGRAM(s) Oral daily  cefTAZidime  IVPB 500 milliGRAM(s) IV Intermittent every 24 hours  chlorhexidine 4% Liquid 1 Application(s) Topical <User Schedule>  collagenase Ointment 1 Application(s) Topical daily  Dakins Solution - 1/2 Strength 1 Application(s) Topical daily  dextrose 5%. 1000 milliLiter(s) (50 mL/Hr) IV Continuous <Continuous>  dextrose 50% Injectable 12.5 Gram(s) IV Push once  dextrose 50% Injectable 25 Gram(s) IV Push once  dextrose 50% Injectable 25 Gram(s) IV Push once  DULoxetine 30 milliGRAM(s) Oral daily  folic acid 1 milliGRAM(s) Oral daily  gabapentin 100 milliGRAM(s) Oral every 8 hours  heparin  Infusion. 1100 Unit(s)/Hr (11 mL/Hr) IV Continuous <Continuous>  insulin lispro (HumaLOG) corrective regimen sliding scale   SubCutaneous three times a day before meals  insulin lispro (HumaLOG) corrective regimen sliding scale   SubCutaneous at bedtime  leucovorin 5 milliGRAM(s) Oral daily  predniSONE   Tablet 5 milliGRAM(s) Oral daily  tamsulosin 0.4 milliGRAM(s) Oral at bedtime    MEDICATIONS  (PRN):  acetaminophen   Tablet .. 650 milliGRAM(s) Oral every 6 hours PRN Temp greater or equal to 38C (100.4F), Mild Pain (1 - 3)  dextrose 40% Gel 15 Gram(s) Oral once PRN Blood Glucose LESS THAN 70 milliGRAM(s)/deciliter  glucagon  Injectable 1 milliGRAM(s) IntraMuscular once PRN Glucose LESS THAN 70 milligrams/deciliter  oxyCODONE    5 mG/acetaminophen 325 mG 1 Tablet(s) Oral every 8 hours PRN Moderate Pain (4 - 6)  QUEtiapine 25 milliGRAM(s) Oral every 12 hours PRN Anxiety/Agitation

## 2019-02-19 ENCOUNTER — TRANSCRIPTION ENCOUNTER (OUTPATIENT)
Age: 79
End: 2019-02-19

## 2019-02-19 VITALS
HEART RATE: 93 BPM | TEMPERATURE: 97 F | SYSTOLIC BLOOD PRESSURE: 120 MMHG | RESPIRATION RATE: 16 BRPM | DIASTOLIC BLOOD PRESSURE: 59 MMHG

## 2019-02-19 LAB
ALBUMIN SERPL ELPH-MCNC: 3.3 G/DL — LOW (ref 3.5–5.2)
ALP SERPL-CCNC: 46 U/L — SIGNIFICANT CHANGE UP (ref 30–115)
ALT FLD-CCNC: 6 U/L — SIGNIFICANT CHANGE UP (ref 0–41)
ANION GAP SERPL CALC-SCNC: 15 MMOL/L — HIGH (ref 7–14)
APTT BLD: 37.2 SEC — SIGNIFICANT CHANGE UP (ref 27–39.2)
AST SERPL-CCNC: 10 U/L — SIGNIFICANT CHANGE UP (ref 0–41)
BILIRUB SERPL-MCNC: 0.3 MG/DL — SIGNIFICANT CHANGE UP (ref 0.2–1.2)
BUN SERPL-MCNC: 17 MG/DL — SIGNIFICANT CHANGE UP (ref 10–20)
CALCIUM SERPL-MCNC: 8.3 MG/DL — LOW (ref 8.5–10.1)
CHLORIDE SERPL-SCNC: 100 MMOL/L — SIGNIFICANT CHANGE UP (ref 98–110)
CO2 SERPL-SCNC: 27 MMOL/L — SIGNIFICANT CHANGE UP (ref 17–32)
CREAT SERPL-MCNC: 1.3 MG/DL — SIGNIFICANT CHANGE UP (ref 0.7–1.5)
GLUCOSE BLDC GLUCOMTR-MCNC: 133 MG/DL — HIGH (ref 70–99)
GLUCOSE BLDC GLUCOMTR-MCNC: 138 MG/DL — HIGH (ref 70–99)
GLUCOSE BLDC GLUCOMTR-MCNC: 99 MG/DL — SIGNIFICANT CHANGE UP (ref 70–99)
GLUCOSE SERPL-MCNC: 103 MG/DL — HIGH (ref 70–99)
HCT VFR BLD CALC: 28.7 % — LOW (ref 37–47)
HGB BLD-MCNC: 9.1 G/DL — LOW (ref 12–16)
MCHC RBC-ENTMCNC: 29.4 PG — SIGNIFICANT CHANGE UP (ref 27–31)
MCHC RBC-ENTMCNC: 31.7 G/DL — LOW (ref 32–37)
MCV RBC AUTO: 92.9 FL — SIGNIFICANT CHANGE UP (ref 81–99)
NRBC # BLD: 0 /100 WBCS — SIGNIFICANT CHANGE UP (ref 0–0)
PLATELET # BLD AUTO: 233 K/UL — SIGNIFICANT CHANGE UP (ref 130–400)
POTASSIUM SERPL-MCNC: 3.4 MMOL/L — LOW (ref 3.5–5)
POTASSIUM SERPL-SCNC: 3.4 MMOL/L — LOW (ref 3.5–5)
PROT SERPL-MCNC: 5.4 G/DL — LOW (ref 6–8)
PTH RELATED PROT SERPL-MCNC: <2 PMOL/L — SIGNIFICANT CHANGE UP
RBC # BLD: 3.09 M/UL — LOW (ref 4.2–5.4)
RBC # FLD: 15.9 % — HIGH (ref 11.5–14.5)
SODIUM SERPL-SCNC: 142 MMOL/L — SIGNIFICANT CHANGE UP (ref 135–146)
WBC # BLD: 6.85 K/UL — SIGNIFICANT CHANGE UP (ref 4.8–10.8)
WBC # FLD AUTO: 6.85 K/UL — SIGNIFICANT CHANGE UP (ref 4.8–10.8)

## 2019-02-19 RX ORDER — APIXABAN 2.5 MG/1
1 TABLET, FILM COATED ORAL
Qty: 0 | Refills: 0 | COMMUNITY
Start: 2019-02-19

## 2019-02-19 RX ORDER — APIXABAN 2.5 MG/1
2.5 TABLET, FILM COATED ORAL EVERY 12 HOURS
Qty: 0 | Refills: 0 | Status: DISCONTINUED | OUTPATIENT
Start: 2019-02-19 | End: 2019-02-19

## 2019-02-19 RX ORDER — CEFTAZIDIME 6 G/30ML
500 INJECTION, POWDER, FOR SOLUTION INTRAVENOUS
Qty: 0 | Refills: 0 | COMMUNITY
Start: 2019-02-19

## 2019-02-19 RX ORDER — TAMSULOSIN HYDROCHLORIDE 0.4 MG/1
1 CAPSULE ORAL
Qty: 0 | Refills: 0 | COMMUNITY
Start: 2019-02-19

## 2019-02-19 RX ORDER — PIOGLITAZONE HYDROCHLORIDE 15 MG/1
1 TABLET ORAL
Qty: 0 | Refills: 0 | COMMUNITY

## 2019-02-19 RX ORDER — OXYCODONE HYDROCHLORIDE 5 MG/1
1 TABLET ORAL
Qty: 0 | Refills: 0 | COMMUNITY

## 2019-02-19 RX ORDER — DULOXETINE HYDROCHLORIDE 30 MG/1
1 CAPSULE, DELAYED RELEASE ORAL
Qty: 0 | Refills: 0 | COMMUNITY
Start: 2019-02-19

## 2019-02-19 RX ORDER — SODIUM HYPOCHLORITE 0.125 %
1 SOLUTION, NON-ORAL MISCELLANEOUS
Qty: 0 | Refills: 0 | COMMUNITY
Start: 2019-02-19

## 2019-02-19 RX ORDER — GLIMEPIRIDE 1 MG
1 TABLET ORAL
Qty: 0 | Refills: 0 | COMMUNITY

## 2019-02-19 RX ORDER — QUETIAPINE FUMARATE 200 MG/1
1 TABLET, FILM COATED ORAL
Qty: 0 | Refills: 0 | COMMUNITY
Start: 2019-02-19

## 2019-02-19 RX ADMIN — Medication 1 APPLICATION(S): at 14:40

## 2019-02-19 RX ADMIN — Medication 5 MILLIGRAM(S): at 05:36

## 2019-02-19 RX ADMIN — DULOXETINE HYDROCHLORIDE 30 MILLIGRAM(S): 30 CAPSULE, DELAYED RELEASE ORAL at 11:19

## 2019-02-19 RX ADMIN — Medication 1 MILLIGRAM(S): at 11:19

## 2019-02-19 RX ADMIN — CHLORHEXIDINE GLUCONATE 1 APPLICATION(S): 213 SOLUTION TOPICAL at 09:53

## 2019-02-19 RX ADMIN — Medication 100 MILLIGRAM(S): at 11:19

## 2019-02-19 RX ADMIN — Medication 1 APPLICATION(S): at 14:37

## 2019-02-19 RX ADMIN — Medication 5 MILLIGRAM(S): at 11:19

## 2019-02-19 RX ADMIN — GABAPENTIN 100 MILLIGRAM(S): 400 CAPSULE ORAL at 14:39

## 2019-02-19 RX ADMIN — CEFTAZIDIME 100 MILLIGRAM(S): 6 INJECTION, POWDER, FOR SOLUTION INTRAVENOUS at 05:36

## 2019-02-19 RX ADMIN — GABAPENTIN 100 MILLIGRAM(S): 400 CAPSULE ORAL at 05:36

## 2019-02-19 NOTE — DISCHARGE NOTE ADULT - MEDICATION SUMMARY - MEDICATIONS TO CHANGE
I will SWITCH the dose or number of times a day I take the medications listed below when I get home from the hospital:    Eliquis 5 mg oral tablet  -- Check with your doctor before becoming pregnant.  It is very important that you take or use this exactly as directed.  Do not skip doses or discontinue unless directed by your doctor.  Obtain medical advice before taking any non-prescription drugs as some may affect the action of this medication.

## 2019-02-19 NOTE — DISCHARGE NOTE ADULT - MEDICATION SUMMARY - MEDICATIONS TO STOP TAKING
I will STOP taking the medications listed below when I get home from the hospital:    Actos 30 mg oral tablet  -- 1 tab(s) by mouth once a day    glimepiride 2 mg oral tablet  -- 1  by mouth 3 times a day    OxyCONTIN 20 mg oral tablet, extended release  -- 1 tab(s) by mouth every 12 hours, As Needed    senna oral tablet  -- 2 tab(s) by mouth once a day (at bedtime)    cefepime  -- 2 gram(s) intravenous once a day until feb 27 , 2019

## 2019-02-19 NOTE — DISCHARGE NOTE ADULT - SECONDARY DIAGNOSIS.
DVT (deep venous thrombosis) Essential hypertension Subacute osteomyelitis of right foot SHONNA (acute kidney injury)

## 2019-02-19 NOTE — DISCHARGE NOTE ADULT - MEDICATION SUMMARY - MEDICATIONS TO TAKE
I will START or STAY ON the medications listed below when I get home from the hospital:    predniSONE 5 mg oral tablet  -- 1 tab(s) by mouth once a day  -- Indication: For Rheumatoid arteritis    oxycodone-acetaminophen 5 mg-325 mg oral tablet  -- 1 tab(s) by mouth every 8 hours, As needed, Moderate Pain (4 - 6)  -- Indication: For pain    tamsulosin 0.4 mg oral capsule  -- 1 cap(s) by mouth once a day (at bedtime)  -- Indication: For flomax    apixaban 2.5 mg oral tablet  -- 1 tab(s) by mouth every 12 hours  -- Indication: For DVT (deep venous thrombosis)    gabapentin 300 mg oral capsule  -- orally every 12 hours  -- Indication: For neuropahty    DULoxetine 30 mg oral delayed release capsule  -- 1 cap(s) by mouth once a day  -- Indication: For Depression    leucovorin 5 mg oral tablet  -- 1 tab(s) by mouth once a day  -- Indication: For Home med    allopurinol 300 mg oral tablet  -- 1 tab(s) by mouth once a day  -- Indication: For Gout    QUEtiapine 25 mg oral tablet  -- 1 tab(s) by mouth every 12 hours, As needed, Anxiety/Agitation  -- Indication: For Dementia    sodium hypochlorite 0.25% topical solution  -- 1 application on skin once a day  -- Indication: For Cream    rifAMPin 300 mg oral capsule  -- 1 cap(s) by mouth 2 times a day  -- Indication: For latent tb    cefTAZidime  -- 500 milligram(s) intravenous once a day until 2/27  -- Indication: For Antibiotic    collagenase 250 units/g topical ointment  -- 1 application on skin once a day  -- Indication: For Cream    folic acid 1 mg oral tablet  -- 1 tab(s) by mouth once a day  -- Indication: For vitamin

## 2019-02-19 NOTE — PROGRESS NOTE ADULT - SUBJECTIVE AND OBJECTIVE BOX
[place] NEPHROLOGY FOLLOW UP NOTE  --------------------------------------------------------------------------------  pt seen and examined  [place] out, + void  no new events  tolerating po, no fever, no cp, sob, n/v  for dc today    PAST HISTORY  --------------------------------------------------------------------------------  No significant changes to PMH, PSH, FHx, SHx, unless otherwise noted    ALLERGIES & MEDICATIONS  --------------------------------------------------------------------------------  Allergies    clindamycin (Unknown)  erythromycin (Unknown)  penicillin (Unknown)  strawberry (Unknown)    Physical Exam:  	  NAD  comfortable  moist mm  no jvd  cta bl  rrr  soft, nt   no edema, knee scar  no [facility] Medications:   MEDICATIONS  (STANDING):  allopurinol 100 milliGRAM(s) Oral daily  apixaban 2.5 milliGRAM(s) Oral every 12 hours  cefTAZidime  IVPB 500 milliGRAM(s) IV Intermittent every 24 hours  chlorhexidine 4% Liquid 1 Application(s) Topical <User Schedule>  collagenase Ointment 1 Application(s) Topical daily  Dakins Solution - 1/2 Strength 1 Application(s) Topical daily  dextrose 5%. 1000 milliLiter(s) (50 mL/Hr) IV Continuous <Continuous>  dextrose 50% Injectable 12.5 Gram(s) IV Push once  dextrose 50% Injectable 25 Gram(s) IV Push once  dextrose 50% Injectable 25 Gram(s) IV Push once  DULoxetine 30 milliGRAM(s) Oral daily  folic acid 1 milliGRAM(s) Oral daily  gabapentin 100 milliGRAM(s) Oral every 8 hours  insulin lispro (HumaLOG) corrective regimen sliding scale   SubCutaneous three times a day before meals  insulin lispro (HumaLOG) corrective regimen sliding scale   SubCutaneous at bedtime  leucovorin 5 milliGRAM(s) Oral daily  predniSONE   Tablet 5 milliGRAM(s) Oral daily  tamsulosin 0.4 milliGRAM(s) Oral at bedtime        VITALS:  T(F): 96.6 (02-19-19 @ 14:40), Max: 97.9 (02-19-19 @ 06:06)  HR: 93 (02-19-19 @ 14:40)  BP: 120/59 (02-19-19 @ 14:40)  RR: 16 (02-19-19 @ 14:40)  SpO2: --  Wt(kg): --    02-17 @ 07:01  -  02-18 @ 07:00  --------------------------------------------------------  IN: 0 mL / OUT: 1300 mL / NET: -1300 mL    02-18 @ 07:01  -  02-19 @ 07:00  --------------------------------------------------------  IN: 0 mL / OUT: 1300 mL / NET: -1300 mL          LABS:  02-19    142  |  100  |  17  ----------------------------<  103<H>  3.4<L>   |  27  |  1.3    Ca    8.3<L>      19 Feb 2019 09:36    TPro  5.4<L>  /  Alb  3.3<L>  /  TBili  0.3  /  DBili      /  AST  10  /  ALT  6   /  AlkPhos  46  02-19                          9.1    6.85  )-----------( 233      ( 19 Feb 2019 09:36 )             28.7       Urine Studies:        RADIOLOGY & ADDITIONAL STUDIES:

## 2019-02-19 NOTE — DISCHARGE NOTE ADULT - CARE PLAN
Principal Discharge DX:	Metabolic encephalopathy  Goal:	prevent recurrence  Assessment and plan of treatment:	take medication as prescribed  Secondary Diagnosis:	DVT (deep venous thrombosis)  Assessment and plan of treatment:	on eliquis  check cbc in one week  Secondary Diagnosis:	Essential hypertension  Assessment and plan of treatment:	take medication as prescribed  Secondary Diagnosis:	Subacute osteomyelitis of right foot  Assessment and plan of treatment:	take medication as prescribed  dressing changes q24  complete abx course via midline  Secondary Diagnosis:	SHONNA (acute kidney injury)  Assessment and plan of treatment:	take medication as prescribed

## 2019-02-19 NOTE — CHART NOTE - NSCHARTNOTEFT_GEN_A_CORE
Nutrition follow-up note     Pt will continue on a carb consistent (snack), DASH/TLC diet + Glucerna q12. Pt reports a good appetite. Observed lunch tray and Pt consumed ~50%. She reports that she did not eat the entire thing because she received too many carbohydrates and she is a diabetic. EMR reports 50-75% intake. Last BM was yesterday 2/18. Fecal incontinence. Anson score 16. No PU. DFU on toe, shin, and calf. Meds: heparin, humalog, folic acid, prednisone. Labs (2/16) H/H 9.1/28.7 (2/17) BUN 23, creat 1.6, glucose 117, calcium 8.4, albumin 3.2. Will monitor intake, nutrition focused physical findings, and labs. Not at risk. Follow-up in 7 days.

## 2019-02-19 NOTE — DISCHARGE NOTE ADULT - HOSPITAL COURSE
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 She was admitted on 12/19/18 to the [facility] and managed for OM of the Right 3rd digit OM (cultures positive for Carbapenem Resistant Pseudomonas) and discharged on Cefepime for a total of 6 weeks.     Per Dr. Monge's note:   Patient has been having intermittent episodes of confusions since discharge from the hospital on 1/19. Reported that there are times where pt is AAOx3 and other times where she only repeats her name. Patients condition worsened in the past few days when she was told that she may not be able to remain in the NH due to insurance issues. Patient was seen by psych and prescribed Abilify.   Currently she is pleasant, awake and alert.       Assessment and Plan:    1. Toxic metabolic Encephalopathy: Improved?  ? Underlying Dementia with Medication side effect vs. SHONNA vs. Dehydration.  ID following: Risk of encephalopathy similar with all Cephalosporins. Dr. Lau agreed to a trial of Ceftazidime.   -ID follow up appreciated - no abx except ceftazidime  -Seroquel 25 mg po q 12 prn for anxiety and agitation.      2. SHONNA on CKD 3/Acute Urinary retention  SHONNA, ATN vs obstruction vs AIN ( Urine eosinophils suggestive) vs prerenal.    Creatinine trending down 3.1-->1.7--> 1.8--> 1.7--> 1.5-->1.3  Monitor ins and out & BMP daily  avoid nephrotoxics   bowman removed - voiding trial sucsessful    2. RT third metatarsal OM:  s/p Amputation.  Podiatry following: Local wound care with Santyl and moist dressing.  Continue IV Ceftazidime for now. STOP date 2/27/19.  PICC placed on 1/16 however it came out on 2/13  midline placed to rue on 2/18    3. PAD:  s/p Angiogram 1/9/19.  Vascular recommended follow up in 2 weeks.    4. Chronic DVT:  -d/c heparin  -start eliquis      5. Acute on Chronic Normocytic Anemia: due to ACD  Goal hgb >7 g/dl.  -Transfused one unit PRBC on 2/10    6. Hypercalcemia:  -improving  -one dose of pamidronate on 2/12  Vitamin D and PTH levels low. Follow up PTHrP - QNS from 1/31.  Ordered on 2/10.      7. Rheumatoid Arthritis:  Continue home Prednisone 5mg Daily.  Pain control.      8. DM II:  Hemoglobin A1C, Whole Blood: 6.3% (01.12.19 @ 08:08)  Monitor finger sticks. Insulin coverage if fingersticks are greater than 180.    9. Latent TB:  Rifampin discontinued by ID.  Per HCP this was started 2 months ago in anticipation of starting ? Biologic DMARD.      10. Gout:  Continue Allopurinol.      11.  Bilateral Chronic Nonhealing Ulcers:  Full thickness wounds s/p debridement by burn 1/9/19.  Continue local wound care: Xeroform dsd kelrix q24h.  Podiatry following      12. Chronic Pain:  Continue Neurontin / Cymbalta and oxycodone prn.  oob to chair daily via Graham lift   -daily PT/Rehab    13. suspected folic acid def;   on supplement; check levels on the outside     14. Diarrhea secondary to laxatives  -resolved  -c.diff is negative      DVT ppx: on heparin  Code Status: DNR/DNI.  Disposition: d/c to SNF today  D/C planning took over 60 min

## 2019-02-19 NOTE — DISCHARGE NOTE ADULT - PLAN OF CARE
prevent recurrence take medication as prescribed on eliquis  check cbc in one week take medication as prescribed  dressing changes q24  complete abx course via midline

## 2019-02-19 NOTE — PROGRESS NOTE ADULT - ASSESSMENT
SHONNA   - resolving  CKD III  Hypercalcemia    - better after pamidronate on 2/12  R foot OM  encephalopathy  RA  anemia  DVT  PVD    Plan:    maintain po hydration  complete abx course  wound care  avoid further bowman  dc planning  oupt renal f/u  consider age appropriate malignancy screening as oupt  for dc to snf today

## 2019-02-19 NOTE — DISCHARGE NOTE ADULT - PATIENT PORTAL LINK FT
You can access the [facility] Patient Portal, offered by [facility], by registering with the following website: [email or web address]

## 2019-02-19 NOTE — DISCHARGE NOTE ADULT - CARE PROVIDER_API CALL
Jorge Waters)  Surgery; Vascular Surgery  [address] [place] [address], [address]  [place] [address]  Phone: [phone]  Fax: [phone]  Follow Up Time:     Haja Manzano)  Internal Medicine; Nephrology  [address] [place] [address]  Phone: [phone]  Fax: [phone]  Follow Up Time:

## 2019-02-19 NOTE — PROGRESS NOTE ADULT - SUBJECTIVE AND OBJECTIVE BOX
YANLINCOLN  78y  Female    Patient is a 78y old  Female who presents with change in mental status.      INTERVAL HPI/OVERNIGHT EVENTS:  -pt seen and examined  -sitting comfortably in chair, awake and alert  -bowman removed 2/16 for voiding trial - voiding well  -acute anemia last weekend, s/p 1 unit prbc  -restarted IV heparin gtt, monitor ptt, keep 50-70 - stop today and restart eliquis at 2.5mg q12  -oob to chair encouraged daily /PT following  cidff negative    Vital Signs Last 24 Hrs  T(C): 36.6 (19 Feb 2019 06:06), Max: 36.6 (19 Feb 2019 06:06)  T(F): 97.9 (19 Feb 2019 06:06), Max: 97.9 (19 Feb 2019 06:06)  HR: 78 (19 Feb 2019 06:06) (76 - 84)  BP: 123/69 (19 Feb 2019 06:06) (123/69 - 180/82)  BP(mean): --  RR: 16 (19 Feb 2019 06:06) (16 - 16)  SpO2: --      PHYSICAL EXAM:  GENERAL: NAD  HEAD:  Atraumatic, Normocephalic  EYES: conjunctiva and sclera clear  ENMT: Moist mucous membranes  NECK: Supple, Normal thyroid  NERVOUS SYSTEM:  Awake & Alert, oriented x 2-3, Moves all extremities.   CHEST/LUNG: Clear to auscultation bilaterally; No rales, rhonchi, wheezing, or rubs  CV/HEART: Regular rate and rhythm; No murmurs, rubs, or gallops  GI/ABDOMEN: Soft, Nontender, obese abdomen; Bowel sounds present; Bowman present   EXTREMITIES:  2+ Peripheral Pulses, No clubbing or cyanosis. LE edema +  LYMPH: No lymphadenopathy noted  SKIN: Right 3rd toe amputation with purulent drainage. Left leg ulcer: dressing in place.             Consultant(s) Notes Reviewed:  [x ] YES  [ ] NO  Care Discussed with Consultants/Other Providers [ x] YES  [ ] NO    LABS:                                                9.1    6.85  )-----------( 233      ( 19 Feb 2019 09:36 )             28.7     02-19    142  |  100  |  17  ----------------------------<  103<H>  3.4<L>   |  27  |  1.3    Ca    8.3<L>      19 Feb 2019 09:36    TPro  5.4<L>  /  Alb  3.3<L>  /  TBili  0.3  /  DBili  x   /  AST  10  /  ALT  6   /  AlkPhos  46  02-19                       Vitamin D, 1, 25-Dihydroxy: 12.4 pg/mL (01.31.19 @ 14:01)  Vitamin D, 25-Hydroxy: 20 ng/mL (01.31.19 @ 09:22)  Intact PTH: 8 pg/mL (01.30.19 @ 06:30)      MICROBIOLOGY:   Culture - Urine (01.31.19 @ 13:57)    Specimen Source: .Urine Clean Catch (Midstream)    Culture Results:   50,000 - 99,000 CFU/mL Yeast-like cells, presumptively not Candida  albicans "Susceptibilities not performed"      Culture - Urine (01.28.19 @ 17:20)    Specimen Source: .Urine Clean Catch (Midstream)    Culture Results: No growth      Culture - Blood (01.28.19 @ 14:16)    Specimen Source: .Blood Blood-Peripheral    Culture Results:   No growth to date.      Culture - Blood (01.28.19 @ 14:14)    Specimen Source: .Blood Blood-Peripheral    Culture Results:   No growth to date.      Culture - Other (01.10.19 @ 14:00)    -  Ciprofloxacin: R >2    -  Gentamicin: R >8    -  Imipenem: R >8    -  Levofloxacin: R >4    -  Meropenem: R >8    -  Piperacillin/Tazobactam: S 16    -  Tobramycin: R >8    -  Aztreonam: I 16    -  Ceftazidime: S 4    -  Cefepime: S 8    -  Amikacin: S 16    Specimen Source: .Other None    Culture Results:   Few Pseudomonas aeruginosa (Carbapenem Resistant)    Organism Identification: Pseudomonas aeruginosa (Carbapenem Resistant)    Organism: Pseudomonas aeruginosa (Carbapenem Resistant)    Method Type: SHANNAN      RADIOLOGY & ADDITIONAL TESTS:  CT Head No Cont (01.28.19 @ 15:54)   1.  The study is limited by motion artifact.    2.  Cerebral atrophy.    3.  Periventricular white matter hypodensities, nonspecific, differential   diagnostic possibilities include ischemic change, gliosis or  demyelination.      X-ray Chest 1 View-PORTABLE IMMEDIATE (01.28.19 @ 13:42)   Support devices: Left-sided PICC with the tip in the distal SVC.    Cardiac/mediastinum/hilum: Grossly stable.    Lung parenchyma/Pleura: Low lung volumes limiting evaluation. No definite   airspace opacity or effusion. Left lower lobe scar/atelectasis.    Skeleton/soft tissues: Stable.    Impression:      Low lung volumes limiting evaluation. No definite airspace opacity or   effusion. Left lower lobe scar/atelectasis.    Imaging Personally Reviewed:  [x] YES  [ ] NO    HEALTH ISSUES - PROBLEM Dx:  Other specified diabetes mellitus with other specified complication, unspecified whether long term insulin use  Rheumatoid arteritis  DVT (deep venous thrombosis)  HTN (hypertension)  Gout  Altered mental status  Osteomyelitis right foot.      MEDICATIONS  (STANDING):  allopurinol 100 milliGRAM(s) Oral daily  apixaban 2.5 milliGRAM(s) Oral every 12 hours  cefTAZidime  IVPB 500 milliGRAM(s) IV Intermittent every 24 hours  chlorhexidine 4% Liquid 1 Application(s) Topical <User Schedule>  collagenase Ointment 1 Application(s) Topical daily  Dakins Solution - 1/2 Strength 1 Application(s) Topical daily  dextrose 5%. 1000 milliLiter(s) (50 mL/Hr) IV Continuous <Continuous>  dextrose 50% Injectable 12.5 Gram(s) IV Push once  dextrose 50% Injectable 25 Gram(s) IV Push once  dextrose 50% Injectable 25 Gram(s) IV Push once  DULoxetine 30 milliGRAM(s) Oral daily  folic acid 1 milliGRAM(s) Oral daily  gabapentin 100 milliGRAM(s) Oral every 8 hours  insulin lispro (HumaLOG) corrective regimen sliding scale   SubCutaneous three times a day before meals  insulin lispro (HumaLOG) corrective regimen sliding scale   SubCutaneous at bedtime  leucovorin 5 milliGRAM(s) Oral daily  predniSONE   Tablet 5 milliGRAM(s) Oral daily  tamsulosin 0.4 milliGRAM(s) Oral at bedtime    MEDICATIONS  (PRN):  acetaminophen   Tablet .. 650 milliGRAM(s) Oral every 6 hours PRN Temp greater or equal to 38C (100.4F), Mild Pain (1 - 3)  dextrose 40% Gel 15 Gram(s) Oral once PRN Blood Glucose LESS THAN 70 milliGRAM(s)/deciliter  glucagon  Injectable 1 milliGRAM(s) IntraMuscular once PRN Glucose LESS THAN 70 milligrams/deciliter  oxyCODONE    5 mG/acetaminophen 325 mG 1 Tablet(s) Oral every 8 hours PRN Moderate Pain (4 - 6)  QUEtiapine 25 milliGRAM(s) Oral every 12 hours PRN Anxiety/Agitation

## 2019-02-19 NOTE — PROGRESS NOTE ADULT - ASSESSMENT
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 She was admitted on 12/19/18 to the [facility] and managed for OM of the Right 3rd digit OM (cultures positive for Carbapenem Resistant Pseudomonas) and discharged on Cefepime for a total of 6 weeks.     Per Dr. Monge's note:   Patient has been having intermittent episodes of confusions since discharge from the hospital on 1/19. Reported that there are times where pt is AAOx3 and other times where she only repeats her name. Patients condition worsened in the past few days when she was told that she may not be able to remain in the NH due to insurance issues. Patient was seen by psych and prescribed Abilify.   Currently she is pleasant, awake and alert.       Assessment and Plan:    1. Toxic metabolic Encephalopathy: Improved?  ? Underlying Dementia with Medication side effect vs. SHONNA vs. Dehydration.  ID following: Risk of encephalopathy similar with all Cephalosporins. Dr. Lau agreed to a trial of Ceftazidime.   -ID follow up appreciated - no abx except ceftazidime  -Seroquel 25 mg po q 12 prn for anxiety and agitation.      2. SHONNA on CKD 3/Acute Urinary retention  SHONNA, ATN vs obstruction vs AIN ( Urine eosinophils suggestive) vs prerenal.    Creatinine trending down 3.1-->1.7--> 1.8--> 1.7--> 1.5-->1.3  Monitor ins and out & BMP daily  avoid nephrotoxics   bowman removed - voiding trial sucsessful    2. RT third metatarsal OM:  s/p Amputation.  Podiatry following: Local wound care with Santyl and moist dressing.  Continue IV Ceftazidime for now. STOP date 2/27/19.  PICC placed on 1/16 however it came out on 2/13  midline placed to rue on 2/18    3. PAD:  s/p Angiogram 1/9/19.  Vascular recommended follow up in 2 weeks.    4. Chronic DVT:  -d/c heparin  -start eliquis      5. Acute on Chronic Normocytic Anemia: due to ACD  Goal hgb >7 g/dl.  -Transfused one unit PRBC on 2/10    6. Hypercalcemia:  -improving  -one dose of pamidronate on 2/12  Vitamin D and PTH levels low. Follow up PTHrP - QNS from 1/31.  Ordered on 2/10.      7. Rheumatoid Arthritis:  Continue home Prednisone 5mg Daily.  Pain control.      8. DM II:  Hemoglobin A1C, Whole Blood: 6.3% (01.12.19 @ 08:08)  Monitor finger sticks. Insulin coverage if fingersticks are greater than 180.    9. Latent TB:  Rifampin discontinued by ID.  Per HCP this was started 2 months ago in anticipation of starting ? Biologic DMARD.      10. Gout:  Continue Allopurinol.      11.  Bilateral Chronic Nonhealing Ulcers:  Full thickness wounds s/p debridement by burn 1/9/19.  Continue local wound care: Xeroform dsd kelrix q24h.  Podiatry following      12. Chronic Pain:  Continue Neurontin / Cymbalta and oxycodone prn.  oob to chair daily via Graham lift   -daily PT/Rehab    13. suspected folic acid def;   on supplement; check levels on the outside     14. Diarrhea secondary to laxatives  -resolved  -c.diff is negative      DVT ppx: on heparin  Code Status: DNR/DNI.  Disposition: NH when stable.  Pt has 20 days left at SNF      Progress Note Handoff  Pending:  Consults -  Tests   Results:   Family Discussion: d/c to snf today  Disposition: Home___/SNF_x___/Other______________/Unknown at this time_____

## 2019-02-19 NOTE — PROGRESS NOTE ADULT - NSHPATTENDINGPLANDISCUSS_GEN_ALL_CORE
medical staff
nursing staff.

## 2019-02-19 NOTE — DISCHARGE NOTE ADULT - CARE PROVIDERS DIRECT ADDRESSES
[email or web address]

## 2019-02-19 NOTE — PROGRESS NOTE ADULT - PROVIDER SPECIALTY LIST ADULT
Geriatrics
Geriatrics
Hospitalist
Infectious Disease
Nephrology
Podiatry
Psychiatry
Hospitalist
Podiatry
Hospitalist
Hospitalist
Infectious Disease

## 2019-02-20 ENCOUNTER — INBOUND DOCUMENT (OUTPATIENT)
Age: 79
End: 2019-02-20

## 2019-02-20 ENCOUNTER — OUTPATIENT (OUTPATIENT)
Dept: OUTPATIENT SERVICES | Facility: HOSPITAL | Age: 79
LOS: 1 days | Discharge: HOME | End: 2019-02-20

## 2019-02-20 DIAGNOSIS — I10 ESSENTIAL (PRIMARY) HYPERTENSION: ICD-10-CM

## 2019-02-20 DIAGNOSIS — D64.9 ANEMIA, UNSPECIFIED: ICD-10-CM

## 2019-02-20 DIAGNOSIS — M17.11 UNILATERAL PRIMARY OSTEOARTHRITIS, RIGHT KNEE: Chronic | ICD-10-CM

## 2019-02-27 DIAGNOSIS — E11.51 TYPE 2 DIABETES MELLITUS WITH DIABETIC PERIPHERAL ANGIOPATHY WITHOUT GANGRENE: ICD-10-CM

## 2019-02-27 DIAGNOSIS — J98.11 ATELECTASIS: ICD-10-CM

## 2019-02-27 DIAGNOSIS — E83.52 HYPERCALCEMIA: ICD-10-CM

## 2019-02-27 DIAGNOSIS — T47.4X5A ADVERSE EFFECT OF OTHER LAXATIVES, INITIAL ENCOUNTER: ICD-10-CM

## 2019-02-27 DIAGNOSIS — G31.9 DEGENERATIVE DISEASE OF NERVOUS SYSTEM, UNSPECIFIED: ICD-10-CM

## 2019-02-27 DIAGNOSIS — I82.513 CHRONIC EMBOLISM AND THROMBOSIS OF FEMORAL VEIN, BILATERAL: ICD-10-CM

## 2019-02-27 DIAGNOSIS — K52.1 TOXIC GASTROENTERITIS AND COLITIS: ICD-10-CM

## 2019-02-27 DIAGNOSIS — M10.9 GOUT, UNSPECIFIED: ICD-10-CM

## 2019-02-27 DIAGNOSIS — I10 ESSENTIAL (PRIMARY) HYPERTENSION: ICD-10-CM

## 2019-02-27 DIAGNOSIS — M06.9 RHEUMATOID ARTHRITIS, UNSPECIFIED: ICD-10-CM

## 2019-02-27 DIAGNOSIS — I82.533 CHRONIC EMBOLISM AND THROMBOSIS OF POPLITEAL VEIN, BILATERAL: ICD-10-CM

## 2019-02-27 DIAGNOSIS — G92 TOXIC ENCEPHALOPATHY: ICD-10-CM

## 2019-02-27 DIAGNOSIS — E53.8 DEFICIENCY OF OTHER SPECIFIED B GROUP VITAMINS: ICD-10-CM

## 2019-02-27 DIAGNOSIS — M86.9 OSTEOMYELITIS, UNSPECIFIED: ICD-10-CM

## 2019-02-27 DIAGNOSIS — R41.82 ALTERED MENTAL STATUS, UNSPECIFIED: ICD-10-CM

## 2019-02-27 DIAGNOSIS — Z79.01 LONG TERM (CURRENT) USE OF ANTICOAGULANTS: ICD-10-CM

## 2019-02-27 DIAGNOSIS — D63.8 ANEMIA IN OTHER CHRONIC DISEASES CLASSIFIED ELSEWHERE: ICD-10-CM

## 2019-02-27 DIAGNOSIS — R33.9 RETENTION OF URINE, UNSPECIFIED: ICD-10-CM
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
## 2019-02-27 DIAGNOSIS — F03.90 UNSPECIFIED DEMENTIA WITHOUT BEHAVIORAL DISTURBANCE: ICD-10-CM

## 2019-02-27 DIAGNOSIS — F41.9 ANXIETY DISORDER, UNSPECIFIED: ICD-10-CM

## 2019-02-27 DIAGNOSIS — R76.11 NONSPECIFIC REACTION TO TUBERCULIN SKIN TEST WITHOUT ACTIVE TUBERCULOSIS: ICD-10-CM

## 2019-02-27 DIAGNOSIS — G89.29 OTHER CHRONIC PAIN: ICD-10-CM

## 2019-02-27 DIAGNOSIS — N18.3 CHRONIC KIDNEY DISEASE, STAGE 3 (MODERATE): ICD-10-CM

## 2019-02-27 DIAGNOSIS — E11.69 TYPE 2 DIABETES MELLITUS WITH OTHER SPECIFIED COMPLICATION: ICD-10-CM

## 2019-02-27 DIAGNOSIS — E86.0 DEHYDRATION: ICD-10-CM

## 2019-02-27 DIAGNOSIS — F05 DELIRIUM DUE TO KNOWN PHYSIOLOGICAL CONDITION: ICD-10-CM

## 2019-02-27 DIAGNOSIS — N17.0 ACUTE KIDNEY FAILURE WITH TUBULAR NECROSIS: ICD-10-CM

## 2019-02-27 DIAGNOSIS — I12.9 HYPERTENSIVE CHRONIC KIDNEY DISEASE WITH STAGE 1 THROUGH STAGE 4 CHRONIC KIDNEY DISEASE, OR UNSPECIFIED CHRONIC KIDNEY DISEASE: ICD-10-CM

## 2019-02-27 DIAGNOSIS — E11.22 TYPE 2 DIABETES MELLITUS WITH DIABETIC CHRONIC KIDNEY DISEASE: ICD-10-CM

## 2019-02-27 DIAGNOSIS — Z79.84 LONG TERM (CURRENT) USE OF ORAL HYPOGLYCEMIC DRUGS: ICD-10-CM

## 2019-03-04 ENCOUNTER — APPOINTMENT (OUTPATIENT)
Dept: VASCULAR SURGERY | Facility: CLINIC | Age: 79
End: 2019-03-04

## 2019-03-23 ENCOUNTER — INPATIENT (INPATIENT)
Facility: HOSPITAL | Age: 79
LOS: 5 days | Discharge: NURSING, REHAB OR LONG-TERM CARE | End: 2019-03-29
Attending: INTERNAL MEDICINE | Admitting: INTERNAL MEDICINE

## 2019-03-23 VITALS
HEART RATE: 106 BPM | SYSTOLIC BLOOD PRESSURE: 167 MMHG | RESPIRATION RATE: 18 BRPM | OXYGEN SATURATION: 98 % | DIASTOLIC BLOOD PRESSURE: 78 MMHG

## 2019-03-23 DIAGNOSIS — M17.11 UNILATERAL PRIMARY OSTEOARTHRITIS, RIGHT KNEE: Chronic | ICD-10-CM

## 2019-03-23 LAB
ALBUMIN SERPL ELPH-MCNC: 3.5 G/DL — SIGNIFICANT CHANGE UP (ref 3.5–5.2)
ALP SERPL-CCNC: 67 U/L — SIGNIFICANT CHANGE UP (ref 30–115)
ALT FLD-CCNC: <5 U/L — SIGNIFICANT CHANGE UP (ref 0–41)
ANION GAP SERPL CALC-SCNC: 18 MMOL/L — HIGH (ref 7–14)
APPEARANCE UR: CLEAR — SIGNIFICANT CHANGE UP
APTT BLD: 35 SEC — SIGNIFICANT CHANGE UP (ref 27–39.2)
AST SERPL-CCNC: 12 U/L — SIGNIFICANT CHANGE UP (ref 0–41)
BACTERIA # UR AUTO: ABNORMAL /HPF
BASOPHILS # BLD AUTO: 0.04 K/UL — SIGNIFICANT CHANGE UP (ref 0–0.2)
BASOPHILS NFR BLD AUTO: 0.6 % — SIGNIFICANT CHANGE UP (ref 0–1)
BILIRUB SERPL-MCNC: 0.3 MG/DL — SIGNIFICANT CHANGE UP (ref 0.2–1.2)
BILIRUB UR-MCNC: NEGATIVE — SIGNIFICANT CHANGE UP
BLD GP AB SCN SERPL QL: SIGNIFICANT CHANGE UP
BUN SERPL-MCNC: 20 MG/DL — SIGNIFICANT CHANGE UP (ref 10–20)
CALCIUM SERPL-MCNC: 11 MG/DL — HIGH (ref 8.5–10.1)
CHLORIDE SERPL-SCNC: 100 MMOL/L — SIGNIFICANT CHANGE UP (ref 98–110)
CO2 SERPL-SCNC: 21 MMOL/L — SIGNIFICANT CHANGE UP (ref 17–32)
COLOR SPEC: YELLOW — SIGNIFICANT CHANGE UP
CREAT SERPL-MCNC: 1.1 MG/DL — SIGNIFICANT CHANGE UP (ref 0.7–1.5)
DIFF PNL FLD: NEGATIVE — SIGNIFICANT CHANGE UP
EOSINOPHIL # BLD AUTO: 0.23 K/UL — SIGNIFICANT CHANGE UP (ref 0–0.7)
EOSINOPHIL NFR BLD AUTO: 3.4 % — SIGNIFICANT CHANGE UP (ref 0–8)
GLUCOSE BLDC GLUCOMTR-MCNC: 139 MG/DL — HIGH (ref 70–99)
GLUCOSE SERPL-MCNC: 115 MG/DL — HIGH (ref 70–99)
GLUCOSE UR QL: NEGATIVE MG/DL — SIGNIFICANT CHANGE UP
HCT VFR BLD CALC: 28 % — LOW (ref 37–47)
HGB BLD-MCNC: 9 G/DL — LOW (ref 12–16)
IMM GRANULOCYTES NFR BLD AUTO: 0.3 % — SIGNIFICANT CHANGE UP (ref 0.1–0.3)
INR BLD: 1.04 RATIO — SIGNIFICANT CHANGE UP (ref 0.65–1.3)
KETONES UR-MCNC: NEGATIVE — SIGNIFICANT CHANGE UP
LEUKOCYTE ESTERASE UR-ACNC: ABNORMAL
LYMPHOCYTES # BLD AUTO: 1.59 K/UL — SIGNIFICANT CHANGE UP (ref 1.2–3.4)
LYMPHOCYTES # BLD AUTO: 23.2 % — SIGNIFICANT CHANGE UP (ref 20.5–51.1)
MCHC RBC-ENTMCNC: 29.2 PG — SIGNIFICANT CHANGE UP (ref 27–31)
MCHC RBC-ENTMCNC: 32.1 G/DL — SIGNIFICANT CHANGE UP (ref 32–37)
MCV RBC AUTO: 90.9 FL — SIGNIFICANT CHANGE UP (ref 81–99)
MONOCYTES # BLD AUTO: 0.82 K/UL — HIGH (ref 0.1–0.6)
MONOCYTES NFR BLD AUTO: 12 % — HIGH (ref 1.7–9.3)
NEUTROPHILS # BLD AUTO: 4.14 K/UL — SIGNIFICANT CHANGE UP (ref 1.4–6.5)
NEUTROPHILS NFR BLD AUTO: 60.5 % — SIGNIFICANT CHANGE UP (ref 42.2–75.2)
NITRITE UR-MCNC: NEGATIVE — SIGNIFICANT CHANGE UP
NRBC # BLD: 0 /100 WBCS — SIGNIFICANT CHANGE UP (ref 0–0)
PH UR: 6.5 — SIGNIFICANT CHANGE UP (ref 5–8)
PLATELET # BLD AUTO: 294 K/UL — SIGNIFICANT CHANGE UP (ref 130–400)
POTASSIUM SERPL-MCNC: 4.4 MMOL/L — SIGNIFICANT CHANGE UP (ref 3.5–5)
POTASSIUM SERPL-SCNC: 4.4 MMOL/L — SIGNIFICANT CHANGE UP (ref 3.5–5)
PROT SERPL-MCNC: 6 G/DL — SIGNIFICANT CHANGE UP (ref 6–8)
PROT UR-MCNC: ABNORMAL MG/DL
PROTHROM AB SERPL-ACNC: 12 SEC — SIGNIFICANT CHANGE UP (ref 9.95–12.87)
RBC # BLD: 3.08 M/UL — LOW (ref 4.2–5.4)
RBC # FLD: 14.4 % — SIGNIFICANT CHANGE UP (ref 11.5–14.5)
SODIUM SERPL-SCNC: 139 MMOL/L — SIGNIFICANT CHANGE UP (ref 135–146)
SP GR SPEC: 1.01 — SIGNIFICANT CHANGE UP (ref 1.01–1.03)
TYPE + AB SCN PNL BLD: SIGNIFICANT CHANGE UP
UROBILINOGEN FLD QL: 0.2 MG/DL — SIGNIFICANT CHANGE UP (ref 0.2–0.2)
WBC # BLD: 6.84 K/UL — SIGNIFICANT CHANGE UP (ref 4.8–10.8)
WBC # FLD AUTO: 6.84 K/UL — SIGNIFICANT CHANGE UP (ref 4.8–10.8)
WBC UR QL: ABNORMAL /HPF

## 2019-03-23 RX ORDER — OXYCODONE AND ACETAMINOPHEN 5; 325 MG/1; MG/1
1 TABLET ORAL ONCE
Qty: 0 | Refills: 0 | Status: DISCONTINUED | OUTPATIENT
Start: 2019-03-23 | End: 2019-03-23

## 2019-03-23 RX ORDER — CEFTRIAXONE 500 MG/1
1 INJECTION, POWDER, FOR SOLUTION INTRAMUSCULAR; INTRAVENOUS ONCE
Qty: 0 | Refills: 0 | Status: COMPLETED | OUTPATIENT
Start: 2019-03-23 | End: 2019-03-23

## 2019-03-23 RX ORDER — MORPHINE SULFATE 50 MG/1
4 CAPSULE, EXTENDED RELEASE ORAL ONCE
Qty: 0 | Refills: 0 | Status: DISCONTINUED | OUTPATIENT
Start: 2019-03-23 | End: 2019-03-23

## 2019-03-23 RX ADMIN — CEFTRIAXONE 100 GRAM(S): 500 INJECTION, POWDER, FOR SOLUTION INTRAMUSCULAR; INTRAVENOUS at 16:07

## 2019-03-23 RX ADMIN — OXYCODONE AND ACETAMINOPHEN 1 TABLET(S): 5; 325 TABLET ORAL at 21:14

## 2019-03-23 NOTE — ED ADULT NURSE NOTE - NSIMPLEMENTINTERV_GEN_ALL_ED
Implemented All Fall with Harm Risk Interventions:  [place] to call system. Call bell, personal items and telephone within reach. Instruct patient to call for assistance. Room bathroom lighting operational. Non-slip footwear when patient is off stretcher. Physically safe environment: no spills, clutter or unnecessary equipment. Stretcher in lowest position, wheels locked, appropriate side rails in place. Provide visual cue, wrist band, yellow gown, etc. Monitor gait and stability. Monitor for mental status changes and reorient to person, place, and time. Review medications for side effects contributing to fall risk. Reinforce activity limits and safety measures with patient and family. Provide visual clues: red socks.

## 2019-03-23 NOTE — ED PROVIDER NOTE - NS ED ROS FT
Constitutional: No fever  Eyes:  No visual changes  ENMT:  No neck pain  Cardiac:  No chest pain  Respiratory:  No cough, SOB  GI:  No nausea, vomiting, diarrhea, abdominal pain.  :  +frequency  MS:  +hip and thigh pain  Neuro:  No headache or lightheadedness. No numbness, tingling  Skin:  No skin rash  Endocrine: h/o DM  Except as documented in the HPI,  all other systems are negative.

## 2019-03-23 NOTE — ED PROVIDER NOTE - PROGRESS NOTE DETAILS
hgb 9.0, previous 9.1 in February, baseline for pt. ATTENDING NOTE: Part 1  77 y/o F with PMH of RA, TB, anxiety, DVTs x 2, GOUT, on Eliquis, also on oxycodone but only takes for pain,  presents for eval of left knee, b/l hip and right wrist pain s/p fall off her recliner last night. Pt reports she was trying to get out of her recliner around 12 am when she slid off of it and hit the floor landing on her buttocks and back. Denies head trauma. No LOC. Pt called 911, when police arrived they were able to help pt get up and put her in bed. Notes she was able to get up 2x from bed to go to the bathroom but when she tried to get up the 3rd time she was unable to ambulate due to her legs feeling weak secondary to pain. Recalls all events prior to and after fall. Denies fever, chills, n/v, cp, sob, pleuritic chest pain, palpitations, diaphoresis, cough, chest wall/rib pain, blurry vision/visual changes, neck pain/stiffness, back pain, abd pain, numbness/tingling, HA/LH/dizziness, lacerations, abrasions, ecchymoses, or swelling. GCS15. ATTENDING NOTE: Part 2  On exam: Vital Signs: I have reviewed the initial vital signs. Constitutional: WDWN in nad. HEAD: No signs of basilar skull fracture. Skin: No rash. No lacerations, abrasions, ecchymoses or swelling. EYES: No periorbial swelling/ecchymoses. PERRL, EOMI. No nystagmus. ENT: MMM. No rhinorrhea/otorrhea. No septal hematoma. No mastoid ecchymoses. NECK: Supple, non-tender, no spinous tenderness to neck. No palpable shelves or step-offs. BACK: No spinous tenderness. No palpable shelves or step-offs. CARDI: RRR, radial pulses 2/4 b/l. No pain to palpation to chest wall. RESP: BS present b/l, ctabl, no wheezing or crackles, good air exchange, good resp effort and excursion, no accessory muscle use, no stridor. No pain to palpation to ribs b/l. No crepitus. GI: BS present throughout all 4 quadrants, soft, nd, nt no rebound tenderness or guarding, no cvat. MSK: FROM, no edema,. RUE: (+) swelling to R dorsal aspect by 1st metacarpal. No snuff box tenderness. FROM in flexion, extension, ulna and radial deviation with pain. Radial pulses 2/4 b/l. FROM of b/l shoulders and elbows. (+) B/l hip pain to palpation. LLE: (+) Left medial knee pain, no patellar/tib/fib head pain. FROM of b/l hips/knee/ankles. No ankle pain. No short leg. No internal or external rotation of LE. NEURO: GCS 15. AAOx3, motor 5/5 and sensation intact throughout upper and lowe ext, CN II-XII intact, No facial droop or slurring of speech. (-) Pronator (-) Romberg. No dysmteria w. ftn or rapid alternating fine movements. No focal deficits. A/P: Will get labs, CT abd/pelv, XR knee, wrist, femur, and b/l hip. ATTENDING NOTE: Part 1  77 y/o F with PMH of RA, TB, anxiety, DVTs x 2, GOUT, on Eliquis, also on oxycodone but only takes for pain,  presents for eval of left knee, b/l hip and right wrist pain s/p fall off her recliner last night. Pt reports she was trying to get out of her recliner around 12 am when she slid off of it and hit the floor landing on her buttocks. Denies head trauma. No LOC. Pt called 911, when police arrived they were able to help pt get up and put her in bed. Notes she was able to get up 2x from bed to go to the bathroom but when she tried to get up the 3rd time she was unable to ambulate due to her legs feeling weak secondary to pain. Recalls all events prior to and after fall. Denies fever, chills, n/v, cp, sob, pleuritic chest pain, palpitations, diaphoresis, cough, chest wall/rib pain, blurry vision/visual changes, neck pain/stiffness, back pain, abd pain, numbness/tingling, HA/LH/dizziness, lacerations, abrasions, ecchymoses, or swelling. GCS15. ATTENDING NOTE: Part 2  On exam: Vital Signs: I have reviewed the initial vital signs. Constitutional: WDWN in nad. HEAD: No signs of basilar skull fracture. Skin: No rash. No lacerations, abrasions, ecchymoses or swelling. EYES: No periorbital swelling/ecchymoses. PERRL, EOMI. No nystagmus. ENT: MMM. No rhinorrhea/otorrhea. No septal hematoma. No mastoid ecchymoses. NECK: Supple, non-tender, no spinous tenderness to neck. No palpable shelves or step-offs. BACK: No spinous tenderness. No palpable shelves or step-offs. CARD: RRR, radial pulses 2/4 b/l. dp and pt pulses 2/4. No pain to palpation to chest wall. RESP: BS present b/l, ctabl, no wheezing or crackles, no accessory muscle use, no stridor. No pain to palpation to ribs b/l. No crepitus. GI: BS present throughout all 4 quadrants, soft, nd, nt no rebound tenderness or guarding, no cvat. MSK: FROM, no edema,. RUE: (+) swelling to R dorsal aspect by 1st metacarpal. No snuff box tenderness. FROM in flexion, extension, ulna and radial deviation with pain in flexion. Radial pulses 2/4 b/l. FROM of b/l shoulders and elbows. (+) B/l hip pain to palpation. LLE: (+) Left medial knee pain, no patellar/tib/fib head pain. FROM of b/l hips/knee/ankles. No ankle pain. No short leg. No internal or external rotation of LE. NEURO: GCS 15. AAOx3, motor 5/5 and sensation intact throughout upper and lowe ext, CN II-XII intact, No facial droop or slurring of speech. (-) Pronator. No dysmteria w. ftn or rapid alternating fine movements. No focal deficits. A/P: Will get labs, CT abd/pelv, XR knee, wrist, femur, and b/l hip. pt has been resting comfortably, pending ct results, xrays reviewed, will continued to monitor and reassess, gcs 15 henri at bedside. Pt lives at home without assistance. Attempted to ambulate pt in ED twice without success. Will admit

## 2019-03-23 NOTE — ED ADULT NURSE NOTE - OBJECTIVE STATEMENT
Per patient she was sitting him her recliner yesterday and fell onto her sacrum and right wrist. She was helped into bed and today woke up with increased right wrist pain, swelling and lower back pain. She also reports inability to ambulate today. Patient denies any loc during episode.

## 2019-03-23 NOTE — ED ADULT TRIAGE NOTE - CHIEF COMPLAINT QUOTE
Pt slid out of recliner chair last night. C/o b/l hip pain and hand pain. Unable to ambulate. Pt denies head trauma. Takes eliquis.

## 2019-03-23 NOTE — ED PROVIDER NOTE - CLINICAL SUMMARY MEDICAL DECISION MAKING FREE TEXT BOX
pt and family aware of all results including chronic findings on ct. pt with decreased ambulation status, no fractures on xray, degenerative changes, inability to ambulate, no assistance at home, pt lives on her own, pt and family prefer admission medical admitting team aware of pt and admission.

## 2019-03-23 NOTE — ED PROVIDER NOTE - OBJECTIVE STATEMENT
77yo F with PMHxDM, HTN, DVT on eliquis, RA on rituximab, right total knee replacement, p/w right wrist and B/L hip and B/L thigh pain s/p fall. Patient was laying in recliner this morning when she raised the back of the recliner too quickly and slid off the front end onto her buttocks. Was initially able to get up afterwards and transfer to bed but since then unable to ambulate. Denies head trauma, LOC. No nausea, vomiting, headache, dizziness. Remembers events clearly. No neck, chest, abdominal or back pain. Has chronic pains in joints 2/2 RA. Denies SOB, cough, diarrhea. Endorses urinary frequency for past few days. Oriented - self; Oriented - place; Oriented - time

## 2019-03-23 NOTE — ED PROVIDER NOTE - PHYSICAL EXAMINATION
Vital signs reviewed  GENERAL: Patient nontoxic appearing, NAD  HEAD: NCAT  EYES: Anicteric  ENT: MMM  NECK: Supple, non tender  RESPIRATORY: Normal respiratory effort. CTA B/L. No wheezing, rales, rhonchi  CARDIOVASCULAR: Regular rate and rhythm  ABDOMEN: Soft. Nondistended. Nontender. No guarding or rebound.   MUSCULOSKELETAL/EXTREMITIES: Brisk cap refill. Palpable pedal pulses. Mild right wrist swelling. TTP along lateral left thigh. Pain with ROM of hip and knee L>R. Extensor mechanisms intact B/L. No midline back TTP. No laxity of knee joints. No pelvic instability.   SKIN:  Warm and dry  NEURO: AAOx3. GCS 15. Speech clear and coherent. Answering questions appropriately. Face symmetric, no facial droop. No gross FND.   PSYCHIATRIC: Cooperative. Affect appropriate.

## 2019-03-23 NOTE — ED PROVIDER NOTE - CARE PLAN
Principal Discharge DX:	UTI (urinary tract infection)  Secondary Diagnosis:	Fall  Secondary Diagnosis:	Inability to ambulate due to multiple joints

## 2019-03-24 LAB
ALBUMIN SERPL ELPH-MCNC: 3.3 G/DL — LOW (ref 3.5–5.2)
ALP SERPL-CCNC: 58 U/L — SIGNIFICANT CHANGE UP (ref 30–115)
ALT FLD-CCNC: <5 U/L — SIGNIFICANT CHANGE UP (ref 0–41)
ANION GAP SERPL CALC-SCNC: 19 MMOL/L — HIGH (ref 7–14)
AST SERPL-CCNC: 8 U/L — SIGNIFICANT CHANGE UP (ref 0–41)
BASOPHILS # BLD AUTO: 0.02 K/UL — SIGNIFICANT CHANGE UP (ref 0–0.2)
BASOPHILS NFR BLD AUTO: 0.2 % — SIGNIFICANT CHANGE UP (ref 0–1)
BILIRUB SERPL-MCNC: 0.4 MG/DL — SIGNIFICANT CHANGE UP (ref 0.2–1.2)
BUN SERPL-MCNC: 19 MG/DL — SIGNIFICANT CHANGE UP (ref 10–20)
CALCIUM SERPL-MCNC: 10.3 MG/DL — HIGH (ref 8.5–10.1)
CHLORIDE SERPL-SCNC: 95 MMOL/L — LOW (ref 98–110)
CO2 SERPL-SCNC: 17 MMOL/L — SIGNIFICANT CHANGE UP (ref 17–32)
CREAT SERPL-MCNC: 1.2 MG/DL — SIGNIFICANT CHANGE UP (ref 0.7–1.5)
EOSINOPHIL # BLD AUTO: 0.35 K/UL — SIGNIFICANT CHANGE UP (ref 0–0.7)
EOSINOPHIL NFR BLD AUTO: 4 % — SIGNIFICANT CHANGE UP (ref 0–8)
GLUCOSE BLDC GLUCOMTR-MCNC: 151 MG/DL — HIGH (ref 70–99)
GLUCOSE BLDC GLUCOMTR-MCNC: 221 MG/DL — HIGH (ref 70–99)
GLUCOSE BLDC GLUCOMTR-MCNC: 240 MG/DL — HIGH (ref 70–99)
GLUCOSE SERPL-MCNC: 119 MG/DL — HIGH (ref 70–99)
HCT VFR BLD CALC: 27 % — LOW (ref 37–47)
HGB BLD-MCNC: 8.7 G/DL — LOW (ref 12–16)
IMM GRANULOCYTES NFR BLD AUTO: 0.5 % — HIGH (ref 0.1–0.3)
LYMPHOCYTES # BLD AUTO: 0.61 K/UL — LOW (ref 1.2–3.4)
LYMPHOCYTES # BLD AUTO: 6.9 % — LOW (ref 20.5–51.1)
MAGNESIUM SERPL-MCNC: 1.6 MG/DL — LOW (ref 1.8–2.4)
MCHC RBC-ENTMCNC: 29.1 PG — SIGNIFICANT CHANGE UP (ref 27–31)
MCHC RBC-ENTMCNC: 32.2 G/DL — SIGNIFICANT CHANGE UP (ref 32–37)
MCV RBC AUTO: 90.3 FL — SIGNIFICANT CHANGE UP (ref 81–99)
MONOCYTES # BLD AUTO: 0.33 K/UL — SIGNIFICANT CHANGE UP (ref 0.1–0.6)
MONOCYTES NFR BLD AUTO: 3.7 % — SIGNIFICANT CHANGE UP (ref 1.7–9.3)
NEUTROPHILS # BLD AUTO: 7.46 K/UL — HIGH (ref 1.4–6.5)
NEUTROPHILS NFR BLD AUTO: 84.7 % — HIGH (ref 42.2–75.2)
NRBC # BLD: 0 /100 WBCS — SIGNIFICANT CHANGE UP (ref 0–0)
PLATELET # BLD AUTO: 278 K/UL — SIGNIFICANT CHANGE UP (ref 130–400)
POTASSIUM SERPL-MCNC: 4 MMOL/L — SIGNIFICANT CHANGE UP (ref 3.5–5)
POTASSIUM SERPL-SCNC: 4 MMOL/L — SIGNIFICANT CHANGE UP (ref 3.5–5)
PROT SERPL-MCNC: 5.2 G/DL — LOW (ref 6–8)
RBC # BLD: 2.99 M/UL — LOW (ref 4.2–5.4)
RBC # FLD: 14.6 % — HIGH (ref 11.5–14.5)
SODIUM SERPL-SCNC: 131 MMOL/L — LOW (ref 135–146)
WBC # BLD: 8.81 K/UL — SIGNIFICANT CHANGE UP (ref 4.8–10.8)
WBC # FLD AUTO: 8.81 K/UL — SIGNIFICANT CHANGE UP (ref 4.8–10.8)

## 2019-03-24 RX ORDER — PIPERACILLIN AND TAZOBACTAM 4; .5 G/20ML; G/20ML
3.38 INJECTION, POWDER, LYOPHILIZED, FOR SOLUTION INTRAVENOUS EVERY 8 HOURS
Qty: 0 | Refills: 0 | Status: DISCONTINUED | OUTPATIENT
Start: 2019-03-24 | End: 2019-03-24

## 2019-03-24 RX ORDER — CEFEPIME 1 G/1
2000 INJECTION, POWDER, FOR SOLUTION INTRAMUSCULAR; INTRAVENOUS ONCE
Qty: 0 | Refills: 0 | Status: DISCONTINUED | OUTPATIENT
Start: 2019-03-24 | End: 2019-03-24

## 2019-03-24 RX ORDER — GABAPENTIN 400 MG/1
300 CAPSULE ORAL EVERY 12 HOURS
Qty: 0 | Refills: 0 | Status: DISCONTINUED | OUTPATIENT
Start: 2019-03-24 | End: 2019-03-29

## 2019-03-24 RX ORDER — OXYCODONE AND ACETAMINOPHEN 5; 325 MG/1; MG/1
1 TABLET ORAL EVERY 8 HOURS
Qty: 0 | Refills: 0 | Status: DISCONTINUED | OUTPATIENT
Start: 2019-03-24 | End: 2019-03-29

## 2019-03-24 RX ORDER — ALLOPURINOL 300 MG
1 TABLET ORAL
Qty: 0 | Refills: 0 | COMMUNITY

## 2019-03-24 RX ORDER — FUROSEMIDE 40 MG
20 TABLET ORAL DAILY
Qty: 0 | Refills: 0 | Status: DISCONTINUED | OUTPATIENT
Start: 2019-03-24 | End: 2019-03-25

## 2019-03-24 RX ORDER — GABAPENTIN 400 MG/1
0 CAPSULE ORAL
Qty: 0 | Refills: 0 | COMMUNITY

## 2019-03-24 RX ORDER — CEFTRIAXONE 500 MG/1
1 INJECTION, POWDER, FOR SOLUTION INTRAMUSCULAR; INTRAVENOUS EVERY 24 HOURS
Qty: 0 | Refills: 0 | Status: DISCONTINUED | OUTPATIENT
Start: 2019-03-24 | End: 2019-03-27

## 2019-03-24 RX ORDER — DULOXETINE HYDROCHLORIDE 30 MG/1
30 CAPSULE, DELAYED RELEASE ORAL DAILY
Qty: 0 | Refills: 0 | Status: DISCONTINUED | OUTPATIENT
Start: 2019-03-24 | End: 2019-03-29

## 2019-03-24 RX ORDER — ACETAMINOPHEN 500 MG
650 TABLET ORAL ONCE
Qty: 0 | Refills: 0 | Status: COMPLETED | OUTPATIENT
Start: 2019-03-24 | End: 2019-03-24

## 2019-03-24 RX ORDER — LEUCOVORIN CALCIUM 5 MG
1 TABLET ORAL
Qty: 0 | Refills: 0 | COMMUNITY

## 2019-03-24 RX ORDER — FLUTICASONE PROPIONATE 50 MCG
1 SPRAY, SUSPENSION NASAL
Qty: 0 | Refills: 0 | Status: DISCONTINUED | OUTPATIENT
Start: 2019-03-24 | End: 2019-03-29

## 2019-03-24 RX ORDER — LEUCOVORIN CALCIUM 5 MG
5 TABLET ORAL DAILY
Qty: 0 | Refills: 0 | Status: DISCONTINUED | OUTPATIENT
Start: 2019-03-24 | End: 2019-03-29

## 2019-03-24 RX ORDER — OXYCODONE AND ACETAMINOPHEN 5; 325 MG/1; MG/1
2 TABLET ORAL EVERY 4 HOURS
Qty: 0 | Refills: 0 | Status: DISCONTINUED | OUTPATIENT
Start: 2019-03-24 | End: 2019-03-29

## 2019-03-24 RX ORDER — CEFEPIME 1 G/1
2000 INJECTION, POWDER, FOR SOLUTION INTRAMUSCULAR; INTRAVENOUS EVERY 12 HOURS
Qty: 0 | Refills: 0 | Status: DISCONTINUED | OUTPATIENT
Start: 2019-03-24 | End: 2019-03-24

## 2019-03-24 RX ORDER — FOLIC ACID 0.8 MG
1 TABLET ORAL DAILY
Qty: 0 | Refills: 0 | Status: DISCONTINUED | OUTPATIENT
Start: 2019-03-24 | End: 2019-03-29

## 2019-03-24 RX ORDER — APIXABAN 2.5 MG/1
2.5 TABLET, FILM COATED ORAL EVERY 12 HOURS
Qty: 0 | Refills: 0 | Status: DISCONTINUED | OUTPATIENT
Start: 2019-03-24 | End: 2019-03-29

## 2019-03-24 RX ORDER — TAMSULOSIN HYDROCHLORIDE 0.4 MG/1
0.4 CAPSULE ORAL AT BEDTIME
Qty: 0 | Refills: 0 | Status: DISCONTINUED | OUTPATIENT
Start: 2019-03-24 | End: 2019-03-29

## 2019-03-24 RX ORDER — CEFEPIME 1 G/1
INJECTION, POWDER, FOR SOLUTION INTRAMUSCULAR; INTRAVENOUS
Qty: 0 | Refills: 0 | Status: DISCONTINUED | OUTPATIENT
Start: 2019-03-24 | End: 2019-03-24

## 2019-03-24 RX ORDER — ALLOPURINOL 300 MG
300 TABLET ORAL DAILY
Qty: 0 | Refills: 0 | Status: DISCONTINUED | OUTPATIENT
Start: 2019-03-24 | End: 2019-03-29

## 2019-03-24 RX ADMIN — GABAPENTIN 300 MILLIGRAM(S): 400 CAPSULE ORAL at 17:57

## 2019-03-24 RX ADMIN — Medication 20 MILLIGRAM(S): at 06:12

## 2019-03-24 RX ADMIN — Medication 1 SPRAY(S): at 21:40

## 2019-03-24 RX ADMIN — APIXABAN 2.5 MILLIGRAM(S): 2.5 TABLET, FILM COATED ORAL at 17:57

## 2019-03-24 RX ADMIN — Medication 5 MILLIGRAM(S): at 11:16

## 2019-03-24 RX ADMIN — APIXABAN 2.5 MILLIGRAM(S): 2.5 TABLET, FILM COATED ORAL at 06:12

## 2019-03-24 RX ADMIN — TAMSULOSIN HYDROCHLORIDE 0.4 MILLIGRAM(S): 0.4 CAPSULE ORAL at 21:40

## 2019-03-24 RX ADMIN — GABAPENTIN 300 MILLIGRAM(S): 400 CAPSULE ORAL at 06:12

## 2019-03-24 RX ADMIN — DULOXETINE HYDROCHLORIDE 30 MILLIGRAM(S): 30 CAPSULE, DELAYED RELEASE ORAL at 11:16

## 2019-03-24 RX ADMIN — CEFTRIAXONE 100 GRAM(S): 500 INJECTION, POWDER, FOR SOLUTION INTRAMUSCULAR; INTRAVENOUS at 18:13

## 2019-03-24 RX ADMIN — Medication 300 MILLIGRAM(S): at 11:16

## 2019-03-24 RX ADMIN — Medication 1 MILLIGRAM(S): at 11:16

## 2019-03-24 RX ADMIN — Medication 650 MILLIGRAM(S): at 06:12

## 2019-03-24 RX ADMIN — Medication 5 MILLIGRAM(S): at 06:13

## 2019-03-24 NOTE — CHART NOTE - NSCHARTNOTEFT_GEN_A_CORE
pt seen and examined   no issues   want to confirm her allergies     PLAN;  ID consultation   pt has hx of encephalopathy with Cefepime in past   also has penicillin allergies ; has rash and unsure about any anaphylaxis   got IV ceftriaxone yesterday in ER   no reaction seen after  that   will cw ceftriaxone ,unless anything else recommended by ID

## 2019-03-24 NOTE — H&P ADULT - ATTENDING COMMENTS
78 y/f with PMH of Dm, HTN, DVT on Eliquis, RA, latent TB on Rifampin presented after fall from her recliner x 1 day back.     PHYSICAL EXAM:  GENERAL: NAD, well-developed  HEAD:  Atraumatic, Normocephalic  EYES: EOMI, PERRLA, conjunctiva and sclera clear  NECK: Supple, No JVD  CHEST/LUNG: Clear to auscultation bilaterally; No wheeze  HEART: Regular rate and rhythm; S1 S2  ABDOMEN: Soft, Nontender, Nondistended; Bowel sounds present  EXTREMITIES:  2+ Peripheral Pulses, No clubbing, cyanosis, or edema  PSYCH: AAOx3  NEUROLOGY: non-focal  SKIN: No rashes or lesions    Fever due to acute Pyelonephritis:   patient with positive UA, fever.   Urine Cx si growing to bacteria gram negative uche <50K and gram positive cocci in pairs>100K.    No leukocytosis.   Continue Rocephin.   As per her Podiatry Dr. Villasenor, She hs previous reaction to Azactam (dizziness) and to cefepime (AMS).     S/p Fall:   Chronic compression fracture of thoracic and lumbar vertebrae.   No LOC   Continue Oxycodone and gabapentin.   Rehab recommended home PT vs STR  patient is concerned she has not enough day paid by her insurance to cover for STR. SW involved.     SHONNA on CKD stage 3:   possible from UTI  Encourage oral hydration. Continue UTI treatment.   Monitor BMP.     DVT:   Continue Eliquis    Latent TB:   Continue Rifampin.

## 2019-03-24 NOTE — H&P ADULT - HISTORY OF PRESENT ILLNESS
77yo F with PMHxDM, HTN, DVT on eliquis, RA on rituximab, right total knee replacement, p/w right wrist and B/L hip and B/L thigh pain s/p fall. Patient was laying in recliner this morning when she raised the back of the recliner too quickly and slid off the front end onto her buttocks. Was initially able to get up afterwards and transfer to bed but since then unable to ambulate. Denies head trauma, LOC. No nausea, vomiting, headache, dizziness. Remembers events clearly. No neck, chest, abdominal or back pain. Has chronic pains in joints 2/2 RA. Denies SOB, cough, diarrhea. Endorses urinary frequency for past few days. 78 y/f with PMH of Dm, HTN, DVT on Eliquis, RA, latent TB on Rifampin presented after fall from her recliner x 1 day back. She denies any dizziness/palpitation prior to fall. She has pain in her bilateral hand and bilateral thigh after the fall, she was able to get up after the fall but since this morning was not able to ambulate which prompted her to come to ED. She denies any focal weakness, numbness, loss of consciousness, abnormal body movements, open wounds after the fall. Her bowel movements is normal. She mentions having increased urinary frequency for a while which she attributes to UTI

## 2019-03-24 NOTE — H&P ADULT - NSHPPHYSICALEXAM_GEN_ALL_CORE
T(C): 37.1 (03-23-19 @ 22:34), Max: 37.1 (03-23-19 @ 22:34)  HR: 95 (03-23-19 @ 22:34) (95 - 106)  BP: 100/60 (03-23-19 @ 22:34) (100/60 - 167/78)  RR: 20 (03-23-19 @ 22:34) (18 - 20)  SpO2: 98% (03-23-19 @ 22:34) (98% - 99%)  PHYSICAL EXAM:    Constitutional:    Eyes:    ENMT:    Neck:    Respiratory:    Cardiovascular:    Gastrointestinal:    Genitourinary:    Rectal:    Extremities:    Neurological: T(C): 37.1 (03-23-19 @ 22:34), Max: 37.1 (03-23-19 @ 22:34)  HR: 95 (03-23-19 @ 22:34) (95 - 106)  BP: 100/60 (03-23-19 @ 22:34) (100/60 - 167/78)  RR: 20 (03-23-19 @ 22:34) (18 - 20)  SpO2: 98% (03-23-19 @ 22:34) (98% - 99%)  PHYSICAL EXAM:    Constitutional: lying in bed no acute distress    Eyes: PERLAA    ENMT: no facial deviation    Neck: no mass, no JVD    Respiratory: b/l clear, no wheeze    Cardiovascular: Regular rate and rhythm, no murmur    Gastrointestinal: soft non tender    Extremities: no edema, right middle toe amputation    Neurological: AO x 3, power 5/5 in b/l upper and 4/5 in b/l lower ext, sensation intact

## 2019-03-24 NOTE — H&P ADULT - NSICDXFAMILYHX_GEN_ALL_CORE_FT
FAMILY HISTORY:  Father  Still living? No  Family history of early CAD, Age at diagnosis: Age Unknown

## 2019-03-24 NOTE — H&P ADULT - NSHPLABSRESULTS_GEN_ALL_CORE
9.0    6.84  )-----------( 294      ( 23 Mar 2019 13:15 )             28.0     03-23    139  |  100  |  20  ----------------------------<  115<H>  4.4   |  21  |  1.1    Ca    11.0<H>      23 Mar 2019 13:15    TPro  6.0  /  Alb  3.5  /  TBili  0.3  /  DBili  x   /  AST  12  /  ALT  <5  /  AlkPhos  67  03-23 9.0    6.84  )-----------( 294      ( 23 Mar 2019 13:15 )             28.0     03-23    139  |  100  |  20  ----------------------------<  115<H>  4.4   |  21  |  1.1    Ca    11.0<H>      23 Mar 2019 13:15    TPro  6.0  /  Alb  3.5  /  TBili  0.3  /  DBili  x   /  AST  12  /  ALT  <5  /  AlkPhos  67  03-23    < from: CT Head No Cont (01.28.19 @ 15:54) >      1.  The study is limited by motion artifact.    2.  Cerebral atrophy.    3.  Periventricular white matter hypodensities, nonspecific, differential   diagnostic possibilities include ischemic change, gliosis or   demyelination.              < end of copied text >      < from: CT Abdomen and Pelvis w/ IV Cont (03.23.19 @ 20:24) >      Circumferential urinary bladder wall thickening with mild adjacent fatty   infiltration. Correlate with urinalysis for cystitis.    No CT evidence of acute traumatic intra-abdominal pathology.    Stable chronic findings as above.        < end of copied text >

## 2019-03-24 NOTE — CHART NOTE - NSCHARTNOTEFT_GEN_A_CORE
I was called by the nurse because of a fever of 102  bedside assessment ICU   Vital Signs Last 24 Hrs  T(C): 39.3 (24 Mar 2019 05:39), Max: 39.3 (24 Mar 2019 05:39)  T(F): 102.7 (24 Mar 2019 05:39), Max: 102.7 (24 Mar 2019 05:39)  HR: 109 (24 Mar 2019 05:25) (95 - 109)  BP: 149/79 (24 Mar 2019 05:25) (100/60 - 167/78)  RR: 22 (24 Mar 2019 05:25) (18 - 22)  SpO2: 100% (24 Mar 2019 05:25) (98% - 100%)    PHYSICAL EXAM:  GENERAL: oriented to self, keeps saying same words, confused  CHEST/LUNG: Clear to anterior auscultation bilaterally; No wheeze;    HEART: Regular rate and rhythm; No murmurs;   ABDOMEN: Soft, Nontender, Nondistended; Bowel sounds present; No guarding  EXTREMITIES:  no pitting edema    LABS:                        9.0    6.84  )-----------( 294      ( 23 Mar 2019 13:15 )             28.0         139  |  100  |  20  ----------------------------<  115<H>  4.4   |  21  |  1.1    Ca    11.0<H>      23 Mar 2019 13:15    TPro  6.0  /  Alb  3.5  /  TBili  0.3  /  DBili  x   /  AST  12  /  ALT  <5  /  AlkPhos  67      PT/INR - ( 23 Mar 2019 13:15 )   PT: 12.00 sec;   INR: 1.04 ratio         PTT - ( 23 Mar 2019 13:15 )  PTT:35.0 sec  Urinalysis Basic - ( 23 Mar 2019 14:30 )    Color: Yellow / Appearance: Clear / S.010 / pH: x  Gluc: x / Ketone: Negative  / Bili: Negative / Urobili: 0.2 mg/dL   Blood: x / Protein: Trace mg/dL / Nitrite: Negative   Leuk Esterase: Trace / RBC: x / WBC 10-25 /HPF   Sq Epi: x / Non Sq Epi: x / Bacteria: Few /HPF    < from: CT Abdomen and Pelvis w/ IV Cont (19 @ 20:24) >  PELVIC ORGANS: Circumferential urinary bladder wall thickening with adjacent fatty infiltration.  < end of copied text >    assessement  -sepsis  -AMS post fall / on eliquis    plan  -STAT cbc and BMP were sent  -urine culture pending, UA noted, CT scan noted  -blood culture sent STAT  -previous culture from , pseudomonas CRE S to cefepime  -start cefepime  -CTH STAT to r/o any bleed since the patient is on Eliquis  -plz monitor MS as previous notes mentioned encephalopathy to cephalosporins I was called by the nurse because of a fever of 102  bedside assessment ICU   Vital Signs Last 24 Hrs  T(C): 39.3 (24 Mar 2019 05:39), Max: 39.3 (24 Mar 2019 05:39)  T(F): 102.7 (24 Mar 2019 05:39), Max: 102.7 (24 Mar 2019 05:39)  HR: 109 (24 Mar 2019 05:25) (95 - 109)  BP: 149/79 (24 Mar 2019 05:25) (100/60 - 167/78)  RR: 22 (24 Mar 2019 05:25) (18 - 22)  SpO2: 100% (24 Mar 2019 05:25) (98% - 100%)    PHYSICAL EXAM:  GENERAL: oriented to self, keeps saying same words, confused  CHEST/LUNG: Clear to anterior auscultation bilaterally; No wheeze;    HEART: Regular rate and rhythm; No murmurs;   ABDOMEN: Soft, Nontender, Nondistended; Bowel sounds present; No guarding  EXTREMITIES:  no pitting edema    LABS:                        9.0    6.84  )-----------( 294      ( 23 Mar 2019 13:15 )             28.0         139  |  100  |  20  ----------------------------<  115<H>  4.4   |  21  |  1.1    Ca    11.0<H>      23 Mar 2019 13:15    TPro  6.0  /  Alb  3.5  /  TBili  0.3  /  DBili  x   /  AST  12  /  ALT  <5  /  AlkPhos  67      PT/INR - ( 23 Mar 2019 13:15 )   PT: 12.00 sec;   INR: 1.04 ratio         PTT - ( 23 Mar 2019 13:15 )  PTT:35.0 sec  Urinalysis Basic - ( 23 Mar 2019 14:30 )    Color: Yellow / Appearance: Clear / S.010 / pH: x  Gluc: x / Ketone: Negative  / Bili: Negative / Urobili: 0.2 mg/dL   Blood: x / Protein: Trace mg/dL / Nitrite: Negative   Leuk Esterase: Trace / RBC: x / WBC 10-25 /HPF   Sq Epi: x / Non Sq Epi: x / Bacteria: Few /HPF    < from: CT Abdomen and Pelvis w/ IV Cont (19 @ 20:24) >  PELVIC ORGANS: Circumferential urinary bladder wall thickening with adjacent fatty infiltration.  < end of copied text >    CXR no infiltrates?    assessement  -sepsis  -AMS post fall / on eliquis    plan  -STAT cbc and BMP were sent  -urine culture pending, UA noted, CT scan noted  -blood culture sent STAT  -previous culture from January reviewed, pseudomonas CRE S to cefepime  -start cefepime  -the patient is absolutely refusing cefepime and said we are gonna killd her, she gave dr Muñiz's number to verify with him, I gave him a call, he wanted to review her records, will call back. [phone] or [phone]. the nurse was instructed to call if any fever, tachycardia or hypotension.  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

## 2019-03-24 NOTE — H&P ADULT - ASSESSMENT
78 y/f with PMH of Dm, HTN, DVT on Eliquis, RA, latent TB on Rifampin presented after mechanical fall from her recliner x 1 day back, found to have multiple chronic vertebral fracture, unable to ambulate:    # Status post mechanical fall:    Chronic compression fracture of thoracic and lumbar vertebra, chronic fracture of left infr rami, no acute fracture  Pain control  PT rehab    # DVT on Eliquis:  c/w Eliquis    # History of HTN:  no medication currently, controlled    # DM:   No medication currently, monitor FS    # RA:  c/w prednisone    #Latent TB:  c/w rifampin, please clarify duration in AM, if 4 months completed can be stopped    # Diet:  Regular    # Activity:  as tolerated    # Dvt ppx:  on eliquis    # Full code 78 y/f with PMH of Dm, HTN, DVT on Eliquis, RA, latent TB on Rifampin presented after mechanical fall from her recliner x 1 day back, found to have multiple chronic vertebral fracture, unable to ambulate:    # Status post mechanical fall:    Chronic compression fracture of thoracic and lumbar vertebra, chronic fracture of left infr rami, no acute fracture  Pain control  PT rehab    # DVT on Eliquis:  c/w Eliquis    # History of HTN:  no medication currently, controlled    # DM:   No medication currently, monitor FS    # RA:  c/w prednisone    #Latent TB:  c/w rifampin, please clarify duration in AM, if 4 months completed can be stopped    # Urinary frequency:  c/w flomax    # Diet:  Regular    # Activity:  as tolerated    # Dvt ppx:  on eliquis    # Full code 78 y/f with PMH of Dm, HTN, DVT on Eliquis, RA, latent TB on Rifampin presented after mechanical fall from her recliner x 1 day back, found to have multiple chronic vertebral fracture, unable to ambulate:    # Status post mechanical fall:    Chronic compression fracture of thoracic and lumbar vertebra, chronic fracture of left infr rami, no acute fracture  Pain control  PT rehab    # DVT on Eliquis:  c/w Eliquis    # History of HTN:  no medication currently, controlled    # DM:   No medication currently, monitor FS    # RA:  c/w prednisone    #Latent TB:  c/w rifampin, please clarify duration in AM, if 4 months completed can be stopped    # Urinary frequency:  c/w flomax    # Diet:  Regular    # Activity:  as tolerated    # Dvt ppx:  on eliquis    # Full code    MEDICATIONS REVIEWED WITH HER PHARMACY AT [facility]: [phone]

## 2019-03-24 NOTE — H&P ADULT - NSICDXPASTMEDICALHX_GEN_ALL_CORE_FT
PAST MEDICAL HISTORY:  DVT (deep venous thrombosis)     Gout     HTN (hypertension)     Other specified diabetes mellitus with other specified complication, unspecified whether long term insulin use     Rheumatoid arteritis

## 2019-03-25 LAB
-  AMIKACIN: SIGNIFICANT CHANGE UP
-  AMPICILLIN/SULBACTAM: SIGNIFICANT CHANGE UP
-  AMPICILLIN: SIGNIFICANT CHANGE UP
-  AMPICILLIN: SIGNIFICANT CHANGE UP
-  AZTREONAM: SIGNIFICANT CHANGE UP
-  CEFAZOLIN: SIGNIFICANT CHANGE UP
-  CEFEPIME: SIGNIFICANT CHANGE UP
-  CEFOXITIN: SIGNIFICANT CHANGE UP
-  CEFTRIAXONE: SIGNIFICANT CHANGE UP
-  CIPROFLOXACIN: SIGNIFICANT CHANGE UP
-  CIPROFLOXACIN: SIGNIFICANT CHANGE UP
-  ERTAPENEM: SIGNIFICANT CHANGE UP
-  GENTAMICIN: SIGNIFICANT CHANGE UP
-  LEVOFLOXACIN: SIGNIFICANT CHANGE UP
-  LEVOFLOXACIN: SIGNIFICANT CHANGE UP
-  MEROPENEM: SIGNIFICANT CHANGE UP
-  NITROFURANTOIN: SIGNIFICANT CHANGE UP
-  NITROFURANTOIN: SIGNIFICANT CHANGE UP
-  PIPERACILLIN/TAZOBACTAM: SIGNIFICANT CHANGE UP
-  TETRACYCLINE: SIGNIFICANT CHANGE UP
-  TOBRAMYCIN: SIGNIFICANT CHANGE UP
-  TRIMETHOPRIM/SULFAMETHOXAZOLE: SIGNIFICANT CHANGE UP
-  VANCOMYCIN: SIGNIFICANT CHANGE UP
ALBUMIN SERPL ELPH-MCNC: 3.2 G/DL — LOW (ref 3.5–5.2)
ALP SERPL-CCNC: 57 U/L — SIGNIFICANT CHANGE UP (ref 30–115)
ALT FLD-CCNC: <5 U/L — SIGNIFICANT CHANGE UP (ref 0–41)
ANION GAP SERPL CALC-SCNC: 15 MMOL/L — HIGH (ref 7–14)
AST SERPL-CCNC: 8 U/L — SIGNIFICANT CHANGE UP (ref 0–41)
BASOPHILS # BLD AUTO: 0.01 K/UL — SIGNIFICANT CHANGE UP (ref 0–0.2)
BASOPHILS NFR BLD AUTO: 0.2 % — SIGNIFICANT CHANGE UP (ref 0–1)
BILIRUB SERPL-MCNC: 0.3 MG/DL — SIGNIFICANT CHANGE UP (ref 0.2–1.2)
BUN SERPL-MCNC: 25 MG/DL — HIGH (ref 10–20)
CALCIUM SERPL-MCNC: 10.4 MG/DL — HIGH (ref 8.5–10.1)
CHLORIDE SERPL-SCNC: 98 MMOL/L — SIGNIFICANT CHANGE UP (ref 98–110)
CO2 SERPL-SCNC: 22 MMOL/L — SIGNIFICANT CHANGE UP (ref 17–32)
CREAT SERPL-MCNC: 1.6 MG/DL — HIGH (ref 0.7–1.5)
CULTURE RESULTS: SIGNIFICANT CHANGE UP
EOSINOPHIL # BLD AUTO: 0.72 K/UL — HIGH (ref 0–0.7)
EOSINOPHIL NFR BLD AUTO: 12.8 % — HIGH (ref 0–8)
GLUCOSE BLDC GLUCOMTR-MCNC: 133 MG/DL — HIGH (ref 70–99)
GLUCOSE SERPL-MCNC: 128 MG/DL — HIGH (ref 70–99)
HCT VFR BLD CALC: 26.5 % — LOW (ref 37–47)
HGB BLD-MCNC: 8.3 G/DL — LOW (ref 12–16)
IMM GRANULOCYTES NFR BLD AUTO: 0.4 % — HIGH (ref 0.1–0.3)
LYMPHOCYTES # BLD AUTO: 0.81 K/UL — LOW (ref 1.2–3.4)
LYMPHOCYTES # BLD AUTO: 14.4 % — LOW (ref 20.5–51.1)
MAGNESIUM SERPL-MCNC: 1.8 MG/DL — SIGNIFICANT CHANGE UP (ref 1.8–2.4)
MCHC RBC-ENTMCNC: 28.8 PG — SIGNIFICANT CHANGE UP (ref 27–31)
MCHC RBC-ENTMCNC: 31.3 G/DL — LOW (ref 32–37)
MCV RBC AUTO: 92 FL — SIGNIFICANT CHANGE UP (ref 81–99)
METHOD TYPE: SIGNIFICANT CHANGE UP
METHOD TYPE: SIGNIFICANT CHANGE UP
MONOCYTES # BLD AUTO: 0.41 K/UL — SIGNIFICANT CHANGE UP (ref 0.1–0.6)
MONOCYTES NFR BLD AUTO: 7.3 % — SIGNIFICANT CHANGE UP (ref 1.7–9.3)
NEUTROPHILS # BLD AUTO: 3.67 K/UL — SIGNIFICANT CHANGE UP (ref 1.4–6.5)
NEUTROPHILS NFR BLD AUTO: 64.9 % — SIGNIFICANT CHANGE UP (ref 42.2–75.2)
NRBC # BLD: 0 /100 WBCS — SIGNIFICANT CHANGE UP (ref 0–0)
ORGANISM # SPEC MICROSCOPIC CNT: SIGNIFICANT CHANGE UP
PLATELET # BLD AUTO: 260 K/UL — SIGNIFICANT CHANGE UP (ref 130–400)
POTASSIUM SERPL-MCNC: 4.3 MMOL/L — SIGNIFICANT CHANGE UP (ref 3.5–5)
POTASSIUM SERPL-SCNC: 4.3 MMOL/L — SIGNIFICANT CHANGE UP (ref 3.5–5)
PROT SERPL-MCNC: 5.2 G/DL — LOW (ref 6–8)
RBC # BLD: 2.88 M/UL — LOW (ref 4.2–5.4)
RBC # FLD: 14.6 % — HIGH (ref 11.5–14.5)
SODIUM SERPL-SCNC: 135 MMOL/L — SIGNIFICANT CHANGE UP (ref 135–146)
SPECIMEN SOURCE: SIGNIFICANT CHANGE UP
WBC # BLD: 5.64 K/UL — SIGNIFICANT CHANGE UP (ref 4.8–10.8)
WBC # FLD AUTO: 5.64 K/UL — SIGNIFICANT CHANGE UP (ref 4.8–10.8)

## 2019-03-25 RX ORDER — SODIUM CHLORIDE 9 MG/ML
1000 INJECTION, SOLUTION INTRAVENOUS
Qty: 0 | Refills: 0 | Status: DISCONTINUED | OUTPATIENT
Start: 2019-03-25 | End: 2019-03-27

## 2019-03-25 RX ADMIN — GABAPENTIN 300 MILLIGRAM(S): 400 CAPSULE ORAL at 18:27

## 2019-03-25 RX ADMIN — CEFTRIAXONE 100 GRAM(S): 500 INJECTION, POWDER, FOR SOLUTION INTRAMUSCULAR; INTRAVENOUS at 21:50

## 2019-03-25 RX ADMIN — Medication 300 MILLIGRAM(S): at 11:37

## 2019-03-25 RX ADMIN — Medication 20 MILLIGRAM(S): at 06:07

## 2019-03-25 RX ADMIN — Medication 5 MILLIGRAM(S): at 06:07

## 2019-03-25 RX ADMIN — Medication 1 MILLIGRAM(S): at 11:37

## 2019-03-25 RX ADMIN — Medication 1 SPRAY(S): at 06:07

## 2019-03-25 RX ADMIN — SODIUM CHLORIDE 75 MILLILITER(S): 9 INJECTION, SOLUTION INTRAVENOUS at 21:51

## 2019-03-25 RX ADMIN — Medication 5 MILLIGRAM(S): at 11:37

## 2019-03-25 RX ADMIN — Medication 1 SPRAY(S): at 18:27

## 2019-03-25 RX ADMIN — TAMSULOSIN HYDROCHLORIDE 0.4 MILLIGRAM(S): 0.4 CAPSULE ORAL at 21:51

## 2019-03-25 RX ADMIN — SODIUM CHLORIDE 75 MILLILITER(S): 9 INJECTION, SOLUTION INTRAVENOUS at 11:36

## 2019-03-25 RX ADMIN — APIXABAN 2.5 MILLIGRAM(S): 2.5 TABLET, FILM COATED ORAL at 18:27

## 2019-03-25 RX ADMIN — GABAPENTIN 300 MILLIGRAM(S): 400 CAPSULE ORAL at 06:07

## 2019-03-25 RX ADMIN — DULOXETINE HYDROCHLORIDE 30 MILLIGRAM(S): 30 CAPSULE, DELAYED RELEASE ORAL at 11:37

## 2019-03-25 RX ADMIN — APIXABAN 2.5 MILLIGRAM(S): 2.5 TABLET, FILM COATED ORAL at 06:07

## 2019-03-25 NOTE — CONSULT NOTE ADULT - SUBJECTIVE AND OBJECTIVE BOX
LINCOLN HEREDIA  78y, Female  Allergy: clindamycin (AMS)  erythromycin (rash)  penicillin (swelling and rash)  strawberry (swelling)      HPI:  78 y/f with PMH of Dm, HTN, DVT on Eliquis, RA, latent TB on Rifampin presented after fall from her recliner x 1 day back. She denies any dizziness/palpitation prior to fall. She has pain in her bilateral hand and bilateral thigh after the fall, she was able to get up after the fall but since this morning was not able to ambulate which prompted her to come to ED. She denies any focal weakness, numbness, loss of consciousness, abnormal body movements, open wounds after the fall. Her bowel movements is normal. She mentions having increased urinary frequency for a while which she attributes to UTI (24 Mar 2019 00:48)    Pt was seen at bedside this morning. pt was resting comfortably in bed and is a poor historian. Pt states she was sitting in her chair and tried to adjust it but ended up slipping out of her chair. She reports of pain in her hand b/l and thighs, reports a 3/10 pain. She reports of no fevers or chills at home but  was febrile to 101F in the ED. She also reports of pain in her b/l LE that is localized to her LE. She is cooperative but seems confused as to her medical history. She says she does not know when her PICC was pulled out but she does report completing the full course of antibiotics before it was d/c. Pt also had right knee replacement does not remember when it was done. She denies n/v/d, urgency, frequency, burning sensation on urination, SOB.     FAMILY HISTORY:  Family history of early CAD (Father)    PAST MEDICAL & SURGICAL HISTORY:  Gout  DVT (deep venous thrombosis)  HTN (hypertension)  Rheumatoid arteritis  Other specified diabetes mellitus with other specified complication, unspecified whether long term insulin use  Primary osteoarthritis of right knee: s/p knee repalcement      ROS negative except as per HPI    VITALS:  T(F): 98.7, Max: 98.7 (19 @ 07:47)  HR: 96  BP: 110/55  RR: 18Vital Signs Last 24 Hrs  T(C): 37.1 (25 Mar 2019 07:47), Max: 37.1 (25 Mar 2019 07:47)  T(F): 98.7 (25 Mar 2019 07:47), Max: 98.7 (25 Mar 2019 07:47)  HR: 96 (25 Mar 2019 08:36) (79 - 96)  BP: 110/55 (25 Mar 2019 08:36) (88/54 - 110/55)  BP(mean): --  RR: 18 (25 Mar 2019 07:47) (18 - 18)  SpO2: 97% (25 Mar 2019 07:47) (97% - 98%)    PHYSICAL EXAM:  General: NAD, well apprearing  HEENT: atraumatic, normocephalic, no lymph nodes palpable.  Lungs: clear to auscultation b/l, no wheezing  Heart: s1 and s2 present, no murmurs  Abdomen: soft, non-tender, non distended  Extremities: b/l le tender on palpation, chronic skin changes noted, amputated r foot 3rd toe, minor erythema noted, no drainage, or ulcers noted   Neuro: AAOx3    TESTS & MEASUREMENTS:                        8.3    5.64  )-----------( 260      ( 25 Mar 2019 08:11 )             26.5     03-24    131<L>  |  95<L>  |  19  ----------------------------<  119<H>  4.0   |  17  |  1.2    Ca    10.3<H>      24 Mar 2019 10:05  Mg     1.6     -    TPro  5.2<L>  /  Alb  3.3<L>  /  TBili  0.4  /  DBili  x   /  AST  8   /  ALT  <5  /  AlkPhos  58  -24    LIVER FUNCTIONS - ( 24 Mar 2019 10:05 )  Alb: 3.3 g/dL / Pro: 5.2 g/dL / ALK PHOS: 58 U/L / ALT: <5 U/L / AST: 8 U/L / GGT: x             Culture - Urine (collected 19 @ 14:30)  Source: .Urine Clean Catch (Midstream)  Preliminary Report (19 @ 18:55):    50,000 - 99,000 CFU/mL Gram Negative Rods    >100,000 CFU/ml Gram positive cocci in pairs      Urinalysis Basic - ( 23 Mar 2019 14:30 )    Color: Yellow / Appearance: Clear / S.010 / pH: x  Gluc: x / Ketone: Negative  / Bili: Negative / Urobili: 0.2 mg/dL   Blood: x / Protein: Trace mg/dL / Nitrite: Negative   Leuk Esterase: Trace / RBC: x / WBC 10-25 /HPF   Sq Epi: x / Non Sq Epi: x / Bacteria: Few /HPF          RADIOLOGY & ADDITIONAL TESTS:    ANTIBIOTICS:    cefTRIAXone   IVPB   100 mL/Hr IV Intermittent (19 @ 18:13)    cefTRIAXone   IVPB   100 mL/Hr IV Intermittent (19 @ 16:07)    rifampin   300 milliGRAM(s) Oral (19 @ 06:07)   300 milliGRAM(s) Oral (19 @ 17:57)   300 milliGRAM(s) Oral (19 @ 06:13)

## 2019-03-25 NOTE — CONSULT NOTE ADULT - ASSESSMENT

## 2019-03-25 NOTE — CONSULT NOTE ADULT - ASSESSMENT
78 y/f with PMH of Dm, HTN, DVT on Eliquis, RA, latent TB on Rifampin presented after fall from her recliner x 1 day back.  ID was consulted for fever and positive UA  UA showed trace LE, -andrew nitrite, 10-25 WBC, Urine culture grew Gram positive cocci in pairs, pending sensitivities. Pt had 1 febrile episode of 101F upon arriving to Ed, but denies any fevers, chills since then or at home. Pt has no urgency, frequency, burning sensation, pain on suprapubic area, no diarrhea, vomiting, nausea. No evidence of skin infections, amputation site looks clean, no erythema, no tenderness. IV sites and PICC site look clean, no evidence of phlebitis    IMPRESSION:  Cystitis    RECOMMENDATION:  c/w ceftriaxone 1g IV q24hrs  f/u urine culture sensitivities 78 y/f with PMH of Dm, HTN, DVT on Eliquis, RA, latent TB on Rifampin presented after fall from her recliner x 1 day back. ID was consulted for fever and positive UA  UA showed trace LE, -andrew nitrite, 10-25 WBC, Urine culture grew Gram positive cocci in pairs, pending sensitivities. Pt had 1 febrile episode of 101F upon arriving to Ed, but denies any fevers, chills since then or at home. Pt has no urgency, frequency, burning sensation, pain on suprapubic area, no diarrhea, vomiting, nausea. No evidence of skin infections, amputation site looks clean, no erythema, no tenderness. IV sites and PICC site look clean, no evidence of phlebitis. No evidence of gout, pseudogout, synovitis, no evidence of pneumonia. Unclear focus of 1 febrile episode, no infection etiology present.     IMPRESSION:  Asymptomatic pyuria    RECOMMENDATION:  d/c antibiotcs 78 y/f with PMH of Dm, HTN, DVT on Eliquis, RA, latent TB on Rifampin presented after fall from her recliner x 1 day back. ID was consulted for fever and positive UA  UA showed trace LE, -andrew nitrite, 10-25 WBC, Urine culture grew Gram positive cocci in pairs, pending sensitivities. Pt had 1 febrile episode of 101F upon arriving to Ed, but denies any fevers, chills since then or at home. Pt has no urgency, frequency, burning sensation, pain on suprapubic area, no diarrhea, vomiting, nausea. No evidence of skin infections, amputation site looks clean, no erythema, no tenderness. IV sites and PICC site look clean, no evidence of phlebitis. No evidence of gout, pseudogout, synovitis, no evidence of pneumonia. Unclear focus of 1 febrile episode, no infection etiology present.     IMPRESSION:  Isolated fever with no obvious focus of infection  Asymptomatic pyuria  No PNA  WBC 8.8    RECOMMENDATION:  D/c antibiotics

## 2019-03-25 NOTE — PROGRESS NOTE ADULT - ASSESSMENT
Fever due to acute Pyelonephritis:   patient with positive UA, fever.   Urine Cx si growing to bacteria gram negative uche <50K and gram positive cocci in pairs>100K.    No leukocytosis.   Continue Rocephin.   As per her Podiatry Dr. Villasenor, She hs previous reaction to Azactam (dizziness) and to cefepime (AMS).     S/p Fall:   Chronic compression fracture of thoracic and lumbar vertebrae.   No LOC   Continue Oxycodone and gabapentin.   Rehab recommended home PT vs STR  patient is concerned she has not enough day paid by her insurance to cover for STR. SW involved.     SHONNA on CKD stage 3:   possible from UTI  Encourage oral hydration. Continue UTI treatment.   Monitor BMP.     DVT:   Continue Eliquis    Latent TB:   Continue Rifampin.     #Progress Note Handoff:  Pending (specify):  Consults ID, monitor for fever.   Family discussion: none  Disposition: Home vs SNF, pending PT.

## 2019-03-25 NOTE — PROGRESS NOTE ADULT - ASSESSMENT
78 y/f with PMH of Dm, HTN, DVT on Eliquis, RA, latent TB on Rifampin presented after fall from her recliner x 1 day back. Found to have fever and +UA.      # Fever due to acute Pyelonephritis?  resolved now  patient with positive UA, fever.   Urine Cx si growing to bacteria gram negative uche <50K and gram positive cocci in pairs>100K.    No leukocytosis.   Continue Rocephin.   As per her Podiatry Dr. Villasenor, She hs previous reaction to Azactam (dizziness) and to cefepime (AMS).     #S/p Fall:   Chronic compression fracture of thoracic and lumbar vertebrae.   No LOC   Continue Oxycodone and gabapentin.   Rehab recommended home PT vs STR  patient is concerned she has not enough day paid by her insurance to cover for STR.     involved.     #SHONNA on CKD stage 3:   possible from UTI  Encourage oral hydration. Continue UTI treatment.   Monitor BMP.     #DVT:   Continue Eliquis    #Latent TB:   Continue Rifampin.     FULL CODE   ambulate as tolerated   REGULAR DIET    #dispo; after medical optimization

## 2019-03-25 NOTE — CONSULT NOTE ADULT - SUBJECTIVE AND OBJECTIVE BOX
HPI:  78 y/f with PMH of Dm, HTN, DVT on Eliquis, RA, latent TB on Rifampin presented after fall from her recliner x 1 day back. She denies any dizziness/palpitation prior to fall. She has pain in her bilateral hand and bilateral thigh after the fall, she was able to get up after the fall but since this morning was not able to ambulate which prompted her to come to ED. She denies any focal weakness, numbness, loss of consciousness, abnormal body movements, open wounds after the fall. Her bowel movements is normal. She mentions having increased urinary frequency for a while which she attributes to UTI (24 Mar 2019 00:48)      PAST MEDICAL & SURGICAL HISTORY:  Gout  DVT (deep venous thrombosis)  HTN (hypertension)  Rheumatoid arteritis  Other specified diabetes mellitus with other specified complication, unspecified whether long term insulin use  Primary osteoarthritis of right knee: s/p knee repalcement      Hospital Course:    TODAY'S SUBJECTIVE & REVIEW OF SYMPTOMS:     Constitutional WNL   Cardio WNL   Resp WNL   GI WNL  Heme WNL  Endo WNL  Skin WNL  MSK Weakness  Neuro WNL  Cognitive WNL  Psych WNL      MEDICATIONS  (STANDING):  allopurinol 300 milliGRAM(s) Oral daily  apixaban 2.5 milliGRAM(s) Oral every 12 hours  cefTRIAXone   IVPB 1 Gram(s) IV Intermittent every 24 hours  DULoxetine 30 milliGRAM(s) Oral daily  fluticasone propionate 50 MICROgram(s)/spray Nasal Spray 1 Spray(s) Both Nostrils two times a day  folic acid 1 milliGRAM(s) Oral daily  gabapentin 300 milliGRAM(s) Oral every 12 hours  lactated ringers. 1000 milliLiter(s) (75 mL/Hr) IV Continuous <Continuous>  leucovorin 5 milliGRAM(s) Oral daily  predniSONE   Tablet 5 milliGRAM(s) Oral daily  rifampin 300 milliGRAM(s) Oral two times a day  tamsulosin 0.4 milliGRAM(s) Oral at bedtime    MEDICATIONS  (PRN):  oxyCODONE    5 mG/acetaminophen 325 mG 2 Tablet(s) Oral every 4 hours PRN Severe Pain (7 - 10)  oxyCODONE    5 mG/acetaminophen 325 mG 1 Tablet(s) Oral every 8 hours PRN Moderate Pain (4 - 6)      FAMILY HISTORY:  Family history of early CAD (Father)      Allergies    clindamycin (Unknown)  erythromycin (Unknown)  penicillin (Unknown)  strawberry (Unknown)    Intolerances        SOCIAL HISTORY:    [  ] Etoh  [  ] Smoking  [  ] Substance abuse     Home Environment:  [  ] Home Alone  [x  ] Lives with Family  [  ] Home Health Aid    Dwelling:  [  ] Apartment  [x  ] Private House  [  ] Adult Home  [  ] Skilled Nursing Facility      [  ] Short Term  [  ] Long Term  [ x ] Stairs       Elevator [  ]    FUNCTIONAL STATUS PTA: (Check all that apply)  Ambulation: [ x  ]Independent    [  ] Dependent     [  ] Non-Ambulatory  Assistive Device: [  ] SA Cane  [  ]  Q Cane  [ x ] Walker  [  ]  Wheelchair  ADL : [x  ] Independent  [  ]  Dependent       Vital Signs Last 24 Hrs  T(C): 37.1 (25 Mar 2019 07:47), Max: 37.1 (25 Mar 2019 07:47)  T(F): 98.7 (25 Mar 2019 07:47), Max: 98.7 (25 Mar 2019 07:47)  HR: 96 (25 Mar 2019 08:36) (79 - 96)  BP: 110/55 (25 Mar 2019 08:36) (88/54 - 110/55)  BP(mean): --  RR: 18 (25 Mar 2019 07:47) (18 - 18)  SpO2: 97% (25 Mar 2019 07:47) (97% - 98%)      PHYSICAL EXAM: Alert & Oriented X3  GENERAL: NAD, well-groomed, well-developed  HEAD:  Atraumatic, Normocephalic  CHEST/LUNG: Clear   HEART: S1S2+  ABDOMEN: Soft, Nontender  EXTREMITIES:  no calf tenderness    NERVOUS SYSTEM:  Cranial Nerves 2-12 intact [  ] Abnormal  [  ]  ROM: WFL all extremities [  ]  Abnormal [ x ]limited right shoulder, chronic  Motor Strength: WFL all extremities  [  ]  Abnormal [x  ]limited rue  Sensation: intact to light touch [  ] Abnormal [  ]  Reflexes: Symmetric [  ]  Abnormal [  ]    FUNCTIONAL STATUS:  Bed Mobility: Independent [  ]  Supervision [  ]  Needs Assistance [x  ]  N/A [  ]  Transfers: Independent [  ]  Supervision [  ]  Needs Assistance [ x ]  N/A [  ]   Ambulation: Independent [  ]  Supervision [  ]  Needs Assistance [  ]  N/A [  ]  ADL: Independent [  ] Requires Assistance [  ] N/A [  ]      LABS:                        8.3    5.64  )-----------( 260      ( 25 Mar 2019 08:11 )             26.5         135  |  98  |  25<H>  ----------------------------<  128<H>  4.3   |  22  |  1.6<H>    Ca    10.4<H>      25 Mar 2019 08:11  Mg     1.8         TPro  5.2<L>  /  Alb  3.2<L>  /  TBili  0.3  /  DBili  x   /  AST  8   /  ALT  <5  /  AlkPhos  57      PT/INR - ( 23 Mar 2019 13:15 )   PT: 12.00 sec;   INR: 1.04 ratio         PTT - ( 23 Mar 2019 13:15 )  PTT:35.0 sec  Urinalysis Basic - ( 23 Mar 2019 14:30 )    Color: Yellow / Appearance: Clear / S.010 / pH: x  Gluc: x / Ketone: Negative  / Bili: Negative / Urobili: 0.2 mg/dL   Blood: x / Protein: Trace mg/dL / Nitrite: Negative   Leuk Esterase: Trace / RBC: x / WBC 10-25 /HPF   Sq Epi: x / Non Sq Epi: x / Bacteria: Few /HPF        RADIOLOGY & ADDITIONAL STUDIES:    Assesment:

## 2019-03-26 ENCOUNTER — TRANSCRIPTION ENCOUNTER (OUTPATIENT)
Age: 79
End: 2019-03-26

## 2019-03-26 LAB
ALBUMIN SERPL ELPH-MCNC: 3.3 G/DL — LOW (ref 3.5–5.2)
ALP SERPL-CCNC: 61 U/L — SIGNIFICANT CHANGE UP (ref 30–115)
ALT FLD-CCNC: <5 U/L — SIGNIFICANT CHANGE UP (ref 0–41)
ANION GAP SERPL CALC-SCNC: 15 MMOL/L — HIGH (ref 7–14)
AST SERPL-CCNC: 9 U/L — SIGNIFICANT CHANGE UP (ref 0–41)
BASOPHILS # BLD AUTO: 0.02 K/UL — SIGNIFICANT CHANGE UP (ref 0–0.2)
BASOPHILS NFR BLD AUTO: 0.3 % — SIGNIFICANT CHANGE UP (ref 0–1)
BILIRUB SERPL-MCNC: 0.2 MG/DL — SIGNIFICANT CHANGE UP (ref 0.2–1.2)
BUN SERPL-MCNC: 24 MG/DL — HIGH (ref 10–20)
CALCIUM SERPL-MCNC: 10.6 MG/DL — HIGH (ref 8.5–10.1)
CHLORIDE SERPL-SCNC: 100 MMOL/L — SIGNIFICANT CHANGE UP (ref 98–110)
CHLORIDE UR-SCNC: 48 — SIGNIFICANT CHANGE UP
CK MB CFR SERPL CALC: 1.4 NG/ML — SIGNIFICANT CHANGE UP (ref 0.6–6.3)
CK SERPL-CCNC: 34 U/L — SIGNIFICANT CHANGE UP (ref 0–225)
CO2 SERPL-SCNC: 22 MMOL/L — SIGNIFICANT CHANGE UP (ref 17–32)
CREAT ?TM UR-MCNC: 55 MG/DL — SIGNIFICANT CHANGE UP
CREAT SERPL-MCNC: 1.4 MG/DL — SIGNIFICANT CHANGE UP (ref 0.7–1.5)
EOSINOPHIL # BLD AUTO: 0.8 K/UL — HIGH (ref 0–0.7)
EOSINOPHIL NFR BLD AUTO: 12.1 % — HIGH (ref 0–8)
GLUCOSE BLDC GLUCOMTR-MCNC: 136 MG/DL — HIGH (ref 70–99)
GLUCOSE BLDC GLUCOMTR-MCNC: 193 MG/DL — HIGH (ref 70–99)
GLUCOSE BLDC GLUCOMTR-MCNC: 208 MG/DL — HIGH (ref 70–99)
GLUCOSE SERPL-MCNC: 127 MG/DL — HIGH (ref 70–99)
HCT VFR BLD CALC: 27.2 % — LOW (ref 37–47)
HGB BLD-MCNC: 8.7 G/DL — LOW (ref 12–16)
IMM GRANULOCYTES NFR BLD AUTO: 0.3 % — SIGNIFICANT CHANGE UP (ref 0.1–0.3)
LYMPHOCYTES # BLD AUTO: 0.69 K/UL — LOW (ref 1.2–3.4)
LYMPHOCYTES # BLD AUTO: 10.5 % — LOW (ref 20.5–51.1)
MAGNESIUM SERPL-MCNC: 1.8 MG/DL — SIGNIFICANT CHANGE UP (ref 1.8–2.4)
MCHC RBC-ENTMCNC: 29.2 PG — SIGNIFICANT CHANGE UP (ref 27–31)
MCHC RBC-ENTMCNC: 32 G/DL — SIGNIFICANT CHANGE UP (ref 32–37)
MCV RBC AUTO: 91.3 FL — SIGNIFICANT CHANGE UP (ref 81–99)
MONOCYTES # BLD AUTO: 0.5 K/UL — SIGNIFICANT CHANGE UP (ref 0.1–0.6)
MONOCYTES NFR BLD AUTO: 7.6 % — SIGNIFICANT CHANGE UP (ref 1.7–9.3)
NEUTROPHILS # BLD AUTO: 4.56 K/UL — SIGNIFICANT CHANGE UP (ref 1.4–6.5)
NEUTROPHILS NFR BLD AUTO: 69.2 % — SIGNIFICANT CHANGE UP (ref 42.2–75.2)
NRBC # BLD: 0 /100 WBCS — SIGNIFICANT CHANGE UP (ref 0–0)
OSMOLALITY UR: 281 MOS/KG — SIGNIFICANT CHANGE UP (ref 50–1400)
PHOSPHATE SERPL-MCNC: 4.6 MG/DL — SIGNIFICANT CHANGE UP (ref 2.1–4.9)
PLATELET # BLD AUTO: 274 K/UL — SIGNIFICANT CHANGE UP (ref 130–400)
POTASSIUM SERPL-MCNC: 4 MMOL/L — SIGNIFICANT CHANGE UP (ref 3.5–5)
POTASSIUM SERPL-SCNC: 4 MMOL/L — SIGNIFICANT CHANGE UP (ref 3.5–5)
PROT SERPL-MCNC: 5.2 G/DL — LOW (ref 6–8)
RBC # BLD: 2.98 M/UL — LOW (ref 4.2–5.4)
RBC # FLD: 14.6 % — HIGH (ref 11.5–14.5)
SODIUM SERPL-SCNC: 137 MMOL/L — SIGNIFICANT CHANGE UP (ref 135–146)
SODIUM UR-SCNC: 55 MMOL/L — SIGNIFICANT CHANGE UP
TROPONIN T SERPL-MCNC: 0.06 NG/ML — CRITICAL HIGH
URATE UR-MCNC: 17.2 MG/DL — SIGNIFICANT CHANGE UP
UUN UR-MCNC: 308 MG/DL — SIGNIFICANT CHANGE UP
WBC # BLD: 6.59 K/UL — SIGNIFICANT CHANGE UP (ref 4.8–10.8)
WBC # FLD AUTO: 6.59 K/UL — SIGNIFICANT CHANGE UP (ref 4.8–10.8)

## 2019-03-26 RX ORDER — CHOLECALCIFEROL (VITAMIN D3) 125 MCG
1000 CAPSULE ORAL
Qty: 30 | Refills: 0 | OUTPATIENT
Start: 2019-03-26 | End: 2019-04-24

## 2019-03-26 RX ORDER — FERROUS SULFATE 325(65) MG
1 TABLET ORAL
Qty: 30 | Refills: 0 | OUTPATIENT
Start: 2019-03-26 | End: 2019-04-24

## 2019-03-26 RX ORDER — CHOLECALCIFEROL (VITAMIN D3) 125 MCG
1000 CAPSULE ORAL DAILY
Qty: 0 | Refills: 0 | Status: DISCONTINUED | OUTPATIENT
Start: 2019-03-26 | End: 2019-03-28

## 2019-03-26 RX ORDER — SENNA PLUS 8.6 MG/1
1 TABLET ORAL
Qty: 0 | Refills: 0 | Status: DISCONTINUED | OUTPATIENT
Start: 2019-03-26 | End: 2019-03-29

## 2019-03-26 RX ORDER — FLUTICASONE PROPIONATE 50 MCG
1 SPRAY, SUSPENSION NASAL
Qty: 1 | Refills: 0 | OUTPATIENT
Start: 2019-03-26 | End: 2019-04-04

## 2019-03-26 RX ORDER — CHLORHEXIDINE GLUCONATE 213 G/1000ML
1 SOLUTION TOPICAL
Qty: 0 | Refills: 0 | Status: DISCONTINUED | OUTPATIENT
Start: 2019-03-26 | End: 2019-03-29

## 2019-03-26 RX ORDER — FERROUS SULFATE 325(65) MG
325 TABLET ORAL DAILY
Qty: 0 | Refills: 0 | Status: DISCONTINUED | OUTPATIENT
Start: 2019-03-26 | End: 2019-03-29

## 2019-03-26 RX ADMIN — GABAPENTIN 300 MILLIGRAM(S): 400 CAPSULE ORAL at 17:10

## 2019-03-26 RX ADMIN — TAMSULOSIN HYDROCHLORIDE 0.4 MILLIGRAM(S): 0.4 CAPSULE ORAL at 21:33

## 2019-03-26 RX ADMIN — Medication 5 MILLIGRAM(S): at 05:16

## 2019-03-26 RX ADMIN — SODIUM CHLORIDE 75 MILLILITER(S): 9 INJECTION, SOLUTION INTRAVENOUS at 18:50

## 2019-03-26 RX ADMIN — Medication 1000 UNIT(S): at 13:47

## 2019-03-26 RX ADMIN — Medication 1 MILLIGRAM(S): at 11:31

## 2019-03-26 RX ADMIN — Medication 1 SPRAY(S): at 17:10

## 2019-03-26 RX ADMIN — APIXABAN 2.5 MILLIGRAM(S): 2.5 TABLET, FILM COATED ORAL at 05:15

## 2019-03-26 RX ADMIN — Medication 5 MILLIGRAM(S): at 13:47

## 2019-03-26 RX ADMIN — APIXABAN 2.5 MILLIGRAM(S): 2.5 TABLET, FILM COATED ORAL at 17:10

## 2019-03-26 RX ADMIN — DULOXETINE HYDROCHLORIDE 30 MILLIGRAM(S): 30 CAPSULE, DELAYED RELEASE ORAL at 11:31

## 2019-03-26 RX ADMIN — Medication 325 MILLIGRAM(S): at 13:46

## 2019-03-26 RX ADMIN — GABAPENTIN 300 MILLIGRAM(S): 400 CAPSULE ORAL at 05:16

## 2019-03-26 RX ADMIN — CEFTRIAXONE 100 GRAM(S): 500 INJECTION, POWDER, FOR SOLUTION INTRAMUSCULAR; INTRAVENOUS at 17:15

## 2019-03-26 RX ADMIN — Medication 300 MILLIGRAM(S): at 11:31

## 2019-03-26 RX ADMIN — SENNA PLUS 1 TABLET(S): 8.6 TABLET ORAL at 17:10

## 2019-03-26 RX ADMIN — Medication 1 SPRAY(S): at 05:15

## 2019-03-26 NOTE — PROGRESS NOTE ADULT - ASSESSMENT
IMPRESSION:  No fevers.  No obvious focus of infection  Asymptomatic pyuria with polymicrobial reva  No PNA  WBC 8.8    RECOMMENDATION:  Contact isolatio  Maintain off antibiotics

## 2019-03-26 NOTE — DISCHARGE NOTE PROVIDER - CARE PROVIDERS DIRECT ADDRESSES
[email or web address]

## 2019-03-26 NOTE — DISCHARGE NOTE PROVIDER - CARE PROVIDER_API CALL
Carlos Stephen)  Family Medicine  [address] [facility], [address]  [place] [address]  Phone: [phone]  Fax: [phone]  Follow Up Time:

## 2019-03-26 NOTE — DISCHARGE NOTE PROVIDER - NSDCCPCAREPLAN_GEN_ALL_CORE_FT
PRINCIPAL DISCHARGE DIAGNOSIS  Diagnosis: Pyelonephritis  Assessment and Plan of Treatment: Please take your medications as prescribed and follow up with your outpatient primary physician. If you start feeling fever, back pain, dizziness, or burning on urination, please seek immediate medical attention.      SECONDARY DISCHARGE DIAGNOSES  Diagnosis: DVT, lower extremity  Assessment and Plan of Treatment: Please take your Eliquis as prescribed and follow up with your primary outpatient physician. If you develop any swelling, pain, or redness of either or both of your legs, please seek immediate medical attention.    Diagnosis: Hypercalcemia  Assessment and Plan of Treatment: Please take your medications as prescribed and, after 1 week following discharge, please go to your primary outpatient physician for a BMP (labwork.)    Diagnosis: SHONNA (acute kidney injury)  Assessment and Plan of Treatment: Please take your medications as prescribed and, after 1 week following discharge, please go to your primary outpatient physician for a BMP (labwork.)    Diagnosis: Thoracic compression fracture  Assessment and Plan of Treatment: Please take your medications as prescribed and follow up with your outpatient primary physician. PRINCIPAL DISCHARGE DIAGNOSIS  Diagnosis: Accident due to mechanical fall without injury  Assessment and Plan of Treatment: Continue Physical therapy and Rehab  Pain medications is needed      SECONDARY DISCHARGE DIAGNOSES  Diagnosis: Hypercalcemia  Assessment and Plan of Treatment: Blease go to your primary outpatient physician for follow up and a BMP (labwork.)    Diagnosis: SHONNA (acute kidney injury)  Assessment and Plan of Treatment: Please go to your primary outpatient physician for a BMP (labwork) in one week.

## 2019-03-26 NOTE — PHYSICAL THERAPY INITIAL EVALUATION ADULT - GENERAL OBSERVATIONS, REHAB EVAL
16:15-16:43. Pt encountered sitting in chair in NAD, +tele, + IV L UE, +chair alarm, agreeable to PT.

## 2019-03-26 NOTE — PROGRESS NOTE ADULT - ASSESSMENT
78 y/f with PMH of Dm, HTN, DVT on Eliquis, RA, latent TB on Rifampin presented after fall from her recliner x 1 day back. Found to have fever and +UA.      # Fever due to acute Pyelonephritis, resolved  resolved now  patient with positive UA, fever.   Urine Cx si growing to bacteria gram negative uche <50K and gram positive cocci in pairs>100K.    No leukocytosis.   D/c IVF  Appreciate ID recs; d/c Rocephin  As per her Podiatry Dr. Villasenor, She hs previous reaction to Azactam (dizziness) and to cefepime (AMS).     #S/p Fall:   Chronic compression fracture of thoracic and lumbar vertebrae.   No LOC   Continue Oxycodone and gabapentin.   Rehab recommended home PT vs STR  Patient pending outpt rehab    #SHONNA on CKD stage 3, possibly secondary to UTI  Encourage oral hydration. Continue UTI treatment.   Monitor BMP.     # Hypercalcemia possibly secondary to CKD  Ca 10.6 <-- 10.4 <-- 10.3  F/u outpatient BMP w/ PMD    # Possible folic acid deficiency  C/w supplementation    #DVT:   Continue Eliquis    #Latent TB:   Continue Rifampin.     FULL CODE   ambulate as tolerated   REGULAR DIET    #dispo; pending d/c to SNF vs outpatient w/ [facility]

## 2019-03-26 NOTE — PHYSICAL THERAPY INITIAL EVALUATION ADULT - IMPAIRED TRANSFERS: SIT/STAND, REHAB EVAL
R foot sliding FW 2* to decreased ROM/flexibility/impaired balance/decreased flexibility/decreased strength

## 2019-03-26 NOTE — DISCHARGE NOTE PROVIDER - HOSPITAL COURSE
Reason for Admission: Fall     Difficulty to ambulate    History of Present Illness:     78 y/f with PMH of Dm, HTN, DVT on Eliquis, RA, latent TB on Rifampin presented after fall from her recliner x 1 day back. She denied any dizziness/palpitation prior to fall. She had pain in her bilateral hand and bilateral thigh after the fall, she was able to get up after the fall but since the morning of presentation was not able to ambulate which prompted her to come to ED. She denied any focal weakness, numbness, loss of consciousness, abnormal body movements, open wounds after the fall. Her bowel movements were normal. She mentioned having increased urinary frequency for a while which she attributes to UTI; was started on Rocephin for presumed pyelonephritis due to leukocytosis in the setting of fever despite lack of concomitant findings on CT Abdomen. The antibiotics were discontinued due to resolution of leukocytosis. She was given oxycodone for her chronic compression fracture; pain was well-controlled during admission. She was given PO iron supplementation for anemia and was stable for discharge on 3/26. Reason for Admission: Fall     Difficulty to ambulate    History of Present Illness:     78 y/f with PMH of Dm, HTN, DVT on Eliquis, RA, latent TB on Rifampin presented after fall from her recliner x 1 day back. She denied any dizziness/palpitation prior to fall. She had pain in her bilateral hand and bilateral thigh after the fall, she was able to get up after the fall but since the morning of presentation was not able to ambulate which prompted her to come to ED. She denied any focal weakness, numbness, loss of consciousness, abnormal body movements, open wounds after the fall. Had an isolated episode of fever in ED for which she took a 4 day course of Rocephin for suspicion of cystitis. Discontinued as per ID recommendations Found to have fever and +UA.        #Mechanical fall    -Trauma workup negative    -Chronic compression fracture of thoracic and lumbar vertebrae    -Continue pain management    -DC to [facility] with rehab services        # Isolated episode of Fever     -Resolved    -Received 4 days of Rocephin    -Per ID asytmptomatic bacteriuria - maintain off Abx     -Urine Cx + Proteus ESBL and Enterococcus durans    -Contact isolation        #SHONNA on CKD stage 3    -Resolved    -Cr trending down to 1.1        # Mild Hypercalcemia    -Patient had extensive workup previously wit hno clear etiology ( low PTH,PTHrP, Vit D, and 1,25 vit D, negative SPEP)    -DC vit D supplements    -Follow up with PMD as OP        #Latent TB:     Continue Rifampin

## 2019-03-27 LAB
ANION GAP SERPL CALC-SCNC: 11 MMOL/L — SIGNIFICANT CHANGE UP (ref 7–14)
BASOPHILS # BLD AUTO: 0.02 K/UL — SIGNIFICANT CHANGE UP (ref 0–0.2)
BASOPHILS NFR BLD AUTO: 0.6 % — SIGNIFICANT CHANGE UP (ref 0–1)
BUN SERPL-MCNC: 25 MG/DL — HIGH (ref 10–20)
CALCIUM SERPL-MCNC: 10.1 MG/DL — SIGNIFICANT CHANGE UP (ref 8.5–10.1)
CHLORIDE SERPL-SCNC: 103 MMOL/L — SIGNIFICANT CHANGE UP (ref 98–110)
CO2 SERPL-SCNC: 24 MMOL/L — SIGNIFICANT CHANGE UP (ref 17–32)
CREAT SERPL-MCNC: 1.4 MG/DL — SIGNIFICANT CHANGE UP (ref 0.7–1.5)
EOSINOPHIL # BLD AUTO: 0.48 K/UL — SIGNIFICANT CHANGE UP (ref 0–0.7)
EOSINOPHIL NFR BLD AUTO: 13.2 % — HIGH (ref 0–8)
GLUCOSE BLDC GLUCOMTR-MCNC: 127 MG/DL — HIGH (ref 70–99)
GLUCOSE BLDC GLUCOMTR-MCNC: 189 MG/DL — HIGH (ref 70–99)
GLUCOSE BLDC GLUCOMTR-MCNC: 222 MG/DL — HIGH (ref 70–99)
GLUCOSE SERPL-MCNC: 116 MG/DL — HIGH (ref 70–99)
HCT VFR BLD CALC: 25.3 % — LOW (ref 37–47)
HGB BLD-MCNC: 8 G/DL — LOW (ref 12–16)
IMM GRANULOCYTES NFR BLD AUTO: 0.3 % — SIGNIFICANT CHANGE UP (ref 0.1–0.3)
LYMPHOCYTES # BLD AUTO: 0.81 K/UL — LOW (ref 1.2–3.4)
LYMPHOCYTES # BLD AUTO: 22.3 % — SIGNIFICANT CHANGE UP (ref 20.5–51.1)
MAGNESIUM SERPL-MCNC: 1.8 MG/DL — SIGNIFICANT CHANGE UP (ref 1.8–2.4)
MCHC RBC-ENTMCNC: 28.8 PG — SIGNIFICANT CHANGE UP (ref 27–31)
MCHC RBC-ENTMCNC: 31.6 G/DL — LOW (ref 32–37)
MCV RBC AUTO: 91 FL — SIGNIFICANT CHANGE UP (ref 81–99)
MONOCYTES # BLD AUTO: 0.43 K/UL — SIGNIFICANT CHANGE UP (ref 0.1–0.6)
MONOCYTES NFR BLD AUTO: 11.8 % — HIGH (ref 1.7–9.3)
NEUTROPHILS # BLD AUTO: 1.88 K/UL — SIGNIFICANT CHANGE UP (ref 1.4–6.5)
NEUTROPHILS NFR BLD AUTO: 51.8 % — SIGNIFICANT CHANGE UP (ref 42.2–75.2)
NRBC # BLD: 0 /100 WBCS — SIGNIFICANT CHANGE UP (ref 0–0)
PLATELET # BLD AUTO: 251 K/UL — SIGNIFICANT CHANGE UP (ref 130–400)
POTASSIUM SERPL-MCNC: 4.1 MMOL/L — SIGNIFICANT CHANGE UP (ref 3.5–5)
POTASSIUM SERPL-SCNC: 4.1 MMOL/L — SIGNIFICANT CHANGE UP (ref 3.5–5)
RBC # BLD: 2.78 M/UL — LOW (ref 4.2–5.4)
RBC # FLD: 14.3 % — SIGNIFICANT CHANGE UP (ref 11.5–14.5)
SODIUM SERPL-SCNC: 138 MMOL/L — SIGNIFICANT CHANGE UP (ref 135–146)
WBC # BLD: 3.63 K/UL — LOW (ref 4.8–10.8)
WBC # FLD AUTO: 3.63 K/UL — LOW (ref 4.8–10.8)

## 2019-03-27 RX ADMIN — Medication 1 SPRAY(S): at 06:11

## 2019-03-27 RX ADMIN — GABAPENTIN 300 MILLIGRAM(S): 400 CAPSULE ORAL at 17:03

## 2019-03-27 RX ADMIN — Medication 5 MILLIGRAM(S): at 06:11

## 2019-03-27 RX ADMIN — SENNA PLUS 1 TABLET(S): 8.6 TABLET ORAL at 17:03

## 2019-03-27 RX ADMIN — TAMSULOSIN HYDROCHLORIDE 0.4 MILLIGRAM(S): 0.4 CAPSULE ORAL at 21:06

## 2019-03-27 RX ADMIN — Medication 325 MILLIGRAM(S): at 11:27

## 2019-03-27 RX ADMIN — APIXABAN 2.5 MILLIGRAM(S): 2.5 TABLET, FILM COATED ORAL at 17:03

## 2019-03-27 RX ADMIN — APIXABAN 2.5 MILLIGRAM(S): 2.5 TABLET, FILM COATED ORAL at 06:11

## 2019-03-27 RX ADMIN — Medication 1 SPRAY(S): at 17:04

## 2019-03-27 RX ADMIN — Medication 1000 UNIT(S): at 11:27

## 2019-03-27 RX ADMIN — Medication 300 MILLIGRAM(S): at 11:27

## 2019-03-27 RX ADMIN — Medication 1 MILLIGRAM(S): at 11:27

## 2019-03-27 RX ADMIN — DULOXETINE HYDROCHLORIDE 30 MILLIGRAM(S): 30 CAPSULE, DELAYED RELEASE ORAL at 11:27

## 2019-03-27 RX ADMIN — Medication 5 MILLIGRAM(S): at 11:27

## 2019-03-27 RX ADMIN — SODIUM CHLORIDE 75 MILLILITER(S): 9 INJECTION, SOLUTION INTRAVENOUS at 06:22

## 2019-03-27 RX ADMIN — CHLORHEXIDINE GLUCONATE 1 APPLICATION(S): 213 SOLUTION TOPICAL at 06:12

## 2019-03-27 RX ADMIN — GABAPENTIN 300 MILLIGRAM(S): 400 CAPSULE ORAL at 06:11

## 2019-03-27 NOTE — PROGRESS NOTE ADULT - ASSESSMENT
IMPRESSION:  No fevers.  No obvious focus of infection  Asymptomatic pyuria with polymicrobial reva  No PNA  WBC 6.5  BCx 3/24, 25 NTD    RECOMMENDATION:  Contact isolation  Maintain off antibiotics  Recall prn please

## 2019-03-27 NOTE — PROGRESS NOTE ADULT - GASTROINTESTINAL DETAILS
no rebound tenderness/no rigidity/soft/nontender/no guarding
no guarding/soft/no rigidity/nontender/no rebound tenderness

## 2019-03-27 NOTE — PROGRESS NOTE ADULT - ASSESSMENT
78 y/f with PMH of Dm, HTN, DVT on Eliquis, RA, latent TB on Rifampin presented after fall from her recliner x 1 day back. Found to have fever and +UA.      # Fever due to acute Pyelonephritis, resolved  resolved now  patient with positive UA, fever.   Urine Cx + Proteus ESBL and Enterococcus durans  Blood cx neg to date  No leukocytosis.   D/c IVF  Rocephin d/c'd  As per her Podiatry Dr. Villasenor, She hs previous reaction to Azactam (dizziness) and to cefepime (AMS).     #S/p Fall:   Chronic compression fracture of thoracic and lumbar vertebrae.   No LOC   Continue Oxycodone and gabapentin.   Rehab recommended home PT vs STR  F/u w/ PT; patient should attempt to walk today    #SHONNA on CKD stage 3, possibly secondary to UTI  Cr fluctuating between 1.1 and 1.7 w/o a clear trend  Encourage oral hydration. Continue UTI treatment.   Monitor BMP.     # Hypercalcemia possibly secondary to CKD  Ca 10.6 <-- 10.4 <-- 10.3  F/u outpatient BMP w/ PMD    # Possible folic acid deficiency  C/w supplementation    #DVT:   Continue Eliquis    #Latent TB:   Continue Rifampin.     FULL CODE   ambulate as tolerated   REGULAR DIET    #dispo; pending d/c to SNF vs outpatient w/ [facility] 78 y/f with PMH of Dm, HTN, DVT on Eliquis, RA, latent TB on Rifampin presented after fall from her recliner x 1 day back. Found to have fever and +UA.      # Fever due to acute Pyelonephritis, resolved  resolved now  patient with positive UA, fever.   Urine Cx + Proteus ESBL and Enterococcus durans  Blood cx neg to date  No leukocytosis.   D/c IVF  Rocephin d/c'd  As per her Podiatry Dr. Villasenor, She hs previous reaction to Azactam (dizziness) and to cefepime (AMS).     #S/p Fall:   Chronic compression fracture of thoracic and lumbar vertebrae.   No LOC   Continue Oxycodone and gabapentin.   Rehab recommended home PT vs STR  F/u w/ PT; patient should attempt to walk today    #SHONNA on CKD stage 3, possibly secondary to UTI  Cr fluctuating between 1.1 and 1.7 w/o a clear trend  Encourage oral hydration. Continue UTI treatment.   Monitor BMP.     # Hypercalcemia possibly secondary to CKD  Ca 10.6 <-- 10.4 <-- 10.3  F/u outpatient BMP w/ PMD  *Clarify w/ patient why she was taking vit D outpt; if not for deficiency, please d/c it    # Possible folic acid deficiency  C/w supplementation    #DVT:   Continue Eliquis    #Latent TB:   Continue Rifampin.     FULL CODE   ambulate as tolerated   REGULAR DIET    #dispo; pending d/c to SNF vs outpatient w/ [facility] 78 y/f with PMH of Dm, HTN, DVT on Eliquis, RA, latent TB on Rifampin presented after fall from her recliner x 1 day back. Found to have fever and +UA.      # Fever due to acute Pyelonephritis, resolved  resolved now  patient with positive UA, fever.   Urine Cx + Proteus ESBL and Enterococcus durans  Blood cx neg to date  No leukocytosis.   D/c IVF  Rocephin d/c'd  As per her Podiatry Dr. Villasenor, She hs previous reaction to Azactam (dizziness) and to cefepime (AMS).     #S/p Fall:   Chronic compression fracture of thoracic and lumbar vertebrae.   No LOC   Continue Oxycodone and gabapentin.   Rehab recommended home PT vs STR  F/u w/ PT; patient should attempt to walk today    #SHONNA on CKD stage 3, possibly secondary to UTI  Cr fluctuating between 1.1 and 1.7 w/o a clear trend  Encourage oral hydration. Continue UTI treatment.   Monitor BMP.     # Hypercalcemia possibly secondary to CKD  Ca 10.1 <-- 10.6 <-- 10.4 <-- 10.3  F/u outpatient BMP w/ PMD  *Clarify w/ patient why she was taking vit D outpt; if not for deficiency, please d/c it    # Possible folic acid deficiency  C/w supplementation    #DVT:   Continue Eliquis    #Latent TB:   Continue Rifampin.     FULL CODE   ambulate as tolerated   REGULAR DIET    #dispo; pending d/c to SNF vs outpatient w/ [facility]

## 2019-03-28 LAB
ANION GAP SERPL CALC-SCNC: 13 MMOL/L — SIGNIFICANT CHANGE UP (ref 7–14)
BASOPHILS # BLD AUTO: 0.01 K/UL — SIGNIFICANT CHANGE UP (ref 0–0.2)
BASOPHILS NFR BLD AUTO: 0.2 % — SIGNIFICANT CHANGE UP (ref 0–1)
BUN SERPL-MCNC: 24 MG/DL — HIGH (ref 10–20)
CALCIUM SERPL-MCNC: 10.6 MG/DL — HIGH (ref 8.5–10.1)
CHLORIDE SERPL-SCNC: 100 MMOL/L — SIGNIFICANT CHANGE UP (ref 98–110)
CO2 SERPL-SCNC: 25 MMOL/L — SIGNIFICANT CHANGE UP (ref 17–32)
CREAT SERPL-MCNC: 1.3 MG/DL — SIGNIFICANT CHANGE UP (ref 0.7–1.5)
EOSINOPHIL # BLD AUTO: 0.55 K/UL — SIGNIFICANT CHANGE UP (ref 0–0.7)
EOSINOPHIL NFR BLD AUTO: 11.5 % — HIGH (ref 0–8)
GLUCOSE BLDC GLUCOMTR-MCNC: 126 MG/DL — HIGH (ref 70–99)
GLUCOSE BLDC GLUCOMTR-MCNC: 164 MG/DL — HIGH (ref 70–99)
GLUCOSE BLDC GLUCOMTR-MCNC: 166 MG/DL — HIGH (ref 70–99)
GLUCOSE SERPL-MCNC: 129 MG/DL — HIGH (ref 70–99)
HCT VFR BLD CALC: 25.7 % — LOW (ref 37–47)
HGB BLD-MCNC: 8.2 G/DL — LOW (ref 12–16)
IMM GRANULOCYTES NFR BLD AUTO: 0.4 % — HIGH (ref 0.1–0.3)
LYMPHOCYTES # BLD AUTO: 1.15 K/UL — LOW (ref 1.2–3.4)
LYMPHOCYTES # BLD AUTO: 24.1 % — SIGNIFICANT CHANGE UP (ref 20.5–51.1)
MAGNESIUM SERPL-MCNC: 1.7 MG/DL — LOW (ref 1.8–2.4)
MCHC RBC-ENTMCNC: 29.2 PG — SIGNIFICANT CHANGE UP (ref 27–31)
MCHC RBC-ENTMCNC: 31.9 G/DL — LOW (ref 32–37)
MCV RBC AUTO: 91.5 FL — SIGNIFICANT CHANGE UP (ref 81–99)
MONOCYTES # BLD AUTO: 0.5 K/UL — SIGNIFICANT CHANGE UP (ref 0.1–0.6)
MONOCYTES NFR BLD AUTO: 10.5 % — HIGH (ref 1.7–9.3)
NEUTROPHILS # BLD AUTO: 2.55 K/UL — SIGNIFICANT CHANGE UP (ref 1.4–6.5)
NEUTROPHILS NFR BLD AUTO: 53.3 % — SIGNIFICANT CHANGE UP (ref 42.2–75.2)
NRBC # BLD: 0 /100 WBCS — SIGNIFICANT CHANGE UP (ref 0–0)
PLATELET # BLD AUTO: 289 K/UL — SIGNIFICANT CHANGE UP (ref 130–400)
POTASSIUM SERPL-MCNC: 4.2 MMOL/L — SIGNIFICANT CHANGE UP (ref 3.5–5)
POTASSIUM SERPL-SCNC: 4.2 MMOL/L — SIGNIFICANT CHANGE UP (ref 3.5–5)
RBC # BLD: 2.81 M/UL — LOW (ref 4.2–5.4)
RBC # FLD: 14.5 % — SIGNIFICANT CHANGE UP (ref 11.5–14.5)
SODIUM SERPL-SCNC: 138 MMOL/L — SIGNIFICANT CHANGE UP (ref 135–146)
WBC # BLD: 4.78 K/UL — LOW (ref 4.8–10.8)
WBC # FLD AUTO: 4.78 K/UL — LOW (ref 4.8–10.8)

## 2019-03-28 RX ORDER — MAGNESIUM SULFATE 500 MG/ML
2 VIAL (ML) INJECTION ONCE
Qty: 0 | Refills: 0 | Status: COMPLETED | OUTPATIENT
Start: 2019-03-28 | End: 2019-03-28

## 2019-03-28 RX ADMIN — Medication 325 MILLIGRAM(S): at 11:02

## 2019-03-28 RX ADMIN — DULOXETINE HYDROCHLORIDE 30 MILLIGRAM(S): 30 CAPSULE, DELAYED RELEASE ORAL at 11:02

## 2019-03-28 RX ADMIN — GABAPENTIN 300 MILLIGRAM(S): 400 CAPSULE ORAL at 17:54

## 2019-03-28 RX ADMIN — TAMSULOSIN HYDROCHLORIDE 0.4 MILLIGRAM(S): 0.4 CAPSULE ORAL at 21:57

## 2019-03-28 RX ADMIN — Medication 1 SPRAY(S): at 06:12

## 2019-03-28 RX ADMIN — SENNA PLUS 1 TABLET(S): 8.6 TABLET ORAL at 17:53

## 2019-03-28 RX ADMIN — Medication 1 SPRAY(S): at 17:53

## 2019-03-28 RX ADMIN — APIXABAN 2.5 MILLIGRAM(S): 2.5 TABLET, FILM COATED ORAL at 06:16

## 2019-03-28 RX ADMIN — Medication 5 MILLIGRAM(S): at 12:21

## 2019-03-28 RX ADMIN — Medication 1000 UNIT(S): at 11:02

## 2019-03-28 RX ADMIN — SENNA PLUS 1 TABLET(S): 8.6 TABLET ORAL at 06:16

## 2019-03-28 RX ADMIN — GABAPENTIN 300 MILLIGRAM(S): 400 CAPSULE ORAL at 06:16

## 2019-03-28 RX ADMIN — APIXABAN 2.5 MILLIGRAM(S): 2.5 TABLET, FILM COATED ORAL at 17:54

## 2019-03-28 RX ADMIN — Medication 300 MILLIGRAM(S): at 11:02

## 2019-03-28 RX ADMIN — Medication 25 GRAM(S): at 10:50

## 2019-03-28 RX ADMIN — Medication 1 MILLIGRAM(S): at 11:02

## 2019-03-28 RX ADMIN — Medication 5 MILLIGRAM(S): at 06:16

## 2019-03-28 NOTE — PROGRESS NOTE ADULT - ASSESSMENT
78 y/f with PMH of Dm, HTN, DVT on Eliquis, RA, latent TB on Rifampin presented after fall from her recliner x 1 day back. Found to have fever and +UA.      # Fever due to acute Pyelonephritis, resolved    On admission, positive UA, fever.   Urine Cx + Proteus ESBL and Enterococcus durans  Blood cx neg to date 3/28  No need for fluids; patient eating and drinking appropriately.  Antibiotics not indicated; patient asymptomatic w/o white count or fever.  As per her Podiatry Dr. Villasenor, She has previous reaction to Azactam (dizziness) and to cefepime (AMS).     #S/p Fall:   Chronic compression fracture of thoracic and lumbar vertebrae.   No LOC   Continue Oxycodone and gabapentin.   Rehab recommended home PT vs STR  F/u w/ PT; patient should attempt to walk today    #SHONNA on CKD stage 3, possibly secondary to UTI, resolved to baseline  Cr 1.3 <-- 1.4 <-- 1.4  Encourage oral hydration.  Monitor BMP.     # Hypercalcemia possibly secondary to CKD  Ca 10.6 <-- 10.1 <-- 10.6 <-- 10.4  F/u outpatient BMP w/ PMD  *Clarify w/ patient why she was taking vit D outpt; if not for deficiency, please d/c it    # Possible folic acid deficiency  C/w supplementation    #DVT:   Continue Eliquis    #Latent TB:   Continue Rifampin.     FULL CODE   ambulate as tolerated   REGULAR DIET    #dispo; pending d/c to SNF vs outpatient w/ [facility] 78 y/f with PMH of Dm, HTN, DVT on Eliquis, RA, latent TB on Rifampin presented after fall from her recliner x 1 day back. Found to have fever and +UA.      # Fever due to acute Pyelonephritis, resolved    On admission, positive UA, fever.   Urine Cx + Proteus ESBL and Enterococcus durans  Blood cx neg to date 3/28  No need for fluids; patient eating and drinking appropriately.  Antibiotics not indicated; patient asymptomatic w/o white count or fever.  As per her Podiatry Dr. Villasenor, She has previous reaction to Azactam (dizziness) and to cefepime (AMS).     #S/p Fall:   Chronic compression fracture of thoracic and lumbar vertebrae.   No LOC   Continue Oxycodone and gabapentin.   Rehab recommended home PT vs STR  F/u w/ PT; patient should attempt to walk today    #SHONNA on CKD stage 3, possibly secondary to UTI, resolved to baseline  Cr 1.3 <-- 1.4 <-- 1.4  Encourage oral hydration.  Monitor BMP.     # Hypercalcemia possibly secondary to CKD  Ca 10.6 <-- 10.1 <-- 10.6 <-- 10.4  F/u outpatient BMP w/ PMD  *Clarify w/ patient why she was taking vit D outpt; if not for deficiency, please d/c it    # Hypomagnesemia  Mg 1.7 <-- 1.8 <-- 1.8  S/p repletion  F/u AM BMP, Mg    # Possible folic acid deficiency  C/w supplementation    #DVT:   Continue Eliquis    #Latent TB:   Continue Rifampin.     FULL CODE   ambulate as tolerated   REGULAR DIET    #dispo; pending d/c to SNF vs outpatient w/ [facility] 78 y/f with PMH of Dm, HTN, DVT on Eliquis, RA, latent TB on Rifampin presented after fall from her recliner x 1 day back. Found to have fever and +UA.      # Fever due to acute Pyelonephritis, resolved  On admission, positive UA, fever.   Urine Cx + Proteus ESBL and Enterococcus durans  Blood cx neg to date 3/28  No need for fluids; patient eating and drinking appropriately.  Antibiotics not indicated; patient asymptomatic w/o white count or fever.  As per her Podiatry Dr. Villasenor, She has previous reaction to Azactam (dizziness) and to cefepime (AMS).     #S/p Fall:   Chronic compression fracture of thoracic and lumbar vertebrae.   No LOC   Continue Oxycodone and gabapentin.   Rehab recommended home PT vs STR  F/u w/ PT; patient should attempt to walk today    #SHONNA on CKD stage 3, possibly secondary to UTI, resolved to baseline  Cr 1.3 <-- 1.4 <-- 1.4  Encourage oral hydration.  Monitor BMP.     # Hypercalcemia possibly secondary to CKD  Ca 10.6 <-- 10.1 <-- 10.6 <-- 10.4  F/u outpatient BMP w/ PMD  Per patient, she was taking vit D as a supplement w/o h/o deficiency  D/c vit D3    # Hypomagnesemia  Mg 1.7 <-- 1.8 <-- 1.8  S/p repletion  F/u AM BMP, Mg    # Possible folic acid deficiency  C/w supplementation    #DVT:   Continue Eliquis    #Latent TB:   Continue Rifampin.     FULL CODE   ambulate as tolerated   REGULAR DIET    #dispo; pending d/c to SNF vs outpatient w/ [facility]

## 2019-03-28 NOTE — PROGRESS NOTE ADULT - ATTENDING COMMENTS
78 y/f with PMH of Dm, HTN, DVT on Eliquis, RA, latent TB on Rifampin presented after fall from her recliner x 1 day PTA. Found to have fever and +UA.    # Fever due to acute Pyelonephritis:  resolved now  Per ID asytmptomatic bacteriuria - maintain off Abx   Urine Cx + Proteus ESBL and Enterococcus durans  Blood cx neg to date  No leukocytosis.   D/c IVF  Rocephin d/c'd  As per her Podiatry Dr. Villasenor, She hs previous reaction to Azactam (dizziness) and to cefepime (AMS).     #S/p Fall:   Chronic compression fracture of thoracic and lumbar vertebrae.   No LOC   Continue Oxycodone and gabapentin.   Rehab recommended home PT vs STR  walked today a short distance with PT    #SHONNA on CKD stage 3, possibly secondary to UTI  Cr fluctuating between 1.1 and 1.7 w/o a clear trend  Encourage oral hydration. Continue UTI treatment.   Monitor BMP.     # Hypercalcemia possibly secondary to CKD  Ca 10.6 <-- 10.4 <-- 10.3  F/u outpatient BMP w/ PMD  *Clarify w/ patient why she was taking vit D outpt; if not for deficiency, please d/c it    # Possible folic acid deficiency  C/w supplementation    #DVT:   Continue Eliquis    #Latent TB:   Continue Rifampin.     FULL CODE   ambulate as tolerated   REGULAR DIET    # HANDOFF: PENDING STR PLACEMENT  DISCUSSED WITH PATIENT. AGREES.

## 2019-03-29 ENCOUNTER — TRANSCRIPTION ENCOUNTER (OUTPATIENT)
Age: 79
End: 2019-03-29

## 2019-03-29 VITALS
RESPIRATION RATE: 20 BRPM | TEMPERATURE: 98 F | OXYGEN SATURATION: 97 % | SYSTOLIC BLOOD PRESSURE: 122 MMHG | HEART RATE: 85 BPM | DIASTOLIC BLOOD PRESSURE: 67 MMHG

## 2019-03-29 LAB
ANION GAP SERPL CALC-SCNC: 15 MMOL/L — HIGH (ref 7–14)
BASOPHILS # BLD AUTO: 0.03 K/UL — SIGNIFICANT CHANGE UP (ref 0–0.2)
BASOPHILS NFR BLD AUTO: 0.5 % — SIGNIFICANT CHANGE UP (ref 0–1)
BUN SERPL-MCNC: 22 MG/DL — HIGH (ref 10–20)
CALCIUM SERPL-MCNC: 10.6 MG/DL — HIGH (ref 8.5–10.1)
CHLORIDE SERPL-SCNC: 102 MMOL/L — SIGNIFICANT CHANGE UP (ref 98–110)
CO2 SERPL-SCNC: 24 MMOL/L — SIGNIFICANT CHANGE UP (ref 17–32)
CREAT SERPL-MCNC: 1.1 MG/DL — SIGNIFICANT CHANGE UP (ref 0.7–1.5)
CULTURE RESULTS: SIGNIFICANT CHANGE UP
EOSINOPHIL # BLD AUTO: 0.59 K/UL — SIGNIFICANT CHANGE UP (ref 0–0.7)
EOSINOPHIL NFR BLD AUTO: 9.9 % — HIGH (ref 0–8)
GLUCOSE BLDC GLUCOMTR-MCNC: 137 MG/DL — HIGH (ref 70–99)
GLUCOSE BLDC GLUCOMTR-MCNC: 164 MG/DL — HIGH (ref 70–99)
GLUCOSE BLDC GLUCOMTR-MCNC: 164 MG/DL — HIGH (ref 70–99)
GLUCOSE BLDC GLUCOMTR-MCNC: 222 MG/DL — HIGH (ref 70–99)
GLUCOSE SERPL-MCNC: 192 MG/DL — HIGH (ref 70–99)
HCT VFR BLD CALC: 26.8 % — LOW (ref 37–47)
HGB BLD-MCNC: 8.6 G/DL — LOW (ref 12–16)
IMM GRANULOCYTES NFR BLD AUTO: 0.3 % — SIGNIFICANT CHANGE UP (ref 0.1–0.3)
LYMPHOCYTES # BLD AUTO: 1.39 K/UL — SIGNIFICANT CHANGE UP (ref 1.2–3.4)
LYMPHOCYTES # BLD AUTO: 23.4 % — SIGNIFICANT CHANGE UP (ref 20.5–51.1)
MAGNESIUM SERPL-MCNC: 2 MG/DL — SIGNIFICANT CHANGE UP (ref 1.8–2.4)
MCHC RBC-ENTMCNC: 29.2 PG — SIGNIFICANT CHANGE UP (ref 27–31)
MCHC RBC-ENTMCNC: 32.1 G/DL — SIGNIFICANT CHANGE UP (ref 32–37)
MCV RBC AUTO: 90.8 FL — SIGNIFICANT CHANGE UP (ref 81–99)
MONOCYTES # BLD AUTO: 0.6 K/UL — SIGNIFICANT CHANGE UP (ref 0.1–0.6)
MONOCYTES NFR BLD AUTO: 10.1 % — HIGH (ref 1.7–9.3)
NEUTROPHILS # BLD AUTO: 3.32 K/UL — SIGNIFICANT CHANGE UP (ref 1.4–6.5)
NEUTROPHILS NFR BLD AUTO: 55.8 % — SIGNIFICANT CHANGE UP (ref 42.2–75.2)
NRBC # BLD: 0 /100 WBCS — SIGNIFICANT CHANGE UP (ref 0–0)
PLATELET # BLD AUTO: 287 K/UL — SIGNIFICANT CHANGE UP (ref 130–400)
POTASSIUM SERPL-MCNC: 4.2 MMOL/L — SIGNIFICANT CHANGE UP (ref 3.5–5)
POTASSIUM SERPL-SCNC: 4.2 MMOL/L — SIGNIFICANT CHANGE UP (ref 3.5–5)
RBC # BLD: 2.95 M/UL — LOW (ref 4.2–5.4)
RBC # FLD: 14.2 % — SIGNIFICANT CHANGE UP (ref 11.5–14.5)
SODIUM SERPL-SCNC: 141 MMOL/L — SIGNIFICANT CHANGE UP (ref 135–146)
SPECIMEN SOURCE: SIGNIFICANT CHANGE UP
WBC # BLD: 5.95 K/UL — SIGNIFICANT CHANGE UP (ref 4.8–10.8)
WBC # FLD AUTO: 5.95 K/UL — SIGNIFICANT CHANGE UP (ref 4.8–10.8)

## 2019-03-29 RX ORDER — FUROSEMIDE 40 MG
1 TABLET ORAL
Qty: 0 | Refills: 0 | COMMUNITY

## 2019-03-29 RX ADMIN — Medication 5 MILLIGRAM(S): at 05:48

## 2019-03-29 RX ADMIN — APIXABAN 2.5 MILLIGRAM(S): 2.5 TABLET, FILM COATED ORAL at 05:49

## 2019-03-29 RX ADMIN — GABAPENTIN 300 MILLIGRAM(S): 400 CAPSULE ORAL at 05:49

## 2019-03-29 RX ADMIN — Medication 300 MILLIGRAM(S): at 12:15

## 2019-03-29 RX ADMIN — DULOXETINE HYDROCHLORIDE 30 MILLIGRAM(S): 30 CAPSULE, DELAYED RELEASE ORAL at 12:15

## 2019-03-29 RX ADMIN — Medication 325 MILLIGRAM(S): at 12:15

## 2019-03-29 RX ADMIN — Medication 1 SPRAY(S): at 05:47

## 2019-03-29 RX ADMIN — CHLORHEXIDINE GLUCONATE 1 APPLICATION(S): 213 SOLUTION TOPICAL at 05:50

## 2019-03-29 RX ADMIN — SENNA PLUS 1 TABLET(S): 8.6 TABLET ORAL at 05:48

## 2019-03-29 RX ADMIN — Medication 1 MILLIGRAM(S): at 12:16

## 2019-03-29 RX ADMIN — Medication 5 MILLIGRAM(S): at 12:15

## 2019-03-29 NOTE — PROGRESS NOTE ADULT - SUBJECTIVE AND OBJECTIVE BOX
LINCOLN HEREDIA 78y Female  MRN#: [id number]   CODE STATUS: Full    SUBJECTIVE  Patient is a 78y old lady known to have HTN, DVT on Eliquis, RA, latent TB on Rifampin presented after a mechanical fall. Had an isolated episode of fever in ED for which she took a 4 day course of Rocephin for suspiscion of cystitis. Dced as per ID recommendations Found to have fever and +UA.  The patient in the AM was resting comfortably; Expecting discharge to NH.        OBJECTIVE  PAST MEDICAL & SURGICAL HISTORY  Gout  DVT (deep venous thrombosis)  HTN (hypertension)  Rheumatoid arteritis  Other specified diabetes mellitus with other specified complication, unspecified whether long term insulin use  Primary osteoarthritis of right knee: s/p knee repalcement    ALLERGIES:  clindamycin (Unknown)  erythromycin (Unknown)  penicillin (Unknown)  strawberry (Unknown)    MEDICATIONS:  STANDING MEDICATIONS  allopurinol 300 milliGRAM(s) Oral daily  apixaban 2.5 milliGRAM(s) Oral every 12 hours  chlorhexidine 4% Liquid 1 Application(s) Topical <User Schedule>  DULoxetine 30 milliGRAM(s) Oral daily  ferrous    sulfate 325 milliGRAM(s) Oral daily  fluticasone propionate 50 MICROgram(s)/spray Nasal Spray 1 Spray(s) Both Nostrils two times a day  folic acid 1 milliGRAM(s) Oral daily  gabapentin 300 milliGRAM(s) Oral every 12 hours  leucovorin 5 milliGRAM(s) Oral daily  predniSONE   Tablet 5 milliGRAM(s) Oral daily  rifampin 300 milliGRAM(s) Oral two times a day  senna 1 Tablet(s) Oral two times a day  tamsulosin 0.4 milliGRAM(s) Oral at bedtime    PRN MEDICATIONS  oxyCODONE    5 mG/acetaminophen 325 mG 2 Tablet(s) Oral every 4 hours PRN  oxyCODONE    5 mG/acetaminophen 325 mG 1 Tablet(s) Oral every 8 hours PRN      VITAL SIGNS: Last 24 Hours  T(C): 36.8 (29 Mar 2019 06:38), Max: 37 (28 Mar 2019 12:30)  T(F): 98.3 (29 Mar 2019 06:38), Max: 98.6 (28 Mar 2019 12:30)  HR: 85 (29 Mar 2019 06:38) (82 - 86)  BP: 122/67 (29 Mar 2019 06:38) (112/66 - 141/66)  BP(mean): --  RR: 20 (29 Mar 2019 06:38) (18 - 20)  SpO2: 97% (29 Mar 2019 06:38) (97% - 97%)    LABS:                        8.6    5.95  )-----------( 287      ( 29 Mar 2019 09:21 )             26.8     03-29    141  |  102  |  22<H>  ----------------------------<  192<H>  4.2   |  24  |  1.1    Ca    10.6<H>      29 Mar 2019 09:21  Mg     2.0     03-29                    RADIOLOGY:      PHYSICAL EXAM:    GENERAL: NAD, well-developed, AAOx3  HEENT:  Atraumatic, Normocephalic.   PULMONARY: Clear to auscultation bilaterally; No wheeze  CARDIOVASCULAR: Regular rate and rhythm; No murmurs, rubs, or gallops  GASTROINTESTINAL: Soft, Nontender, Nondistended; Bowel sounds present  MUSCULOSKELETAL: No clubbing, cyanosis, or edema  NEUROLOGY: non-focal   SKIN: No rashes or lesions        ASSESSMENT & PLAN        #Mechanical fall  -Trauma workup negative  -Chronic compression fracture of thoracic and lumbar vertebrae  -Continue pain management  -DC to NH with rehab services    # Isolated episode of Fever   -Resolved  -Received 4 days of Rocephin  -Per ID asytmptomatic bacteriuria - maintain off Abx   -Urine Cx + Proteus ESBL and Enterococcus durans  -Contact isolation    #SHONNA on CKD stage 3  Resolved  Cr trending down to 1.1    # Mild Hypercalcemia  Patient had extensive workup previously wit hno clear etiology ( low PTH,PTHrP, Vit D, and 1,25 vit D, negative SPEP)  DC vit D supplements  Follow up with PMD as OP      # Possible folic acid deficiency  C/w supplementation    #DVT:   Continue Eliquis    #Latent TB:   Continue Rifampin.     FULL CODE   ambulate as tolerated   REGULAR DIET    #dispo; pending d/c to SNF
LENGTH OF HOSPITAL STAY: 2d    CHIEF COMPLAINT:   Patient is a 78y old  Female who presents with a chief complaint of Fall   Difficulty to ambulate (25 Mar 2019 13:41)    EVENTS OVERNIGHT : no issues   no fever       HISTORY OF PRESENTING ILLNESS:    HPI:  78 y/f with PMH of Dm, HTN, DVT on Eliquis, RA, latent TB on Rifampin presented after fall from her recliner x 1 day back. She denies any dizziness/palpitation prior to fall. She has pain in her bilateral hand and bilateral thigh after the fall, she was able to get up after the fall but since this morning was not able to ambulate which prompted her to come to ED. She denies any focal weakness, numbness, loss of consciousness, abnormal body movements, open wounds after the fall. Her bowel movements is normal. She mentions having increased urinary frequency for a while which she attributes to UTI (24 Mar 2019 00:48)    PAST MEDICAL & SURGICAL HISTORY  PAST MEDICAL & SURGICAL HISTORY:  Gout  DVT (deep venous thrombosis)  HTN (hypertension)  Rheumatoid arteritis  Other specified diabetes mellitus with other specified complication, unspecified whether long term insulin use  Primary osteoarthritis of right knee: s/p knee repalcement    SOCIAL HISTORY:    ALLERGIES:  clindamycin (Unknown)  erythromycin (Unknown)  penicillin (Unknown)  strawberry (Unknown)    MEDICATIONS:  STANDING MEDICATIONS  allopurinol 300 milliGRAM(s) Oral daily  apixaban 2.5 milliGRAM(s) Oral every 12 hours  cefTRIAXone   IVPB 1 Gram(s) IV Intermittent every 24 hours  DULoxetine 30 milliGRAM(s) Oral daily  fluticasone propionate 50 MICROgram(s)/spray Nasal Spray 1 Spray(s) Both Nostrils two times a day  folic acid 1 milliGRAM(s) Oral daily  gabapentin 300 milliGRAM(s) Oral every 12 hours  lactated ringers. 1000 milliLiter(s) IV Continuous <Continuous>  leucovorin 5 milliGRAM(s) Oral daily  predniSONE   Tablet 5 milliGRAM(s) Oral daily  rifampin 300 milliGRAM(s) Oral two times a day  tamsulosin 0.4 milliGRAM(s) Oral at bedtime    PRN MEDICATIONS  oxyCODONE    5 mG/acetaminophen 325 mG 2 Tablet(s) Oral every 4 hours PRN  oxyCODONE    5 mG/acetaminophen 325 mG 1 Tablet(s) Oral every 8 hours PRN    VITALS:   T(F): 98.7  HR: 96  BP: 110/55  RR: 18  SpO2: 97%    LABS:                        8.3    5.64  )-----------( 260      ( 25 Mar 2019 08:11 )             26.5     03-25    135  |  98  |  25<H>  ----------------------------<  128<H>  4.3   |  22  |  1.6<H>    Ca    10.4<H>      25 Mar 2019 08:11  Mg     1.8     03-25    TPro  5.2<L>  /  Alb  3.2<L>  /  TBili  0.3  /  DBili  x   /  AST  8   /  ALT  <5  /  AlkPhos  57  03-25              Culture - Blood (collected 24 Mar 2019 06:50)  Source: .Blood Blood  Preliminary Report (25 Mar 2019 12:01):    No growth to date.    Culture - Urine (collected 23 Mar 2019 14:30)  Source: .Urine Clean Catch (Midstream)  Preliminary Report (24 Mar 2019 18:55):    50,000 - 99,000 CFU/mL Gram Negative Rods    >100,000 CFU/ml Gram positive cocci in pairs          RADIOLOGY:  < from: CT Abdomen and Pelvis w/ IV Cont (03.23.19 @ 20:24) >    IMPRESSION:     Circumferential urinary bladder wall thickening with mild adjacent fatty   infiltration. Correlate with urinalysis for cystitis.    No CT evidence of acute traumatic intra-abdominal pathology.    Stable chronic findings as above.        < end of copied text >    PHYSICAL EXAM:  GEN: No acute distress  HEENT: within normal range  LUNGS: Clear to auscultation bilaterally   HEART: S1/S2 present. RRR.   ABD: Soft, non-tender, non-distended. Bowel sounds present  EXT: no LE edema  NEURO: AAOX3  non focal
LINCOLN HEREDIA  78y  Female      Patient is a 78y old  Female who presents with a chief complaint of Fall   Difficulty to ambulate (25 Mar 2019 12:54)      INTERVAL HPI/OVERNIGHT EVENTS:  She feels ok, she denies any pain. No fever today, she is concerned about rehab option, she states she only has only one day left from her STR stay then she has to pay out of her pocket.     Vital Signs Last 24 Hrs  T(C): 37.1 (25 Mar 2019 07:47), Max: 37.1 (25 Mar 2019 07:47)  T(F): 98.7 (25 Mar 2019 07:47), Max: 98.7 (25 Mar 2019 07:47)  HR: 96 (25 Mar 2019 08:36) (79 - 96)  BP: 110/55 (25 Mar 2019 08:36) (88/54 - 110/55)  BP(mean): --  RR: 18 (25 Mar 2019 07:47) (18 - 18)  SpO2: 97% (25 Mar 2019 07:47) (97% - 98%)            Consultant(s) Notes Reviewed:  [x ] YES  [ ] NO          MEDICATIONS  (STANDING):  allopurinol 300 milliGRAM(s) Oral daily  apixaban 2.5 milliGRAM(s) Oral every 12 hours  cefTRIAXone   IVPB 1 Gram(s) IV Intermittent every 24 hours  DULoxetine 30 milliGRAM(s) Oral daily  fluticasone propionate 50 MICROgram(s)/spray Nasal Spray 1 Spray(s) Both Nostrils two times a day  folic acid 1 milliGRAM(s) Oral daily  gabapentin 300 milliGRAM(s) Oral every 12 hours  lactated ringers. 1000 milliLiter(s) (75 mL/Hr) IV Continuous <Continuous>  leucovorin 5 milliGRAM(s) Oral daily  predniSONE   Tablet 5 milliGRAM(s) Oral daily  rifampin 300 milliGRAM(s) Oral two times a day  tamsulosin 0.4 milliGRAM(s) Oral at bedtime    MEDICATIONS  (PRN):  oxyCODONE    5 mG/acetaminophen 325 mG 2 Tablet(s) Oral every 4 hours PRN Severe Pain (7 - 10)  oxyCODONE    5 mG/acetaminophen 325 mG 1 Tablet(s) Oral every 8 hours PRN Moderate Pain (4 - 6)      LABS                          8.3    5.64  )-----------( 260      ( 25 Mar 2019 08:11 )             26.5         135  |  98  |  25<H>  ----------------------------<  128<H>  4.3   |  22  |  1.6<H>    Ca    10.4<H>      25 Mar 2019 08:11  Mg     1.8         TPro  5.2<L>  /  Alb  3.2<L>  /  TBili  0.3  /  DBili  x   /  AST  8   /  ALT  <5  /  AlkPhos  57        Urinalysis Basic - ( 23 Mar 2019 14:30 )    Color: Yellow / Appearance: Clear / S.010 / pH: x  Gluc: x / Ketone: Negative  / Bili: Negative / Urobili: 0.2 mg/dL   Blood: x / Protein: Trace mg/dL / Nitrite: Negative   Leuk Esterase: Trace / RBC: x / WBC 10-25 /HPF   Sq Epi: x / Non Sq Epi: x / Bacteria: Few /HPF        Lactate Trend        CAPILLARY BLOOD GLUCOSE      POCT Blood Glucose.: 133 mg/dL (25 Mar 2019 08:21)      Culture - Blood (collected 19 @ 06:50)  Source: .Blood Blood  Preliminary Report (19 @ 12:01):    No growth to date.    Culture - Urine (collected 19 @ 14:30)  Source: .Urine Clean Catch (Midstream)  Preliminary Report (19 @ 18:55):    50,000 - 99,000 CFU/mL Gram Negative Rods    >100,000 CFU/ml Gram positive cocci in pairs        RADIOLOGY & ADDITIONAL TESTS:    Imaging Personally Reviewed:  [ ] YES  [ ] NO    HEALTH ISSUES - PROBLEM Dx:        PHYSICAL EXAM:  GENERAL: NAD, well-developed  HEAD:  Atraumatic, Normocephalic  EYES: EOMI, PERRLA, conjunctiva and sclera clear  NECK: Supple, No JVD  CHEST/LUNG: Clear to auscultation bilaterally; No wheeze  HEART: Regular rate and rhythm; S1 S2  ABDOMEN: Soft, Nontender, Nondistended; Bowel sounds present  EXTREMITIES:  2+ Peripheral Pulses, No clubbing, cyanosis, or edema  PSYCH: AAOx3  NEUROLOGY: non-focal  SKIN: No rashes or lesions
LINCOLN HEREDIA  78y, Female      OVERNIGHT EVENTS:    no fevers, feels well  no  complaints    VITALS:  T(F): 97.7, Max: 97.7 (03-26-19 @ 04:54)  HR: 81  BP: 113/56  RR: 18Vital Signs Last 24 Hrs  T(C): 36.5 (26 Mar 2019 04:54), Max: 36.5 (26 Mar 2019 04:54)  T(F): 97.7 (26 Mar 2019 04:54), Max: 97.7 (26 Mar 2019 04:54)  HR: 81 (26 Mar 2019 04:54) (80 - 81)  BP: 113/56 (26 Mar 2019 04:54) (103/54 - 113/56)  BP(mean): --  RR: 18 (26 Mar 2019 04:54) (18 - 18)  SpO2: 97% (25 Mar 2019 15:00) (97% - 97%)    TESTS & MEASUREMENTS:                        8.7    6.59  )-----------( 274      ( 26 Mar 2019 07:34 )             27.2     03-25    135  |  98  |  25<H>  ----------------------------<  128<H>  4.3   |  22  |  1.6<H>    Ca    10.4<H>      25 Mar 2019 08:11  Mg     1.8     03-25    TPro  5.2<L>  /  Alb  3.2<L>  /  TBili  0.3  /  DBili  x   /  AST  8   /  ALT  <5  /  AlkPhos  57  03-25    LIVER FUNCTIONS - ( 25 Mar 2019 08:11 )  Alb: 3.2 g/dL / Pro: 5.2 g/dL / ALK PHOS: 57 U/L / ALT: <5 U/L / AST: 8 U/L / GGT: x             Culture - Blood (collected 03-24-19 @ 06:50)  Source: .Blood Blood  Preliminary Report (03-25-19 @ 12:01):    No growth to date.    Culture - Urine (collected 03-23-19 @ 14:30)  Source: .Urine Clean Catch (Midstream)  Final Report (03-25-19 @ 17:51):    50,000 - 99,000 CFU/mL Proteus mirabilis ESBL    >100,000 CFU/ml Enterococcus durans/hirae  Organism: Enterococcus durans/hirae  Proteus mirabilis ESBL (03-25-19 @ 17:51)  Organism: Proteus mirabilis ESBL (03-25-19 @ 17:51)      -  Amikacin: S <=16      -  Ampicillin: R >16 These ampicillin results predict results for amoxicillin      -  Ampicillin/Sulbactam: R <=8/4      -  Aztreonam: R <=4      -  Cefazolin: R >16 For uncomplicated UTI with K. pneumoniae, E. coli, or P. mirablis: SHANNAN <=16 is sensitive and SHANNAN >=32 is resistant. This also predicts results for oral agents cefaclor, cefdinir, cefpodoxime, cefprozil, cefuroxime axetil, cephalexin and locarbef for uncomplicated UTI. Note that some isolates may be susceptible to these agents while testing resistant to cefazolin.      -  Cefepime: R 16      -  Cefoxitin: S <=8      -  Ceftriaxone: R >32 Enterobacter, Citrobacter, and Serratia may develop resistance during prolonged therapy      -  Ciprofloxacin: R >2      -  Ertapenem: S <=1      -  Gentamicin: S <=4      -  Levofloxacin: R >4      -  Meropenem: S <=1      -  Nitrofurantoin: R >64 Should not be used to treat pyelonephritis      -  Piperacillin/Tazobactam: R <=16      -  Tobramycin: S <=4      -  Trimethoprim/Sulfamethoxazole: R >2/38      Method Type: SHANNAN  Organism: Enterococcus durans/hirae (03-25-19 @ 17:51)      -  Ampicillin: R >8 Predicts results to ampicillin/sulbactam, amoxacillin-clavulanate and  piperacillin-tazobactam.      -  Ciprofloxacin: R >2      -  Levofloxacin: R >4      -  Nitrofurantoin: S <=32 Should not be used to treat pyelonephritis.      -  Tetra/Doxy: R >8      -  Vancomycin: R >16      Method Type: SHANNAN            RADIOLOGY & ADDITIONAL TESTS:    ANTIBIOTICS:  cefTRIAXone   IVPB 1 Gram(s) IV Intermittent every 24 hours  rifampin 300 milliGRAM(s) Oral two times a day
LINCOLN HEREDIA  78y, Female      OVERNIGHT EVENTS:  no fevers, feels well, has no complaints  no  complaints      VITALS:  T(F): 96.7, Max: 97.4 (03-26-19 @ 13:14)  HR: 79  BP: 127/55  RR: 18Vital Signs Last 24 Hrs  T(C): 35.9 (27 Mar 2019 05:06), Max: 36.3 (26 Mar 2019 13:14)  T(F): 96.7 (27 Mar 2019 05:06), Max: 97.4 (26 Mar 2019 13:14)  HR: 79 (27 Mar 2019 05:06) (79 - 88)  BP: 127/55 (27 Mar 2019 05:06) (96/56 - 127/55)  BP(mean): --  RR: 18 (27 Mar 2019 05:06) (18 - 18)  SpO2: --    TESTS & MEASUREMENTS:                        8.7    6.59  )-----------( 274      ( 26 Mar 2019 07:34 )             27.2     03-26    137  |  100  |  24<H>  ----------------------------<  127<H>  4.0   |  22  |  1.4    Ca    10.6<H>      26 Mar 2019 07:34  Phos  4.6     03-26  Mg     1.8     03-26    TPro  5.2<L>  /  Alb  3.3<L>  /  TBili  0.2  /  DBili  x   /  AST  9   /  ALT  <5  /  AlkPhos  61  03-26    LIVER FUNCTIONS - ( 26 Mar 2019 07:34 )  Alb: 3.3 g/dL / Pro: 5.2 g/dL / ALK PHOS: 61 U/L / ALT: <5 U/L / AST: 9 U/L / GGT: x             Culture - Blood (collected 03-25-19 @ 08:11)  Source: .Blood None  Preliminary Report (03-26-19 @ 18:01):    No growth to date.    Culture - Blood (collected 03-24-19 @ 06:50)  Source: .Blood Blood  Preliminary Report (03-25-19 @ 12:01):    No growth to date.    Culture - Urine (collected 03-23-19 @ 14:30)  Source: .Urine Clean Catch (Midstream)  Final Report (03-25-19 @ 17:51):    50,000 - 99,000 CFU/mL Proteus mirabilis ESBL    >100,000 CFU/ml Enterococcus durans/hirae  Organism: Enterococcus durans/hirae  Proteus mirabilis ESBL (03-25-19 @ 17:51)  Organism: Proteus mirabilis ESBL (03-25-19 @ 17:51)      -  Amikacin: S <=16      -  Ampicillin: R >16 These ampicillin results predict results for amoxicillin      -  Ampicillin/Sulbactam: R <=8/4      -  Aztreonam: R <=4      -  Cefazolin: R >16 For uncomplicated UTI with K. pneumoniae, E. coli, or P. mirablis: SHANNAN <=16 is sensitive and SHANNAN >=32 is resistant. This also predicts results for oral agents cefaclor, cefdinir, cefpodoxime, cefprozil, cefuroxime axetil, cephalexin and locarbef for uncomplicated UTI. Note that some isolates may be susceptible to these agents while testing resistant to cefazolin.      -  Cefepime: R 16      -  Cefoxitin: S <=8      -  Ceftriaxone: R >32 Enterobacter, Citrobacter, and Serratia may develop resistance during prolonged therapy      -  Ciprofloxacin: R >2      -  Ertapenem: S <=1      -  Gentamicin: S <=4      -  Levofloxacin: R >4      -  Meropenem: S <=1      -  Nitrofurantoin: R >64 Should not be used to treat pyelonephritis      -  Piperacillin/Tazobactam: R <=16      -  Tobramycin: S <=4      -  Trimethoprim/Sulfamethoxazole: R >2/38      Method Type: SHANNAN  Organism: Enterococcus durans/hirae (03-25-19 @ 17:51)      -  Ampicillin: R >8 Predicts results to ampicillin/sulbactam, amoxacillin-clavulanate and  piperacillin-tazobactam.      -  Ciprofloxacin: R >2      -  Levofloxacin: R >4      -  Nitrofurantoin: S <=32 Should not be used to treat pyelonephritis.      -  Tetra/Doxy: R >8      -  Vancomycin: R >16      Method Type: SHANNAN            RADIOLOGY & ADDITIONAL TESTS:    ANTIBIOTICS:  cefTRIAXone   IVPB 1 Gram(s) IV Intermittent every 24 hours  rifampin 300 milliGRAM(s) Oral two times a day
LINCOLN HEREDIA  [facility] T[facility] 0[address] ([facility] T[facility])            Patient was evaluated and examined  by bedside, tolerating diet well, c/o mild heel area pain        REVIEW OF SYSTEMS:  please see pertinent positives mentioned above, all other 12 ROS negative      T(C): , Max: 36.5 (03-26-19 @ 04:54)  HR: 81 (03-26-19 @ 04:54)  BP: 113/56 (03-26-19 @ 04:54)  RR: 18 (03-26-19 @ 04:54)  SpO2: 97% (03-25-19 @ 15:00)  CAPILLARY BLOOD GLUCOSE      POCT Blood Glucose.: 136 mg/dL (26 Mar 2019 07:35)      PHYSICAL EXAM:  General: NAD, AAOX3, patient is laying comfortably in bed  HEENT: AT, NC, Supple, NO JVD, NO CB  Lungs: CTA B/L, no wheezing, no rhonchi  CVS: normal S1, S2, RRR, NO M/G/R  Abdomen: soft, bowel sounds present, non-tender, non-distended  Extremities: no edema, no clubbing, no cyanosis, positive peripheral pulses b/l  Neuro: no acute focal neurological deficits, gait not tested  Skin: no rush, no ecchymosis      LAB  CBC  Date: 03-26-19 @ 07:34  Mean cell Idhlztltcq92.2  Mean cell Hemoglobin Conc32.0  Mean cell Volum 91.3  Platelet count-Automate 274  RBC Count 2.98  Red Cell Distrib Width14.6  WBC Count6.59  % Albumin, Urine--  Hematocrit 27.2  Hemoglobin 8.7  CBC  Date: 03-25-19 @ 08:11  Mean cell Nxcrkhensk12.8  Mean cell Hemoglobin Conc31.3  Mean cell Volum 92.0  Platelet count-Automate 260  RBC Count 2.88  Red Cell Distrib Width14.6  WBC Count5.64  % Albumin, Urine--  Hematocrit 26.5  Hemoglobin 8.3  CBC  Date: 03-24-19 @ 10:05  Mean cell Gaehbzbydf93.1  Mean cell Hemoglobin Conc32.2  Mean cell Volum 90.3  Platelet count-Automate 278  RBC Count 2.99  Red Cell Distrib Width14.6  WBC Count8.81  % Albumin, Urine--  Hematocrit 27.0  Hemoglobin 8.7  CBC  Date: 03-23-19 @ 13:15  Mean cell Jnxvlwslev84.2  Mean cell Hemoglobin Conc32.1  Mean cell Volum 90.9  Platelet count-Automate 294  RBC Count 3.08  Red Cell Distrib Width14.4  WBC Count6.84  % Albumin, Urine--  Hematocrit 28.0  Hemoglobin 9.0    [facility]  03-26-19 @ 07:34  Blood Gas Arterial-Calcium,Ionized--  Blood Urea Nitrogen, Serum 24 mg/dL<H> [10 - 20]  Carbon Dioxide, Serum22 mmol/L [17 - 32]  Chloride, Gvdyp617 mmol/L [98 - 110]  Creatinie, Serum1.4 mg/dL [0.7 - 1.5]  Glucose, Xsbjg840 mg/dL<H> [70 - 99]  Potassium, Serum4.0 mmol/L [3.5 - 5.0]  Sodium, Serum 137 mmol/L [135 - 146]  [facility]  03-25-19 @ 08:11  Blood Gas Arterial-Calcium,Ionized--  Blood Urea Nitrogen, Serum 25 mg/dL<H> [10 - 20]  Carbon Dioxide, Serum22 mmol/L [17 - 32]  Chloride, Serum98 mmol/L [98 - 110]  Creatinie, Serum1.6 mg/dL<H> [0.7 - 1.5]  Glucose, Lahxc576 mg/dL<H> [70 - 99]  Potassium, Serum4.3 mmol/L [3.5 - 5.0]  Sodium, Serum 135 mmol/L [135 - 146]  [facility]  03-24-19 @ 10:05  Blood Gas Arterial-Calcium,Ionized--  Blood Urea Nitrogen, Serum 19 mg/dL [10 - 20]  Carbon Dioxide, Serum17 mmol/L [17 - 32]  Chloride, Serum95 mmol/L<L> [98 - 110]  Creatinie, Serum1.2 mg/dL [0.7 - 1.5]  Glucose, Jswrs807 mg/dL<H> [70 - 99]  Potassium, Serum4.0 mmol/L [3.5 - 5.0]  Sodium, Serum 131 mmol/L<L> [135 - 146]  [facility]  03-23-19 @ 13:15  Blood Gas Arterial-Calcium,Ionized--  Blood Urea Nitrogen, Serum 20 mg/dL [10 - 20]  Carbon Dioxide, Serum21 mmol/L [17 - 32]  Chloride, Ohqkj674 mmol/L [98 - 110]  Creatinie, Serum1.1 mg/dL [0.7 - 1.5]  Glucose, Gplry370 mg/dL<H> [70 - 99]  Potassium, Serum4.4 mmol/L [3.5 - 5.0] [Slighty Hemolyzed use with Caution]  Sodium, Serum 139 mmol/L [135 - 146]              Microbiology:    Culture - Blood (collected 03-24-19 @ 06:50)  Source: .Blood Blood  Preliminary Report (03-25-19 @ 12:01):    No growth to date.    Culture - Urine (collected 03-23-19 @ 14:30)  Source: .Urine Clean Catch (Midstream)  Final Report (03-25-19 @ 17:51):    50,000 - 99,000 CFU/mL Proteus mirabilis ESBL    >100,000 CFU/ml Enterococcus durans/hirae  Organism: Enterococcus durans/hirae  Proteus mirabilis ESBL (03-25-19 @ 17:51)  Organism: Proteus mirabilis ESBL (03-25-19 @ 17:51)      -  Amikacin: S <=16      -  Ampicillin: R >16 These ampicillin results predict results for amoxicillin      -  Ampicillin/Sulbactam: R <=8/4      -  Aztreonam: R <=4      -  Cefazolin: R >16 For uncomplicated UTI with K. pneumoniae, E. coli, or P. mirablis: SHANNAN <=16 is sensitive and SHANNAN >=32 is resistant. This also predicts results for oral agents cefaclor, cefdinir, cefpodoxime, cefprozil, cefuroxime axetil, cephalexin and locarbef for uncomplicated UTI. Note that some isolates may be susceptible to these agents while testing resistant to cefazolin.      -  Cefepime: R 16      -  Cefoxitin: S <=8      -  Ceftriaxone: R >32 Enterobacter, Citrobacter, and Serratia may develop resistance during prolonged therapy      -  Ciprofloxacin: R >2      -  Ertapenem: S <=1      -  Gentamicin: S <=4      -  Levofloxacin: R >4      -  Meropenem: S <=1      -  Nitrofurantoin: R >64 Should not be used to treat pyelonephritis      -  Piperacillin/Tazobactam: R <=16      -  Tobramycin: S <=4      -  Trimethoprim/Sulfamethoxazole: R >2/38      Method Type: SHANNAN  Organism: Enterococcus durans/hirae (03-25-19 @ 17:51)      -  Ampicillin: R >8 Predicts results to ampicillin/sulbactam, amoxacillin-clavulanate and  piperacillin-tazobactam.      -  Ciprofloxacin: R >2      -  Levofloxacin: R >4      -  Nitrofurantoin: S <=32 Should not be used to treat pyelonephritis.      -  Tetra/Doxy: R >8      -  Vancomycin: R >16      Method Type: SHANNAN              Medications:  allopurinol 300 milliGRAM(s) Oral daily  apixaban 2.5 milliGRAM(s) Oral every 12 hours  cefTRIAXone   IVPB 1 Gram(s) IV Intermittent every 24 hours  chlorhexidine 4% Liquid 1 Application(s) Topical <User Schedule>  cholecalciferol 1000 Unit(s) Oral daily  DULoxetine 30 milliGRAM(s) Oral daily  ferrous    sulfate 325 milliGRAM(s) Oral daily  fluticasone propionate 50 MICROgram(s)/spray Nasal Spray 1 Spray(s) Both Nostrils two times a day  folic acid 1 milliGRAM(s) Oral daily  gabapentin 300 milliGRAM(s) Oral every 12 hours  lactated ringers. 1000 milliLiter(s) IV Continuous <Continuous>  leucovorin 5 milliGRAM(s) Oral daily  oxyCODONE    5 mG/acetaminophen 325 mG 2 Tablet(s) Oral every 4 hours PRN  oxyCODONE    5 mG/acetaminophen 325 mG 1 Tablet(s) Oral every 8 hours PRN  predniSONE   Tablet 5 milliGRAM(s) Oral daily  rifampin 300 milliGRAM(s) Oral two times a day  senna 1 Tablet(s) Oral two times a day  tamsulosin 0.4 milliGRAM(s) Oral at bedtime        Assessment and Plan:  78 y/f with PMH of Dm, HTN, DVT on Eliquis, RA, latent TB on Rifampin presented after fall from her recliner x 1 day back. Found to have fever and +UA.      # Fever due to acute Pyelonephritis?  resolved now  patient with positive UA, fever.   Urine CxS- Enterococcus and proteus  No leukocytosis.   -patient was tx.  Rocephin - post ID f/up to d/c all abx. from 3/26/19  .     #S/p Fall:   Chronic compression fracture of thoracic and lumbar vertebrae.   No LOC   Continue Oxycodone and gabapentin.   Rehab recommended home PT vs STR      #SHONNA on CKD stage 3:   possible from UTI  Encourage oral hydration. Continue UTI treatment.   Monitor BMP.     #DVT:   Continue Eliquis    # Vitamin D deficiency  - started on vitamin D tx.    #Iron deficiency anemia  -started on daily iron tx    #Latent TB:   Continue Rifampin.     FULL CODE   ambulate as tolerated   REGULAR DIET    #dispo; patient is medically stable for d/c to [facility] , f/up PT today    #Progress Note Handoff: Pending Consults_________,Tests________,Test Results_______,Other;  Family discussion: pt. by bedside Disposition: Home__xx_/SNFxx _
LINCOLN HEREDIA 78y Female  MRN#: [id number]       SUBJECTIVE  Patient is a 78y old Female who presents with a chief complaint of Fall   Difficulty to ambulate (26 Mar 2019 11:10)  Currently admitted to medicine with the primary diagnosis of pyelonephritis. She was resting comfortably in the AM; no complaints and eager for discharge.    OBJECTIVE  PAST MEDICAL & SURGICAL HISTORY  Gout  DVT (deep venous thrombosis)  HTN (hypertension)  Rheumatoid arteritis  Other specified diabetes mellitus with other specified complication, unspecified whether long term insulin use  Primary osteoarthritis of right knee: s/p knee repalcement    ALLERGIES:  clindamycin (Unknown)  erythromycin (Unknown)  penicillin (Unknown)  strawberry (Unknown)    MEDICATIONS:  STANDING MEDICATIONS  allopurinol 300 milliGRAM(s) Oral daily  apixaban 2.5 milliGRAM(s) Oral every 12 hours  cefTRIAXone   IVPB 1 Gram(s) IV Intermittent every 24 hours  chlorhexidine 4% Liquid 1 Application(s) Topical <User Schedule>  cholecalciferol 1000 Unit(s) Oral daily  DULoxetine 30 milliGRAM(s) Oral daily  ferrous    sulfate 325 milliGRAM(s) Oral daily  fluticasone propionate 50 MICROgram(s)/spray Nasal Spray 1 Spray(s) Both Nostrils two times a day  folic acid 1 milliGRAM(s) Oral daily  gabapentin 300 milliGRAM(s) Oral every 12 hours  lactated ringers. 1000 milliLiter(s) IV Continuous <Continuous>  leucovorin 5 milliGRAM(s) Oral daily  predniSONE   Tablet 5 milliGRAM(s) Oral daily  rifampin 300 milliGRAM(s) Oral two times a day  senna 1 Tablet(s) Oral two times a day  tamsulosin 0.4 milliGRAM(s) Oral at bedtime    PRN MEDICATIONS  oxyCODONE    5 mG/acetaminophen 325 mG 2 Tablet(s) Oral every 4 hours PRN  oxyCODONE    5 mG/acetaminophen 325 mG 1 Tablet(s) Oral every 8 hours PRN      VITAL SIGNS: Last 24 Hours  T(C): 36.3 (26 Mar 2019 13:14), Max: 36.5 (26 Mar 2019 04:54)  T(F): 97.4 (26 Mar 2019 13:14), Max: 97.7 (26 Mar 2019 04:54)  HR: 88 (26 Mar 2019 13:14) (80 - 88)  BP: 98/55 (26 Mar 2019 13:14) (98/55 - 113/56)  BP(mean): --  RR: 18 (26 Mar 2019 13:14) (18 - 18)  SpO2: 97% (25 Mar 2019 15:00) (97% - 97%)    LABS:                        8.7    6.59  )-----------( 274      ( 26 Mar 2019 07:34 )             27.2     03-26    137  |  100  |  24<H>  ----------------------------<  127<H>  4.0   |  22  |  1.4    Ca    10.6<H>      26 Mar 2019 07:34  Phos  4.6     03-26  Mg     1.8     03-26    TPro  5.2<L>  /  Alb  3.3<L>  /  TBili  0.2  /  DBili  x   /  AST  9   /  ALT  <5  /  AlkPhos  61  03-26    Creatine Kinase, Serum: 34 U/L (03-26-19 @ 07:34)  Troponin T, Serum: 0.06 ng/mL <HH> (03-26-19 @ 07:34)      Culture - Blood (collected 24 Mar 2019 06:50)  Source: .Blood Blood  Preliminary Report (25 Mar 2019 12:01):    No growth to date.    Culture - Urine (collected 23 Mar 2019 14:30)  Source: .Urine Clean Catch (Midstream)  Final Report (25 Mar 2019 17:51):    50,000 - 99,000 CFU/mL Proteus mirabilis ESBL    >100,000 CFU/ml Enterococcus durans/hirae  Organism: Enterococcus durans/hirae  Proteus mirabilis ESBL (25 Mar 2019 17:51)  Organism: Proteus mirabilis ESBL (25 Mar 2019 17:51)  Organism: Enterococcus durans/hirae (25 Mar 2019 17:51)      CARDIAC MARKERS ( 26 Mar 2019 07:34 )  x     / 0.06 ng/mL / 34 U/L / x     / 1.4 ng/mL      PHYSICAL EXAM:    GENERAL: NAD, well-developed, AAOx3  HEENT:  Atraumatic, Normocephalic.  PULMONARY: Clear to auscultation bilaterally; No wheeze  CARDIOVASCULAR: Regular rate and rhythm; No murmurs, rubs, or gallops  GASTROINTESTINAL: Soft, Nontender, Nondistended; Bowel sounds present  MUSCULOSKELETAL: No clubbing, cyanosis, or edema  NEUROLOGY: non-focal  SKIN: No rashes or lesions
LINCOLN HEREDIA 78y Female  MRN#: [id number]       SUBJECTIVE  Patient is a 78y old Female who presents with a chief complaint of Fall   Difficulty to ambulate (27 Mar 2019 09:44)  Currently admitted to medicine with the primary diagnosis of pyelonephritis. The patient was resting comfortably in the AM; reported not having walked yesterday though she is eager to get back to baseline functional status.      OBJECTIVE  PAST MEDICAL & SURGICAL HISTORY  Gout  DVT (deep venous thrombosis)  HTN (hypertension)  Rheumatoid arteritis  Other specified diabetes mellitus with other specified complication, unspecified whether long term insulin use  Primary osteoarthritis of right knee: s/p knee repalcement    ALLERGIES:  clindamycin (Unknown)  erythromycin (Unknown)  penicillin (Unknown)  strawberry (Unknown)    MEDICATIONS:  STANDING MEDICATIONS  allopurinol 300 milliGRAM(s) Oral daily  apixaban 2.5 milliGRAM(s) Oral every 12 hours  chlorhexidine 4% Liquid 1 Application(s) Topical <User Schedule>  cholecalciferol 1000 Unit(s) Oral daily  DULoxetine 30 milliGRAM(s) Oral daily  ferrous    sulfate 325 milliGRAM(s) Oral daily  fluticasone propionate 50 MICROgram(s)/spray Nasal Spray 1 Spray(s) Both Nostrils two times a day  folic acid 1 milliGRAM(s) Oral daily  gabapentin 300 milliGRAM(s) Oral every 12 hours  leucovorin 5 milliGRAM(s) Oral daily  predniSONE   Tablet 5 milliGRAM(s) Oral daily  rifampin 300 milliGRAM(s) Oral two times a day  senna 1 Tablet(s) Oral two times a day  tamsulosin 0.4 milliGRAM(s) Oral at bedtime    PRN MEDICATIONS  oxyCODONE    5 mG/acetaminophen 325 mG 2 Tablet(s) Oral every 4 hours PRN  oxyCODONE    5 mG/acetaminophen 325 mG 1 Tablet(s) Oral every 8 hours PRN      VITAL SIGNS: Last 24 Hours  T(C): 36.8 (28 Mar 2019 05:18), Max: 36.8 (28 Mar 2019 05:18)  T(F): 98.2 (28 Mar 2019 05:18), Max: 98.2 (28 Mar 2019 05:18)  HR: 81 (28 Mar 2019 05:18) (67 - 81)  BP: 116/59 (28 Mar 2019 05:18) (110/56 - 126/62)  BP(mean): --  RR: 18 (28 Mar 2019 05:18) (18 - 18)  SpO2: --    LABS:                        8.2    4.78  )-----------( 289      ( 28 Mar 2019 08:08 )             25.7     03-28    138  |  100  |  24<H>  ----------------------------<  129<H>  4.2   |  25  |  1.3    Ca    10.6<H>      28 Mar 2019 08:08  Phos  4.6     03-26  Mg     1.7     03-28      Culture - Blood (collected 26 Mar 2019 07:34)  Source: .Blood None  Preliminary Report (27 Mar 2019 19:04):    No growth to date.    PHYSICAL EXAM:    GENERAL: NAD, well-developed, AAOx3  HEENT:  Atraumatic, Normocephalic.   PULMONARY: Clear to auscultation bilaterally; No wheeze  CARDIOVASCULAR: Regular rate and rhythm; No murmurs, rubs, or gallops  GASTROINTESTINAL: Soft, Nontender, Nondistended; Bowel sounds present  MUSCULOSKELETAL: No clubbing, cyanosis, or edema  NEUROLOGY: non-focal  SKIN: No rashes or lesions
LINCOLN HEREDIA 78y Female  MRN#: [id number]       SUBJECTIVE  Patient is a 78y old Female who presents with a chief complaint of Fall   Difficulty to ambulate (27 Mar 2019 08:22)  Currently admitted to medicine with the primary diagnosis of pyelonephritis. The patient in the AM was resting comfortably; reports last bowel movement was Monday; is enthusiastic to work w/ PT to begin walking.      OBJECTIVE  PAST MEDICAL & SURGICAL HISTORY  Gout  DVT (deep venous thrombosis)  HTN (hypertension)  Rheumatoid arteritis  Other specified diabetes mellitus with other specified complication, unspecified whether long term insulin use  Primary osteoarthritis of right knee: s/p knee repalcement    ALLERGIES:  clindamycin (Unknown)  erythromycin (Unknown)  penicillin (Unknown)  strawberry (Unknown)    MEDICATIONS:  STANDING MEDICATIONS  allopurinol 300 milliGRAM(s) Oral daily  apixaban 2.5 milliGRAM(s) Oral every 12 hours  chlorhexidine 4% Liquid 1 Application(s) Topical <User Schedule>  cholecalciferol 1000 Unit(s) Oral daily  DULoxetine 30 milliGRAM(s) Oral daily  ferrous    sulfate 325 milliGRAM(s) Oral daily  fluticasone propionate 50 MICROgram(s)/spray Nasal Spray 1 Spray(s) Both Nostrils two times a day  folic acid 1 milliGRAM(s) Oral daily  gabapentin 300 milliGRAM(s) Oral every 12 hours  lactated ringers. 1000 milliLiter(s) IV Continuous <Continuous>  leucovorin 5 milliGRAM(s) Oral daily  predniSONE   Tablet 5 milliGRAM(s) Oral daily  rifampin 300 milliGRAM(s) Oral two times a day  senna 1 Tablet(s) Oral two times a day  tamsulosin 0.4 milliGRAM(s) Oral at bedtime    PRN MEDICATIONS  oxyCODONE    5 mG/acetaminophen 325 mG 2 Tablet(s) Oral every 4 hours PRN  oxyCODONE    5 mG/acetaminophen 325 mG 1 Tablet(s) Oral every 8 hours PRN      VITAL SIGNS: Last 24 Hours  T(C): 35.9 (27 Mar 2019 05:06), Max: 36.3 (26 Mar 2019 13:14)  T(F): 96.7 (27 Mar 2019 05:06), Max: 97.4 (26 Mar 2019 13:14)  HR: 79 (27 Mar 2019 05:06) (79 - 88)  BP: 127/55 (27 Mar 2019 05:06) (96/56 - 127/55)  BP(mean): --  RR: 18 (27 Mar 2019 05:06) (18 - 18)  SpO2: --    LABS:                        8.0    3.63  )-----------( 251      ( 27 Mar 2019 08:07 )             25.3     03-26    137  |  100  |  24<H>  ----------------------------<  127<H>  4.0   |  22  |  1.4    Ca    10.6<H>      26 Mar 2019 07:34  Phos  4.6     03-26  Mg     1.8     03-26    TPro  5.2<L>  /  Alb  3.3<L>  /  TBili  0.2  /  DBili  x   /  AST  9   /  ALT  <5  /  AlkPhos  61  03-26      Culture - Blood (collected 25 Mar 2019 08:11)  Source: .Blood None  Preliminary Report (26 Mar 2019 18:01):    No growth to date.      CARDIAC MARKERS ( 26 Mar 2019 07:34 )  x     / 0.06 ng/mL / 34 U/L / x     / 1.4 ng/mL    PHYSICAL EXAM:    GENERAL: NAD, well-developed, AAOx3  HEENT:  Atraumatic, Normocephalic.   PULMONARY: Clear to auscultation bilaterally; No wheeze  CARDIOVASCULAR: Regular rate and rhythm; No murmurs, rubs, or gallops  GASTROINTESTINAL: Soft, Nontender, Nondistended; Bowel sounds present  MUSCULOSKELETAL: No clubbing, cyanosis, or edema  NEUROLOGY: non-focal   SKIN: No rashes or lesions
Received call from hospital this morning from Dr. Juju Kirkland for patient concerning a prior adverse reaction to an antibiotic during January 2018 admission and surgery.  She presently has no high WBC count but does have temp with findings of possible UTI. I spoke to patient twice this morning. She has no pedal complaints. She is allergic to many antibiotics including PCN Clinda Erythromycin  After review of records, her pedal cultures returned Carbapenem resistant pseudomonas with sensitivities as follows  I  Azactam  S Cefepime  S Ceftazidime    According to record review she had an adverse reaction to Azactam which includied "dizziness" as well as a possible reaction to Cefepime which may have included mental status changes. I am unclear about her DC antibiotic when she was released to [facility] but she states she was receiving in once daily for 10 days. I would think it was Ceftazidime and this presented no issue. Awaiting urine cultures to target the infectious etiology. In the past she has taken Rifampin, Doxycycline without incident.      She is also concerned about discharge and the possibility she would have to pay out of pocket for re-entrance to [facility]. Would the [facility] rehab unit be an option??  Will discuss with

## 2019-03-29 NOTE — DISCHARGE NOTE NURSING/CASE MANAGEMENT/SOCIAL WORK - NSDCDPATPORTLINK_GEN_ALL_CORE
You can access the [facility] Patient Portal, offered by [facility], by registering with the following website: [email or web address]

## 2019-03-29 NOTE — PROGRESS NOTE ADULT - REASON FOR ADMISSION
Fall   Difficulty to ambulate

## 2019-03-29 NOTE — PROGRESS NOTE ADULT - PROVIDER SPECIALTY LIST ADULT
Hospitalist
Infectious Disease
Infectious Disease
Internal Medicine
Podiatry
Internal Medicine

## 2019-03-30 ENCOUNTER — OUTPATIENT (OUTPATIENT)
Dept: OUTPATIENT SERVICES | Facility: HOSPITAL | Age: 79
LOS: 1 days | Discharge: HOME | End: 2019-03-30

## 2019-03-30 DIAGNOSIS — M17.11 UNILATERAL PRIMARY OSTEOARTHRITIS, RIGHT KNEE: Chronic | ICD-10-CM

## 2019-03-30 DIAGNOSIS — N39.0 URINARY TRACT INFECTION, SITE NOT SPECIFIED: ICD-10-CM

## 2019-03-30 LAB
CULTURE RESULTS: SIGNIFICANT CHANGE UP
SPECIMEN SOURCE: SIGNIFICANT CHANGE UP

## 2019-03-31 LAB
CULTURE RESULTS: SIGNIFICANT CHANGE UP
SPECIMEN SOURCE: SIGNIFICANT CHANGE UP

## 2019-04-02 DIAGNOSIS — I12.9 HYPERTENSIVE CHRONIC KIDNEY DISEASE WITH STAGE 1 THROUGH STAGE 4 CHRONIC KIDNEY DISEASE, OR UNSPECIFIED CHRONIC KIDNEY DISEASE: ICD-10-CM

## 2019-04-02 DIAGNOSIS — M06.9 RHEUMATOID ARTHRITIS, UNSPECIFIED: ICD-10-CM

## 2019-04-02 DIAGNOSIS — N39.0 URINARY TRACT INFECTION, SITE NOT SPECIFIED: ICD-10-CM

## 2019-04-02 DIAGNOSIS — Z16.12 EXTENDED SPECTRUM BETA LACTAMASE (ESBL) RESISTANCE: ICD-10-CM

## 2019-04-02 DIAGNOSIS — F41.9 ANXIETY DISORDER, UNSPECIFIED: ICD-10-CM

## 2019-04-02 DIAGNOSIS — B95.2 ENTEROCOCCUS AS THE CAUSE OF DISEASES CLASSIFIED ELSEWHERE: ICD-10-CM

## 2019-04-02 DIAGNOSIS — Z86.718 PERSONAL HISTORY OF OTHER VENOUS THROMBOSIS AND EMBOLISM: ICD-10-CM

## 2019-04-02 DIAGNOSIS — B96.4 PROTEUS (MIRABILIS) (MORGANII) AS THE CAUSE OF DISEASES CLASSIFIED ELSEWHERE: ICD-10-CM

## 2019-04-02 DIAGNOSIS — N18.3 CHRONIC KIDNEY DISEASE, STAGE 3 (MODERATE): ICD-10-CM

## 2019-04-02 DIAGNOSIS — Z91.018 ALLERGY TO OTHER FOODS: ICD-10-CM

## 2019-04-02 DIAGNOSIS — Z28.21 IMMUNIZATION NOT CARRIED OUT BECAUSE OF PATIENT REFUSAL: ICD-10-CM

## 2019-04-02 DIAGNOSIS — E53.8 DEFICIENCY OF OTHER SPECIFIED B GROUP VITAMINS: ICD-10-CM

## 2019-04-02 DIAGNOSIS — N10 ACUTE PYELONEPHRITIS: ICD-10-CM

## 2019-04-02 DIAGNOSIS — Z82.49 FAMILY HISTORY OF ISCHEMIC HEART DISEASE AND OTHER DISEASES OF THE CIRCULATORY SYSTEM: ICD-10-CM

## 2019-04-02 DIAGNOSIS — R26.89 OTHER ABNORMALITIES OF GAIT AND MOBILITY: ICD-10-CM

## 2019-04-02 DIAGNOSIS — D50.9 IRON DEFICIENCY ANEMIA, UNSPECIFIED: ICD-10-CM

## 2019-04-02 DIAGNOSIS — E54 ASCORBIC ACID DEFICIENCY: ICD-10-CM

## 2019-04-02 DIAGNOSIS — M48.56XA COLLAPSED VERTEBRA, NOT ELSEWHERE CLASSIFIED, LUMBAR REGION, INITIAL ENCOUNTER FOR FRACTURE: ICD-10-CM

## 2019-04-02 DIAGNOSIS — E11.22 TYPE 2 DIABETES MELLITUS WITH DIABETIC CHRONIC KIDNEY DISEASE: ICD-10-CM

## 2019-04-02 DIAGNOSIS — G89.29 OTHER CHRONIC PAIN: ICD-10-CM

## 2019-04-02 DIAGNOSIS — E83.52 HYPERCALCEMIA: ICD-10-CM

## 2019-04-02 DIAGNOSIS — E83.42 HYPOMAGNESEMIA: ICD-10-CM

## 2019-04-02 DIAGNOSIS — N17.9 ACUTE KIDNEY FAILURE, UNSPECIFIED: ICD-10-CM

## 2019-04-02 DIAGNOSIS — M48.54XA COLLAPSED VERTEBRA, NOT ELSEWHERE CLASSIFIED, THORACIC REGION, INITIAL ENCOUNTER FOR FRACTURE: ICD-10-CM

## 2019-04-02 DIAGNOSIS — M10.9 GOUT, UNSPECIFIED: ICD-10-CM

## 2019-04-02 DIAGNOSIS — Z96.651 PRESENCE OF RIGHT ARTIFICIAL KNEE JOINT: ICD-10-CM

## 2019-04-02 DIAGNOSIS — Z51.89 ENCOUNTER FOR OTHER SPECIFIED AFTERCARE: ICD-10-CM

## 2019-04-02 DIAGNOSIS — Z88.0 ALLERGY STATUS TO PENICILLIN: ICD-10-CM

## 2019-04-02 DIAGNOSIS — R76.11 NONSPECIFIC REACTION TO TUBERCULIN SKIN TEST WITHOUT ACTIVE TUBERCULOSIS: ICD-10-CM

## 2019-04-02 DIAGNOSIS — Z88.1 ALLERGY STATUS TO OTHER ANTIBIOTIC AGENTS STATUS: ICD-10-CM

## 2019-04-03 ENCOUNTER — OUTPATIENT (OUTPATIENT)
Dept: OUTPATIENT SERVICES | Facility: HOSPITAL | Age: 79
LOS: 1 days | Discharge: HOME | End: 2019-04-03

## 2019-04-03 DIAGNOSIS — N39.0 URINARY TRACT INFECTION, SITE NOT SPECIFIED: ICD-10-CM

## 2019-04-03 DIAGNOSIS — M17.11 UNILATERAL PRIMARY OSTEOARTHRITIS, RIGHT KNEE: Chronic | ICD-10-CM

## 2019-04-11 ENCOUNTER — OUTPATIENT (OUTPATIENT)
Dept: OUTPATIENT SERVICES | Facility: HOSPITAL | Age: 79
LOS: 1 days | Discharge: HOME | End: 2019-04-11

## 2019-04-11 DIAGNOSIS — R79.9 ABNORMAL FINDING OF BLOOD CHEMISTRY, UNSPECIFIED: ICD-10-CM

## 2019-04-11 DIAGNOSIS — M17.11 UNILATERAL PRIMARY OSTEOARTHRITIS, RIGHT KNEE: Chronic | ICD-10-CM

## 2019-04-24 ENCOUNTER — OUTPATIENT (OUTPATIENT)
Dept: OUTPATIENT SERVICES | Facility: HOSPITAL | Age: 79
LOS: 1 days | Discharge: HOME | End: 2019-04-24

## 2019-04-24 DIAGNOSIS — R79.9 ABNORMAL FINDING OF BLOOD CHEMISTRY, UNSPECIFIED: ICD-10-CM

## 2019-04-24 DIAGNOSIS — M17.11 UNILATERAL PRIMARY OSTEOARTHRITIS, RIGHT KNEE: Chronic | ICD-10-CM

## 2019-05-09 ENCOUNTER — OUTPATIENT (OUTPATIENT)
Dept: OUTPATIENT SERVICES | Facility: HOSPITAL | Age: 79
LOS: 1 days | Discharge: HOME | End: 2019-05-09

## 2019-05-09 DIAGNOSIS — M17.11 UNILATERAL PRIMARY OSTEOARTHRITIS, RIGHT KNEE: Chronic | ICD-10-CM

## 2019-05-09 DIAGNOSIS — D64.9 ANEMIA, UNSPECIFIED: ICD-10-CM

## 2019-05-21 ENCOUNTER — OUTPATIENT (OUTPATIENT)
Dept: OUTPATIENT SERVICES | Facility: HOSPITAL | Age: 79
LOS: 1 days | Discharge: HOME | End: 2019-05-21

## 2019-05-21 DIAGNOSIS — E79.0 HYPERURICEMIA WITHOUT SIGNS OF INFLAMMATORY ARTHRITIS AND TOPHACEOUS DISEASE: ICD-10-CM

## 2019-05-21 DIAGNOSIS — M06.9 RHEUMATOID ARTHRITIS, UNSPECIFIED: ICD-10-CM

## 2019-05-21 DIAGNOSIS — R73.02 IMPAIRED GLUCOSE TOLERANCE (ORAL): ICD-10-CM

## 2019-05-21 DIAGNOSIS — M17.11 UNILATERAL PRIMARY OSTEOARTHRITIS, RIGHT KNEE: Chronic | ICD-10-CM

## 2019-05-21 DIAGNOSIS — H15.013 ANTERIOR SCLERITIS, BILATERAL: ICD-10-CM

## 2019-05-21 DIAGNOSIS — N18.2 CHRONIC KIDNEY DISEASE, STAGE 2 (MILD): ICD-10-CM

## 2019-05-21 DIAGNOSIS — I82.519 CHRONIC EMBOLISM AND THROMBOSIS OF UNSPECIFIED FEMORAL VEIN: ICD-10-CM

## 2019-07-24 NOTE — PHYSICAL THERAPY INITIAL EVALUATION ADULT - IMPAIRMENTS FOUND, PT EVAL
Patient states she wants the salpingectomy    gait, locomotion, and balance/aerobic capacity/endurance

## 2019-07-29 ENCOUNTER — OUTPATIENT (OUTPATIENT)
Dept: OUTPATIENT SERVICES | Facility: HOSPITAL | Age: 79
LOS: 1 days | Discharge: HOME | End: 2019-07-29

## 2019-07-29 DIAGNOSIS — M06.4 INFLAMMATORY POLYARTHROPATHY: ICD-10-CM

## 2019-07-29 DIAGNOSIS — M81.0 AGE-RELATED OSTEOPOROSIS WITHOUT CURRENT PATHOLOGICAL FRACTURE: ICD-10-CM

## 2019-07-29 DIAGNOSIS — D50.8 OTHER IRON DEFICIENCY ANEMIAS: ICD-10-CM

## 2019-07-29 DIAGNOSIS — E78.2 MIXED HYPERLIPIDEMIA: ICD-10-CM

## 2019-07-29 DIAGNOSIS — E11.9 TYPE 2 DIABETES MELLITUS WITHOUT COMPLICATIONS: ICD-10-CM

## 2019-07-29 DIAGNOSIS — M17.11 UNILATERAL PRIMARY OSTEOARTHRITIS, RIGHT KNEE: Chronic | ICD-10-CM

## 2019-08-15 ENCOUNTER — OUTPATIENT (OUTPATIENT)
Dept: OUTPATIENT SERVICES | Facility: HOSPITAL | Age: 79
LOS: 1 days | Discharge: HOME | End: 2019-08-15
Payer: MEDICARE

## 2019-08-15 DIAGNOSIS — M25.511 PAIN IN RIGHT SHOULDER: ICD-10-CM

## 2019-08-15 DIAGNOSIS — M17.11 UNILATERAL PRIMARY OSTEOARTHRITIS, RIGHT KNEE: Chronic | ICD-10-CM

## 2019-08-15 PROCEDURE — 73030 X-RAY EXAM OF SHOULDER: CPT | Mod: 26,RT

## 2019-08-19 NOTE — ED ADULT NURSE NOTE - NS ED NURSE RECORD ANOTHER VITAL SIGN
Quality 47: Advance Care Plan: Advance Care Planning discussed and documented; advance care plan or surrogate decision maker documented in the medical record.
Yes
Quality 130: Documentation Of Current Medications In The Medical Record: Current Medications Documented
Quality 110: Preventive Care And Screening: Influenza Immunization: Influenza Immunization Administered during Influenza season
Detail Level: Detailed
Quality 431: Preventive Care And Screening: Unhealthy Alcohol Use - Screening: Patient screened for unhealthy alcohol use using a single question and scores less than 2 times per year
Quality 131: Pain Assessment And Follow-Up: Pain assessment using a standardized tool is documented as negative, no follow-up plan required
Quality 138: Melanoma: Coordination Of Care: Treatment plan not communicated, reason not otherwise specified.
Quality 111:Pneumonia Vaccination Status For Older Adults: Pneumococcal Vaccination Previously Received
Quality 137: Melanoma: Continuity Of Care - Recall System: Recall system not utilized, reason not otherwise specified
Quality 226: Preventive Care And Screening: Tobacco Use: Screening And Cessation Intervention: Patient screened for tobacco use and is an ex/non-smoker

## 2019-08-29 ENCOUNTER — OUTPATIENT (OUTPATIENT)
Dept: OUTPATIENT SERVICES | Facility: HOSPITAL | Age: 79
LOS: 1 days | Discharge: HOME | End: 2019-08-29
Payer: MEDICARE

## 2019-08-29 DIAGNOSIS — M25.511 PAIN IN RIGHT SHOULDER: ICD-10-CM

## 2019-08-29 DIAGNOSIS — M17.11 UNILATERAL PRIMARY OSTEOARTHRITIS, RIGHT KNEE: Chronic | ICD-10-CM

## 2019-08-29 PROCEDURE — 76882 US LMTD JT/FCL EVL NVASC XTR: CPT | Mod: 26,RT

## 2019-12-04 NOTE — PRE-ANESTHESIA EVALUATION ADULT - MALLAMPATI CLASS
Other Procedure: I&D with Kenalog
Introduction Text (Please End With A Colon): The following procedure was deferred:
Scheduling Instructions (Optional): S1
Detail Level: Detailed
Class III - visualization of the soft palate and the base of the uvula
Class I (easy) - visualization of the soft palate, fauces, uvula, and both anterior and posterior pillars
Class III - visualization of the soft palate and the base of the uvula

## 2020-01-23 NOTE — PATIENT PROFILE ADULT. - MEDICATION PUMPS, PROFILE
"Subjective:      Patient ID: Rigoberto Vasquez is a 69 y.o. male.    Chief Complaint: Shortness of Breath and Fatigue    HPI  Patient presents to the office today for evaluation of dyspnea with exertion.  He is unsure what started 1st, fatigue or the shortness of breath. No chest pain.  History of pacemaker insertion which is working appropriately.  History of anemia which has been stable.  He works out for 90 min, 3 times a week.  Some days he does well with his exercise regimen, and other days he feels short of breath/fatigued. He has diabetes-stable. No wheezing.  He denies sleep apnea type symptoms.  No cough.  Past pulmonary history negative except for pneumonia at age 7.  Recent PFT with increased total lung capacity.  Normal chest x-ray.    Patient Active Problem List   Diagnosis    Osteoarthritis of knees, bilateral    Hypertension associated with diabetes    Type 2 diabetes mellitus    Hyperlipidemia associated with type 2 diabetes mellitus    Macular hole    Open angle with borderline findings, low risk    High myopia    Optic disc anomaly    CHANDRAKANT (iron deficiency anemia)    Pacemaker    Conductive hearing loss in left ear    Choroidal nevus of right eye    Epiretinal membrane (ERM) of left eye    Overweight (BMI 25.0-29.9)    Macular hole of right eye    Open angle with borderline findings and low glaucoma risk in both eyes    Chronic LUQ pain    Nausea    Ectopic gastric mucosa    Open angle with borderline findings and high glaucoma risk in both eyes    History of skin cancer       /70   Pulse 79   Resp 17   Ht 5' 11" (1.803 m)   Wt 93.5 kg (206 lb 2.1 oz)   SpO2 95%   BMI 28.75 kg/m²   Body mass index is 28.75 kg/m².    Review of Systems   Constitutional: Positive for fatigue.   HENT: Negative.    Respiratory: Positive for dyspnea on extertion. Negative for cough and wheezing.    Cardiovascular: Negative.    Musculoskeletal: Negative.    Gastrointestinal: Negative.  "   Neurological: Negative.    Psychiatric/Behavioral: Negative.      Objective:      Physical Exam   Constitutional: He is oriented to person, place, and time. He appears well-developed and well-nourished.   HENT:   Head: Normocephalic and atraumatic.   Mouth/Throat: Uvula is midline and oropharynx is clear and moist.   Neck: Trachea normal and normal range of motion. Neck supple. No thyroid mass and no thyromegaly present.   Cardiovascular: Normal rate, regular rhythm and normal heart sounds.   Pulmonary/Chest: Effort normal and breath sounds normal. He has no wheezes. He has no rhonchi. He has no rales. Chest wall is not dull to percussion.   Abdominal: Soft. He exhibits no mass. There is no hepatosplenomegaly or splenomegaly. There is no tenderness.   Musculoskeletal: Normal range of motion. He exhibits no edema.   Neurological: He is alert and oriented to person, place, and time.   Skin: Skin is warm and dry.   Psychiatric: He has a normal mood and affect.     Personal Diagnostic Review  PFT reviewed. PFT with hyperinflation with noted total lung capacity 128% of predicted. Fortunately, no obstruction seen with an FEV1 at 106% of predicted. Normal ratio.    Results for orders placed during the hospital encounter of 01/23/20   X-Ray Chest PA And Lateral    Narrative EXAMINATION:  XR CHEST PA AND LATERAL    CLINICAL HISTORY:  Other specified symptoms and signs involving the circulatory and respiratory systems    TECHNIQUE:  PA and lateral views of the chest were performed.    COMPARISON:  04/25/2019    FINDINGS:  Clear lungs.  Cardiomediastinal silhouette is not enlarged.  Bones are intact.      Impression Stable chest.  No active disease      Electronically signed by: Oseas Emmanuel MD  Date:    01/23/2020  Time:    10:53         Assessment:       1. Hyperinflation of lungs    2. Dyspnea on exertion    3. Fatigue, unspecified type        Outpatient Encounter Medications as of 1/23/2020   Medication Sig Dispense  Refill    ACETAMINOPHEN (TYLENOL 8 HOUR ORAL) Take by mouth as needed.      albuterol (VENTOLIN HFA) 90 mcg/actuation inhaler Inhale 2 puffs into the lungs daily as needed for Shortness of Breath. Rescue 18 g 0    azelastine (ASTELIN) 137 mcg (0.1 %) nasal spray 2 sprays (274 mcg total) by Nasal route 2 (two) times daily. 30 mL 12    bifidobacterium infantis (ALIGN) 4 mg Cap Take 1 capsule by mouth Daily.      blood sugar diagnostic (ACCU-CHEK JAXON) Strp Check BG 2-3 times daily. Accuchek jaxon strips 300 strip 3    fenofibrate micronized (LOFIBRA) 200 MG Cap Take 1 capsule (200 mg total) by mouth every evening. 90 capsule 3    ferrous gluconate (FERGON) 324 MG tablet Take 1 tablet (324 mg total) by mouth daily with breakfast. 90 tablet 1    gabapentin (CMPD PAIN MANAGEMENT COMPOUND OINTMNENT THREE) Apply bid prn pain 100 g 0    guaiFENesin (MUCINEX) 600 mg 12 hr tablet Take 1,200 mg by mouth 2 (two) times daily.      hydrocortisone 2.5 % cream Apply topically 2 (two) times daily. 3.5 g 5    LEVEMIR FLEXTOUCH U-100 INSULN 100 unit/mL (3 mL) InPn pen INJECT 25 UNITS INTO THE SKIN ONCE DAILY. 30 mL 3    levocetirizine (XYZAL) 5 MG tablet Take 5 mg by mouth every evening.      liraglutide 0.6 mg/0.1 mL, 18 mg/3 mL, subq PNIJ (VICTOZA 3-ALESHA) 0.6 mg/0.1 mL (18 mg/3 mL) PnIj INJECT 1.8 MG DAILY 27 mL 3    losartan (COZAAR) 50 MG tablet Take 1 tablet by mouth in the morning and 1 tablet in the evening 180 tablet 3    lovastatin (MEVACOR) 40 MG tablet Take 1 tablet by mouth Every evening. 90 tablet 3    metFORMIN (GLUCOPHAGE) 1000 MG tablet Take 1 tablet (1,000 mg total) by mouth once daily. Generic  tablet 3    multivitamin capsule Take 1 capsule by mouth once daily.      omeprazole (PRILOSEC) 20 MG capsule Take 20 mg by mouth once daily.      pantoprazole (PROTONIX) 40 MG tablet Take 1 tablet (40 mg total) by mouth before breakfast. 90 tablet 3    pen needle, diabetic (BD INSULIN PEN NEEDLE UF  "MINI) 31 gauge x 3/16" Ndle USE AS DIRECTED WITH INSULIN 90 each 4    pioglitazone (ACTOS) 30 MG tablet Take 1 tablet (30 mg total) by mouth once daily. 90 tablet 3    ranitidine (ZANTAC) 150 MG tablet Take 150 mg by mouth 2 (two) times daily.      [DISCONTINUED] albuterol (VENTOLIN HFA) 90 mcg/actuation inhaler Inhale 2 puffs into the lungs daily as needed for Shortness of Breath. Rescue 18 g 0     No facility-administered encounter medications on file as of 1/23/2020.      No orders of the defined types were placed in this encounter.    Plan:     Increased lung capacity on PFT without signs of COPD.  Albuterol if needed. He felt this helped with his work of breathing in past.  Follow up as needed.  Continue exercise regimen.       Problem List Items Addressed This Visit     None      Visit Diagnoses     Hyperinflation of lungs    -  Primary    Dyspnea on exertion        Relevant Medications    albuterol (VENTOLIN HFA) 90 mcg/actuation inhaler    Fatigue, unspecified type            " none

## 2020-11-19 ENCOUNTER — APPOINTMENT (OUTPATIENT)
Dept: BURN CARE | Facility: CLINIC | Age: 80
End: 2020-11-19
Payer: MEDICARE

## 2020-11-19 ENCOUNTER — OUTPATIENT (OUTPATIENT)
Dept: OUTPATIENT SERVICES | Facility: HOSPITAL | Age: 80
LOS: 1 days | Discharge: HOME | End: 2020-11-19

## 2020-11-19 DIAGNOSIS — M17.11 UNILATERAL PRIMARY OSTEOARTHRITIS, RIGHT KNEE: Chronic | ICD-10-CM

## 2020-11-19 PROCEDURE — 97597 DBRDMT OPN WND 1ST 20 CM/<: CPT

## 2020-11-19 PROCEDURE — 99202 OFFICE O/P NEW SF 15 MIN: CPT

## 2020-11-19 PROCEDURE — 97598 DBRDMT OPN WND ADDL 20CM/<: CPT

## 2020-11-21 LAB — BACTERIA SPEC CULT: ABNORMAL

## 2020-12-03 DIAGNOSIS — S81.801A UNSPECIFIED OPEN WOUND, RIGHT LOWER LEG, INITIAL ENCOUNTER: ICD-10-CM

## 2020-12-03 DIAGNOSIS — S81.802A UNSPECIFIED OPEN WOUND, LEFT LOWER LEG, INITIAL ENCOUNTER: ICD-10-CM
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
## 2020-12-03 NOTE — ED PROVIDER NOTE - CADM POA PRESS ULCER
Trip and fall at home. Left knee pain, +shortening and internal rotation. Denies blood thinners.  Denies hitting head and no LOC
No

## 2020-12-16 NOTE — DISCHARGE NOTE ADULT - NS AS DC FOLLOWUP INST YN
Health Maintenance Due   Topic Date Due   • Diabetes Foot Exam  [birth year]27/1970   • Shingles Vaccine (1 of 2) 01/27/2003   • Diabetes Eye Exam  10/04/2020       Patient is due for topics as listed above but is not proceeding with Immunization(s) Shingles at this time. Verbal refusal.          Yes

## 2021-01-11 NOTE — PRE-OP CHECKLIST - PATIENT'S PERSONAL PROPERTY GIVEN TO
on unit
Valtrex Counseling: I discussed with the patient the risks of valacyclovir including but not limited to kidney damage, nausea, vomiting and severe allergy.  The patient understands that if the infection seems to be worsening or is not improving, they are to call.

## 2021-01-21 ENCOUNTER — OUTPATIENT (OUTPATIENT)
Dept: OUTPATIENT SERVICES | Facility: HOSPITAL | Age: 81
LOS: 1 days | Discharge: HOME | End: 2021-01-21

## 2021-01-21 ENCOUNTER — APPOINTMENT (OUTPATIENT)
Dept: BURN CARE | Facility: CLINIC | Age: 81
End: 2021-01-21
Payer: MEDICARE

## 2021-01-21 DIAGNOSIS — M17.11 UNILATERAL PRIMARY OSTEOARTHRITIS, RIGHT KNEE: Chronic | ICD-10-CM

## 2021-01-21 PROCEDURE — 99213 OFFICE O/P EST LOW 20 MIN: CPT | Mod: 25
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
## 2021-01-27 DIAGNOSIS — S81.802A UNSPECIFIED OPEN WOUND, LEFT LOWER LEG, INITIAL ENCOUNTER: ICD-10-CM

## 2021-01-27 DIAGNOSIS — S81.801A UNSPECIFIED OPEN WOUND, RIGHT LOWER LEG, INITIAL ENCOUNTER: ICD-10-CM
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
## 2021-02-25 ENCOUNTER — APPOINTMENT (OUTPATIENT)
Dept: BURN CARE | Facility: CLINIC | Age: 81
End: 2021-02-25
Payer: MEDICARE

## 2021-02-25 ENCOUNTER — OUTPATIENT (OUTPATIENT)
Dept: OUTPATIENT SERVICES | Facility: HOSPITAL | Age: 81
LOS: 1 days | Discharge: HOME | End: 2021-02-25

## 2021-02-25 DIAGNOSIS — M17.11 UNILATERAL PRIMARY OSTEOARTHRITIS, RIGHT KNEE: Chronic | ICD-10-CM

## 2021-02-25 PROCEDURE — 99213 OFFICE O/P EST LOW 20 MIN: CPT | Mod: 25

## 2021-02-25 PROCEDURE — 97597 DBRDMT OPN WND 1ST 20 CM/<: CPT
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
## 2021-03-02 DIAGNOSIS — S81.802A UNSPECIFIED OPEN WOUND, LEFT LOWER LEG, INITIAL ENCOUNTER: ICD-10-CM
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
## 2021-03-02 DIAGNOSIS — S81.801A UNSPECIFIED OPEN WOUND, RIGHT LOWER LEG, INITIAL ENCOUNTER: ICD-10-CM

## 2021-03-26 NOTE — PROGRESS NOTE ADULT - PROBLEM SELECTOR PROBLEM 2
Encephalopathy, unspecified
Metabolic encephalopathy
Sepsis due to Pseudomonas species
DC instructions

## 2021-04-08 ENCOUNTER — APPOINTMENT (OUTPATIENT)
Dept: BURN CARE | Facility: CLINIC | Age: 81
End: 2021-04-08

## 2021-04-20 NOTE — PROGRESS NOTE ADULT - SUBJECTIVE AND OBJECTIVE BOX
Patient wa seen in clinic by Dr Conrad and will be scheduling for below procedure. He states he will call after speaking to his spouse regarding what day works the best. Scheduling telephone number has been provided to patient.    Patient: [place]    Procedure: EGD    Sedation: MAC    Physician: Niraj Conrad MD    Diagnosis: Barretts    Pharmacy: N/A    Diabetic: No    Blood Thinners: Yes ASA 81 mg         Reviewed and referred to chart notes.    pt is observed to be comfortable in the bed. she is  alert and engages. oriented to self but not to place, situation, unable to provide basic information about her living situation. she gets anxious about her memory deficit and is unaware.

## 2021-04-27 ENCOUNTER — OUTPATIENT (OUTPATIENT)
Dept: OUTPATIENT SERVICES | Facility: HOSPITAL | Age: 81
LOS: 1 days | Discharge: HOME | End: 2021-04-27

## 2021-04-27 ENCOUNTER — APPOINTMENT (OUTPATIENT)
Dept: BURN CARE | Facility: CLINIC | Age: 81
End: 2021-04-27
Payer: MEDICARE

## 2021-04-27 DIAGNOSIS — M17.11 UNILATERAL PRIMARY OSTEOARTHRITIS, RIGHT KNEE: Chronic | ICD-10-CM

## 2021-04-27 PROCEDURE — 99213 OFFICE O/P EST LOW 20 MIN: CPT
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
## 2021-05-06 DIAGNOSIS — S81.801A UNSPECIFIED OPEN WOUND, RIGHT LOWER LEG, INITIAL ENCOUNTER: ICD-10-CM
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
## 2021-05-06 DIAGNOSIS — S81.802A UNSPECIFIED OPEN WOUND, LEFT LOWER LEG, INITIAL ENCOUNTER: ICD-10-CM

## 2021-05-25 ENCOUNTER — OUTPATIENT (OUTPATIENT)
Dept: OUTPATIENT SERVICES | Facility: HOSPITAL | Age: 81
LOS: 1 days | Discharge: HOME | End: 2021-05-25

## 2021-05-25 ENCOUNTER — APPOINTMENT (OUTPATIENT)
Dept: BURN CARE | Facility: CLINIC | Age: 81
End: 2021-05-25
Payer: MEDICARE

## 2021-05-25 DIAGNOSIS — M17.11 UNILATERAL PRIMARY OSTEOARTHRITIS, RIGHT KNEE: Chronic | ICD-10-CM

## 2021-05-25 PROCEDURE — 99213 OFFICE O/P EST LOW 20 MIN: CPT

## 2021-05-29 NOTE — PATIENT PROFILE ADULT. - NS PRO PT REFERRAL QUES 2 YN
no - Nondisplaced Left hip fracture Evaluated by Orthopedist group Dr. Marx  - Non weight bearing per orthopedics  - Patient reports she does not have pain at rest  - DASH diet, NPO after midnight for possible surgery tomorrow  - Given severity of AS would defer medical clearance for surgery based on TTE and cardiologist evaluation in the AM
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
## 2021-06-01 DIAGNOSIS — S81.801A UNSPECIFIED OPEN WOUND, RIGHT LOWER LEG, INITIAL ENCOUNTER: ICD-10-CM
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
## 2021-06-01 DIAGNOSIS — S81.802A UNSPECIFIED OPEN WOUND, LEFT LOWER LEG, INITIAL ENCOUNTER: ICD-10-CM

## 2021-09-28 ENCOUNTER — OUTPATIENT (OUTPATIENT)
Dept: OUTPATIENT SERVICES | Facility: HOSPITAL | Age: 81
LOS: 1 days | Discharge: HOME | End: 2021-09-28

## 2021-09-28 ENCOUNTER — APPOINTMENT (OUTPATIENT)
Dept: BURN CARE | Facility: CLINIC | Age: 81
End: 2021-09-28
Payer: MEDICARE

## 2021-09-28 DIAGNOSIS — M17.11 UNILATERAL PRIMARY OSTEOARTHRITIS, RIGHT KNEE: Chronic | ICD-10-CM

## 2021-09-28 PROCEDURE — 99213 OFFICE O/P EST LOW 20 MIN: CPT

## 2021-10-01 LAB — BACTERIA SPEC CULT: NORMAL

## 2021-10-28 ENCOUNTER — APPOINTMENT (OUTPATIENT)
Dept: BURN CARE | Facility: CLINIC | Age: 81
End: 2021-10-28
Payer: MEDICARE

## 2021-10-28 ENCOUNTER — OUTPATIENT (OUTPATIENT)
Dept: OUTPATIENT SERVICES | Facility: HOSPITAL | Age: 81
LOS: 1 days | Discharge: HOME | End: 2021-10-28

## 2021-10-28 DIAGNOSIS — M17.11 UNILATERAL PRIMARY OSTEOARTHRITIS, RIGHT KNEE: Chronic | ICD-10-CM

## 2021-10-28 DIAGNOSIS — L97.909 NON-PRESSURE CHRONIC ULCER OF UNSPECIFIED PART OF UNSPECIFIED LOWER LEG WITH UNSPECIFIED SEVERITY: ICD-10-CM

## 2021-10-28 DIAGNOSIS — I83.009 VARICOSE VEINS OF UNSPECIFIED LOWER EXTREMITY WITH ULCER OF UNSPECIFIED SITE: ICD-10-CM

## 2021-10-28 DIAGNOSIS — T14.90XA INJURY, UNSPECIFIED, INITIAL ENCOUNTER: ICD-10-CM

## 2021-10-28 PROCEDURE — 99213 OFFICE O/P EST LOW 20 MIN: CPT

## 2021-11-09 ENCOUNTER — APPOINTMENT (OUTPATIENT)
Dept: OPHTHALMOLOGY | Facility: CLINIC | Age: 81
End: 2021-11-09

## 2021-11-24 ENCOUNTER — OUTPATIENT (OUTPATIENT)
Dept: OUTPATIENT SERVICES | Facility: HOSPITAL | Age: 81
LOS: 1 days | Discharge: HOME | End: 2021-11-24
Payer: MEDICARE

## 2021-11-24 ENCOUNTER — APPOINTMENT (OUTPATIENT)
Dept: OPHTHALMOLOGY | Facility: CLINIC | Age: 81
End: 2021-11-24

## 2021-11-24 DIAGNOSIS — M17.11 UNILATERAL PRIMARY OSTEOARTHRITIS, RIGHT KNEE: Chronic | ICD-10-CM

## 2021-11-24 PROCEDURE — 92002 INTRM OPH EXAM NEW PATIENT: CPT

## 2021-11-29 DIAGNOSIS — H04.123 DRY EYE SYNDROME OF BILATERAL LACRIMAL GLANDS: ICD-10-CM

## 2021-11-29 DIAGNOSIS — E11.3511 TYPE 2 DIABETES MELLITUS WITH PROLIFERATIVE DIABETIC RETINOPATHY WITH MACULAR EDEMA, RIGHT EYE: ICD-10-CM

## 2021-11-29 DIAGNOSIS — H15.843 SCLERAL ECTASIA, BILATERAL: ICD-10-CM

## 2021-11-29 DIAGNOSIS — H16.143 PUNCTATE KERATITIS, BILATERAL: ICD-10-CM

## 2021-11-29 DIAGNOSIS — E11.3412 TYPE 2 DIABETES MELLITUS WITH SEVERE NONPROLIFERATIVE DIABETIC RETINOPATHY WITH MACULAR EDEMA, LEFT EYE: ICD-10-CM

## 2021-12-30 ENCOUNTER — OUTPATIENT (OUTPATIENT)
Dept: OUTPATIENT SERVICES | Facility: HOSPITAL | Age: 81
LOS: 1 days | Discharge: HOME | End: 2021-12-30

## 2021-12-30 ENCOUNTER — APPOINTMENT (OUTPATIENT)
Dept: BURN CARE | Facility: CLINIC | Age: 81
End: 2021-12-30
Payer: MEDICARE

## 2021-12-30 DIAGNOSIS — S81.801A UNSPECIFIED OPEN WOUND, RIGHT LOWER LEG, INITIAL ENCOUNTER: ICD-10-CM

## 2021-12-30 DIAGNOSIS — M17.11 UNILATERAL PRIMARY OSTEOARTHRITIS, RIGHT KNEE: Chronic | ICD-10-CM

## 2021-12-30 DIAGNOSIS — I87.8 OTHER SPECIFIED DISORDERS OF VEINS: ICD-10-CM

## 2021-12-30 DIAGNOSIS — S81.802D UNSPECIFIED OPEN WOUND, LEFT LOWER LEG, SUBSEQUENT ENCOUNTER: ICD-10-CM

## 2021-12-30 DIAGNOSIS — S81.802A UNSPECIFIED OPEN WOUND, LEFT LOWER LEG, INITIAL ENCOUNTER: ICD-10-CM

## 2021-12-30 DIAGNOSIS — S81.801D UNSPECIFIED OPEN WOUND, RIGHT LOWER LEG, SUBSEQUENT ENCOUNTER: ICD-10-CM

## 2021-12-30 PROCEDURE — 99213 OFFICE O/P EST LOW 20 MIN: CPT

## 2021-12-30 RX ORDER — SILVER SULFADIAZINE 10 MG/G
1 CREAM TOPICAL TWICE DAILY
Qty: 1 | Refills: 0 | Status: ACTIVE | COMMUNITY
Start: 2021-12-30 | End: 1900-01-01

## 2021-12-31 PROBLEM — S81.802A WOUND OF LEFT LOWER EXTREMITY, INITIAL ENCOUNTER: Status: ACTIVE | Noted: 2020-11-19

## 2021-12-31 PROBLEM — S81.801A OPEN WOUND OF RIGHT LOWER EXTREMITY: Status: ACTIVE | Noted: 2021-12-31
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
## 2021-12-31 NOTE — PHYSICAL EXAM
[Healing] : healing [Size%: ______] : Size: [unfilled]% [Infected?] : Infected: No [3] : 3 out of 10 [Abnormal] : abnormal [Large] : medium [] : no [de-identified] : bilateral lower leg wounds- small, pink shallow wounds \par no significant drainage \par  [TWNoteComboBox1] : ABD pad

## 2021-12-31 NOTE — HISTORY OF PRESENT ILLNESS
[Did you have an operation on your burn/wound injury?] : Did you have an operation on your burn/wound injury? No [Did this injury occur on the job?] : Did this injury occur on the job? No [de-identified] : bilateral lower leg wounds [de-identified] : No concerns. Healing wounds

## 2021-12-31 NOTE — ASSESSMENT
[FreeTextEntry1] : bilateral lower leg wounds--> healing \par continue local wound care \par Xeroform to sites  \par \par  [Wound Care] : wound care

## 2022-01-04 NOTE — DISCHARGE NOTE ADULT - PHYSICIAN SECTION COMPLETE
Oxygen 97-98% RA with ambulation     Su Stevens, [address] [facility]  01/04/22 1384
Yes

## 2022-01-20 NOTE — CONSULT NOTE ADULT - ASSESSMENT
Spoke with patient. Patient states someone is overnighting him his Trulicity with dry ice but would like a refill of his lantus sent to preferred pharmacy. Refill sent.    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 Martínez), gout, latent TB on Rifampin and foot ulcer presented to ED for fever after being advised by visiting nurse to come in. Patient reports she had a fever of 103 when visiting nurse came to see her day PTP. Was told she should come into ED. Also reports she had her foot ulcer debrided by Podiatry on Monday. Afterwards Monday evening, patient began experiencing severe, unbearable pain. Visiting nurse came next day and noted fever. Patient reports some lower abdominal discomfort. Denies any burning while urinating, foul odor from urine, constipation, diarrhea, chest pain, SOB, cough or other complaint or symptoms.     Unlikely arterial supply has changed since the recent angiogram. No need to repeat arterial duplex (last dplx also was done 8/28/18 revealing bilateral normal arterial flow)  Continue AC for hx DVT  Follow up with Dr. Martínez in 2 weeks after discharge  [phone]      SPECTRA [id number]

## 2022-01-25 ENCOUNTER — APPOINTMENT (OUTPATIENT)
Dept: BURN CARE | Facility: CLINIC | Age: 82
End: 2022-01-25
Payer: MEDICARE

## 2022-01-25 ENCOUNTER — OUTPATIENT (OUTPATIENT)
Dept: OUTPATIENT SERVICES | Facility: HOSPITAL | Age: 82
LOS: 1 days | Discharge: HOME | End: 2022-01-25

## 2022-01-25 DIAGNOSIS — M17.11 UNILATERAL PRIMARY OSTEOARTHRITIS, RIGHT KNEE: Chronic | ICD-10-CM

## 2022-01-25 PROCEDURE — 99213 OFFICE O/P EST LOW 20 MIN: CPT

## 2022-01-25 RX ORDER — SILVER SULFADIAZINE 10 MG/G
1 CREAM TOPICAL TWICE DAILY
Qty: 1 | Refills: 0 | Status: ACTIVE | COMMUNITY
Start: 2022-01-25 | End: 1900-01-01

## 2022-02-09 NOTE — PROGRESS NOTE ADULT - PROBLEM SELECTOR PLAN 4
Continue for full 6 week course of AB, can be done at SNF via PICC
Recommendation Preamble: The following recommendations were made during the visit: start using aquaphor lip balm
Render Risk Assessment In Note?: no
Detail Level: Zone

## 2022-02-19 NOTE — PROGRESS NOTE ADULT - PROBLEM SELECTOR PLAN 4
covid precautions
- diet controlled   - monitor finger stick
- start Insulin SS ( finger stick is high after IV steroids)   - monitor finger stick
- start Insulin SS ( finger stick is high after IV steroids)   - monitor finger stick
- started on  Insulin SS ( finger stick is high after IV steroids)   - monitor finger stick

## 2022-03-22 ENCOUNTER — OUTPATIENT (OUTPATIENT)
Dept: OUTPATIENT SERVICES | Facility: HOSPITAL | Age: 82
LOS: 1 days | Discharge: HOME | End: 2022-03-22

## 2022-03-22 ENCOUNTER — APPOINTMENT (OUTPATIENT)
Dept: BURN CARE | Facility: CLINIC | Age: 82
End: 2022-03-22
Payer: MEDICARE

## 2022-03-22 DIAGNOSIS — I87.8 OTHER SPECIFIED DISORDERS OF VEINS: ICD-10-CM

## 2022-03-22 DIAGNOSIS — S81.802D UNSPECIFIED OPEN WOUND, LEFT LOWER LEG, SUBSEQUENT ENCOUNTER: ICD-10-CM

## 2022-03-22 DIAGNOSIS — M17.11 UNILATERAL PRIMARY OSTEOARTHRITIS, RIGHT KNEE: Chronic | ICD-10-CM

## 2022-03-22 PROCEDURE — 99212 OFFICE O/P EST SF 10 MIN: CPT

## 2022-03-22 RX ORDER — SILVER SULFADIAZINE 10 MG/G
1 CREAM TOPICAL DAILY
Qty: 1 | Refills: 5 | Status: ACTIVE | COMMUNITY
Start: 2022-03-22 | End: 1900-01-01
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
## 2022-03-22 NOTE — HISTORY OF PRESENT ILLNESS
[Did you have an operation on your burn/wound injury?] : Did you have an operation on your burn/wound injury? No [Did this injury occur on the job?] : Did this injury occur on the job? No [de-identified] : bilateral lower leg wounds venous stasis ulcers  [de-identified] : Pt reports new wounds lateral left leg in past week. Continuing Xeroform to older medial wounds. \par Applying Xeroform to right leg - no wounds \par  \par

## 2022-03-22 NOTE — ASSESSMENT
[FreeTextEntry1] : left lower leg wounds-  healing and new  \par Continuing local wound care. SSD and dressing to sites with compression wraps for edema control \par May discontinue Xeroform to right leg \par \par f/u 3 months / sooner prn   [Wound Care] : wound care

## 2022-03-22 NOTE — PHYSICAL EXAM
[Healing] : healing [Size%: ______] : Size: [unfilled]% [Infected?] : Infected: No [3] : 3 out of 10 [Abnormal] : abnormal [Large] : medium [] : no [de-identified] : left medial leg -wounds multiple shallow small pink wounds, pink granulation \par left lateral leg - multiple newer wounds with pink exposed dermis -no significant drainage ; mild erythema \par \par no significant edema  [TWNoteComboBox1] : ABD pad [TWNoteComboBox2] : SSD

## 2022-03-22 NOTE — PHYSICAL EXAM
[Healing] : healing [Size%: ______] : Size: [unfilled]% [Infected?] : Infected: No [3] : 3 out of 10 [Abnormal] : abnormal [Large] : medium [] : no [de-identified] : left medial leg -wounds multiple shallow small pink wounds, pink granulation \par left lateral leg - multiple newer wounds with pink exposed dermis -no significant drainage ; mild erythema \par \par no significant edema  [TWNoteComboBox1] : ABD pad [TWNoteComboBox2] : SSD

## 2022-03-22 NOTE — HISTORY OF PRESENT ILLNESS
[Did you have an operation on your burn/wound injury?] : Did you have an operation on your burn/wound injury? No [Did this injury occur on the job?] : Did this injury occur on the job? No [de-identified] : bilateral lower leg wounds venous stasis ulcers  [de-identified] : Pt reports new wounds lateral left leg in past week. Continuing Xeroform to older medial wounds. \par Applying Xeroform to right leg - no wounds \par  \par
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
## 2022-06-21 ENCOUNTER — OUTPATIENT (OUTPATIENT)
Dept: OUTPATIENT SERVICES | Facility: HOSPITAL | Age: 82
LOS: 1 days | Discharge: HOME | End: 2022-06-21

## 2022-06-21 ENCOUNTER — APPOINTMENT (OUTPATIENT)
Dept: BURN CARE | Facility: CLINIC | Age: 82
End: 2022-06-21
Payer: MEDICARE

## 2022-06-21 DIAGNOSIS — M17.11 UNILATERAL PRIMARY OSTEOARTHRITIS, RIGHT KNEE: Chronic | ICD-10-CM

## 2022-06-21 PROCEDURE — 99213 OFFICE O/P EST LOW 20 MIN: CPT

## 2022-06-21 RX ORDER — SILVER SULFADIAZINE 10 MG/G
1 CREAM TOPICAL TWICE DAILY
Qty: 1 | Refills: 0 | Status: ACTIVE | COMMUNITY
Start: 2022-06-21 | End: 1900-01-01

## 2022-06-21 NOTE — ASSESSMENT
[FreeTextEntry1] : bilateral lower leg wounds-  healing --> local wound care  [Wound Care] : wound care

## 2022-06-21 NOTE — PHYSICAL EXAM
[Healing] : healing [Size%: ______] : Size: [unfilled]% [Infected?] : Infected: No [3] : 3 out of 10 [Abnormal] : abnormal [Large] : medium [] : no [de-identified] : bilateral lower leg wounds-  healing --> local wound care  [TWNoteComboBox1] : ABD pad [TWNoteComboBox2] : SSD

## 2022-06-21 NOTE — HISTORY OF PRESENT ILLNESS
[Did you have an operation on your burn/wound injury?] : Did you have an operation on your burn/wound injury? No [Did this injury occur on the job?] : Did this injury occur on the job? No [de-identified] : bilateral lower leg wounds venous stasis ulcers  [de-identified] :  Healing wounds
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
## 2022-06-28 DIAGNOSIS — S81.801D UNSPECIFIED OPEN WOUND, RIGHT LOWER LEG, SUBSEQUENT ENCOUNTER: ICD-10-CM

## 2022-06-28 DIAGNOSIS — S81.802D UNSPECIFIED OPEN WOUND, LEFT LOWER LEG, SUBSEQUENT ENCOUNTER: ICD-10-CM

## 2022-07-14 RX ORDER — SILVER SULFADIAZINE 10 MG/G
1 CREAM TOPICAL TWICE DAILY
Qty: 1 | Refills: 1 | Status: ACTIVE | COMMUNITY
Start: 2022-07-14 | End: 1900-01-01

## 2022-07-19 ENCOUNTER — APPOINTMENT (OUTPATIENT)
Dept: BURN CARE | Facility: CLINIC | Age: 82
End: 2022-07-19

## 2022-07-19 ENCOUNTER — OUTPATIENT (OUTPATIENT)
Dept: OUTPATIENT SERVICES | Facility: HOSPITAL | Age: 82
LOS: 1 days | Discharge: HOME | End: 2022-07-19

## 2022-07-19 DIAGNOSIS — M17.11 UNILATERAL PRIMARY OSTEOARTHRITIS, RIGHT KNEE: Chronic | ICD-10-CM

## 2022-07-19 PROCEDURE — 99213 OFFICE O/P EST LOW 20 MIN: CPT
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
## 2022-07-20 DIAGNOSIS — S81.801D UNSPECIFIED OPEN WOUND, RIGHT LOWER LEG, SUBSEQUENT ENCOUNTER: ICD-10-CM

## 2022-07-20 DIAGNOSIS — S81.802D UNSPECIFIED OPEN WOUND, LEFT LOWER LEG, SUBSEQUENT ENCOUNTER: ICD-10-CM

## 2022-08-09 ENCOUNTER — OUTPATIENT (OUTPATIENT)
Dept: OUTPATIENT SERVICES | Facility: HOSPITAL | Age: 82
LOS: 1 days | Discharge: HOME | End: 2022-08-09

## 2022-08-09 ENCOUNTER — APPOINTMENT (OUTPATIENT)
Dept: BURN CARE | Facility: CLINIC | Age: 82
End: 2022-08-09

## 2022-08-09 DIAGNOSIS — M17.11 UNILATERAL PRIMARY OSTEOARTHRITIS, RIGHT KNEE: Chronic | ICD-10-CM

## 2022-08-09 PROCEDURE — 99212 OFFICE O/P EST SF 10 MIN: CPT

## 2022-08-10 DIAGNOSIS — S81.802D UNSPECIFIED OPEN WOUND, LEFT LOWER LEG, SUBSEQUENT ENCOUNTER: ICD-10-CM
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
## 2022-08-10 DIAGNOSIS — S81.801D UNSPECIFIED OPEN WOUND, RIGHT LOWER LEG, SUBSEQUENT ENCOUNTER: ICD-10-CM

## 2022-08-30 ENCOUNTER — APPOINTMENT (OUTPATIENT)
Dept: BURN CARE | Facility: CLINIC | Age: 82
End: 2022-08-30

## 2022-08-30 ENCOUNTER — OUTPATIENT (OUTPATIENT)
Dept: OUTPATIENT SERVICES | Facility: HOSPITAL | Age: 82
LOS: 1 days | Discharge: HOME | End: 2022-08-30

## 2022-08-30 DIAGNOSIS — M17.11 UNILATERAL PRIMARY OSTEOARTHRITIS, RIGHT KNEE: Chronic | ICD-10-CM

## 2022-08-30 PROCEDURE — 99213 OFFICE O/P EST LOW 20 MIN: CPT

## 2022-09-01 DIAGNOSIS — S81.802A UNSPECIFIED OPEN WOUND, LEFT LOWER LEG, INITIAL ENCOUNTER: ICD-10-CM

## 2022-09-01 DIAGNOSIS — S81.801A UNSPECIFIED OPEN WOUND, RIGHT LOWER LEG, INITIAL ENCOUNTER: ICD-10-CM
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
## 2022-09-27 ENCOUNTER — APPOINTMENT (OUTPATIENT)
Dept: BURN CARE | Facility: CLINIC | Age: 82
End: 2022-09-27

## 2022-09-27 ENCOUNTER — OUTPATIENT (OUTPATIENT)
Dept: OUTPATIENT SERVICES | Facility: HOSPITAL | Age: 82
LOS: 1 days | Discharge: HOME | End: 2022-09-27

## 2022-09-27 DIAGNOSIS — M17.11 UNILATERAL PRIMARY OSTEOARTHRITIS, RIGHT KNEE: Chronic | ICD-10-CM

## 2022-09-27 PROCEDURE — 99213 OFFICE O/P EST LOW 20 MIN: CPT
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
## 2022-09-30 DIAGNOSIS — S81.801A UNSPECIFIED OPEN WOUND, RIGHT LOWER LEG, INITIAL ENCOUNTER: ICD-10-CM

## 2022-09-30 DIAGNOSIS — S81.802A UNSPECIFIED OPEN WOUND, LEFT LOWER LEG, INITIAL ENCOUNTER: ICD-10-CM

## 2022-10-27 ENCOUNTER — OUTPATIENT (OUTPATIENT)
Dept: OUTPATIENT SERVICES | Facility: HOSPITAL | Age: 82
LOS: 1 days | Discharge: HOME | End: 2022-10-27

## 2022-10-27 ENCOUNTER — APPOINTMENT (OUTPATIENT)
Dept: BURN CARE | Facility: CLINIC | Age: 82
End: 2022-10-27

## 2022-10-27 VITALS — HEART RATE: 70 BPM | OXYGEN SATURATION: 98 %

## 2022-10-27 DIAGNOSIS — M17.11 UNILATERAL PRIMARY OSTEOARTHRITIS, RIGHT KNEE: Chronic | ICD-10-CM

## 2022-10-27 PROCEDURE — 99212 OFFICE O/P EST SF 10 MIN: CPT

## 2022-10-27 NOTE — PHYSICAL EXAM
[Healing] : healing [Size%: ______] : Size: [unfilled]% [Infected?] : Infected: No [3] : 3 out of 10 [Abnormal] : abnormal [Large] : medium [] : no [de-identified] : bilateral lower leg wounds-  healing --> local wound care \par left 10x5cm and right 10x5cm \par \par follow up 2 - 4  weeks  [TWNoteComboBox1] : ABD pad [TWNoteComboBox2] : SSD

## 2022-10-27 NOTE — ASSESSMENT
[FreeTextEntry1] : bilateral lower leg wounds-  healing --> local wound care \par left 10x5cm and right 10x5cm \par \par follow up 2 - 4  weeks  [Wound Care] : wound care
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
## 2022-10-27 NOTE — HISTORY OF PRESENT ILLNESS
[Did you have an operation on your burn/wound injury?] : Did you have an operation on your burn/wound injury? No [Did this injury occur on the job?] : Did this injury occur on the job? No [de-identified] : bilateral lower leg wounds venous stasis ulcers  [de-identified] :  Healing wounds

## 2022-11-01 DIAGNOSIS — E11.622 TYPE 2 DIABETES MELLITUS WITH OTHER SKIN ULCER: ICD-10-CM

## 2022-11-01 DIAGNOSIS — L97.829 NON-PRESSURE CHRONIC ULCER OF OTHER PART OF LEFT LOWER LEG WITH UNSPECIFIED SEVERITY: ICD-10-CM

## 2022-11-01 DIAGNOSIS — L97.819 NON-PRESSURE CHRONIC ULCER OF OTHER PART OF RIGHT LOWER LEG WITH UNSPECIFIED SEVERITY: ICD-10-CM

## 2022-11-01 DIAGNOSIS — I83.018 VARICOSE VEINS OF RIGHT LOWER EXTREMITY WITH ULCER OTHER PART OF LOWER LEG: ICD-10-CM

## 2022-11-01 DIAGNOSIS — I83.028 VARICOSE VEINS OF LEFT LOWER EXTREMITY WITH ULCER OTHER PART OF LOWER LEG: ICD-10-CM

## 2022-11-17 ENCOUNTER — OUTPATIENT (OUTPATIENT)
Dept: OUTPATIENT SERVICES | Facility: HOSPITAL | Age: 82
LOS: 1 days | Discharge: HOME | End: 2022-11-17

## 2022-11-17 ENCOUNTER — APPOINTMENT (OUTPATIENT)
Dept: BURN CARE | Facility: CLINIC | Age: 82
End: 2022-11-17

## 2022-11-17 VITALS — HEART RATE: 103 BPM | DIASTOLIC BLOOD PRESSURE: 100 MMHG | SYSTOLIC BLOOD PRESSURE: 177 MMHG

## 2022-11-17 DIAGNOSIS — M17.11 UNILATERAL PRIMARY OSTEOARTHRITIS, RIGHT KNEE: Chronic | ICD-10-CM

## 2022-11-17 PROCEDURE — 99212 OFFICE O/P EST SF 10 MIN: CPT

## 2022-11-17 NOTE — PHYSICAL EXAM
[Healing] : healing [Size%: ______] : Size: [unfilled]% [Infected?] : Infected: No [3] : 3 out of 10 [Abnormal] : abnormal [Large] : medium [] : no [de-identified] : bilateral lower leg wounds-  healed left leg --> local wound care \par left 10x5cm and right 10x5cm--> healing  \par \par follow up 2 - 4  months [TWNoteComboBox1] : xeroform [TWNoteComboBox2] : False

## 2022-11-17 NOTE — HISTORY OF PRESENT ILLNESS
[Did you have an operation on your burn/wound injury?] : Did you have an operation on your burn/wound injury? No [Did this injury occur on the job?] : Did this injury occur on the job? No [de-identified] : bilateral lower leg wounds venous stasis ulcers  [de-identified] :  Healing wounds

## 2022-11-17 NOTE — ASSESSMENT
[FreeTextEntry1] : bilateral lower leg wounds-  healed left leg --> local wound care \par left 10x5cm and right 10x5cm--> healing  \par \par follow up 2 - 4  months [Wound Care] : wound care

## 2022-11-29 DIAGNOSIS — E11.622 TYPE 2 DIABETES MELLITUS WITH OTHER SKIN ULCER: ICD-10-CM

## 2022-11-29 DIAGNOSIS — I83.018 VARICOSE VEINS OF RIGHT LOWER EXTREMITY WITH ULCER OTHER PART OF LOWER LEG: ICD-10-CM

## 2022-11-29 DIAGNOSIS — L97.819 NON-PRESSURE CHRONIC ULCER OF OTHER PART OF RIGHT LOWER LEG WITH UNSPECIFIED SEVERITY: ICD-10-CM

## 2023-01-09 NOTE — DIETITIAN INITIAL EVALUATION ADULT. - OTHER INFO
Reason for visit: poor PO found during RD screening. Pt admitted w/ AMS. Was at the [place] site from 12/19/18-1/19/19. 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 Detail Level: Detailed Quality 130: Documentation Of Current Medications In The Medical Record: Current Medications Documented Quality 47: Advance Care Plan: Advance Care Planning discussed and documented; advance care plan or surrogate decision maker documented in the medical record. Quality 431: Preventive Care And Screening: Unhealthy Alcohol Use - Screening: Patient not identified as an unhealthy alcohol user when screened for unhealthy alcohol use using a systematic screening method Quality 110: Preventive Care And Screening: Influenza Immunization: Influenza Immunization Administered during Influenza season Quality 226: Preventive Care And Screening: Tobacco Use: Screening And Cessation Intervention: Patient screened for tobacco use and is an ex/non-smoker Quality 111:Pneumonia Vaccination Status For Older Adults: Pneumococcal vaccine (PPSV23) administered on or after patient’s 60th birthday and before the end of the measurement period Reason for visit: poor PO found during RD screening. Pt admitted w/ AMS. Was at the [place] site from 12/19/18-1/19/19. 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 Observed multiple unopened glucerna supplements at the bedside, Spoke to PCA who notes pt refuses to drink glucerna. May benefit from beneprotein mixed in during meals.
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
## 2023-02-09 ENCOUNTER — OUTPATIENT (OUTPATIENT)
Dept: OUTPATIENT SERVICES | Facility: HOSPITAL | Age: 83
LOS: 1 days | End: 2023-02-09
Payer: MEDICARE

## 2023-02-09 ENCOUNTER — APPOINTMENT (OUTPATIENT)
Dept: BURN CARE | Facility: CLINIC | Age: 83
End: 2023-02-09

## 2023-02-09 ENCOUNTER — APPOINTMENT (OUTPATIENT)
Dept: BURN CARE | Facility: CLINIC | Age: 83
End: 2023-02-09
Payer: MEDICARE

## 2023-02-09 VITALS — DIASTOLIC BLOOD PRESSURE: 93 MMHG | HEART RATE: 75 BPM | SYSTOLIC BLOOD PRESSURE: 182 MMHG

## 2023-02-09 DIAGNOSIS — Z00.8 ENCOUNTER FOR OTHER GENERAL EXAMINATION: ICD-10-CM

## 2023-02-09 DIAGNOSIS — M17.11 UNILATERAL PRIMARY OSTEOARTHRITIS, RIGHT KNEE: Chronic | ICD-10-CM

## 2023-02-09 PROCEDURE — 99212 OFFICE O/P EST SF 10 MIN: CPT
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
## 2023-02-13 NOTE — HISTORY OF PRESENT ILLNESS
[Did you have an operation on your burn/wound injury?] : Did you have an operation on your burn/wound injury? No [Did this injury occur on the job?] : Did this injury occur on the job? No [de-identified] : 11/2020 [de-identified] : home [de-identified] : bilateral lower leg wounds venous stasis ulcers  [de-identified] :  Healing wounds

## 2023-02-13 NOTE — PHYSICAL EXAM
[Healing] : healing [Size%: ______] : Size: [unfilled]% [Infected?] : Infected: No [3] : 3 out of 10 [Abnormal] : abnormal [Large] : medium [] : no [de-identified] : The bilateral lower leg wounds venous stasis ulcers  are healing and measures on the left leg  10x5cm and on the right leg  10x5cm.  The is no adherent devitalized tissue .  The patient was instructed to clean  the wound with soap and water. Ace wrap compression applied. Continue local wound care with moisturizer and sunscreen. and ABD pad.  Follow up 1-2 months.  [TWNoteComboBox1] : xeroform

## 2023-02-13 NOTE — ASSESSMENT
[FreeTextEntry1] : The bilateral lower leg wounds venous stasis ulcers  are healing and measures on the left leg  10x5cm and on the right leg  10x5cm.  The is no adherent devitalized tissue .  The patient was instructed to clean  the wound with soap and water. Ace wrap compression applied. Continue local wound care with moisturizer and sunscreen. and ABD pad.  Follow up 1-2 months.  [Wound Care] : wound care

## 2023-02-14 DIAGNOSIS — I83.018 VARICOSE VEINS OF RIGHT LOWER EXTREMITY WITH ULCER OTHER PART OF LOWER LEG: ICD-10-CM

## 2023-02-14 DIAGNOSIS — I83.028 VARICOSE VEINS OF LEFT LOWER EXTREMITY WITH ULCER OTHER PART OF LOWER LEG: ICD-10-CM

## 2023-02-14 DIAGNOSIS — L97.818 NON-PRESSURE CHRONIC ULCER OF OTHER PART OF RIGHT LOWER LEG WITH OTHER SPECIFIED SEVERITY: ICD-10-CM

## 2023-02-14 DIAGNOSIS — L97.828 NON-PRESSURE CHRONIC ULCER OF OTHER PART OF LEFT LOWER LEG WITH OTHER SPECIFIED SEVERITY: ICD-10-CM

## 2023-04-06 ENCOUNTER — OUTPATIENT (OUTPATIENT)
Dept: OUTPATIENT SERVICES | Facility: HOSPITAL | Age: 83
LOS: 1 days | End: 2023-04-06
Payer: MEDICARE

## 2023-04-06 ENCOUNTER — APPOINTMENT (OUTPATIENT)
Dept: BURN CARE | Facility: CLINIC | Age: 83
End: 2023-04-06
Payer: MEDICARE

## 2023-04-06 VITALS — SYSTOLIC BLOOD PRESSURE: 182 MMHG | DIASTOLIC BLOOD PRESSURE: 64 MMHG | HEART RATE: 79 BPM

## 2023-04-06 DIAGNOSIS — Z00.8 ENCOUNTER FOR OTHER GENERAL EXAMINATION: ICD-10-CM

## 2023-04-06 DIAGNOSIS — M17.11 UNILATERAL PRIMARY OSTEOARTHRITIS, RIGHT KNEE: Chronic | ICD-10-CM

## 2023-04-06 PROCEDURE — 99212 OFFICE O/P EST SF 10 MIN: CPT

## 2023-04-06 NOTE — HISTORY OF PRESENT ILLNESS
[Did you have an operation on your burn/wound injury?] : Did you have an operation on your burn/wound injury? No [Did this injury occur on the job?] : Did this injury occur on the job? No [de-identified] : 11/2020 [de-identified] : home [de-identified] : bilateral lower leg wounds venous stasis ulcers  [de-identified] :  Healed wounds

## 2023-04-06 NOTE — CONSULT NOTE ADULT - SUBJECTIVE AND OBJECTIVE BOX
78y old Female c/o sensation that food is stuck in her throat after eating chicken breast at 6PM. Pt states she initially was choking and coughing trying to get the food up. Denies SOB or difficulty breathing. Has been able to swallow/tolerate water comfortably.     PAST MEDICAL & SURGICAL HISTORY:  Gout  DVT (deep venous thrombosis)  HTN (hypertension)  Rheumatoid arteritis  Other specified diabetes mellitus with other specified complication, unspecified whether long term insulin use  Primary osteoarthritis of right knee: s/p knee repalcement    MEDS: allopurinol 300 milliGRAM(s)  apixaban 5 milliGRAM(s)  cefTRIAXone   IVPB 1 Gram(s)  docusate sodium 100 milliGRAM(s)  folic acid 1 milliGRAM(s)  leucovorin 5 milliGRAM(s)  morphine  - Injectable 4 milliGRAM(s) PRN  predniSONE   Tablet 15 milliGRAM(s)  rifampin 300 milliGRAM(s)    ALLERGIES: clindamycin (Unknown)  erythromycin (Unknown)  penicillin (Unknown)  strawberry (Unknown)    VS: T(F): 99.2, Max: 99.2 (10-24 @ 07:18)  HR: 99 (94 - 99)  BP: 130/62 (130/62 - 142/92)  RR: 20  SpO2: 95% (95% - 95%)  GEN: Alert, awake, NAD. No drooling or pooling of secretions  HEENT: neck supple, posterior OP pink, uvula midline, FOM soft  Flexible laryngoscopy: airway patent without evidence of edema, erythema, foreign body throughout, VC intact and mobile    LABS/IMAGIN.6    8.75  )-----------( 239      ( 24 Oct 2018 07:26 )             27.6     10-    136  |  98  |  25<H>  ----------------------------<  133<H>  4.5   |  22  |  1.2    Ca    8.0<L>      24 Oct 2018 07:26  Mg     2.2     10-    TPro  6.0  /  Alb  3.8  /  TBili  0.3  /  DBili  x   /  AST  24  /  ALT  8   /  AlkPhos  55  10-    PT/INR - ( 23 Oct 2018 17:27 )   PT: 13.80 sec;   INR: 1.20 ratio  PTT - ( 23 Oct 2018 17:27 )  PTT:28.0 sec    Urinalysis Basic - ( 23 Oct 2018 20:50 )  Color: Yellow / Appearance: Cloudy / S.020 / pH: x  Gluc: x / Ketone: Negative  / Bili: Negative / Urobili: 0.2 mg/dL   Blood: x / Protein: 30 mg/dL / Nitrite: Positive   Leuk Esterase: Moderate / RBC: 3-5 /HPF / WBC >50 /HPF   Sq Epi: x / Non Sq Epi: Moderate /HPF / Bacteria: TNTC /HPF
HPI:  77 yo F with PMH of DM2, RA, DVT on Eliquis, gout, latent TB on Rifampin and foot ulcer presented to ED for fever after being advised by visiting nurse to come in. Patient reports she had a fever of 103 when visiting nurse came to see her day PTP. Was told she should come into ED. Also reports she had her foot ulcer debrided by Podiatry on Monday. Afterwards Monday evening, patient began experiencing severe, unbearable pain. Visiting nurse came next day and noted fever. Patient reports some lower abdominal discomfort. Denies any burning while urinating, foul odor from urine, constipation, diarrhea, chest pain, SOB, cough or other complaint or symptoms.     In ED UA was grossly positive. Received Rocephin 1g. (24 Oct 2018 01:34)      PAST MEDICAL & SURGICAL HISTORY:  Gout  DVT (deep venous thrombosis)  HTN (hypertension)  Rheumatoid arteritis  Other specified diabetes mellitus with other specified complication, unspecified whether long term insulin use  Primary osteoarthritis of right knee: s/p knee repalcement      Hospital Course:  She has 3rd toe erythema, dorsal. Treated for UTi. She is deconditioned and weak. She has RA; she lives alone.  TODAY'S SUBJECTIVE & REVIEW OF SYMPTOMS:     Constitutional WNL   Cardio WNL   Resp WNL   GI WNL  Heme WNL  Endo WNL  Skin WNL  MSK WNL  Neuro WNL  Cognitive WNL  Psych WNL      MEDICATIONS  (STANDING):  allopurinol 300 milliGRAM(s) Oral daily  apixaban 5 milliGRAM(s) Oral every 12 hours  cefTRIAXone   IVPB 1 Gram(s) IV Intermittent every 24 hours  chlorhexidine 4% Liquid 1 Application(s) Topical <User Schedule>  dextrose 5%. 1000 milliLiter(s) (50 mL/Hr) IV Continuous <Continuous>  dextrose 50% Injectable 12.5 Gram(s) IV Push once  dextrose 50% Injectable 25 Gram(s) IV Push once  dextrose 50% Injectable 25 Gram(s) IV Push once  docusate sodium 100 milliGRAM(s) Oral two times a day  folic acid 1 milliGRAM(s) Oral daily  insulin glargine Injectable (LANTUS) 12 Unit(s) SubCutaneous at bedtime  insulin lispro (HumaLOG) corrective regimen sliding scale   SubCutaneous three times a day before meals  insulin lispro Injectable (HumaLOG) 4 Unit(s) SubCutaneous three times a day before meals  leucovorin 5 milliGRAM(s) Oral daily  predniSONE   Tablet 15 milliGRAM(s) Oral daily  rifampin 300 milliGRAM(s) Oral two times a day  senna 2 Tablet(s) Oral at bedtime    MEDICATIONS  (PRN):  dextrose 40% Gel 15 Gram(s) Oral once PRN Blood Glucose LESS THAN 70 milliGRAM(s)/deciliter  glucagon  Injectable 1 milliGRAM(s) IntraMuscular once PRN Glucose LESS THAN 70 milligrams/deciliter  oxyCODONE    5 mG/acetaminophen 325 mG 2 Tablet(s) Oral every 4 hours PRN Moderate Pain (4 - 6)      FAMILY HISTORY:  No pertinent family history in first degree relatives      Allergies    clindamycin (Unknown)  erythromycin (Unknown)  penicillin (Unknown)  strawberry (Unknown)    Intolerances        SOCIAL HISTORY:    [  ] Etoh  [  ] Smoking  [  ] Substance abuse     Home Environment:  [x  ] Home Alone  [  ] Lives with Family  [  ] Home Health Aid    Dwelling:  [  ] Apartment  [  ] Private House  [  ] Adult Home  [  ] Skilled Nursing Facility      [  ] Short Term  [  ] Long Term  [  ] Stairs       Elevator [  ]    FUNCTIONAL STATUS PTA: (Check all that apply)  Ambulation: [x   ]Independent    [  ] Dependent     [  ] Non-Ambulatory  Assistive Device: [  ] SA Cane  [  ]  Q Cane  [x  ] Walker  [  ]  Wheelchair  ADL : [  ] Independent  [  ]  Dependent       Vital Signs Last 24 Hrs  T(C): 36.4 (29 Oct 2018 05:20), Max: 36.4 (28 Oct 2018 20:40)  T(F): 97.6 (29 Oct 2018 05:20), Max: 97.6 (29 Oct 2018 05:20)  HR: 107 (29 Oct 2018 05:20) (85 - 107)  BP: 141/67 (29 Oct 2018 05:20) (122/61 - 141/67)  BP(mean): --  RR: 18 (29 Oct 2018 05:20) (18 - 18)  SpO2: 94% (29 Oct 2018 07:55) (94% - 94%)      PHYSICAL EXAM: Alert & Oriented X3  GENERAL: NAD, well-groomed, well-developed  HEAD:  Atraumatic, Normocephalic  EYES: EOMI, PERRLA, conjunctiva and sclera clear  NECK: Supple, No JVD, Normal thyroid  CHEST/LUNG: Clear to percussion bilaterally; No rales, rhonchi, wheezing, or rubs  HEART: Regular rate and rhythm; No murmurs, rubs, or gallops  ABDOMEN: Soft, Nontender, Nondistended; Bowel sounds present  EXTREMITIES:  2+ Peripheral Pulses, No clubbing, cyanosis, or edema    NERVOUS SYSTEM:  Cranial Nerves 2-12 intact [  ] Abnormal  [  ]  ROM: WFL all extremities [  ]  Abnormal [  ]  Motor Strength: WFL all extremities  [  ]  Abnormal [ x ]5-/5  Sensation: intact to light touch [  ] Abnormal [  ]  Reflexes: Symmetric [  ]  Abnormal [  ]    FUNCTIONAL STATUS:  Bed Mobility: Independent [  ]  Supervision [  ]  Needs Assistance [  ]  N/A [  ]  Transfers: Independent [  ]  Supervision [  ]  Needs Assistance [  ]  N/A [  ]   Ambulation: Independent [  ]  Supervision [  ]  Needs Assistance [  ]  N/A [  ]  ADL: Independent [  ] Requires Assistance [  ] N/A [  ]      LABS:                        8.8    7.82  )-----------( 306      ( 29 Oct 2018 08:38 )             28.7     10-29    131<L>  |  97<L>  |  19  ----------------------------<  154<H>  4.5   |  22  |  1.1    Ca    8.4<L>      29 Oct 2018 08:38            RADIOLOGY & ADDITIONAL STUDIES:    Assesment:
LINCOLN HEREDIA 78yFemalePatient is a 78y old  Female who presents with a chief complaint of UTI (25 Oct 2018 10:19)      Patient has history of:  clindamycin (Unknown)  erythromycin (Unknown)  penicillin (Unknown)  strawberry (Unknown)          UTI (URINARY TRACT INFECTION);ELEVATED TROPONIN  ^NECROTIC WOUND AND ELEV TEMP  No pertinent family history in first degree relatives  Handoff  MEWS Score  Gout  DVT (deep venous thrombosis)  HTN (hypertension)  Rheumatoid arteritis  Other specified diabetes mellitus with other specified complication, unspecified whether long term insulin use  UTI (urinary tract infection)  Primary osteoarthritis of right knee  NECROTIC WOUND AND ELEV TEMP  53  Foot ulcer  Elevated troponin        Patient treated with:  cefTRIAXone   IVPB 1 Gram(s) IV Intermittent every 24 hours  rifampin 300 milliGRAM(s) Oral two times a day        PHYSICAL EXAM  T(F): 99.3 (10-25-18 @ 04:45), Max: 99.8 (10-24-18 @ 23:30)  HR: 102 (10-25-18 @ 04:45) (99 - 102)  BP: 119/76 (10-25-18 @ 04:45) (119/76 - 133/93)  RR: 20 (10-25-18 @ 04:45) (20 - 20)  SpO2: 98% (10-25-18 @ 00:52) (95% - 98%)  Daily Height in cm: 172.72 (24 Oct 2018 23:30)    Daily   HEENT: normal, no nuchal rigidity  Cor: RSR Nl S1 S2  Lungs: clear  Decreased breath sounds at bases    Abdomen: Nontender, Nl BS,     Ext: No clubbing,cyanosis or edema    LAB & RADIOLOGIC RESULTS:                        8.2    7.66  )-----------( 238      ( 25 Oct 2018 07:17 )             25.8         10-25    136  |  100  |  25<H>  ----------------------------<  148<H>  4.2   |  21  |  1.2    Mg     2.2     10-24    TPro  5.2<L>  /  Alb  2.9<L>  /  TBili  0.4  /  DBili  x   /  AST  24  /  ALT  10  /  AlkPhos  46  10-25    <--<9>>1.5       Creatinine, Serum: 1.2 mg/dL (10-25-18 @ 07:17)  eGFR if Non African American: 44 mL/min/1.73M2 (10-25-18 @ 07:17)  eGFR if African American: 50 mL/min/1.73M2 (10-25-18 @ 07:17)      Urinalysis Basic - ( 23 Oct 2018 20:50 )    Color: Yellow / Appearance: Cloudy / S.020 / pH: x  Gluc: x / Ketone: Negative  / Bili: Negative / Urobili: 0.2 mg/dL   Blood: x / Protein: 30 mg/dL / Nitrite: Positive   Leuk Esterase: Moderate / RBC: 3-5 /HPF / WBC >50 /HPF   Sq Epi: x / Non Sq Epi: Moderate /HPF / Bacteria: TNTC /HPF      PT/INR - ( 23 Oct 2018 17:27 )   PT: 13.80 sec;   INR: 1.20 ratio         PTT - ( 23 Oct 2018 17:27 )  PTT:28.0 sec    Culture - Blood (collected 23 Oct 2018 18:41)  Source: .Blood Blood  Preliminary Report (25 Oct 2018 03:01):    No growth to date.    Culture - Blood (collected 23 Oct 2018 18:41)  Source: .Blood Blood  Preliminary Report (25 Oct 2018 03:01):    No growth to date.
PROGRESS NOTE   Patient is a 78y old  Female who presents with a chief complaint of UTI (24 Oct 2018 09:31). Podiatry evaluating pt for right 3rd digit DFU and wound to medial aspect LLE      HPI:  79 yo F with PMH of DM2, RA, DVT on Eliquis, gout, latent TB on Rifampin and foot ulcer presented to ED for fever after being advised by visiting nurse to come in. Patient reports she had a fever of 103 when visiting nurse came to see her day PTP. Was told she should come into ED. Also reports she had her foot ulcer debrided by Podiatry on Monday. Afterwards Monday evening, patient began experiencing severe, unbearable pain. Visiting nurse came next day and noted fever. Patient reports some lower abdominal discomfort. Denies any burning while urinating, foul odor from urine, constipation, diarrhea, chest pain, SOB, cough or other complaint or symptoms.     In ED UA was grossly positive. Received Rocephin 1g. (24 Oct 2018 01:34)      UTI (URINARY TRACT INFECTION);ELEVATED TROPONIN  ^UTI (URINARY TRACT INFECTION);ELEVATED TROPONIN  No pertinent family history in first degree relatives  Handoff  MEWS Score  Gout  DVT (deep venous thrombosis)  HTN (hypertension)  Rheumatoid arteritis  Other specified diabetes mellitus with other specified complication, unspecified whether long term insulin use  UTI (urinary tract infection)  Primary osteoarthritis of right knee  NECROTIC WOUND AND ELEV TEMP  54  Foot ulcer  Elevated troponin      VITALS:  Vital Signs Last 24 Hrs  T(C): 37.3 (24 Oct 2018 07:18), Max: 37.7 (23 Oct 2018 16:28)  T(F): 99.2 (24 Oct 2018 07:18), Max: 99.8 (23 Oct 2018 16:28)  HR: 94 (24 Oct 2018 07:18) (94 - 95)  BP: 142/92 (24 Oct 2018 07:18) (133/58 - 142/92)  BP(mean): --  RR: 20 (24 Oct 2018 07:18) (18 - 20)  SpO2: 95% (24 Oct 2018 07:18) (95% - 97%)    LABS:                        8.6    8.75  )-----------( 239      ( 24 Oct 2018 07:26 )             27.6     10-24    136  |  98  |  25<H>  ----------------------------<  133<H>  4.5   |  22  |  1.2    Ca    8.0<L>      24 Oct 2018 07:26  Mg     2.2     10-24    TPro  6.0  /  Alb  3.8  /  TBili  0.3  /  DBili  x   /  AST  24  /  ALT  8   /  AlkPhos  55  10-23    PT/INR - ( 23 Oct 2018 17:27 )   PT: 13.80 sec;   INR: 1.20 ratio         PTT - ( 23 Oct 2018 17:27 )  PTT:28.0 sec  Hemoglobin A1C     PHYSICAL EXAM  GEN: LINCOLN HEREDIA is a pleasant well-nourished, well developed 78y Female in no acute distress, alert awake, and oriented to person, place and time.     Medication(s):   allopurinol 300 milliGRAM(s) Oral daily  apixaban 5 milliGRAM(s) Oral every 12 hours  cefTRIAXone   IVPB 1 Gram(s) IV Intermittent every 24 hours  docusate sodium 100 milliGRAM(s) Oral two times a day  folic acid 1 milliGRAM(s) Oral daily  leucovorin 5 milliGRAM(s) Oral daily  morphine  - Injectable 4 milliGRAM(s) IV Push every 4 hours PRN  predniSONE   Tablet 15 milliGRAM(s) Oral daily  rifampin 300 milliGRAM(s) Oral two times a day          Vasc:    - DP/PT pulses dopplerable B/L  - Skin temp warm to warm, proximal to distal B/L  - CFT < 3 sec B/L    Neuro:   - Gross sensation in tact B/L     Derm:   - No interdigital macerations B/L   - Wound medial aspect LLE  - DFU dorsal aspect right 3rd digit. + edema, + erythema, + malodor, - purulence    MSK:   - Muscle strength 3/5 in all quadrants  - Hammertoes left digits 3/4 and right digits 2,3,4
PROGRESS NOTE   Patient is a 78y old  Female who presents with a chief complaint of right 3rd digit ulceration      HPI:  79 yo F with PMH of DM2, RA, DVT on Eliquis, gout, latent TB on Rifampin and foot ulcer presented to ED for fever after being advised by visiting nurse to come in. Patient reports she had a fever of 103 when visiting nurse came to see her day PTP. Was told she should come into ED. Also reports she had her foot ulcer debrided by Podiatry on Monday. Afterwards Monday evening, patient began experiencing severe, unbearable pain. Visiting nurse came next day and noted fever. Patient reports some lower abdominal discomfort. Denies any burning while urinating, foul odor from urine, constipation, diarrhea, chest pain, SOB, cough or other complaint or symptoms.     In ED UA was grossly positive. Received Rocephin 1g. (24 Oct 2018 01:34)      UTI (URINARY TRACT INFECTION);ELEVATED TROPONIN  ^UTI (URINARY TRACT INFECTION);ELEVATED TROPONIN  No pertinent family history in first degree relatives  Handoff  MEWS Score  Gout  DVT (deep venous thrombosis)  HTN (hypertension)  Rheumatoid arteritis  Other specified diabetes mellitus with other specified complication, unspecified whether long term insulin use  UTI (urinary tract infection)  Primary osteoarthritis of right knee  NECROTIC WOUND AND ELEV TEMP  54  Foot ulcer  Elevated troponin      VITALS:  Vital Signs Last 24 Hrs  T(C): 37.3 (24 Oct 2018 07:18), Max: 37.7 (23 Oct 2018 16:28)  T(F): 99.2 (24 Oct 2018 07:18), Max: 99.8 (23 Oct 2018 16:28)  HR: 94 (24 Oct 2018 07:18) (94 - 95)  BP: 142/92 (24 Oct 2018 07:18) (133/58 - 142/92)  BP(mean): --  RR: 20 (24 Oct 2018 07:18) (18 - 20)  SpO2: 95% (24 Oct 2018 07:18) (95% - 97%)    LABS:                        8.6    8.75  )-----------( 239      ( 24 Oct 2018 07:26 )             27.6     10-24    136  |  98  |  25<H>  ----------------------------<  133<H>  4.5   |  22  |  1.2    Ca    8.0<L>      24 Oct 2018 07:26  Mg     2.2     10-24    TPro  6.0  /  Alb  3.8  /  TBili  0.3  /  DBili  x   /  AST  24  /  ALT  8   /  AlkPhos  55  10-23    PT/INR - ( 23 Oct 2018 17:27 )   PT: 13.80 sec;   INR: 1.20 ratio         PTT - ( 23 Oct 2018 17:27 )  PTT:28.0 sec  Hemoglobin A1C     PHYSICAL EXAM  GEN: LINCOLN HEREDIA is a pleasant well-nourished, well developed 78y Female in no acute distress, alert awake, and oriented to person, place and time.     Medication(s):   allopurinol 300 milliGRAM(s) Oral daily  apixaban 5 milliGRAM(s) Oral every 12 hours  cefTRIAXone   IVPB 1 Gram(s) IV Intermittent every 24 hours  docusate sodium 100 milliGRAM(s) Oral two times a day  folic acid 1 milliGRAM(s) Oral daily  leucovorin 5 milliGRAM(s) Oral daily  morphine  - Injectable 4 milliGRAM(s) IV Push every 4 hours PRN  predniSONE   Tablet 15 milliGRAM(s) Oral daily  rifampin 300 milliGRAM(s) Oral two times a day      LE Focused Exam:      Vasc:    - DP/PT pulses dopplerable B/L  - Skin temp warm to warm, proximal to distal B/L  - CFT < 3 sec B/L    Neuro:   - Gross sensation in tact B/L     Derm:   - No interdigital macerations B/L   - Wound medial aspect LLE  - DFU dorsal aspect right 3rd digit. + edema, + erythema, + malodor, - purulence    MSK:   - Muscle strength 3/5 in all quadrants  - Hammertoes left digits 3/4 and right digits 2,3,4
VASCULAR SURGERY CONSULT NOTE      HPI:  77 yo F with PMH of DM2, RA, known B/L fem-pop DVT on Eliquis, on going Right DFU, pt underwent Right lower extremity angiogram 18 revealing normal arterial flow (Dr. Martínez), gout, latent TB on Rifampin and foot ulcer presented to ED for fever after being advised by visiting nurse to come in. Patient reports she had a fever of 103 when visiting nurse came to see her day PTP. Was told she should come into ED. Also reports she had her foot ulcer debrided by Podiatry on Monday. Afterwards Monday evening, patient began experiencing severe, unbearable pain. Visiting nurse came next day and noted fever. Patient reports some lower abdominal discomfort. Denies any burning while urinating, foul odor from urine, constipation, diarrhea, chest pain, SOB, cough or other complaint or symptoms.     In ED UA was grossly positive. Received Rocephin 1g. (24 Oct 2018 01:34)        PAST MEDICAL & SURGICAL HISTORY:  Gout  DVT (deep venous thrombosis)  HTN (hypertension)  Rheumatoid arteritis  Other specified diabetes mellitus with other specified complication, unspecified whether long term insulin use  Primary osteoarthritis of right knee: s/p knee repalcement    clindamycin (Unknown)  erythromycin (Unknown)  penicillin (Unknown)  strawberry (Unknown)    Home Medications:  Actos 30 mg oral tablet: 1 tab(s) orally once a day (2018 17:15)  allopurinol 300 mg oral tablet: 1 tab(s) orally once a day (15 Jin 2018 13:43)  docusate sodium 100 mg oral capsule: 1 cap(s) orally 2 times a day (31 Aug 2018 11:48)  folic acid 1 mg oral tablet: 1 tab(s) orally once a day (2018 09:59)  gabapentin 100 mg oral tablet: 1 tab(s) orally 2 times a day, As Needed (24 Oct 2018 02:14)  glimepiride 2 mg oral tablet: 1  orally 3 times a day (2018 17:15)  leucovorin 5 mg oral tablet: 1 tab(s) orally once a day (24 Aug 2018 23:35)  oxyCODONE-acetaminophen 5 mg-325 mg oral tablet: 1 tab(s) orally every 4 hours, As needed, Severe Pain (7 - 10) (31 Aug 2018 11:48)  predniSONE 5 mg oral tablet: 3 tab(s) orally once a day (24 Oct 2018 02:09)  rifAMPin 300 mg oral capsule: 1 cap(s) orally 2 times a day (24 Oct 2018 02:13)    No permtinent family history of PVD    REVIEW OF SYSTEMS:  GENERAL:                                         negative  SKIN:                                                 negative  OPTHALMOLOGIC:                          negative  ENMT:                                               negative  RESPIRATORY AND THORAX:        negative  CARDIOVASCULAR:                       see HPI  GASTROINTESTINAL:                       negative  NEPHROLOGY:                                  negative  MUSCULOSKELETAL:                       negative  NEUROLOGIC:                                   negative  PSYCHIATRIC:                                    negative  HEMATOLOGY/LYMPHATICS:         negative  ENDOCRINE:                                 see HPI  ALLERGIC/IMMUNOLOGIC:            negative    12 point ROS otherwise normal except as stated in HPI    PHYSICAL EXAM  Vital Signs Last 24 Hrs  T(C): 37.4 (25 Oct 2018 04:45), Max: 37.7 (24 Oct 2018 23:30)  T(F): 99.3 (25 Oct 2018 04:45), Max: 99.8 (24 Oct 2018 23:30)  HR: 102 (25 Oct 2018 04:45) (99 - 102)  BP: 119/76 (25 Oct 2018 04:45) (119/76 - 133/93)  BP(mean): --  RR: 20 (25 Oct 2018 04:45) (20 - 20)  SpO2: 98% (25 Oct 2018 00:52) (95% - 98%)    Appearance: Normal	  HEENT:   Normal oral mucosa, PERRL, EOMI	  Neck: Supple, - JVD; Carotid Bruit   Cardiovascular: Normal S1 S2, No JVD, No murmurs,   Respiratory: Lungs clear to auscultation, No Rales, Rhonchi, Wheezing	  Gastrointestinal:  Soft, Non-tender, positive BS	  Skin:  - Wound medial aspect LLE  - DFU dorsal aspect right 3rd digit. + edema, + erythema, + malodor, - purulence  Extremities: Normal range of motion, No clubbing, cyanosis or edema  Neurologic: Non-focal  Psychiatry: A & O x 3, Mood & affect appropriate      PULSES:  Femoral:  Popliteal:  Dorsal Pedal: dopplerable Right   Posterior Tibial: dopplerable Right  Capillary:    MEDICATIONS:   MEDICATIONS  (STANDING):  allopurinol 300 milliGRAM(s) Oral daily  apixaban 5 milliGRAM(s) Oral every 12 hours  cefTRIAXone   IVPB 1 Gram(s) IV Intermittent every 24 hours  chlorhexidine 4% Liquid 1 Application(s) Topical <User Schedule>  docusate sodium 100 milliGRAM(s) Oral two times a day  folic acid 1 milliGRAM(s) Oral daily  leucovorin 5 milliGRAM(s) Oral daily  predniSONE   Tablet 15 milliGRAM(s) Oral daily  rifampin 300 milliGRAM(s) Oral two times a day    MEDICATIONS  (PRN):  morphine  - Injectable 4 milliGRAM(s) IV Push every 4 hours PRN Severe Pain (7 - 10)      LAB/STUDIES:                        8.2    7.66  )-----------( 238      ( 25 Oct 2018 07:17 )             25.8     10-25    136  |  100  |  25<H>  ----------------------------<  148<H>  4.2   |  21  |  1.2    Ca    7.7<L>      25 Oct 2018 07:17  Mg     2.2     10-24    TPro  5.2<L>  /  Alb  2.9<L>  /  TBili  0.4  /  DBili  x   /  AST  24  /  ALT  10  /  AlkPhos  46  10-    PT/INR - ( 23 Oct 2018 17:27 )   PT: 13.80 sec;   INR: 1.20 ratio         PTT - ( 23 Oct 2018 17:27 )  PTT:28.0 sec  LIVER FUNCTIONS - ( 25 Oct 2018 07:17 )  Alb: 2.9 g/dL / Pro: 5.2 g/dL / ALK PHOS: 46 U/L / ALT: 10 U/L / AST: 24 U/L / GGT: x             Urinalysis Basic - ( 23 Oct 2018 20:50 )    Color: Yellow / Appearance: Cloudy / S.020 / pH: x  Gluc: x / Ketone: Negative  / Bili: Negative / Urobili: 0.2 mg/dL   Blood: x / Protein: 30 mg/dL / Nitrite: Positive   Leuk Esterase: Moderate / RBC: 3-5 /HPF / WBC >50 /HPF   Sq Epi: x / Non Sq Epi: Moderate /HPF / Bacteria: TNTC /HPF      CARDIAC MARKERS ( 24 Oct 2018 07:26 )  x     / 0.06 ng/mL / 759 U/L / x     / 2.3 ng/mL  CARDIAC MARKERS ( 23 Oct 2018 17:27 )  x     / 0.06 ng/mL / x     / x     / x                  Culture - Blood (collected 23 Oct 2018 18:41)  Source: .Blood Blood  Preliminary Report (25 Oct 2018 03:01):    No growth to date.    Culture - Blood (collected 23 Oct 2018 18:41)  Source: .Blood Blood  Preliminary Report (25 Oct 2018 03:01):    No growth to date.
Patient has no objection to blood transfusions.

## 2023-04-06 NOTE — PHYSICAL EXAM
[Closed] : closed [Size%: ______] : Size: [unfilled]% [Infected?] : Infected: No [3] : 3 out of 10 [Abnormal] : abnormal [None] : none [] : no [de-identified] : The bilateral lower leg wounds venous stasis ulcers  are healed and measures on the left leg  10x5cm and on the right leg  10x5cm.  The is no adherent devitalized tissue .  The patient was instructed to clean  the wound with soap and water. Continue local wound care with moisturizer and sunscreen..  Follow up prn [TWNoteComboBox1] : False

## 2023-04-06 NOTE — ASSESSMENT
[FreeTextEntry1] : The bilateral lower leg wounds venous stasis ulcers  are healed and measures on the left leg  10x5cm and on the right leg  10x5cm.  The is no adherent devitalized tissue .  The patient was instructed to clean  the wound with soap and water. Continue local wound care with moisturizer and sunscreen..  Follow up prn [Wound Care] : wound care
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
## 2023-04-07 DIAGNOSIS — Z09 ENCOUNTER FOR FOLLOW-UP EXAMINATION AFTER COMPLETED TREATMENT FOR CONDITIONS OTHER THAN MALIGNANT NEOPLASM: ICD-10-CM

## 2023-04-07 DIAGNOSIS — Z87.2 PERSONAL HISTORY OF DISEASES OF THE SKIN AND SUBCUTANEOUS TISSUE: ICD-10-CM

## 2023-07-13 ENCOUNTER — APPOINTMENT (OUTPATIENT)
Dept: BURN CARE | Facility: CLINIC | Age: 83
End: 2023-07-13

## 2023-08-27 NOTE — DIETITIAN INITIAL EVALUATION ADULT. - NS FNS WEIGHT USED FOR CALC
08/27/23                            Osbaldo Pereraace  [address]  [facility] [address]    To Whom It May Concern:    This is to certify Osbaldo Pereraace was evaluated with Jovany Combs MD on 08/27/23 and can return to regular work on 8/28/2023.     RESTRICTIONS: NONE            Electronically signed by:  Jovany Combs MD  [facility]  [address] [facility] [address]  Dept Phone: [phone]       
105lbs (47.7kg)/ideal

## 2023-09-14 ENCOUNTER — OUTPATIENT (OUTPATIENT)
Dept: OUTPATIENT SERVICES | Facility: HOSPITAL | Age: 83
LOS: 1 days | End: 2023-09-14
Payer: MEDICARE

## 2023-09-14 ENCOUNTER — APPOINTMENT (OUTPATIENT)
Dept: BURN CARE | Facility: CLINIC | Age: 83
End: 2023-09-14
Payer: MEDICARE

## 2023-09-14 VITALS — HEART RATE: 66 BPM | SYSTOLIC BLOOD PRESSURE: 128 MMHG | DIASTOLIC BLOOD PRESSURE: 70 MMHG | TEMPERATURE: 98.6 F

## 2023-09-14 DIAGNOSIS — M17.11 UNILATERAL PRIMARY OSTEOARTHRITIS, RIGHT KNEE: Chronic | ICD-10-CM

## 2023-09-14 DIAGNOSIS — Z00.8 ENCOUNTER FOR OTHER GENERAL EXAMINATION: ICD-10-CM

## 2023-09-14 PROCEDURE — 99212 OFFICE O/P EST SF 10 MIN: CPT

## 2023-09-15 DIAGNOSIS — I83.018 VARICOSE VEINS OF RIGHT LOWER EXTREMITY WITH ULCER OTHER PART OF LOWER LEG: ICD-10-CM

## 2023-09-15 DIAGNOSIS — L97.819 NON-PRESSURE CHRONIC ULCER OF OTHER PART OF RIGHT LOWER LEG WITH UNSPECIFIED SEVERITY: ICD-10-CM

## 2023-10-05 ENCOUNTER — APPOINTMENT (OUTPATIENT)
Dept: BURN CARE | Facility: CLINIC | Age: 83
End: 2023-10-05

## 2023-10-21 NOTE — PROGRESS NOTE ADULT - PROVIDER SPECIALTY LIST ADULT
Heme/Onc
Hospitalist
Internal Medicine
Podiatry
1 pair

## 2024-01-28 NOTE — PATIENT PROFILE ADULT - OVER THE PAST TWO WEEKS HAVE YOU FELT DOWN, DEPRESSED OR HOPELESS?
Pt is not symptomatic of his low platelet count hemoglobin 7.4 hematocrit 20.8 platelets 17 Patient endorsing mucositis pain w/ excessive salvation noted. VSS and patient pain remains adequately managed throughout shift. FOC denies episodes of profuse bleeding. Bleeding precautions maintained. This is an expected finding as patient is awaiting engraftment and count recovery. no

## 2024-02-08 RX ORDER — SILVER SULFADIAZINE 10 MG/G
1 CREAM TOPICAL TWICE DAILY
Qty: 1 | Refills: 1 | Status: ACTIVE | COMMUNITY
Start: 2024-02-08 | End: 1900-01-01

## 2024-02-15 ENCOUNTER — APPOINTMENT (OUTPATIENT)
Dept: BURN CARE | Facility: CLINIC | Age: 84
End: 2024-02-15
Payer: MEDICARE

## 2024-02-15 ENCOUNTER — OUTPATIENT (OUTPATIENT)
Dept: OUTPATIENT SERVICES | Facility: HOSPITAL | Age: 84
LOS: 1 days | End: 2024-02-15
Payer: MEDICARE

## 2024-02-15 VITALS — SYSTOLIC BLOOD PRESSURE: 136 MMHG | DIASTOLIC BLOOD PRESSURE: 76 MMHG | TEMPERATURE: 97.3 F | HEART RATE: 70 BPM

## 2024-02-15 DIAGNOSIS — M17.11 UNILATERAL PRIMARY OSTEOARTHRITIS, RIGHT KNEE: Chronic | ICD-10-CM

## 2024-02-15 DIAGNOSIS — Z00.8 ENCOUNTER FOR OTHER GENERAL EXAMINATION: ICD-10-CM

## 2024-02-15 PROCEDURE — 99212 OFFICE O/P EST SF 10 MIN: CPT

## 2024-02-15 NOTE — HISTORY OF PRESENT ILLNESS
[Did you have an operation on your burn/wound injury?] : Did you have an operation on your burn/wound injury? No [Did this injury occur on the job?] : Did this injury occur on the job? No [de-identified] : 11/2020 [de-identified] : bilateral lower leg wounds venous stasis ulcers  [de-identified] : home [de-identified] : new wounds right lower leg

## 2024-02-15 NOTE — REASON FOR VISIT
[Revisit] : revisit [Were you seen in the Emergency Room?] : not seen in the emergency room [Were you admitted to the burn center at [facility]?] : not admitted to the burn center at [facility]

## 2024-02-15 NOTE — ASSESSMENT
[FreeTextEntry1] : The right lower leg venous stasis ulcer measures 10x5cm .  The burned skin is pink and moist. The patient was instructed to clean  the wound with soap and water. Continue local wound care with ABD pad. Ace wrap compression applied..   Follow up 2 - 4  weeks.  [Wound Care] : wound care

## 2024-02-15 NOTE — PHYSICAL EXAM
[New] : new [Size%: ______] : Size: [unfilled]% [Infected?] : Infected: No [3] : 3 out of 10 [Abnormal] : abnormal [Large] : medium [] : no [de-identified] : The right lower leg venous stasis ulcer measures 10x5cm .  The burned skin is pink and moist. The patient was instructed to clean  the wound with soap and water. Continue local wound care with ABD pad. Ace wrap compression applied..   Follow up 2 - 4  weeks.  [TWNoteComboBox1] : ABD pad

## 2024-02-16 DIAGNOSIS — L97.819 NON-PRESSURE CHRONIC ULCER OF OTHER PART OF RIGHT LOWER LEG WITH UNSPECIFIED SEVERITY: ICD-10-CM

## 2024-02-16 DIAGNOSIS — I83.018 VARICOSE VEINS OF RIGHT LOWER EXTREMITY WITH ULCER OTHER PART OF LOWER LEG: ICD-10-CM

## 2024-03-07 ENCOUNTER — OUTPATIENT (OUTPATIENT)
Dept: OUTPATIENT SERVICES | Facility: HOSPITAL | Age: 84
LOS: 1 days | End: 2024-03-07
Payer: MEDICARE

## 2024-03-07 ENCOUNTER — APPOINTMENT (OUTPATIENT)
Dept: BURN CARE | Facility: CLINIC | Age: 84
End: 2024-03-07
Payer: MEDICARE

## 2024-03-07 VITALS — DIASTOLIC BLOOD PRESSURE: 82 MMHG | TEMPERATURE: 98.3 F | SYSTOLIC BLOOD PRESSURE: 133 MMHG | HEART RATE: 82 BPM

## 2024-03-07 DIAGNOSIS — M17.11 UNILATERAL PRIMARY OSTEOARTHRITIS, RIGHT KNEE: Chronic | ICD-10-CM

## 2024-03-07 DIAGNOSIS — Z00.8 ENCOUNTER FOR OTHER GENERAL EXAMINATION: ICD-10-CM

## 2024-03-07 PROCEDURE — 99213 OFFICE O/P EST LOW 20 MIN: CPT

## 2024-03-07 RX ORDER — SILVER SULFADIAZINE 10 MG/G
1 CREAM TOPICAL TWICE DAILY
Qty: 400 | Refills: 0 | Status: ACTIVE | COMMUNITY
Start: 2024-03-07 | End: 1900-01-01

## 2024-03-07 NOTE — ASSESSMENT
[FreeTextEntry1] : The right lower leg venous stasis ulcer measures 10x5cm .  The skin is pink and moist. The patient was instructed to clean  the wound with soap and water. Continue local wound care with ABD pad. Ace wrap compression applied..   Follow up 2 - 4  months [Wound Care] : wound care

## 2024-03-07 NOTE — PHYSICAL EXAM
[Healing] : healing [Size%: ______] : Size: [unfilled]% [Infected?] : Infected: No [3] : 3 out of 10 [Abnormal] : abnormal [Large] : medium [] : no [de-identified] : The right lower leg venous stasis ulcer measures 10x5cm .  The skin is pink and moist. The patient was instructed to clean  the wound with soap and water. Continue local wound care with ABD pad. Ace wrap compression applied..   Follow up 2 - 4  months [TWNoteComboBox1] : ABD pad

## 2024-03-07 NOTE — HISTORY OF PRESENT ILLNESS
[Did you have an operation on your burn/wound injury?] : Did you have an operation on your burn/wound injury? No [Did this injury occur on the job?] : Did this injury occur on the job? No [de-identified] : 11/2020 [de-identified] : bilateral lower leg wounds venous stasis ulcers  [de-identified] : home [de-identified] :  wounds right lower leg

## 2024-03-11 RX ORDER — HYDROCORTISONE 25 MG/G
2.5 CREAM TOPICAL TWICE DAILY
Qty: 1 | Refills: 2 | Status: ACTIVE | COMMUNITY
Start: 2024-03-11 | End: 1900-01-01

## 2024-03-12 DIAGNOSIS — L97.819 NON-PRESSURE CHRONIC ULCER OF OTHER PART OF RIGHT LOWER LEG WITH UNSPECIFIED SEVERITY: ICD-10-CM

## 2024-03-12 DIAGNOSIS — I83.018 VARICOSE VEINS OF RIGHT LOWER EXTREMITY WITH ULCER OTHER PART OF LOWER LEG: ICD-10-CM

## 2024-03-28 ENCOUNTER — OUTPATIENT (OUTPATIENT)
Dept: OUTPATIENT SERVICES | Facility: HOSPITAL | Age: 84
LOS: 1 days | End: 2024-03-28
Payer: MEDICARE

## 2024-03-28 ENCOUNTER — APPOINTMENT (OUTPATIENT)
Dept: BURN CARE | Facility: CLINIC | Age: 84
End: 2024-03-28
Payer: MEDICARE

## 2024-03-28 VITALS — TEMPERATURE: 98.7 F | SYSTOLIC BLOOD PRESSURE: 111 MMHG | DIASTOLIC BLOOD PRESSURE: 91 MMHG | HEART RATE: 110 BPM

## 2024-03-28 DIAGNOSIS — Z00.8 ENCOUNTER FOR OTHER GENERAL EXAMINATION: ICD-10-CM

## 2024-03-28 DIAGNOSIS — M17.11 UNILATERAL PRIMARY OSTEOARTHRITIS, RIGHT KNEE: Chronic | ICD-10-CM

## 2024-03-28 PROCEDURE — 99212 OFFICE O/P EST SF 10 MIN: CPT

## 2024-03-28 NOTE — REASON FOR VISIT
[Revisit] : revisit [Were you seen in the Emergency Room?] : not seen in the emergency room [Were you admitted to the burn center at [facility]?] : not admitted to the burn center at [facility]

## 2024-03-28 NOTE — PHYSICAL EXAM
[Healing] : healing [Size%: ______] : Size: [unfilled]% [Infected?] : Infected: No [3] : 3 out of 10 [Abnormal] : abnormal [Large] : medium [] : no [de-identified] : The right lower leg venous stasis ulcer measures 5x5cm  and is healing .   The skin is pink and moist. The patient was instructed to clean  the wound with soap and water. Continue local wound care with silvadene cream. Ace wrap compression applied. Follow up 1-2 months.  [TWNoteComboBox1] : DALE [TWNoteComboBox2] : SSD

## 2024-03-28 NOTE — HISTORY OF PRESENT ILLNESS
[Did you have an operation on your burn/wound injury?] : Did you have an operation on your burn/wound injury? No [Did this injury occur on the job?] : Did this injury occur on the job? No [de-identified] : 11/2020 [de-identified] : home [de-identified] : bilateral lower leg wounds venous stasis ulcers  [de-identified] :  wounds right lower leg

## 2024-03-28 NOTE — ASSESSMENT
[FreeTextEntry1] : The right lower leg venous stasis ulcer measures 5x5cm  and is healing .   The skin is pink and moist. The patient was instructed to clean  the wound with soap and water. Continue local wound care with silvadene cream. Ace wrap compression applied. Follow up 1-2 months.  [Wound Care] : wound care

## 2024-04-01 DIAGNOSIS — I83.018 VARICOSE VEINS OF RIGHT LOWER EXTREMITY WITH ULCER OTHER PART OF LOWER LEG: ICD-10-CM

## 2024-04-01 DIAGNOSIS — L97.819 NON-PRESSURE CHRONIC ULCER OF OTHER PART OF RIGHT LOWER LEG WITH UNSPECIFIED SEVERITY: ICD-10-CM

## 2024-04-29 NOTE — DISCHARGE NOTE ADULT - IF YOU ARE A SMOKER, IT IS IMPORTANT FOR YOUR HEALTH TO STOP SMOKING. PLEASE BE AWARE THAT SECOND HAND SMOKE IS ALSO HARMFUL.
Pt noted her  was dx with genital hsv, s/p tx for 10 days valtrex.  Pt counseled, pt has never had lesions or dx.  Pt stated she will abstain from intercourse and will d/w mfm at her next appt in 1 week.    Statement Selected Tumor Debulked?: scalpel

## 2024-05-09 ENCOUNTER — APPOINTMENT (OUTPATIENT)
Dept: BURN CARE | Facility: CLINIC | Age: 84
End: 2024-05-09

## 2024-05-22 ENCOUNTER — OUTPATIENT (OUTPATIENT)
Dept: OUTPATIENT SERVICES | Facility: HOSPITAL | Age: 84
LOS: 1 days | End: 2024-05-22
Payer: MEDICARE

## 2024-05-22 ENCOUNTER — APPOINTMENT (OUTPATIENT)
Dept: OPHTHALMOLOGY | Facility: CLINIC | Age: 84
End: 2024-05-22
Payer: MEDICARE

## 2024-05-22 DIAGNOSIS — H53.8 OTHER VISUAL DISTURBANCES: ICD-10-CM

## 2024-05-22 DIAGNOSIS — H35.033 HYPERTENSIVE RETINOPATHY, BILATERAL: ICD-10-CM

## 2024-05-22 DIAGNOSIS — Z96.1 PRESENCE OF INTRAOCULAR LENS: ICD-10-CM

## 2024-05-22 DIAGNOSIS — H43.11 VITREOUS HEMORRHAGE, RIGHT EYE: ICD-10-CM

## 2024-05-22 DIAGNOSIS — H40.003 PREGLAUCOMA, UNSPECIFIED, BILATERAL: ICD-10-CM

## 2024-05-22 DIAGNOSIS — H16.229 KERATOCONJUNCTIVITIS SICCA, NOT SPECIFIED AS SJOGREN'S, UNSPECIFIED EYE: ICD-10-CM

## 2024-05-22 DIAGNOSIS — M17.11 UNILATERAL PRIMARY OSTEOARTHRITIS, RIGHT KNEE: Chronic | ICD-10-CM

## 2024-05-22 DIAGNOSIS — H43.9 UNSPECIFIED DISORDER OF VITREOUS BODY: ICD-10-CM

## 2024-05-22 DIAGNOSIS — E11.39 TYPE 2 DIABETES MELLITUS WITH OTHER DIABETIC OPHTHALMIC COMPLICATION: ICD-10-CM

## 2024-05-22 PROCEDURE — 99213 OFFICE O/P EST LOW 20 MIN: CPT

## 2024-09-23 NOTE — PROGRESS NOTE ADULT - SUBJECTIVE AND OBJECTIVE BOX
SUBJECTIVE:    Patient is a 78y old Female who presents with a chief complaint of "I couldn't stand up from the recliner", and worsening pain of bilateral lower extremities (16 Jan 2019 11:30)    Currently admitted to medicine with the primary diagnosis of Wounds, multiple open, lower extremity     Today is hospital day 29d. pt comfortable and aware that she is being discharged.    PAST MEDICAL & SURGICAL HISTORY  Gout  DVT (deep venous thrombosis)  HTN (hypertension)  Rheumatoid arteritis  Other specified diabetes mellitus with other specified complication, unspecified whether long term insulin use  Primary osteoarthritis of right knee: s/p knee repalcement    SOCIAL HISTORY:  Negative for smoking/alcohol/drug use.     ALLERGIES:  clindamycin (Unknown)  erythromycin (Unknown)  penicillin (Unknown)  strawberry (Unknown)    MEDICATIONS:  STANDING MEDICATIONS  allopurinol 300 milliGRAM(s) Oral daily  apixaban 5 milliGRAM(s) Oral every 12 hours  cefepime   IVPB      cefepime   IVPB 2000 milliGRAM(s) IV Intermittent every 12 hours  chlorhexidine 4% Liquid 1 Application(s) Topical <User Schedule>  Dakins Solution - 1/2 Strength 1 Application(s) Topical daily  dextrose 5%. 1000 milliLiter(s) IV Continuous <Continuous>  dextrose 50% Injectable 12.5 Gram(s) IV Push once  dextrose 50% Injectable 25 Gram(s) IV Push once  dextrose 50% Injectable 25 Gram(s) IV Push once  docusate sodium 100 milliGRAM(s) Oral two times a day  folic acid 1 milliGRAM(s) Oral daily  gabapentin 300 milliGRAM(s) Oral every 12 hours  insulin lispro (HumaLOG) corrective regimen sliding scale   SubCutaneous three times a day before meals  leucovorin 5 milliGRAM(s) Oral daily  morphine  - Injectable 2 milliGRAM(s) IV Push daily  predniSONE   Tablet 5 milliGRAM(s) Oral daily  rifampin 300 milliGRAM(s) Oral two times a day  senna 2 Tablet(s) Oral at bedtime    PRN MEDICATIONS  acetaminophen   Tablet .. 650 milliGRAM(s) Oral every 6 hours PRN  benzocaine 15 mG/menthol 3.6 mG (Sugar-Free) Lozenge 1 Lozenge Oral three times a day PRN  dextrose 40% Gel 15 Gram(s) Oral once PRN  glucagon  Injectable 1 milliGRAM(s) IntraMuscular once PRN  lactulose Syrup 10 Gram(s) Oral three times a day PRN  oxyCODONE    5 mG/acetaminophen 325 mG 2 Tablet(s) Oral every 4 hours PRN    VITALS:   T(F): 97.4  HR: 78  BP: 132/68  RR: 18  SpO2: 98%    PHYSICAL EXAM:  GEN: No acute distress  LUNGS: Clear to auscultation bilaterally   HEART: S1/S2 present.    ABD: Soft, non-tender, non-distended.    EXT: b/l lower extremity lesion  NEURO: AAOX3      LABS:                        9.4    9.38  )-----------( 291      ( 16 Jan 2019 12:01 )             29.9     01-16    138  |  100  |  22<H>  ----------------------------<  179<H>  4.2   |  21  |  1.2    Ca    9.3      16 Jan 2019 12:01      PT/INR - ( 16 Jan 2019 12:01 )   PT: 12.40 sec;   INR: 1.08 ratio         PTT - ( 16 Jan 2019 12:01 )  PTT:36.1 sec                  RADIOLOGY: How Severe Is Your Skin Discoloration?: moderate Additional History: Pt states for about 3 years there has been some darkening/discoloration on upper lip that has not gone away. Pt states she has tried OTC products which has not helped.

## 2024-10-31 ENCOUNTER — NON-APPOINTMENT (OUTPATIENT)
Age: 84
End: 2024-10-31

## 2024-10-31 ENCOUNTER — APPOINTMENT (OUTPATIENT)
Facility: CLINIC | Age: 84
End: 2024-10-31
Payer: MEDICARE

## 2024-10-31 PROCEDURE — 92020 GONIOSCOPY: CPT

## 2024-10-31 PROCEDURE — 92134 CPTRZ OPH DX IMG PST SGM RTA: CPT

## 2024-10-31 PROCEDURE — 99204 OFFICE O/P NEW MOD 45 MIN: CPT | Mod: 25

## 2024-10-31 PROCEDURE — 76512 OPH US DX B-SCAN: CPT | Mod: LT

## 2024-10-31 PROCEDURE — 67228 TREATMENT X10SV RETINOPATHY: CPT | Mod: LT

## 2024-11-12 RX ORDER — SILVER SULFADIAZINE 10 MG/G
1 CREAM TOPICAL TWICE DAILY
Qty: 1 | Refills: 1 | Status: ACTIVE | COMMUNITY
Start: 2024-11-12 | End: 1900-01-01

## 2024-11-23 ENCOUNTER — NON-APPOINTMENT (OUTPATIENT)
Age: 84
End: 2024-11-23

## 2024-12-05 ENCOUNTER — APPOINTMENT (OUTPATIENT)
Dept: BURN CARE | Facility: CLINIC | Age: 84
End: 2024-12-05

## 2024-12-05 ENCOUNTER — OUTPATIENT (OUTPATIENT)
Dept: OUTPATIENT SERVICES | Facility: HOSPITAL | Age: 84
LOS: 1 days | End: 2024-12-05
Payer: MEDICARE

## 2024-12-05 VITALS
WEIGHT: 175 LBS | BODY MASS INDEX: 26.61 KG/M2 | HEART RATE: 95 BPM | SYSTOLIC BLOOD PRESSURE: 148 MMHG | OXYGEN SATURATION: 98 % | DIASTOLIC BLOOD PRESSURE: 81 MMHG

## 2024-12-05 DIAGNOSIS — Z00.8 ENCOUNTER FOR OTHER GENERAL EXAMINATION: ICD-10-CM

## 2024-12-05 DIAGNOSIS — M17.11 UNILATERAL PRIMARY OSTEOARTHRITIS, RIGHT KNEE: Chronic | ICD-10-CM

## 2024-12-05 PROCEDURE — 99213 OFFICE O/P EST LOW 20 MIN: CPT

## 2024-12-11 NOTE — ED PROVIDER NOTE - HEME LYMPH, MLM
Please do hand xray    Continue leflunomide for now    Please see Branch2 for additional rx of fibromyalgia    Follow up x 3-4 months     No adenopathy

## 2025-01-14 ENCOUNTER — APPOINTMENT (OUTPATIENT)
Facility: CLINIC | Age: 85
End: 2025-01-14

## 2025-01-16 ENCOUNTER — APPOINTMENT (OUTPATIENT)
Dept: BURN CARE | Facility: CLINIC | Age: 85
End: 2025-01-16

## 2025-03-04 ENCOUNTER — APPOINTMENT (OUTPATIENT)
Facility: CLINIC | Age: 85
End: 2025-03-04

## 2025-05-13 ENCOUNTER — NON-APPOINTMENT (OUTPATIENT)
Age: 85
End: 2025-05-13

## 2025-05-29 ENCOUNTER — APPOINTMENT (OUTPATIENT)
Facility: CLINIC | Age: 85
End: 2025-05-29

## 2025-06-04 ENCOUNTER — NON-APPOINTMENT (OUTPATIENT)
Age: 85
End: 2025-06-04

## 2025-07-09 ENCOUNTER — APPOINTMENT (OUTPATIENT)
Facility: CLINIC | Age: 85
End: 2025-07-09

## 2025-07-22 ENCOUNTER — APPOINTMENT (OUTPATIENT)
Dept: BURN CARE | Facility: CLINIC | Age: 85
End: 2025-07-22
Payer: MEDICARE

## 2025-07-22 ENCOUNTER — OUTPATIENT (OUTPATIENT)
Dept: OUTPATIENT SERVICES | Facility: HOSPITAL | Age: 85
LOS: 1 days | End: 2025-07-22
Payer: MEDICARE

## 2025-07-22 VITALS — SYSTOLIC BLOOD PRESSURE: 160 MMHG | DIASTOLIC BLOOD PRESSURE: 103 MMHG | HEART RATE: 97 BPM | OXYGEN SATURATION: 98 %

## 2025-07-22 DIAGNOSIS — M17.11 UNILATERAL PRIMARY OSTEOARTHRITIS, RIGHT KNEE: Chronic | ICD-10-CM

## 2025-07-22 DIAGNOSIS — Z00.8 ENCOUNTER FOR OTHER GENERAL EXAMINATION: ICD-10-CM

## 2025-07-22 PROCEDURE — 99214 OFFICE O/P EST MOD 30 MIN: CPT

## 2025-07-22 RX ORDER — SILVER SULFADIAZINE 10 MG/G
1 CREAM TOPICAL DAILY
Qty: 2 | Refills: 2 | Status: ACTIVE | COMMUNITY
Start: 2025-07-22 | End: 1900-01-01

## 2025-07-29 DIAGNOSIS — S81.802A UNSPECIFIED OPEN WOUND, LEFT LOWER LEG, INITIAL ENCOUNTER: ICD-10-CM
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
## 2025-08-14 ENCOUNTER — APPOINTMENT (OUTPATIENT)
Dept: BURN CARE | Facility: CLINIC | Age: 85
End: 2025-08-14
Payer: MEDICARE

## 2025-08-14 ENCOUNTER — OUTPATIENT (OUTPATIENT)
Dept: OUTPATIENT SERVICES | Facility: HOSPITAL | Age: 85
LOS: 1 days | End: 2025-08-14
Payer: MEDICARE

## 2025-08-14 VITALS
HEIGHT: 68 IN | DIASTOLIC BLOOD PRESSURE: 80 MMHG | HEART RATE: 83 BPM | SYSTOLIC BLOOD PRESSURE: 136 MMHG | TEMPERATURE: 97.5 F | BODY MASS INDEX: 25.76 KG/M2 | WEIGHT: 170 LBS | OXYGEN SATURATION: 95 %

## 2025-08-14 DIAGNOSIS — M17.11 UNILATERAL PRIMARY OSTEOARTHRITIS, RIGHT KNEE: Chronic | ICD-10-CM

## 2025-08-14 DIAGNOSIS — Z00.8 ENCOUNTER FOR OTHER GENERAL EXAMINATION: ICD-10-CM

## 2025-08-14 PROCEDURE — 99213 OFFICE O/P EST LOW 20 MIN: CPT

## 2025-08-18 DIAGNOSIS — I87.2 VENOUS INSUFFICIENCY (CHRONIC) (PERIPHERAL): ICD-10-CM

## 2025-09-04 ENCOUNTER — APPOINTMENT (OUTPATIENT)
Dept: BURN CARE | Facility: CLINIC | Age: 85
End: 2025-09-04
Payer: MEDICARE

## 2025-09-04 ENCOUNTER — OUTPATIENT (OUTPATIENT)
Dept: OUTPATIENT SERVICES | Facility: HOSPITAL | Age: 85
LOS: 1 days | End: 2025-09-04
Payer: MEDICARE

## 2025-09-04 VITALS — SYSTOLIC BLOOD PRESSURE: 156 MMHG | OXYGEN SATURATION: 96 % | HEART RATE: 88 BPM | DIASTOLIC BLOOD PRESSURE: 83 MMHG

## 2025-09-04 DIAGNOSIS — M17.11 UNILATERAL PRIMARY OSTEOARTHRITIS, RIGHT KNEE: Chronic | ICD-10-CM

## 2025-09-04 DIAGNOSIS — Z00.8 ENCOUNTER FOR OTHER GENERAL EXAMINATION: ICD-10-CM

## 2025-09-04 PROCEDURE — 99213 OFFICE O/P EST LOW 20 MIN: CPT

## 2025-09-04 RX ORDER — SILVER SULFADIAZINE 10 MG/G
1 CREAM TOPICAL TWICE DAILY
Qty: 1 | Refills: 1 | Status: ACTIVE | COMMUNITY
Start: 2025-09-04 | End: 1900-01-01

## 2025-09-06 DIAGNOSIS — I87.2 VENOUS INSUFFICIENCY (CHRONIC) (PERIPHERAL): ICD-10-CM

## 2025-09-06 DIAGNOSIS — L97.919 NON-PRESSURE CHRONIC ULCER OF UNSPECIFIED PART OF RIGHT LOWER LEG WITH UNSPECIFIED SEVERITY: ICD-10-CM

## 2025-09-06 DIAGNOSIS — L97.929 NON-PRESSURE CHRONIC ULCER OF UNSPECIFIED PART OF LEFT LOWER LEG WITH UNSPECIFIED SEVERITY: ICD-10-CM
